# Patient Record
Sex: FEMALE | Race: WHITE | NOT HISPANIC OR LATINO | Employment: OTHER | URBAN - METROPOLITAN AREA
[De-identification: names, ages, dates, MRNs, and addresses within clinical notes are randomized per-mention and may not be internally consistent; named-entity substitution may affect disease eponyms.]

---

## 2017-01-04 RX ORDER — CLONIDINE HYDROCHLORIDE 0.3 MG/1
TABLET ORAL
Qty: 30 TABLET | Refills: 0 | Status: SHIPPED | OUTPATIENT
Start: 2017-01-04 | End: 2017-05-19

## 2017-01-09 ENCOUNTER — PATIENT OUTREACH (OUTPATIENT)
Dept: ADMINISTRATIVE | Facility: HOSPITAL | Age: 67
End: 2017-01-09

## 2017-02-16 ENCOUNTER — PATIENT MESSAGE (OUTPATIENT)
Dept: INTERNAL MEDICINE | Facility: CLINIC | Age: 67
End: 2017-02-16

## 2017-02-16 DIAGNOSIS — E11.9 DIABETES MELLITUS TYPE 2 IN NONOBESE: Primary | ICD-10-CM

## 2017-02-16 DIAGNOSIS — I15.2 HYPERTENSION ASSOCIATED WITH DIABETES: ICD-10-CM

## 2017-02-16 DIAGNOSIS — E78.5 HYPERLIPIDEMIA ASSOCIATED WITH TYPE 2 DIABETES MELLITUS: ICD-10-CM

## 2017-02-16 DIAGNOSIS — E55.9 VITAMIN D DEFICIENCY: ICD-10-CM

## 2017-02-16 DIAGNOSIS — E11.69 HYPERLIPIDEMIA ASSOCIATED WITH TYPE 2 DIABETES MELLITUS: ICD-10-CM

## 2017-02-16 DIAGNOSIS — E11.59 HYPERTENSION ASSOCIATED WITH DIABETES: ICD-10-CM

## 2017-02-21 ENCOUNTER — LAB VISIT (OUTPATIENT)
Dept: LAB | Facility: HOSPITAL | Age: 67
End: 2017-02-21
Attending: FAMILY MEDICINE
Payer: MEDICARE

## 2017-02-21 DIAGNOSIS — E55.9 VITAMIN D DEFICIENCY: ICD-10-CM

## 2017-02-21 DIAGNOSIS — E78.5 HYPERLIPIDEMIA ASSOCIATED WITH TYPE 2 DIABETES MELLITUS: ICD-10-CM

## 2017-02-21 DIAGNOSIS — E11.9 DIABETES MELLITUS TYPE 2 IN NONOBESE: ICD-10-CM

## 2017-02-21 DIAGNOSIS — E11.59 HYPERTENSION ASSOCIATED WITH DIABETES: ICD-10-CM

## 2017-02-21 DIAGNOSIS — E11.69 HYPERLIPIDEMIA ASSOCIATED WITH TYPE 2 DIABETES MELLITUS: ICD-10-CM

## 2017-02-21 DIAGNOSIS — I15.2 HYPERTENSION ASSOCIATED WITH DIABETES: ICD-10-CM

## 2017-02-21 LAB
25(OH)D3+25(OH)D2 SERPL-MCNC: 40 NG/ML
ALBUMIN SERPL BCP-MCNC: 4 G/DL
ALP SERPL-CCNC: 84 U/L
ALT SERPL W/O P-5'-P-CCNC: 28 U/L
ANION GAP SERPL CALC-SCNC: 9 MMOL/L
AST SERPL-CCNC: 24 U/L
BASOPHILS # BLD AUTO: 0.02 K/UL
BASOPHILS NFR BLD: 0.4 %
BILIRUB SERPL-MCNC: 0.5 MG/DL
BUN SERPL-MCNC: 10 MG/DL
CALCIUM SERPL-MCNC: 9.5 MG/DL
CHLORIDE SERPL-SCNC: 106 MMOL/L
CHOLEST/HDLC SERPL: 3.7 {RATIO}
CO2 SERPL-SCNC: 28 MMOL/L
CREAT SERPL-MCNC: 0.8 MG/DL
DIFFERENTIAL METHOD: NORMAL
EOSINOPHIL # BLD AUTO: 0.2 K/UL
EOSINOPHIL NFR BLD: 3.2 %
ERYTHROCYTE [DISTWIDTH] IN BLOOD BY AUTOMATED COUNT: 12.6 %
EST. GFR  (AFRICAN AMERICAN): >60 ML/MIN/1.73 M^2
EST. GFR  (NON AFRICAN AMERICAN): >60 ML/MIN/1.73 M^2
GLUCOSE SERPL-MCNC: 123 MG/DL
HCT VFR BLD AUTO: 38.9 %
HDL/CHOLESTEROL RATIO: 26.8 %
HDLC SERPL-MCNC: 149 MG/DL
HDLC SERPL-MCNC: 40 MG/DL
HGB BLD-MCNC: 12.9 G/DL
LDLC SERPL CALC-MCNC: 76.8 MG/DL
LYMPHOCYTES # BLD AUTO: 1.7 K/UL
LYMPHOCYTES NFR BLD: 33.4 %
MCH RBC QN AUTO: 30.1 PG
MCHC RBC AUTO-ENTMCNC: 33.2 %
MCV RBC AUTO: 91 FL
MONOCYTES # BLD AUTO: 0.4 K/UL
MONOCYTES NFR BLD: 8.4 %
NEUTROPHILS # BLD AUTO: 2.7 K/UL
NEUTROPHILS NFR BLD: 54.4 %
NONHDLC SERPL-MCNC: 109 MG/DL
PLATELET # BLD AUTO: 172 K/UL
PMV BLD AUTO: 12 FL
POTASSIUM SERPL-SCNC: 3.5 MMOL/L
PROT SERPL-MCNC: 6.9 G/DL
RBC # BLD AUTO: 4.29 M/UL
SODIUM SERPL-SCNC: 143 MMOL/L
TRIGL SERPL-MCNC: 161 MG/DL
TSH SERPL DL<=0.005 MIU/L-ACNC: 1.24 UIU/ML
WBC # BLD AUTO: 5 K/UL

## 2017-02-21 PROCEDURE — 84443 ASSAY THYROID STIM HORMONE: CPT

## 2017-02-21 PROCEDURE — 82306 VITAMIN D 25 HYDROXY: CPT

## 2017-02-21 PROCEDURE — 80053 COMPREHEN METABOLIC PANEL: CPT

## 2017-02-21 PROCEDURE — 85025 COMPLETE CBC W/AUTO DIFF WBC: CPT

## 2017-02-21 PROCEDURE — 83036 HEMOGLOBIN GLYCOSYLATED A1C: CPT

## 2017-02-21 PROCEDURE — 36415 COLL VENOUS BLD VENIPUNCTURE: CPT

## 2017-02-21 PROCEDURE — 80061 LIPID PANEL: CPT

## 2017-02-22 LAB
ESTIMATED AVG GLUCOSE: 131 MG/DL
HBA1C MFR BLD HPLC: 6.2 %

## 2017-02-23 ENCOUNTER — OFFICE VISIT (OUTPATIENT)
Dept: INTERNAL MEDICINE | Facility: CLINIC | Age: 67
End: 2017-02-23
Payer: MEDICARE

## 2017-02-23 VITALS
HEIGHT: 60 IN | WEIGHT: 138.88 LBS | OXYGEN SATURATION: 98 % | DIASTOLIC BLOOD PRESSURE: 76 MMHG | TEMPERATURE: 98 F | HEART RATE: 69 BPM | BODY MASS INDEX: 27.26 KG/M2 | SYSTOLIC BLOOD PRESSURE: 132 MMHG

## 2017-02-23 DIAGNOSIS — E11.9 CONTROLLED TYPE 2 DIABETES MELLITUS WITHOUT COMPLICATION, WITHOUT LONG-TERM CURRENT USE OF INSULIN: ICD-10-CM

## 2017-02-23 DIAGNOSIS — Z11.59 NEED FOR HEPATITIS C SCREENING TEST: ICD-10-CM

## 2017-02-23 DIAGNOSIS — Z00.00 ROUTINE GENERAL MEDICAL EXAMINATION AT A HEALTH CARE FACILITY: Primary | ICD-10-CM

## 2017-02-23 DIAGNOSIS — F51.04 CHRONIC INSOMNIA: ICD-10-CM

## 2017-02-23 DIAGNOSIS — K21.9 GASTROESOPHAGEAL REFLUX DISEASE WITHOUT ESOPHAGITIS: ICD-10-CM

## 2017-02-23 DIAGNOSIS — I15.2 HYPERTENSION ASSOCIATED WITH DIABETES: ICD-10-CM

## 2017-02-23 DIAGNOSIS — E78.5 HYPERLIPIDEMIA ASSOCIATED WITH TYPE 2 DIABETES MELLITUS: ICD-10-CM

## 2017-02-23 DIAGNOSIS — E11.69 HYPERLIPIDEMIA ASSOCIATED WITH TYPE 2 DIABETES MELLITUS: ICD-10-CM

## 2017-02-23 DIAGNOSIS — Z85.3 HISTORY OF MALIGNANT NEOPLASM OF BREAST: ICD-10-CM

## 2017-02-23 DIAGNOSIS — E11.59 HYPERTENSION ASSOCIATED WITH DIABETES: ICD-10-CM

## 2017-02-23 DIAGNOSIS — Z23 NEED FOR VACCINATION WITH 13-POLYVALENT PNEUMOCOCCAL CONJUGATE VACCINE: ICD-10-CM

## 2017-02-23 PROCEDURE — G0009 ADMIN PNEUMOCOCCAL VACCINE: HCPCS | Mod: S$GLB,,, | Performed by: FAMILY MEDICINE

## 2017-02-23 PROCEDURE — 99397 PER PM REEVAL EST PAT 65+ YR: CPT | Mod: S$GLB,,, | Performed by: FAMILY MEDICINE

## 2017-02-23 PROCEDURE — 99999 PR PBB SHADOW E&M-EST. PATIENT-LVL III: CPT | Mod: PBBFAC,,, | Performed by: FAMILY MEDICINE

## 2017-02-23 PROCEDURE — 3075F SYST BP GE 130 - 139MM HG: CPT | Mod: S$GLB,,, | Performed by: FAMILY MEDICINE

## 2017-02-23 PROCEDURE — 99499 UNLISTED E&M SERVICE: CPT | Mod: S$GLB,,, | Performed by: FAMILY MEDICINE

## 2017-02-23 PROCEDURE — 3078F DIAST BP <80 MM HG: CPT | Mod: S$GLB,,, | Performed by: FAMILY MEDICINE

## 2017-02-23 PROCEDURE — 90670 PCV13 VACCINE IM: CPT | Mod: S$GLB,,, | Performed by: FAMILY MEDICINE

## 2017-02-23 NOTE — MR AVS SNAPSHOT
[facility]  [address]  [place] [address]  Phone: [phone]  Fax: [phone]                  Renea Bourgeois   2017 1:00 PM   Office Visit    Description:  Female : 1950   Provider:  Kamini Newton MD   Department:  [facility]           Reason for Visit     Annual Exam           Diagnoses this Visit        Comments    Need for vaccination with 13-polyvalent pneumococcal conjugate vaccine    -  Primary     Need for hepatitis C screening test                To Do List           Future Appointments        Provider Department Dept Phone    2017 10:00 AM Andree Coyne, NP-C, CDE [facility] - Diabetes Management [phone]      Goals (5 Years of Data)     None      Follow-Up and Disposition     Return in about 6 months (around 2017), or if symptoms worsen or fail to improve, for follow up chronic conditions.      [facility]     [facility] On Call Nurse Care Line -  Assistance  Registered nurses in the [facility] provide clinical advisement, health education, appointment booking, and other advisory services.  Call for this free service at [phone].             Medications           Message regarding Medications     Verify the changes and/or additions to your medication regime listed below are the same as discussed with your clinician today.  If any of these changes or additions are incorrect, please notify your healthcare provider.        STOP taking these medications     calcium citrate tablet 200 mg     cyclobenzaprine (FLEXERIL) 10 MG tablet Take 1 tablet by mouth daily as needed. [facility] [place] [phone].    ergocalciferol (ERGOCALCIFEROL) 50,000 unit Cap take 1 capsule by mouth every week    glucosamine-D3-boswellia serr (OSTEO BI-FLEX, 5-LOXIN,) 1,500-400-100 mg-unit-mg Tab Take 1 tablet by mouth once daily.           Verify that the below list of medications is an accurate representation  of the medications you are currently taking.  If none reported, the list may be blank. If incorrect, please contact your healthcare provider. Carry this list with you in case of emergency.           Current Medications     ALCOHOL PREP PADS PadM     anastrozole (ARIMIDEX) 1 mg Tab Take 1 tablet (1 mg total) by mouth once daily.    blood sugar diagnostic (ACCU-CHEK JD PLUS TEST STRP) Strp 1 strip by Misc.(Non-Drug; Combo Route) route 3 (three) times daily. Accu-chek Jd Plus Strips    blood-glucose meter (ACCU-CHEK JD PLUS METER) Misc 1 each by Misc.(Non-Drug; Combo Route) route as directed. Accu-chek Jd Plus Meter    cetirizine-pseudoephedrine (ZYRTEC-D) 5-120 mg Tb12 Take by mouth. 1 Tablet Sustained Release 12HR Oral Twice a day    cloNIDine (CATAPRES) 0.3 MG tablet take 1 tablet by mouth every evening    CRESTOR 10 mg tablet take 1 tablet by mouth once daily    esomeprazole (NEXIUM) 40 MG capsule take 1 capsule by mouth once daily    fluticasone (FLONASE) 50 mcg/actuation nasal spray 2 Spray, Suspension Nasal Every day    glimepiride (AMARYL) 1 MG tablet Take 1 tablet (1 mg total) by mouth before breakfast.    KRILL OIL ORAL Take by mouth.    lancets (ACCU-CHEK SOFTCLIX LANCETS) Misc 1 each by Misc.(Non-Drug; Combo Route) route 3 (three) times daily.    lancing device Misc     nebivolol (BYSTOLIC) 5 MG tablet Take 5 mg by mouth Daily.     trazodone (DESYREL) 50 MG tablet take 2 tablets by mouth at bedtime if needed for insomnia           Clinical Reference Information           Your Vitals Were     BP Pulse Temp Height    132/76 (BP Location: Right arm, Patient Position: Sitting, BP Method: Manual) 69 98.1 °F (36.7 °C) (Tympanic) 5' (1.524 m)    Weight Last Period SpO2 BMI    63 kg (138 lb 14.2 oz) 01/01/2004 (Within Months) 98% 27.13 kg/m2      Blood Pressure          Most Recent Value    BP  132/76      Allergies as of 2/23/2017     Codeine      Immunizations Administered on Date of Encounter -  2/23/2017     Name Date Dose VIS Date Route    Pneumococcal Conjugate - 13 Valent 2/23/2017 0.5 mL 11/5/2015 Intramuscular      Orders Placed During Today's Visit      Normal Orders This Visit    Pneumococcal Conjugate Vaccine (13 Valent) (IM)     Future Labs/Procedures Expected by Expires    Hepatitis C antibody  2/23/2017 4/24/2018      Language Assistance Services     ATTENTION: Language assistance services are available, free of charge. Please call [phone].      ATENCIÓN: Si habla jose albertoañol, tiene a flynn disposición servicios gratuitos de asistencia lingüística. Llame al [phone].     CHÚ Ý: N?u b?n nói Ti?ng Vi?t, có các d?ch v? h? tr? ngôn ng? mi?n phí dành cho b?n. G?i s? [phone].         [facility] complies with applicable Federal civil rights laws and does not discriminate on the basis of race, color, national origin, age, disability, or sex.

## 2017-02-24 NOTE — PROGRESS NOTES
"Subjective:       Patient ID: Renea Bourgeois is a 67 y.o. female.    Chief Complaint: Annual Exam    HPI Comments: Patient presents to clinic today for annual physical exam.    Review of Systems   Constitutional: Positive for fatigue. Negative for chills, fever and unexpected weight change.   HENT: Positive for trouble swallowing (chronic, stable, x years). Negative for congestion, dental problem, ear pain, hearing loss and rhinorrhea.    Eyes: Negative for pain and visual disturbance.   Respiratory: Positive for cough (occasionally). Negative for shortness of breath.    Cardiovascular: Positive for palpitations (chronic, stable). Negative for chest pain and leg swelling.   Gastrointestinal: Negative for abdominal distention, abdominal pain, blood in stool, constipation, diarrhea, nausea and vomiting.   Genitourinary: Negative for difficulty urinating and vaginal discharge.   Musculoskeletal: Positive for arthralgias (knees x 2 weeks, "achy", aleve helps). Negative for myalgias.   Skin: Negative for rash.   Neurological: Negative for dizziness, weakness, numbness and headaches.   Hematological: Negative for adenopathy. Does not bruise/bleed easily.   Psychiatric/Behavioral: Positive for sleep disturbance (chronic). Negative for dysphoric mood. The patient is not nervous/anxious.        Objective:      Physical Exam   Constitutional: She is oriented to person, place, and time. Vital signs are normal. She appears well-developed and well-nourished. No distress.   HENT:   Head: Normocephalic and atraumatic.   Right Ear: Tympanic membrane, external ear and ear canal normal.   Left Ear: Tympanic membrane, external ear and ear canal normal.   Nose: Nose normal. No mucosal edema or rhinorrhea.   Mouth/Throat: Uvula is midline, oropharynx is clear and moist and mucous membranes are normal.   Eyes: Conjunctivae, EOM and lids are normal. Pupils are equal, round, and reactive to light.   Neck: Normal range of motion. Neck " supple. No thyromegaly present.   Cardiovascular: Normal rate and regular rhythm.  Exam reveals no gallop and no friction rub.    No murmur heard.  Pulmonary/Chest: Effort normal and breath sounds normal. She has no wheezes. She has no rhonchi. She has no rales.   Abdominal: Soft. Normal appearance and bowel sounds are normal. She exhibits no distension and no mass. There is no hepatosplenomegaly. There is no tenderness.   Musculoskeletal: Normal range of motion.   Lymphadenopathy:     She has no cervical adenopathy.   Neurological: She is alert and oriented to person, place, and time. She has normal strength. No cranial nerve deficit or sensory deficit. Gait normal.   Reflex Scores:       Patellar reflexes are 2+ on the right side and 2+ on the left side.  Skin: Skin is warm and dry. No lesion and no rash noted. No cyanosis. Nails show no clubbing.   Psychiatric: She has a normal mood and affect.   Vitals reviewed.      Assessment:       1. Routine general medical examination at a health care facility    2. Controlled type 2 diabetes mellitus without complication, without long-term current use of insulin    3. Hypertension associated with diabetes    4. Hyperlipidemia associated with type 2 diabetes mellitus    5. Gastroesophageal reflux disease without esophagitis    6. Chronic insomnia    7. Malignant neoplasm of right female breast, unspecified site of breast    8. Need for vaccination with 13-polyvalent pneumococcal conjugate vaccine    9. Need for hepatitis C screening test        Plan:   Renea was seen today for annual exam.    Diagnoses and all orders for this visit:    Routine general medical examination at a health care facility    Controlled type 2 diabetes mellitus without complication, without long-term current use of insulin    Hypertension associated with diabetes  Comments:  controlled, continue current meds    Hyperlipidemia associated with type 2 diabetes mellitus  Comments:  improved, continue  current meds    Gastroesophageal reflux disease without esophagitis  Comments:  controlled on nexium    Chronic insomnia  Comments:  stable on trazodone    Malignant neoplasm of right female breast, unspecified site of breast  Comments:  followed by Dr. Jones, Hem/Onc    Need for vaccination with 13-polyvalent pneumococcal conjugate vaccine  -     Pneumococcal Conjugate Vaccine (13 Valent) (IM)    Need for hepatitis C screening test  -     Hepatitis C antibody; Future    - knee pain may be due to recently helping her kids clean their houses. She can take aleve with food as needed, advised against regular use. Followup if persistent.  - Health Maintenance reviewed/updated.

## 2017-03-16 DIAGNOSIS — E11.9 TYPE 2 DIABETES MELLITUS WITHOUT COMPLICATION, WITHOUT LONG-TERM CURRENT USE OF INSULIN: Chronic | ICD-10-CM

## 2017-03-16 RX ORDER — ROSUVASTATIN CALCIUM 10 MG/1
TABLET, COATED ORAL
Qty: 90 TABLET | Refills: 3 | Status: SHIPPED | OUTPATIENT
Start: 2017-03-16 | End: 2018-03-24 | Stop reason: SDUPTHER

## 2017-03-16 RX ORDER — GLIMEPIRIDE 1 MG/1
TABLET ORAL
Qty: 30 TABLET | Refills: 3 | Status: SHIPPED | OUTPATIENT
Start: 2017-03-16 | End: 2017-05-19 | Stop reason: SDUPTHER

## 2017-05-19 ENCOUNTER — OFFICE VISIT (OUTPATIENT)
Dept: INTERNAL MEDICINE | Facility: CLINIC | Age: 67
End: 2017-05-19
Payer: MEDICARE

## 2017-05-19 ENCOUNTER — OFFICE VISIT (OUTPATIENT)
Dept: DIABETES | Facility: CLINIC | Age: 67
End: 2017-05-19
Payer: MEDICARE

## 2017-05-19 VITALS
DIASTOLIC BLOOD PRESSURE: 80 MMHG | WEIGHT: 140.88 LBS | SYSTOLIC BLOOD PRESSURE: 120 MMHG | HEIGHT: 60 IN | BODY MASS INDEX: 27.66 KG/M2

## 2017-05-19 VITALS
WEIGHT: 140.88 LBS | BODY MASS INDEX: 27.66 KG/M2 | SYSTOLIC BLOOD PRESSURE: 120 MMHG | TEMPERATURE: 98 F | HEART RATE: 70 BPM | DIASTOLIC BLOOD PRESSURE: 80 MMHG | OXYGEN SATURATION: 99 % | HEIGHT: 60 IN

## 2017-05-19 DIAGNOSIS — I10 ESSENTIAL HYPERTENSION, BENIGN: Chronic | ICD-10-CM

## 2017-05-19 DIAGNOSIS — H25.13 NUCLEAR SCLEROSIS OF BOTH EYES: ICD-10-CM

## 2017-05-19 DIAGNOSIS — B97.7 HUMAN PAPILLOMA VIRUS (HPV) INFECTION: ICD-10-CM

## 2017-05-19 DIAGNOSIS — I20.9 ANGINA PECTORIS: ICD-10-CM

## 2017-05-19 DIAGNOSIS — Z85.3 HISTORY OF MALIGNANT NEOPLASM OF BREAST: ICD-10-CM

## 2017-05-19 DIAGNOSIS — K21.9 GASTROESOPHAGEAL REFLUX DISEASE, ESOPHAGITIS PRESENCE NOT SPECIFIED: ICD-10-CM

## 2017-05-19 DIAGNOSIS — E11.9 DIABETES MELLITUS TYPE 2 WITHOUT RETINOPATHY: ICD-10-CM

## 2017-05-19 DIAGNOSIS — E78.2 COMBINED HYPERLIPIDEMIA ASSOCIATED WITH TYPE 2 DIABETES MELLITUS: Chronic | ICD-10-CM

## 2017-05-19 DIAGNOSIS — G56.03 CARPAL TUNNEL SYNDROME ON BOTH SIDES: Chronic | ICD-10-CM

## 2017-05-19 DIAGNOSIS — M85.80 OSTEOPENIA, UNSPECIFIED LOCATION: ICD-10-CM

## 2017-05-19 DIAGNOSIS — E11.9 TYPE 2 DIABETES MELLITUS WITHOUT COMPLICATION, WITHOUT LONG-TERM CURRENT USE OF INSULIN: Primary | Chronic | ICD-10-CM

## 2017-05-19 DIAGNOSIS — H52.7 REFRACTIVE ERROR: ICD-10-CM

## 2017-05-19 DIAGNOSIS — Z00.00 ENCOUNTER FOR PREVENTIVE HEALTH EXAMINATION: Primary | ICD-10-CM

## 2017-05-19 DIAGNOSIS — E11.69 COMBINED HYPERLIPIDEMIA ASSOCIATED WITH TYPE 2 DIABETES MELLITUS: Chronic | ICD-10-CM

## 2017-05-19 DIAGNOSIS — I27.20 PULMONARY HTN: ICD-10-CM

## 2017-05-19 DIAGNOSIS — H26.9 CATARACT, UNSPECIFIED CATARACT TYPE, UNSPECIFIED LATERALITY: ICD-10-CM

## 2017-05-19 DIAGNOSIS — Z96.1 PSEUDOPHAKIA: ICD-10-CM

## 2017-05-19 DIAGNOSIS — Z87.442 HISTORY OF RENAL CALCULI: ICD-10-CM

## 2017-05-19 DIAGNOSIS — H25.12 NUCLEAR SCLEROSIS OF LEFT EYE: ICD-10-CM

## 2017-05-19 LAB — GLUCOSE SERPL-MCNC: 142 MG/DL (ref 70–110)

## 2017-05-19 PROCEDURE — 3074F SYST BP LT 130 MM HG: CPT | Mod: S$GLB,,, | Performed by: PHYSICIAN ASSISTANT

## 2017-05-19 PROCEDURE — 3079F DIAST BP 80-89 MM HG: CPT | Mod: S$GLB,,, | Performed by: NURSE PRACTITIONER

## 2017-05-19 PROCEDURE — 99213 OFFICE O/P EST LOW 20 MIN: CPT | Mod: S$GLB,,, | Performed by: NURSE PRACTITIONER

## 2017-05-19 PROCEDURE — G0439 PPPS, SUBSEQ VISIT: HCPCS | Mod: S$GLB,,, | Performed by: PHYSICIAN ASSISTANT

## 2017-05-19 PROCEDURE — 1159F MED LIST DOCD IN RCRD: CPT | Mod: S$GLB,,, | Performed by: NURSE PRACTITIONER

## 2017-05-19 PROCEDURE — 99999 PR PBB SHADOW E&M-EST. PATIENT-LVL III: CPT | Mod: PBBFAC,,, | Performed by: NURSE PRACTITIONER

## 2017-05-19 PROCEDURE — 99999 PR PBB SHADOW E&M-EST. PATIENT-LVL IV: CPT | Mod: PBBFAC,,, | Performed by: PHYSICIAN ASSISTANT

## 2017-05-19 PROCEDURE — 99499 UNLISTED E&M SERVICE: CPT | Mod: S$GLB,,, | Performed by: PHYSICIAN ASSISTANT

## 2017-05-19 PROCEDURE — 3044F HG A1C LEVEL LT 7.0%: CPT | Mod: S$GLB,,, | Performed by: NURSE PRACTITIONER

## 2017-05-19 PROCEDURE — 3079F DIAST BP 80-89 MM HG: CPT | Mod: S$GLB,,, | Performed by: PHYSICIAN ASSISTANT

## 2017-05-19 PROCEDURE — 3074F SYST BP LT 130 MM HG: CPT | Mod: S$GLB,,, | Performed by: NURSE PRACTITIONER

## 2017-05-19 PROCEDURE — 1160F RVW MEDS BY RX/DR IN RCRD: CPT | Mod: S$GLB,,, | Performed by: NURSE PRACTITIONER

## 2017-05-19 PROCEDURE — 82948 REAGENT STRIP/BLOOD GLUCOSE: CPT | Mod: S$GLB,,, | Performed by: NURSE PRACTITIONER

## 2017-05-19 PROCEDURE — 3060F POS MICROALBUMINURIA REV: CPT | Mod: 8P,S$GLB,, | Performed by: NURSE PRACTITIONER

## 2017-05-19 RX ORDER — GLIMEPIRIDE 1 MG/1
1 TABLET ORAL DAILY
Qty: 30 TABLET | Refills: 6 | Status: SHIPPED | OUTPATIENT
Start: 2017-05-19 | End: 2017-12-11 | Stop reason: SDUPTHER

## 2017-05-19 RX ORDER — METOPROLOL SUCCINATE 25 MG/1
25 TABLET, EXTENDED RELEASE ORAL NIGHTLY
Refills: 1 | COMMUNITY
Start: 2017-05-12 | End: 2021-12-02 | Stop reason: SDUPTHER

## 2017-05-19 NOTE — PATIENT INSTRUCTIONS
Counseling and Referral of Other Preventative  (Italic type indicates deductible and co-insurance are waived)    Patient Name: Renea Bourgeois  Today's Date: 5/19/2017      SERVICE LIMITATIONS RECOMMENDATION    Vaccines    · Pneumococcal (once after 65)    · Influenza (annually)    · Hepatitis B (if medium/high risk)    · Prevnar 13      Hepatitis B medium/high risk factors:       - End-stage renal disease       - Hemophiliacs who received Factor VII or         IX concentrates       - Clients of institutions for the mentally             retarded       - Persons who live in the same house as          a HepB carrier       - Homosexual men       - Illicit injectable drug abusers     Pneumococcal: Done, no repeat necessary     Influenza: Done, repeat in one year     Hepatitis B: N/A     Prevnar 13: Done, no repeat necessary    Mammogram (biennial age 50-74)  Annually (age 40 or over)  done 6/21/17   Recommend yearly    Pap (up to age 70 and after 70 if unknown history or abnormal study last 10 years)    N/A     Patient's GYN states there no need.     Colorectal cancer screening (to age 75)    · Fecal occult blood test (annual)  · Flexible sigmoidoscopy (5y)  · Screening colonoscopy (10y)  · Barium enema   Last done 12/18/2014, recommend to repeat every 3  years    Diabetes self-management training (no USPSTF recommendations)  Requires referral by treating physician for patient with diabetes or renal disease. 10 hours of initial DSMT sessions of no less than 30 minutes each in a continuous 12-month period. 2 hours of follow-up DSMT in subsequent years.  Done this year, repeat every year    Bone mass measurements (age 65 & older, biennial)  Requires diagnosis related to osteoporosis or estrogen deficiency. Biennial benefit unless patient has history of long-term glucocorticoid  Scheduled, see appointments    Glaucoma screening (no USPSTF recommendation)  Diabetes mellitus, family history   , age 50 or  over    American, age 65 or over  Last done 6/17/16, recommend to repeat every 1  years    Medical nutrition therapy for diabetes or renal disease (no recommended schedule)  Requires referral by treating physician for patient with diabetes or renal disease or kidney transplant within the past 3 years.  Can be provided in same year as diabetes self-management training (DSMT), and CMS recommends medical nutrition therapy take place after DSMT. Up to 3 hours for initial year and 2 hours in subsequent years.  Done this year, repeat every year    Cardiovascular screening blood tests (every 5 years)  · Fasting lipid panel  Order as a panel if possible  Done this year, repeat every year    Diabetes screening tests (at least every 3 years, Medicare covers annually or at 6-month intervals for prediabetic patients)  · Fasting blood sugar (FBS) or glucose tolerance test (GTT)  Patient must be diagnosed with one of the following:       - Hypertension       - Dyslipidemia       - Obesity (BMI 30kg/m2)       - Previous elevated impaired FBS or GTT       ... or any two of the following:       - Overweight (BMI 25 but <30)       - Family history of diabetes       - Age 65 or older       - History of gestational diabetes or birth of baby weighing more than 9 pounds  Last done 2/21/2017, recommend to repeat every 6  months    Abdominal aortic aneurysm screening (once)  · Sonogram   Limited to patients who meet one of the following criteria:       - Men who are 65-75 years old and have smoked more than 100 cigarette in their lifetime       - Anyone with a family history of abdominal aortic aneurysm       - Anyone recommended for screening by the USPSTF  N/A    HIV screening (annually for increased risk patients)  · HIV-1 and HIV-2 by EIA, or PALMER, rapid antibody test or oral mucosa transudate  Patients must be at increased risk for HIV infection per USPSTF guidelines or pregnant. Tests covered annually for patient at  increased risk or as requested by the patient. Pregnant patients may receive up to 3 tests during pregnancy.  Risks discussed, screening is not recommended    Smoking cessation counseling (up to 8 sessions per year)  Patients must be asymptomatic of tobacco-related conditions to receive as a preventative service.  Non-smoker    Subsequent annual wellness visit  At least 12 months since last AWV  Return in one year     The following information is provided to all patients.  This information is to help you find resources for any of the problems found today that may be affecting your health:                Living healthy guide: www.[facility].[place].[facility]      Understanding Diabetes: www.diabetes.org      Eating healthy: www.cdc.gov/healthyweight      [facility] home safety checklist: www.cdc.gov/steadi/patient.html      Agency on Aging: www.goea.[place].[facility]      Alcoholics anonymous (AA): www.aa.org      Physical Activity: www.arie.nih.gov/ki2ipza      Tobacco use: www.quitwithusla.org

## 2017-05-19 NOTE — MR AVS SNAPSHOT
[facility]  [id number] [place] [id number]  Phone: [phone]  Fax: [phone]                  Renea Bourgeois   2017 9:00 AM   Office Visit    Description:  Female : 1950   Provider:  Marc Srinivasan III, PA-C   Department:  [facility]           Reason for Visit     HRA           Diagnoses this Visit        Comments    Encounter for preventive health examination    -  Primary     Angina pectoris         Carpal tunnel syndrome on both sides         Pulmonary HTN         Essential hypertension, benign         Human papilloma virus (HPV) infection         Combined hyperlipidemia associated with type 2 diabetes mellitus         Diabetes mellitus type 2 without retinopathy         Gastroesophageal reflux disease, esophagitis presence not specified         Osteopenia, unspecified location         Cataract, unspecified cataract type, unspecified laterality         History of renal calculi         Nuclear sclerosis of both eyes         Refractive error         Pseudophakia         Nuclear sclerosis of left eye         History of malignant neoplasm of breast                To Do List           Future Appointments        Provider Department Dept Phone    2017 10:00 AM Andree Coyne, NP-C, CDE [facility] [phone]      Goals (5 Years of Data)     None      [facility]     [facility] Care [facility] - 24/7 Assistance  Unless otherwise directed by your provider, please contact [facility], our nurse care line that is available for 24/7 assistance.     Registered nurses in the [facility] provide: appointment scheduling, clinical advisement, health education, and other advisory services.  Call: [phone] (toll free)               Medications           Message regarding Medications     Verify the changes and/or additions to your medication regime listed below are the same as discussed with  your clinician today.  If any of these changes or additions are incorrect, please notify your healthcare provider.        STOP taking these medications     cloNIDine (CATAPRES) 0.3 MG tablet take 1 tablet by mouth every evening           Verify that the below list of medications is an accurate representation of the medications you are currently taking.  If none reported, the list may be blank. If incorrect, please contact your healthcare provider. Carry this list with you in case of emergency.           Current Medications     ALCOHOL PREP PADS PadM     anastrozole (ARIMIDEX) 1 mg Tab Take 1 tablet (1 mg total) by mouth once daily.    blood sugar diagnostic (ACCU-CHEK JD PLUS TEST STRP) Strp 1 strip by Misc.(Non-Drug; Combo Route) route 3 (three) times daily. Accu-chek Jd Plus Strips    blood-glucose meter (ACCU-CHEK JD PLUS METER) Misc 1 each by Misc.(Non-Drug; Combo Route) route as directed. Accu-chek Jd Plus Meter    cetirizine-pseudoephedrine (ZYRTEC-D) 5-120 mg Tb12 Take by mouth. 1 Tablet Sustained Release 12HR Oral Twice a day    esomeprazole (NEXIUM) 40 MG capsule take 1 capsule by mouth once daily    fluticasone (FLONASE) 50 mcg/actuation nasal spray 2 Spray, Suspension Nasal Every day    glimepiride (AMARYL) 1 MG tablet take 1 tablet by mouth once daily BEFORE BREAKFAST    KRILL OIL ORAL Take by mouth.    lancets (ACCU-CHEK SOFTCLIX LANCETS) Misc 1 each by Misc.(Non-Drug; Combo Route) route 3 (three) times daily.    lancing device Misc     metoprolol succinate (TOPROL-XL) 25 MG 24 hr tablet Take 25 mg by mouth once daily.    nebivolol (BYSTOLIC) 5 MG tablet Take 5 mg by mouth Daily.     rosuvastatin (CRESTOR) 10 MG tablet take 1 tablet by mouth once daily    trazodone (DESYREL) 50 MG tablet take 2 tablets by mouth at bedtime if needed for insomnia           Clinical Reference Information           Your Vitals Were     BP Pulse Temp Height    120/80 (BP Location: Left arm, Patient Position:  Sitting, BP Method: Manual) 70 97.6 °F (36.4 °C) (Tympanic) 5' (1.524 m)    Weight Last Period SpO2 BMI    63.9 kg (140 lb 14 oz) 01/01/2004 (Within Months) 99% 27.51 kg/m2      Blood Pressure          Most Recent Value    BP  120/80      Allergies as of 5/19/2017     Codeine      Immunizations Administered on Date of Encounter - 5/19/2017     None      Instructions      Counseling and Referral of Other Preventative  (Italic type indicates deductible and co-insurance are waived)    Patient Name: Renea Bourgeois  Today's Date: 5/19/2017      SERVICE LIMITATIONS RECOMMENDATION    Vaccines    · Pneumococcal (once after 65)    · Influenza (annually)    · Hepatitis B (if medium/high risk)    · Prevnar 13      Hepatitis B medium/high risk factors:       - End-stage renal disease       - Hemophiliacs who received Factor VII or         IX concentrates       - Clients of institutions for the mentally             retarded       - Persons who live in the same house as          a HepB carrier       - Homosexual men       - Illicit injectable drug abusers     Pneumococcal: Done, no repeat necessary     Influenza: Done, repeat in one year     Hepatitis B: N/A     Prevnar 13: Done, no repeat necessary    Mammogram (biennial age 50-74)  Annually (age 40 or over)  done 6/21/17   Recommend yearly    Pap (up to age 70 and after 70 if unknown history or abnormal study last 10 years)    N/A     Patient's GYN states there no need.     Colorectal cancer screening (to age 75)    · Fecal occult blood test (annual)  · Flexible sigmoidoscopy (5y)  · Screening colonoscopy (10y)  · Barium enema   Last done 12/18/2014, recommend to repeat every 3  years    Diabetes self-management training (no USPSTF recommendations)  Requires referral by treating physician for patient with diabetes or renal disease. 10 hours of initial DSMT sessions of no less than 30 minutes each in a continuous 12-month period. 2 hours of follow-up DSMT in subsequent years.   Done this year, repeat every year    Bone mass measurements (age 65 & older, biennial)  Requires diagnosis related to osteoporosis or estrogen deficiency. Biennial benefit unless patient has history of long-term glucocorticoid  Scheduled, see appointments    Glaucoma screening (no USPSTF recommendation)  Diabetes mellitus, family history   , age 50 or over    American, age 65 or over  Last done 6/17/16, recommend to repeat every 1  years    Medical nutrition therapy for diabetes or renal disease (no recommended schedule)  Requires referral by treating physician for patient with diabetes or renal disease or kidney transplant within the past 3 years.  Can be provided in same year as diabetes self-management training (DSMT), and CMS recommends medical nutrition therapy take place after DSMT. Up to 3 hours for initial year and 2 hours in subsequent years.  Done this year, repeat every year    Cardiovascular screening blood tests (every 5 years)  · Fasting lipid panel  Order as a panel if possible  Done this year, repeat every year    Diabetes screening tests (at least every 3 years, Medicare covers annually or at 6-month intervals for prediabetic patients)  · Fasting blood sugar (FBS) or glucose tolerance test (GTT)  Patient must be diagnosed with one of the following:       - Hypertension       - Dyslipidemia       - Obesity (BMI 30kg/m2)       - Previous elevated impaired FBS or GTT       ... or any two of the following:       - Overweight (BMI 25 but <30)       - Family history of diabetes       - Age 65 or older       - History of gestational diabetes or birth of baby weighing more than 9 pounds  Last done 2/21/2017, recommend to repeat every 6  months    Abdominal aortic aneurysm screening (once)  · Sonogram   Limited to patients who meet one of the following criteria:       - Men who are 65-75 years old and have smoked more than 100 cigarette in their lifetime       - Anyone with a family  history of abdominal aortic aneurysm       - Anyone recommended for screening by the USPSTF  N/A    HIV screening (annually for increased risk patients)  · HIV-1 and HIV-2 by EIA, or PALMER, rapid antibody test or oral mucosa transudate  Patients must be at increased risk for HIV infection per USPSTF guidelines or pregnant. Tests covered annually for patient at increased risk or as requested by the patient. Pregnant patients may receive up to 3 tests during pregnancy.  Risks discussed, screening is not recommended    Smoking cessation counseling (up to 8 sessions per year)  Patients must be asymptomatic of tobacco-related conditions to receive as a preventative service.  Non-smoker    Subsequent annual wellness visit  At least 12 months since last AWV  Return in one year     The following information is provided to all patients.  This information is to help you find resources for any of the problems found today that may be affecting your health:                Living healthy guide: www.[facility].[place].[facility]      Understanding Diabetes: www.diabetes.org      Eating healthy: www.cdc.gov/healthyweight      [facility] home safety checklist: www.cdc.gov/steadi/patient.html      Agency on Aging: www.goea.[place].[facility]      Alcoholics anonymous (AA): www.aa.org      Physical Activity: www.arie.nih.gov/uv4epcm      Tobacco use: www.quitwithusla.org          Language Assistance Services     ATTENTION: Language assistance services are available, free of charge. Please call [phone].      ATENCIÓN: Si habla español, tiene a flynn disposición servicios gratuitos de asistencia lingüística. Llame al [phone].     CHÚ Ý: N?u b?n nói Ti?ng Vi?t, có các d?ch v? h? tr? ngôn ng? mi?n phí dành cho b?n. G?i s? [phone].         [facility] complies with applicable Federal civil rights laws and does not discriminate on the basis of race, color, national origin, age, disability, or sex.

## 2017-05-19 NOTE — PROGRESS NOTES
PCP: Kamini Newton MD    Subjective:     HISTORY OF PRESENT ILLNESS: 67 year old  female patient is in clinic today for diabetes. Patient has had Type II diabetes since 2010. She is to be enrolled in diabetes education classes. Most recent A1C is 6.2, ADA recommends less than 7.0.  She has a history of breast cancer, treated in 2010 with remission since then.  No longer taking metformin due to SE.  Per meter, fasting BGs are 86 - 136.  She has lost 1 pound since last visit.     Patient denies polyuria, polydipsia, occasional polyphagia, or blurred vision. Also denies nausea, vomiting, diarrhea or hypoglycemic symptoms.     Height: 5', Weight: 140 pounds, BMI 27.51  Blood Glucose reading this AM: 107 mg/dl fasting  Blood Glucose reading in clinic: 142 mg/dl at 10:00 am    DM MEDICATIONS:   Glimepiride 1 mg before breakfast    Labs Reviewed.    REVIEW OF SYSTEMS:  General: Denies fever, chills, change in appetite.  HEENT: Denies impaired hearing, dysphagia.  Respiratory: Denies shortness of breath, cough or wheezing.  Cardiovascular: Denies chest pain, palpitations.  GI: Denies hematochezia.  : Denies hematuria, dysuria or frequency.  MS:  Normal gait. Denies difficulty with mobility, muscle or joint pain.  SKIN: Denies rashes and lesions.  Neuro: Denies numbness or tingling in the hands or feet.   PSYCH: Denies depression or anxiety. No suicidal ideations.  ENDO: See HPI.    STANDARDS OF CARE:  Eye doctor: Dr. Lu, Last exam 6 / 2016.    Dental exam: Recommend regular exams; denies gums bleeding.  Podiatry doctor: No podiatrist.     ACTIVITY LEVEL: No routine exercise.   MEAL PLANNING: Number of meals per day - 3. Number of snacks per day - 2.  Per dietary recall, patient is not limiting carbohydrates, saturated fats and sodium.     BLOOD GLUCOSE TESTING: Self-monitoring with TRUE RESULT meter  SOCIAL HISTORY: .  Lives with spouse. She works as a  for a disabled student,  which happens to be her grandchild.  She does not smoke, drink.     Objective:     PHYSICAL EXAMINATION:  GENERAL: WDWN  female in no acute distress, ambulatory, responds appropriately. AAO X 3.   NECK: Supple, no thyromegaly, no cervical or supraclavicular lymphadenopathy, no carotid bruit.  HEART: Regular rate and rhythm. No rubs, murmurs or gallops.  LUNGS: CTA bilaterally. Unlabored breathing, no use of accessory muscles.  MUSCULOSKELETAL: Normal gait and muscle strength.  ABDOMEN: Active bowel sounds X 4, no masses or tenderness.   SKIN: Warm, dry skin. No lesions or abrasions. Clean, dry well-healed injection sites.   NEUROLOGIC: Cranial nerves II-XII grossly intact.   FOOT EXAMINATION: Protective Sensation (w/ 10 gram monofilament):  Right: Intact  Left: Intact  //  Visual Inspection: Normal -  Bilateral  //  Pedal Pulses:  Right: Present  Left: Present    Assessment:     EDUCATIONAL ASSESSMENT:  1. Impediments in learning class environment - NONE.  2. Needs improvement in self-care management skills.    (1.) Diabetes Type II  - controlled on glimepiride, A1C 6.2  (2.) Hypertension - controlled Toprol XL, Bystolic  (3.) Hyperlipidemia - controlled on Crestor    Plan:     1.) Patient was instructed to monitor blood glucose 1 x daily, rotating times. Discussed ADA goal for fasting blood sugar, 80 - 130 mg/dL; pp blood sugars below 180 mg/dl. Also, discussed prevention of hypoglycemia and the need to adjust goals to higher levels if persistent hypoglycemia. Reminded to bring blood glucose records or meter to each visit for review.  2.) Reviewed pathophysiology of Type 2 diabetes, complications related to the disease, importance of annual dilated eye exam and daily foot examination.  3.) Continue glimepiride 1 mg before breakfast.  This regimen appears to be controlling blood sugars without frequent hypoglycemia.   4.) Meal planning teaching: Carbohydrate definition - one serving is 15 gms. Carbohydrate  spacing - carbohydrates should be spaced into approximately 3 meals with 2 snacks (of one carbohydrate) between meals or at bedtime. Increase vegetable intake to 2 or more cups of vegetables per day as well as 2 fruit servings. Recommended low saturated fat, low sodium diet to aid in control of hypertension and cholesterol.  5.) Discussed activity, benefits, methods, and precautions. Recommended patient start some form of exercise and increase as tolerated to 30 minutes per day to facilitate weight loss and aid in control of BGs.  6.) Future Labs scheduled:  A1C, Comprehensive metabolic panel.  7.) Return to clinic in 6 months for follow up.  Advised patient to call clinic with any questions or concerns.     Andree Coyne, JULIUS-C, CDE

## 2017-05-19 NOTE — PROGRESS NOTES
Renea Bourgeois presented for a  Medicare AWV and comprehensive Health Risk Assessment today. The following components were reviewed and updated:    · Medical history  · Family History  · Social history  · Allergies and Current Medications  · Health Risk Assessment  · Health Maintenance  · Care Team     ** See Completed Assessments for Annual Wellness Visit within the encounter summary.**       The following assessments were completed:  · Living Situation  · CAGE  · Depression Screening  · Timed Get Up and Go  · Whisper Test  · Cognitive Function Screening  · Nutrition Screening  · ADL Screening  · PAQ Screening    Vitals:    05/19/17 0904   BP: 120/80   BP Location: Left arm   Patient Position: Sitting   BP Method: Manual   Pulse: 70   Temp: 97.6 °F (36.4 °C)   TempSrc: Tympanic   SpO2: 99%   Weight: 63.9 kg (140 lb 14 oz)   Height: 5' (1.524 m)     Body mass index is 27.51 kg/(m^2).  Physical Exam   Constitutional: She appears well-developed and well-nourished. No distress.   Cardiovascular: Normal rate and regular rhythm.  Exam reveals no gallop and no friction rub.    No murmur heard.  Pulmonary/Chest: Effort normal and breath sounds normal. No respiratory distress. She has no wheezes. She has no rales. She exhibits no tenderness.   Abdominal: Soft. There is no tenderness.   Skin: She is not diaphoretic.         Diagnoses and health risks identified today and associated recommendations/orders:    1. Encounter for preventive health examination      2. Angina pectoris  Patient states that she is scheduled to see cardiology for this. She has no active chest pain at the moment. She was instructed to go to the ER if things get worse.     3. Carpal tunnel syndrome on both sides  Stable and controlled. Continue current treatment plan as previously prescribed with your PCP.       4. Pulmonary HTN  Stable and controlled. Continue current treatment plan as previously prescribed with your cardiologist.       5. Essential  hypertension, benign  Stable and controlled. Continue current treatment plan as previously prescribed with your PCP.       6. Human papilloma virus (HPV) infection  Stable and controlled. Continue current treatment plan as previously prescribed with your PCP.       7. Combined hyperlipidemia associated with type 2 diabetes mellitus  Stable and controlled. Continue current treatment plan as previously prescribed with your PCP.       9. Gastroesophageal reflux disease, esophagitis presence not specified  Stable and controlled. Continue current treatment plan as previously prescribed with your PCP.       10. Osteopenia, unspecified location  Stable and controlled. Continue current treatment plan as previously prescribed with your PCP.       11. Cataract, unspecified cataract type, unspecified laterality  Stable and controlled. Continue current treatment plan as previously prescribed with your Ophthalmologist. .       12. History of renal calculi  Stable and controlled. Continue current treatment plan as previously prescribed with your PCP.       13. Nuclear sclerosis of both eyes  Stable and controlled. Continue current treatment plan as previously prescribed with your Ophthalmologist. .         14. Refractive error  Stable and controlled. Continue current treatment plan as previously prescribed with your Ophthalmologist. .         15. Pseudophakia  Stable and controlled. Continue current treatment plan as previously prescribed with your Ophthalmologist. .         16. Nuclear sclerosis of left eye  Stable and controlled. Continue current treatment plan as previously prescribed with your Ophthalmologist. .         17. History of malignant neoplasm of breast  Stable and controlled. Continue current treatment plan as previously prescribed with your Oncologist.         Provided Renea with a 5-10 year written screening schedule and personal prevention plan. Recommendations were developed using the USPSTF age appropriate  recommendations. Education, counseling, and referrals were provided as needed. After Visit Summary printed and given to patient which includes a list of additional screenings\tests needed.    No Follow-up on file.    Marc Srinivasan Iii, PA-C

## 2017-07-07 ENCOUNTER — HOSPITAL ENCOUNTER (OUTPATIENT)
Dept: RADIOLOGY | Facility: HOSPITAL | Age: 67
Discharge: HOME | End: 2017-07-07
Attending: INTERNAL MEDICINE
Payer: MEDICARE

## 2017-07-07 VITALS — HEIGHT: 60 IN | BODY MASS INDEX: 27.48 KG/M2 | WEIGHT: 140 LBS

## 2017-07-07 DIAGNOSIS — T38.6X5A OSTEOPOROSIS DUE TO AROMATASE INHIBITOR: ICD-10-CM

## 2017-07-07 DIAGNOSIS — M85.80 OSTEOPENIA: ICD-10-CM

## 2017-07-07 DIAGNOSIS — M81.8 OSTEOPOROSIS DUE TO AROMATASE INHIBITOR: ICD-10-CM

## 2017-07-07 DIAGNOSIS — Z12.31 ENCOUNTER FOR SCREENING MAMMOGRAM FOR BREAST CANCER: ICD-10-CM

## 2017-07-07 DIAGNOSIS — C50.411 MALIGNANT NEOPLASM OF UPPER-OUTER QUADRANT OF RIGHT FEMALE BREAST: ICD-10-CM

## 2017-07-07 DIAGNOSIS — C50.911 BREAST CANCER, RIGHT: Chronic | ICD-10-CM

## 2017-07-07 PROCEDURE — 77063 BREAST TOMOSYNTHESIS BI: CPT | Mod: 26,,, | Performed by: RADIOLOGY

## 2017-07-07 PROCEDURE — 77067 SCR MAMMO BI INCL CAD: CPT | Mod: 26,,, | Performed by: RADIOLOGY

## 2017-07-07 PROCEDURE — 77067 SCR MAMMO BI INCL CAD: CPT | Mod: TC

## 2017-07-12 ENCOUNTER — OFFICE VISIT (OUTPATIENT)
Dept: HEMATOLOGY/ONCOLOGY | Facility: CLINIC | Age: 67
End: 2017-07-12
Payer: MEDICARE

## 2017-07-12 VITALS
RESPIRATION RATE: 18 BRPM | TEMPERATURE: 98 F | WEIGHT: 141.75 LBS | HEIGHT: 62 IN | BODY MASS INDEX: 26.09 KG/M2 | SYSTOLIC BLOOD PRESSURE: 139 MMHG | DIASTOLIC BLOOD PRESSURE: 82 MMHG | HEART RATE: 73 BPM | OXYGEN SATURATION: 98 %

## 2017-07-12 DIAGNOSIS — Z17.0 MALIGNANT NEOPLASM OF UPPER-OUTER QUADRANT OF LEFT BREAST IN FEMALE, ESTROGEN RECEPTOR POSITIVE: ICD-10-CM

## 2017-07-12 DIAGNOSIS — Z85.3 HISTORY OF MALIGNANT NEOPLASM OF BREAST: Primary | ICD-10-CM

## 2017-07-12 DIAGNOSIS — C50.412 MALIGNANT NEOPLASM OF UPPER-OUTER QUADRANT OF LEFT BREAST IN FEMALE, ESTROGEN RECEPTOR POSITIVE: ICD-10-CM

## 2017-07-12 PROCEDURE — 99999 PR PBB SHADOW E&M-EST. PATIENT-LVL III: CPT | Mod: PBBFAC,,, | Performed by: INTERNAL MEDICINE

## 2017-07-12 PROCEDURE — 99214 OFFICE O/P EST MOD 30 MIN: CPT | Mod: S$GLB,,, | Performed by: INTERNAL MEDICINE

## 2017-07-12 PROCEDURE — 1126F AMNT PAIN NOTED NONE PRSNT: CPT | Mod: S$GLB,,, | Performed by: INTERNAL MEDICINE

## 2017-07-12 PROCEDURE — 1159F MED LIST DOCD IN RCRD: CPT | Mod: S$GLB,,, | Performed by: INTERNAL MEDICINE

## 2017-07-12 NOTE — PROGRESS NOTES
Subjective:       Patient ID: Renea Bourgeois is a 67 y.o. female.    Chief Complaint: Results and Breast Cancer    HPI 67-year-old female history of T1 ER positive high Oncotype on the type recurrence score breast cancer completed 6 cycles of TAC therapy 2011 is been on aromatase inhibitor since will complete in 2021    Past Medical History:   Diagnosis Date    Allergy     Angina pectoris     followed by cardiology    Arthritis     Breast cancer     Right T1 Ductal Ca in situ,high oncotype Dx 13, Estrogen positive. Lumpectomy, TAC chemo x 6 cyscles then RT,on aromatase inhibitor    Cataract     Diabetes mellitus type II 2011    GERD (gastroesophageal reflux disease)     Hyperlipidemia     Hypertension     Kidney stone     right side, passed    Osteopenia     PVC (premature ventricular contraction)     on and off    Trouble in sleeping      Family History   Problem Relation Age of Onset    Diabetes Father     Hypertension Father     Stroke Father     Cancer Father 65     metastatic when diagnosed    Stroke Brother     Cancer Other      kidney    Atrial fibrillation Son      35    Heart disease Son     Cancer Paternal Grandfather      prostate    Glaucoma Maternal Grandmother     Psoriasis Cousin     Alcohol abuse Neg Hx     Drug abuse Neg Hx     COPD Neg Hx     Birth defects Neg Hx     Mental retardation Neg Hx     Mental illness Neg Hx     Kidney disease Neg Hx     Hyperlipidemia Neg Hx     Melanoma Neg Hx     Lupus Neg Hx      Social History     Social History    Marital status:      Spouse name: Agustín    Number of children: 4    Years of education: N/A     Occupational History    Retired Teacher      authorGEN     Social History Main Topics    Smoking status: Never Smoker    Smokeless tobacco: Never Used    Alcohol use No    Drug use: No    Sexual activity: Not Currently     Partners: Male     Birth control/ protection: Post-menopausal     Other Topics  Concern    Not on file     Social History Narrative    aretired from homeschooling/,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 Caffeine intake;1-2 per week - manily coke. Decaffinated tea and most beveridges. Does have a living will - at home in her filing cabinet.      Past Surgical History:   Procedure Laterality Date    BREAST LUMPECTOMY  2/11/2011    right    CATARACT EXTRACTION Right 10/14/15    CHOLECYSTECTOMY  1989    open    DILATION AND CURETTAGE OF [place]TERUS  10/2010    [place]    Nasal septal deviation repair  2009    toenail edges removed      TONSILLECTOMY  1959    TOTAL REDUCTION MAMMOPLASTY Left     2015       Labs:  Lab Results   Component Value Date    WBC 5.00 02/21/2017    HGB 12.9 02/21/2017    HCT 38.9 02/21/2017    MCV 91 02/21/2017     02/21/2017     BMP  Lab Results   Component Value Date     02/21/2017    K 3.5 02/21/2017     02/21/2017    CO2 28 02/21/2017    BUN 10 02/21/2017    CREATININE 0.8 02/21/2017    CALCIUM 9.5 02/21/2017    ANIONGAP 9 02/21/2017    ESTGFRAFRICA >60.0 02/21/2017    EGFRNONAA >60.0 02/21/2017     Lab Results   Component Value Date    ALT 28 02/21/2017    AST 24 02/21/2017    GGT 69 (H) 02/09/2010    ALKPHOS 84 02/21/2017    BILITOT 0.5 02/21/2017       No results found for: IRON, TIBC, FERRITIN, SATURATEDIRO  Lab Results   Component Value Date    GDNRFCCP49 255 02/09/2012     Lab Results   Component Value Date    FOLATE 11.5 02/09/2012     Lab Results   Component Value Date    TSH 1.239 02/21/2017         Review of Systems   Constitutional: Negative for activity change, appetite change, chills, diaphoresis, fatigue, fever and unexpected weight change.   HENT: Negative for congestion, dental problem, drooling, ear discharge, ear pain, facial swelling, hearing loss, mouth sores, nosebleeds, postnasal drip, rhinorrhea, sinus pressure, sneezing, sore throat, tinnitus, trouble swallowing and voice change.    Eyes: Negative for photophobia, pain,  discharge, redness, itching and visual disturbance.   Respiratory: Negative for cough, choking, chest tightness, shortness of breath, wheezing and stridor.    Cardiovascular: Negative for chest pain, palpitations and leg swelling.   Gastrointestinal: Negative for abdominal distention, abdominal pain, anal bleeding, blood in stool, constipation, diarrhea, nausea, rectal pain and vomiting.   Endocrine: Negative for cold intolerance, heat intolerance, polydipsia, polyphagia and polyuria.   Genitourinary: Negative for decreased urine volume, difficulty urinating, dyspareunia, dysuria, enuresis, flank pain, frequency, genital sores, hematuria, menstrual problem, pelvic pain, urgency, vaginal bleeding, vaginal discharge and vaginal pain.   Musculoskeletal: Negative for arthralgias, back pain, gait problem, joint swelling, myalgias, neck pain and neck stiffness.   Skin: Negative for color change, pallor and rash.   Allergic/Immunologic: Negative for environmental allergies, food allergies and immunocompromised state.   Neurological: Negative for dizziness, tremors, seizures, syncope, facial asymmetry, speech difficulty, weakness, light-headedness, numbness and headaches.   Hematological: Negative for adenopathy. Does not bruise/bleed easily.   Psychiatric/Behavioral: Negative for agitation, behavioral problems, confusion, decreased concentration, dysphoric mood, hallucinations, self-injury, sleep disturbance and suicidal ideas. The patient is not nervous/anxious and is not hyperactive.        Objective:      Physical Exam   Constitutional: She is oriented to person, place, and time. She appears well-developed and well-nourished. No distress.   HENT:   Head: Normocephalic and atraumatic.   Right Ear: External ear normal.   Left Ear: External ear normal.   Nose: Nose normal. Right sinus exhibits no maxillary sinus tenderness and no frontal sinus tenderness. Left sinus exhibits no maxillary sinus tenderness and no frontal  sinus tenderness.   Mouth/Throat: Oropharynx is clear and moist. No oropharyngeal exudate.   Eyes: Conjunctivae, EOM and lids are normal. Pupils are equal, round, and reactive to light. Right eye exhibits no discharge. Left eye exhibits no discharge. Right conjunctiva is not injected. Right conjunctiva has no hemorrhage. Left conjunctiva is not injected. Left conjunctiva has no hemorrhage. No scleral icterus.   Neck: Normal range of motion. Neck supple. No JVD present. No tracheal deviation present. No thyromegaly present.   Cardiovascular: Normal rate, regular rhythm, normal heart sounds and intact distal pulses.    Pulmonary/Chest: Effort normal and breath sounds normal. No stridor. No respiratory distress. She exhibits no tenderness.       Abdominal: Soft. Bowel sounds are normal. She exhibits no distension and no mass. There is no splenomegaly or hepatomegaly. There is no tenderness. There is no rebound.   Musculoskeletal: Normal range of motion. She exhibits no edema or tenderness.   Lymphadenopathy:     She has no cervical adenopathy.     She has no axillary adenopathy.        Right: No supraclavicular adenopathy present.        Left: No supraclavicular adenopathy present.   Neurological: She is alert and oriented to person, place, and time. No cranial nerve deficit. Coordination normal.   Skin: Skin is dry. No rash noted. She is not diaphoretic. No erythema.   Psychiatric: She has a normal mood and affect. Her behavior is normal. Judgment and thought content normal.   Vitals reviewed.          Assessment:      1. History of malignant neoplasm of breast    2. Malignant neoplasm of upper-outer quadrant of left breast in female, estrogen receptor positive           Plan:   Patient remains an active treatment with Arimidex continue 2/20/20 1 repeat mammogram negative DEXA scan reviewed will continue to remain off of Prolia present will repeat DEXA scan in approximately 2 years for any deterioration.  Follow-up  with me in one year with repeat mammogram

## 2017-07-18 ENCOUNTER — LAB VISIT (OUTPATIENT)
Dept: LAB | Facility: HOSPITAL | Age: 67
End: 2017-07-18
Payer: MEDICARE

## 2017-07-18 DIAGNOSIS — E11.9 TYPE 2 DIABETES MELLITUS WITHOUT COMPLICATION, WITHOUT LONG-TERM CURRENT USE OF INSULIN: Chronic | ICD-10-CM

## 2017-07-18 LAB
ALBUMIN SERPL BCP-MCNC: 4.1 G/DL
ALP SERPL-CCNC: 99 U/L
ALT SERPL W/O P-5'-P-CCNC: 33 U/L
ANION GAP SERPL CALC-SCNC: 12 MMOL/L
AST SERPL-CCNC: 21 U/L
BILIRUB SERPL-MCNC: 0.5 MG/DL
BUN SERPL-MCNC: 9 MG/DL
CALCIUM SERPL-MCNC: 9.6 MG/DL
CHLORIDE SERPL-SCNC: 105 MMOL/L
CO2 SERPL-SCNC: 24 MMOL/L
CREAT SERPL-MCNC: 0.8 MG/DL
EST. GFR  (AFRICAN AMERICAN): >60 ML/MIN/1.73 M^2
EST. GFR  (NON AFRICAN AMERICAN): >60 ML/MIN/1.73 M^2
GLUCOSE SERPL-MCNC: 94 MG/DL
POTASSIUM SERPL-SCNC: 3.7 MMOL/L
PROT SERPL-MCNC: 7.2 G/DL
SODIUM SERPL-SCNC: 141 MMOL/L

## 2017-07-18 PROCEDURE — 36415 COLL VENOUS BLD VENIPUNCTURE: CPT

## 2017-07-18 PROCEDURE — 80053 COMPREHEN METABOLIC PANEL: CPT

## 2017-07-18 PROCEDURE — 83036 HEMOGLOBIN GLYCOSYLATED A1C: CPT

## 2017-07-19 LAB
ESTIMATED AVG GLUCOSE: 123 MG/DL
HBA1C MFR BLD HPLC: 5.9 %

## 2017-07-24 DIAGNOSIS — C50.411 MALIGNANT NEOPLASM OF UPPER-OUTER QUADRANT OF RIGHT FEMALE BREAST: ICD-10-CM

## 2017-07-24 RX ORDER — ANASTROZOLE 1 MG/1
TABLET ORAL
Qty: 30 TABLET | Refills: 10 | Status: SHIPPED | OUTPATIENT
Start: 2017-07-24 | End: 2018-07-23 | Stop reason: SDUPTHER

## 2017-08-04 ENCOUNTER — OFFICE VISIT (OUTPATIENT)
Dept: OPHTHALMOLOGY | Facility: CLINIC | Age: 67
End: 2017-08-04
Payer: MEDICARE

## 2017-08-04 DIAGNOSIS — Z96.1 PSEUDOPHAKIA OF BOTH EYES: ICD-10-CM

## 2017-08-04 DIAGNOSIS — H52.7 REFRACTIVE ERROR: ICD-10-CM

## 2017-08-04 DIAGNOSIS — H04.123 BILATERAL DRY EYES: ICD-10-CM

## 2017-08-04 DIAGNOSIS — E11.9 DIABETES MELLITUS TYPE 2 WITHOUT RETINOPATHY: Primary | ICD-10-CM

## 2017-08-04 PROCEDURE — 99499 UNLISTED E&M SERVICE: CPT | Mod: S$GLB,,, | Performed by: OPTOMETRIST

## 2017-08-04 PROCEDURE — 99999 PR PBB SHADOW E&M-EST. PATIENT-LVL I: CPT | Mod: PBBFAC,,, | Performed by: OPTOMETRIST

## 2017-08-04 PROCEDURE — 92014 COMPRE OPH EXAM EST PT 1/>: CPT | Mod: S$GLB,,, | Performed by: OPTOMETRIST

## 2017-08-04 PROCEDURE — 92015 DETERMINE REFRACTIVE STATE: CPT | Mod: S$GLB,,, | Performed by: OPTOMETRIST

## 2017-08-04 NOTE — PROGRESS NOTES
HPI     NIDDM exam. Dry eyes a lot. Last eye exam 06/17/2016 [facility]. Patient wears   OTC readers. Bilateral pseudophakia.    Last edited by SILVIO Lu, OD on 8/4/2017  2:41 PM. (History)            Assessment /Plan     For exam results, see Encounter Report.    Pseudophakia of both eyes    Diabetes mellitus type 2 without retinopathy    Bilateral dry eyes    Refractive error      No diabetic retinopathy OU.  Stable PIOL OU.  Dry eyes OU = Systane Balance prn.  OH OK OU otherwise.  Spec Rx given but OTC readers are also OK.  RTC one year.

## 2017-09-21 ENCOUNTER — HOSPITAL ENCOUNTER (OUTPATIENT)
Dept: RADIOLOGY | Facility: HOSPITAL | Age: 67
Discharge: HOME | End: 2017-09-21
Attending: PODIATRIST
Payer: MEDICARE

## 2017-09-21 ENCOUNTER — OFFICE VISIT (OUTPATIENT)
Dept: PODIATRY | Facility: CLINIC | Age: 67
End: 2017-09-21
Payer: MEDICARE

## 2017-09-21 VITALS
WEIGHT: 144.19 LBS | DIASTOLIC BLOOD PRESSURE: 82 MMHG | TEMPERATURE: 98 F | BODY MASS INDEX: 26.53 KG/M2 | HEIGHT: 62 IN | SYSTOLIC BLOOD PRESSURE: 154 MMHG | HEART RATE: 63 BPM

## 2017-09-21 DIAGNOSIS — M79.672 LEFT FOOT PAIN: ICD-10-CM

## 2017-09-21 DIAGNOSIS — M77.9 BONE SPUR: ICD-10-CM

## 2017-09-21 DIAGNOSIS — M67.40 GANGLION OF JOINT: Primary | ICD-10-CM

## 2017-09-21 DIAGNOSIS — M67.40 GANGLION OF JOINT: ICD-10-CM

## 2017-09-21 PROCEDURE — 3077F SYST BP >= 140 MM HG: CPT | Mod: S$GLB,,, | Performed by: PODIATRIST

## 2017-09-21 PROCEDURE — 1159F MED LIST DOCD IN RCRD: CPT | Mod: S$GLB,,, | Performed by: PODIATRIST

## 2017-09-21 PROCEDURE — 3079F DIAST BP 80-89 MM HG: CPT | Mod: S$GLB,,, | Performed by: PODIATRIST

## 2017-09-21 PROCEDURE — 3008F BODY MASS INDEX DOCD: CPT | Mod: S$GLB,,, | Performed by: PODIATRIST

## 2017-09-21 PROCEDURE — 73630 X-RAY EXAM OF FOOT: CPT | Mod: TC,LT

## 2017-09-21 PROCEDURE — 1126F AMNT PAIN NOTED NONE PRSNT: CPT | Mod: S$GLB,,, | Performed by: PODIATRIST

## 2017-09-21 PROCEDURE — 73630 X-RAY EXAM OF FOOT: CPT | Mod: 26,LT,, | Performed by: RADIOLOGY

## 2017-09-21 PROCEDURE — 99999 PR PBB SHADOW E&M-EST. PATIENT-LVL III: CPT | Mod: PBBFAC,,, | Performed by: PODIATRIST

## 2017-09-21 PROCEDURE — 99213 OFFICE O/P EST LOW 20 MIN: CPT | Mod: 25,S$GLB,, | Performed by: PODIATRIST

## 2017-09-21 NOTE — PROGRESS NOTES
Office Visit 9/21/2017  Last encounter in this department: Visit date not found    Subjective:      Patient ID: Reena Bourgeois is a 67 y.o. female.    Chief Complaint: Follow-up (pcp Kamini Newton, last seen 2/23/2017)    Renea Bourgeois is a 67 y.o. year-old female presenting to podiatry clinic with complaint of Painful cyst left foot. The patient describes the pain as a sharp pressure that is as bad as a  5/10 pain when ruptured.  The patient relates that over 40 years ago she had a cyst removed from that same location but since then she has had off and on recurrence in that area.  Over the past month she states that the cyst ruptured and caused significant pain that is actually resolving now but is concerned of the recurrence of the cyst with rupture and subsequent pain.       Review of Systems   Constitution: Negative for chills and fever.   Cardiovascular: Negative for chest pain.   Respiratory: Negative for shortness of breath.    Gastrointestinal: Negative for nausea and vomiting.           Objective:      Physical Exam   Constitutional: She is oriented to person, place, and time. She appears well-developed and well-nourished. No distress.   Cardiovascular:   Pulses:       Dorsalis pedis pulses are 2+ on the right side, and 2+ on the left side.        Posterior tibial pulses are 2+ on the right side, and 2+ on the left side.   Capillary fill time 3 seconds to digits bilateral feet.   Musculoskeletal:   Lower extremities:    Deformities: Left dorsal midtarsal 1 cm mobile nodule just adjacent to dorsalis pedis neurovascular bundle.    Normal arch height and rectus feet bilaterally.     5/5 muscle strength and tone in all four quadrants bilaterally.     Pain-free range of motion in all four quadrants bilaterally.     Some pulling discomfort on maximal ankle joint dorsiflexion with some limitation bilateral feet.    No pain on side to side compression of the calcaneal body bilateral feet.    Primary area of  pain to palpation is the left dorsal midtarsal nodule.   Neurological: She is alert and oriented to person, place, and time. She displays no Babinski's sign on the right side. She displays no Babinski's sign on the left side.   Plantar protective sensation by touch via 5.07 [place] monofilament present bilaterally. Paresthesia right 5th digit.      Skin:   Lower extremities:    Normal turgor texture temperature, bilaterally. Warm equally bilaterally.     Mild fullness at the dorsal midtarsal left foot. No varicosities bilaterally. No open wounds or interdigital maceration, bilaterally.     Keratotic skin lesions: None.     Nails are thick dystrophic and discolored bilaterally.       Psychiatric: She has a normal mood and affect.   Nursing note and vitals reviewed.            X-rays: no fracture or dislocation noted, soft tissue swelling, no obvious spurring    Assessment:       Encounter Diagnoses   Name Primary?    Ganglion of joint Yes    Left foot pain     Bone spur          Plan:       Renea was seen today for follow-up.    Diagnoses and all orders for this visit:    Ganglion of joint  -     X-Ray Foot Complete Left; Future  -     US Soft Tissue Misc; Future    Left foot pain  -     X-Ray Foot Complete Left; Future  -     US Soft Tissue Misc; Future    Bone spur  -     X-Ray Foot Complete Left; Future  -     US Soft Tissue Misc; Future      I counseled the patient on her conditions, their implications and medical management.    Patient was educated and counseled regarding the likely ganglion cyst. I explained to the patient that the current presentation is a result of a benign cystic growth of either a tendon sheath or joint capsule. The patient was guided on off-loading the cyst for symptomatic relief by utilizing a cutout adhesive pad which was dispensed to the patient with information on ordering additional pads. We also discussed the possibilities of aspiration vs. surgical excision of the cyst  for continued irritation and discomfort. I explained that there is a respectable recurrence rate, more so for the aspiration.  Patient acknowledges this stating that she did have it removed in the past.    Patient wished to go ahead and begin planning for excision of the cyst.  We discussed its proximity to the neurovascular bundle and possible underlying spur.  X-rays were reviewed and no obvious spurring was noted.  We discussed how ultrasound imaging might give a better image of the soft tissue, surrounding neurovascular bundle and possible underlying spur.  Ultrasound imaging was ordered to be reviewed on follow-up when we may further discuss surgical excision as well.  In the meantime she will try to offload with padding and looser fitting shoes.  She will call if any problems arise.  .

## 2017-09-22 ENCOUNTER — HOSPITAL ENCOUNTER (OUTPATIENT)
Dept: RADIOLOGY | Facility: HOSPITAL | Age: 67
Discharge: HOME | End: 2017-09-22
Attending: PODIATRIST
Payer: MEDICARE

## 2017-09-22 DIAGNOSIS — M67.40 GANGLION OF JOINT: ICD-10-CM

## 2017-09-22 DIAGNOSIS — M79.672 LEFT FOOT PAIN: ICD-10-CM

## 2017-09-22 DIAGNOSIS — M77.9 BONE SPUR: ICD-10-CM

## 2017-09-22 PROCEDURE — 76999 ECHO EXAMINATION PROCEDURE: CPT | Mod: TC

## 2017-09-22 PROCEDURE — 76882 US LMTD JT/FCL EVL NVASC XTR: CPT | Mod: 26,,, | Performed by: RADIOLOGY

## 2017-09-25 RX ORDER — TRAZODONE HYDROCHLORIDE 50 MG/1
TABLET ORAL
Qty: 60 TABLET | Refills: 0 | Status: SHIPPED | OUTPATIENT
Start: 2017-09-25 | End: 2017-11-14 | Stop reason: SDUPTHER

## 2017-09-25 NOTE — TELEPHONE ENCOUNTER
Patient overdue for 6 month follow up, HRA also needs to be reviewed. Please schedule with Roopa Cox. Thanks

## 2017-09-26 ENCOUNTER — OFFICE VISIT (OUTPATIENT)
Dept: INTERNAL MEDICINE | Facility: CLINIC | Age: 67
End: 2017-09-26
Payer: MEDICARE

## 2017-09-26 VITALS
SYSTOLIC BLOOD PRESSURE: 102 MMHG | HEIGHT: 62 IN | RESPIRATION RATE: 16 BRPM | BODY MASS INDEX: 26.29 KG/M2 | HEART RATE: 71 BPM | OXYGEN SATURATION: 96 % | TEMPERATURE: 98 F | WEIGHT: 142.88 LBS | DIASTOLIC BLOOD PRESSURE: 70 MMHG

## 2017-09-26 DIAGNOSIS — H66.91 RIGHT OTITIS MEDIA, UNSPECIFIED CHRONICITY, UNSPECIFIED OTITIS MEDIA TYPE: Primary | ICD-10-CM

## 2017-09-26 DIAGNOSIS — I10 ESSENTIAL HYPERTENSION, BENIGN: Chronic | ICD-10-CM

## 2017-09-26 PROCEDURE — 99213 OFFICE O/P EST LOW 20 MIN: CPT | Mod: S$GLB,,, | Performed by: PHYSICIAN ASSISTANT

## 2017-09-26 PROCEDURE — 1159F MED LIST DOCD IN RCRD: CPT | Mod: S$GLB,,, | Performed by: PHYSICIAN ASSISTANT

## 2017-09-26 PROCEDURE — 3078F DIAST BP <80 MM HG: CPT | Mod: S$GLB,,, | Performed by: PHYSICIAN ASSISTANT

## 2017-09-26 PROCEDURE — 3074F SYST BP LT 130 MM HG: CPT | Mod: S$GLB,,, | Performed by: PHYSICIAN ASSISTANT

## 2017-09-26 PROCEDURE — 99999 PR PBB SHADOW E&M-EST. PATIENT-LVL IV: CPT | Mod: PBBFAC,,, | Performed by: PHYSICIAN ASSISTANT

## 2017-09-26 PROCEDURE — 99499 UNLISTED E&M SERVICE: CPT | Mod: S$GLB,,, | Performed by: PHYSICIAN ASSISTANT

## 2017-09-26 PROCEDURE — 1125F AMNT PAIN NOTED PAIN PRSNT: CPT | Mod: S$GLB,,, | Performed by: PHYSICIAN ASSISTANT

## 2017-09-26 PROCEDURE — 3008F BODY MASS INDEX DOCD: CPT | Mod: S$GLB,,, | Performed by: PHYSICIAN ASSISTANT

## 2017-09-26 RX ORDER — ACETAMINOPHEN 500 MG
500 TABLET ORAL EVERY 6 HOURS PRN
COMMUNITY

## 2017-09-26 RX ORDER — NAPROXEN SODIUM 220 MG
220 TABLET ORAL
Status: ON HOLD | COMMUNITY
End: 2020-06-29 | Stop reason: HOSPADM

## 2017-09-26 RX ORDER — AMOXICILLIN 875 MG/1
875 TABLET, FILM COATED ORAL EVERY 12 HOURS
Qty: 20 TABLET | Refills: 0 | Status: SHIPPED | OUTPATIENT
Start: 2017-09-26 | End: 2017-10-06

## 2017-09-26 NOTE — PROGRESS NOTES
"Subjective:       Patient ID: Renea Bourgeois is a 67 y.o. female.    Chief Complaint: Nasal Congestion and Otalgia (Right)    67 year old female c/o clear rhinorrhea, post-nasal drainage, rare headaches, and dry cough X "quite awhile." PCP is Dr. Newton. She reports onset of R ear pain/pressure about one week ago. She reports no fever, chills, N/V, CP, SOB, rash, sinus pressure, sore throat, or other medical complaints. She has used Flonase and taken OTC antihistamine without improvement of sxs.    Patient Active Problem List:     Type 2 diabetes mellitus without complication     GERD (gastroesophageal reflux disease)     Combined hyperlipidemia associated with type 2 diabetes mellitus     Essential hypertension, benign     Osteopenia     Cataract     Carpal tunnel syndrome on both sides     Pulmonary HTN     History of renal calculi     Nuclear sclerosis of both eyes     Refractive error     Family history of glaucoma     Pseudophakia     Nuclear sclerosis of left eye     Human papilloma virus (HPV) infection     Angina pectoris     Malignant neoplasm of upper-outer quadrant of left breast in female, estrogen receptor positive      Review of Systems   Constitutional: Negative for chills and fever.   HENT: Positive for congestion, ear pain (R), postnasal drip and rhinorrhea. Negative for sore throat.    Respiratory: Positive for cough. Negative for shortness of breath.    Cardiovascular: Negative for chest pain, palpitations and leg swelling.   Gastrointestinal: Negative for nausea and vomiting.   Skin: Negative for rash.   Neurological: Positive for headaches. Negative for dizziness, weakness and numbness.   Psychiatric/Behavioral: Negative for confusion.       Objective:       Vitals:    09/26/17 0923   BP: 102/70   BP Location: Right arm   Patient Position: Sitting   BP Method: Medium (Manual)   Pulse: 71   Resp: 16   Temp: 98.4 °F (36.9 °C)   TempSrc: Oral   SpO2: 96%   Weight: 64.8 kg (142 lb 13.7 oz) " "  Height: 5' 1.5" (1.562 m)     Physical Exam   Constitutional: She is oriented to person, place, and time. She appears well-developed and well-nourished. No distress.   HENT:   Head: Normocephalic and atraumatic.   Right Ear: Ear canal normal. A middle ear effusion (purulent) is present.   Left Ear: Tympanic membrane and ear canal normal.   Mouth/Throat: Oropharynx is clear and moist. No oropharyngeal exudate.   Eyes: EOM are normal. No scleral icterus.   Neck: Neck supple.   Cardiovascular: Normal rate and regular rhythm.    Pulmonary/Chest: Effort normal and breath sounds normal. No respiratory distress. She has no decreased breath sounds. She has no wheezes. She has no rhonchi. She has no rales.   Musculoskeletal: Normal range of motion. She exhibits no edema.   Lymphadenopathy:     She has no cervical adenopathy.   Neurological: She is alert and oriented to person, place, and time. No cranial nerve deficit.   Skin: Skin is warm and dry. No rash noted.   Psychiatric: She has a normal mood and affect. Her speech is normal and behavior is normal. Thought content normal.       Assessment:       1. Right otitis media, unspecified chronicity, unspecified otitis media type    2. Essential hypertension, benign        Plan:         Renea was seen today for nasal congestion and otalgia.    Diagnoses and all orders for this visit:    Right otitis media, unspecified chronicity, unspecified otitis media type  -     amoxicillin (AMOXIL) 875 MG tablet; Take 1 tablet (875 mg total) by mouth every 12 (twelve) hours.  F/u with PCP or ENT if sxs persist or worsen. ER if severe sxs occur.    Essential hypertension, benign  Controlled. F/u with PCP as recommended for health management.      "

## 2017-10-12 ENCOUNTER — OFFICE VISIT (OUTPATIENT)
Dept: PODIATRY | Facility: CLINIC | Age: 67
End: 2017-10-12
Payer: MEDICARE

## 2017-10-12 VITALS
SYSTOLIC BLOOD PRESSURE: 140 MMHG | WEIGHT: 144.5 LBS | BODY MASS INDEX: 26.59 KG/M2 | HEIGHT: 62 IN | DIASTOLIC BLOOD PRESSURE: 60 MMHG | HEART RATE: 85 BPM

## 2017-10-12 DIAGNOSIS — M79.672 LEFT FOOT PAIN: ICD-10-CM

## 2017-10-12 DIAGNOSIS — M67.40 GANGLION OF JOINT: Primary | ICD-10-CM

## 2017-10-12 PROCEDURE — 99999 PR PBB SHADOW E&M-EST. PATIENT-LVL III: CPT | Mod: PBBFAC,,, | Performed by: PODIATRIST

## 2017-10-12 PROCEDURE — 99214 OFFICE O/P EST MOD 30 MIN: CPT | Mod: 57,S$GLB,, | Performed by: PODIATRIST

## 2017-10-12 NOTE — PROGRESS NOTES
Office Visit 10/12/2017  Last encounter in this department: 9/21/2017    Subjective:      Patient ID: Renea Bourgeois is a 67 y.o. female.    Chief Complaint: Pre-op Exam (pt presents to preop of cyst to left foot,,,sx in November)    Renea Bourgeois is a 67 y.o. year-old female here for review of u/s results and preop evaluation of left dorsal midtarsal ganglion cyst.  The patient has tried conservative therapy alternatives including different pads and wound care with fracture of the cyst.  She states she has had previous cyst removal that had periodic recurrence but most recently had breakdown of the skin with rupture of this cyst with wound that caused her significant discomfort.  She wants to go ahead and proceed with surgical excision for that reason.      Review of Systems   Constitution: Negative for chills and fever.   Cardiovascular: Negative for chest pain.   Respiratory: Negative for shortness of breath.    Gastrointestinal: Negative for nausea and vomiting.           Objective:      Physical Exam   Constitutional: She is oriented to person, place, and time. She appears well-developed and well-nourished. No distress.   Cardiovascular:   Pulses:       Dorsalis pedis pulses are 2+ on the right side, and 2+ on the left side.        Posterior tibial pulses are 2+ on the right side, and 2+ on the left side.   Capillary fill time 3 seconds to digits bilateral feet.   Musculoskeletal:   Lower extremities:    Deformities: Left dorsal midtarsal 1 cm mobile nodule just adjacent to dorsalis pedis neurovascular bundle.    Normal arch height and rectus feet bilaterally.     5/5 muscle strength and tone in all four quadrants bilaterally.     Pain-free range of motion in all four quadrants bilaterally.     Some pulling discomfort on maximal ankle joint dorsiflexion with some limitation bilateral feet.    No pain on side to side compression of the calcaneal body bilateral feet.    Primary area of pain to palpation is the  left dorsal midtarsal nodule.   Neurological: She is alert and oriented to person, place, and time. She displays no Babinski's sign on the right side. She displays no Babinski's sign on the left side.   Plantar protective sensation by touch via 5.07 [place]oto monofilament present bilaterally. Paresthesia right 5th digit.      Skin:   Lower extremities:    Normal turgor texture temperature, bilaterally. Warm equally bilaterally.     Mild fullness at the dorsal midtarsal left foot. No varicosities bilaterally. No open wounds or interdigital maceration, bilaterally.     Keratotic skin lesions: None.     Nails are thick dystrophic and discolored bilaterally.       Psychiatric: She has a normal mood and affect.   Nursing note and vitals reviewed.    Ultrasound of the soft tissues of the left foot.    Findings: Directed sonographic evaluation performed for a palpable lump at the dorsal aspect of the left foot.    There is a small oval cyst or collection with thin internal septation in the area palpable concern measuring 7 x 2 x 6 mm.  No solid mass or other abnormality noted.   Impression      Small complex cyst or collection dorsal aspect of left foot corresponding to clinically palpable lump.             X-rays: No significant spurring or exostosis.    Assessment:       Encounter Diagnoses   Name Primary?    Ganglion of joint Yes    Left foot pain          Plan:       Renea was seen today for pre-op exam.    Diagnoses and all orders for this visit:    Ganglion of joint  -     Case Request Operating Room: GANGLIONECTOMY    Left foot pain  -     Case Request Operating Room: GANGLIONECTOMY      I counseled the patient on her conditions, their implications and medical management.    At this time ultrasound results were reviewed with the patient and recommendation was made for surgical excision of ganglion cyst left dorsal midfoot. Preop Intra-Op and postoperative course was reviewed with the patient and patient  appeared to be in good understanding. Risk benefits and alternatives were reviewed with the patient and consent was signed making no guarantees regarding the outcome of the surgery. All of the patient's questions were answered and concerns were addressed. Copies were made of the consent and given to the patient and patient was instructed to call for any additional questions and if any problems arise. The patient is scheduled for surgery at [facility].      .

## 2017-10-17 ENCOUNTER — TELEPHONE (OUTPATIENT)
Dept: PODIATRY | Facility: CLINIC | Age: 67
End: 2017-10-17

## 2017-10-17 DIAGNOSIS — Z01.818 PRE-OP TESTING: Primary | ICD-10-CM

## 2017-10-17 NOTE — TELEPHONE ENCOUNTER
Contacted pt and made her aware of upcoming surgery with Dr. Cara DPM on 11/24/17 at . Made aware of all pre-op appts scheduled and testing required. Verbalized understanding and confirmed all appts. Encouraged to contact clinic with any questions or concerns prior to surgery. Verbalized understanding.

## 2017-10-20 RX ORDER — BLOOD SUGAR DIAGNOSTIC
STRIP MISCELLANEOUS
Qty: 100 STRIP | Refills: 11 | Status: SHIPPED | OUTPATIENT
Start: 2017-10-20

## 2017-10-23 ENCOUNTER — OFFICE VISIT (OUTPATIENT)
Dept: OTOLARYNGOLOGY | Facility: CLINIC | Age: 67
End: 2017-10-23
Payer: MEDICARE

## 2017-10-23 VITALS
SYSTOLIC BLOOD PRESSURE: 139 MMHG | BODY MASS INDEX: 27.33 KG/M2 | HEART RATE: 74 BPM | DIASTOLIC BLOOD PRESSURE: 75 MMHG | WEIGHT: 147.06 LBS

## 2017-10-23 DIAGNOSIS — H92.01 RIGHT EAR PAIN: Primary | ICD-10-CM

## 2017-10-23 DIAGNOSIS — H93.13 TINNITUS OF BOTH EARS: ICD-10-CM

## 2017-10-23 DIAGNOSIS — M26.623 BILATERAL TEMPOROMANDIBULAR JOINT PAIN: ICD-10-CM

## 2017-10-23 PROCEDURE — 99999 PR PBB SHADOW E&M-EST. PATIENT-LVL III: CPT | Mod: PBBFAC,,, | Performed by: PHYSICIAN ASSISTANT

## 2017-10-23 PROCEDURE — 99214 OFFICE O/P EST MOD 30 MIN: CPT | Mod: S$GLB,,, | Performed by: PHYSICIAN ASSISTANT

## 2017-10-23 NOTE — PROGRESS NOTES
Subjective:       Patient ID: Renea Bourgeois is a 67 y.o. female.    Chief Complaint: Otalgia    Patient is a very pleasant 67 year old female here to see me today for the first time for evaluation of right otalgia, intermittent over past 6 weeks.  She was seen by PCP last month with right STACEY; treated with Amoxil.  She had a crackling sensation in her right ear when bending over at that time that has now resolved.  She has occasional tinnitus AU for years.  She describes intermittent right ear pressure; she says it pops at time.  Denies any recent change in her hearing.  She noticed that her right ear is usually sensitive to loud noise so she often uses her left ear when on the phone.  Denies ear drainage or dizziness.  She has clear nasal drainage and has been using Zyrtec and Flonase as needed (not daily).  Denies facial pain or pressure.  Denies fever.  Denies previous otologic surgery but had TM rupture as a child.      Review of Systems   Constitutional: Negative for activity change, appetite change and fever.   HENT: Positive for dental problem (TMJ arthralgia), ear pain (right), rhinorrhea and tinnitus (occasional AU). Negative for congestion, ear discharge, hearing loss, nosebleeds, postnasal drip, sinus pain, sinus pressure, sneezing and sore throat.    Eyes: Negative for discharge.   Respiratory: Positive for cough (dry). Negative for shortness of breath and wheezing.    Cardiovascular: Negative for chest pain and palpitations.   Gastrointestinal: Negative for diarrhea, nausea and vomiting.   Musculoskeletal: Positive for arthralgias.   Allergic/Immunologic: Negative for food allergies.   Neurological: Positive for light-headedness (occasional). Negative for dizziness and headaches.   Hematological: Negative for adenopathy.   Psychiatric/Behavioral: Negative for confusion.       Objective:      Physical Exam   Constitutional: She is oriented to person, place, and time. She appears well-developed and  well-nourished. She is cooperative. No distress.   HENT:   Head: Normocephalic and atraumatic.   Right Ear: External ear and ear canal normal. Tympanic membrane is scarred and retracted. Tympanic membrane is not erythematous. No middle ear effusion.   Left Ear: Tympanic membrane, external ear and ear canal normal.  No middle ear effusion.   Nose: Mucosal edema present. No rhinorrhea, nasal deformity or septal deviation. No epistaxis. Right sinus exhibits no maxillary sinus tenderness and no frontal sinus tenderness. Left sinus exhibits no maxillary sinus tenderness and no frontal sinus tenderness.   Mouth/Throat: Uvula is midline, oropharynx is clear and moist and mucous membranes are normal. Mucous membranes are not pale and not dry. She has dentures (upper). No trismus in the jaw. Normal dentition. No uvula swelling. No oropharyngeal exudate or posterior oropharyngeal erythema.   Crepitus over bilateral TMJ with opening her mouth   Eyes: Conjunctivae, EOM and lids are normal. Pupils are equal, round, and reactive to light. Right eye exhibits no chemosis. Left eye exhibits no chemosis. Right conjunctiva is not injected. Left conjunctiva is not injected. No scleral icterus. Right eye exhibits normal extraocular motion and no nystagmus. Left eye exhibits normal extraocular motion and no nystagmus.   Neck: Trachea normal and phonation normal. No tracheal tenderness present. No tracheal deviation present. No thyroid mass and no thyromegaly present.   Cardiovascular: Intact distal pulses.    Pulmonary/Chest: Effort normal. No stridor. No respiratory distress.   Abdominal: She exhibits no distension.   Lymphadenopathy:        Head (right side): No submental, no submandibular, no preauricular and no posterior auricular adenopathy present.        Head (left side): No submental, no submandibular, no preauricular and no posterior auricular adenopathy present.     She has no cervical adenopathy.   Neurological: She is alert  and oriented to person, place, and time. No cranial nerve deficit.   Skin: Skin is warm and dry. No rash noted. No erythema.   Psychiatric: She has a normal mood and affect. Her behavior is normal.         Tympanograms:  Right  Type A 1.2 mL @ ; ECV 1.3 mL  Left 1.6 mL @ -18 daPa; ECV 1.1 mL      Tuning fork exam:  Calzada does not lateralize  Rinne  AC > BC  AU      Assessment:       1. Right ear pain    2. Tinnitus of both ears    3. Bilateral temporomandibular joint pain        Plan:         Reassured patient that her ear exam today is normal.  I see no STACEY or signs of otitis externa.  Her tympanograms and tuning fork exam are also reassuring today.  Recommend scheduling an audiogram at her convenience and I'll review those results once available.  We discussed that tinnitus is most often caused by a hearing loss, and that as the hair cells are damaged, either genetic or as a result of loud noise exposure, they then cause tinnitus.  Some patients find that restricting the salt or caffeine in their diet helps, and there is also an OTC supplement, lipoflavinoids, that some people find to be effective though their benefit is not fully proven.  Tinnitus tends to be louder in times of stress and fatigue, and may decrease with time.  Sound machines may also be an effective masking technique if needed at night.  Recommend she continue Zyrtec and Flonase.   We discussed in detail the proper mechanism of use directing the spray away from the nasal septum.  In addition, we also discussed that it will take two to three weeks of daily use to achieve maximal effectiveness.      We had a long discussion regarding the underlying pathology of temporomandibular joint dysfunction (TMD) as the cause of ear pain.  We further discussed conservative measures to treat TMD including avoiding gum and other foods that require lots of chewing, warm compresses, and scheduled antinflammatories.  She has previously worn a  and we  discussed that if the pain persists, the patient will then schedule an appointment with a dentist for further evaluation.

## 2017-10-23 NOTE — LETTER
October 23, 2017      Kamini Newton MD  [address] [place] [address] [address] [place] [id number]           [facility]  [address] [place] [address] [address] [place] [address]  Phone: [phone]  Fax: [phone]          Patient: Renea Bourgeois   MR Number: [id number]   YOB: 1950   Date of Visit: 10/23/2017       Dear Dr. Kamini Newton:    Thank you for referring Renea Bourgeois to me for evaluation. Attached you will find relevant portions of my assessment and plan of care.    If you have questions, please do not hesitate to call me. I look forward to following Renea Bourgeois along with you.    Sincerely,    Kaylee Butcher PA-C    Enclosure  CC:  No Recipients    If you would like to receive this communication electronically, please contact [email or web address] or [phone] to request more information on [employer] access.    For providers and/or their staff who would like to refer a patient to [facility], please contact us through our one-stop-shop provider referral line, [facility], at [phone].    If you feel you have received this communication in error or would no longer like to receive these types of communications, please e-mail [email or web address]

## 2017-11-06 ENCOUNTER — CLINICAL SUPPORT (OUTPATIENT)
Dept: CARDIOLOGY | Facility: CLINIC | Age: 67
End: 2017-11-06
Payer: MEDICARE

## 2017-11-06 ENCOUNTER — LAB VISIT (OUTPATIENT)
Dept: LAB | Facility: HOSPITAL | Age: 67
End: 2017-11-06
Attending: PODIATRIST
Payer: MEDICARE

## 2017-11-06 DIAGNOSIS — Z01.818 PRE-OP TESTING: ICD-10-CM

## 2017-11-06 LAB
ANION GAP SERPL CALC-SCNC: 7 MMOL/L
BASOPHILS # BLD AUTO: 0.03 K/UL
BASOPHILS NFR BLD: 0.7 %
BUN SERPL-MCNC: 10 MG/DL
CALCIUM SERPL-MCNC: 9.3 MG/DL
CHLORIDE SERPL-SCNC: 106 MMOL/L
CO2 SERPL-SCNC: 28 MMOL/L
CREAT SERPL-MCNC: 0.8 MG/DL
DIFFERENTIAL METHOD: ABNORMAL
EOSINOPHIL # BLD AUTO: 0.1 K/UL
EOSINOPHIL NFR BLD: 1.8 %
ERYTHROCYTE [DISTWIDTH] IN BLOOD BY AUTOMATED COUNT: 12.7 %
EST. GFR  (AFRICAN AMERICAN): >60 ML/MIN/1.73 M^2
EST. GFR  (NON AFRICAN AMERICAN): >60 ML/MIN/1.73 M^2
GLUCOSE SERPL-MCNC: 145 MG/DL
HCT VFR BLD AUTO: 35.8 %
HCT VFR BLD AUTO: 35.8 %
HGB BLD-MCNC: 11.7 G/DL
IMM GRANULOCYTES # BLD AUTO: 0.01 K/UL
IMM GRANULOCYTES NFR BLD AUTO: 0.2 %
LYMPHOCYTES # BLD AUTO: 1.2 K/UL
LYMPHOCYTES NFR BLD: 26.8 %
MCH RBC QN AUTO: 28.8 PG
MCHC RBC AUTO-ENTMCNC: 32.7 G/DL
MCV RBC AUTO: 88 FL
MONOCYTES # BLD AUTO: 0.3 K/UL
MONOCYTES NFR BLD: 6 %
NEUTROPHILS # BLD AUTO: 2.9 K/UL
NEUTROPHILS NFR BLD: 64.5 %
NRBC BLD-RTO: 0 /100 WBC
PLATELET # BLD AUTO: 146 K/UL
PMV BLD AUTO: 12.5 FL
POTASSIUM SERPL-SCNC: 3.5 MMOL/L
RBC # BLD AUTO: 4.06 M/UL
SODIUM SERPL-SCNC: 141 MMOL/L
WBC # BLD AUTO: 4.48 K/UL

## 2017-11-06 PROCEDURE — 85025 COMPLETE CBC W/AUTO DIFF WBC: CPT

## 2017-11-06 PROCEDURE — 80048 BASIC METABOLIC PNL TOTAL CA: CPT

## 2017-11-06 PROCEDURE — 93000 ELECTROCARDIOGRAM COMPLETE: CPT | Mod: S$GLB,,, | Performed by: INTERNAL MEDICINE

## 2017-11-06 PROCEDURE — 36415 COLL VENOUS BLD VENIPUNCTURE: CPT

## 2017-11-14 ENCOUNTER — OFFICE VISIT (OUTPATIENT)
Dept: INTERNAL MEDICINE | Facility: CLINIC | Age: 67
End: 2017-11-14
Payer: MEDICARE

## 2017-11-14 ENCOUNTER — CLINICAL SUPPORT (OUTPATIENT)
Dept: AUDIOLOGY | Facility: CLINIC | Age: 67
End: 2017-11-14
Payer: MEDICARE

## 2017-11-14 ENCOUNTER — HOSPITAL ENCOUNTER (OUTPATIENT)
Dept: RADIOLOGY | Facility: HOSPITAL | Age: 67
Discharge: HOME | End: 2017-11-14
Attending: FAMILY MEDICINE
Payer: MEDICARE

## 2017-11-14 VITALS
HEART RATE: 72 BPM | DIASTOLIC BLOOD PRESSURE: 80 MMHG | OXYGEN SATURATION: 98 % | TEMPERATURE: 98 F | WEIGHT: 144.38 LBS | BODY MASS INDEX: 26.57 KG/M2 | HEIGHT: 62 IN | SYSTOLIC BLOOD PRESSURE: 134 MMHG

## 2017-11-14 DIAGNOSIS — E11.59 HYPERTENSION ASSOCIATED WITH DIABETES: ICD-10-CM

## 2017-11-14 DIAGNOSIS — Z01.818 PREOP GENERAL PHYSICAL EXAM: Primary | ICD-10-CM

## 2017-11-14 DIAGNOSIS — Z12.11 COLON CANCER SCREENING: ICD-10-CM

## 2017-11-14 DIAGNOSIS — E11.9 DIABETES MELLITUS TYPE 2 IN NONOBESE: ICD-10-CM

## 2017-11-14 DIAGNOSIS — F51.04 CHRONIC INSOMNIA: ICD-10-CM

## 2017-11-14 DIAGNOSIS — D64.9 ANEMIA, UNSPECIFIED TYPE: ICD-10-CM

## 2017-11-14 DIAGNOSIS — Z01.818 PREOP GENERAL PHYSICAL EXAM: ICD-10-CM

## 2017-11-14 DIAGNOSIS — I15.2 HYPERTENSION ASSOCIATED WITH DIABETES: ICD-10-CM

## 2017-11-14 DIAGNOSIS — J30.9 ALLERGIC RHINITIS, UNSPECIFIED CHRONICITY, UNSPECIFIED SEASONALITY, UNSPECIFIED TRIGGER: ICD-10-CM

## 2017-11-14 DIAGNOSIS — H90.3 SENSORINEURAL HEARING LOSS, BILATERAL: Primary | ICD-10-CM

## 2017-11-14 DIAGNOSIS — H93.13 TINNITUS, BILATERAL: ICD-10-CM

## 2017-11-14 PROCEDURE — 99499 UNLISTED E&M SERVICE: CPT | Mod: S$GLB,,, | Performed by: FAMILY MEDICINE

## 2017-11-14 PROCEDURE — 71020 XR CHEST PA AND LATERAL: CPT | Mod: TC

## 2017-11-14 PROCEDURE — 92557 COMPREHENSIVE HEARING TEST: CPT | Mod: S$GLB,,, | Performed by: AUDIOLOGIST-HEARING AID FITTER

## 2017-11-14 PROCEDURE — 71020 XR CHEST PA AND LATERAL: CPT | Mod: 26,,, | Performed by: RADIOLOGY

## 2017-11-14 PROCEDURE — 92567 TYMPANOMETRY: CPT | Mod: S$GLB,,, | Performed by: AUDIOLOGIST-HEARING AID FITTER

## 2017-11-14 PROCEDURE — 99999 PR PBB SHADOW E&M-EST. PATIENT-LVL III: CPT | Mod: PBBFAC,,, | Performed by: FAMILY MEDICINE

## 2017-11-14 PROCEDURE — 99214 OFFICE O/P EST MOD 30 MIN: CPT | Mod: S$GLB,,, | Performed by: FAMILY MEDICINE

## 2017-11-14 RX ORDER — FLUTICASONE PROPIONATE 50 MCG
2 SPRAY, SUSPENSION (ML) NASAL DAILY PRN
Qty: 16 G | Refills: 5 | Status: SHIPPED | OUTPATIENT
Start: 2017-11-14 | End: 2021-05-05 | Stop reason: SDUPTHER

## 2017-11-14 RX ORDER — TRAZODONE HYDROCHLORIDE 100 MG/1
100 TABLET ORAL NIGHTLY
Qty: 90 TABLET | Refills: 1 | Status: SHIPPED | OUTPATIENT
Start: 2017-11-14 | End: 2018-05-14 | Stop reason: SDUPTHER

## 2017-11-14 NOTE — PROGRESS NOTES
Referring provider: Kaylee Butcher PA-C    Renea Bourgeois was seen 11/14/2017 for an audiological evaluation.  Patient's last audiogram was 2009 - normal hearing sensitivity 250-8000 Hz bilaterally.  She suspects a slight decrease in hearing since that time but does not have problems understanding conversations.   She has occasional tinnitus AU for years.  She notes that her right ear is usually sensitive to loud noise so she often uses her left ear when on the phone.  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     Results reveal a borderline normal-to-mild sensorineural hearing loss 250-8000 Hz for the right ear, and  normal-to-mild sensorineural hearing loss 250-8000 Hz for the left ear.   Speech Reception Thresholds were  15 dBHL for the right ear and 10 dBHL for the left ear.   Word recognition scores were excellent for the right ear and excellent for the left ear.   Tympanograms were Type A for the right ear and Type A for the left ear.    Patient was counseled on the above findings.    Recommendations:  1. Audiogram every other year to monitor hearing  2. Hearing protection around loud noises

## 2017-11-14 NOTE — PROGRESS NOTES
Subjective:       Patient ID: Renea Bourgeois is a 67 y.o. female.    Chief Complaint: Pre-op Exam (cyst removal from left foot next friday)    Patient presents to clinic today for preop exam. Patient is having cyst removed from foot by Dr. Marroquin on November 24th. Patient denies personal or family history of problems with anesthesia. Patient requests refills on flonase and trazodone.      Review of Systems   Constitutional: Negative for activity change and unexpected weight change.   HENT: Positive for rhinorrhea. Negative for hearing loss and trouble swallowing.    Eyes: Negative for discharge and visual disturbance.   Respiratory: Negative for chest tightness and wheezing.    Cardiovascular: Negative for chest pain and palpitations.   Gastrointestinal: Negative for blood in stool, constipation, diarrhea and vomiting.   Endocrine: Negative for polydipsia and polyuria.   Genitourinary: Negative for difficulty urinating, dysuria, hematuria and menstrual problem.   Musculoskeletal: Positive for arthralgias and joint swelling. Negative for neck pain.   Neurological: Negative for weakness and headaches.   Psychiatric/Behavioral: Negative for dysphoric mood.       Objective:      Physical Exam   Constitutional: She is oriented to person, place, and time. Vital signs are normal. She appears well-developed and well-nourished. No distress.   HENT:   Head: Normocephalic and atraumatic.   Right Ear: Tympanic membrane, external ear and ear canal normal.   Left Ear: Tympanic membrane, external ear and ear canal normal.   Nose: Nose normal. No mucosal edema or rhinorrhea.   Mouth/Throat: Uvula is midline, oropharynx is clear and moist and mucous membranes are normal.   Eyes: Conjunctivae, EOM and lids are normal. Pupils are equal, round, and reactive to light.   Neck: Normal range of motion. Neck supple. No thyromegaly present.   Cardiovascular: Normal rate and regular rhythm.  Exam reveals no gallop and no friction rub.    No  murmur heard.  Pulmonary/Chest: Effort normal and breath sounds normal. She has no wheezes. She has no rhonchi. She has no rales.   Abdominal: Soft. Normal appearance and bowel sounds are normal. She exhibits no distension and no mass. There is no hepatosplenomegaly. There is no tenderness.   Musculoskeletal: Normal range of motion.   Lymphadenopathy:     She has no cervical adenopathy.   Neurological: She is alert and oriented to person, place, and time. She has normal strength. No cranial nerve deficit or sensory deficit. Gait normal.   Reflex Scores:       Patellar reflexes are 2+ on the right side and 2+ on the left side.  Skin: Skin is warm and dry. No lesion and no rash noted. No cyanosis. Nails show no clubbing.   Psychiatric: She has a normal mood and affect.   Vitals reviewed.      Assessment:       1. Preop general physical exam    2. Diabetes mellitus type 2 in nonobese    3. Hypertension associated with diabetes    4. Anemia, unspecified type    5. Allergic rhinitis, unspecified chronicity, unspecified seasonality, unspecified trigger    6. Chronic insomnia    7. Colon cancer screening        Plan:     Problem List Items Addressed This Visit     Diabetes mellitus type 2 in nonobese    Current Assessment & Plan     Last a1c showed good control, recheck today         Relevant Orders    Hemoglobin A1c    Anemia    Current Assessment & Plan     Mild, check iron studies, due for routine colonoscopy next month         Relevant Orders    Ferritin    Iron and TIBC    Allergic rhinitis    Relevant Medications    fluticasone (FLONASE) 50 mcg/actuation nasal spray    Chronic insomnia    Relevant Medications    traZODone (DESYREL) 100 MG tablet    Hypertension associated with diabetes    Current Assessment & Plan     Borderline, reassess at visit in 1 month, continue current medications           Other Visit Diagnoses     Preop general physical exam    -  Primary    labs within reasonable limits, check iron as  above; EKG normal; CXR pending    Relevant Orders    X-Ray Chest PA And Lateral    Colon cancer screening        Relevant Orders    Case request GI: COLONOSCOPY (Completed)          Health Maintenance reviewed/updated.    ADDENDUM:  - CXR negative for acute process.  - Iron deficiency anemia, monitor H/H in perioperative period.  - patient moderate risk for proposed procedure.

## 2017-11-15 ENCOUNTER — PATIENT MESSAGE (OUTPATIENT)
Dept: INTERNAL MEDICINE | Facility: CLINIC | Age: 67
End: 2017-11-15

## 2017-11-15 DIAGNOSIS — D50.9 IRON DEFICIENCY ANEMIA, UNSPECIFIED IRON DEFICIENCY ANEMIA TYPE: Primary | ICD-10-CM

## 2017-11-15 RX ORDER — QUINIDINE GLUCONATE 324 MG
240 TABLET, EXTENDED RELEASE ORAL DAILY
COMMUNITY
Start: 2017-11-15

## 2017-11-16 NOTE — PRE-PROCEDURE INSTRUCTIONS
Pre op instructions reviewed with patient per phone:    To confirm, Your surgeon has instructed you:  Surgery is scheduled 11/24/17 at 0700.      Please report to [facility] 1st floor main lobby by 0530.   Pre admit office to call afternoon prior to surgery with final arrival time      INSTRUCTIONS IMPORTANT!!!  ¨ Do not eat, drink, or smoke after 12 midnight-including water. OK to brush teeth, no gum, candy or mints!    ¨ Take only these medicines with a small swallow of water-morning of surgery.  None  ____  Do not wear makeup, including mascara.  ____  No powder, lotions or creams to surgical area.  ____  Please remove all jewelry, including piercings and leave at home.  ____  No money or valuables needed. Please leave at home.  ____  Please bring identification and insurance information to hospital.  ____  If going home the same day, arrange for a ride home. You will not be able to   drive if Anesthesia was used.  ____  Children, under 12 years old, must remain in the waiting room with an adult.  They are not allowed in patient areas.  ____  Wear loose fitting clothing. Allow for dressings, bandages.  ____  Stop Aspirin, Ibuprofen, Motrin and Aleve at least 5-7 days before surgery, unless otherwise instructed by your doctor, or the nurse.   You MAY use Tylenol/acetaminophen until day of surgery.  ____  If you take diabetic medication, do not take am of surgery unless instructed by   Doctor.  ____ Stop taking any Fish Oil supplement or any Vitamins that contain Vitamin E at least 5 days prior to surgery.          Bathing Instructions-- The night before surgery and the morning prior to coming to the hospital:   -Do not shave the surgical area.   -Shower and wash your hair and body as usual with anti-bacterial  soap and shampoo.   -Rinse your hair and body completely.   -Use one packet of hibiclens to wash the surgical site (using your hand) gently for 5 minutes.  Do not scrub you skin too  hard.   -Do not use hibiclens on your head, face, or genitals.   -Do not wash with anti-bacterial soap after you use the hibiclens.   -Rinse your body thoroughly.   -Dry with clean, soft towel.  Do not use lotion, cream, deodorant, or powders on   the surgical site.    Use antibacterial soap in place of hibiclens if your surgery is on the head, face or genitals.         Surgical Site Infection    Prevention of surgical site infections:     -Keep incisions clean and dry.   -Do not soak/submerge incisions in water until completely healed.   -Do not apply lotions, powders, creams, or deodorants to site.   -Always make sure hands are cleaned with antibacterial soap/ alcohol-based   prior to touching the surgical site.  (This includes doctors, nurses, staff, and yourself.)    Signs and symptoms:   -Redness and pain around the area where you had surgery   -Drainage of cloudy fluid from your surgical wound   -Fever over 100.4  I have read or had read and explained to me, and understand the above information.

## 2017-11-22 ENCOUNTER — ANESTHESIA EVENT (OUTPATIENT)
Dept: SURGERY | Facility: HOSPITAL | Age: 67
End: 2017-11-22
Payer: MEDICARE

## 2017-11-24 ENCOUNTER — ANESTHESIA (OUTPATIENT)
Dept: SURGERY | Facility: HOSPITAL | Age: 67
End: 2017-11-24
Payer: MEDICARE

## 2017-11-24 ENCOUNTER — HOSPITAL ENCOUNTER (OUTPATIENT)
Facility: HOSPITAL | Age: 67
Discharge: HOME | End: 2017-11-24
Attending: PODIATRIST | Admitting: PODIATRIST
Payer: MEDICARE

## 2017-11-24 ENCOUNTER — SURGERY (OUTPATIENT)
Age: 67
End: 2017-11-24

## 2017-11-24 VITALS
HEART RATE: 75 BPM | HEIGHT: 60 IN | BODY MASS INDEX: 27.88 KG/M2 | WEIGHT: 142 LBS | TEMPERATURE: 98 F | RESPIRATION RATE: 18 BRPM | OXYGEN SATURATION: 100 % | SYSTOLIC BLOOD PRESSURE: 172 MMHG | DIASTOLIC BLOOD PRESSURE: 83 MMHG

## 2017-11-24 DIAGNOSIS — E11.9 DIABETES MELLITUS TYPE 2 IN NONOBESE: ICD-10-CM

## 2017-11-24 DIAGNOSIS — M67.40 GANGLION OF JOINT: Primary | ICD-10-CM

## 2017-11-24 DIAGNOSIS — M67.40 GANGLION: ICD-10-CM

## 2017-11-24 LAB — POCT GLUCOSE: 142 MG/DL (ref 70–110)

## 2017-11-24 PROCEDURE — 28090 REMOVAL OF FOOT LESION: CPT | Mod: 51,LT,, | Performed by: PODIATRIST

## 2017-11-24 PROCEDURE — 37000008 HC ANESTHESIA 1ST 15 MINUTES: Performed by: PODIATRIST

## 2017-11-24 PROCEDURE — 36000707: Performed by: PODIATRIST

## 2017-11-24 PROCEDURE — 88304 TISSUE EXAM BY PATHOLOGIST: CPT | Mod: 26,,, | Performed by: PATHOLOGY

## 2017-11-24 PROCEDURE — 25000003 PHARM REV CODE 250: Performed by: PODIATRIST

## 2017-11-24 PROCEDURE — 25000003 PHARM REV CODE 250: Performed by: CLINIC/CENTER

## 2017-11-24 PROCEDURE — 71000015 HC POSTOP RECOV 1ST HR: Performed by: PODIATRIST

## 2017-11-24 PROCEDURE — S0020 INJECTION, BUPIVICAINE HYDRO: HCPCS | Performed by: PODIATRIST

## 2017-11-24 PROCEDURE — 71000033 HC RECOVERY, INTIAL HOUR: Performed by: PODIATRIST

## 2017-11-24 PROCEDURE — 63600175 PHARM REV CODE 636 W HCPCS: Performed by: CLINIC/CENTER

## 2017-11-24 PROCEDURE — 88304 TISSUE EXAM BY PATHOLOGIST: CPT | Performed by: PATHOLOGY

## 2017-11-24 PROCEDURE — 36000706: Performed by: PODIATRIST

## 2017-11-24 PROCEDURE — 63600175 PHARM REV CODE 636 W HCPCS: Performed by: ANESTHESIOLOGY

## 2017-11-24 PROCEDURE — 28120 PART REMOVAL OF ANKLE/HEEL: CPT | Mod: LT,,, | Performed by: PODIATRIST

## 2017-11-24 PROCEDURE — 37000009 HC ANESTHESIA EA ADD 15 MINS: Performed by: PODIATRIST

## 2017-11-24 RX ORDER — FENTANYL CITRATE 50 UG/ML
INJECTION, SOLUTION INTRAMUSCULAR; INTRAVENOUS
Status: DISCONTINUED | OUTPATIENT
Start: 2017-11-24 | End: 2017-11-24

## 2017-11-24 RX ORDER — HYDROMORPHONE HYDROCHLORIDE 1 MG/ML
0.2 INJECTION, SOLUTION INTRAMUSCULAR; INTRAVENOUS; SUBCUTANEOUS EVERY 5 MIN PRN
Status: DISCONTINUED | OUTPATIENT
Start: 2017-11-24 | End: 2017-11-24 | Stop reason: HOSPADM

## 2017-11-24 RX ORDER — ACETAMINOPHEN 10 MG/ML
1000 INJECTION, SOLUTION INTRAVENOUS ONCE
Status: COMPLETED | OUTPATIENT
Start: 2017-11-24 | End: 2017-11-24

## 2017-11-24 RX ORDER — LIDOCAINE HYDROCHLORIDE 10 MG/ML
1 INJECTION, SOLUTION EPIDURAL; INFILTRATION; INTRACAUDAL; PERINEURAL ONCE
Status: DISCONTINUED | OUTPATIENT
Start: 2017-11-24 | End: 2017-11-24 | Stop reason: HOSPADM

## 2017-11-24 RX ORDER — ONDANSETRON 2 MG/ML
INJECTION INTRAMUSCULAR; INTRAVENOUS
Status: DISCONTINUED | OUTPATIENT
Start: 2017-11-24 | End: 2017-11-24

## 2017-11-24 RX ORDER — OXYCODONE AND ACETAMINOPHEN 7.5; 325 MG/1; MG/1
1 TABLET ORAL EVERY 4 HOURS PRN
Qty: 30 TABLET | Refills: 0 | Status: SHIPPED | OUTPATIENT
Start: 2017-11-24 | End: 2018-05-03 | Stop reason: ALTCHOICE

## 2017-11-24 RX ORDER — MEPERIDINE HYDROCHLORIDE 50 MG/ML
12.5 INJECTION INTRAMUSCULAR; INTRAVENOUS; SUBCUTANEOUS ONCE AS NEEDED
Status: DISCONTINUED | OUTPATIENT
Start: 2017-11-24 | End: 2017-11-24 | Stop reason: HOSPADM

## 2017-11-24 RX ORDER — SODIUM CHLORIDE 0.9 % (FLUSH) 0.9 %
3 SYRINGE (ML) INJECTION EVERY 8 HOURS
Status: DISCONTINUED | OUTPATIENT
Start: 2017-11-24 | End: 2017-11-24 | Stop reason: HOSPADM

## 2017-11-24 RX ORDER — MIDAZOLAM HYDROCHLORIDE 1 MG/ML
INJECTION, SOLUTION INTRAMUSCULAR; INTRAVENOUS
Status: DISCONTINUED | OUTPATIENT
Start: 2017-11-24 | End: 2017-11-24

## 2017-11-24 RX ORDER — SODIUM CHLORIDE, SODIUM LACTATE, POTASSIUM CHLORIDE, CALCIUM CHLORIDE 600; 310; 30; 20 MG/100ML; MG/100ML; MG/100ML; MG/100ML
INJECTION, SOLUTION INTRAVENOUS CONTINUOUS PRN
Status: DISCONTINUED | OUTPATIENT
Start: 2017-11-24 | End: 2017-11-24

## 2017-11-24 RX ORDER — LIDOCAINE HYDROCHLORIDE 10 MG/ML
INJECTION, SOLUTION EPIDURAL; INFILTRATION; INTRACAUDAL; PERINEURAL
Status: DISCONTINUED | OUTPATIENT
Start: 2017-11-24 | End: 2017-11-24 | Stop reason: HOSPADM

## 2017-11-24 RX ORDER — PROPOFOL 10 MG/ML
VIAL (ML) INTRAVENOUS
Status: DISCONTINUED | OUTPATIENT
Start: 2017-11-24 | End: 2017-11-24

## 2017-11-24 RX ORDER — HYDROCODONE BITARTRATE AND ACETAMINOPHEN 5; 325 MG/1; MG/1
1 TABLET ORAL EVERY 4 HOURS PRN
Status: DISCONTINUED | OUTPATIENT
Start: 2017-11-24 | End: 2017-11-24 | Stop reason: HOSPADM

## 2017-11-24 RX ORDER — CEPHALEXIN 500 MG/1
500 CAPSULE ORAL EVERY 12 HOURS
Qty: 20 CAPSULE | Refills: 0 | Status: SHIPPED | OUTPATIENT
Start: 2017-11-24 | End: 2017-12-04

## 2017-11-24 RX ORDER — BUPIVACAINE HYDROCHLORIDE 5 MG/ML
INJECTION, SOLUTION EPIDURAL; INTRACAUDAL
Status: DISCONTINUED | OUTPATIENT
Start: 2017-11-24 | End: 2017-11-24 | Stop reason: HOSPADM

## 2017-11-24 RX ORDER — CEFAZOLIN SODIUM 2 G/50ML
2 SOLUTION INTRAVENOUS
Status: DISCONTINUED | OUTPATIENT
Start: 2017-11-24 | End: 2017-11-24 | Stop reason: HOSPADM

## 2017-11-24 RX ORDER — PROPOFOL 10 MG/ML
VIAL (ML) INTRAVENOUS CONTINUOUS PRN
Status: DISCONTINUED | OUTPATIENT
Start: 2017-11-24 | End: 2017-11-24

## 2017-11-24 RX ORDER — LIDOCAINE HYDROCHLORIDE 10 MG/ML
INJECTION INFILTRATION; PERINEURAL
Status: DISCONTINUED | OUTPATIENT
Start: 2017-11-24 | End: 2017-11-24

## 2017-11-24 RX ORDER — SODIUM CHLORIDE 0.9 % (FLUSH) 0.9 %
3 SYRINGE (ML) INJECTION
Status: DISCONTINUED | OUTPATIENT
Start: 2017-11-24 | End: 2017-11-24 | Stop reason: HOSPADM

## 2017-11-24 RX ADMIN — LIDOCAINE HYDROCHLORIDE 40 MG: 10 INJECTION, SOLUTION INFILTRATION; PERINEURAL at 07:11

## 2017-11-24 RX ADMIN — MIDAZOLAM HYDROCHLORIDE 2 MG: 1 INJECTION, SOLUTION INTRAMUSCULAR; INTRAVENOUS at 06:11

## 2017-11-24 RX ADMIN — ACETAMINOPHEN 1000 MG: 10 INJECTION, SOLUTION INTRAVENOUS at 08:11

## 2017-11-24 RX ADMIN — FENTANYL CITRATE 25 MCG: 50 INJECTION, SOLUTION INTRAMUSCULAR; INTRAVENOUS at 07:11

## 2017-11-24 RX ADMIN — PROPOFOL 40 MG: 10 INJECTION, EMULSION INTRAVENOUS at 07:11

## 2017-11-24 RX ADMIN — SODIUM CHLORIDE, SODIUM LACTATE, POTASSIUM CHLORIDE, AND CALCIUM CHLORIDE: 600; 310; 30; 20 INJECTION, SOLUTION INTRAVENOUS at 06:11

## 2017-11-24 RX ADMIN — LIDOCAINE HYDROCHLORIDE 10 ML: 10 INJECTION, SOLUTION EPIDURAL; INFILTRATION; INTRACAUDAL; PERINEURAL at 07:11

## 2017-11-24 RX ADMIN — PROPOFOL 75 MCG/KG/MIN: 10 INJECTION, EMULSION INTRAVENOUS at 07:11

## 2017-11-24 RX ADMIN — BUPIVACAINE HYDROCHLORIDE 30 ML: 5 INJECTION, SOLUTION EPIDURAL; INTRACAUDAL; PERINEURAL at 07:11

## 2017-11-24 RX ADMIN — ONDANSETRON 4 MG: 2 INJECTION, SOLUTION INTRAMUSCULAR; INTRAVENOUS at 07:11

## 2017-11-24 RX ADMIN — FENTANYL CITRATE 50 MCG: 50 INJECTION, SOLUTION INTRAMUSCULAR; INTRAVENOUS at 07:11

## 2017-11-24 NOTE — BRIEF OP NOTE
[facility] -   Brief Operative Note     SUMMARY     Surgery Date: 11/24/2017     Surgeon(s) and Role:     * Cindy Marroquin DPM - Primary    Assisting Surgeon: None    Pre-op Diagnosis:  Ganglion of joint [M67.40]  Left foot pain [M79.672]    Post-op Diagnosis:  Post-Op Diagnosis Codes:     * Ganglion of joint [M67.40]     * Left foot pain [M79.672]    Procedure(s) (LRB):  GANGLIONECTOMY (Left) and exostectomy left    Anesthesia: Local MAC    Description of the findings of the procedure: ganglion with underlying spur resection    Findings/Key Components: ganglion submitted to pathology    Estimated Blood Loss: * No values recorded between 11/24/2017  7:15 AM and 11/24/2017  8:00 AM *         Specimens:   Specimen (12h ago through future)    Start     Ordered    11/24/17 0723  Specimen to Pathology - Surgery  Once     Comments:  Ganglion cyst left footDx:  Ganglion cyst      11/24/17 0723          Discharge Note    SUMMARY     Admit Date: 11/24/2017    Discharge Date and Time:  11/24/2017 8:11 AM    Hospital Course (synopsis of major diagnoses, care, treatment, and services provided during the course of the hospital stay): VSS. VSI. Patient tolerated procedure and anesthesia well with no complications.        Final Diagnosis: Post-Op Diagnosis Codes:     * Ganglion of joint [M67.40]     * Left foot pain [M79.672]    Disposition: Home or Self Care    Follow Up/Patient Instructions:     Medications:  Reconciled Home Medications:   Current Discharge Medication List      START taking these medications    Details   cephALEXin (KEFLEX) 500 MG capsule Take 1 capsule (500 mg total) by mouth every 12 (twelve) hours.  Qty: 20 capsule, Refills: 0      oxyCODONE-acetaminophen (PERCOCET) 7.5-325 mg per tablet Take 1 tablet by mouth every 4 (four) hours as needed for Pain.  Qty: 30 tablet, Refills: 0         CONTINUE these medications which have NOT CHANGED    Details   acetaminophen (TYLENOL) 500 MG tablet Take 500  mg by mouth every 6 (six) hours as needed for Pain.      anastrozole (ARIMIDEX) 1 mg Tab take 1 tablet by mouth once daily  Qty: 30 tablet, Refills: 10    Associated Diagnoses: Malignant neoplasm of upper-outer quadrant of right female breast      fluticasone (FLONASE) 50 mcg/actuation nasal spray 2 sprays by Each Nare route daily as needed. 2 Spray, Suspension Nasal Every day  Qty: 16 g, Refills: 5    Associated Diagnoses: Allergic rhinitis, unspecified chronicity, unspecified seasonality, unspecified trigger      glimepiride (AMARYL) 1 MG tablet Take 1 tablet (1 mg total) by mouth once daily.  Qty: 30 tablet, Refills: 6    Associated Diagnoses: Type 2 diabetes mellitus without complication, without long-term current use of insulin      metoprolol succinate (TOPROL-XL) 25 MG 24 hr tablet Take 25 mg by mouth every evening.   Refills: 1      naproxen sodium (ALEVE) 220 MG tablet Take 220 mg by mouth as needed.      rosuvastatin (CRESTOR) 10 MG tablet take 1 tablet by mouth once daily  Qty: 90 tablet, Refills: 3      traZODone (DESYREL) 100 MG tablet Take 1 tablet (100 mg total) by mouth nightly.  Qty: 90 tablet, Refills: 1    Associated Diagnoses: Chronic insomnia      ACCU-CHEK JD PLUS TEST STRP Strp TEST three times a day  Qty: 100 strip, Refills: 11      ALCOHOL PREP PADS PadM       ferrous gluconate (FERGON) 240 (27 FE) MG tablet Take 1 tablet (240 mg total) by mouth once daily.    Associated Diagnoses: Iron deficiency anemia, unspecified iron deficiency anemia type      lancets (ACCU-CHEK SOFTCLIX LANCETS) Misc 1 each by Misc.(Non-Drug; Combo Route) route 3 (three) times daily.  Qty: 100 each, Refills: 11      lancing device Misc              Discharge Procedure Orders  CRUTCHES FOR HOME USE   Order Specific Question Answer Comments   Type: Axillary    Height: 5' (1.524 m)    Weight: 64.4 kg (141 lb 15.6 oz)    Length of need (1-99 months): 3      Diet general     Sponge bath only until clinic visit     Keep  surgical extremity elevated     Ice to affected area     Lifting restrictions     Weight bearing restrictions (specify)     Call MD for:     Leave dressing on - Keep it clean, dry, and intact until clinic visit

## 2017-11-24 NOTE — TRANSFER OF CARE
Anesthesia Transfer of Care Note    Patient: Renea Bourgeois    Procedure(s) Performed: Procedure(s) (LRB):  GANGLIONECTOMY (Left)    Patient location: PACU    Anesthesia Type: MAC    Transport from OR: Transported from OR on room air with adequate spontaneous ventilation    Post pain: adequate analgesia    Post assessment: no apparent anesthetic complications and tolerated procedure well    Post vital signs: stable    Level of consciousness: awake    Nausea/Vomiting: no nausea/vomiting    Complications: none    Transfer of care protocol was followed      Last vitals:   Visit Vitals  BP (!) 157/79 (BP Location: Right arm, Patient Position: Sitting)   Pulse 70   Temp 36.7 °C (98.1 °F) (Temporal)   Resp 18   Ht 5' (1.524 m)   Wt 64.4 kg (141 lb 15.6 oz)   LMP 01/01/2004 (Within Months)   SpO2 98%   Breastfeeding? No   BMI 27.73 kg/m²

## 2017-11-24 NOTE — INTERVAL H&P NOTE
The patient has been examined and the H&P has been reviewed:    I concur with the findings and no changes have occurred since H&P was written.    Anesthesia/Surgery risks, benefits and alternative options discussed and understood by patient/family.          Active Hospital Problems    Diagnosis  POA    Ganglion [M67.40]  Yes      Resolved Hospital Problems    Diagnosis Date Resolved POA   No resolved problems to display.

## 2017-11-24 NOTE — OP NOTE
DATE OF PROCEDURE:  11/24/2017    PREOPERATIVE DIAGNOSIS:  Ganglion cyst, left dorsal mid foot.    POSTOPERATIVE DIAGNOSES:  Ganglion cyst, left dorsal mid foot with underlying   exostosis.    PROCEDURE:  Excision of ganglion cyst, left foot and exostosectomy dorsal mid   tarsal left foot.    SURGEON:  Cindy Marroquin DPM.    ANESTHESIA:  MAC with local.    HEMOSTASIS:  Pneumatic ankle tourniquet.    ESTIMATED BLOOD LOSS:  Less than 5 mL    PROCEDURE IN DETAIL:  The patient was brought into the Operating Room and placed   on the operating table in the supine position.  Under mild IV sedation, local   anesthesia of 1:1 mixture of 1% lidocaine plain and 0.5% Marcaine plain was   administered to the left foot.  Pneumatic ankle tourniquet was placed around the   left ankle and the foot was then scrubbed, prepped and draped in the usual   aseptic manner and following Esmarch exsanguination, tourniquet was inflated to   250 mmHg.  Attention was first directed over the left dorsal mid tarsal just   prior to infiltration of local anesthesia.  The area of the ganglion cyst was   demarcated and noted to be adjacent in proximity of the DP artery, incision was   placed just medial to the DP artery.  Incision was deepened taking care to   identify and retract all neurovascular elements.  The dissection was continued   within the subcutaneous tissue where the ganglion cyst was noted to the   multilobular measuring in total about 2.5 cm x 1 cm.  Dissection was continued   around the perimeter of the ganglion cyst and care was taken to dissect the DP   artery away, which was retracted as the ganglion cyst was completely dissected   free and passed from the operative field.  Bleeders which were supplying the   ganglion cyst was cauterized and ligated and the DP artery was noted to be   remained intact, though upon further inspection, there was noted to be a dorsal   exostosis immediately underlying the ganglion cyst.  The underlying  capsular   tissue and periosteum was incised and reflected medial and lateral and the small   exostosis measuring approximately 0.5 cm was resected utilizing the rongeur and   the wound was copiously irrigated with sterile normal saline.  The area was   further inspected and any prominences was smoothed down utilizing the   [place]-tail rasp.  The wound was once again copiously irrigated and 3-0 Vicryl   was utilized to reapproximate the capsular tissue.  Subcutaneous tissue was   further reapproximated utilizing 4-0 Vicryl and the skin was reapproximated   utilizing 4-0 nylon.  The wound was then further dressed with Betadine-soaked   Adaptics, 4 x 4, and local anesthetic, 0.5% Marcaine plain was infiltrated into   the area.  The tourniquet was released with immediate hyperemia to all digits of   the left foot and the foot was further dressed with Evans, cast padding and   Ace.  The patient tolerated procedure and anesthesia well without complications   and was transported to the recovery area with all vital signs stable and   vascular status intact to the left foot.  Following a period of postoperative   monitoring, oral and written instructions were given to the patient to keep   dressings clean, dry and intact, to avoid excessive ambulation and to ambulate   only with the postoperative shoe and prescriptions were written for cephalexin and Percocet 7.5/325.  She will follow up in 1 week for first postoperative   dressing change and call if any problems arise.      ANA/IN  dd: 11/24/2017 15:49:02 (CST)  td: 11/24/2017 16:22:37 (CST)  Doc ID   #[id number]  Job ID #[id number]    CC:

## 2017-11-24 NOTE — ANESTHESIA POSTPROCEDURE EVALUATION
Anesthesia Post Evaluation    Patient: Renea Bourgeois    Procedure(s) Performed: Procedure(s) (LRB):  GANGLIONECTOMY (Left)    Final Anesthesia Type: MAC  Patient location during evaluation: PACU  Patient participation: Yes- Able to Participate  Level of consciousness: awake and alert  Post-procedure vital signs: reviewed and stable  Pain management: adequate  Airway patency: patent  PONV status at discharge: No PONV  Anesthetic complications: no      Cardiovascular status: hemodynamically stable  Respiratory status: unassisted, room air and spontaneous ventilation          Visit Vitals  BP (!) 172/83   Pulse 75   Temp 36.5 °C (97.7 °F) (Temporal)   Resp 18   Ht 5' (1.524 m)   Wt 64.4 kg (141 lb 15.6 oz)   LMP 01/01/2004 (Within Months)   SpO2 100%   Breastfeeding? No   BMI 27.73 kg/m²       Pain/Johnathan Score: Pain Assessment Performed: Yes (11/24/2017  8:40 AM)  Presence of Pain: denies (11/24/2017  8:40 AM)  Pain Rating Prior to Med Admin: 0 (11/24/2017  8:30 AM)  Johnathan Score: 10 (11/24/2017  8:40 AM)

## 2017-11-24 NOTE — ANESTHESIA PREPROCEDURE EVALUATION
11/23/2017  Renea Bourgeois is a 67 y.o., female.    Pre-op Assessment    I have reviewed the Patient Summary Reports.     I have reviewed the Nursing Notes.      Review of Systems  Social:  Non-Smoker, No Alcohol Use    Hematology/Oncology:        Oncology Comments: Breast cancer   EENT/Dental:   Cataract   Cardiovascular:   Hypertension Angina hyperlipidemia ECG has been reviewed. Premature ventricular contraction   Pulmonary:   Cataract   Renal/:   Chronic Renal Disease    Hepatic/GI:   GERD    Musculoskeletal:   Arthritis  Ganglion of left foot.  Osteopenia.   Neurological:   Neuromuscular Disease,    Endocrine:   Diabetes           Anesthesia Plan  Type of Anesthesia, risks & benefits discussed:  Anesthesia Type:  MAC  Patient's Preference:   Intra-op Monitoring Plan: standard ASA monitors  Intra-op Monitoring Plan Comments:   Post Op Pain Control Plan: multimodal analgesia  Post Op Pain Control Plan Comments:   Induction:   IV  Beta Blocker:         Informed Consent: Patient understands risks and agrees with Anesthesia plan.  Questions answered. Anesthesia consent signed with patient.  ASA Score: 3     Day of Surgery Review of History & Physical:

## 2017-11-29 ENCOUNTER — OFFICE VISIT (OUTPATIENT)
Dept: PODIATRY | Facility: CLINIC | Age: 67
End: 2017-11-29
Payer: MEDICARE

## 2017-11-29 VITALS
SYSTOLIC BLOOD PRESSURE: 128 MMHG | HEIGHT: 60 IN | RESPIRATION RATE: 18 BRPM | BODY MASS INDEX: 27.68 KG/M2 | HEART RATE: 69 BPM | WEIGHT: 141 LBS | DIASTOLIC BLOOD PRESSURE: 76 MMHG

## 2017-11-29 DIAGNOSIS — Z98.890 POST-OPERATIVE STATE: Primary | ICD-10-CM

## 2017-11-29 PROCEDURE — 99999 PR PBB SHADOW E&M-EST. PATIENT-LVL III: CPT | Mod: PBBFAC,,, | Performed by: PODIATRIST

## 2017-11-29 PROCEDURE — 99024 POSTOP FOLLOW-UP VISIT: CPT | Mod: S$GLB,,, | Performed by: PODIATRIST

## 2017-11-29 NOTE — PROGRESS NOTES
Renea Bourgeois is following up 1 week post op excision ganglionic cyst left foot. Patient reports good compliance by keeping the dressings clean dry and intact and avoiding excessive ambulation and remaining nonweightbearing on the operative foot. Patient reports good pain control with prescribed pain meds.  Patient denies any fevers, chills, nausea, vomiting or pain.     LOWER EXTREMITY PHYSICAL EXAM:  VASCULAR: DP and PT pulses palpable with capillary fill time 3 seconds to left foot.   DERMATOLOGIC: Sutures intact and incision site well coapted. Minimal surrounding postoperative swelling and ecchymosis left foot. No surrounding redness or streaking.  NEURO: Plantar protective sensation by touch is present surrounding and distal to the operative incision site.  MUSCULOSKELETAL: Good deformity reduction with excision ganglion cyst left foot. Positive passive and active range of motion of the digits.    ASSESSMENT AND PLAN: 1 week status post excision ganglionic cyst left foot. Dressings were changed and applied to the operative site left foot. Patient was instructed to keep the dressing clean dry and intact and ambulate only in the post operative shoe. Patient will followup in 1 week for possible suture removal and will call if any problems arise.

## 2017-12-06 ENCOUNTER — OFFICE VISIT (OUTPATIENT)
Dept: PODIATRY | Facility: CLINIC | Age: 67
End: 2017-12-06
Payer: MEDICARE

## 2017-12-06 VITALS
HEART RATE: 80 BPM | HEIGHT: 60 IN | SYSTOLIC BLOOD PRESSURE: 140 MMHG | DIASTOLIC BLOOD PRESSURE: 74 MMHG | BODY MASS INDEX: 29.04 KG/M2 | RESPIRATION RATE: 18 BRPM | WEIGHT: 147.94 LBS

## 2017-12-06 DIAGNOSIS — Z98.890 POST-OPERATIVE STATE: Primary | ICD-10-CM

## 2017-12-06 PROCEDURE — 99999 PR PBB SHADOW E&M-EST. PATIENT-LVL III: CPT | Mod: PBBFAC,,, | Performed by: PODIATRIST

## 2017-12-06 PROCEDURE — 99024 POSTOP FOLLOW-UP VISIT: CPT | Mod: S$GLB,,, | Performed by: PODIATRIST

## 2017-12-11 DIAGNOSIS — E11.9 TYPE 2 DIABETES MELLITUS WITHOUT COMPLICATION, WITHOUT LONG-TERM CURRENT USE OF INSULIN: Chronic | ICD-10-CM

## 2017-12-11 RX ORDER — GLIMEPIRIDE 1 MG/1
TABLET ORAL
Qty: 30 TABLET | Refills: 3 | Status: SHIPPED | OUTPATIENT
Start: 2017-12-11 | End: 2018-04-18 | Stop reason: SDUPTHER

## 2017-12-16 NOTE — PROGRESS NOTES
Renea Bourgeois is following up 2 weeks post op excision of ganglion cyst with exostectomy left foot. Patient reports good compliance by keeping the dressings clean dry and intact and avoiding excessive ambulation. Patient denies any fevers, chills, nausea, vomiting or pain.     LOWER EXTREMITY PHYSICAL EXAM:  VASCULAR: DP and PT pulses palpable with capillary fill time 3 seconds to the left foot.   DERMATOLOGIC: Sutures intact and incision site well coapted. Minimal surrounding postoperative swelling and ecchymosis. No surrounding redness or streaking.  NEURO: Plantar protective sensation by touch is present surrounding and distal to the operative incision site.  MUSCULOSKELETAL: Good deformity reduction with excision of ganglion cyst and exostectomy left foot. Positive passive and active range of motion of the digits.     ASSESSMENT AND PLAN: 2 weeks status post excision of ganglion cyst and exostectomy left foot.  Sutures were removed and a light dressing applied to the operative foot. Patient was instructed to keep the foot dry for additional 24 hours, but after that may begin to bathe and get the foot wet. Patient was also instructed to continue Ace bandage wrapping to control swelling as needed and ambulate only in postoperative shoe until further instructed.  Patient will followup in 3 weeks for postoperative x-rays or call if any problems arise.

## 2018-01-02 ENCOUNTER — OFFICE VISIT (OUTPATIENT)
Dept: URGENT CARE | Facility: CLINIC | Age: 68
End: 2018-01-02
Payer: MEDICARE

## 2018-01-02 VITALS
BODY MASS INDEX: 28.61 KG/M2 | RESPIRATION RATE: 21 BRPM | SYSTOLIC BLOOD PRESSURE: 128 MMHG | HEIGHT: 60 IN | TEMPERATURE: 98 F | DIASTOLIC BLOOD PRESSURE: 66 MMHG | WEIGHT: 145.75 LBS | HEART RATE: 81 BPM

## 2018-01-02 DIAGNOSIS — L03.011 PARONYCHIA OF RIGHT THUMB: Primary | ICD-10-CM

## 2018-01-02 PROCEDURE — 99214 OFFICE O/P EST MOD 30 MIN: CPT | Mod: S$GLB,,, | Performed by: FAMILY MEDICINE

## 2018-01-02 PROCEDURE — 99999 PR PBB SHADOW E&M-EST. PATIENT-LVL IV: CPT | Mod: PBBFAC,,, | Performed by: FAMILY MEDICINE

## 2018-01-02 RX ORDER — CEPHALEXIN 500 MG/1
500 CAPSULE ORAL 4 TIMES DAILY
Qty: 28 CAPSULE | Refills: 0 | Status: SHIPPED | OUTPATIENT
Start: 2018-01-02 | End: 2018-01-09

## 2018-01-02 NOTE — PROGRESS NOTES
Subjective:       Patient ID: Renea Bourgeois is a 67 y.o. female.    Chief Complaint: Hand Injury    /66 (BP Location: Right arm, Patient Position: Sitting, BP Method: Large (Manual))   Pulse 81   Temp 98.2 °F (36.8 °C) (Tympanic)   Resp (!) 21   Ht 5' (1.524 m)   Wt 66.1 kg (145 lb 11.6 oz)   LMP 01/01/2004 (Within Months)   BMI 28.46 kg/m²     HPI  Right thumb pain and red and swelling after cutting off a hangnail a week ago.   Hx arthritis involving finger joints    Review of Systems   Constitutional: Positive for appetite change.   Skin: Positive for color change.       Objective:      Physical Exam   Constitutional: She is oriented to person, place, and time. She appears well-developed and well-nourished.   HENT:   Head: Normocephalic and atraumatic.   Eyes: EOM are normal. Pupils are equal, round, and reactive to light.   Neck: Normal range of motion. Neck supple.   Cardiovascular: Normal rate.    Pulmonary/Chest: Effort normal.   Neurological: She is alert and oriented to person, place, and time.   Skin:   Right thumb: skin adjacent to nail groove tender and erythematous, mild swelling   Psychiatric: She has a normal mood and affect.   Nursing note and vitals reviewed.      Assessment:       1. Paronychia of right thumb        Plan:     Renea was seen today for hand injury.    Diagnoses and all orders for this visit:    Paronychia of right thumb  -     cephALEXin (KEFLEX) 500 MG capsule; Take 1 capsule (500 mg total) by mouth 4 (four) times daily.        Discussed with pt/family all information and results pertaining to this visit. Discussed diagnosis and plan of treatment.  All questions and concerns were addressed at this time. Pt/family expresses understanding of information and instructions.  Care and follow up instruction provided.

## 2018-01-19 ENCOUNTER — OFFICE VISIT (OUTPATIENT)
Dept: PODIATRY | Facility: CLINIC | Age: 68
End: 2018-01-19
Payer: MEDICARE

## 2018-01-19 VITALS
WEIGHT: 150.56 LBS | HEART RATE: 81 BPM | RESPIRATION RATE: 18 BRPM | SYSTOLIC BLOOD PRESSURE: 129 MMHG | BODY MASS INDEX: 29.56 KG/M2 | DIASTOLIC BLOOD PRESSURE: 67 MMHG | HEIGHT: 60 IN

## 2018-01-19 DIAGNOSIS — Z98.890 POST-OPERATIVE STATE: Primary | ICD-10-CM

## 2018-01-19 PROCEDURE — 99999 PR PBB SHADOW E&M-EST. PATIENT-LVL III: CPT | Mod: PBBFAC,,, | Performed by: PODIATRIST

## 2018-01-19 PROCEDURE — 99024 POSTOP FOLLOW-UP VISIT: CPT | Mod: S$GLB,,, | Performed by: PODIATRIST

## 2018-01-19 NOTE — Clinical Note
I've ordered PO WB left foot x-ray. Please schedule at patient's convenience. I will call results in for her.

## 2018-01-19 NOTE — PROGRESS NOTES
Renea Bourgeois is following up 2 months post op excision of ganglion cyst with exostectomy left foot. Patient reports good compliance by limiting weightbearing on the operative foot in recommended supportive shoe and insole. Patient denies any fevers, chills, nausea, vomiting.  She has noticed only mild pain which she describes as a 1 out of 10 sharp pain on her first steps in the morning along the surgical site.    LOWER EXTREMITY PHYSICAL EXAM:  VASCULAR: DP and PT pulses palpable with capillary fill time 3 seconds to the left foot.   DERMATOLOGIC: Incision site well coapted. Minimal surrounding postoperative swelling and no ecchymosis. No surrounding redness or streaking. No hypertrophy of surgical scar.  NEURO: Plantar protective sensation by touch is present surrounding and distal to the operative incision site.  MUSCULOSKELETAL: Good deformity reduction with excision of ganglion cyst with exostectomy  left foot. Positive passive and active range of motion of the digits. No pain to palpation or range of motion.    X-rays: ordered    ASSESSMENT AND PLAN: 4 weeks status post excision of ganglion cyst with exostectomy  left foot. Patient was instructed to continue Ace bandage wrapping to control swelling as needed and gradually transition and increase ambulation in the recommended supportive shoes and insoles until further instructed.  Patient will followup in  1 year for annual diabetic foot check.  We will also order postoperative x-rays with results to be called in.  She will call if any problems arise.

## 2018-01-24 ENCOUNTER — PATIENT MESSAGE (OUTPATIENT)
Dept: INTERNAL MEDICINE | Facility: CLINIC | Age: 68
End: 2018-01-24

## 2018-01-24 DIAGNOSIS — Z12.11 COLON CANCER SCREENING: Primary | ICD-10-CM

## 2018-01-30 NOTE — DISCHARGE INSTRUCTIONS
General Information:  1.  Do not drink alcoholic beverages including beer for 24 hours or as long as you are on pain medication..  2.  Do not drive a motor vehicle, operate machinery or power tools, or signs legal papers for 24 hours or as long as you are on pain medication.   3.  You may experience light-headedness, dizziness, and sleepiness following surgery. Please do not stay alone. A responsible adult should be with you for this 24 hour period.  4.  Go home and rest.  5. Progress slowly to a normal diet unless instructed.  Otherwise, begin with liquids such as soft drinks, then soup and crackers working up to solid foods. Drink plenty of nonalcoholic fluids.  6.  Certain anesthetics and pain medications produce nausea and vomiting in certain       individuals. If nausea becomes a problem at home, call you doctor.  7. A nurse will be calling you sometime after surgery. Do not be alarmed. This is our way of finding out how you are doing.  8. Several times every hour while you are awake, take 2-3 deep breaths and cough. If you had stomach surgery hold a pillow or rolled towel firmly against your stomach before you cough. This will help with any pain the cough might cause.  9. Several times every hour while you are awake, pump and flex your feet 5-6 times and do foot circles. This will help prevent blood clots.  10.Call your doctor for severe pain, bleeding, fever, or signs or symptoms of infection (pain, swelling, redness, foul odor, drainage).  11.You can contact your doctor anytime by [phone] for the [facility] (at [facility]) or [phone] for the [facility] on [facility].   [email or web address] is another way to contact your doctor if you are an active participant online with [facility].          
7

## 2018-02-06 RX ORDER — SODIUM, POTASSIUM,MAG SULFATES 17.5-3.13G
SOLUTION, RECONSTITUTED, ORAL ORAL
Qty: 354 ML | Refills: 0 | Status: SHIPPED | OUTPATIENT
Start: 2018-02-06 | End: 2018-05-09

## 2018-02-07 ENCOUNTER — TELEPHONE (OUTPATIENT)
Dept: INTERNAL MEDICINE | Facility: CLINIC | Age: 68
End: 2018-02-07

## 2018-02-07 NOTE — TELEPHONE ENCOUNTER
Notice received that patient has not scheduled colonoscopy. Order has . Patient will need to contact office for new order when they are ready to schedule colonoscopy.\

## 2018-02-07 NOTE — TELEPHONE ENCOUNTER
"----- Message from Rosanna Pinto LPN sent at 1/30/2018  9:15 AM CST -----  Regarding: Cancellation of Order # [id number]  Order number [id number] for the procedure [facility] [GI5]   has been canceled by Rosanna Pinto LPN [[id number]]. This procedure  was ordered by Kamini Newton MD [[id number]] on Nov 14, 2017   for the patient Renea Bourgeois [[id number]]. The reason for   cancellation was "None".  "

## 2018-02-15 ENCOUNTER — LAB VISIT (OUTPATIENT)
Dept: LAB | Facility: HOSPITAL | Age: 68
End: 2018-02-15
Attending: FAMILY MEDICINE
Payer: MEDICARE

## 2018-02-15 DIAGNOSIS — D50.9 IRON DEFICIENCY ANEMIA, UNSPECIFIED IRON DEFICIENCY ANEMIA TYPE: ICD-10-CM

## 2018-02-15 LAB
BASOPHILS # BLD AUTO: 0.02 K/UL
BASOPHILS NFR BLD: 0.4 %
DIFFERENTIAL METHOD: NORMAL
EOSINOPHIL # BLD AUTO: 0.1 K/UL
EOSINOPHIL NFR BLD: 2.9 %
ERYTHROCYTE [DISTWIDTH] IN BLOOD BY AUTOMATED COUNT: 13 %
FERRITIN SERPL-MCNC: 35 NG/ML
HCT VFR BLD AUTO: 37.7 %
HGB BLD-MCNC: 12.6 G/DL
IMM GRANULOCYTES # BLD AUTO: 0.01 K/UL
IMM GRANULOCYTES NFR BLD AUTO: 0.2 %
IRON SERPL-MCNC: 93 UG/DL
LYMPHOCYTES # BLD AUTO: 1.4 K/UL
LYMPHOCYTES NFR BLD: 28.9 %
MCH RBC QN AUTO: 29.9 PG
MCHC RBC AUTO-ENTMCNC: 33.4 G/DL
MCV RBC AUTO: 90 FL
MONOCYTES # BLD AUTO: 0.4 K/UL
MONOCYTES NFR BLD: 7.4 %
NEUTROPHILS # BLD AUTO: 2.9 K/UL
NEUTROPHILS NFR BLD: 60.2 %
NRBC BLD-RTO: 0 /100 WBC
PLATELET # BLD AUTO: 151 K/UL
PMV BLD AUTO: 11.8 FL
RBC # BLD AUTO: 4.21 M/UL
SATURATED IRON: 23 %
TOTAL IRON BINDING CAPACITY: 413 UG/DL
TRANSFERRIN SERPL-MCNC: 279 MG/DL
WBC # BLD AUTO: 4.84 K/UL

## 2018-02-15 PROCEDURE — 82728 ASSAY OF FERRITIN: CPT

## 2018-02-15 PROCEDURE — 36415 COLL VENOUS BLD VENIPUNCTURE: CPT

## 2018-02-15 PROCEDURE — 85025 COMPLETE CBC W/AUTO DIFF WBC: CPT

## 2018-02-15 PROCEDURE — 83540 ASSAY OF IRON: CPT

## 2018-02-21 PROBLEM — Z86.0100 HISTORY OF COLON POLYPS: Status: ACTIVE | Noted: 2018-02-21

## 2018-02-21 PROBLEM — Z86.010 HISTORY OF COLON POLYPS: Status: ACTIVE | Noted: 2018-02-21

## 2018-02-22 ENCOUNTER — ANESTHESIA (OUTPATIENT)
Dept: ENDOSCOPY | Facility: HOSPITAL | Age: 68
End: 2018-02-22
Payer: MEDICARE

## 2018-02-22 ENCOUNTER — HOSPITAL ENCOUNTER (OUTPATIENT)
Facility: HOSPITAL | Age: 68
Discharge: HOME | End: 2018-02-22
Attending: FAMILY MEDICINE | Admitting: FAMILY MEDICINE
Payer: MEDICARE

## 2018-02-22 ENCOUNTER — ANESTHESIA EVENT (OUTPATIENT)
Dept: ENDOSCOPY | Facility: HOSPITAL | Age: 68
End: 2018-02-22
Payer: MEDICARE

## 2018-02-22 ENCOUNTER — SURGERY (OUTPATIENT)
Age: 68
End: 2018-02-22

## 2018-02-22 VITALS
SYSTOLIC BLOOD PRESSURE: 128 MMHG | OXYGEN SATURATION: 97 % | BODY MASS INDEX: 27 KG/M2 | DIASTOLIC BLOOD PRESSURE: 75 MMHG | HEIGHT: 61 IN | WEIGHT: 143 LBS | TEMPERATURE: 99 F | HEART RATE: 69 BPM | RESPIRATION RATE: 18 BRPM

## 2018-02-22 DIAGNOSIS — Z86.010 HISTORY OF COLON POLYPS: Primary | ICD-10-CM

## 2018-02-22 DIAGNOSIS — K63.5 POLYP OF COLON, UNSPECIFIED PART OF COLON, UNSPECIFIED TYPE: ICD-10-CM

## 2018-02-22 DIAGNOSIS — K64.0 GRADE I HEMORRHOIDS: ICD-10-CM

## 2018-02-22 DIAGNOSIS — K57.30 DIVERTICULOSIS OF COLON: ICD-10-CM

## 2018-02-22 LAB — POCT GLUCOSE: 110 MG/DL (ref 70–110)

## 2018-02-22 PROCEDURE — 45380 COLONOSCOPY AND BIOPSY: CPT | Performed by: FAMILY MEDICINE

## 2018-02-22 PROCEDURE — 25000003 PHARM REV CODE 250: Performed by: FAMILY MEDICINE

## 2018-02-22 PROCEDURE — 63600175 PHARM REV CODE 636 W HCPCS: Performed by: NURSE ANESTHETIST, CERTIFIED REGISTERED

## 2018-02-22 PROCEDURE — 82962 GLUCOSE BLOOD TEST: CPT | Performed by: FAMILY MEDICINE

## 2018-02-22 PROCEDURE — 27201012 HC FORCEPS, HOT/COLD, DISP: Performed by: FAMILY MEDICINE

## 2018-02-22 PROCEDURE — 37000008 HC ANESTHESIA 1ST 15 MINUTES: Performed by: FAMILY MEDICINE

## 2018-02-22 PROCEDURE — 27201089 HC SNARE, DISP (ANY): Performed by: FAMILY MEDICINE

## 2018-02-22 PROCEDURE — 45380 COLONOSCOPY AND BIOPSY: CPT | Mod: 59,,, | Performed by: FAMILY MEDICINE

## 2018-02-22 PROCEDURE — 88305 TISSUE EXAM BY PATHOLOGIST: CPT | Performed by: PATHOLOGY

## 2018-02-22 PROCEDURE — 45385 COLONOSCOPY W/LESION REMOVAL: CPT | Mod: PT,,, | Performed by: FAMILY MEDICINE

## 2018-02-22 PROCEDURE — 45385 COLONOSCOPY W/LESION REMOVAL: CPT | Performed by: FAMILY MEDICINE

## 2018-02-22 PROCEDURE — 37000009 HC ANESTHESIA EA ADD 15 MINS: Performed by: FAMILY MEDICINE

## 2018-02-22 PROCEDURE — 88305 TISSUE EXAM BY PATHOLOGIST: CPT | Mod: 26,,, | Performed by: PATHOLOGY

## 2018-02-22 RX ORDER — PROPOFOL 10 MG/ML
VIAL (ML) INTRAVENOUS
Status: DISCONTINUED | OUTPATIENT
Start: 2018-02-22 | End: 2018-02-22

## 2018-02-22 RX ORDER — LIDOCAINE HCL/PF 100 MG/5ML
SYRINGE (ML) INTRAVENOUS
Status: DISCONTINUED | OUTPATIENT
Start: 2018-02-22 | End: 2018-02-22

## 2018-02-22 RX ORDER — SODIUM CHLORIDE, SODIUM LACTATE, POTASSIUM CHLORIDE, CALCIUM CHLORIDE 600; 310; 30; 20 MG/100ML; MG/100ML; MG/100ML; MG/100ML
INJECTION, SOLUTION INTRAVENOUS CONTINUOUS
Status: DISCONTINUED | OUTPATIENT
Start: 2018-02-22 | End: 2018-02-22 | Stop reason: HOSPADM

## 2018-02-22 RX ADMIN — PROPOFOL 20 MG: 10 INJECTION, EMULSION INTRAVENOUS at 07:02

## 2018-02-22 RX ADMIN — SODIUM CHLORIDE, SODIUM LACTATE, POTASSIUM CHLORIDE, AND CALCIUM CHLORIDE: .6; .31; .03; .02 INJECTION, SOLUTION INTRAVENOUS at 07:02

## 2018-02-22 RX ADMIN — LIDOCAINE HYDROCHLORIDE 40 MG: 20 INJECTION, SOLUTION INTRAVENOUS at 07:02

## 2018-02-22 RX ADMIN — PROPOFOL 30 MG: 10 INJECTION, EMULSION INTRAVENOUS at 07:02

## 2018-02-22 RX ADMIN — PROPOFOL 80 MG: 10 INJECTION, EMULSION INTRAVENOUS at 07:02

## 2018-02-22 RX ADMIN — PROPOFOL 40 MG: 10 INJECTION, EMULSION INTRAVENOUS at 07:02

## 2018-02-22 NOTE — ANESTHESIA POSTPROCEDURE EVALUATION
"Anesthesia Post Evaluation    Patient: Renea Bourgeois    Procedure(s) Performed: Procedure(s) (LRB):  COLONOSCOPY (N/A)    Final Anesthesia Type: MAC  Patient location during evaluation: PACU  Patient participation: Yes- Able to Participate  Level of consciousness: awake and alert  Post-procedure vital signs: reviewed and stable  Pain management: adequate  PONV status at discharge: No PONV  Anesthetic complications: no      Cardiovascular status: blood pressure returned to baseline  Respiratory status: unassisted  Hydration status: euvolemic  Follow-up not needed.        Visit Vitals  BP (!) 154/82 (BP Location: Left arm, Patient Position: Lying)   Pulse 78   Temp 37.1 °C (98.7 °F) (Oral)   Resp 18   Ht 5' 1" (1.549 m)   Wt 64.9 kg (143 lb)   LMP 01/01/2004 (Within Months)   SpO2 97%   Breastfeeding? No   BMI 27.02 kg/m²       Pain/Johnathan Score: Pain Assessment Performed: Yes (2/22/2018  7:22 AM)  Presence of Pain: denies (2/22/2018  7:22 AM)      "

## 2018-02-22 NOTE — DISCHARGE SUMMARY
Endoscopy Discharge Summary      Admit Date: 2/22/2018    Discharge Date and Time:  2/22/2018 7:55 AM    Attending Physician: Carlito Odonnell MD     Discharge Physician: Carlito Odonnell MD     Principal Admitting Diagnoses: History of colon polyps         Discharge Diagnosis: The primary encounter diagnosis was History of colon polyps. Diagnoses of Grade I hemorrhoids, Polyp of colon, unspecified part of colon, unspecified type, and Diverticulosis of colon were also pertinent to this visit.     Discharged Condition: Good    Indication for Admission: History of colon polyps     Hospital Course: Patient was admitted for an inpatient procedure and tolerated the procedure well with no complications.    Significant Diagnostic Studies: Colonoscopy with cold biopsy polypectomy and Colonoscopy with hot snare polypectomy    Pathology (if any):  Specimen (12h ago through future)    Start     Ordered    02/22/18 0746  Specimen to Pathology - Surgery  Once     Comments:  1. Proximal ascending polyps2. Transverse polyp      02/22/18 0751          Estimated Blood Loss: 1 ml.    Discussed with: patient and family.    Disposition: Home.    Follow Up/Patient Instructions:   Current Discharge Medication List      CONTINUE these medications which have NOT CHANGED    Details   ACCU-CHEK JD PLUS TEST STRP Strp TEST three times a day  Qty: 100 strip, Refills: 11      acetaminophen (TYLENOL) 500 MG tablet Take 500 mg by mouth every 6 (six) hours as needed for Pain.      ALCOHOL PREP PADS PadM       anastrozole (ARIMIDEX) 1 mg Tab take 1 tablet by mouth once daily  Qty: 30 tablet, Refills: 10    Associated Diagnoses: Malignant neoplasm of upper-outer quadrant of right female breast      ferrous gluconate (FERGON) 240 (27 FE) MG tablet Take 1 tablet (240 mg total) by mouth once daily.    Associated Diagnoses: Iron deficiency anemia, unspecified iron deficiency anemia type      fluticasone (FLONASE) 50 mcg/actuation nasal spray 2  sprays by Each Nare route daily as needed. 2 Spray, Suspension Nasal Every day  Qty: 16 g, Refills: 5    Associated Diagnoses: Allergic rhinitis, unspecified chronicity, unspecified seasonality, unspecified trigger      glimepiride (AMARYL) 1 MG tablet take 1 tablet by mouth once daily  Qty: 30 tablet, Refills: 3    Associated Diagnoses: Type 2 diabetes mellitus without complication, without long-term current use of insulin      lancets (ACCU-CHEK SOFTCLIX LANCETS) Misc 1 each by Misc.(Non-Drug; Combo Route) route 3 (three) times daily.  Qty: 100 each, Refills: 11      lancing device Misc       metoprolol succinate (TOPROL-XL) 25 MG 24 hr tablet Take 25 mg by mouth every evening.   Refills: 1      naproxen sodium (ALEVE) 220 MG tablet Take 220 mg by mouth as needed.      rosuvastatin (CRESTOR) 10 MG tablet take 1 tablet by mouth once daily  Qty: 90 tablet, Refills: 3      sodium,potassium,mag sulfates (SUPREP BOWEL PREP KIT) 17.5-3.13-1.6 gram SolR As directed  Qty: 354 mL, Refills: 0      traZODone (DESYREL) 100 MG tablet Take 1 tablet (100 mg total) by mouth nightly.  Qty: 90 tablet, Refills: 1    Associated Diagnoses: Chronic insomnia      oxyCODONE-acetaminophen (PERCOCET) 7.5-325 mg per tablet Take 1 tablet by mouth every 4 (four) hours as needed for Pain.  Qty: 30 tablet, Refills: 0               Discharge Procedure Orders  Diet general     Activity as tolerated     Call MD for:  temperature >100.4     Call MD for:  persistent nausea and vomiting     Call MD for:  severe uncontrolled pain     Call MD for:  difficulty breathing, headache or visual disturbances     Call MD for:  redness, tenderness, or signs of infection (pain, swelling, redness, odor or green/yellow discharge around incision site)     Call MD for:  hives     Call MD for:  persistent dizziness or light-headedness     No dressing needed         Follow-up Information     Carlito Odonnell MD. Call in 1 week.    Specialty:  Family Medicine  Why:  To  receive pathology results.  Contact information:  [id number] [facility] [address] [place] [address]  [phone]                   [email or web address]    3

## 2018-02-22 NOTE — ANESTHESIA PREPROCEDURE EVALUATION
02/22/2018  Renea Bourgeois is a 68 y.o., female.    Anesthesia Evaluation    I have reviewed the Patient Summary Reports.    I have reviewed the Nursing Notes.   I have reviewed the Medications.     Review of Systems  Anesthesia Hx:  No problems with previous Anesthesia    Social:  Non-Smoker    Hematology/Oncology:         -- Anemia: Breast  Current/Recent Cancer.   EENT/Dental:   chronic allergic rhinitis   Cardiovascular:   Hypertension, well controlled Angina, with exertion hyperlipidemia  Disorder of Cardiac Rhythm, Premature Ventricular Contraction (PVC)    Pulmonary:  Pulmonary Normal Pul HTN   Renal/:   Chronic Renal Disease renal calculi    Hepatic/GI:   Bowel Prep. GERD, well controlled  Bowel Conditions:  Bowel Neoplasm:, Adenomatous Polyp    Musculoskeletal:   Arthritis   Bone Disorders: Osteopenia    Neurological:   Neuromuscular Disease,    Endocrine:   Diabetes, well controlled, type 2    Dermatological:  Skin Normal    Psych:  Psychiatric Normal           Physical Exam  General:  Well nourished    Airway/Jaw/Neck:  Airway Findings: Mouth Opening: Normal Tongue: Normal       Chest/Lungs:  Chest/Lungs Findings: Clear to auscultation     Heart/Vascular:  Heart Findings: Rate: Normal             Anesthesia Plan  Type of Anesthesia, risks & benefits discussed:  Anesthesia Type:  MAC  Patient's Preference:   Intra-op Monitoring Plan:   Intra-op Monitoring Plan Comments:   Post Op Pain Control Plan:   Post Op Pain Control Plan Comments:   Induction:   IV  Beta Blocker:  Patient is on a Beta-Blocker and has received one dose within the past 24 hours (No further documentation required).       Informed Consent: Patient understands risks and agrees with Anesthesia plan.  Questions answered. Anesthesia consent signed with patient.  ASA Score: 3     Day of Surgery Review of History & Physical: I have  interviewed and examined the patient. I have reviewed the patient's H&P dated:  There are no significant changes.          Ready For Surgery From Anesthesia Perspective.

## 2018-02-22 NOTE — ANESTHESIA RELEASE NOTE
"Anesthesia Release from PACU Note    Patient: Renea Bourgeois    Procedure(s) Performed: Procedure(s) (LRB):  COLONOSCOPY (N/A)    Anesthesia type: MAC    Post pain: Adequate analgesia    Post assessment: no apparent anesthetic complications    Last Vitals:   Visit Vitals  BP (!) 154/82 (BP Location: Left arm, Patient Position: Lying)   Pulse 78   Temp 37.1 °C (98.7 °F) (Oral)   Resp 18   Ht 5' 1" (1.549 m)   Wt 64.9 kg (143 lb)   LMP 01/01/2004 (Within Months)   SpO2 97%   Breastfeeding? No   BMI 27.02 kg/m²       Post vital signs: stable    Level of consciousness: awake    Nausea/Vomiting: no nausea/no vomiting    Complications: none    Airway Patency: patent    Respiratory: unassisted    Cardiovascular: stable and blood pressure at baseline    Hydration: euvolemic  "

## 2018-02-22 NOTE — TRANSFER OF CARE
"Anesthesia Transfer of Care Note    Patient: Renea Bourgeois    Procedure(s) Performed: Procedure(s) (LRB):  COLONOSCOPY (N/A)    Patient location: PACU    Anesthesia Type: MAC    Transport from OR: Transported from OR on room air with adequate spontaneous ventilation    Post pain: adequate analgesia    Post assessment: no apparent anesthetic complications    Post vital signs: stable    Level of consciousness: awake    Nausea/Vomiting: no nausea/vomiting    Complications: none    Transfer of care protocol was followed      Last vitals:   Visit Vitals  BP (!) 154/82 (BP Location: Left arm, Patient Position: Lying)   Pulse 78   Temp 37.1 °C (98.7 °F) (Oral)   Resp 18   Ht 5' 1" (1.549 m)   Wt 64.9 kg (143 lb)   LMP 01/01/2004 (Within Months)   SpO2 97%   Breastfeeding? No   BMI 27.02 kg/m²     "

## 2018-02-22 NOTE — H&P
Short Stay Endoscopy History and Physical    PCP - Kamini Newton MD    Procedure - Colonoscopy  ASA - 2  Mallampati - per anesthesia  History of Anesthesia problems - no  Family history Anesthesia problems -  no     HPI:  This is a 68 y.o. female here for evaluation of :   Active Hospital Problems    Diagnosis  POA    *History of colon polyps [Z86.010]  Not Applicable     The patient had adenomatous colon polyps in 2014.          Resolved Hospital Problems    Diagnosis Date Resolved POA   No resolved problems to display.         Health Maintenance       Date Due Completion Date    Colonoscopy 12/18/2017 12/18/2014    Override on 1/27/2006: Done (Repeat in 7 years)    Lipid Panel 02/21/2018 2/21/2017    Pneumococcal (65+) (2 of 2 - PPSV23) 02/23/2018 2/23/2017    Hemoglobin A1c 05/14/2018 11/14/2017    Foot Exam 05/19/2018 5/19/2017 (Done)    Override on 5/19/2017: Done    Override on 11/18/2016: Done    Override on 10/1/2015: Done    Override on 8/11/2015: Done    Urine Microalbumin 05/19/2018 5/19/2017    Mammogram 07/07/2018 7/7/2017    Override on 4/16/2013: Done    Eye Exam 08/04/2018 8/4/2017    Override on 2/26/2013: Done    High Dose Statin 01/19/2019 1/19/2018    DEXA SCAN 07/07/2020 7/7/2017    Override on 8/25/2011: Done (osteopenia)    TETANUS VACCINE 10/24/2024 10/24/2014          Screening - yes  History of polyps - yes  Diarrhea - no  Anemia - no  Blood in stools - no  Abdominal pain - no  Other - no    ROS:  CONSTITUTIONAL: Denies weight change,  fatigue, fevers, chills, night sweats.  CARDIOVASCULAR: Denies chest pain, shortness of breath, orthopnea and edema.  RESPIRATORY: Denies cough, hemoptysis, dyspnea, and wheezing.  GI: See HPI.    Medical History:   Past Medical History:   Diagnosis Date    Allergy     Anemia 11/14/2017    Angina pectoris     followed by cardiology    Arthritis     Breast cancer     Right T1 Ductal Ca in situ,high oncotype Dx 13, Estrogen positive. Lumpectomy, TAC  chemo x 6 cyscles then RT,on aromatase inhibitor    Cataract     Diabetes mellitus type II 2011    DM (diabetes mellitus) 2011     am 08/04/2017    GERD (gastroesophageal reflux disease)     History of colon polyps 2/21/2018    The patient had adenomatous colon polyps in 2014.      Hyperlipidemia     Hypertension     Kidney stone     right side, passed    Osteopenia     PVC (premature ventricular contraction)     on and off    Trouble in sleeping        Surgical History:   Past Surgical History:   Procedure Laterality Date    BREAST LUMPECTOMY  2/11/2011    right    CATARACT EXTRACTION Bilateral 10/14/15    CHOLECYSTECTOMY  1989    open    CYST REMOVAL Left 11/2017    foot    DIL[place]  10[place]    Nasal septal deviation repair  2009    toenail edges removed      TONSILLECTOMY  1959    TOTAL REDUCTION MAMMOPLASTY Left     2015       Family History:   Family History   Problem Relation Age of Onset    Diabetes Father     Hypertension Father     Stroke Father     Cancer Father 65     metastatic when diagnosed    Stroke Brother     Cancer Other      kidney    Atrial fibrillation Son      35    Heart disease Son     Cancer Paternal Grandfather      prostate    Glaucoma Maternal Grandmother     Psoriasis Cousin     Alcohol abuse Neg Hx     Drug abuse Neg Hx     COPD Neg Hx     Birth defects Neg Hx     Mental retardation Neg Hx     Mental illness Neg Hx     Kidney disease Neg Hx     Hyperlipidemia Neg Hx     Melanoma Neg Hx     Lupus Neg Hx        Social History:   Social History   Substance Use Topics    Smoking status: Never Smoker    Smokeless tobacco: Never Used    Alcohol use No       Allergies:   Review of patient's allergies indicates:   Allergen Reactions    Codeine Itching       Medications:   No current facility-administered medications on file prior to encounter.      Current Outpatient Prescriptions on File Prior to  Encounter   Medication Sig Dispense Refill    ACCU-CHEK JD PLUS TEST STRP Strp TEST three times a day 100 strip 11    acetaminophen (TYLENOL) 500 MG tablet Take 500 mg by mouth every 6 (six) hours as needed for Pain.      ALCOHOL PREP PADS PadM       anastrozole (ARIMIDEX) 1 mg Tab take 1 tablet by mouth once daily (Patient taking differently: take 1 tablet by mouth once nightly) 30 tablet 10    ferrous gluconate (FERGON) 240 (27 FE) MG tablet Take 1 tablet (240 mg total) by mouth once daily.      fluticasone (FLONASE) 50 mcg/actuation nasal spray 2 sprays by Each Nare route daily as needed. 2 Spray, Suspension Nasal Every day 16 g 5    glimepiride (AMARYL) 1 MG tablet take 1 tablet by mouth once daily 30 tablet 3    lancets (ACCU-CHEK SOFTCLIX LANCETS) Misc 1 each by Misc.(Non-Drug; Combo Route) route 3 (three) times daily. 100 each 11    lancing device Misc       metoprolol succinate (TOPROL-XL) 25 MG 24 hr tablet Take 25 mg by mouth every evening.   1    naproxen sodium (ALEVE) 220 MG tablet Take 220 mg by mouth as needed.      rosuvastatin (CRESTOR) 10 MG tablet take 1 tablet by mouth once daily (Patient taking differently: take 1 tablet by mouth once nightly) 90 tablet 3    traZODone (DESYREL) 100 MG tablet Take 1 tablet (100 mg total) by mouth nightly. 90 tablet 1    oxyCODONE-acetaminophen (PERCOCET) 7.5-325 mg per tablet Take 1 tablet by mouth every 4 (four) hours as needed for Pain. 30 tablet 0       Physical Exam:  Vital Signs:   Vitals:    02/22/18 0718   BP: (!) 154/82   Pulse: 78   Resp: 18   Temp: 98.7 °F (37.1 °C)     General Appearance: Well appearing in no acute distress  ENT: OP clear  Chest: CTA B  CV: RRR, no m/r/g  Abd: s/nt/nd/nabs  Ext: no edema    Labs:Reviewed    IMP:  Active Hospital Problems    Diagnosis  POA    *History of colon polyps [Z86.010]  Not Applicable     The patient had adenomatous colon polyps in 2014.          Resolved Hospital Problems    Diagnosis Date  Resolved POA   No resolved problems to display.         Plan:   I have explained the risks and benefits of colonoscopy to the patient including but not limited to bleeding, perforation, infection, and death. The patient wishes to proceed.

## 2018-02-22 NOTE — DISCHARGE INSTRUCTIONS
Diverticulosis    Diverticulosis means that small pouches have formed in the wall of your large intestine (colon). Most often, this problem causes no symptoms and is common as people age. But the pouches in the colon are at risk of becoming infected. When this happens, the condition is called diverticulitis. Although most people with diverticulosis never develop diverticulitis, it is still not uncommon. Rectal bleeding can also occur and in less common situations, a type of colon inflammation called colitis.  While most people do not have symptoms, some people with diverticulosis may have:  · Abdominal cramps and pain  · Bloating  · Constipation  · Change in bowel habits  Causes  The exact cause of diverticulosis (and diverticulitis) has not been proved, but a few things are associated with the condition:  · Low-fiber diet  · Constipation  · Lack of exercise  Your healthcare provider will talk with you about how to manage your condition. Diet changes may be all that are needed to help control diverticulosis and prevent progression to diverticulitis. If you develop diverticulitis, you will likely need other treatments.  Home care  You may be told to take fiber supplements daily. Fiber adds bulk to the stool so that it passes through the colon more easily. Stool softeners may be recommended. You may also be given medications for pain relief. Be sure to take all medications as directed.  In the past, people were told to avoid corn, nuts, and seeds. This is no longer necessary.  Follow these guidelines when caring for yourself at home:  · Eat unprocessed foods that are high in fiber. Whole grains, fruits, and vegetables are good choices.  · Drink 6 to 8 glasses of water every day unless your healthcare provider has you limit how much fluid you should have.  · Watch for changes in your bowel movements. Tell your provider if you notice any changes.  · Begin an exercise program. Ask your provider how to get started.  Generally, walking is the best.  · Get plenty of rest and sleep.  Follow-up care  Follow up with your healthcare provider, or as advised. Regular visits may be needed to check on your health. Sometimes special procedures such as colonoscopy, are needed after an episode of diverticulitis or blooding. Be sure to keep all your appointments.  If a stool sample was taken, or cultures were done, you should be told if they are positive, or if your treatment needs to be changed. You can call as directed for the results.  If X-rays were done, a radiologist will look at them. You will be told if there is a change in your treatment.  If antibiotics were prescribed, be sure to finish them all.  When to seek medical advice  Call your healthcare provider right away if any of these occur:  · Fever of 100.4°F (38°C) or higher, or as directed by your healthcare provider  · Severe cramps in the lower left side of the abdomen or pain that is getting worse  · Tenderness in the lower left side of the abdomen or worsening pain throughout the abdomen  · Diarrhea or constipation that doesn't get better within 24 hours  · Nausea and vomiting  · Bleeding from the rectum  Call 911  Call emergency services if any of the following occur:  · Trouble breathing  · Confusion  · Very drowsy or trouble awakening  · Fainting or loss of consciousness  · Rapid heart rate  · Chest pain  Date Last Reviewed: 12/30/2015 © 2000-2017 "[employer] [address] [place] [address]. All rights reserved. This information is not intended as a substitute for professional medical care. Always follow your healthcare professional's instructions.        Hemorrhoids    Hemorrhoids are swollen and inflamed veins inside the rectum and near the anus. The rectum is the last several inches of the colon. The anus is the passage between the rectum and the outside of the body.  Causes  The veins can become swollen due to increased pressure in them. This  is most often caused by:  · Chronic constipation or diarrhea  · Straining when having a bowel movement  · Sitting too long on the toilet  · A low-fiber diet  · Pregnancy  Symptoms  · Bleeding from the rectum (this may be noticeable after bowel movements)  · Lump near the anus  · Itching around the anus  · Pain around the anus  There are different types of hemorrhoids. Depending on the type you have and the severity, you may be able to treat yourself at home. In some cases, a procedure may be the best treatment option. Your healthcare provider can tell you more about this, if needed.  Home care  General care  · To get relief from pain or itching, try:  ¨ Topical products. Your healthcare provider may prescribe or recommend creams, ointments, or pads that can be applied to the hemorrhoid. Use these exactly as directed.  ¨ Medicines. Your healthcare provider may recommend stool softeners, suppositories, or laxatives to help manage constipation. Use these exactly as directed.  ¨ Sitz baths. A sitz bath involves sitting in a few inches of warm bath water. Be careful not to make the water so hot that you burn yourself--test it before sitting in it. Soak for about 10 to 15 minutes a few times a day. This may help relieve pain.  Tips to help prevent hemorrhoids  · Eat more fiber. Fiber adds bulk to stool and absorbs water as it moves through your colon. This makes stool softer and easier to pass.  ¨ Increase the fiber in your diet with more fiber-rich foods. These include fresh fruit, vegetables, and whole grains.  ¨ Take a fiber supplement or bulking agent, if advised to by your provider. These include products such as psyllium or methylcellulose.  · Drink plenty of water, if directed to by your provider. This can help keep stool soft.  · Be more active. Frequent exercise aids digestion and helps prevent constipation. It may also help make bowel movements more regular.  · Dont strain during bowel movements. This can make  hemorrhoids more likely. Also, dont sit on the toilet for long periods of time.  Follow-up care  Follow up with your healthcare provider, or as advised. If a culture or imaging tests were done, you will be notified of the results when they are ready. This may take a few days or longer.  When to seek medical advice  Call your healthcare provider right away if any of these occur:  · Increased bleeding from the rectum  · Increased pain around the rectum or anus  · Weakness or dizziness  Call 911  Call 911 or return to the emergency department right away if any of these occur:  · Trouble breathing or swallowing  · Fainting or loss of consciousness  · Unusually fast heart rate  · Vomiting blood  · Large amounts of blood in stool  Date Last Reviewed: 6/22/2015 © 2000-2017 [employer]. [address] [place] [address], [place] [address]. All rights reserved. This information is not intended as a substitute for professional medical care. Always follow your healthcare professional's instructions.        Understanding Colon and Rectal Polyps    The colon (also called the large intestine) is a muscular tube that forms the last part of the digestive tract. It absorbs water and stores food waste. The colon is about 4 to 6 feet long. The rectum is the last 6 inches of the colon. The colon and rectum have a smooth lining composed of millions of cells. Changes in these cells can lead to growths in the colon that can become cancerous and should be removed. Multiple tests are available to screen for colon cancer, but the colonoscopy is the most recommended test. During colonoscopy, these polyps can be removed. How often you need this test depends on many things including your condition, your family history, symptoms, and what the findings were at the previous colonoscopy.   When the colon lining changes  Changes that happen in the cells that line the colon or rectum can lead to growths called polyps. Over a period of years,  polyps can turn cancerous. Removing polyps early may prevent cancer from ever forming.  Polyps  Polyps are fleshy clumps of tissue that form on the lining of the colon or rectum. Small polyps are usually benign (not cancerous). However, over time, cells in a polyp can change and become cancerous. Certain types of polyps known as adenomatous polyps are premalignant. The risk for invasive cancer increases with the size of the polyp and certain cell and gene features. This means that they can become cancerous if they're not removed. Hyperplastic polyps are benign. They can grow quite large and not turn cancerous.   Cancer  Almost all colorectal cancers start when polyp cells begin growing abnormally. As a cancerous tumor grows, it may involve more and more of the colon or rectum. In time, cancer can also grow beyond the colon or rectum and spread to nearby organs or to glands called lymph nodes. The cells can also travel to other parts of the body. This is known as metastasis. The earlier a cancerous tumor is removed, the better the chance of preventing its spread.    Date Last Reviewed: 8/1/2016  © 0782-9020 [employer]. [address] [place] [address], [place] [address]. All rights reserved. This information is not intended as a substitute for professional medical care. Always follow your healthcare professional's instructions.

## 2018-02-27 NOTE — PROGRESS NOTES
Dear Kamini Newton MD,    I recently cared for Renea Bourgeois and performed an endoscopy.  Tissue was sent for pathology evaluation and I will have a letter written to ask the patient to repeat the colonoscopy in 5 years.  The pathology showed that there was adenomatous tissue present.  Thank you for allowing me to participate in the care of your patient.  Please call me for any questions that you might have.      Dr. Carlito Odonnell  [phone] cell  [phone] office      NURSING STAFF:Please  inform the patient that I reviewed the recent pathology obtained at the time of colonoscopy.    The results showed that there was adenomatous tissue present which is benign and based on that, I recommend that the patient have a repeat colonoscopy performed in 5 years.     If the patient has MyChart, this message has been sent to them.  Confirm that they read the note.  If not, copy the information and print a letter to send to the patient at this time.  Confirm that a notation to the PCP was done.      Dear Renea Bourgeois,    This is to inform you that I have reviewed your recent colonoscopy pathology.  The results showed that you had adenomatous tissue present which is benign and based on that, I recommend that you have a repeat colonoscopy performed in 5 years.      Dr. Carlito Odonnell  [phone]

## 2018-02-28 ENCOUNTER — OFFICE VISIT (OUTPATIENT)
Dept: PODIATRY | Facility: CLINIC | Age: 68
End: 2018-02-28
Payer: MEDICARE

## 2018-02-28 ENCOUNTER — HOSPITAL ENCOUNTER (OUTPATIENT)
Dept: RADIOLOGY | Facility: HOSPITAL | Age: 68
Discharge: HOME | End: 2018-02-28
Attending: PODIATRIST
Payer: MEDICARE

## 2018-02-28 VITALS
SYSTOLIC BLOOD PRESSURE: 156 MMHG | DIASTOLIC BLOOD PRESSURE: 75 MMHG | WEIGHT: 148.5 LBS | HEIGHT: 61 IN | BODY MASS INDEX: 28.04 KG/M2 | HEART RATE: 69 BPM

## 2018-02-28 DIAGNOSIS — Z98.890 POST-OPERATIVE STATE: ICD-10-CM

## 2018-02-28 DIAGNOSIS — M79.672 LEFT FOOT PAIN: Primary | ICD-10-CM

## 2018-02-28 DIAGNOSIS — M19.079 ARTHRITIS OF MIDFOOT: ICD-10-CM

## 2018-02-28 PROCEDURE — 3077F SYST BP >= 140 MM HG: CPT | Mod: S$GLB,,, | Performed by: PODIATRIST

## 2018-02-28 PROCEDURE — 99999 PR PBB SHADOW E&M-EST. PATIENT-LVL III: CPT | Mod: PBBFAC,,, | Performed by: PODIATRIST

## 2018-02-28 PROCEDURE — 73630 X-RAY EXAM OF FOOT: CPT | Mod: TC,LT

## 2018-02-28 PROCEDURE — 99213 OFFICE O/P EST LOW 20 MIN: CPT | Mod: S$GLB,,, | Performed by: PODIATRIST

## 2018-02-28 PROCEDURE — 3078F DIAST BP <80 MM HG: CPT | Mod: S$GLB,,, | Performed by: PODIATRIST

## 2018-02-28 PROCEDURE — 73630 X-RAY EXAM OF FOOT: CPT | Mod: 26,LT,, | Performed by: RADIOLOGY

## 2018-03-03 NOTE — PROGRESS NOTES
Last encounter in this department: 12/6/2017    Subjective:      Patient ID: Renea Bourgoeis is a 68 y.o. female.    Chief Complaint: Follow-up (PCP Dr. Newton 11/14/17, Review of Xray and pt is also stating pain on the top of left foot, rates pain 4/10 describes as a sharp pain. Pt is daibetic , A1c 110, pt is wearing tennis shoes)    Renea Bourgeois is a 68 y.o. year-old female following up for now over 3 months post excision of ganglion cyst with exostectomy left foot. Patient now rates pain as 4/10 sharp with weightbearing that has improved in some ways and worsened in others.  She explains that she does not have pain from direct rubbing over the top of the foot anymore but when she weightbears she notices the pain over the area of surgery.  She does feel that the pain is deep and notices some difference depending on shoe she wears.  She has brought her new balance tennis shoes and insoles today.  She admits that she is due for new ones and would like guidance and recommendations on shoes and insoles.        Review of Systems   Constitution: Negative for chills and fever.   Cardiovascular: Negative for chest pain.   Respiratory: Negative for shortness of breath.    Gastrointestinal: Negative for nausea and vomiting.           Objective:      Physical Exam   Constitutional: She is oriented to person, place, and time. She appears well-developed and well-nourished. No distress.   Cardiovascular:   Pulses:       Dorsalis pedis pulses are 2+ on the right side, and 2+ on the left side.        Posterior tibial pulses are 2+ on the right side, and 2+ on the left side.   Capillary fill time 3 seconds to digits bilateral feet.   Musculoskeletal:   Lower extremities:    Deformities:  Normal arch height and rectus feet bilaterally.     5/5 muscle strength and tone in all four quadrants bilaterally.     Pain-free range of motion in all four quadrants bilaterally.     Some pulling discomfort on maximal ankle joint  dorsiflexion with some limitation bilateral feet.    No pain on side to side compression of the calcaneal body bilateral feet.    No pain to palpation left midtarsal surgical site.   Neurological: She is alert and oriented to person, place, and time. She displays no Babinski's sign on the right side. She displays no Babinski's sign on the left side.   Plantar protective sensation by touch via 5.07 [place] [employer] monofilament present bilaterally. Paresthesia right 5th digit.      Skin:   Lower extremities:    Normal turgor texture temperature, bilaterally. Warm equally bilaterally.     Minimal residual fullness at the dorsal midtarsal left foot. No varicosities bilaterally. No open wounds or interdigital maceration, bilaterally.     Keratotic skin lesions: Surgical scar dorsal midtarsal left foot.     Nails are thick dystrophic and discolored bilaterally.       Psychiatric: She has a normal mood and affect.   Nursing note and vitals reviewed.            X-rays: No fractures or dislocations reduction of dorsal soft tissue density along prior ganglion cyst.    Assessment:       Encounter Diagnoses   Name Primary?    Left foot pain Yes    Arthritis of midfoot          Plan:       Renea was seen today for follow-up.    Diagnoses and all orders for this visit:    Left foot pain    Arthritis of midfoot      I counseled the patient on her conditions, their implications and medical management.  Patient's shoes and insoles were inspected and she does have relatively high arch compared to amount of accommodation are provided by prefab insoles.  At this time we discussed going ahead and purchasing recommended shoes and insoles.  She will return with recommended insoles to be modified with additional arch padding to give her more added support.  Explained to her that she may have more motion in the area where she had degenerative exostosis which was resected along with the ganglionic cyst.  Patient appeared to be in good  understanding and will go ahead and purchase recommended shoes and insoles and will return with him for modification.    .

## 2018-03-03 NOTE — PATIENT INSTRUCTIONS
Wear recommended shoes and insoles and perform stretches and exercises as directed. Return with shoes and insoles on follow up for adjustments as needed.

## 2018-03-26 ENCOUNTER — HOSPITAL ENCOUNTER (OUTPATIENT)
Dept: RADIOLOGY | Facility: HOSPITAL | Age: 68
Discharge: HOME | End: 2018-03-26
Attending: ORTHOPAEDIC SURGERY
Payer: MEDICARE

## 2018-03-26 ENCOUNTER — OFFICE VISIT (OUTPATIENT)
Dept: ORTHOPEDICS | Facility: CLINIC | Age: 68
End: 2018-03-26
Payer: MEDICARE

## 2018-03-26 VITALS
DIASTOLIC BLOOD PRESSURE: 77 MMHG | SYSTOLIC BLOOD PRESSURE: 145 MMHG | BODY MASS INDEX: 27.19 KG/M2 | HEIGHT: 61 IN | WEIGHT: 144 LBS | HEART RATE: 66 BPM

## 2018-03-26 DIAGNOSIS — M22.41 CHONDROMALACIA OF RIGHT PATELLA: ICD-10-CM

## 2018-03-26 DIAGNOSIS — M25.561 ACUTE PAIN OF RIGHT KNEE: ICD-10-CM

## 2018-03-26 DIAGNOSIS — M25.561 ACUTE PAIN OF RIGHT KNEE: Primary | ICD-10-CM

## 2018-03-26 DIAGNOSIS — M17.0 PRIMARY OSTEOARTHRITIS OF BOTH KNEES: ICD-10-CM

## 2018-03-26 PROCEDURE — 3078F DIAST BP <80 MM HG: CPT | Mod: CPTII,S$GLB,, | Performed by: ORTHOPAEDIC SURGERY

## 2018-03-26 PROCEDURE — 99214 OFFICE O/P EST MOD 30 MIN: CPT | Mod: 25,S$GLB,, | Performed by: ORTHOPAEDIC SURGERY

## 2018-03-26 PROCEDURE — 3077F SYST BP >= 140 MM HG: CPT | Mod: CPTII,S$GLB,, | Performed by: ORTHOPAEDIC SURGERY

## 2018-03-26 PROCEDURE — 99999 PR PBB SHADOW E&M-EST. PATIENT-LVL III: CPT | Mod: PBBFAC,,, | Performed by: ORTHOPAEDIC SURGERY

## 2018-03-26 PROCEDURE — 73562 X-RAY EXAM OF KNEE 3: CPT | Mod: 26,59,LT, | Performed by: RADIOLOGY

## 2018-03-26 PROCEDURE — 73564 X-RAY EXAM KNEE 4 OR MORE: CPT | Mod: 26,RT,, | Performed by: RADIOLOGY

## 2018-03-26 PROCEDURE — 20610 DRAIN/INJ JOINT/BURSA W/O US: CPT | Mod: RT,S$GLB,, | Performed by: ORTHOPAEDIC SURGERY

## 2018-03-26 PROCEDURE — 73562 X-RAY EXAM OF KNEE 3: CPT | Mod: TC,FY,PO,LT

## 2018-03-26 RX ORDER — ROSUVASTATIN CALCIUM 10 MG/1
TABLET, COATED ORAL
Qty: 90 TABLET | Refills: 1 | Status: SHIPPED | OUTPATIENT
Start: 2018-03-26 | End: 2018-09-25 | Stop reason: SDUPTHER

## 2018-03-26 RX ORDER — TRIAMCINOLONE ACETONIDE 40 MG/ML
40 INJECTION, SUSPENSION INTRA-ARTICULAR; INTRAMUSCULAR
Status: COMPLETED | OUTPATIENT
Start: 2018-03-26 | End: 2018-03-26

## 2018-03-26 RX ADMIN — TRIAMCINOLONE ACETONIDE 40 MG: 40 INJECTION, SUSPENSION INTRA-ARTICULAR; INTRAMUSCULAR at 12:03

## 2018-03-26 NOTE — PROGRESS NOTES
Subjective:     Patient ID: Renea Bourgeois is a 68 y.o. female.    Chief Complaint: Pain of the Right Knee    Patient is here for right knee pain. Reports slipping and falling on a puppy pad on Friday, March 16 and landing down on the knee. Anterior knee pain since.      Knee Pain    The pain is present in the right knee. This is a new problem. The current episode started 1 to 4 weeks ago. The injury was the result of a falling action while at home. The problem occurs intermittently. The problem has been gradually worsening. The quality of the pain is described as sharp, dull and aching. The pain is at a severity of 5/10. Associated symptoms include joint swelling, a limited range of motion and stiffness. Pertinent negatives include no fever or numbness. The symptoms are aggravated by bending (Going down stairs). She has tried NSAIDs and rest (Aleve) for the symptoms. The treatment provided mild relief.       Past Medical History:   Diagnosis Date    Allergy     Anemia 11/14/2017    Angina pectoris     followed by cardiology    Arthritis     Breast cancer     Right T1 Ductal Ca in situ,high oncotype Dx 13, Estrogen positive. Lumpectomy, TAC chemo x 6 cyscles then RT,on aromatase inhibitor    Cataract     Diabetes mellitus type II 2011    DM (diabetes mellitus) 2011     am 08/04/2017    GERD (gastroesophageal reflux disease)     History of colon polyps 2/21/2018    The patient had adenomatous colon polyps in 2014.      Hyperlipidemia     Hypertension     Kidney stone     right side, passed    Osteopenia     PVC (premature ventricular contraction)     on and off    Trouble in sleeping      Past Surgical History:   Procedure Laterality Date    BREAST LUMPECTOMY  2/11/2011    right    CATARACT EXTRACTION Bilateral 10/14/15    CHOLECYSTECTOMY  1989    open    COLONOSCOPY N/A 2/22/2018    Procedure: COLONOSCOPY;  Surgeon: Carlito Odonnell MD;  Location: [facility];  Service: Endoscopy;   Laterality: N/A;    CYST REMOVAL Left 11/2017    foot    DILATION AND CURETTAGE OF [place]TERUS  10/2010    [place]    Nasal septal deviation repair  2009    toenail edges removed      TONSILLECTOMY  1959    TOTAL REDUCTION MAMMOPLASTY Left     2015     Family History   Problem Relation Age of Onset    Diabetes Father     Hypertension Father     Stroke Father     Cancer Father 65     metastatic when diagnosed    Stroke Brother     Cancer Other      kidney    Atrial fibrillation Son      35    Heart disease Son     Cancer Paternal Grandfather      prostate    Glaucoma Maternal Grandmother     Psoriasis Cousin     Alcohol abuse Neg Hx     Drug abuse Neg Hx     COPD Neg Hx     Birth defects Neg Hx     Mental retardation Neg Hx     Mental illness Neg Hx     Kidney disease Neg Hx     Hyperlipidemia Neg Hx     Melanoma Neg Hx     Lupus Neg Hx      Social History     Social History    Marital status:      Spouse name: Don    Number of children: 4    Years of education: N/A     Occupational History    Retired Teacher      [employer]     Social History Main Topics    Smoking status: Never Smoker    Smokeless tobacco: Never Used    Alcohol use No    Drug use: No    Sexual activity: Not Currently     Partners: Male     Birth control/ protection: Post-menopausal     Other Topics Concern    Not on file     Social History Narrative    aretired from homeschooling/,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 Caffeine intake;1-2 per week - dennis calvo. Decaffinated tea and most beveridges. Does have a living will - at home in her filing cabinet.      Medication List with Changes/Refills   Current Medications    ACCU-CHEK JD PLUS TEST STRP STRP    TEST three times a day    ACETAMINOPHEN (TYLENOL) 500 MG TABLET    Take 500 mg by mouth every 6 (six) hours as needed for Pain.    ALCOHOL PREP PADS PADM        ANASTROZOLE (ARIMIDEX) 1 MG TAB    take 1 tablet by mouth once daily    FERROUS  GLUCONATE (FERGON) 240 (27 FE) MG TABLET    Take 1 tablet (240 mg total) by mouth once daily.    FLUTICASONE (FLONASE) 50 MCG/ACTUATION NASAL SPRAY    2 sprays by Each Nare route daily as needed. 2 Spray, Suspension Nasal Every day    GLIMEPIRIDE (AMARYL) 1 MG TABLET    take 1 tablet by mouth once daily    LANCETS (ACCU-CHEK SOFTCLIX LANCETS) MISC    1 each by Misc.(Non-Drug; Combo Route) route 3 (three) times daily.    LANCING DEVICE MISC        METOPROLOL SUCCINATE (TOPROL-XL) 25 MG 24 HR TABLET    Take 25 mg by mouth every evening.     NAPROXEN SODIUM (ALEVE) 220 MG TABLET    Take 220 mg by mouth as needed.    OXYCODONE-ACETAMINOPHEN (PERCOCET) 7.5-325 MG PER TABLET    Take 1 tablet by mouth every 4 (four) hours as needed for Pain.    SODIUM,POTASSIUM,MAG SULFATES (SUPREP BOWEL PREP KIT) 17.5-3.13-1.6 GRAM SOLR    As directed    TRAZODONE (DESYREL) 100 MG TABLET    Take 1 tablet (100 mg total) by mouth nightly.   Changed and/or Refilled Medications    Modified Medication Previous Medication    ROSUVASTATIN (CRESTOR) 10 MG TABLET rosuvastatin (CRESTOR) 10 MG tablet       take 1 tablet by mouth once daily    take 1 tablet by mouth once daily     Review of patient's allergies indicates:   Allergen Reactions    Codeine Itching     Review of Systems   Constitution: Negative for fever and night sweats.   HENT: Negative for hearing loss.    Eyes: Positive for blurred vision. Negative for visual disturbance.   Cardiovascular: Positive for leg swelling. Negative for chest pain.   Respiratory: Negative for shortness of breath.    Endocrine: Negative for polyuria.   Hematologic/Lymphatic: Negative for bleeding problem.   Skin: Negative for rash.   Musculoskeletal: Positive for joint pain, joint swelling and stiffness. Negative for back pain, muscle cramps and muscle weakness.   Gastrointestinal: Negative for melena.   Genitourinary: Negative for hematuria.   Neurological: Negative for loss of balance, numbness and  paresthesias.   Psychiatric/Behavioral: Negative for altered mental status.       Objective:   Body mass index is 27.21 kg/m².  Vitals:    03/26/18 1101   BP: (!) 145/77   Pulse: 66       General: Renea is well-developed, well-nourished, appears stated age, in no acute distress, alert and oriented to time, place and person.       General    Vitals reviewed.  Constitutional: She is oriented to person, place, and time. She appears well-developed and well-nourished. No distress.   HENT:   Mouth/Throat: No oropharyngeal exudate.   Eyes: Right eye exhibits no discharge. Left eye exhibits no discharge.   Neck: Normal range of motion.   Pulmonary/Chest: Effort normal and breath sounds normal. No respiratory distress.   Neurological: She is alert and oriented to person, place, and time. She has normal reflexes. No cranial nerve deficit. Coordination normal.   Psychiatric: She has a normal mood and affect. Her behavior is normal. Judgment and thought content normal.     General Musculoskeletal Exam   Gait: normal       Right Knee Exam     Inspection   Erythema: absent  Scars: absent  Swelling: absent  Effusion: effusion  Deformity: deformity  Bruising: absent    Tenderness   The patient is tender to palpation of the condyle.    Crepitus   The patient has crepitus of the patella.    Range of Motion   Extension: 0   Flexion: 140     Tests   Meniscus   Regina:  Medial - negative Lateral - negative  Ligament Examination Lachman: normal (-1 to 2mm) PCL-Posterior Drawer: normal (0 to 2mm)     MCL - Valgus: normal (0 to 2mm)  LCL - Varus: normal  Posterior Sag Test: negative  Posterolateral Corner: unstable (>15 degrees difference)  Patella   Patellar Apprehension: negative  Passive Patellar Tilt: neutral  Patellar Tracking: normal  Patellar Glide (quadrants): Lateral - 1   Medial - 2  Q-Angle at 90 degrees: normal  Patellar Grind: positive  J-Sign: none    Other   Meniscal Cyst: absent  Popliteal (Baker's) Cyst:  absent  Sensation: normal    Left Knee Exam   Left knee exam is normal.    Inspection   Erythema: absent  Scars: absent  Swelling: absent  Effusion: absent  Deformity: deformity  Bruising: absent    Tenderness   The patient is experiencing no tenderness.         Crepitus   The patient has crepitus of the patella.    Range of Motion   Extension: 0   Flexion: 140     Tests   Meniscus   Regina:  Medial - negative Lateral - negative  Stability Lachman: normal (-1 to 2mm) PCL-Posterior Drawer: normal (0 to 2mm)  MCL - Valgus: normal (0 to 2mm)  LCL - Varus: normal (0 to 2mm)  Posterior Sag Test: negative  Posterolateral Corner: unstable (>15 degrees difference)  Patella   Patellar Apprehension: negative  Passive Patellar Tilt: neutral  Patellar Tracking: normal  Patellar Glide (Quadrants): Lateral - 1 Medial - 2  Q-Angle at 90 degrees: normal  Patellar Grind: positive  J-Sign: J sign absent    Other   Meniscal Cyst: absent  Popliteal (Baker's) Cyst: absent  Sensation: normal    Right Hip Exam     Tests   Kaleb: negative  Left Hip Exam     Tests   Kaleb: negative          Muscle Strength   Right Lower Extremity   Quadriceps:  4/5   Hamstrin/5   Left Lower Extremity   Quadriceps:  4/5   Hamstrin/5     Reflexes     Left Side  Quadriceps:  2+  Achilles:  2+    Right Side   Quadriceps:  2+  Achilles:  2+    Vascular Exam     Right Pulses  Dorsalis Pedis:      2+  Posterior Tibial:      2+        Left Pulses  Dorsalis Pedis:      2+  Posterior Tibial:      2+          X-rays including standing, weight bearing AP and flexion bilateral knees, lateral and merchant views ordered and images reviewed by me show:    No fracture, dislocation or other pathology   Medial compartment: mild degenerative changes   Lateral compartment: no degenerative changes   Patellofemoral compartment: mild degenerative changes    Assessment:     Encounter Diagnosis   Name Primary?    Acute pain of right knee Yes        Plan:      We reviewed  with Renea today, the pathology and natural history of her diagnosis. We had an extensive discussion as to the conservative treatment and management of their condition. We also discussed the variety of treatment options to include medication, physical therapy, diagnostic testing as well as other treatments.The decision was made to go forward with :    1. PT    2. PROCEDURE NOTE: right KNEE INJECTION  After time out was performed, including verification of patient ID, procedure, site and side, availability of information and equipment, review of safety issues, and agreement with consent, the procedure site was marked and the patient was prepped aseptically. A diagnostic and therapeutic injection of 2cc 40 mg kenalog and 1% lidocaine/0.5% sensorcaine was given under sterile technique using a 22g x 1.5 needle into the knee inferolaterally in seated position.   The patient had no adverse reactions to the medication. Pain decreased. The patient was instructed to apply ice to the joint for 20 minutes and avoid strenuous activities for 24-36 hours following the injection. Patient was warned of possible blood sugar and/or blood pressure changes during that time. Following that time, patient can resume regular activities.

## 2018-04-11 ENCOUNTER — OFFICE VISIT (OUTPATIENT)
Dept: PODIATRY | Facility: CLINIC | Age: 68
End: 2018-04-11
Payer: MEDICARE

## 2018-04-11 ENCOUNTER — CLINICAL SUPPORT (OUTPATIENT)
Dept: REHABILITATION | Facility: HOSPITAL | Age: 68
End: 2018-04-11
Attending: ORTHOPAEDIC SURGERY
Payer: MEDICARE

## 2018-04-11 VITALS
SYSTOLIC BLOOD PRESSURE: 150 MMHG | HEIGHT: 61 IN | RESPIRATION RATE: 16 BRPM | BODY MASS INDEX: 27.55 KG/M2 | HEART RATE: 69 BPM | WEIGHT: 145.94 LBS | DIASTOLIC BLOOD PRESSURE: 74 MMHG

## 2018-04-11 DIAGNOSIS — M25.561 ACUTE PAIN OF RIGHT KNEE: Primary | ICD-10-CM

## 2018-04-11 DIAGNOSIS — M19.079 ARTHRITIS OF MIDFOOT: Primary | ICD-10-CM

## 2018-04-11 PROCEDURE — 99999 PR PBB SHADOW E&M-EST. PATIENT-LVL III: CPT | Mod: PBBFAC,,, | Performed by: PODIATRIST

## 2018-04-11 PROCEDURE — 97014 ELECTRIC STIMULATION THERAPY: CPT

## 2018-04-11 PROCEDURE — G8978 MOBILITY CURRENT STATUS: HCPCS | Mod: CJ

## 2018-04-11 PROCEDURE — 3078F DIAST BP <80 MM HG: CPT | Mod: CPTII,S$GLB,, | Performed by: PODIATRIST

## 2018-04-11 PROCEDURE — 3077F SYST BP >= 140 MM HG: CPT | Mod: CPTII,S$GLB,, | Performed by: PODIATRIST

## 2018-04-11 PROCEDURE — 97161 PT EVAL LOW COMPLEX 20 MIN: CPT

## 2018-04-11 PROCEDURE — 99213 OFFICE O/P EST LOW 20 MIN: CPT | Mod: S$GLB,,, | Performed by: PODIATRIST

## 2018-04-11 PROCEDURE — 97110 THERAPEUTIC EXERCISES: CPT

## 2018-04-11 PROCEDURE — G8979 MOBILITY GOAL STATUS: HCPCS | Mod: CI

## 2018-04-11 NOTE — PROGRESS NOTES
PHYSICAL THERAPY INITIAL OUTPATIENT EVALUATION    Referring Provider:  Dr. Vishnu Cox    Diagnosis:       ICD-10-CM ICD-9-CM    1. Acute pain of right knee M25.561 719.46        Orders:  Evaluate and Treat    Date of Initial Evaluation: 4/10/18      Visit # 1     BACKGROUND: 69 y/o WF with acute right knee pain sustained during a fall 3-4 weeks ago. Reports pain began approximately 1 week after the fall with multiple point of pain surrounding the knee. X-rays reveal no structural defecits. Following the fall patient struck her right tibia resulting in a hematoma. Reports pain is worst in the morning and with prolonged standing. Reports pain as sharp, dull and aching.  PAIN: 6/10    PMH:   Anemia 11/14/2017     Angina pectoris       followed by cardiology    Arthritis      Breast cancer       Right T1 Ductal Ca in situ,high oncotype Dx 13, Estrogen positive. Lumpectomy, TAC chemo x 6 cyscles then RT,on aromatase inhibitor    Cataract      Diabetes mellitus type II 2011    DM (diabetes mellitus) 2011      am 08/04/2017    GERD (gastroesophageal reflux disease)      History of colon polyps 2/21/2018     The patient had adenomatous colon polyps in 2014.      Hyperlipidemia      Hypertension      Kidney stone       right side, passed    Osteopenia      PVC (premature ventricular contraction)       on and off    Trouble in sleeping              Past Surgical History:   Procedure Laterality Date    BREAST LUMPECTOMY   2/11/2011     right    CATARACT EXTRACTION Bilateral 10/14/15    CHOLECYSTECTOMY   1989     open    COLONOSCOPY N/A 2/22/2018     Procedure: COLONOSCOPY;  Surgeon: Carlito Odonnell MD;  Location: [facility];  Service: Endoscopy;  Laterality: N/A;         OBJECTIVE:  Gait: Presents ambulating with antalgic gait of right LE.                                                                                             RIGHT                                 LEFT  Knee  AROM:  Flexion      131                               128     Extension    0                                           0  Knee PROM  Flexion      131                                    128      Extension    0                                          0        Strength:  Quadriceps    4/5                               5/5      Hamstrings    4/5                                    4+/5     Gluteus Medius   3+/5                                  3+/5      Gluteus Kole   3+/5                                  3+/5            Function: (53/72) 27% impairment noted on LEFS  LOWER EXTREMITY FUNCTIONAL SCALE         1. Any of your usual work, housework or school activities   2/4  2. Your usual hobbies, sporting     2/4  3. Getting in and out of tub      3/4  4. Walking between rooms      3/4  5. Putting on shoes or socks      4/4  6. Squatting        1/4  7. Lifting an object from the ground      4/4  8. Performing light activities around the home   3/4  9. Performing heavy activities around the home   3/4  10. Getting in and out of car      3/4  11. Walking 2 blocks       3/4  12.Walking a mile       2/4  13. Getting up and down 1 flight of stairs    2/4  14. Standing for 1 hour      2/4  15. Sitting for an hour       2/4  16. Running on even ground      2/4  17. Running on uneven ground     x/4  18. Making sharp turns when running fast    x/4  19. Hopping        4/4  20. Rolling over in bed                  4/4      Tenderness to palpation:  Medial tibial plateau and medial joint line    Special Test: (-) yony, lachman, posterior drawer and varus/valgus testing    ASSESSMENT:  The patient is a 68 y.o. year old female who presents to physical therapy with complaints of right knee pain due to a recent fall from slipping on a dog blanket.  Patient's impairments include right knee pain with resultant weakness and intermittent intraarticular edema resulting in antalgic gait pattern .  Exhibits lateral patellar tracking.  These impairments are limiting patient's ability to stand and ambulate prolonged distances, perform household duties, recreational activities and squatting. Pain reduced from 6/10 to 1/10 following treatment. Patient's prognosis is excellent.  Patient will benefit from skilled physical therapy intervention to improve right knee strength, pain reduction and improved gait mechanics to allow for return to painfree activities. Clinical presentation is stable requiring a low complexity physical therapy evaluation.    Short Term Goals:    1.I with HEP  2. Increase hip/knee strength 1/2 grade  3. Pt to score less than 20% on the LEFS  Long Term Goals:  1.Ambulate with normalized gait pattern painfree  2. Hip/knee strength 4+/5  3. Patient to perform daily activities including squatting, walking and stairs without limitation    TREATMENT PROVIDED:  -Therapeutic Exercise:  SLR, SLR with Er, SLR adduction, bridges, hamstring stretch, achilles stretch,   -Modalities: IFC/MH  -Evaluation  -Education on condition, joint protection techniques and HEP    PLAN:  Patient will benefit from physical therapy (2) x/week for (4) weeks including manual therapy, therapeutic exercise, functional activities, modalities, and patient education.    Thank you for this referral.    These services are reasonable and necessary for the conditions set forth above while under my care.

## 2018-04-13 ENCOUNTER — CLINICAL SUPPORT (OUTPATIENT)
Dept: REHABILITATION | Facility: HOSPITAL | Age: 68
End: 2018-04-13
Attending: ORTHOPAEDIC SURGERY
Payer: MEDICARE

## 2018-04-13 DIAGNOSIS — M25.561 ACUTE PAIN OF RIGHT KNEE: Primary | ICD-10-CM

## 2018-04-13 PROCEDURE — G8978 MOBILITY CURRENT STATUS: HCPCS | Mod: CJ

## 2018-04-13 PROCEDURE — 97110 THERAPEUTIC EXERCISES: CPT

## 2018-04-13 PROCEDURE — G8979 MOBILITY GOAL STATUS: HCPCS | Mod: CI

## 2018-04-13 NOTE — PROGRESS NOTES
PHYSICAL THERAPY ROUTINE VISIT    Referring Provider:  Dr. Vishnu Cox    Diagnosis:       ICD-10-CM ICD-9-CM    1. Acute pain of right knee M25.561 719.46        Orders:  Evaluate and Treat    Date of Initial Evaluation: 4/10/18      Visit # 2     BACKGROUND: Patient reports significant decrease in pain since evaluation. Reports HEP helps to reduce pain.  PAIN: 1/10    PMH:   Anemia 11/14/2017     Angina pectoris       followed by cardiology    Arthritis      Breast cancer       Right T1 Ductal Ca in situ,high oncotype Dx 13, Estrogen positive. Lumpectomy, TAC chemo x 6 cyscles then RT,on aromatase inhibitor    Cataract      Diabetes mellitus type II 2011    DM (diabetes mellitus) 2011      am 08/04/2017    GERD (gastroesophageal reflux disease)      History of colon polyps 2/21/2018     The patient had adenomatous colon polyps in 2014.      Hyperlipidemia      Hypertension      Kidney stone       right side, passed    Osteopenia      PVC (premature ventricular contraction)       on and off    Trouble in sleeping              Past Surgical History:   Procedure Laterality Date    BREAST LUMPECTOMY   2/11/2011     right    CATARACT EXTRACTION Bilateral 10/14/15    CHOLECYSTECTOMY   1989     open    COLONOSCOPY N/A 2/22/2018     Procedure: COLONOSCOPY;  Surgeon: Carlito Odonnell MD;  Location[facility];  Service: Endoscopy;  Laterality: N/A;         OBJECTIVE:  Gait: Presents ambulating with improved mechanics and less report of pain.                                                                                               RIGHT                                 LEFT  Knee AROM:  Flexion      131                               128     Extension    0                                           0  Knee PROM  Flexion      131                                    128      Extension    0                                          0        Strength:  Quadriceps    4/5                                5/5      Hamstrings    4/5                                    4+/5     Gluteus Medius   3+/5                                  3+/5      Gluteus Kole   3+/5                                  3+/5            Function: (53/72) 27% impairment noted on LEFS  LOWER EXTREMITY FUNCTIONAL SCALE         1. Any of your usual work, housework or school activities   2/4  2. Your usual hobbies, sporting     2/4  3. Getting in and out of tub      3/4  4. Walking between rooms      3/4  5. Putting on shoes or socks      4/4  6. Squatting        1/4  7. Lifting an object from the ground      4/4  8. Performing light activities around the home   3/4  9. Performing heavy activities around the home   3/4  10. Getting in and out of car      3/4  11. Walking 2 blocks       3/4  12.Walking a mile       2/4  13. Getting up and down 1 flight of stairs    2/4  14. Standing for 1 hour      2/4  15. Sitting for an hour       2/4  16. Running on even ground      2/4  17. Running on uneven ground     x/4  18. Making sharp turns when running fast    x/4  19. Hopping        4/4  20. Rolling over in bed                  4/4      Tenderness to palpation:  Medial tibial plateau and medial joint line      ASSESSMENT: Patient presents in NAD. Reports pain reduction following previous session. Pain upon arrival was 1/10 and reduced to 0/10 after treatment. Participated in right knee strengthening and flexibility activities with min assist to perform correctly with cues for adequate alignment. Gait mechanics improved allowing for pain free ambulation and reduced trendelenburg gait. Provided enhanced HEP.    Short Term Goals:    1.I with HEP  2. Increase hip/knee strength 1/2 grade  3. Pt to score less than 20% on the LEFS  Long Term Goals:  1.Ambulate with normalized gait pattern painfree  2. Hip/knee strength 4+/5  3. Patient to perform daily activities including squatting, walking and stairs without limitation    TREATMENT PROVIDED:  -Therapeutic  Exercise:         SLR                           10X2 B      SLR with Er               10X2 B      SLR adduction          10X2 B      Bridges                     10X2  B      hamstring stretch     20 sec X 5      achilles stretch         3 min      Mini squats               5 minutes      SLR extension         10 X 2  -Modalities: IFC/MH  -Evaluation  -Education on condition, joint protection techniques and HEP    PLAN:  Patient will benefit from physical therapy (2) x/week for (4) weeks including manual therapy, therapeutic exercise, functional activities, modalities, and patient education.    Thank you for this referral.    These services are reasonable and necessary for the conditions set forth above while under my care.

## 2018-04-15 NOTE — PROGRESS NOTES
Last encounter in this department: 2/28/2018    Subjective:      Patient ID: Renea Bourgeois is a 68 y.o. female.    Chief Complaint: Follow-up (6 wk F/U left foot, rates pain 0/10,  Pt. wears [employer] shoes with insoles, Last visit with PCP Dr. Newton on 11/14/17)    Renea Bourgeois is a 68 y.o. year-old female following up for left midfoot arthritis. Patient now reports complete resolution of her left midfoot pain and discomfort.  She returns today with recommended shoes and insoles reporting remarkable improvement of her discomfort with her knee shoes.          Review of Systems   Constitution: Negative for chills and fever.   Cardiovascular: Negative for chest pain.   Respiratory: Negative for shortness of breath.    Gastrointestinal: Negative for nausea and vomiting.           Objective:      Physical Exam   Constitutional: She is oriented to person, place, and time. She appears well-developed and well-nourished. No distress.   Cardiovascular:   Pulses:       Dorsalis pedis pulses are 2+ on the right side, and 2+ on the left side.        Posterior tibial pulses are 2+ on the right side, and 2+ on the left side.   Capillary fill time 3 seconds to digits bilateral feet.   Musculoskeletal:   Lower extremities:    Deformities:  Normal arch height and rectus feet bilaterally.     5/5 muscle strength and tone in all four quadrants bilaterally.     Pain-free range of motion in all four quadrants bilaterally.     Some pulling discomfort on maximal ankle joint dorsiflexion with some limitation bilateral feet.    No pain on side to side compression of the calcaneal body bilateral feet.    No pain to palpation left midtarsal surgical site.   Neurological: She is alert and oriented to person, place, and time. She displays no Babinski's sign on the right side. She displays no Babinski's sign on the left side.   Plantar protective sensation by touch via 5.07 [place] monofilament present bilaterally. Paresthesia  right 5th digit.      Skin:   Lower extremities:    Normal turgor texture temperature, bilaterally. Warm equally bilaterally.     Minimal residual fullness at the dorsal midtarsal left foot. No varicosities bilaterally. No open wounds or interdigital maceration, bilaterally.     Keratotic skin lesions: Surgical scar dorsal midtarsal left foot.     Nails are thick dystrophic and discolored bilaterally.       Psychiatric: She has a normal mood and affect.   Nursing note and vitals reviewed.              Assessment:       Encounter Diagnosis   Name Primary?    Arthritis of midfoot Yes         Plan:       Renea was seen today for follow-up.    Diagnoses and all orders for this visit:    Arthritis of midfoot      I counseled the patient on her conditions, their implications and medical management. Once again emphasized the importance of continuing the use of supportive shoe gear at all times, orthotic inserts, stretching, and icing to prevent recurrence. The patient was guided on the use of over the counter anti-inflammatories for flare ups and possible injection in the future if pain persists. The patient is to call if any problems arise.      .

## 2018-04-17 ENCOUNTER — CLINICAL SUPPORT (OUTPATIENT)
Dept: REHABILITATION | Facility: HOSPITAL | Age: 68
End: 2018-04-17
Attending: ORTHOPAEDIC SURGERY
Payer: MEDICARE

## 2018-04-17 DIAGNOSIS — M25.561 ACUTE PAIN OF RIGHT KNEE: Primary | ICD-10-CM

## 2018-04-17 PROCEDURE — G8979 MOBILITY GOAL STATUS: HCPCS | Mod: CI

## 2018-04-17 PROCEDURE — G8978 MOBILITY CURRENT STATUS: HCPCS | Mod: CJ

## 2018-04-17 PROCEDURE — 97110 THERAPEUTIC EXERCISES: CPT

## 2018-04-17 NOTE — PROGRESS NOTES
PHYSICAL THERAPY ROUTINE VISIT    Referring Provider:  Dr. Vishnu Cox    Diagnosis:       ICD-10-CM ICD-9-CM    1. Acute pain of right knee M25.561 719.46        Orders:  Evaluate and Treat    Date of Initial Evaluation: 4/10/18    Visit # 3    BACKGROUND: Patient reports significant decrease in pain since evaluation. Reports HEP helps to reduce pain.  PAIN: 0/10  SUBJECTIVE: Reports no current pain however continues to report low intensity right knee pain in the morning. Reports increased ability amadeo perform household duties.  PMH:   Anemia 11/14/2017     Angina pectoris       followed by cardiology    Arthritis      Breast cancer       Right T1 Ductal Ca in situ,high oncotype Dx 13, Estrogen positive. Lumpectomy, TAC chemo x 6 cyscles then RT,on aromatase inhibitor    Cataract      Diabetes mellitus type II 2011    DM (diabetes mellitus) 2011      am 08/04/2017    GERD (gastroesophageal reflux disease)      History of colon polyps 2/21/2018     The patient had adenomatous colon polyps in 2014.      Hyperlipidemia      Hypertension      Kidney stone       right side, passed    Osteopenia      PVC (premature ventricular contraction)       on and off    Trouble in sleeping              Past Surgical History:   Procedure Laterality Date    BREAST LUMPECTOMY   2/11/2011     right    CATARACT EXTRACTION Bilateral 10/14/15    CHOLECYSTECTOMY   1989     open    COLONOSCOPY N/A 2/22/2018     Procedure: COLONOSCOPY;  Surgeon: Carlito Odonnell MD;  Location: [facility];  Service: Endoscopy;  Laterality: N/A;         OBJECTIVE:  Gait: Presents ambulating with improved mechanics.                                                                                               RIGHT                                 LEFT  Knee AROM:  Flexion      131                               128     Extension    0                                           0  Knee PROM  Flexion      131                                     128      Extension    0                                          0        Strength:  Quadriceps    4/5                               5/5      Hamstrings    4/5                                    4+/5     Gluteus Medius   3+/5                                  3+/5      Gluteus Kole   3+/5                                  3+/5            Function: (53/72) 27% impairment noted on LEFS  LOWER EXTREMITY FUNCTIONAL SCALE         1. Any of your usual work, housework or school activities   2/4  2. Your usual hobbies, sporting     2/4  3. Getting in and out of tub      3/4  4. Walking between rooms      3/4  5. Putting on shoes or socks      4/4  6. Squatting        1/4  7. Lifting an object from the ground      4/4  8. Performing light activities around the home   3/4  9. Performing heavy activities around the home   3/4  10. Getting in and out of car      3/4  11. Walking 2 blocks       3/4  12.Walking a mile       2/4  13. Getting up and down 1 flight of stairs    2/4  14. Standing for 1 hour      2/4  15. Sitting for an hour       2/4  16. Running on even ground      2/4  17. Running on uneven ground     x/4  18. Making sharp turns when running fast    x/4  19. Hopping        4/4  20. Rolling over in bed                  4/4      Tenderness to palpation:  Minimal tenderness to pes anserine Right    ASSESSMENT: Patient presents in NAD. Reports pain reduction following previous session however minimal pain with prolonged standing and household duties. Pain upon arrival was 0/10 and  0/10 after treatment. Participated in enhanced right knee strengthening and flexibility activities with min assist to perform correctly with cues for adequate alignment. Gait mechanics improved allowing for pain free ambulation and reduced trendelenburg gait. Provided enhanced HEP which she completed independently. Continues to require bilateral LE strength and muscular endurance training.   Short Term Goals:    1.I with HEP  2.  Increase hip/knee strength 1/2 grade  3. Pt to score less than 20% on the LEFS  Long Term Goals:  1.Ambulate with normalized gait pattern painfree  2. Hip/knee strength 4+/5  3. Patient to perform daily activities including squatting, walking and stairs without limitation    TREATMENT PROVIDED:  -Therapeutic Exercise:   40 minutes individual time      Bike                            5 minutes      SLR                           15X2 B      SLR with Er               15X2 B      SLR adduction          10X2 B      SLR abd                    10X 1 B      Bridges                     15X2  B on bal      hamstring stretch    2 min      achilles stretch        2 min      IT band stretch        2 min      Mini squats               5 minutes      SLR extension         10 X 2      LAQ                          10#  10X1  -Education on condition, joint protection techniques and HEP    PLAN:  Patient will benefit from physical therapy (2) x/week for (4) weeks including manual therapy, therapeutic exercise, functional activities, modalities, and patient education.    Thank you for this referral.    These services are reasonable and necessary for the conditions set forth above while under my care.

## 2018-04-18 DIAGNOSIS — E11.9 TYPE 2 DIABETES MELLITUS WITHOUT COMPLICATION, WITHOUT LONG-TERM CURRENT USE OF INSULIN: Chronic | ICD-10-CM

## 2018-04-18 RX ORDER — GLIMEPIRIDE 1 MG/1
TABLET ORAL
Qty: 30 TABLET | Refills: 1 | Status: SHIPPED | OUTPATIENT
Start: 2018-04-18 | End: 2018-06-25 | Stop reason: SDUPTHER

## 2018-04-19 ENCOUNTER — CLINICAL SUPPORT (OUTPATIENT)
Dept: REHABILITATION | Facility: HOSPITAL | Age: 68
End: 2018-04-19
Attending: ORTHOPAEDIC SURGERY
Payer: MEDICARE

## 2018-04-19 DIAGNOSIS — M25.561 ACUTE PAIN OF RIGHT KNEE: Primary | ICD-10-CM

## 2018-04-19 PROCEDURE — G8978 MOBILITY CURRENT STATUS: HCPCS | Mod: CJ

## 2018-04-19 PROCEDURE — 97110 THERAPEUTIC EXERCISES: CPT

## 2018-04-19 PROCEDURE — G8979 MOBILITY GOAL STATUS: HCPCS | Mod: CI

## 2018-04-19 NOTE — PROGRESS NOTES
PHYSICAL THERAPY ROUTINE VISIT    Referring Provider:  Dr. Vishnu Cox    Diagnosis:       ICD-10-CM ICD-9-CM    1. Acute pain of right knee M25.561 719.46        Orders:  Evaluate and Treat    Date of Initial Evaluation: 4/10/18    Visit # 4    BACKGROUND: Patient reports significant decrease in pain since evaluation. Reports HEP helps to reduce pain.  PAIN: 0/10  SUBJECTIVE: Reports no current pain however continues to report low intensity right knee pain in the morning. Reports increased ability amadeo perform household duties painfree after performing HEP.  PMH:   Anemia 11/14/2017     Angina pectoris       followed by cardiology    Arthritis      Breast cancer       Right T1 Ductal Ca in situ,high oncotype Dx 13, Estrogen positive. Lumpectomy, TAC chemo x 6 cyscles then RT,on aromatase inhibitor    Cataract      Diabetes mellitus type II 2011    DM (diabetes mellitus) 2011      am 08/04/2017    GERD (gastroesophageal reflux disease)      History of colon polyps 2/21/2018     The patient had adenomatous colon polyps in 2014.      Hyperlipidemia      Hypertension      Kidney stone       right side, passed    Osteopenia      PVC (premature ventricular contraction)       on and off    Trouble in sleeping              Past Surgical History:   Procedure Laterality Date    BREAST LUMPECTOMY   2/11/2011     right    CATARACT EXTRACTION Bilateral 10/14/15    CHOLECYSTECTOMY   1989     open    COLONOSCOPY N/A 2/22/2018     Procedure: COLONOSCOPY;  Surgeon: Carlito Odonnell MD;  Location: [facility];  Service: Endoscopy;  Laterality: N/A;         OBJECTIVE:  Gait: Presents ambulating with improved mechanics painfree                                                                                               RIGHT                                 LEFT  Knee AROM:  Flexion      131                               128     Extension    0                                           0  Knee  PROM  Flexion      131                                    128      Extension    0                                          0        Strength:  Quadriceps    4/5                               5/5      Hamstrings    4/5                                    4+/5     Gluteus Medius   3+/5                                  3+/5      Gluteus Kole   3+/5                                  3+/5            Function: (53/72) 27% impairment noted on LEFS  LOWER EXTREMITY FUNCTIONAL SCALE         1. Any of your usual work, housework or school activities   2/4  2. Your usual hobbies, sporting     2/4  3. Getting in and out of tub      3/4  4. Walking between rooms      3/4  5. Putting on shoes or socks      4/4  6. Squatting        1/4  7. Lifting an object from the ground      4/4  8. Performing light activities around the home   3/4  9. Performing heavy activities around the home   3/4  10. Getting in and out of car      3/4  11. Walking 2 blocks       3/4  12.Walking a mile       2/4  13. Getting up and down 1 flight of stairs    2/4  14. Standing for 1 hour      2/4  15. Sitting for an hour       2/4  16. Running on even ground      2/4  17. Running on uneven ground     x/4  18. Making sharp turns when running fast    x/4  19. Hopping        4/4  20. Rolling over in bed                  4/4      ASSESSMENT: Patient presents in NAD. Reports 0/10 pain following previous session allowing her to participate in household duties. Pain upon arrival was 0/10 and  0/10 after treatment. Participated in enhanced right knee strengthening and flexibility activities with min assist to perform correctly with cues for adequate alignment . Attempted step ups to increase proprioception and stabilization however unable to perform due to pain. Gait mechanics improved allowing for pain free ambulation. Provided enhanced HEP which she completed independently. Continues to require bilateral LE strength and muscular endurance training as well  proprioception training to allow for increased standing and activity tolerance.    Short Term Goals:    1.I with HEP  2. Increase hip/knee strength 1/2 grade  3. Pt to score less than 20% on the LEFS  Long Term Goals:  1.Ambulate with normalized gait pattern painfree  2. Hip/knee strength 4+/5  3. Patient to perform daily activities including squatting, walking and stairs without limitation    TREATMENT PROVIDED:  -Therapeutic Exercise:   30 minutes individual time      Bike                            5 minutes      SLR                           10X3 B      SLR with Er               10x2 B      SLR adduction          10X2 B      SLR abd                    10X 2 B      Bridges                     10x3  B on bal      hamstring stretch    2 min      achilles stretch        2 min      IT band stretch        2 min      Mini squats              10x3      SLR extension         10 X 2      LAQ                          10#  10X1  -Education on condition, joint protection techniques and HEP    PLAN:  Patient will benefit from physical therapy (2) x/week for (4) weeks including manual therapy, therapeutic exercise, functional activities, modalities, and patient education.    Thank you for this referral.    These services are reasonable and necessary for the conditions set forth above while under my care.

## 2018-04-24 ENCOUNTER — CLINICAL SUPPORT (OUTPATIENT)
Dept: REHABILITATION | Facility: HOSPITAL | Age: 68
End: 2018-04-24
Attending: ORTHOPAEDIC SURGERY
Payer: MEDICARE

## 2018-04-24 DIAGNOSIS — M25.561 ACUTE PAIN OF RIGHT KNEE: Primary | ICD-10-CM

## 2018-04-24 PROCEDURE — G8978 MOBILITY CURRENT STATUS: HCPCS | Mod: CJ

## 2018-04-24 PROCEDURE — G8979 MOBILITY GOAL STATUS: HCPCS | Mod: CI

## 2018-04-24 PROCEDURE — 97110 THERAPEUTIC EXERCISES: CPT

## 2018-04-24 NOTE — PROGRESS NOTES
PHYSICAL THERAPY ROUTINE VISIT    Referring Provider:  Dr. Vishnu Cox    Diagnosis:       ICD-10-CM ICD-9-CM    1. Acute pain of right knee M25.561 719.46        Orders:  Evaluate and Treat    Date of Initial Evaluation: 4/10/18    Visit # 5    BACKGROUND: Patient reports significant decrease in pain since evaluation. Reports HEP helps to reduce pain.  PAIN: 0/10  SUBJECTIVE: Reports no current pain. Reports excessive stretching over the weekend which resulted in soreness.  PMH:   Anemia 11/14/2017     Angina pectoris       followed by cardiology    Arthritis      Breast cancer       Right T1 Ductal Ca in situ,high oncotype Dx 13, Estrogen positive. Lumpectomy, TAC chemo x 6 cyscles then RT,on aromatase inhibitor    Cataract      Diabetes mellitus type II 2011    DM (diabetes mellitus) 2011      am 08/04/2017    GERD (gastroesophageal reflux disease)      History of colon polyps 2/21/2018     The patient had adenomatous colon polyps in 2014.      Hyperlipidemia      Hypertension      Kidney stone       right side, passed    Osteopenia      PVC (premature ventricular contraction)       on and off    Trouble in sleeping              Past Surgical History:   Procedure Laterality Date    BREAST LUMPECTOMY   2/11/2011     right    CATARACT EXTRACTION Bilateral 10/14/15    CHOLECYSTECTOMY   1989     open    COLONOSCOPY N/A 2/22/2018     Procedure: COLONOSCOPY;  Surgeon: Carlito Odonnell MD;  Location: [facility];  Service: Endoscopy;  Laterality: N/A;         OBJECTIVE:  Gait: Presents ambulating with improved mechanics painfree                                                                                               RIGHT                                 LEFT  Knee AROM:  Flexion      131                               128     Extension    0                                           0  Knee PROM  Flexion      131                                    128      Extension    0                                           0        Strength:  Quadriceps    4/5                               5/5      Hamstrings    4/5                                    4+/5     Gluteus Medius   3+/5                                  3+/5      Gluteus Kole   3+/5                                  3+/5            Function: (53/72) 27% impairment noted on LEFS  LOWER EXTREMITY FUNCTIONAL SCALE         1. Any of your usual work, housework or school activities   2/4  2. Your usual hobbies, sporting     2/4  3. Getting in and out of tub      3/4  4. Walking between rooms      3/4  5. Putting on shoes or socks      4/4  6. Squatting        1/4  7. Lifting an object from the ground      4/4  8. Performing light activities around the home   3/4  9. Performing heavy activities around the home   3/4  10. Getting in and out of car      3/4  11. Walking 2 blocks       3/4  12.Walking a mile       2/4  13. Getting up and down 1 flight of stairs    2/4  14. Standing for 1 hour      2/4  15. Sitting for an hour       2/4  16. Running on even ground      2/4  17. Running on uneven ground     x/4  18. Making sharp turns when running fast    x/4  19. Hopping        4/4  20. Rolling over in bed                  4/4      ASSESSMENT: Patient presents in NAD with reports of 0/10 pain. Patient continues to exhibit improved right knee strength and stability allowing patient to tolerate prolonged standing and walking with less discomfort. Proprioception remains limited with single limb stance but also demonstrates improvement in maintaining NPA.  Patient continues to have excessive right femoral IR after 5 reps while descending step resulting in pain. Exhibits right gluteus medius weakness in  Standing which limits in left sided pelvic drop. Will continue to require PT to enhance pelvic stability and right hip and knee weakness to allow for ability to perform stairs painfree.      Short Term Goals:    1.I with HEP  2. Increase hip/knee strength 1/2  grade  3. Pt to score less than 20% on the LEFS  Long Term Goals:  1.Ambulate with normalized gait pattern painfree  2. Hip/knee strength 4+/5  3. Patient to perform daily activities including squatting, walking and stairs without limitation    TREATMENT PROVIDED:  -Therapeutic Exercise:   45 minutes individual time      Bike                            5 minutes      SLR                           10X3 B      SLR with Er               10x3 B      SLR adduction          10X3 B      SLR abd                    10X 3 B      Bridges                     10x3  B on bal      hamstring stretch    2 min      achilles stretch        2 min      IT band stretch        2 min      Mini squats              10x3      SLR extension         10 X 3      LAQ                          10#  10X3     Step ups                    5    Standing hip flexion    10  -Education on condition, joint protection techniques and HEP    PLAN:  Patient will benefit from physical therapy (2) x/week for (4) weeks including manual therapy, therapeutic exercise, functional activities, modalities, and patient education.    Thank you for this referral.    These services are reasonable and necessary for the conditions set forth above while under my care.

## 2018-04-25 ENCOUNTER — PATIENT OUTREACH (OUTPATIENT)
Dept: ADMINISTRATIVE | Facility: HOSPITAL | Age: 68
End: 2018-04-25

## 2018-04-25 DIAGNOSIS — E11.9 TYPE 2 DIABETES MELLITUS WITHOUT COMPLICATION, WITHOUT LONG-TERM CURRENT USE OF INSULIN: Primary | Chronic | ICD-10-CM

## 2018-04-25 DIAGNOSIS — E11.69 COMBINED HYPERLIPIDEMIA ASSOCIATED WITH TYPE 2 DIABETES MELLITUS: Chronic | ICD-10-CM

## 2018-04-25 DIAGNOSIS — E78.2 COMBINED HYPERLIPIDEMIA ASSOCIATED WITH TYPE 2 DIABETES MELLITUS: Chronic | ICD-10-CM

## 2018-04-25 DIAGNOSIS — I15.2 HYPERTENSION ASSOCIATED WITH DIABETES: ICD-10-CM

## 2018-04-25 DIAGNOSIS — E11.59 HYPERTENSION ASSOCIATED WITH DIABETES: ICD-10-CM

## 2018-04-27 ENCOUNTER — LAB VISIT (OUTPATIENT)
Dept: LAB | Facility: HOSPITAL | Age: 68
End: 2018-04-27
Attending: FAMILY MEDICINE
Payer: MEDICARE

## 2018-04-27 DIAGNOSIS — E11.59 HYPERTENSION ASSOCIATED WITH DIABETES: ICD-10-CM

## 2018-04-27 DIAGNOSIS — E78.5 HYPERLIPIDEMIA ASSOCIATED WITH TYPE 2 DIABETES MELLITUS: ICD-10-CM

## 2018-04-27 DIAGNOSIS — E11.9 DIABETES MELLITUS TYPE 2 IN NONOBESE: ICD-10-CM

## 2018-04-27 DIAGNOSIS — E11.69 HYPERLIPIDEMIA ASSOCIATED WITH TYPE 2 DIABETES MELLITUS: ICD-10-CM

## 2018-04-27 DIAGNOSIS — E55.9 VITAMIN D DEFICIENCY: ICD-10-CM

## 2018-04-27 DIAGNOSIS — E78.2 COMBINED HYPERLIPIDEMIA ASSOCIATED WITH TYPE 2 DIABETES MELLITUS: Chronic | ICD-10-CM

## 2018-04-27 DIAGNOSIS — E11.9 TYPE 2 DIABETES MELLITUS WITHOUT COMPLICATION, WITHOUT LONG-TERM CURRENT USE OF INSULIN: Chronic | ICD-10-CM

## 2018-04-27 DIAGNOSIS — E11.69 COMBINED HYPERLIPIDEMIA ASSOCIATED WITH TYPE 2 DIABETES MELLITUS: Chronic | ICD-10-CM

## 2018-04-27 DIAGNOSIS — I15.2 HYPERTENSION ASSOCIATED WITH DIABETES: ICD-10-CM

## 2018-04-27 LAB
25(OH)D3+25(OH)D2 SERPL-MCNC: 25 NG/ML
ALBUMIN SERPL BCP-MCNC: 4.1 G/DL
ALP SERPL-CCNC: 85 U/L
ALT SERPL W/O P-5'-P-CCNC: 16 U/L
ANION GAP SERPL CALC-SCNC: 8 MMOL/L
AST SERPL-CCNC: 15 U/L
BILIRUB SERPL-MCNC: 0.4 MG/DL
BUN SERPL-MCNC: 8 MG/DL
CALCIUM SERPL-MCNC: 9.6 MG/DL
CHLORIDE SERPL-SCNC: 105 MMOL/L
CHOLEST SERPL-MCNC: 170 MG/DL
CHOLEST/HDLC SERPL: 2.9 {RATIO}
CO2 SERPL-SCNC: 28 MMOL/L
CREAT SERPL-MCNC: 0.8 MG/DL
CREAT UR-MCNC: 162 MG/DL
EST. GFR  (AFRICAN AMERICAN): >60 ML/MIN/1.73 M^2
EST. GFR  (NON AFRICAN AMERICAN): >60 ML/MIN/1.73 M^2
ESTIMATED AVG GLUCOSE: 117 MG/DL
GLUCOSE SERPL-MCNC: 111 MG/DL
HBA1C MFR BLD HPLC: 5.7 %
HDLC SERPL-MCNC: 58 MG/DL
HDLC SERPL: 34.1 %
LDLC SERPL CALC-MCNC: 87.2 MG/DL
MICROALBUMIN UR DL<=1MG/L-MCNC: 16 UG/ML
MICROALBUMIN/CREATININE RATIO: 9.9 UG/MG
NONHDLC SERPL-MCNC: 112 MG/DL
POTASSIUM SERPL-SCNC: 4.1 MMOL/L
PROT SERPL-MCNC: 7 G/DL
SODIUM SERPL-SCNC: 141 MMOL/L
TRIGL SERPL-MCNC: 124 MG/DL
TSH SERPL DL<=0.005 MIU/L-ACNC: 1.44 UIU/ML

## 2018-04-27 PROCEDURE — 36415 COLL VENOUS BLD VENIPUNCTURE: CPT

## 2018-04-27 PROCEDURE — 82306 VITAMIN D 25 HYDROXY: CPT

## 2018-04-27 PROCEDURE — 82043 UR ALBUMIN QUANTITATIVE: CPT

## 2018-04-27 PROCEDURE — 80061 LIPID PANEL: CPT

## 2018-04-27 PROCEDURE — 83036 HEMOGLOBIN GLYCOSYLATED A1C: CPT

## 2018-04-27 PROCEDURE — 84443 ASSAY THYROID STIM HORMONE: CPT

## 2018-04-27 PROCEDURE — 80053 COMPREHEN METABOLIC PANEL: CPT

## 2018-05-01 ENCOUNTER — CLINICAL SUPPORT (OUTPATIENT)
Dept: REHABILITATION | Facility: HOSPITAL | Age: 68
End: 2018-05-01
Attending: ORTHOPAEDIC SURGERY
Payer: MEDICARE

## 2018-05-01 DIAGNOSIS — G89.29 CHRONIC PATELLOFEMORAL PAIN OF RIGHT KNEE: Primary | ICD-10-CM

## 2018-05-01 DIAGNOSIS — M25.561 CHRONIC PATELLOFEMORAL PAIN OF RIGHT KNEE: Primary | ICD-10-CM

## 2018-05-01 PROCEDURE — G8978 MOBILITY CURRENT STATUS: HCPCS | Mod: CJ

## 2018-05-01 PROCEDURE — 97110 THERAPEUTIC EXERCISES: CPT

## 2018-05-01 PROCEDURE — G8979 MOBILITY GOAL STATUS: HCPCS | Mod: CI

## 2018-05-01 NOTE — PROGRESS NOTES
PHYSICAL THERAPY ROUTINE VISIT    Referring Provider:  Dr. Vishnu Cox    Diagnosis:       ICD-10-CM ICD-9-CM    1. Chronic patellofemoral pain of right knee M25.561 719.46     G89.29 338.29        Orders:  Evaluate and Treat    Date of Initial Evaluation: 4/10/18    Visit # 6    BACKGROUND: Patient reports significant decrease in pain since evaluation. Reports HEP helps to reduce pain.  PAIN: 0/10  SUBJECTIVE: Reports no current pain.   PMH:   Anemia 11/14/2017     Angina pectoris       followed by cardiology    Arthritis      Breast cancer       Right T1 Ductal Ca in situ,high oncotype Dx 13, Estrogen positive. Lumpectomy, TAC chemo x 6 cyscles then RT,on aromatase inhibitor    Cataract      Diabetes mellitus type II 2011    DM (diabetes mellitus) 2011      am 08/04/2017    GERD (gastroesophageal reflux disease)      History of colon polyps 2/21/2018     The patient had adenomatous colon polyps in 2014.      Hyperlipidemia      Hypertension      Kidney stone       right side, passed    Osteopenia      PVC (premature ventricular contraction)       on and off    Trouble in sleeping              Past Surgical History:   Procedure Laterality Date    BREAST LUMPECTOMY   2/11/2011     right    CATARACT EXTRACTION Bilateral 10/14/15    CHOLECYSTECTOMY   1989     open    COLONOSCOPY N/A 2/22/2018     Procedure: COLONOSCOPY;  Surgeon: Carlito Odonnell MD;  Location: [facility];  Service: Endoscopy;  Laterality: N/A;         OBJECTIVE:  Gait: Presents ambulating with improved mechanics painfree                                                                                               RIGHT                                 LEFT  Knee AROM:  Flexion      131                               128     Extension    0                                           0  Knee PROM  Flexion      131                                    128      Extension    0                                           0        Strength:  Quadriceps    4/5                               5/5      Hamstrings    4/5                                    4+/5     Gluteus Medius   3+/5                                  3+/5      Gluteus Kole   3+/5                                  3+/5            Function: (53/72) 27% impairment noted on LEFS  LOWER EXTREMITY FUNCTIONAL SCALE         1. Any of your usual work, housework or school activities   2/4  2. Your usual hobbies, sporting     2/4  3. Getting in and out of tub      3/4  4. Walking between rooms      3/4  5. Putting on shoes or socks      4/4  6. Squatting        1/4  7. Lifting an object from the ground      4/4  8. Performing light activities around the home   3/4  9. Performing heavy activities around the home   3/4  10. Getting in and out of car      3/4  11. Walking 2 blocks       3/4  12.Walking a mile       2/4  13. Getting up and down 1 flight of stairs    2/4  14. Standing for 1 hour      2/4  15. Sitting for an hour       2/4  16. Running on even ground      2/4  17. Running on uneven ground     x/4  18. Making sharp turns when running fast    x/4  19. Hopping        4/4  20. Rolling over in bed                  4/4      ASSESSMENT: Patient presents in NAD with reports of 0/10 pain. Reports intermittent soreness of right knee over the weekend due to prolonged standing and walking. Patient continues to exhibit improved right knee strength and stability allowing patient to tolerate prolonged standing and walking with less discomfort. Proprioception remains limited with single limb stance . However unable to sustain due to muscular endurance defecits. Patient continues to have excessive right femoral IR with single limb stance and prolonged walking due to right gluteus medius weakness resulting in contralateral pelvic drop. Will continue to require PT to enhance pelvic stability, proprioception and right hip/ knee strengthening to allow for ability to perform stairs painfree.       Short Term Goals:    1.I with HEP  2. Increase hip/knee strength 1/2 grade  3. Pt to score less than 20% on the LEFS  Long Term Goals:  1.Ambulate with normalized gait pattern painfree  2. Hip/knee strength 4+/5  3. Patient to perform daily activities including squatting, walking and stairs without limitation    TREATMENT PROVIDED:  -Therapeutic Exercise:   30 minutes individual time      Bike                            5 minutes      SLR                           10X3 B      SLR with Er               10x3 B      SLR adduction          10X3 B      SLR abd                    10X 3 B      Bridges                     10x3  B on bal      hamstring stretch    2 min      achilles stretch        2 min      Mini squats              10x3      SLR extension         10 X 3      LAQ                          10#  10X3     Step ups                    5x4    Standing hip flexion    10  -Education on condition, joint protection techniques and HEP    PLAN:  Patient will benefit from physical therapy (2) x/week for (4) weeks including manual therapy, therapeutic exercise, functional activities, modalities, and patient education.    Thank you for this referral.    These services are reasonable and necessary for the conditions set forth above while under my care.

## 2018-05-03 ENCOUNTER — OFFICE VISIT (OUTPATIENT)
Dept: INTERNAL MEDICINE | Facility: CLINIC | Age: 68
End: 2018-05-03
Payer: MEDICARE

## 2018-05-03 ENCOUNTER — CLINICAL SUPPORT (OUTPATIENT)
Dept: REHABILITATION | Facility: HOSPITAL | Age: 68
End: 2018-05-03
Attending: ORTHOPAEDIC SURGERY
Payer: MEDICARE

## 2018-05-03 VITALS
RESPIRATION RATE: 16 BRPM | OXYGEN SATURATION: 98 % | SYSTOLIC BLOOD PRESSURE: 130 MMHG | BODY MASS INDEX: 27.26 KG/M2 | DIASTOLIC BLOOD PRESSURE: 70 MMHG | TEMPERATURE: 98 F | HEIGHT: 61 IN | HEART RATE: 64 BPM | WEIGHT: 144.38 LBS

## 2018-05-03 DIAGNOSIS — G89.29 CHRONIC PATELLOFEMORAL PAIN OF RIGHT KNEE: Primary | ICD-10-CM

## 2018-05-03 DIAGNOSIS — R05.9 COUGH: ICD-10-CM

## 2018-05-03 DIAGNOSIS — J30.2 SEASONAL ALLERGIC RHINITIS, UNSPECIFIED TRIGGER: Primary | ICD-10-CM

## 2018-05-03 DIAGNOSIS — M25.561 CHRONIC PATELLOFEMORAL PAIN OF RIGHT KNEE: Primary | ICD-10-CM

## 2018-05-03 PROCEDURE — 99214 OFFICE O/P EST MOD 30 MIN: CPT | Mod: S$GLB,,, | Performed by: FAMILY MEDICINE

## 2018-05-03 PROCEDURE — G8979 MOBILITY GOAL STATUS: HCPCS | Mod: CI

## 2018-05-03 PROCEDURE — 97110 THERAPEUTIC EXERCISES: CPT

## 2018-05-03 PROCEDURE — 3078F DIAST BP <80 MM HG: CPT | Mod: CPTII,S$GLB,, | Performed by: FAMILY MEDICINE

## 2018-05-03 PROCEDURE — 3075F SYST BP GE 130 - 139MM HG: CPT | Mod: CPTII,S$GLB,, | Performed by: FAMILY MEDICINE

## 2018-05-03 PROCEDURE — G8978 MOBILITY CURRENT STATUS: HCPCS | Mod: CJ

## 2018-05-03 PROCEDURE — 99999 PR PBB SHADOW E&M-EST. PATIENT-LVL III: CPT | Mod: PBBFAC,,, | Performed by: FAMILY MEDICINE

## 2018-05-03 RX ORDER — BENZONATATE 100 MG/1
100-200 CAPSULE ORAL 3 TIMES DAILY PRN
Qty: 30 CAPSULE | Refills: 1 | Status: SHIPPED | OUTPATIENT
Start: 2018-05-03 | End: 2018-05-13

## 2018-05-03 RX ORDER — HYDROCODONE POLISTIREX AND CHLORPHENIRAMINE POLISTIREX 10; 8 MG/5ML; MG/5ML
5 SUSPENSION, EXTENDED RELEASE ORAL EVERY 12 HOURS PRN
Qty: 115 ML | Refills: 0 | Status: SHIPPED | OUTPATIENT
Start: 2018-05-03 | End: 2019-05-17

## 2018-05-03 NOTE — PROGRESS NOTES
PHYSICAL THERAPY ROUTINE VISIT    Referring Provider:  Dr. Vishnu Cox    Diagnosis:       ICD-10-CM ICD-9-CM    1. Chronic patellofemoral pain of right knee M25.561 719.46     G89.29 338.29        Orders:  Evaluate and Treat    Date of Initial Evaluation: 4/10/18    Visit # 7    BACKGROUND: Patient reports significant decrease in pain since evaluation. Reports HEP helps to reduce pain.  PAIN: 0/10  SUBJECTIVE: Reports no pain but reports feeling ill from sinus issues   PMH:   Anemia 11/14/2017     Angina pectoris       followed by cardiology    Arthritis      Breast cancer       Right T1 Ductal Ca in situ,high oncotype Dx 13, Estrogen positive. Lumpectomy, TAC chemo x 6 cyscles then RT,on aromatase inhibitor    Cataract      Diabetes mellitus type II 2011    DM (diabetes mellitus) 2011      am 08/04/2017    GERD (gastroesophageal reflux disease)      History of colon polyps 2/21/2018     The patient had adenomatous colon polyps in 2014.      Hyperlipidemia      Hypertension      Kidney stone       right side, passed    Osteopenia      PVC (premature ventricular contraction)       on and off    Trouble in sleeping              Past Surgical History:   Procedure Laterality Date    BREAST LUMPECTOMY   2/11/2011     right    CATARACT EXTRACTION Bilateral 10/14/15    CHOLECYSTECTOMY   1989     open    COLONOSCOPY N/A 2/22/2018     Procedure: COLONOSCOPY;  Surgeon: Carlito Odonnell MD;  Location: [facility];  Service: Endoscopy;  Laterality: N/A;         OBJECTIVE: Presents to clinic in NAD  Gait: Presents ambulating with improved mechanics painfree                                                                                               RIGHT                                 LEFT  Knee AROM:  Flexion      131                               128     Extension    0                                           0  Knee PROM  Flexion      131                                     128      Extension    0                                          0        Strength:  Quadriceps    4/5                               5/5      Hamstrings    4/5                                    4+/5     Gluteus Medius   3+/5                                  3+/5      Gluteus Kole   3+/5                                  3+/5            Function: (53/72) 27% impairment noted on LEFS  LOWER EXTREMITY FUNCTIONAL SCALE         1. Any of your usual work, housework or school activities   2/4  2. Your usual hobbies, sporting     2/4  3. Getting in and out of tub      3/4  4. Walking between rooms      3/4  5. Putting on shoes or socks      4/4  6. Squatting        1/4  7. Lifting an object from the ground      4/4  8. Performing light activities around the home   3/4  9. Performing heavy activities around the home   3/4  10. Getting in and out of car      3/4  11. Walking 2 blocks       3/4  12.Walking a mile       2/4  13. Getting up and down 1 flight of stairs    2/4  14. Standing for 1 hour      2/4  15. Sitting for an hour       2/4  16. Running on even ground      2/4  17. Running on uneven ground     x/4  18. Making sharp turns when running fast    x/4  19. Hopping        4/4  20. Rolling over in bed                  4/4      ASSESSMENT: Patient presents in NAD with reports of 0/10 pain. After approximately 30 minutes of treatment patient began to feel fatigue from recent sinus symptoms. Treatment was discontinued at that time.  Patient continues to exhibit improved right knee strength and stability allowing patient to tolerate prolonged standing and walking with less discomfort. Patient continues to have excessive right femoral IR with single limb stance and prolonged walking due to right gluteus medius weakness resulting in contralateral pelvic drop. Will continue to require PT to enhance pelvic stability, proprioception and right hip/ knee strengthening to allow for ability to perform stairs painfree.       Short Term Goals:    1.I with HEP  2. Increase hip/knee strength 1/2 grade  3. Pt to score less than 20% on the LEFS  Long Term Goals:  1.Ambulate with normalized gait pattern painfree  2. Hip/knee strength 4+/5  3. Patient to perform daily activities including squatting, walking and stairs without limitation    TREATMENT PROVIDED:  -Therapeutic Exercise:   30 minutes individual time      Bike                            5 minutes      SLR                           10X3 B      SLR with Er               10x3 B      SLR adduction          10X3 B      SLR abd                    10X 3 B      Bridges                     10x3  B on bal      hamstring stretch    2 min      achilles stretch        2 min      Mini squats              10x3      SLR extension         10 X 3      LAQ                          10#  10X3     Step ups                    5x4    Standing hip flexion    10  -Education on condition, joint protection techniques and HEP    PLAN:  Patient will benefit from physical therapy (2) x/week for (4) weeks including manual therapy, therapeutic exercise, functional activities, modalities, and patient education.    Thank you for this referral.    These services are reasonable and necessary for the conditions set forth above while under my care.

## 2018-05-03 NOTE — PROGRESS NOTES
Subjective:       Patient ID: Renea Bourgeois is a 68 y.o. female.    Chief Complaint: Cough (white to yellow mucus, since last Thursday getting worse) and Sore Throat (painful when coughing ans swallowing)    Taking loratadine - little improvement.  Cough, sore throat, clear runny nose since last Thursday, worsening. No sinus pressure, headache, vomiting, fever, chills.   + nausea with coughing last night, interfering with sleep.      Review of Systems   Constitutional: Negative for chills and fever.   HENT: Positive for rhinorrhea and sore throat. Negative for sinus pressure.    Respiratory: Positive for cough.    Gastrointestinal: Positive for nausea. Negative for vomiting.   Neurological: Negative for headaches.       Objective:      Physical Exam   Constitutional: She is oriented to person, place, and time. She appears well-developed and well-nourished. No distress.   HENT:   Head: Normocephalic and atraumatic.   Right Ear: Tympanic membrane and ear canal normal.   Left Ear: Tympanic membrane and ear canal normal.   Nose: Mucosal edema and rhinorrhea present.   Mouth/Throat: Oropharynx is clear and moist and mucous membranes are normal.   Pale, boggy nasal turbinates with clear rhinorrhea   Eyes: Conjunctivae and EOM are normal. Pupils are equal, round, and reactive to light. Right eye exhibits no discharge. Left eye exhibits no discharge.   Neck: Normal range of motion. Neck supple.   Cardiovascular: Normal rate and regular rhythm.  Exam reveals no gallop and no friction rub.    No murmur heard.  Pulmonary/Chest: Effort normal and breath sounds normal. No respiratory distress. She has no wheezes. She has no rales.   Lymphadenopathy:     She has no cervical adenopathy.   Neurological: She is alert and oriented to person, place, and time.   Skin: Skin is warm and dry. No rash noted.   Vitals reviewed.      Assessment:       1. Seasonal allergic rhinitis, unspecified trigger    2. Cough        Plan:     Problem  List Items Addressed This Visit     Allergic rhinitis - Primary      Other Visit Diagnoses     Cough        Relevant Medications    benzonatate (TESSALON) 100 MG capsule    hydrocodone-chlorpheniramine (TUSSIONEX) 10-8 mg/5 mL suspension

## 2018-05-03 NOTE — PATIENT INSTRUCTIONS
Continue claritin (loratadine) daily, resume flonase daily.  Follow up if worse/not resolving.    Nasal Allergy  Nasal Allergy, also called Allergic Rhinitis occurs after exposure to pollen, molds, mildew, animal dander (scales from animal skin, hair and feathers), dust, smoke and fumes. (These are called allergens). When pollen causes a nasal allergy it is commonly called Hay Fever.  When these particles contact the lining of the nose, eyes, eyelids, sinuses or throat, they cause the cells to release a chemical called histamine. Histamine may cause a watery discharge from the eyes or nose. It may also cause violent sneezing, nasal congestion, itching of the eyes, nose, throat and mouth.  Prevention:  Nasal allergy cannot be cured but symptoms can be reduced. Avoid or reduce exposure to the allergen when possible, by the following measures:  POLLEN  · Stay indoors on hot windy days during pollen season  · Keep windows and doors closed  · Use an air conditioner with an electrostatic filter  DUST, MOLD & MILDEW  Follow these measures, especially in the bedroom:  · When cleaning use vacuum , oiled mops and damp cloths; dont stir up the dust.  · Once a week clean the walls, woodwork and floors with a damp mop and vacuum carpets.  · Once a year clean the bed frame and springs (do this outside).  · Cover the box springs with plastic. Do not use mattress pads.  · Remove stuffed chairs and rugs from the bedroom.  · Discard old moldy books, furniture and bedding.  · Use synthetic fabrics for furniture, curtains and bedding. Avoid quilts, comforters, and stuffed toys.  ANIMAL DANDER  · Remove all indoor pets (except fish and reptiles).  · Avoid all contact with furry animals.  · Avoid down-stuffed pillows and coats.  · Some persons are also sensitive to wool and should avoid it.  OTHER IRRITANTS  · Do not smoke and avoid the smoke of others.  · Some persons are sensitive to cosmetic powder, baby powder and  powdered laundry detergents. Therefore, these powders should be avoided.  Home Care:  · DECONGESTANT pills and sprays (Sudafed, NeoSynephrine, Afrin), reduce tissue swelling and watery discharge. Overuse of nasal decongestant sprays may make symptoms worse. Do not use these more often than recommended.  · ANTIHISTAMINES block the release of histamine during the allergic response. Antihistamines are more effective when taken BEFORE symptoms develop. Unless a prescription antihistamine was prescribed, you may take CLARITIN (loratadine). (Claritin is an over-the-counter antihistamine that does not cause drowsiness.)  · STEROID nasal sprays (Beconase, Vancenase, Nasalide) or oral steroids (Prednisone) may also be prescribed for more severe symptoms. These help to reduce the local inflammation which adds to the allergic response.  · If you have ASTHMA, pollen season may make your asthma symptoms worse. It is important that you use your asthma medicines as directed during this time to prevent or treat attacks. Some persons with asthma have a worsening of their asthma symptoms when taking antihistamines. If you notice this, stop the antihistamines and notify your doctor.  Follow Up  with your doctor or as directed by our staff if your symptoms are not improving with the treatment advised.  Get Prompt Medical Attention  if any of the following occur:  · Facial or sinus pain or colored drainage from the nose  · Severe headache or ear pain  · Fever of 100.4°F (38ºC) or higher, or as directed by your healthcare provider  · Wheezing or trouble breathing (If you already know you have asthma, return if your asthma symptoms do not respond to the usual doses of your medicine)  · Cough with lots of colored sputum (mucus)  © 8165-4782 [facility], [address] [place] [address], [place] [address]. All rights reserved. This information is not intended as a substitute for professional medical care. Always follow your healthcare  professional's instructions.

## 2018-05-07 ENCOUNTER — CLINICAL SUPPORT (OUTPATIENT)
Dept: REHABILITATION | Facility: HOSPITAL | Age: 68
End: 2018-05-07
Attending: ORTHOPAEDIC SURGERY
Payer: MEDICARE

## 2018-05-07 DIAGNOSIS — M25.561 ACUTE PAIN OF RIGHT KNEE: Primary | ICD-10-CM

## 2018-05-07 PROCEDURE — G8978 MOBILITY CURRENT STATUS: HCPCS | Mod: CI

## 2018-05-07 PROCEDURE — G8979 MOBILITY GOAL STATUS: HCPCS | Mod: CI

## 2018-05-07 PROCEDURE — 97110 THERAPEUTIC EXERCISES: CPT

## 2018-05-07 NOTE — PROGRESS NOTES
PHYSICAL THERAPY DISCHARGE SUMMARY    Referring Provider:  Dr. Vishnu Cox    Diagnosis:       ICD-10-CM ICD-9-CM    1. Acute pain of right knee M25.561 719.46        Orders:  Evaluate and Treat    Date of Initial Evaluation: 4/10/18    Visit # 8    BACKGROUND: Patient reports significant decrease in pain since evaluation. Reports HEP helps to reduce pain.  PAIN: 0/10  SUBJECTIVE: Reports no pain   PMH:   Anemia 11/14/2017     Angina pectoris       followed by cardiology    Arthritis      Breast cancer       Right T1 Ductal Ca in situ,high oncotype Dx 13, Estrogen positive. Lumpectomy, TAC chemo x 6 cyscles then RT,on aromatase inhibitor    Cataract      Diabetes mellitus type II 2011    DM (diabetes mellitus) 2011      am 08/04/2017    GERD (gastroesophageal reflux disease)      History of colon polyps 2/21/2018     The patient had adenomatous colon polyps in 2014.      Hyperlipidemia      Hypertension      Kidney stone       right side, passed    Osteopenia      PVC (premature ventricular contraction)       on and off    Trouble in sleeping              Past Surgical History:   Procedure Laterality Date    BREAST LUMPECTOMY   2/11/2011     right    CATARACT EXTRACTION Bilateral 10/14/15    CHOLECYSTECTOMY   1989     open    COLONOSCOPY N/A 2/22/2018     Procedure: COLONOSCOPY;  Surgeon: Carlito Odonnell MD;  Location: [facility];  Service: Endoscopy;  Laterality: N/A;         OBJECTIVE: Presents to clinic in NAD  Gait: Presents ambulating with improved mechanics painfree                                                                                               RIGHT                                 LEFT  Knee AROM:  Flexion      131                               128     Extension    0                                           0  Knee PROM  Flexion      131                                    128      Extension    0                                           0        Strength:  Quadriceps    5/5                               5/5      Hamstrings    5/5                                   5/5     Gluteus Medius   4+/5                                  4+/5      Gluteus Kole   4+/5                                  4+/5            Function: (73/80) 8% impairment noted on LEFS  LOWER EXTREMITY FUNCTIONAL SCALE         1. Any of your usual work, housework or school activities   4/4  2. Your usual hobbies, sporting     4/4  3. Getting in and out of tub      4/4  4. Walking between rooms      4/4  5. Putting on shoes or socks      4/4  6. Squatting        3/4  7. Lifting an object from the ground      4/4  8. Performing light activities around the home   4/4  9. Performing heavy activities around the home   4/4  10. Getting in and out of car      4/4  11. Walking 2 blocks       3/4  12.Walking a mile       3/4  13. Getting up and down 1 flight of stairs    3/4  14. Standing for 1 hour      4/4  15. Sitting for an hour       4/4  16. Running on even ground      3/4  17. Running on uneven ground     3/4  18. Making sharp turns when running fast    3/4  19. Hopping        4/4  20. Rolling over in bed                  4/4      ASSESSMENT: Patient presents in NAD with reports of 0/10 pain. Reports ability to perform all household and recreational activities. Patient is independent with HEP. LEFS reduced from 27% to 8% with 8 treatments. Right knee strength 5/5 with gluteus medius and minimus 4+/5. Patient has met all goals and will continue to benefit from HEP daily.    Short Term Goals:    1.I with HEP                                               MET  2. Increase hip/knee strength 1/2 grade   MET  3. Pt to score less than 20% on the LEFS    MET  Long Term Goals:  1.Ambulate with normalized gait pattern painfree     MET  2. Hip/knee strength 4+/5                                          MET  3. Patient to perform daily activities including squatting, walking and stairs without  limitation      MET    TREATMENT PROVIDED:  -Therapeutic Exercise:   30 minutes individual time      Bike                            5 minutes      SLR                           10X3 B      SLR with Er               10x3 B      SLR adduction          10X3 B      SLR abd                    10X 3 B      Bridges                     10x3  B on bal      hamstring stretch    2 min      achilles stretch        2 min      Mini squats              10x3      SLR extension         10 X 3      LAQ                          10#  10X3     Step ups                    5x4    Standing hip flexion    10  -Education on condition, joint protection techniques and HEP    PLAN:  Patient will benefit from physical therapy (2) x/week for (4) weeks including manual therapy, therapeutic exercise, functional activities, modalities, and patient education.    Thank you for this referral.    These services are reasonable and necessary for the conditions set forth above while under my care.

## 2018-05-09 ENCOUNTER — OFFICE VISIT (OUTPATIENT)
Dept: INTERNAL MEDICINE | Facility: CLINIC | Age: 68
End: 2018-05-09
Payer: MEDICARE

## 2018-05-09 VITALS
DIASTOLIC BLOOD PRESSURE: 74 MMHG | HEART RATE: 63 BPM | SYSTOLIC BLOOD PRESSURE: 122 MMHG | TEMPERATURE: 98 F | BODY MASS INDEX: 27.3 KG/M2 | RESPIRATION RATE: 17 BRPM | HEIGHT: 61 IN | OXYGEN SATURATION: 98 % | WEIGHT: 144.63 LBS

## 2018-05-09 DIAGNOSIS — E11.69 COMBINED HYPERLIPIDEMIA ASSOCIATED WITH TYPE 2 DIABETES MELLITUS: Chronic | ICD-10-CM

## 2018-05-09 DIAGNOSIS — E78.2 COMBINED HYPERLIPIDEMIA ASSOCIATED WITH TYPE 2 DIABETES MELLITUS: Chronic | ICD-10-CM

## 2018-05-09 DIAGNOSIS — E55.9 VITAMIN D DEFICIENCY: ICD-10-CM

## 2018-05-09 DIAGNOSIS — Z00.00 ROUTINE GENERAL MEDICAL EXAMINATION AT A HEALTH CARE FACILITY: Primary | ICD-10-CM

## 2018-05-09 DIAGNOSIS — Z11.59 NEED FOR HEPATITIS C SCREENING TEST: ICD-10-CM

## 2018-05-09 DIAGNOSIS — E11.9 CONTROLLED TYPE 2 DIABETES MELLITUS WITHOUT COMPLICATION, WITHOUT LONG-TERM CURRENT USE OF INSULIN: ICD-10-CM

## 2018-05-09 DIAGNOSIS — I15.2 HYPERTENSION ASSOCIATED WITH DIABETES: ICD-10-CM

## 2018-05-09 DIAGNOSIS — E11.59 HYPERTENSION ASSOCIATED WITH DIABETES: ICD-10-CM

## 2018-05-09 PROCEDURE — 3044F HG A1C LEVEL LT 7.0%: CPT | Mod: CPTII,S$GLB,, | Performed by: FAMILY MEDICINE

## 2018-05-09 PROCEDURE — 99397 PER PM REEVAL EST PAT 65+ YR: CPT | Mod: S$GLB,,, | Performed by: FAMILY MEDICINE

## 2018-05-09 PROCEDURE — 99499 UNLISTED E&M SERVICE: CPT | Mod: S$GLB,,, | Performed by: FAMILY MEDICINE

## 2018-05-09 PROCEDURE — 99999 PR PBB SHADOW E&M-EST. PATIENT-LVL III: CPT | Mod: PBBFAC,,, | Performed by: FAMILY MEDICINE

## 2018-05-09 PROCEDURE — 3074F SYST BP LT 130 MM HG: CPT | Mod: CPTII,S$GLB,, | Performed by: FAMILY MEDICINE

## 2018-05-09 PROCEDURE — 3078F DIAST BP <80 MM HG: CPT | Mod: CPTII,S$GLB,, | Performed by: FAMILY MEDICINE

## 2018-05-09 NOTE — PROGRESS NOTES
Subjective:       Patient ID: Renea Bourgeois is a 68 y.o. female.    Chief Complaint: Annual Exam    Patient presents to clinic today for annual physical exam. She reports cough/allergy symptoms have improved significantly.      Review of Systems   Constitutional: Negative for activity change and unexpected weight change.   HENT: Negative for hearing loss and trouble swallowing.    Eyes: Negative for discharge and visual disturbance.   Respiratory: Negative for chest tightness and wheezing.    Cardiovascular: Negative for chest pain and palpitations.   Gastrointestinal: Negative for blood in stool, constipation, diarrhea and vomiting.   Endocrine: Negative for polyuria.   Genitourinary: Negative for difficulty urinating, dysuria, hematuria and menstrual problem.   Musculoskeletal: Positive for arthralgias, joint swelling and neck pain (intermittent x several months; on right side).   Neurological: Positive for headaches. Negative for weakness.   Psychiatric/Behavioral: Negative for confusion and dysphoric mood.       Objective:      Physical Exam   Constitutional: She is oriented to person, place, and time. Vital signs are normal. She appears well-developed and well-nourished. No distress.   HENT:   Head: Normocephalic and atraumatic.   Right Ear: Tympanic membrane, external ear and ear canal normal.   Left Ear: Tympanic membrane, external ear and ear canal normal.   Nose: Mucosal edema and rhinorrhea present.   Mouth/Throat: Uvula is midline, oropharynx is clear and moist and mucous membranes are normal.   Pale, boggy nasal turbinates with clear rhinorrhea   Eyes: Conjunctivae, EOM and lids are normal. Pupils are equal, round, and reactive to light. Right eye exhibits no discharge. Left eye exhibits no discharge.   Neck: Normal range of motion. Neck supple. No thyromegaly present.   Cardiovascular: Normal rate and regular rhythm.  Exam reveals no gallop and no friction rub.    No murmur heard.  Pulmonary/Chest:  Effort normal and breath sounds normal. No respiratory distress. She has no wheezes. She has no rhonchi. She has no rales.   Abdominal: Soft. Normal appearance and bowel sounds are normal. She exhibits no distension and no mass. There is no hepatosplenomegaly. There is no tenderness.   Musculoskeletal: Normal range of motion.   Lymphadenopathy:     She has no cervical adenopathy.   Neurological: She is alert and oriented to person, place, and time. She has normal strength. No cranial nerve deficit or sensory deficit. Gait normal.   Reflex Scores:       Patellar reflexes are 2+ on the right side and 2+ on the left side.  Skin: Skin is warm and dry. No lesion and no rash noted. No cyanosis. Nails show no clubbing.   Psychiatric: She has a normal mood and affect.   Vitals reviewed.        Protective Sensation (w/ 10 gram monofilament):  Right: Intact  Left: Intact    Visual Inspection:  Normal -  Bilateral    Pedal Pulses:   Right: Present  Left: Present    Posterior tibialis:   Right:Present  Left: Present        Assessment:       1. Routine general medical examination at a health care facility    2. Controlled type 2 diabetes mellitus without complication, without long-term current use of insulin    3. Hypertension associated with diabetes    4. Combined hyperlipidemia associated with type 2 diabetes mellitus    5. Vitamin D deficiency    6. Need for hepatitis C screening test        Plan:     Problem List Items Addressed This Visit     Combined hyperlipidemia associated with type 2 diabetes mellitus (Chronic)    Overview     LDL 87, continue crestor         Controlled type 2 diabetes mellitus without complication, without long-term current use of insulin    Current Assessment & Plan     a1c 5.7, continue glimepiride         Hypertension associated with diabetes    Current Assessment & Plan     Controlled, continue metoprolol         Vitamin D deficiency    Current Assessment & Plan     Resume OTC supplement            Other Visit Diagnoses     Routine general medical examination at a health care facility    -  Primary    Need for hepatitis C screening test        Relevant Orders    Hepatitis C antibody          Health Maintenance reviewed/updated. Dr. Jones follows patient for history of breast cancer. PADMINI signed for immunizations at [facility], she reports having pneumonia vaccine done there.

## 2018-05-12 PROBLEM — E55.9 VITAMIN D DEFICIENCY: Status: ACTIVE | Noted: 2018-05-12

## 2018-05-14 DIAGNOSIS — F51.04 CHRONIC INSOMNIA: ICD-10-CM

## 2018-05-14 RX ORDER — TRAZODONE HYDROCHLORIDE 100 MG/1
TABLET ORAL
Qty: 90 TABLET | Refills: 1 | Status: SHIPPED | OUTPATIENT
Start: 2018-05-14 | End: 2018-11-01 | Stop reason: SDUPTHER

## 2018-05-18 ENCOUNTER — PES CALL (OUTPATIENT)
Dept: ADMINISTRATIVE | Facility: CLINIC | Age: 68
End: 2018-05-18

## 2018-05-19 DIAGNOSIS — R05.9 COUGH: ICD-10-CM

## 2018-05-22 DIAGNOSIS — Z17.0 MALIGNANT NEOPLASM OF UPPER-OUTER QUADRANT OF LEFT BREAST IN FEMALE, ESTROGEN RECEPTOR POSITIVE: Primary | ICD-10-CM

## 2018-05-22 DIAGNOSIS — C50.412 MALIGNANT NEOPLASM OF UPPER-OUTER QUADRANT OF LEFT BREAST IN FEMALE, ESTROGEN RECEPTOR POSITIVE: Primary | ICD-10-CM

## 2018-05-24 ENCOUNTER — PATIENT OUTREACH (OUTPATIENT)
Dept: ADMINISTRATIVE | Facility: HOSPITAL | Age: 68
End: 2018-05-24

## 2018-05-24 RX ORDER — BENZONATATE 100 MG/1
CAPSULE ORAL
Qty: 30 CAPSULE | Refills: 1 | OUTPATIENT
Start: 2018-05-24

## 2018-06-07 ENCOUNTER — OFFICE VISIT (OUTPATIENT)
Dept: INTERNAL MEDICINE | Facility: CLINIC | Age: 68
End: 2018-06-07
Payer: MEDICARE

## 2018-06-07 VITALS
OXYGEN SATURATION: 97 % | BODY MASS INDEX: 26.76 KG/M2 | HEART RATE: 65 BPM | TEMPERATURE: 97 F | WEIGHT: 141.75 LBS | HEIGHT: 61 IN | SYSTOLIC BLOOD PRESSURE: 118 MMHG | DIASTOLIC BLOOD PRESSURE: 78 MMHG

## 2018-06-07 DIAGNOSIS — E55.9 VITAMIN D DEFICIENCY: ICD-10-CM

## 2018-06-07 DIAGNOSIS — E78.2 COMBINED HYPERLIPIDEMIA ASSOCIATED WITH TYPE 2 DIABETES MELLITUS: Chronic | ICD-10-CM

## 2018-06-07 DIAGNOSIS — E11.59 HYPERTENSION ASSOCIATED WITH DIABETES: ICD-10-CM

## 2018-06-07 DIAGNOSIS — F51.04 CHRONIC INSOMNIA: ICD-10-CM

## 2018-06-07 DIAGNOSIS — I49.3 PVC (PREMATURE VENTRICULAR CONTRACTION): ICD-10-CM

## 2018-06-07 DIAGNOSIS — M22.41 CHONDROMALACIA OF RIGHT PATELLA: ICD-10-CM

## 2018-06-07 DIAGNOSIS — K57.30 DIVERTICULOSIS OF COLON: ICD-10-CM

## 2018-06-07 DIAGNOSIS — I15.2 HYPERTENSION ASSOCIATED WITH DIABETES: ICD-10-CM

## 2018-06-07 DIAGNOSIS — D64.9 ANEMIA, UNSPECIFIED TYPE: ICD-10-CM

## 2018-06-07 DIAGNOSIS — H52.7 REFRACTIVE ERROR: ICD-10-CM

## 2018-06-07 DIAGNOSIS — I27.20 PULMONARY HTN: ICD-10-CM

## 2018-06-07 DIAGNOSIS — H25.13 NUCLEAR SCLEROSIS OF BOTH EYES: ICD-10-CM

## 2018-06-07 DIAGNOSIS — G56.03 CARPAL TUNNEL SYNDROME ON BOTH SIDES: Chronic | ICD-10-CM

## 2018-06-07 DIAGNOSIS — E11.69 COMBINED HYPERLIPIDEMIA ASSOCIATED WITH TYPE 2 DIABETES MELLITUS: Chronic | ICD-10-CM

## 2018-06-07 DIAGNOSIS — Z87.442 HISTORY OF RENAL CALCULI: ICD-10-CM

## 2018-06-07 DIAGNOSIS — C50.412 MALIGNANT NEOPLASM OF UPPER-OUTER QUADRANT OF LEFT BREAST IN FEMALE, ESTROGEN RECEPTOR POSITIVE: ICD-10-CM

## 2018-06-07 DIAGNOSIS — Z17.0 MALIGNANT NEOPLASM OF UPPER-OUTER QUADRANT OF LEFT BREAST IN FEMALE, ESTROGEN RECEPTOR POSITIVE: ICD-10-CM

## 2018-06-07 DIAGNOSIS — Z00.00 ENCOUNTER FOR PREVENTIVE HEALTH EXAMINATION: Primary | ICD-10-CM

## 2018-06-07 DIAGNOSIS — M17.0 PRIMARY OSTEOARTHRITIS OF BOTH KNEES: ICD-10-CM

## 2018-06-07 DIAGNOSIS — Z86.010 HISTORY OF COLON POLYPS: ICD-10-CM

## 2018-06-07 DIAGNOSIS — B97.7 HUMAN PAPILLOMA VIRUS (HPV) INFECTION: ICD-10-CM

## 2018-06-07 DIAGNOSIS — J30.9 ALLERGIC RHINITIS, UNSPECIFIED SEASONALITY, UNSPECIFIED TRIGGER: ICD-10-CM

## 2018-06-07 DIAGNOSIS — M85.80 OSTEOPENIA, UNSPECIFIED LOCATION: ICD-10-CM

## 2018-06-07 DIAGNOSIS — Z96.1 PSEUDOPHAKIA: ICD-10-CM

## 2018-06-07 DIAGNOSIS — E11.69 TYPE 2 DIABETES MELLITUS WITH OTHER SPECIFIED COMPLICATION, WITHOUT LONG-TERM CURRENT USE OF INSULIN: ICD-10-CM

## 2018-06-07 DIAGNOSIS — H81.09 MENIERE'S DISEASE, UNSPECIFIED LATERALITY: ICD-10-CM

## 2018-06-07 DIAGNOSIS — K21.9 GASTROESOPHAGEAL REFLUX DISEASE, ESOPHAGITIS PRESENCE NOT SPECIFIED: ICD-10-CM

## 2018-06-07 PROBLEM — K63.5 COLON POLYPS: Status: RESOLVED | Noted: 2018-02-22 | Resolved: 2018-06-07

## 2018-06-07 PROBLEM — M25.561 ACUTE PAIN OF RIGHT KNEE: Status: RESOLVED | Noted: 2018-03-26 | Resolved: 2018-06-07

## 2018-06-07 PROBLEM — I20.9 ANGINA PECTORIS: Status: RESOLVED | Noted: 2017-05-19 | Resolved: 2018-06-07

## 2018-06-07 PROBLEM — E11.9 CONTROLLED TYPE 2 DIABETES MELLITUS WITHOUT COMPLICATION, WITHOUT LONG-TERM CURRENT USE OF INSULIN: Status: RESOLVED | Noted: 2017-11-14 | Resolved: 2018-06-07

## 2018-06-07 PROBLEM — E11.9 TYPE 2 DIABETES MELLITUS, WITHOUT LONG-TERM CURRENT USE OF INSULIN: Status: ACTIVE | Noted: 2018-06-07

## 2018-06-07 PROBLEM — M67.40 GANGLION: Status: RESOLVED | Noted: 2017-11-24 | Resolved: 2018-06-07

## 2018-06-07 PROCEDURE — 99499 UNLISTED E&M SERVICE: CPT | Mod: S$GLB,,, | Performed by: PHYSICIAN ASSISTANT

## 2018-06-07 PROCEDURE — 3044F HG A1C LEVEL LT 7.0%: CPT | Mod: CPTII,S$GLB,, | Performed by: PHYSICIAN ASSISTANT

## 2018-06-07 PROCEDURE — G0439 PPPS, SUBSEQ VISIT: HCPCS | Mod: S$GLB,,, | Performed by: PHYSICIAN ASSISTANT

## 2018-06-07 PROCEDURE — 90732 PPSV23 VACC 2 YRS+ SUBQ/IM: CPT | Mod: S$GLB,,, | Performed by: FAMILY MEDICINE

## 2018-06-07 PROCEDURE — G0009 ADMIN PNEUMOCOCCAL VACCINE: HCPCS | Mod: S$GLB,,, | Performed by: FAMILY MEDICINE

## 2018-06-07 PROCEDURE — 99999 PR PBB SHADOW E&M-EST. PATIENT-LVL IV: CPT | Mod: PBBFAC,,, | Performed by: PHYSICIAN ASSISTANT

## 2018-06-07 PROCEDURE — 3074F SYST BP LT 130 MM HG: CPT | Mod: CPTII,S$GLB,, | Performed by: PHYSICIAN ASSISTANT

## 2018-06-07 PROCEDURE — 3078F DIAST BP <80 MM HG: CPT | Mod: CPTII,S$GLB,, | Performed by: PHYSICIAN ASSISTANT

## 2018-06-07 NOTE — PATIENT INSTRUCTIONS
Counseling and Referral of Other Preventative  (Italic type indicates deductible and co-insurance are waived)    Patient Name: Renea Bourgeois  Today's Date: 6/7/2018    Health Maintenance       Date Due Completion Date    Pneumococcal (65+) (2 of 2 - PPSV23) 02/23/2018 2/23/2017    Mammogram 07/07/2018 7/7/2017    Override on 4/16/2013: Done    Influenza Vaccine 08/01/2018 9/25/2017    Eye Exam 08/04/2018 8/4/2017    Override on 2/26/2013: Done    Hemoglobin A1c 10/27/2018 4/27/2018    Lipid Panel 04/27/2019 4/27/2018    Urine Microalbumin 04/27/2019 4/27/2018    Foot Exam 05/09/2019 5/9/2018    Override on 5/19/2017: Done    Override on 11/18/2016: Done    Override on 10/1/2015: Done    Override on 8/11/2015: Done    High Dose Statin 06/07/2019 6/7/2018    DEXA SCAN 07/07/2020 7/7/2017    Override on 8/25/2011: Done (osteopenia)    Colonoscopy 02/22/2021 2/22/2018    Override on 1/27/2006: Done (Repeat in 7 years)    TETANUS VACCINE 10/24/2024 10/24/2014        Orders Placed This Encounter   Procedures    Pneumococcal Polysaccharide Vaccine (23 Valent) (SQ/IM)     The following information is provided to all patients.  This information is to help you find resources for any of the problems found today that may be affecting your health:                Living healthy guide: www.[facility].[place].gov      Understanding Diabetes: www.diabetes.org      Eating healthy: www.cdc.gov/healthyweight      CDC home safety checklist: www.cdc.gov/steadi/patient.html      Agency on Aging: www.goea.[place].gov      Alcoholics anonymous (AA): www.aa.org      Physical Activity: www.arie.nih.gov/mw5aqbp      Tobacco use: www.quitwithusla.org

## 2018-06-07 NOTE — ASSESSMENT & PLAN NOTE
Stable and controlled. Continue current treatment plan as previously prescribed with your eye doctor.

## 2018-06-07 NOTE — PROGRESS NOTES
"MD Sonal Platanna Christelle presented for a  Medicare AWV and comprehensive Health Risk Assessment today. The following components were reviewed and updated:    · Medical history  · Family History  · Social history  · Allergies and Current Medications  · Health Risk Assessment  · Health Maintenance  · Care Team     ** See Completed Assessments for Annual Wellness Visit within the encounter summary.**       The following assessments were completed:  · Living Situation  · CAGE  · Depression Screening  · Timed Get Up and Go  · Whisper Test  · Cognitive Function Screening  · Nutrition Screening  · ADL Screening  · PAQ Screening    Vitals:    06/07/18 0908   BP: 118/78   BP Location: Left arm   Patient Position: Sitting   BP Method: Medium (Manual)   Pulse: 65   Temp: 97.4 °F (36.3 °C)   TempSrc: Tympanic   SpO2: 97%   Weight: 64.3 kg (141 lb 12.1 oz)   Height: 5' 1" (1.549 m)     Body mass index is 26.78 kg/m².  Physical Exam   Constitutional: She appears well-developed and well-nourished. No distress.   Cardiovascular: Normal rate, regular rhythm and normal heart sounds.  Exam reveals no gallop and no friction rub.    No murmur heard.  Pulmonary/Chest: Effort normal and breath sounds normal. No respiratory distress. She has no wheezes. She has no rales. She exhibits no tenderness.   Abdominal: Soft. Bowel sounds are normal. She exhibits no distension and no mass. There is no tenderness. There is no rebound and no guarding. No hernia.   Skin: She is not diaphoretic.   Nursing note and vitals reviewed.        Diagnoses and health risks identified today and associated recommendations/orders:    Problem List Items Addressed This Visit     Vitamin D deficiency    Overview     Renea Bourgeois #[id number] (CSN: [id number])  (68 y.o. F)     Results    Vitamin D (Acc# [id number]) (Order [id number])   Reviewed By     Kamini Newton MD on 5/1/2018 07:54    MyChart Results Release     MyChart " Status: Active Results Release      Vitamin D   Order: [id number]   Status:  Final result   Visible to patient:  Yes (Patient Portal) Next appt:  06/27/2018 at 10:20 AM in Orthopedics (Vishnu Cox MD) Dx:  Vitamin D deficiency     Ref Range & Units 1mo ago 1yr ago 2yr ago 3yr ago 5yr ago 6yr ago    Vit D, 25-Hydroxy 30 - 96 ng/mL 25   40CM  79CM  67CM  56R, CM  29R, CM     Comment: Vitamin D deficiency.........<10 ng/mL                               Vitamin D insufficiency......10-29 ng/mL       Vitamin D sufficiency........> or equal to 30 ng/mL   Vitamin D toxicity............>100 ng/mL    Resulting Agency  [facility]      Specimen Collected: 04/27/18 09:12 Last Resulted: 04/27/18 15:42 Lab Flowsheet Order Details View Encounter Lab and Collection Details Routing Result History      CM=Additional comments  R=Reference range differs from displayed range              Patient Result Comments     Viewed by Renea Bourgeois on 5/1/2018  1:17 PM   Written by Kamini Newton MD on 5/1/2018  7:54 AM   Here are your recent lab results, we will discuss at your upcoming visit.            Current Assessment & Plan     The current medical regimen is effective;  continue present plan and medications.         Type 2 diabetes mellitus, without long-term current use of insulin    Overview     Hemoglobin A1c   Order: [id number]   Status:  Final result   Visible to patient:  Yes (Patient Portal) Next appt:  06/27/2018 at 10:20 AM in Orthopedics (Vishnu Cox MD) Dx:  Diabetes mellitus type 2 in nonobese     Ref Range & Units 1mo ago  (4/27/18) 6mo ago  (11/14/17) 10mo ago  (7/18/17) 1yr ago  (2/21/17) 1yr ago  (11/18/16) 2yr ago  (5/18/16) 2yr ago  (1/6/16)    Hemoglobin A1C 4.0 - 5.6 % 5.7   6.1CM   5.9CM   6.2R, CM  7.2R, CM   7.1R   7.3R     Comment: According to ADA guidelines, hemoglobin A1c <7.0% represents   optimal control in non-pregnant diabetic patients. Different   metrics may  apply to specific patient populations.   Standards of Medical Care in Diabetes-2016.   For the purpose of screening for the presence of diabetes:   <5.7%     Consistent with the absence of diabetes   5.7-6.4%  Consistent with increasing risk for diabetes   (prediabetes)   >or=6.5%  Consistent with diabetes   Currently, no consensus exists for use of hemoglobin A1c   for diagnosis of diabetes for children.   This Hemoglobin A1c assay has significant interference with fetal   hemoglobin   (HbF). The results are invalid for patients with abnormal amounts of   HbF,   including those with known Hereditary Persistence   of Fetal Hemoglobin. Heterozygous hemoglobin variants (HbAS, HbAC,   HbAD, HbAE, HbA2) do not significantly interfere with this assay;   however, presence of multiple variants in a sample may impact the %   interference.     Estimated Avg Glucose 68 - 131 mg/dL 117  128  123  131  160   157CM   163CM     Resulting Agency  [facility]      Specimen Collected: 04/27/18 09:12 Last Resulted: 04/27/18 16:02 Lab Flowsheet Order Details View Encounter Lab and Collection Details Routing Result History      CM=Additional comments  R=Reference range differs from displayed range                         Current Assessment & Plan     Stable and controlled. Continue current treatment plan as previously prescribed with your PCP.            Refractive error    Current Assessment & Plan     Stable and controlled. Continue current treatment plan as previously prescribed with your eye doctor.              PVC (premature ventricular contraction)    Pulmonary HTN    Overview     3/8/2011 2 D echo: Mild Pulm HTN.PAP 36 , Mild MR, mild TR; nl sys and diastolic fx. Sees Dr Lewis cardio - [facility] Asso since 2008.         Current Assessment & Plan     Stable and controlled. Continue current treatment plan as previously prescribed with your PCP.            Pseudophakia    Current Assessment &  Plan     Stable and controlled. Continue current treatment plan as previously prescribed with your eye doctor.              Primary osteoarthritis of both knees    Current Assessment & Plan     Stable and controlled. Continue current treatment plan as previously prescribed with your PCP.            Osteopenia    Current Assessment & Plan     Stable and controlled. Continue current treatment plan as previously prescribed with your PCP.            Nuclear sclerosis of both eyes    Current Assessment & Plan     Stable and controlled. Continue current treatment plan as previously prescribed with your eye doctor.            Meniere's disease    Malignant neoplasm of upper-outer quadrant of left breast in female, estrogen receptor positive    Overview     Treated with chemo  And continue  Arimidex 1 mg till June 2021         Hypertension associated with diabetes    Current Assessment & Plan     The current medical regimen is effective;  continue present plan and medications.         Human papilloma virus (HPV) infection    History of renal calculi    Overview     Right obstructing 8/20/13 - passed.         History of colon polyps    Overview     The patient had adenomatous colon polyps in 2014.           Current Assessment & Plan     Stable and controlled. Continue current treatment plan as previously prescribed with your PCP.            GERD (gastroesophageal reflux disease)    Overview     Controlled with a PPI         Current Assessment & Plan     The current medical regimen is effective;  continue present plan and medications.         Diverticulosis of colon    Current Assessment & Plan     Stable and controlled. Continue current treatment plan as previously prescribed with your PCP.            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         Chondromalacia of right patella    Overview     Stable and controlled. Continue current treatment plan as previously prescribed with your PCP.            Carpal tunnel syndrome on both sides (Chronic)    Overview     very mild demyelinating bilaterally         Current Assessment & Plan     Stable and controlled. Continue current treatment plan as previously prescribed with your PCP.            Anemia    Overview     CBC auto differential   Order: [id number]   Status:  Final result   Visible to patient:  Yes (Patient Portal) Next appt:  06/27/2018 at 10:20 AM in Orthopedics (Vishnu Cox MD) Dx:  Iron deficiency anemia, unspecified i...     Ref Range & Units 3mo ago  (2/15/18) 7mo ago  (11/6/17) 7mo ago  (11/6/17) 1yr ago  (2/21/17) 2yr ago  (1/6/16) 2yr ago  (11/19/15) 2yr ago  (9/28/15)    WBC 3.90 - 12.70 K/uL 4.84   4.48  5.00  5.34  5.96  5.82     RBC 4.00 - 5.40 M/uL 4.21   4.06  4.29  4.18  4.11  3.98      Hemoglobin 12.0 - 16.0 g/dL 12.6   11.7   12.9  12.9  12.8  12.5     Hematocrit 37.0 - 48.5 % 37.7  35.8   35.8   38.9  38.1  37.8  36.5      MCV 82 - 98 fL 90   88  91  91  92  92     MCH 27.0 - 31.0 pg 29.9   28.8  30.1  30.9  31.1   31.4      MCHC 32.0 - 36.0 g/dL 33.4   32.7  33.2R  33.9R  33.9R  34.2R     RDW 11.5 - 14.5 % 13.0   12.7  12.6  12.5  12.4  12.7     Platelets 150 - 350 K/uL 151   146   172  172  176  160     MPV 9.2 - 12.9 fL 11.8   12.5  12.0  12.5  11.9  12.3     Immature Granulocytes 0.0 - 0.5 % 0.2   0.2         Gran # (ANC) 1.8 - 7.7 K/uL 2.9   2.9  2.7  2.9  3.9  3.8     Immature Grans (Abs) 0.00 - 0.04 K/uL 0.01   0.01        Comment: Mild elevation in immature granulocytes is non specific and   can be seen in a variety of conditions including stress response,   acute inflammation, trauma and pregnancy. Correlation with other   laboratory and clinical findings is essential.     Lymph # 1.0 -  4.8 K/uL 1.4   1.2  1.7  1.8  1.5  1.5     Mono # 0.3 - 1.0 K/uL 0.4   0.3  0.4  0.4  0.4  0.4     Eos # 0.0 - 0.5 K/uL 0.1   0.1  0.2  0.1  0.1  0.1     Baso # 0.00 - 0.20 K/uL 0.02   0.03  0.02  0.01  0.01  0.01     nRBC 0 /100 WBC 0   0         Gran% 38.0 - 73.0 % 60.2   64.5  54.4  54.7  65.9  64.7     Lymph% 18.0 - 48.0 % 28.9   26.8  33.4  34.3  24.5  25.3     Mono% 4.0 - 15.0 % 7.4   6.0  8.4  8.2  7.4  7.4     Eosinophil% 0.0 - 8.0 % 2.9   1.8  3.2  2.2  1.8  2.4     Basophil% 0.0 - 1.9 % 0.4   0.7  0.4  0.2  0.2  0.2     Differential Method  Automated   Automated  Automated  Automated  Automated  Automated    Resulting Agency  [facility]      Specimen Collected: 02/15/18 09:20 Last Resulted: 02/15/18 17:15 Lab Flowsheet Order Details View Encounter Lab and Collection Details Routing Result History      R=Reference range differs from displayed range                         Current Assessment & Plan     Stable and controlled. Continue current treatment plan as previously prescribed with your PCP.            Allergic rhinitis    Current Assessment & Plan     The current medical regimen is effective;  continue present plan and medications.             Other Visit Diagnoses     Encounter for preventive health examination    -  Primary        Pneumococcal 23 given today.  Patient with get her Mammogram thru Dr aguilera  Provided Renea with a 5-10 year written screening schedule and personal prevention plan. Recommendations were developed using the USPSTF age appropriate recommendations. Education, counseling, and referrals were provided as needed. After Visit Summary printed and given to patient which includes a list of additional screenings\tests needed.    No Follow-up on file.    Marc Srinivasan Iii, PA-C

## 2018-06-25 DIAGNOSIS — E11.9 TYPE 2 DIABETES MELLITUS WITHOUT COMPLICATION, WITHOUT LONG-TERM CURRENT USE OF INSULIN: Chronic | ICD-10-CM

## 2018-06-25 RX ORDER — GLIMEPIRIDE 1 MG/1
TABLET ORAL
Qty: 90 TABLET | Refills: 1 | OUTPATIENT
Start: 2018-06-25

## 2018-06-25 RX ORDER — GLIMEPIRIDE 1 MG/1
1 TABLET ORAL DAILY
Qty: 30 TABLET | Refills: 1 | Status: SHIPPED | OUTPATIENT
Start: 2018-06-25 | End: 2018-08-23 | Stop reason: SDUPTHER

## 2018-07-03 ENCOUNTER — PATIENT MESSAGE (OUTPATIENT)
Dept: DIABETES | Facility: CLINIC | Age: 68
End: 2018-07-03

## 2018-07-11 ENCOUNTER — OFFICE VISIT (OUTPATIENT)
Dept: DIABETES | Facility: CLINIC | Age: 68
End: 2018-07-11
Payer: MEDICARE

## 2018-07-11 VITALS
WEIGHT: 138.88 LBS | BODY MASS INDEX: 26.22 KG/M2 | DIASTOLIC BLOOD PRESSURE: 70 MMHG | HEIGHT: 61 IN | SYSTOLIC BLOOD PRESSURE: 120 MMHG

## 2018-07-11 DIAGNOSIS — E11.59 HYPERTENSION ASSOCIATED WITH DIABETES: ICD-10-CM

## 2018-07-11 DIAGNOSIS — E11.9 TYPE 2 DIABETES MELLITUS WITHOUT COMPLICATION, WITHOUT LONG-TERM CURRENT USE OF INSULIN: Chronic | ICD-10-CM

## 2018-07-11 DIAGNOSIS — I15.2 HYPERTENSION ASSOCIATED WITH DIABETES: ICD-10-CM

## 2018-07-11 DIAGNOSIS — E11.69 TYPE 2 DIABETES MELLITUS WITH OTHER SPECIFIED COMPLICATION, WITHOUT LONG-TERM CURRENT USE OF INSULIN: Primary | ICD-10-CM

## 2018-07-11 DIAGNOSIS — E78.2 COMBINED HYPERLIPIDEMIA ASSOCIATED WITH TYPE 2 DIABETES MELLITUS: Chronic | ICD-10-CM

## 2018-07-11 DIAGNOSIS — E11.69 COMBINED HYPERLIPIDEMIA ASSOCIATED WITH TYPE 2 DIABETES MELLITUS: Chronic | ICD-10-CM

## 2018-07-11 LAB — GLUCOSE SERPL-MCNC: 97 MG/DL (ref 70–110)

## 2018-07-11 PROCEDURE — 99214 OFFICE O/P EST MOD 30 MIN: CPT | Mod: S$GLB,,, | Performed by: NURSE PRACTITIONER

## 2018-07-11 PROCEDURE — 99999 PR PBB SHADOW E&M-EST. PATIENT-LVL III: CPT | Mod: PBBFAC,,, | Performed by: NURSE PRACTITIONER

## 2018-07-11 PROCEDURE — 82948 REAGENT STRIP/BLOOD GLUCOSE: CPT | Mod: S$GLB,,, | Performed by: NURSE PRACTITIONER

## 2018-07-11 NOTE — PROGRESS NOTES
PCP: Kamini Newton MD    Subjective:     HISTORY OF PRESENT ILLNESS: 68 year old  female patient is in clinic today for diabetes. Patient has had Type II diabetes since 2010. She is to be enrolled in diabetes education classes. Most recent A1C is 5.7, ADA recommends less than 7.0.  She has a history of breast cancer, treated in 2010 with remission since then.  No longer taking metformin due to SE.  Per meter, fasting BGs are 97 - 127.  She has lost 2 pounds since last visit.     Patient denies polyuria, polydipsia, occasional polyphagia, or blurred vision. Also denies nausea, vomiting, diarrhea or hypoglycemia.     Height: 5', Weight: 138 pounds, BMI 26.68  Blood Glucose reading this AM: 107 mg/dl fasting  Blood Glucose reading in clinic: 97 mg/dl at 9:43 am    DM MEDICATIONS:   Glimepiride 1 mg before breakfast    Labs Reviewed.    REVIEW OF SYSTEMS:  General: Denies fever, chills, change in appetite.  HEENT: Denies impaired hearing, dysphagia.  Respiratory: Denies shortness of breath, cough or wheezing.  Cardiovascular: Denies chest pain, palpitations.  GI: Denies hematochezia.  : Denies hematuria, dysuria or frequency.  MS:  Normal gait. Denies difficulty with mobility, muscle or joint pain.  SKIN: Denies rashes and lesions.  Neuro: Denies numbness or tingling in the hands or feet.   PSYCH: Denies depression or anxiety. No suicidal ideations.  ENDO: See HPI.    STANDARDS OF CARE:  Eye doctor: Dr. Lu, Last exam 8 / 2017   Dental exam: Recommend regular exams; denies gums bleeding.  Podiatry doctor: No podiatrist.   ACE / ARB: No   Statin: Yes    ACTIVITY LEVEL: No routine exercise.   MEAL PLANNING: Number of meals per day - 3. Number of snacks per day - 2.  Per dietary recall, patient is not limiting carbohydrates, saturated fats and sodium.     BLOOD GLUCOSE TESTING: Self-monitoring with TRUE RESULT meter  SOCIAL HISTORY: .  Lives with spouse. She works as a  for a  disabled student, which happens to be her grandchild.  She does not smoke, drink.     Objective:     PHYSICAL EXAMINATION:  GENERAL: WDWN  female in no acute distress, ambulatory, responds appropriately. AAO X 3.   NECK: Supple, no thyromegaly, no cervical or supraclavicular lymphadenopathy, no carotid bruit.  HEART: Regular rate and rhythm. No rubs, murmurs or gallops.  LUNGS: CTA bilaterally. Unlabored breathing, no use of accessory muscles.  MUSCULOSKELETAL: Normal gait and muscle strength.  ABDOMEN: Active bowel sounds X 4, no masses or tenderness.   SKIN: Warm, dry skin. No lesions or abrasions. Clean, dry well-healed injection sites.   NEUROLOGIC: Cranial nerves II-XII grossly intact.   FOOT EXAMINATION: Protective Sensation (w/ 10 gram monofilament):  Right: Intact  Left: Intact  //  Visual Inspection: Normal -  Bilateral  //  Pedal Pulses:  Right: Present  Left: Present    Assessment:     (1.) Diabetes Type II  - controlled on glimepiride, A1C 5.7  (2.) Hypertension - controlled Toprol XL, Bystolic  (3.) Hyperlipidemia - controlled on Crestor    Plan:     1.) Patient was instructed to monitor blood glucose 1 x daily, rotating times. Discussed ADA goal for fasting blood sugar, 80 - 130 mg/dL; pp blood sugars below 180 mg/dl. Also, discussed prevention of hypoglycemia and the need to adjust goals to higher levels if persistent hypoglycemia. Reminded to bring blood glucose records or meter to each visit for review.  2.) Reviewed pathophysiology of Type 2 diabetes, complications related to the disease, importance of annual dilated eye exam and daily foot examination.  3.) Continue glimepiride 1 mg before breakfast.  This regimen appears to be controlling blood sugars without frequent hypoglycemia.   4.) Meal planning teaching: Carbohydrate definition - one serving is 15 gms. Carbohydrate spacing - carbohydrates should be spaced into approximately 3 meals with 2 snacks (of one carbohydrate) between meals  or at bedtime. Increase vegetable intake to 2 or more cups of vegetables per day as well as 2 fruit servings. Recommended low saturated fat, low sodium diet to aid in control of hypertension and cholesterol.  5.) Discussed activity, benefits, methods, and precautions. Recommended patient start some form of exercise and increase as tolerated to 30 minutes per day to facilitate weight loss and aid in control of blood glucoses.  6.) Return to clinic in yearly for follow up.  Advised patient to call clinic with any questions or concerns.     Andree Coyne, NP-C, CDE

## 2018-07-23 ENCOUNTER — OFFICE VISIT (OUTPATIENT)
Dept: HEMATOLOGY/ONCOLOGY | Facility: CLINIC | Age: 68
End: 2018-07-23
Payer: MEDICARE

## 2018-07-23 ENCOUNTER — HOSPITAL ENCOUNTER (OUTPATIENT)
Dept: RADIOLOGY | Facility: HOSPITAL | Age: 68
Discharge: HOME | End: 2018-07-23
Attending: INTERNAL MEDICINE
Payer: MEDICARE

## 2018-07-23 VITALS
TEMPERATURE: 98 F | RESPIRATION RATE: 20 BRPM | DIASTOLIC BLOOD PRESSURE: 73 MMHG | BODY MASS INDEX: 26.65 KG/M2 | OXYGEN SATURATION: 96 % | WEIGHT: 141.13 LBS | SYSTOLIC BLOOD PRESSURE: 130 MMHG | HEIGHT: 61 IN | HEART RATE: 76 BPM

## 2018-07-23 VITALS — HEIGHT: 61 IN | BODY MASS INDEX: 26.06 KG/M2 | WEIGHT: 138 LBS

## 2018-07-23 DIAGNOSIS — Z17.0 MALIGNANT NEOPLASM OF UPPER-OUTER QUADRANT OF RIGHT BREAST IN FEMALE, ESTROGEN RECEPTOR POSITIVE: ICD-10-CM

## 2018-07-23 DIAGNOSIS — C50.412 MALIGNANT NEOPLASM OF UPPER-OUTER QUADRANT OF LEFT BREAST IN FEMALE, ESTROGEN RECEPTOR POSITIVE: ICD-10-CM

## 2018-07-23 DIAGNOSIS — C50.411 MALIGNANT NEOPLASM OF UPPER-OUTER QUADRANT OF RIGHT BREAST IN FEMALE, ESTROGEN RECEPTOR POSITIVE: ICD-10-CM

## 2018-07-23 DIAGNOSIS — Z17.0 MALIGNANT NEOPLASM OF UPPER-OUTER QUADRANT OF LEFT BREAST IN FEMALE, ESTROGEN RECEPTOR POSITIVE: ICD-10-CM

## 2018-07-23 DIAGNOSIS — Z12.31 ENCOUNTER FOR SCREENING MAMMOGRAM FOR MALIGNANT NEOPLASM OF BREAST: ICD-10-CM

## 2018-07-23 PROCEDURE — 77063 BREAST TOMOSYNTHESIS BI: CPT | Mod: TC,PO

## 2018-07-23 PROCEDURE — 99999 PR PBB SHADOW E&M-EST. PATIENT-LVL III: CPT | Mod: PBBFAC,,, | Performed by: INTERNAL MEDICINE

## 2018-07-23 PROCEDURE — 77067 SCR MAMMO BI INCL CAD: CPT | Mod: 26,,, | Performed by: RADIOLOGY

## 2018-07-23 PROCEDURE — 3078F DIAST BP <80 MM HG: CPT | Mod: CPTII,S$GLB,, | Performed by: INTERNAL MEDICINE

## 2018-07-23 PROCEDURE — 99215 OFFICE O/P EST HI 40 MIN: CPT | Mod: S$GLB,,, | Performed by: INTERNAL MEDICINE

## 2018-07-23 PROCEDURE — 3075F SYST BP GE 130 - 139MM HG: CPT | Mod: CPTII,S$GLB,, | Performed by: INTERNAL MEDICINE

## 2018-07-23 PROCEDURE — 77063 BREAST TOMOSYNTHESIS BI: CPT | Mod: 26,,, | Performed by: RADIOLOGY

## 2018-07-23 RX ORDER — ANASTROZOLE 1 MG/1
1 TABLET ORAL DAILY
Qty: 90 TABLET | Refills: 3 | Status: SHIPPED | OUTPATIENT
Start: 2018-07-23 | End: 2018-10-17 | Stop reason: SDUPTHER

## 2018-07-23 NOTE — PROGRESS NOTES
Subjective:       Patient ID: Renea Bourgeois is a 68 y.o. female.    Chief Complaint: Results and Breast Cancer    HPI 67-year-old female history of T1 ER positive high Oncotype on the type recurrence score breast cancer completed 6 cycles of TAC therapy 2011 is been on aromatase inhibitor since will complete in 2021       Past Medical History:   Diagnosis Date    Allergy     Anemia 11/14/2017    Angina pectoris     followed by cardiology    Arthritis     Breast cancer     Right T1 Ductal Ca in situ,high oncotype Dx 33, Estrogen positive. Lumpectomy, TAC chemo x 6 cyscles then RT,on aromatase inhibitor    Cataract     Diabetes mellitus type II 2011    DM (diabetes mellitus) 2011     am 08/04/2017    GERD (gastroesophageal reflux disease)     History of colon polyps 2/21/2018    The patient had adenomatous colon polyps in 2014.      Hyperlipidemia     Hypertension     Kidney stone     right side, passed    Osteopenia     PVC (premature ventricular contraction)     on and off    Trouble in sleeping     Type 2 diabetes mellitus      Family History   Problem Relation Age of Onset    Diabetes Father     Hypertension Father     Stroke Father     Cancer Father 65        metastatic when diagnosed    Stroke Brother     Cancer Other         kidney    Atrial fibrillation Son         35    Heart disease Son     Cancer Paternal Grandfather         prostate    Glaucoma Maternal Grandmother     Psoriasis Cousin     Alcohol abuse Neg Hx     Drug abuse Neg Hx     COPD Neg Hx     Birth defects Neg Hx     Mental retardation Neg Hx     Mental illness Neg Hx     Kidney disease Neg Hx     Hyperlipidemia Neg Hx     Melanoma Neg Hx     Lupus Neg Hx      Social History     Social History    Marital status:      Spouse name: Don    Number of children: 4    Years of education: N/A     Occupational History    Retired Teacher      [employer]     Social History Main Topics     Smoking status: Never Smoker    Smokeless tobacco: Never Used    Alcohol use No    Drug use: No    Sexual activity: Not Currently     Partners: Male     Birth control/ protection: Post-menopausal     Other Topics Concern    Not on file     Social History Narrative    aretired from homeschooling/,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 Caffeine intake;1-2 per week - manily coke. Decaffinated tea and most beveridges. Does have a living will - at home in her filing cabinet.      Past Surgical History:   Procedure Laterality Date    BREAST BIOPSY      BREAST LUMPECTOMY  2/11/2011    right    CATARACT EXTRACTION Bilateral 10/14/15    CHOLECYSTECTOMY  1989    open    COLONOSCOPY N/A 2/22/2018    Procedure: COLONOSCOPY;  Surgeon: Carlito Odonnell MD;  Location: [facility];  Service: Endoscopy;  Laterality: N/A;    CYST REMOVAL Left 11/2017    foot    DILATION AND CURETTAGE OF [place]TERUS  10/2010    [place]    Nasal septal deviation repair  2009    toenail edges removed      TONSILLECTOMY  1959    TOTAL REDUCTION MAMMOPLASTY Left     2015       Labs:  Lab Results   Component Value Date    WBC 4.84 02/15/2018    HGB 12.6 02/15/2018    HCT 37.7 02/15/2018    MCV 90 02/15/2018     02/15/2018     BMP  Lab Results   Component Value Date     04/27/2018    K 4.1 04/27/2018     04/27/2018    CO2 28 04/27/2018    BUN 8 04/27/2018    CREATININE 0.8 04/27/2018    CALCIUM 9.6 04/27/2018    ANIONGAP 8 04/27/2018    ESTGFRAFRICA >60.0 04/27/2018    EGFRNONAA >60.0 04/27/2018     Lab Results   Component Value Date    ALT 16 04/27/2018    AST 15 04/27/2018    GGT 69 (H) 02/09/2010    ALKPHOS 85 04/27/2018    BILITOT 0.4 04/27/2018       Lab Results   Component Value Date    IRON 93 02/15/2018    TIBC 413 02/15/2018    FERRITIN 35 02/15/2018     Lab Results   Component Value Date    QAWTYPGT90 255 02/09/2012     Lab Results   Component Value Date    FOLATE 11.5 02/09/2012     Lab Results   Component Value Date     TSH 1.441 04/27/2018         Review of Systems   Constitutional: Negative for activity change, appetite change, chills, diaphoresis, fatigue, fever and unexpected weight change.   HENT: Negative for congestion, dental problem, drooling, ear discharge, ear pain, facial swelling, hearing loss, mouth sores, nosebleeds, postnasal drip, rhinorrhea, sinus pressure, sneezing, sore throat, tinnitus, trouble swallowing and voice change.    Eyes: Negative for photophobia, pain, discharge, redness, itching and visual disturbance.   Respiratory: Negative for cough, choking, chest tightness, shortness of breath, wheezing and stridor.    Cardiovascular: Negative for chest pain, palpitations and leg swelling.   Gastrointestinal: Negative for abdominal distention, abdominal pain, anal bleeding, blood in stool, constipation, diarrhea, nausea, rectal pain and vomiting.   Endocrine: Negative for cold intolerance, heat intolerance, polydipsia, polyphagia and polyuria.   Genitourinary: Negative for decreased urine volume, difficulty urinating, dyspareunia, dysuria, enuresis, flank pain, frequency, genital sores, hematuria, menstrual problem, pelvic pain, urgency, vaginal bleeding, vaginal discharge and vaginal pain.   Musculoskeletal: Negative for arthralgias, back pain, gait problem, joint swelling, myalgias, neck pain and neck stiffness.   Skin: Negative for color change, pallor and rash.   Allergic/Immunologic: Negative for environmental allergies, food allergies and immunocompromised state.   Neurological: Negative for dizziness, tremors, seizures, syncope, facial asymmetry, speech difficulty, weakness, light-headedness, numbness and headaches.   Hematological: Negative for adenopathy. Does not bruise/bleed easily.   Psychiatric/Behavioral: Negative for agitation, behavioral problems, confusion, decreased concentration, dysphoric mood, hallucinations, self-injury, sleep disturbance and suicidal ideas. The patient is nervous/anxious.  The patient is not hyperactive.        Objective:      Physical Exam   Constitutional: She is oriented to person, place, and time. She appears well-developed and well-nourished. She appears distressed.   HENT:   Head: Normocephalic and atraumatic.   Right Ear: External ear normal.   Left Ear: External ear normal.   Nose: Nose normal. Right sinus exhibits no maxillary sinus tenderness and no frontal sinus tenderness. Left sinus exhibits no maxillary sinus tenderness and no frontal sinus tenderness.   Mouth/Throat: Oropharynx is clear and moist. No oropharyngeal exudate.   Eyes: Conjunctivae, EOM and lids are normal. Pupils are equal, round, and reactive to light. Right eye exhibits no discharge. Left eye exhibits no discharge. Right conjunctiva is not injected. Right conjunctiva has no hemorrhage. Left conjunctiva is not injected. Left conjunctiva has no hemorrhage. No scleral icterus.   Neck: Normal range of motion. Neck supple. No JVD present. No tracheal deviation present. No thyromegaly present.   Cardiovascular: Normal rate, regular rhythm, normal heart sounds and intact distal pulses.    Pulmonary/Chest: Effort normal and breath sounds normal. No stridor. No respiratory distress. She exhibits no tenderness.       Abdominal: Soft. Bowel sounds are normal. She exhibits no distension and no mass. There is no splenomegaly or hepatomegaly. There is no tenderness. There is no rebound.   Musculoskeletal: Normal range of motion. She exhibits no edema or tenderness.   Lymphadenopathy:     She has no cervical adenopathy.     She has no axillary adenopathy.        Right: No supraclavicular adenopathy present.        Left: No supraclavicular adenopathy present.   Neurological: She is alert and oriented to person, place, and time. No cranial nerve deficit. Coordination normal.   Skin: Skin is dry. No rash noted. She is not diaphoretic. No erythema.   Psychiatric: She has a normal mood and affect. Her behavior is normal.  Judgment and thought content normal.   Vitals reviewed.          Assessment:      1. Malignant neoplasm of upper-outer quadrant of right breast in female, estrogen receptor positive           Plan:     Continue with aromatase inhibitor 1 mg daily and till old 01/10/2020 years treatment.  Sister diagnosed with breast cancer is being followed in [place] having elevated tumor markers.  Talked to her about the lack of utility of this in sent an article from up-to-date for her for and her sisters review        Reza Jones Jr, MD FACP

## 2018-08-23 DIAGNOSIS — E11.9 TYPE 2 DIABETES MELLITUS WITHOUT COMPLICATION, WITHOUT LONG-TERM CURRENT USE OF INSULIN: Chronic | ICD-10-CM

## 2018-08-24 RX ORDER — GLIMEPIRIDE 1 MG/1
TABLET ORAL
Qty: 30 TABLET | Refills: 6 | Status: SHIPPED | OUTPATIENT
Start: 2018-08-24 | End: 2019-02-18 | Stop reason: SDUPTHER

## 2018-09-07 ENCOUNTER — OFFICE VISIT (OUTPATIENT)
Dept: OPHTHALMOLOGY | Facility: CLINIC | Age: 68
End: 2018-09-07
Payer: MEDICARE

## 2018-09-07 DIAGNOSIS — H52.13 MYOPIA OF BOTH EYES: ICD-10-CM

## 2018-09-07 DIAGNOSIS — Z96.1 PSEUDOPHAKIA OF BOTH EYES: ICD-10-CM

## 2018-09-07 DIAGNOSIS — H52.4 PRESBYOPIA: ICD-10-CM

## 2018-09-07 DIAGNOSIS — H04.123 BILATERAL DRY EYES: ICD-10-CM

## 2018-09-07 DIAGNOSIS — E11.9 DIABETES MELLITUS TYPE 2 WITHOUT RETINOPATHY: Primary | ICD-10-CM

## 2018-09-07 PROCEDURE — 99212 OFFICE O/P EST SF 10 MIN: CPT | Mod: PBBFAC | Performed by: OPTOMETRIST

## 2018-09-07 PROCEDURE — 99499 UNLISTED E&M SERVICE: CPT | Mod: S$GLB,,, | Performed by: OPTOMETRIST

## 2018-09-07 PROCEDURE — 92015 DETERMINE REFRACTIVE STATE: CPT | Mod: ,,, | Performed by: OPTOMETRIST

## 2018-09-07 PROCEDURE — 92014 COMPRE OPH EXAM EST PT 1/>: CPT | Mod: S$PBB,,, | Performed by: OPTOMETRIST

## 2018-09-07 PROCEDURE — 99999 PR PBB SHADOW E&M-EST. PATIENT-LVL II: CPT | Mod: PBBFAC,,, | Performed by: OPTOMETRIST

## 2018-09-07 NOTE — PROGRESS NOTES
HPI     Last [facility] exam 08/04/2017  Diabetic/NIDDM  Pseudophakia, OU  Dry eyes, bilateral  Screening for glaucoma  RE  Using OTC Readers +2.00    Last edited by Vahid Pittman MA on 9/7/2018  3:27 PM. (History)            Assessment /Plan     For exam results, see Encounter Report.    Diabetes mellitus type 2 without retinopathy    Pseudophakia of both eyes    Bilateral dry eyes    Myopia of both eyes    Presbyopia      No diabetic retinopathy OU.  Stable PIOL OU.  Dry eyes OU = AT prn.  OH OK OU otherwise.  Spec Rx given.  RTC one year or prn.

## 2018-09-26 RX ORDER — ROSUVASTATIN CALCIUM 10 MG/1
TABLET, COATED ORAL
Qty: 90 TABLET | Refills: 1 | Status: SHIPPED | OUTPATIENT
Start: 2018-09-26 | End: 2019-03-27 | Stop reason: SDUPTHER

## 2018-10-17 DIAGNOSIS — C50.411 MALIGNANT NEOPLASM OF UPPER-OUTER QUADRANT OF RIGHT BREAST IN FEMALE, ESTROGEN RECEPTOR POSITIVE: ICD-10-CM

## 2018-10-17 DIAGNOSIS — Z17.0 MALIGNANT NEOPLASM OF UPPER-OUTER QUADRANT OF RIGHT BREAST IN FEMALE, ESTROGEN RECEPTOR POSITIVE: ICD-10-CM

## 2018-10-17 RX ORDER — ANASTROZOLE 1 MG/1
TABLET ORAL
Qty: 90 TABLET | Refills: 0 | Status: SHIPPED | OUTPATIENT
Start: 2018-10-17 | End: 2020-05-27

## 2018-11-01 DIAGNOSIS — F51.04 CHRONIC INSOMNIA: ICD-10-CM

## 2018-11-02 RX ORDER — TRAZODONE HYDROCHLORIDE 100 MG/1
TABLET ORAL
Qty: 90 TABLET | Refills: 1 | Status: SHIPPED | OUTPATIENT
Start: 2018-11-02 | End: 2019-04-26 | Stop reason: SDUPTHER

## 2018-11-09 ENCOUNTER — OFFICE VISIT (OUTPATIENT)
Dept: INTERNAL MEDICINE | Facility: CLINIC | Age: 68
End: 2018-11-09
Payer: MEDICARE

## 2018-11-09 VITALS
WEIGHT: 143.06 LBS | SYSTOLIC BLOOD PRESSURE: 170 MMHG | HEART RATE: 66 BPM | TEMPERATURE: 97 F | OXYGEN SATURATION: 99 % | BODY MASS INDEX: 27.48 KG/M2 | DIASTOLIC BLOOD PRESSURE: 82 MMHG

## 2018-11-09 DIAGNOSIS — K21.9 GASTROESOPHAGEAL REFLUX DISEASE, ESOPHAGITIS PRESENCE NOT SPECIFIED: ICD-10-CM

## 2018-11-09 DIAGNOSIS — Z17.0 MALIGNANT NEOPLASM OF UPPER-OUTER QUADRANT OF RIGHT BREAST IN FEMALE, ESTROGEN RECEPTOR POSITIVE: ICD-10-CM

## 2018-11-09 DIAGNOSIS — F51.04 CHRONIC INSOMNIA: ICD-10-CM

## 2018-11-09 DIAGNOSIS — I15.2 HYPERTENSION ASSOCIATED WITH DIABETES: ICD-10-CM

## 2018-11-09 DIAGNOSIS — E11.69 COMBINED HYPERLIPIDEMIA ASSOCIATED WITH TYPE 2 DIABETES MELLITUS: Chronic | ICD-10-CM

## 2018-11-09 DIAGNOSIS — Z87.442 HISTORY OF RENAL CALCULI: ICD-10-CM

## 2018-11-09 DIAGNOSIS — K57.30 DIVERTICULOSIS OF COLON: ICD-10-CM

## 2018-11-09 DIAGNOSIS — E11.59 HYPERTENSION ASSOCIATED WITH DIABETES: ICD-10-CM

## 2018-11-09 DIAGNOSIS — E55.9 VITAMIN D DEFICIENCY: Primary | ICD-10-CM

## 2018-11-09 DIAGNOSIS — Z86.010 HISTORY OF COLON POLYPS: ICD-10-CM

## 2018-11-09 DIAGNOSIS — M85.80 OSTEOPENIA, UNSPECIFIED LOCATION: ICD-10-CM

## 2018-11-09 DIAGNOSIS — M17.0 PRIMARY OSTEOARTHRITIS OF BOTH KNEES: ICD-10-CM

## 2018-11-09 DIAGNOSIS — E78.2 COMBINED HYPERLIPIDEMIA ASSOCIATED WITH TYPE 2 DIABETES MELLITUS: Chronic | ICD-10-CM

## 2018-11-09 DIAGNOSIS — D64.9 ANEMIA, UNSPECIFIED TYPE: ICD-10-CM

## 2018-11-09 DIAGNOSIS — H52.7 REFRACTIVE ERROR: ICD-10-CM

## 2018-11-09 DIAGNOSIS — Z96.1 PSEUDOPHAKIA: ICD-10-CM

## 2018-11-09 DIAGNOSIS — C50.411 MALIGNANT NEOPLASM OF UPPER-OUTER QUADRANT OF RIGHT BREAST IN FEMALE, ESTROGEN RECEPTOR POSITIVE: ICD-10-CM

## 2018-11-09 DIAGNOSIS — I49.3 PVC (PREMATURE VENTRICULAR CONTRACTION): ICD-10-CM

## 2018-11-09 PROBLEM — H81.09 MENIERE'S DISEASE: Status: RESOLVED | Noted: 2018-06-07 | Resolved: 2018-11-09

## 2018-11-09 PROCEDURE — 99999 PR PBB SHADOW E&M-EST. PATIENT-LVL IV: CPT | Mod: PBBFAC,HCNC,, | Performed by: NURSE PRACTITIONER

## 2018-11-09 PROCEDURE — G0008 ADMIN INFLUENZA VIRUS VAC: HCPCS | Mod: HCNC,S$GLB,, | Performed by: NURSE PRACTITIONER

## 2018-11-09 PROCEDURE — 3079F DIAST BP 80-89 MM HG: CPT | Mod: CPTII,HCNC,S$GLB, | Performed by: NURSE PRACTITIONER

## 2018-11-09 PROCEDURE — 3288F FALL RISK ASSESSMENT DOCD: CPT | Mod: CPTII,HCNC,S$GLB, | Performed by: NURSE PRACTITIONER

## 2018-11-09 PROCEDURE — 1100F PTFALLS ASSESS-DOCD GE2>/YR: CPT | Mod: CPTII,HCNC,S$GLB, | Performed by: NURSE PRACTITIONER

## 2018-11-09 PROCEDURE — 3044F HG A1C LEVEL LT 7.0%: CPT | Mod: CPTII,HCNC,S$GLB, | Performed by: NURSE PRACTITIONER

## 2018-11-09 PROCEDURE — 99214 OFFICE O/P EST MOD 30 MIN: CPT | Mod: 25,HCNC,S$GLB, | Performed by: NURSE PRACTITIONER

## 2018-11-09 PROCEDURE — 90662 IIV NO PRSV INCREASED AG IM: CPT | Mod: HCNC,S$GLB,, | Performed by: NURSE PRACTITIONER

## 2018-11-09 PROCEDURE — 3077F SYST BP >= 140 MM HG: CPT | Mod: CPTII,HCNC,S$GLB, | Performed by: NURSE PRACTITIONER

## 2018-11-09 NOTE — PATIENT INSTRUCTIONS
Neck Clock (Flexibility)    1. Lie on your back on the floor, with your knees bent and your feet flat on the floor. Place a rolled-up towel or neck roll under your neck.  2. Close your eyes and imagine a clock face. With your nose, slowly trace the outer edge of the clock in a clockwise direction. Move your neck smoothly. Dont force your head or neck.  3. Repeat 5 times, or as instructed.  4. Then switch to a counterclockwise direction, and repeat the exercise 5 times, or as instructed.     Challenge yourself  You can also do this exercise while sitting at your work desk. Sit up straight with your back supported firmly against your chair. You can do the exercise several times throughout your day.   Date Last Reviewed: 3/10/2016  © 8315-7482 [employer] [address] [place] [address]. All rights reserved. This information is not intended as a substitute for professional medical care. Always follow your healthcare professional's instructions.

## 2018-11-09 NOTE — PROGRESS NOTES
Renea Bourgeois  11/09/2018  [id number]    Kamini Newton MD  Patient Care Team:  Kamini Newton MD as PCP - General (Family Medicine)  SILVIO Lu OD as Consulting Physician (Optometry)  Reza Jones MD as Consulting Physician (Hematology and Oncology)  Perlita Zafar LPN as Care Coordinator (Internal Medicine)  Has the patient seen any provider outside of the [facility] network since the last visit? (no). If yes, HIPPA forms completed and records requested.        Visit Type:a scheduled routine follow-up visit    Chief Complaint:  Chief Complaint   Patient presents with    Follow-up       History of Present Illness:    Patient presents to clinic today for follow up of chronic conditions including HTN, HLD, and vitamin D deficiency. She has had URI symptoms and taking OTC medication. She is otherwise without concern.     History:  Past Medical History:   Diagnosis Date    Allergy     Anemia 11/14/2017    Angina pectoris     followed by cardiology    Arthritis     Breast cancer     Right T1 Ductal Ca in situ,high oncotype Dx 33, Estrogen positive. Lumpectomy, TAC chemo x 6 cyscles then RT,on aromatase inhibitor    Cataract     Diabetes mellitus type II 2011    DM (diabetes mellitus) 2011     am 08/04/2017    GERD (gastroesophageal reflux disease)     History of colon polyps 2/21/2018    The patient had adenomatous colon polyps in 2014.      Hyperlipidemia     Hypertension     Kidney stone     right side, passed    Osteopenia     PVC (premature ventricular contraction)     on and off    Trouble in sleeping     Type 2 diabetes mellitus      Past Surgical History:   Procedure Laterality Date    BREAST BIOPSY      BREAST LUMPECTOMY  2/11/2011    right    CATARACT EXTRACTION Bilateral 10/14/15    CHOLECYSTECTOMY  1989    open    COLONOSCOPY N/A 2/22/2018    Procedure: COLONOSCOPY;  Surgeon: Carlito Odonnell MD;  Location[facility];  Service: Endoscopy;  Laterality: N/A;     COLONOSCOPY N/A 2/22/2018    [facility]O    COLONOSCOPY N/A 12/18/2014    [facility]den [facility] at [facility] ENDO    CYST REMOVAL Left 11/2017    foot    DILATION AND CURETTAGE OF [place]TERUS  10/2010    [place]    EXOSTECTOMY Left 11/24/2017    [facility] D[facility] at [facility] OR    GANGLIONECTOMY Left 11/24/2017    [facility] OR    Nasal septal deviation repair  2009    toenail edges removed      TONSILLECTOMY  1959    TOTAL REDUCTION MAMMOPLASTY Left     2015     Family History   Problem Relation Age of Onset    Diabetes Father     Hypertension Father     Stroke Father     Cancer Father 65        metastatic when diagnosed    Stroke Brother     Cancer Other         kidney    Atrial fibrillation Son         35    Heart disease Son     Cancer Paternal Grandfather         prostate    Glaucoma Maternal Grandmother     Psoriasis Cousin     Alcohol abuse Neg Hx     Drug abuse Neg Hx     COPD Neg Hx     Birth defects Neg Hx     Mental retardation Neg Hx     Mental illness Neg Hx     Kidney disease Neg Hx     Hyperlipidemia Neg Hx     Melanoma Neg Hx     Lupus Neg Hx      Social History     Socioeconomic History    Marital status:      Spouse name: Don    Number of children: 4    Years of education: Not on file    Highest education level: Not on file   Social Needs    Financial resource strain: Not on file    Food insecurity - worry: Not on file    Food insecurity - inability: Not on file    Transportation needs - medical: Not on file    Transportation needs - non-medical: Not on file   Occupational History    Occupation: Retired Teacher     Comment: [employer]   Tobacco Use    Smoking status: Never Smoker    Smokeless tobacco: Never Used   Substance and Sexual Activity    Alcohol use: No     Alcohol/week: 0.0 oz    Drug use: No    Sexual activity: Not Currently     Partners: Male      Birth control/protection: Post-menopausal   Other Topics Concern    Are you pregnant or think you may be? 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 Caffeine intake;1-2 per week - dennis calvo. Decaffinated tea and most beveridges. Does have a living will - at home in her filing cabinet.      Patient Active Problem List   Diagnosis    GERD (gastroesophageal reflux disease)    Combined hyperlipidemia associated with type 2 diabetes mellitus    Osteopenia    Pulmonary HTN    History of renal calculi    Nuclear sclerosis of both eyes    Refractive error    Pseudophakia    Anemia    Allergic rhinitis    Chronic insomnia    Hypertension associated with diabetes    History of colon polyps    Diverticulosis of colon    Chondromalacia of right patella    Primary osteoarthritis of both knees    Vitamin D deficiency    Type 2 diabetes mellitus, without long-term current use of insulin    PVC (premature ventricular contraction)    Malignant neoplasm of upper-outer quadrant of right breast in female, estrogen receptor positive     Review of patient's allergies indicates:   Allergen Reactions    Codeine Itching       The following were reviewed at this visit: active problem list, medication list, allergies, family history, social history, and health maintenance.    Medications:  Current Outpatient Medications on File Prior to Visit   Medication Sig Dispense Refill    ACCU-CHEK JD PLUS TEST STRP Strp TEST three times a day 100 strip 11    acetaminophen (TYLENOL) 500 MG tablet Take 500 mg by mouth every 6 (six) hours as needed for Pain.      ALCOHOL PREP PADS PadM       anastrozole (ARIMIDEX) 1 mg Tab TAKE 1 TABLET(1 MG) BY MOUTH EVERY DAY 90 tablet 0    ferrous gluconate (FERGON) 240 (27 FE) MG tablet Take 1 tablet (240 mg total) by mouth once daily.      fluticasone (FLONASE) 50 mcg/actuation nasal spray 2 sprays  by Each Nare route daily as needed. 2 Spray, Suspension Nasal Every day 16 g 5    glimepiride (AMARYL) 1 MG tablet TAKE 1 TABLET(1 MG) BY MOUTH EVERY DAY 30 tablet 6    hydrocodone-chlorpheniramine (TUSSIONEX) 10-8 mg/5 mL suspension Take 5 mLs by mouth every 12 (twelve) hours as needed for Cough. 115 mL 0    lancets (ACCU-CHEK SOFTCLIX LANCETS) Misc 1 each by Misc.(Non-Drug; Combo Route) route 3 (three) times daily. 100 each 11    lancing device Misc       metoprolol succinate (TOPROL-XL) 25 MG 24 hr tablet Take 25 mg by mouth every evening.   1    naproxen sodium (ALEVE) 220 MG tablet Take 220 mg by mouth as needed.      rosuvastatin (CRESTOR) 10 MG tablet TAKE 1 TABLET BY MOUTH ONCE DAILY 90 tablet 1    traZODone (DESYREL) 100 MG tablet TAKE 1 TABLET BY MOUTH AT BEDTIME 90 tablet 1     No current facility-administered medications on file prior to visit.        Medications have been reviewed and reconciled with patient at this visit.  Barriers to medications present (no)    Adverse reactions to current medications (no)    Over the counter medications reviewed (Yes ), and if needed added to active Medication list at this visit.     Exam:  Wt Readings from Last 3 Encounters:   11/09/18 64.9 kg (143 lb 1.3 oz)   07/23/18 62.6 kg (138 lb)   07/23/18 64 kg (141 lb 1.5 oz)     Temp Readings from Last 3 Encounters:   11/09/18 97.2 °F (36.2 °C) (Tympanic)   07/23/18 97.9 °F (36.6 °C) (Oral)   06/07/18 97.4 °F (36.3 °C) (Tympanic)     BP Readings from Last 3 Encounters:   11/09/18 (!) 170/82   07/23/18 130/73   07/11/18 120/70     Pulse Readings from Last 3 Encounters:   11/09/18 66   07/23/18 76   06/07/18 65     Body mass index is 27.48 kg/m².      Review of Systems   Constitutional: Negative for chills, diaphoresis, fever, malaise/fatigue and weight loss.   HENT: Positive for congestion. Negative for ear pain and sore throat.    Eyes: Negative for pain.   Respiratory: Positive for cough. Negative for shortness  of breath.    Cardiovascular: Negative for chest pain and leg swelling.   Skin: Negative for rash.   Neurological: Negative for headaches.     Physical Exam   Constitutional: She is oriented to person, place, and time. She appears well-developed and well-nourished. No distress.   HENT:   Head: Normocephalic and atraumatic.   Eyes: Conjunctivae and EOM are normal. Pupils are equal, round, and reactive to light.   Cardiovascular: Normal rate and regular rhythm. Exam reveals no gallop and no friction rub.   No murmur heard.  Pulmonary/Chest: Effort normal and breath sounds normal.   Neurological: She is alert and oriented to person, place, and time.   Skin: Skin is warm and dry.   Psychiatric: She has a normal mood and affect.   Vitals reviewed.      Laboratory Reviewed ({Yes)  Lab Results   Component Value Date    WBC 4.84 02/15/2018    HGB 12.6 02/15/2018    HCT 37.7 02/15/2018     02/15/2018    CHOL 170 04/27/2018    TRIG 124 04/27/2018    HDL 58 04/27/2018    ALT 16 04/27/2018    AST 15 04/27/2018     04/27/2018    K 4.1 04/27/2018     04/27/2018    CREATININE 0.8 04/27/2018    BUN 8 04/27/2018    CO2 28 04/27/2018    TSH 1.441 04/27/2018    INR 1.0 03/19/2011    GLUF 136 (H) 09/05/2012    HGBA1C 5.7 (H) 04/27/2018       Renea was seen today for follow-up.    Diagnoses and all orders for this visit:    Vitamin D deficiency  Comments:  status pending labs, continue current medications    Hypertension associated with diabetes  Comments:  HTN not controlled, taking OTC cold meds that may be contributing, recheck in 2 weeks, diabetes status pending labs, continue current medications    Combined hyperlipidemia associated with type 2 diabetes mellitus  Comments:  status pending labs, continue current medications    Anemia, unspecified type  Comments:  status pending labs    Refractive error  Comments:  followed by Dr. Lu    Pseudophakia  Comments:  followed by Dr. Lu    PVC (premature  ventricular contraction)  Comments:  none recently    Osteopenia, unspecified location  Comments:  stable, DEXA up to date    Primary osteoarthritis of both knees  Comments:  chronic, stable    Malignant neoplasm of upper-outer quadrant of right breast in female, estrogen receptor positive  Comments:  followed by Dr. Jones    History of renal calculi    History of colon polyps  Comments:  colonoscopy up to date    Gastroesophageal reflux disease, esophagitis presence not specified  Comments:  stable on nexium    Diverticulosis of colon  Comments:  colonoscopy up to date    Chronic insomnia  Comments:  stable on trazodone    Other orders  -     Influenza - High Dose (65+) (PF) (IM)      Labs Tuesday.  HRA reviewed, questions answered.    Care Plan/Goals: Reviewed  (N/A)  Goals     None          Follow up: Follow-up in about 6 months (around 5/9/2019), or if symptoms worsen or fail to improve, for annual wellness/EPP with Dr. Newton.    After visit summary was printed and given to patient upon discharge today.  Patient goals and care plan are included in After Visit Summary.

## 2018-11-13 ENCOUNTER — LAB VISIT (OUTPATIENT)
Dept: LAB | Facility: HOSPITAL | Age: 68
End: 2018-11-13
Attending: FAMILY MEDICINE
Payer: MEDICARE

## 2018-11-13 DIAGNOSIS — E78.5 HYPERLIPIDEMIA ASSOCIATED WITH TYPE 2 DIABETES MELLITUS: ICD-10-CM

## 2018-11-13 DIAGNOSIS — E11.59 HYPERTENSION ASSOCIATED WITH DIABETES: ICD-10-CM

## 2018-11-13 DIAGNOSIS — I15.2 HYPERTENSION ASSOCIATED WITH DIABETES: ICD-10-CM

## 2018-11-13 DIAGNOSIS — E11.9 DIABETES MELLITUS TYPE 2 IN NONOBESE: ICD-10-CM

## 2018-11-13 DIAGNOSIS — E55.9 VITAMIN D DEFICIENCY: ICD-10-CM

## 2018-11-13 DIAGNOSIS — E11.69 HYPERLIPIDEMIA ASSOCIATED WITH TYPE 2 DIABETES MELLITUS: ICD-10-CM

## 2018-11-13 LAB
25(OH)D3+25(OH)D2 SERPL-MCNC: 28 NG/ML
ALBUMIN SERPL BCP-MCNC: 3.7 G/DL
ALP SERPL-CCNC: 81 U/L
ALT SERPL W/O P-5'-P-CCNC: 17 U/L
ANION GAP SERPL CALC-SCNC: 8 MMOL/L
AST SERPL-CCNC: 17 U/L
BASOPHILS # BLD AUTO: 0.03 K/UL
BASOPHILS NFR BLD: 0.5 %
BILIRUB SERPL-MCNC: 0.5 MG/DL
BUN SERPL-MCNC: 8 MG/DL
CALCIUM SERPL-MCNC: 9.8 MG/DL
CHLORIDE SERPL-SCNC: 105 MMOL/L
CHOLEST SERPL-MCNC: 146 MG/DL
CHOLEST/HDLC SERPL: 3 {RATIO}
CO2 SERPL-SCNC: 28 MMOL/L
CREAT SERPL-MCNC: 0.8 MG/DL
DIFFERENTIAL METHOD: NORMAL
EOSINOPHIL # BLD AUTO: 0.1 K/UL
EOSINOPHIL NFR BLD: 2.4 %
ERYTHROCYTE [DISTWIDTH] IN BLOOD BY AUTOMATED COUNT: 12.7 %
EST. GFR  (AFRICAN AMERICAN): >60 ML/MIN/1.73 M^2
EST. GFR  (NON AFRICAN AMERICAN): >60 ML/MIN/1.73 M^2
ESTIMATED AVG GLUCOSE: 114 MG/DL
GLUCOSE SERPL-MCNC: 118 MG/DL
HBA1C MFR BLD HPLC: 5.6 %
HCT VFR BLD AUTO: 39.9 %
HDLC SERPL-MCNC: 48 MG/DL
HDLC SERPL: 32.9 %
HGB BLD-MCNC: 12.9 G/DL
IMM GRANULOCYTES # BLD AUTO: 0.01 K/UL
IMM GRANULOCYTES NFR BLD AUTO: 0.2 %
LDLC SERPL CALC-MCNC: 70 MG/DL
LYMPHOCYTES # BLD AUTO: 1.4 K/UL
LYMPHOCYTES NFR BLD: 25.3 %
MCH RBC QN AUTO: 30.1 PG
MCHC RBC AUTO-ENTMCNC: 32.3 G/DL
MCV RBC AUTO: 93 FL
MONOCYTES # BLD AUTO: 0.4 K/UL
MONOCYTES NFR BLD: 7.2 %
NEUTROPHILS # BLD AUTO: 3.6 K/UL
NEUTROPHILS NFR BLD: 64.4 %
NONHDLC SERPL-MCNC: 98 MG/DL
NRBC BLD-RTO: 0 /100 WBC
PLATELET # BLD AUTO: 177 K/UL
PMV BLD AUTO: 12 FL
POTASSIUM SERPL-SCNC: 3.8 MMOL/L
PROT SERPL-MCNC: 6.8 G/DL
RBC # BLD AUTO: 4.29 M/UL
SODIUM SERPL-SCNC: 141 MMOL/L
TRIGL SERPL-MCNC: 140 MG/DL
WBC # BLD AUTO: 5.53 K/UL

## 2018-11-13 PROCEDURE — 85025 COMPLETE CBC W/AUTO DIFF WBC: CPT | Mod: HCNC

## 2018-11-13 PROCEDURE — 36415 COLL VENOUS BLD VENIPUNCTURE: CPT | Mod: HCNC

## 2018-11-13 PROCEDURE — 80061 LIPID PANEL: CPT | Mod: HCNC

## 2018-11-13 PROCEDURE — 83036 HEMOGLOBIN GLYCOSYLATED A1C: CPT | Mod: HCNC

## 2018-11-13 PROCEDURE — 82306 VITAMIN D 25 HYDROXY: CPT | Mod: HCNC

## 2018-11-13 PROCEDURE — 80053 COMPREHEN METABOLIC PANEL: CPT | Mod: HCNC

## 2018-11-25 ENCOUNTER — HOSPITAL ENCOUNTER (OUTPATIENT)
Dept: RADIOLOGY | Facility: HOSPITAL | Age: 68
Discharge: HOME | End: 2018-11-25
Attending: FAMILY MEDICINE
Payer: MEDICARE

## 2018-11-25 ENCOUNTER — OFFICE VISIT (OUTPATIENT)
Dept: URGENT CARE | Facility: CLINIC | Age: 68
End: 2018-11-25
Payer: MEDICARE

## 2018-11-25 VITALS
OXYGEN SATURATION: 97 % | WEIGHT: 141.13 LBS | SYSTOLIC BLOOD PRESSURE: 170 MMHG | HEART RATE: 60 BPM | BODY MASS INDEX: 26.65 KG/M2 | HEIGHT: 61 IN | TEMPERATURE: 98 F | RESPIRATION RATE: 17 BRPM | DIASTOLIC BLOOD PRESSURE: 74 MMHG

## 2018-11-25 DIAGNOSIS — M25.522 LEFT ELBOW PAIN: ICD-10-CM

## 2018-11-25 DIAGNOSIS — M25.571 ACUTE RIGHT ANKLE PAIN: ICD-10-CM
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
## 2018-11-25 DIAGNOSIS — R03.0 ELEVATED BP WITHOUT DIAGNOSIS OF HYPERTENSION: ICD-10-CM

## 2018-11-25 PROCEDURE — 73080 X-RAY EXAM OF ELBOW: CPT | Mod: 26,HCNC,LT, | Performed by: RADIOLOGY

## 2018-11-25 PROCEDURE — 73610 X-RAY EXAM OF ANKLE: CPT | Mod: 26,HCNC,RT, | Performed by: RADIOLOGY

## 2018-11-25 PROCEDURE — 99999 PR PBB SHADOW E&M-EST. PATIENT-LVL IV: CPT | Mod: PBBFAC,HCNC,, | Performed by: FAMILY MEDICINE

## 2018-11-25 PROCEDURE — 99214 OFFICE O/P EST MOD 30 MIN: CPT | Mod: HCNC,25,S$GLB, | Performed by: FAMILY MEDICINE

## 2018-11-25 PROCEDURE — 1100F PTFALLS ASSESS-DOCD GE2>/YR: CPT | Mod: CPTII,HCNC,S$GLB, | Performed by: FAMILY MEDICINE

## 2018-11-25 PROCEDURE — 3288F FALL RISK ASSESSMENT DOCD: CPT | Mod: CPTII,HCNC,S$GLB, | Performed by: FAMILY MEDICINE

## 2018-11-25 PROCEDURE — 3077F SYST BP >= 140 MM HG: CPT | Mod: CPTII,HCNC,S$GLB, | Performed by: FAMILY MEDICINE

## 2018-11-25 PROCEDURE — 73080 X-RAY EXAM OF ELBOW: CPT | Mod: TC,HCNC,PO,LT

## 2018-11-25 PROCEDURE — 3078F DIAST BP <80 MM HG: CPT | Mod: CPTII,HCNC,S$GLB, | Performed by: FAMILY MEDICINE

## 2018-11-25 PROCEDURE — 73610 X-RAY EXAM OF ANKLE: CPT | Mod: TC,HCNC,PO,RT

## 2018-11-25 RX ORDER — MELOXICAM 15 MG/1
15 TABLET ORAL DAILY
Qty: 10 TABLET | Refills: 0 | Status: SHIPPED | OUTPATIENT
Start: 2018-11-25 | End: 2019-05-17

## 2018-11-25 NOTE — PATIENT INSTRUCTIONS
ACE Wrap  Minor muscle or joint injuries are often treated with an elastic bandage. The bandage provides support and compression to the injured area. An elastic bandage is a stretchy, rolled bandage. Elastic bandages range in width from 2 to 6 inches. They can be used for a variety of injuries. The bandages are often called ACE bandages, after the most common brand name.  If used correctly, elastic bandages help control swelling and ease pain. An elastic bandage is also a good reminder not to overuse the injured area. However, elastic bandages do not provide a lot of support and will not prevent reinjury.  Home care    To apply an elastic bandage:  · Check the skin before wrapping the injury. It should be clean, dry, and free of drainage.  · Start wrapping below the injury and work your way toward the body. For an ankle sprain, start wrapping around the foot and work up toward the calf. This will help control swelling.  · Overlap the edges of the bandage so it stays snuggly in place.  · Wrap the bandage firmly, but not too tightly. A tight bandage can increase swelling on either end of the bandage. Make sure the bandage is wrinkle free.  · Leave fingers and toes exposed.  · Secure ends of the bandage (even self-sticking ones) with clips or tape.  · Check frequently to ensure adequate circulation, especially in the fingers and toes. Loosen the bandage if there is local swelling, numbness, tingling, discomfort, coldness, or discoloration (skin pale or bluish in color).  · Rewrap the bandage as needed during the day. Reroll the bandage as you unwind it.  Continue using the elastic bandage until the pain and swelling are gone or as your healthcare provider advises.  If you have been told to ice the area, the ice can be secured in place with the elastic bandage. Wrap the ice pack with a thin towel to protect the skin. Do not put ice or an ice pack directly on the skin.  Ice the area for no more than 20 minutes at a  time.    Follow-up care  Follow up with your healthcare provider, as advised.  When to seek medical advice  Call your healthcare provider for any of the following:  · Pain and swelling that doesn't get better or gets worse  · Trouble moving injured area  · Skin discoloration, numbness, or tingling that doesnt go away after bandage is removed  Date Last Reviewed: 9/13/2015  © 1410-5438 [employer]. [address] [place] [address], [place] [address]. All rights reserved. This information is not intended as a substitute for professional medical care. Always follow your healthcare professional's instructions.        Understanding Ankle Sprain    The ankle is the joint where the leg and foot meet. Bones are held in place by connective tissue called ligaments. When ankle ligaments are stretched to the point of pain and injury, it is called an ankle sprain. A sprain can tear the ligaments. These tears can be very small but still cause pain. Ankle sprains can be mild or severe.  What causes an ankle sprain?  A sprain may occur when you twist your ankle or bend it too far. This can happen when you stumble or fall. Things that can make an ankle sprain more likely include:  · Having had an ankle sprain before  · Playing sports that involve running and jumping. Or playing contact sports such as football or hockey.  · Wearing shoes that dont support your feet and ankles well  · Having ankles with poor strength and flexibility  Symptoms of an ankle sprain  Symptoms may include:  · Pain or soreness in the ankle  · Swelling  · Redness or bruising  · Not being able to walk or put weight on the affected foot  · Reduced range of motion in the ankle  · A popping or tearing feeling at the time the sprain occurs  · An abnormal or dislocated look to the ankle  · Instability or too much range of motion in the ankle  Treatment for an ankle sprain  Treatment focuses on reducing pain and swelling, and avoiding further injury.  Treatments may include:  · Resting the ankle. Avoid putting weight on it. This may mean using crutches until the sprain heals.  · Prescription or over-the-counter pain medicines. These help reduce swelling and pain.  · Cold packs. These help reduce pain and swelling.  · Raising your ankle above your heart. This helps reduce swelling.  · Wrapping the ankle with an elastic bandage or ankle brace. This helps reduce swelling and gives some support to the ankle. In rare cases, you may need a cast or boot.  · Stretching and other exercises. These improve flexibility and strength.  · Heat packs. These may be recommended before doing ankle exercises.  Possible complications of an ankle sprain  An ankle that has been weakened by a sprain can be more likely to have repeated sprains afterward. Doing exercises to strengthen your ankle and improve balance can reduce your risk for repeated sprains. Other possible complications are long-term (chronic) pain or an ankle that remains unstable.  When to call your healthcare provider  Call your healthcare provider right away if you have any of these:  · Fever of 100.4°F (38°C) or higher, or as directed  · Pain, numbness, discoloration, or coldness in the foot or toes  · Pain that gets worse  · Symptoms that dont get better, or get worse  · New symptoms   Date Last Reviewed: 3/10/2016  © 6524-0794 [employer]. [address] [place], [place] [address]. All rights reserved. This information is not intended as a substitute for professional medical care. Always follow your healthcare professional's instructions.        Treating Ankle Sprains  Treatment will depend on how bad your sprain is. For a severe sprain, healing may take 3 months or more.  Right after your injury: Use R.I.C.E.  · Rest: At first, keep weight off the ankle as much as you can. You may be given crutches to help you walk without putting weight on the ankle.  · Ice: Put an ice pack on the ankle for 15  minutes. Remove the pack and wait at least 30 minutes. Repeat for up to 3 days. This helps reduce swelling.  · Compression: To reduce swelling and keep the joint stable, you may need to wrap the ankle with an elastic bandage. For more severe sprains, you may need an ankle brace or a cast.  · Elevation: To reduce swelling, keep your ankle raised above your heart when you sit or lie down.  Medicine  Your healthcare provider may suggest oral non-steroidal anti-inflammatory medicine (NSAIDs), such as ibuprofen. This relieves the pain and helps reduce any swelling. Be sure to take your medicine as directed.  Contrast baths  After 3 days, soak your ankle in warm water for 30 seconds, then in cool water for 30 seconds. Go back and forth for 5 minutes. Doing this every 2 hours will help keep the swelling down.  Exercises    After about 2 to 3 weeks, you may be given exercises to strengthen the ligaments and muscles in the ankle. Doing these exercises will help prevent another ankle sprain. Exercises may include standing on your toes and then on your heels and doing ankle curls.  Ankle curls  · Sit on the edge of a sturdy table or lie on your back.  · Pull your toes toward you. Then point them away from you. Repeat for 2 to 3 minutes.   Date Last Reviewed: 9/28/2015 © 2000-2017 [employer]. [address] [place] [address], [place] [address]. All rights reserved. This information is not intended as a substitute for professional medical care. Always follow your healthcare professional's instructions.        First Aid: Cuts and Scrapes  A break in the skin is an open door, inviting dirt and germs to enter your body and cause infection.      Step 1. Control bleeding  · Apply direct pressure for at least 5 minutes.  Step 2. Clean and cover  · Wash the scrape or cut with soap and water to kill germs and remove dirt and foreign objects.  · Apply a topical antibiotic to minor cuts and scrapes that do not need medical attention  (see below).  · Cover the wound with a clean gauze dressing to reduce the risk of infection and further injury. Keep the dressing in place with a gauze or cloth bandage.  · Don't tie or tape the bandage too tight.  When to call your healthcare provider  Seek medical help if any of the following is true:  · The wound covers a large area or is deep.  · The injury is on the face or any other area where scarring is a concern.  · The person needs protection against tetanus. This is a disease caused by bacteria that may enter any break in the skin and bring on a life-threatening illness called lockjaw. A tetanus booster (injection) may be needed if it's been more than 5 years since the last tetanus vaccination.   Call 911  Call 911 immediately if the victim has any of the following  · Uncontrollable bleeding  · Shock symptoms:  ¨ The skin is pale or clammy  ¨ The pulse is so light or races so fast that you can't count the beats.  ¨ The victim is confused or unable to concentrate, or stares blankly. Over time, the victim may even become unconscious.  · A detached body part: Keep all fragments of the detached part dry. Put them in a plastic bag or other container first, and then put bag or container in ice or cold water to improve chances for reattachment. Send the parts to the hospital along with the victim.  While you wait for help  · Reassure the person.  · Continue to control bleeding with direct pressure.   Date Last Reviewed: 12/1/2016  © 9895-5926 [employer]. [address] [place] [address], [place] [address]. All rights reserved. This information is not intended as a substitute for professional medical care. Always follow your healthcare professional's instructions.        RICE     Rest an injury, elevate it, and use ice and compression as directed.   RICE stands for rest, ice, compression, and elevation. These can limit pain and swelling after an injury. RICE may be recommended to help treat fractures,  sprains, strains, and bruises or bumps.   Home care  The following explain the details of RICE:  · Rest. Limit the use of the injured body part. This helps prevent further damage to the body part and gives it time to heal. In some cases, you may need a sling, brace, splint, or cast to help keep the body part still until it has healed.  · Ice. Applying ice right after an injury helps relieve pain and swelling. Wrap a bag of ice in a thin towel. Then, place it over the injured area. Do this for 10 to 15 minutes every 3 to 4 hours. Continue for the next 1 to 3 days or until your symptoms improve. Never put ice directly on your skin or ice an area longer than 15 minutes at a time.  · Compression. Putting pressure on an injury helps reduce swelling and provides support. Wrap the injured area firmly with an elastic bandage/wrap. Make sure not to wrap the bandage too tightly or you will cut off blood flow to the injured area. If your bandage loosens, rewrap it.  · Elevation. Keeping an injury raised above the level of your heart reduces swelling, pain, and throbbing. For instance, if you have a broken leg, it may help to rest your leg on several pillows when sitting or lying down. Try to keep the injured area elevated for at least 2 to 3 hours per day.  Follow-up care  Follow up with your healthcare provider, or as advised.  When to seek medical advice  Call your healthcare provider right away if any of these occur:  · Fever of 100.4°F (38°C) or higher, or as directed by your healthcare provider  · Increased pain or swelling in the injured body part  · Injured body part becomes cold, blue, numb, or tingly  · Signs of infection. These include warmth in the skin, redness, drainage, or bad smell coming from the injured body part.  Date Last Reviewed: 1/18/2016  © 9017-9340 [employer]. [address] [place] [address], [place] [address]. All rights reserved. This information is not intended as a substitute for  professional medical care. Always follow your healthcare professional's instructions.

## 2018-11-25 NOTE — PROGRESS NOTES
Subjective:       Patient ID: Renea Bourgeois is a 68 y.o. female.    Chief Complaint: Fall; Ankle Pain (right ); and Elbow Pain    Blood pressure is elevated, no visual changes, HA, or CP.      Fall   The accident occurred 1 to 3 hours ago. Fall occurred: tripped over sidewalk while walking dog. She landed on concrete. The volume of blood lost was minimal. Point of impact: left elbow, right ankle. Pain location: left elbow, right ankle. The patient is experiencing no pain. Pertinent negatives include no abdominal pain, fever, headaches, nausea, numbness, tingling or vomiting. She has tried acetaminophen for the symptoms. The treatment provided no relief.     Review of Systems   Constitutional: Negative for fever.   Respiratory: Negative for shortness of breath.    Cardiovascular: Negative for chest pain.   Gastrointestinal: Negative for abdominal pain, nausea and vomiting.   Musculoskeletal: Positive for arthralgias and joint swelling.   Neurological: Negative for tingling, numbness and headaches.       Objective:      Physical Exam   Constitutional: She appears well-developed and well-nourished. She appears distressed.   HENT:   Head: Normocephalic and atraumatic.   Pulmonary/Chest: Effort normal and breath sounds normal. No respiratory distress. She has no wheezes.   Musculoskeletal: Normal range of motion. She exhibits no edema or deformity.   As per [place] Ankle Rules. Ankle XR as there is some pain and ttp in malleolar zone. Able to bear weight, but walking very gingerly.       Skin: Skin is warm and dry. No rash noted. She is not diaphoretic. No erythema.   Abrasion to right elbow.     Nursing note and vitals reviewed.      Assessment:       1. Fall, initial encounter    2. Acute right ankle pain    3. Left elbow pain    4. Elevated BP without diagnosis of hypertension        Plan:     Problem List Items Addressed This Visit        Cardiac/Vascular    Elevated BP without diagnosis of hypertension    Current  Assessment & Plan     Pt not on BP meds, likely cause is pain 2/2 fall. F/u with pcp            Orthopedic    Acute right ankle pain    Current Assessment & Plan     RICE.  Do not take mobic with naproxen.           Relevant Orders    X-Ray Ankle Complete Right (Completed)    Left elbow pain    Current Assessment & Plan     Neosporin to abrasion of left elbow.  RICE         Relevant Orders    X-Ray Elbow Complete Left (Completed)      Other Visit Diagnoses     Fall, initial encounter    -  Primary    Relevant Medications    meloxicam (MOBIC) 15 MG tablet

## 2018-11-26 NOTE — PROGRESS NOTES
Results have been reviewed . All labs are within normal range.   If you have any questions please feel free to contact me.  No acute fracture or dislocation identified.

## 2018-11-26 NOTE — PROGRESS NOTES
Results have been reviewed . All labs are within normal range.   If you have any questions please feel free to contact me.  No acute fracture or dislocation.  There is some soft tissue swelling seen laterally.

## 2018-11-27 PROBLEM — R03.0 ELEVATED BP WITHOUT DIAGNOSIS OF HYPERTENSION: Status: ACTIVE | Noted: 2018-11-27

## 2019-02-18 DIAGNOSIS — E11.9 TYPE 2 DIABETES MELLITUS WITHOUT COMPLICATION, WITHOUT LONG-TERM CURRENT USE OF INSULIN: Chronic | ICD-10-CM

## 2019-02-18 RX ORDER — GLIMEPIRIDE 1 MG/1
TABLET ORAL
Qty: 90 TABLET | Refills: 0 | Status: SHIPPED | OUTPATIENT
Start: 2019-02-18 | End: 2019-05-20 | Stop reason: SDUPTHER

## 2019-03-27 RX ORDER — ROSUVASTATIN CALCIUM 10 MG/1
TABLET, COATED ORAL
Qty: 90 TABLET | Refills: 1 | Status: SHIPPED | OUTPATIENT
Start: 2019-03-27 | End: 2019-09-18 | Stop reason: SDUPTHER

## 2019-04-12 ENCOUNTER — OFFICE VISIT (OUTPATIENT)
Dept: INTERNAL MEDICINE | Facility: CLINIC | Age: 69
End: 2019-04-12
Payer: MEDICARE

## 2019-04-12 VITALS
SYSTOLIC BLOOD PRESSURE: 122 MMHG | OXYGEN SATURATION: 98 % | WEIGHT: 141.13 LBS | HEART RATE: 69 BPM | DIASTOLIC BLOOD PRESSURE: 80 MMHG | HEIGHT: 61 IN | BODY MASS INDEX: 26.65 KG/M2

## 2019-04-12 DIAGNOSIS — I10 HYPERTENSION, UNSPECIFIED TYPE: ICD-10-CM

## 2019-04-12 DIAGNOSIS — E11.69 HYPERLIPIDEMIA ASSOCIATED WITH TYPE 2 DIABETES MELLITUS: ICD-10-CM

## 2019-04-12 DIAGNOSIS — M81.0 OSTEOPOROSIS, UNSPECIFIED OSTEOPOROSIS TYPE, UNSPECIFIED PATHOLOGICAL FRACTURE PRESENCE: ICD-10-CM

## 2019-04-12 DIAGNOSIS — Z00.00 ENCOUNTER FOR PREVENTIVE HEALTH EXAMINATION: Primary | ICD-10-CM

## 2019-04-12 DIAGNOSIS — G47.00 INSOMNIA, UNSPECIFIED TYPE: ICD-10-CM

## 2019-04-12 DIAGNOSIS — M19.90 OSTEOARTHRITIS, UNSPECIFIED OSTEOARTHRITIS TYPE, UNSPECIFIED SITE: ICD-10-CM

## 2019-04-12 DIAGNOSIS — E78.5 HYPERLIPIDEMIA ASSOCIATED WITH TYPE 2 DIABETES MELLITUS: ICD-10-CM

## 2019-04-12 DIAGNOSIS — E11.9 TYPE 2 DIABETES MELLITUS WITHOUT COMPLICATION, WITHOUT LONG-TERM CURRENT USE OF INSULIN: ICD-10-CM

## 2019-04-12 DIAGNOSIS — E55.9 VITAMIN D INSUFFICIENCY: ICD-10-CM

## 2019-04-12 DIAGNOSIS — C50.919 MALIGNANT NEOPLASM OF FEMALE BREAST, UNSPECIFIED ESTROGEN RECEPTOR STATUS, UNSPECIFIED LATERALITY, UNSPECIFIED SITE OF BREAST: ICD-10-CM

## 2019-04-12 PROCEDURE — 99499 UNLISTED E&M SERVICE: CPT | Mod: HCNC,S$GLB,, | Performed by: NURSE PRACTITIONER

## 2019-04-12 PROCEDURE — 3074F SYST BP LT 130 MM HG: CPT | Mod: HCNC,CPTII,S$GLB, | Performed by: NURSE PRACTITIONER

## 2019-04-12 PROCEDURE — G0439 PR MEDICARE ANNUAL WELLNESS SUBSEQUENT VISIT: ICD-10-PCS | Mod: HCNC,S$GLB,, | Performed by: NURSE PRACTITIONER

## 2019-04-12 PROCEDURE — 3074F PR MOST RECENT SYSTOLIC BLOOD PRESSURE < 130 MM HG: ICD-10-PCS | Mod: HCNC,CPTII,S$GLB, | Performed by: NURSE PRACTITIONER

## 2019-04-12 PROCEDURE — 3079F PR MOST RECENT DIASTOLIC BLOOD PRESSURE 80-89 MM HG: ICD-10-PCS | Mod: HCNC,CPTII,S$GLB, | Performed by: NURSE PRACTITIONER

## 2019-04-12 PROCEDURE — 99999 PR PBB SHADOW E&M-EST. PATIENT-LVL IV: CPT | Mod: PBBFAC,HCNC,, | Performed by: NURSE PRACTITIONER

## 2019-04-12 PROCEDURE — 99999 PR PBB SHADOW E&M-EST. PATIENT-LVL IV: ICD-10-PCS | Mod: PBBFAC,HCNC,, | Performed by: NURSE PRACTITIONER

## 2019-04-12 PROCEDURE — 3079F DIAST BP 80-89 MM HG: CPT | Mod: HCNC,CPTII,S$GLB, | Performed by: NURSE PRACTITIONER

## 2019-04-12 PROCEDURE — 3044F PR MOST RECENT HEMOGLOBIN A1C LEVEL <7.0%: ICD-10-PCS | Mod: HCNC,CPTII,S$GLB, | Performed by: NURSE PRACTITIONER

## 2019-04-12 PROCEDURE — 99499 RISK ADDL DX/OHS AUDIT: ICD-10-PCS | Mod: HCNC,S$GLB,, | Performed by: NURSE PRACTITIONER

## 2019-04-12 PROCEDURE — 3044F HG A1C LEVEL LT 7.0%: CPT | Mod: HCNC,CPTII,S$GLB, | Performed by: NURSE PRACTITIONER

## 2019-04-12 PROCEDURE — G0439 PPPS, SUBSEQ VISIT: HCPCS | Mod: HCNC,S$GLB,, | Performed by: NURSE PRACTITIONER

## 2019-04-12 NOTE — PROGRESS NOTES
"Renea Bourgeois presented for a  Medicare AWV and comprehensive Health Risk Assessment today. The following components were reviewed and updated:    · Medical history  · Family History  · Social history  · Allergies and Current Medications  · Health Risk Assessment  · Health Maintenance  · Care Team     ** See Completed Assessments for Annual Wellness Visit within the encounter summary.**       The following assessments were completed:  · Living Situation  · CAGE  · Depression Screening  · Timed Get Up and Go  · Whisper Test  · Cognitive Function Screening  · Nutrition Screening  · ADL Screening  · PAQ Screening    Vitals:    04/12/19 0807   BP: 122/80   BP Location: Left arm   Patient Position: Sitting   BP Method: Medium (Manual)   Pulse: 69   SpO2: 98%   Weight: 64 kg (141 lb 1.5 oz)   Height: 5' 1.42" (1.56 m)     Body mass index is 26.3 kg/m².  Physical Exam   Constitutional: She is oriented to person, place, and time. She appears well-developed and well-nourished.   HENT:   Head: Normocephalic.   Cardiovascular: Normal rate, regular rhythm and normal heart sounds.   No murmur heard.  Pulmonary/Chest: Effort normal and breath sounds normal. No respiratory distress.   Abdominal: Soft. She exhibits no mass. There is no tenderness.   Musculoskeletal: Normal range of motion. She exhibits no edema.   Neurological: She is alert and oriented to person, place, and time. She exhibits normal muscle tone.   Skin: Skin is warm, dry and intact.   Psychiatric: She has a normal mood and affect. Her speech is normal and behavior is normal.   Nursing note and vitals reviewed.        Diagnoses and health risks identified today and associated recommendations/orders:    1. Encounter for preventive health examination  Normal timed get up and go test. Reports 1 fall in the last 12 months.    Fall precautions reviewed with patient. Advised to follow up with PCP for further recommendations. Patient expressed understanding.       She " will discuss ASA therapy with outside cardiologist and PCP.     2. Hyperlipidemia associated with type 2 diabetes mellitus  A1C 5.6  Stable and controlled (DM and hyperlipidemia). Continue current treatment plan as previously prescribed with your PCP and diabetes management.  - Diabetes Digital Medicine (DDMP) Enrollment Order  - Hypertension Digital Medicine (HDMP) Enrollment Order  - Hypertension Digital Medicine (HDMP): Assign Onboarding Questionnaires    3. Type 2 diabetes mellitus without complication, without long-term current use of insulin  See #2  - Diabetes Digital Medicine (DDMP) Enrollment Order  - Hypertension Digital Medicine (HDMP) Enrollment Order    4. Hypertension, unspecified type  Stable and controlled. Continue current treatment plan as previously prescribed with your PCP.   - Hypertension Digital Medicine (HDMP) Enrollment Order  - Hypertension Digital Medicine (HDMP): Assign Onboarding Questionnaires    5. Malignant neoplasm of female breast, unspecified estrogen receptor status, unspecified laterality, unspecified site of breast  S/P lumpectomy, chemo, and radiation.   On Arimidex.   Stable and controlled. Continue current treatment plan as previously prescribed with your oncologist.     6. Osteoporosis, unspecified osteoporosis type, unspecified pathological fracture presence  DEXA 7/7/2017  Advised to follow up with PCP and oncologist for further evaluation and treatment. She expressed understanding.      7. Vitamin D insufficiency  28L, increased from prior check.   Continue current treatment plan as previously prescribed with your PCP.     8. Osteoarthritis, unspecified osteoarthritis type, unspecified site  Continue current treatment plan as previously prescribed with your orthopedist.     9. Insomnia, unspecified type  Continue current treatment plan as previously prescribed with your PCP.    10. Reports she has Echos completed with her outside cardiologist. Recommended to get outside  records sent to PCP to be scanned into chart. She expressed understanding.       Provided Renea with a 5-10 year written screening schedule and personal prevention plan. Education, counseling, and referrals were provided as needed. After Visit Summary printed and given to patient which includes a list of additional screenings\tests needed.    Follow up in about 1 year (around 4/12/2020) for AWV.    Merced Trujillo NP  I offered to discuss end of life issues, including information on how to make advance directives that the patient could use to name someone who would make medical decisions on their behalf if they became too ill to make themselves.    ___Patient declined  _X_Patient is interested, I provided paper work and offered to discuss.

## 2019-04-12 NOTE — PATIENT INSTRUCTIONS
Counseling and Referral of Other Preventative  (Italic type indicates deductible and co-insurance are waived)    Patient Name: Renea Bourgeois  Today's Date: 4/12/2019    Health Maintenance       Date Due Completion Date    Aspirin/Antiplatelet Therapy 01/09/1968 ---    Urine Microalbumin 04/27/2019 4/27/2018    Foot Exam 05/09/2019 5/9/2018    Override on 5/19/2017: Done    Override on 11/18/2016: Done    Override on 10/1/2015: Done    Override on 8/11/2015: Done    Hemoglobin A1c 05/13/2019 11/13/2018    Mammogram 07/23/2019 7/23/2018    Override on 4/16/2013: Done    Eye Exam 09/07/2019 9/7/2018    Override on 2/26/2013: Done    Lipid Panel 11/13/2019 11/13/2018    DEXA SCAN 07/07/2020 7/7/2017    Override on 8/25/2011: Done (osteopenia)    Colonoscopy 02/22/2023 2/22/2018    Override on 1/27/2006: Done (Repeat in 7 years)    TETANUS VACCINE 10/24/2024 10/24/2014        Orders Placed This Encounter   Procedures    Diabetes Digital Medicine (DDMP) Enrollment Order    Hypertension Digital Medicine (HDMP) Enrollment Order     The following information is provided to all patients.  This information is to help you find resources for any of the problems found today that may be affecting your health:                Living healthy guide: www.[facility].[place].gov      Understanding Diabetes: www.diabetes.org      Eating healthy: www.cdc.gov/healthyweight      CDC home safety checklist: www.cdc.gov/steadi/patient.html      Agency on Aging: www.goea.[place].[facility]      Alcoholics anonymous (AA): www.aa.org      Physical Activity: www.arie.nih.gov/lp8lxtv      Tobacco use: www.quitwithusla.org

## 2019-04-25 ENCOUNTER — PATIENT MESSAGE (OUTPATIENT)
Dept: INTERNAL MEDICINE | Facility: CLINIC | Age: 69
End: 2019-04-25

## 2019-04-26 DIAGNOSIS — F51.04 CHRONIC INSOMNIA: ICD-10-CM

## 2019-04-26 RX ORDER — TRAZODONE HYDROCHLORIDE 100 MG/1
TABLET ORAL
Qty: 90 TABLET | Refills: 1 | Status: SHIPPED | OUTPATIENT
Start: 2019-04-26 | End: 2019-10-24 | Stop reason: SDUPTHER

## 2019-04-30 ENCOUNTER — LAB VISIT (OUTPATIENT)
Dept: LAB | Facility: HOSPITAL | Age: 69
End: 2019-04-30
Payer: MEDICARE

## 2019-04-30 DIAGNOSIS — E11.9 DIABETES MELLITUS TYPE 2 IN NONOBESE: ICD-10-CM

## 2019-04-30 DIAGNOSIS — Z11.59 NEED FOR HEPATITIS C SCREENING TEST: ICD-10-CM

## 2019-04-30 DIAGNOSIS — E11.69 HYPERLIPIDEMIA ASSOCIATED WITH TYPE 2 DIABETES MELLITUS: ICD-10-CM

## 2019-04-30 DIAGNOSIS — E55.9 VITAMIN D DEFICIENCY: ICD-10-CM

## 2019-04-30 DIAGNOSIS — E78.5 HYPERLIPIDEMIA ASSOCIATED WITH TYPE 2 DIABETES MELLITUS: ICD-10-CM

## 2019-04-30 LAB
25(OH)D3+25(OH)D2 SERPL-MCNC: 22 NG/ML (ref 30–96)
ALBUMIN SERPL BCP-MCNC: 4.1 G/DL (ref 3.5–5.2)
ALP SERPL-CCNC: 88 U/L (ref 55–135)
ALT SERPL W/O P-5'-P-CCNC: 22 U/L (ref 10–44)
ANION GAP SERPL CALC-SCNC: 9 MMOL/L (ref 8–16)
AST SERPL-CCNC: 21 U/L (ref 10–40)
BILIRUB SERPL-MCNC: 0.7 MG/DL (ref 0.1–1)
BUN SERPL-MCNC: 12 MG/DL (ref 8–23)
CALCIUM SERPL-MCNC: 9.8 MG/DL (ref 8.7–10.5)
CHLORIDE SERPL-SCNC: 105 MMOL/L (ref 95–110)
CHOLEST SERPL-MCNC: 157 MG/DL (ref 120–199)
CHOLEST/HDLC SERPL: 3.2 {RATIO} (ref 2–5)
CO2 SERPL-SCNC: 27 MMOL/L (ref 23–29)
CREAT SERPL-MCNC: 0.8 MG/DL (ref 0.5–1.4)
EST. GFR  (AFRICAN AMERICAN): >60 ML/MIN/1.73 M^2
EST. GFR  (NON AFRICAN AMERICAN): >60 ML/MIN/1.73 M^2
ESTIMATED AVG GLUCOSE: 126 MG/DL (ref 68–131)
GLUCOSE SERPL-MCNC: 101 MG/DL (ref 70–110)
HBA1C MFR BLD HPLC: 6 % (ref 4–5.6)
HDLC SERPL-MCNC: 49 MG/DL (ref 40–75)
HDLC SERPL: 31.2 % (ref 20–50)
LDLC SERPL CALC-MCNC: 81 MG/DL (ref 63–159)
NONHDLC SERPL-MCNC: 108 MG/DL
POTASSIUM SERPL-SCNC: 3.4 MMOL/L (ref 3.5–5.1)
PROT SERPL-MCNC: 7 G/DL (ref 6–8.4)
SODIUM SERPL-SCNC: 141 MMOL/L (ref 136–145)
TRIGL SERPL-MCNC: 135 MG/DL (ref 30–150)

## 2019-04-30 PROCEDURE — 82306 VITAMIN D 25 HYDROXY: CPT | Mod: HCNC

## 2019-04-30 PROCEDURE — 80061 LIPID PANEL: CPT | Mod: HCNC

## 2019-04-30 PROCEDURE — 86803 HEPATITIS C AB TEST: CPT | Mod: HCNC

## 2019-04-30 PROCEDURE — 80053 COMPREHEN METABOLIC PANEL: CPT | Mod: HCNC

## 2019-04-30 PROCEDURE — 36415 COLL VENOUS BLD VENIPUNCTURE: CPT | Mod: HCNC

## 2019-04-30 PROCEDURE — 83036 HEMOGLOBIN GLYCOSYLATED A1C: CPT | Mod: HCNC

## 2019-05-01 LAB — HCV AB SERPL QL IA: NEGATIVE

## 2019-05-17 ENCOUNTER — OFFICE VISIT (OUTPATIENT)
Dept: INTERNAL MEDICINE | Facility: CLINIC | Age: 69
End: 2019-05-17
Payer: MEDICARE

## 2019-05-17 VITALS
DIASTOLIC BLOOD PRESSURE: 86 MMHG | SYSTOLIC BLOOD PRESSURE: 138 MMHG | HEIGHT: 61 IN | HEART RATE: 70 BPM | OXYGEN SATURATION: 97 % | WEIGHT: 143.31 LBS | TEMPERATURE: 98 F | BODY MASS INDEX: 27.06 KG/M2

## 2019-05-17 DIAGNOSIS — E11.59 HYPERTENSION ASSOCIATED WITH DIABETES: ICD-10-CM

## 2019-05-17 DIAGNOSIS — E55.9 VITAMIN D INSUFFICIENCY: ICD-10-CM

## 2019-05-17 DIAGNOSIS — M81.0 OSTEOPOROSIS, UNSPECIFIED OSTEOPOROSIS TYPE, UNSPECIFIED PATHOLOGICAL FRACTURE PRESENCE: ICD-10-CM

## 2019-05-17 DIAGNOSIS — E11.69 HYPERLIPIDEMIA ASSOCIATED WITH TYPE 2 DIABETES MELLITUS: ICD-10-CM

## 2019-05-17 DIAGNOSIS — E78.5 HYPERLIPIDEMIA ASSOCIATED WITH TYPE 2 DIABETES MELLITUS: ICD-10-CM

## 2019-05-17 DIAGNOSIS — E11.9 TYPE 2 DIABETES MELLITUS WITHOUT COMPLICATION, WITHOUT LONG-TERM CURRENT USE OF INSULIN: Primary | ICD-10-CM

## 2019-05-17 DIAGNOSIS — M19.90 OSTEOARTHRITIS, UNSPECIFIED OSTEOARTHRITIS TYPE, UNSPECIFIED SITE: ICD-10-CM

## 2019-05-17 DIAGNOSIS — Z17.0 MALIGNANT NEOPLASM OF UPPER-OUTER QUADRANT OF RIGHT BREAST IN FEMALE, ESTROGEN RECEPTOR POSITIVE: ICD-10-CM

## 2019-05-17 DIAGNOSIS — I15.2 HYPERTENSION ASSOCIATED WITH DIABETES: ICD-10-CM

## 2019-05-17 DIAGNOSIS — C50.411 MALIGNANT NEOPLASM OF UPPER-OUTER QUADRANT OF RIGHT BREAST IN FEMALE, ESTROGEN RECEPTOR POSITIVE: ICD-10-CM

## 2019-05-17 DIAGNOSIS — F51.04 CHRONIC INSOMNIA: ICD-10-CM

## 2019-05-17 PROCEDURE — 99499 UNLISTED E&M SERVICE: CPT | Mod: HCNC,S$GLB,, | Performed by: FAMILY MEDICINE

## 2019-05-17 PROCEDURE — 99397 PR PREVENTIVE VISIT,EST,65 & OVER: ICD-10-PCS | Mod: HCNC,S$GLB,, | Performed by: FAMILY MEDICINE

## 2019-05-17 PROCEDURE — 3075F PR MOST RECENT SYSTOLIC BLOOD PRESS GE 130-139MM HG: ICD-10-PCS | Mod: HCNC,CPTII,S$GLB, | Performed by: FAMILY MEDICINE

## 2019-05-17 PROCEDURE — 3079F PR MOST RECENT DIASTOLIC BLOOD PRESSURE 80-89 MM HG: ICD-10-PCS | Mod: HCNC,CPTII,S$GLB, | Performed by: FAMILY MEDICINE

## 2019-05-17 PROCEDURE — 99499 RISK ADDL DX/OHS AUDIT: ICD-10-PCS | Mod: HCNC,S$GLB,, | Performed by: FAMILY MEDICINE

## 2019-05-17 PROCEDURE — 3044F PR MOST RECENT HEMOGLOBIN A1C LEVEL <7.0%: ICD-10-PCS | Mod: HCNC,CPTII,S$GLB, | Performed by: FAMILY MEDICINE

## 2019-05-17 PROCEDURE — 3044F HG A1C LEVEL LT 7.0%: CPT | Mod: HCNC,CPTII,S$GLB, | Performed by: FAMILY MEDICINE

## 2019-05-17 PROCEDURE — 99999 PR PBB SHADOW E&M-EST. PATIENT-LVL IV: ICD-10-PCS | Mod: PBBFAC,HCNC,, | Performed by: FAMILY MEDICINE

## 2019-05-17 PROCEDURE — 3079F DIAST BP 80-89 MM HG: CPT | Mod: HCNC,CPTII,S$GLB, | Performed by: FAMILY MEDICINE

## 2019-05-17 PROCEDURE — 99999 PR PBB SHADOW E&M-EST. PATIENT-LVL IV: CPT | Mod: PBBFAC,HCNC,, | Performed by: FAMILY MEDICINE

## 2019-05-17 PROCEDURE — 3075F SYST BP GE 130 - 139MM HG: CPT | Mod: HCNC,CPTII,S$GLB, | Performed by: FAMILY MEDICINE

## 2019-05-17 PROCEDURE — 99397 PER PM REEVAL EST PAT 65+ YR: CPT | Mod: HCNC,S$GLB,, | Performed by: FAMILY MEDICINE

## 2019-05-17 RX ORDER — VIT C/E/ZN/COPPR/LUTEIN/ZEAXAN 250MG-90MG
1000 CAPSULE ORAL DAILY
Refills: 0 | COMMUNITY
Start: 2019-05-17

## 2019-05-17 NOTE — PATIENT INSTRUCTIONS
Try 3-5 minutes daily of guided meditation  Headspace - has free 10 day introduction  Simply Being  Calm  Simple Habit

## 2019-05-17 NOTE — ASSESSMENT & PLAN NOTE
Advised to try turmeric supplement; okay to take tylenol; will schedule with Rheumatology for further management

## 2019-05-17 NOTE — PROGRESS NOTES
"Subjective:       Patient ID: Renea Bourgeois is a 69 y.o. female.    Chief Complaint: Annual Exam    Patient presents to clinic today for annual physical exam.    Review of Systems   Constitutional: Positive for activity change ("I've slowed down a little bit, doing 99% of housework, stopped mowing"). Negative for unexpected weight change.   HENT: Positive for rhinorrhea and trouble swallowing (chronic, stable). Negative for hearing loss.    Eyes: Negative for discharge and visual disturbance.   Respiratory: Positive for cough (last few days with runny nose). Negative for chest tightness and wheezing.    Cardiovascular: Negative for chest pain and palpitations.   Gastrointestinal: Positive for constipation (increases fiber intake). Negative for blood in stool, diarrhea and vomiting.   Endocrine: Negative for polydipsia and polyuria.   Genitourinary: Negative for difficulty urinating, dysuria, hematuria and menstrual problem.   Musculoskeletal: Positive for arthralgias. Negative for joint swelling and neck pain.   Neurological: Negative for weakness and headaches.   Psychiatric/Behavioral: Negative for confusion and dysphoric mood.       Objective:      Physical Exam   Constitutional: She is oriented to person, place, and time. Vital signs are normal. She appears well-developed and well-nourished. No distress.   HENT:   Head: Normocephalic and atraumatic.   Right Ear: Tympanic membrane, external ear and ear canal normal.   Left Ear: Tympanic membrane, external ear and ear canal normal.   Nose: Nose normal. No mucosal edema or rhinorrhea.   Mouth/Throat: Uvula is midline, oropharynx is clear and moist and mucous membranes are normal.   Eyes: Pupils are equal, round, and reactive to light. Conjunctivae, EOM and lids are normal.   Neck: Normal range of motion. Neck supple. No thyromegaly present.   Cardiovascular: Normal rate and regular rhythm. Exam reveals no gallop and no friction rub.   No murmur " heard.  Pulmonary/Chest: Effort normal and breath sounds normal. She has no wheezes. She has no rhonchi. She has no rales. Right breast exhibits tenderness. Right breast exhibits no inverted nipple, no mass and no nipple discharge. Left breast exhibits no inverted nipple, no mass, no nipple discharge and no tenderness.   Scars noted from prior surgeries       Abdominal: Soft. Normal appearance and bowel sounds are normal. She exhibits no distension and no mass. There is no hepatosplenomegaly. There is no tenderness.   Musculoskeletal: Normal range of motion.   Lymphadenopathy:     She has no cervical adenopathy.   Neurological: She is alert and oriented to person, place, and time. She has normal strength. No cranial nerve deficit or sensory deficit. Gait normal.   Reflex Scores:       Patellar reflexes are 2+ on the right side and 2+ on the left side.  Skin: Skin is warm and dry. No lesion and no rash noted. No cyanosis. Nails show no clubbing.   Psychiatric: She has a normal mood and affect.   Vitals reviewed.      Protective Sensation (w/ 10 gram monofilament):  Right: Intact  Left: Intact    Visual Inspection:  Normal -  Bilateral    Pedal Pulses:   Right: Present  Left: Present    Posterior tibialis:   Right:Present  Left: Present      Assessment:       1. Type 2 diabetes mellitus without complication, without long-term current use of insulin    2. Osteoporosis, unspecified osteoporosis type, unspecified pathological fracture presence    3. Osteoarthritis, unspecified osteoarthritis type, unspecified site    4. Vitamin D insufficiency    5. Hyperlipidemia associated with type 2 diabetes mellitus    6. Hypertension associated with diabetes    7. Malignant neoplasm of upper-outer quadrant of right breast in female, estrogen receptor positive    8. Chronic insomnia        Plan:     Problem List Items Addressed This Visit     Chronic insomnia    Current Assessment & Plan     Controlled with trazadone          Hyperlipidemia associated with type 2 diabetes mellitus    Current Assessment & Plan     Reasonable control, LDL 81, continue crestor         Hypertension associated with diabetes    Current Assessment & Plan     Controlled, continue metoprolol         Malignant neoplasm of upper-outer quadrant of right breast in female, estrogen receptor positive    Overview     Followed by Dr. Jones         Current Assessment & Plan     Scheduled for follow up Monday with Dr. Jones         Osteoarthritis    Current Assessment & Plan     Advised to try turmeric supplement; okay to take tylenol; will schedule with Rheumatology for further management         Relevant Orders    Ambulatory referral to Rheumatology    Osteoporosis    Current Assessment & Plan     DEXA ordered, scheduled with Rheumatology to address         Relevant Orders    DXA Bone Density Spine And Hip    Ambulatory referral to Rheumatology    Type 2 diabetes mellitus, without long-term current use of insulin - Primary    Current Assessment & Plan     a1c 6.0, controlled, continue current meds         Relevant Orders    Microalbumin/creatinine urine ratio    Vitamin D insufficiency    Current Assessment & Plan     Vitamin D is low, resume supplement         Relevant Medications    cholecalciferol, vitamin D3, (VITAMIN D3) 1,000 unit capsule          Health Maintenance reviewed/updated.

## 2019-05-20 ENCOUNTER — OFFICE VISIT (OUTPATIENT)
Dept: HEMATOLOGY/ONCOLOGY | Facility: CLINIC | Age: 69
End: 2019-05-20
Payer: MEDICARE

## 2019-05-20 VITALS
DIASTOLIC BLOOD PRESSURE: 82 MMHG | HEIGHT: 61 IN | TEMPERATURE: 98 F | RESPIRATION RATE: 20 BRPM | HEART RATE: 69 BPM | WEIGHT: 143.06 LBS | SYSTOLIC BLOOD PRESSURE: 150 MMHG | OXYGEN SATURATION: 98 % | BODY MASS INDEX: 27.01 KG/M2

## 2019-05-20 DIAGNOSIS — C50.411 MALIGNANT NEOPLASM OF UPPER-OUTER QUADRANT OF RIGHT BREAST IN FEMALE, ESTROGEN RECEPTOR POSITIVE: Primary | ICD-10-CM

## 2019-05-20 DIAGNOSIS — E11.9 TYPE 2 DIABETES MELLITUS WITHOUT COMPLICATION, WITHOUT LONG-TERM CURRENT USE OF INSULIN: Chronic | ICD-10-CM

## 2019-05-20 DIAGNOSIS — Z17.0 MALIGNANT NEOPLASM OF UPPER-OUTER QUADRANT OF RIGHT BREAST IN FEMALE, ESTROGEN RECEPTOR POSITIVE: Primary | ICD-10-CM

## 2019-05-20 PROCEDURE — 99214 OFFICE O/P EST MOD 30 MIN: CPT | Mod: HCNC,S$GLB,, | Performed by: INTERNAL MEDICINE

## 2019-05-20 PROCEDURE — 3079F DIAST BP 80-89 MM HG: CPT | Mod: HCNC,CPTII,S$GLB, | Performed by: INTERNAL MEDICINE

## 2019-05-20 PROCEDURE — 99214 PR OFFICE/OUTPT VISIT, EST, LEVL IV, 30-39 MIN: ICD-10-PCS | Mod: HCNC,S$GLB,, | Performed by: INTERNAL MEDICINE

## 2019-05-20 PROCEDURE — 3077F PR MOST RECENT SYSTOLIC BLOOD PRESSURE >= 140 MM HG: ICD-10-PCS | Mod: HCNC,CPTII,S$GLB, | Performed by: INTERNAL MEDICINE

## 2019-05-20 PROCEDURE — 3077F SYST BP >= 140 MM HG: CPT | Mod: HCNC,CPTII,S$GLB, | Performed by: INTERNAL MEDICINE

## 2019-05-20 PROCEDURE — 99999 PR PBB SHADOW E&M-EST. PATIENT-LVL III: CPT | Mod: PBBFAC,HCNC,, | Performed by: INTERNAL MEDICINE

## 2019-05-20 PROCEDURE — 1101F PR PT FALLS ASSESS DOC 0-1 FALLS W/OUT INJ PAST YR: ICD-10-PCS | Mod: HCNC,CPTII,S$GLB, | Performed by: INTERNAL MEDICINE

## 2019-05-20 PROCEDURE — 99999 PR PBB SHADOW E&M-EST. PATIENT-LVL III: ICD-10-PCS | Mod: PBBFAC,HCNC,, | Performed by: INTERNAL MEDICINE

## 2019-05-20 PROCEDURE — 1101F PT FALLS ASSESS-DOCD LE1/YR: CPT | Mod: HCNC,CPTII,S$GLB, | Performed by: INTERNAL MEDICINE

## 2019-05-20 PROCEDURE — 3079F PR MOST RECENT DIASTOLIC BLOOD PRESSURE 80-89 MM HG: ICD-10-PCS | Mod: HCNC,CPTII,S$GLB, | Performed by: INTERNAL MEDICINE

## 2019-05-20 RX ORDER — GLIMEPIRIDE 1 MG/1
TABLET ORAL
Qty: 90 TABLET | Refills: 0 | Status: SHIPPED | OUTPATIENT
Start: 2019-05-20 | End: 2019-07-01 | Stop reason: SDUPTHER

## 2019-05-20 NOTE — PROGRESS NOTES
Subjective:       Patient ID: Renea Bourgeois is a 69 y.o. female.    Chief Complaint: Results; Breast Cancer; and Chemotherapy    HPI 69-year-old female returns increasing anterior chest wall pain over 6 right breast area patient is concerned sister recently  of metastatic breast cancer.  Patient is concerned about recurrence she herself had a T1 N0 Oncotype recurrence score 33 status post 6 cycles of Tolu chemotherapy  ECOG status 1    Past Medical History:   Diagnosis Date    Allergy     Anemia 2017    Angina pectoris     followed by cardiology    Arthritis     Breast cancer     Right T1 Ductal Ca in situ,high oncotype Dx 33, Estrogen positive. Lumpectomy, TAC chemo x 6 cyscles then RT,on aromatase inhibitor    Cataract     Diabetes mellitus type II     DM (diabetes mellitus)      am 2017    Elevated BP without diagnosis of hypertension 2018    GERD (gastroesophageal reflux disease)     History of colon polyps 2018    The patient had adenomatous colon polyps in .      Hyperlipidemia     Hypertension     Kidney stone     right side, passed    Osteopenia     Osteoporosis 2019    PVC (premature ventricular contraction)     on and off    Trouble in sleeping     Type 2 diabetes mellitus      Family History   Problem Relation Age of Onset    Diabetes Father     Hypertension Father     Stroke Father     Cancer Father 65        metastatic when diagnosed    Stroke Brother     Cancer Other         kidney    Atrial fibrillation Son         35    Heart disease Son     Alzheimer's disease Mother     Parkinsonism Brother     Cancer Paternal Grandfather         prostate    Glaucoma Maternal Grandmother     Psoriasis Cousin     Alcohol abuse Neg Hx     Drug abuse Neg Hx     COPD Neg Hx     Birth defects Neg Hx     Mental retardation Neg Hx     Mental illness Neg Hx     Kidney disease Neg Hx     Hyperlipidemia Neg Hx     Melanoma Neg Hx      Lupus Neg Hx      Social History     Socioeconomic History    Marital status:      Spouse name: Don    Number of children: 4    Years of education: Not on file    Highest education level: Not on file   Occupational History    Occupation: Retired Teacher     Comment: [employer]   Social Needs    Financial resource strain: Not on file    Food insecurity:     Worry: Not on file     Inability: Not on file    Transportation needs:     Medical: Not on file     Non-medical: Not on file   Tobacco Use    Smoking status: Never Smoker    Smokeless tobacco: Never Used   Substance and Sexual Activity    Alcohol use: No     Alcohol/week: 0.0 oz    Drug use: No    Sexual activity: Not Currently     Partners: Male     Birth control/protection: Post-menopausal   Lifestyle    Physical activity:     Days per week: Not on file     Minutes per session: Not on file    Stress: Not on file   Relationships    Social connections:     Talks on phone: Not on file     Gets together: Not on file     Attends Zoroastrianism service: Not on file     Active member of club or organization: Not on file     Attends meetings of clubs or organizations: Not on file     Relationship status: Not on file   Other Topics Concern    Are you pregnant or think you may be? 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 Caffeine intake;1-2 per week - dennis calvo. Decaffinated tea and most beveridges. Does have a living will - at home in her filing cabinet.      Past Surgical History:   Procedure Laterality Date    BREAST BIOPSY      BREAST LUMPECTOMY  2/11/2011    right    CATARACT EXTRACTION Bilateral 10/14/15    CHOLECYSTECTOMY  1989    open    COLONOSCOPY N/A 2/22/2018    [facility]    COLONOSCOPY N/A 12/18/2014    Performed by Jeovanny Walden MD at [facility]    CYST REMOVAL Left 11/2017    foot     DILATION AND CURETTAGE OF UTERUS  10/2010    [place]    EXOSTECTOMY Left 11/24/2017    [facility]    GALLBLADDER SURGERY      removed in 1989    GANGLIONECTOMY Left 11/24/2017    [facility]    Nasal septal deviation repair  2009    toenail edges removed      TONSILLECTOMY  1959    TOTAL REDUCTION MAMMOPLASTY Left     2015       Labs:  Lab Results   Component Value Date    WBC 5.53 11/13/2018    HGB 12.9 11/13/2018    HCT 39.9 11/13/2018    MCV 93 11/13/2018     11/13/2018     BMP  Lab Results   Component Value Date     04/30/2019    K 3.4 (L) 04/30/2019     04/30/2019    CO2 27 04/30/2019    BUN 12 04/30/2019    CREATININE 0.8 04/30/2019    CALCIUM 9.8 04/30/2019    ANIONGAP 9 04/30/2019    ESTGFRAFRICA >60.0 04/30/2019    EGFRNONAA >60.0 04/30/2019     Lab Results   Component Value Date    ALT 22 04/30/2019    AST 21 04/30/2019    GGT 69 (H) 02/09/2010    ALKPHOS 88 04/30/2019    BILITOT 0.7 04/30/2019       Lab Results   Component Value Date    IRON 93 02/15/2018    TIBC 413 02/15/2018    FERRITIN 35 02/15/2018     Lab Results   Component Value Date    LCNPTEKV56 255 02/09/2012     Lab Results   Component Value Date    FOLATE 11.5 02/09/2012     Lab Results   Component Value Date    TSH 1.441 04/27/2018         Review of Systems   Constitutional: Negative for activity change, appetite change, chills, diaphoresis, fatigue, fever and unexpected weight change.   HENT: Negative for congestion, dental problem, drooling, ear discharge, ear pain, facial swelling, hearing loss, mouth sores, nosebleeds, postnasal drip, rhinorrhea, sinus pressure, sneezing, sore throat, tinnitus, trouble swallowing and voice change.    Eyes: Negative for photophobia, pain, discharge, redness, itching and visual disturbance.   Respiratory: Negative for cough, choking, chest tightness, shortness of breath, wheezing and stridor.    Cardiovascular:  Positive for chest pain. Negative for palpitations and leg swelling.   Gastrointestinal: Negative for abdominal distention, abdominal pain, anal bleeding, blood in stool, constipation, diarrhea, nausea, rectal pain and vomiting.   Endocrine: Negative for cold intolerance, heat intolerance, polydipsia, polyphagia and polyuria.   Genitourinary: Negative for decreased urine volume, difficulty urinating, dyspareunia, dysuria, enuresis, flank pain, frequency, genital sores, hematuria, menstrual problem, pelvic pain, urgency, vaginal bleeding, vaginal discharge and vaginal pain.   Musculoskeletal: Negative for arthralgias, back pain, gait problem, joint swelling, myalgias, neck pain and neck stiffness.   Skin: Negative for color change, pallor and rash.   Allergic/Immunologic: Negative for environmental allergies, food allergies and immunocompromised state.   Neurological: Negative for dizziness, tremors, seizures, syncope, facial asymmetry, speech difficulty, weakness, light-headedness, numbness and headaches.   Hematological: Negative for adenopathy. Does not bruise/bleed easily.   Psychiatric/Behavioral: Positive for dysphoric mood. Negative for agitation, behavioral problems, confusion, decreased concentration, hallucinations, self-injury, sleep disturbance and suicidal ideas. The patient is nervous/anxious. The patient is not hyperactive.        Objective:      Physical Exam   Constitutional: She is oriented to person, place, and time. She appears well-developed and well-nourished. She appears distressed.   HENT:   Head: Normocephalic and atraumatic.   Right Ear: External ear normal.   Left Ear: External ear normal.   Nose: Nose normal. Right sinus exhibits no maxillary sinus tenderness and no frontal sinus tenderness. Left sinus exhibits no maxillary sinus tenderness and no frontal sinus tenderness.   Mouth/Throat: Oropharynx is clear and moist. No oropharyngeal exudate.   Eyes: Pupils are equal, round, and reactive  to light. Conjunctivae, EOM and lids are normal. Right eye exhibits no discharge. Left eye exhibits no discharge. Right conjunctiva is not injected. Right conjunctiva has no hemorrhage. Left conjunctiva is not injected. Left conjunctiva has no hemorrhage. No scleral icterus.   Neck: Normal range of motion. Neck supple. No JVD present. No tracheal deviation present. No thyromegaly present.   Cardiovascular: Normal rate and regular rhythm.   Pulmonary/Chest: Effort normal. No stridor. No respiratory distress. She exhibits no tenderness.   Abdominal: Soft. She exhibits no distension and no mass. There is no splenomegaly or hepatomegaly. There is no tenderness. There is no rebound.   Musculoskeletal: Normal range of motion. She exhibits no edema or tenderness.   Lymphadenopathy:     She has no cervical adenopathy.     She has no axillary adenopathy.        Right: No supraclavicular adenopathy present.        Left: No supraclavicular adenopathy present.   Neurological: She is alert and oriented to person, place, and time. No cranial nerve deficit. Coordination normal.   Skin: Skin is dry. No rash noted. She is not diaphoretic. No erythema.   Psychiatric: She has a normal mood and affect. Her behavior is normal. Judgment and thought content normal.   Vitals reviewed.          Assessment:      1. Malignant neoplasm of upper-outer quadrant of right breast in female, estrogen receptor positive           Plan:     Recurrent breast carcinoma possible discussed with her likelihood low proceed with PET scan for review high risk recurrence with high Oncotype recurrence score        Reza Jones Jr, MD FACP

## 2019-05-23 ENCOUNTER — TELEPHONE (OUTPATIENT)
Dept: RADIOLOGY | Facility: HOSPITAL | Age: 69
End: 2019-05-23

## 2019-05-23 ENCOUNTER — INITIAL CONSULT (OUTPATIENT)
Dept: RHEUMATOLOGY | Facility: CLINIC | Age: 69
End: 2019-05-23
Payer: MEDICARE

## 2019-05-23 VITALS
DIASTOLIC BLOOD PRESSURE: 58 MMHG | HEIGHT: 61 IN | BODY MASS INDEX: 26.95 KG/M2 | WEIGHT: 142.75 LBS | SYSTOLIC BLOOD PRESSURE: 150 MMHG

## 2019-05-23 DIAGNOSIS — Z71.89 COUNSELING ON HEALTH PROMOTION AND DISEASE PREVENTION: ICD-10-CM

## 2019-05-23 DIAGNOSIS — M15.9 PRIMARY OSTEOARTHRITIS INVOLVING MULTIPLE JOINTS: Primary | ICD-10-CM

## 2019-05-23 DIAGNOSIS — M81.0 OSTEOPOROSIS, UNSPECIFIED OSTEOPOROSIS TYPE, UNSPECIFIED PATHOLOGICAL FRACTURE PRESENCE: ICD-10-CM

## 2019-05-23 PROCEDURE — 1101F PR PT FALLS ASSESS DOC 0-1 FALLS W/OUT INJ PAST YR: ICD-10-PCS | Mod: HCNC,CPTII,S$GLB, | Performed by: INTERNAL MEDICINE

## 2019-05-23 PROCEDURE — 3077F PR MOST RECENT SYSTOLIC BLOOD PRESSURE >= 140 MM HG: ICD-10-PCS | Mod: HCNC,CPTII,S$GLB, | Performed by: INTERNAL MEDICINE

## 2019-05-23 PROCEDURE — 99999 PR PBB SHADOW E&M-EST. PATIENT-LVL III: ICD-10-PCS | Mod: PBBFAC,HCNC,, | Performed by: INTERNAL MEDICINE

## 2019-05-23 PROCEDURE — 99205 OFFICE O/P NEW HI 60 MIN: CPT | Mod: HCNC,S$GLB,, | Performed by: INTERNAL MEDICINE

## 2019-05-23 PROCEDURE — 3078F PR MOST RECENT DIASTOLIC BLOOD PRESSURE < 80 MM HG: ICD-10-PCS | Mod: HCNC,CPTII,S$GLB, | Performed by: INTERNAL MEDICINE

## 2019-05-23 PROCEDURE — 3077F SYST BP >= 140 MM HG: CPT | Mod: HCNC,CPTII,S$GLB, | Performed by: INTERNAL MEDICINE

## 2019-05-23 PROCEDURE — 99999 PR PBB SHADOW E&M-EST. PATIENT-LVL III: CPT | Mod: PBBFAC,HCNC,, | Performed by: INTERNAL MEDICINE

## 2019-05-23 PROCEDURE — 99205 PR OFFICE/OUTPT VISIT, NEW, LEVL V, 60-74 MIN: ICD-10-PCS | Mod: HCNC,S$GLB,, | Performed by: INTERNAL MEDICINE

## 2019-05-23 PROCEDURE — 3078F DIAST BP <80 MM HG: CPT | Mod: HCNC,CPTII,S$GLB, | Performed by: INTERNAL MEDICINE

## 2019-05-23 PROCEDURE — 1101F PT FALLS ASSESS-DOCD LE1/YR: CPT | Mod: HCNC,CPTII,S$GLB, | Performed by: INTERNAL MEDICINE

## 2019-05-23 RX ORDER — GABAPENTIN 100 MG/1
100 CAPSULE ORAL 3 TIMES DAILY
Qty: 90 CAPSULE | Refills: 2 | Status: SHIPPED | OUTPATIENT
Start: 2019-05-23 | End: 2019-08-29 | Stop reason: SDUPTHER

## 2019-05-23 RX ORDER — DICLOFENAC SODIUM 10 MG/G
2 GEL TOPICAL DAILY
Qty: 1 TUBE | Refills: 2 | Status: SHIPPED | OUTPATIENT
Start: 2019-05-23 | End: 2023-07-26

## 2019-05-23 NOTE — LETTER
May 23, 2019      Kamini Newton MD  [address] [place] [address]           [facility]  [address] [place] [address]  Phone: [phone]  Fax: [phone]          Patient: Renea Bourgeois   MR Number: [id number]   YOB: 1950   Date of Visit: 5/23/2019       Dear Dr. Kamini Newton:    Thank you for referring Renea Bourgeois to me for evaluation. Attached you will find relevant portions of my assessment and plan of care.    If you have questions, please do not hesitate to call me. I look forward to following Renea Bourgeois along with you.    Sincerely,    Nathaniel Moon MD    Enclosure  CC:  No Recipients    If you would like to receive this communication electronically, please contact [email or web address] or [phone] to request more information on [employer] access.    For providers and/or their staff who would like to refer a patient to [facility], please contact us through our one-stop-shop provider referral line, [facility] , at [phone].    If you feel you have received this communication in error or would no longer like to receive these types of communications, please e-mail [email or web address]

## 2019-05-23 NOTE — PATIENT INSTRUCTIONS
Osteoarthritis: Coping with Pain    There are many ways to control your pain. Youre making a good start by learning about osteoarthritis and its treatments. Knowing more about this condition helps you work with your healthcare provider to find answers to problems. Keeping a positive outlook can help you manage pain from day to day. And making time each day to relax and enjoy yourself may help you control osteoarthritis pain, instead of letting it control you. Try these methods to help you cope with, and even reduce, your pain.  Take control  Relaxing may help relieve muscle aches that result from joint pain. To relax, try these techniques:  · Breathe slowly and calmly and think of a peaceful scene.  · Meditate by focusing your mind on one word, object, or idea.  Getting plenty of sleep can help reduce pain and let you function better. If pain is making it hard for you to sleep, ask your doctor about ways to control pain and ensure a good nights sleep. Cutting back on caffeine and alcohol can help you sleep better. So can going to bed and getting up at about the same time every day.  Use distraction  Getting your mind off the pain may seem hard to do. But it can actually help reduce pain. When you are in pain, try one of these ways of distracting yourself:  · Watch a funny movie with a friend.  · Listen to music you enjoy.  · Read a novel.  · Talk with friends or family.  · Go to a museum, park, or other favorite attraction.  · Arrange to do a regular activity, such as volunteer work.  Heat and cold  Using heat and cold treatments are simple ways to lessen arthritis symptoms:  · Heat soothes stiff joints and tired muscles. Heat works well before exercise, for example. Heat treatments include:  ¨ A warm shower or baths, or soak (for example, fill the sink with warm water and move your fingers, hands, and wrists around in the water)  ¨ A moist heating pad  ¨ A warm, moist wash cloth  ¨ An electric blanket or  throw  · Cold treatments help to numb painful areas and decrease swelling. Cold treatments include the following wrapped in a thin towel:  ¨ An ice pack or bag of ice  ¨ A gel-filled cold pack  ¨ A bag of frozen vegetables, like peas or corn  Be careful when using heat or cold. You can injure your skin. Each treatment should only last for 10 to 20 minutes. Your healthcare provider or therapist can give you specific instructions.  Acupuncture  Acupuncture is a 2000-year-old practice. Practitioners insert thin needles in specific parts of the body. Research shows that it can help to relieve the pain of arthritis.  For more information or to find a practitioner in your area, contact the American Academy of Medical Acupuncture. Its website is: http://www.medicalacupuncture.org/.  Massage  Therapeutic massage has many benefits. It may:  · Help you and your muscles relax  · Improve blood flow to muscles and joints  · Help joints stay more flexible  Look for a certified massage therapist. Many are trained to treat sore muscles and joint pain and stiffness.  Vitamins, supplements, and herbs  People with arthritis, or other long-term conditions that cause pain, often look for alternative ways to lessen pain. Vitamins, supplements, and herbs may or may not help you to feel better. Before you try any vitamin, supplement, or herb, make sure you ask your healthcare provider or pharmacist.  Physical therapy/occupational therapy  Evaluation by a physical therapist and or occupational therapist for assessment for limitations in activities of daily living  Assistance with developing an appropriate exercise routine for both muscle strengthening and cardiovascular health  Weight management  Studies have demonstrated that weight loss in overweight individuals can improve osteoarthritis symptoms.  Talk with your healthcare provider regarding your optimal weight and techniques for weight management if necessary.  Psychological  "treatments  Research shows that many psychological therapies or those that deal with thinking and emotions, help people cope with arthritis pain. Therapies include cognitive-behavioral therapy (CBT), pain coping skills training, biofeedback, stress management, and hypnosis. Ask your healthcare provider for more information about these therapies.  For more information about many of these methods, contact the National Center for Complementary and Alternative Medicine (NCCAM) at http://www.[facility].gov.   Date Last Reviewed: 2/14/2016 © 2000-2017 [employer]. [address] [place] [address]. All rights reserved. This information is not intended as a substitute for professional medical care. Always follow your healthcare professional's instructions.        Osteoarthritis: Tips for Daily Living     Lift items with both hands.   Making a few changes in your daily life can reduce stress on your joints. This helps protect the joints from further damage.  Your surroundings  Make your home work for you:  · Arrange cupboards, closets, desks, and drawers to reduce reaching and bending.  · Arrange furniture to make it safer and easier to get around.  · Secure or remove rugs, power cords, and other items that might make you slip or trip.  Think ahead  Plan in advance:  · Combine errands so that you make fewer trips up and down stairs.  · Break up packages so that you carry less weight with each trip. For example, ask cashiers to use more bags for your groceries.  · If you need help with chores or errands, try to arrange for it in advance.  · If you need to lift something heavy, ask for help.  · Try to use other parts of your body if you have pain in certain joints.  Use whats available  To rest your hands, back, and neck:  · Make sure that knives are sharp.  · Use a "reacher" or grasper to reach and grab.  · Use soap-on-a-rope and a long-handled scrubber in the shower or bath.  To rest your knees, " hips, and lower back:  · Wear shoes that feel good, fit well, and provide good support.  · Choose chairs with firm seats and armrests.  Assistive devices  Devices are available to help you:  · In the kitchen, use light-weight dishes, cook and bakeware.  · Attach larger handles to keys.  · Use helpful gripping devices for opening jars.   · For gardening, use a rolling bench to sit on or to hold your tools. Use tools with padded handles.  · In the bathroom, try using grab bars, a raised toilet seat, or a shower seat.  · A cane, brace, or walker may help you walk more easily. Make sure that its properly fitted and that youre trained to use it.  Date Last Reviewed: 2/14/2016 © 2000-2017 [employer]. [address] [place] [address]. All rights reserved. This information is not intended as a substitute for professional medical care. Always follow your healthcare professional's instructions.        Osteoarthritis Medicine    Pain from osteoarthritis can interfere with your life in many ways. It can make it hard to be active and take good care of yourself. Untreated pain may make sleep difficult. It may also contribute to depression and anxiety.  Controlling pain involves lifestyle changes like weight management and exercise. Natural and alternative treatments for pain relief include the use of hot and cold, massage, acupuncture, relaxation, and counseling. Other medicines are available to help relieve pain.  Over-the-counter medicines  Some arthritis medicines can be bought without a prescription:  · Acetaminophen is effective for moderate pain and does not cause stomach upset. It doesnt relieve swelling, though. You must talk with your healthcare provider before taking it if you have liver or kidney problems.   · Nonsteroidal anti-inflammatory medicines (NSAIDs), such as aspirin, ibuprofen, or naproxen, help relieve pain and swelling. Use of NSAIDs can cause stomach and kidney problems and  raise blood pressure. Note: Do not take NSAIDs if you take medicines that thin your blood, such as warfarin. Talk to your healthcare provider first if you have kidney or liver disease.   Prescription medicines  Some arthritis medicines need a prescription:  · Prescription NSAIDs are stronger than over-the-counter NSAIDs. They reduce pain and swelling. Use of NSAIDs may cause serious stomach problems and easy bruising. In rare cases they may lead to kidney or liver problems. These include nonselective NSAIDs, such as naproxen, diclofenac, and indomethacin. Also, selective NSAIDs, such as meloxicam and celecoxib. Selective NSAIDs are thought to have less of a risk of gastrointestinal side effects when compared to nonselective NSAIDs.   · Other medicines, such as duloxetine, tramadol, and narcotic pain relievers, may be prescribed for certain patients based on specific clinical factors.   Topical medicines  Topical medicines are those applied directly to the skin over the affected joint. They may be lotions, cream, sprays, ointments, or gels. They can be used along with some oral medicines:  · NSAID creams may reduce swelling and relieve pain.  · Capsaicin (cream) is made from an ingredient found in chili peppers. It works by stopping production of a substance that helps send pain signals to the brain. It may cause a burning or stinging feeling when you first use it.  · Other topical medicines provide pain relief by numbing the area to which they are applied.  Intra-articular medicines  Some patients benefit from joint injections with:  · Corticosteroids used for most joints  · Hyaluronic acid preparation used for knees   If one medicine doesn't work for you, another may help. If you have any questions or concerns about your current medicines or other medicines choices, talk with your healthcare provider.  Date Last Reviewed: 2/14/2016  © 1983-7614 [employer]. [address] [place] [address], [place] [address].  All rights reserved. This information is not intended as a substitute for professional medical care. Always follow your healthcare professional's instructions.        Preventing Osteoporosis: Avoiding Bone Loss  Certain factors can speed up bone loss or decrease bone growth. For example, alcohol, cigarettes, and certain medicines reduce bone mass. Some foods make it hard for your body to absorb calcium.    Things to avoid  Here are things to avoid to help prevent osteoporosis:  · Alcohol is toxic to bones. It is a major cause of bone loss. Heavy drinking can cause osteoporosis even if you have no other risk factors.  · Smoking reduces bone mass. Smoking may also interfere with estrogen levels and cause early menopause.  · Inactivity makes your bones lose strength and become thinner. Over time, thin bones may break. Women who aren't active are at a high risk for osteoporosis.  · Certain medicines, such as cortisone, increase bone loss. They also decrease bone growth. Ask your healthcare provider about any side effects of your medicines, and how to prevent them.  · Protein-rich or salty foods eaten in large amounts may deplete calcium.  · Caffeine increases calcium loss. People who drink a lot of coffee, tea, or shaji lose more calcium than those who don't.  Date Last Reviewed: 10/17/2015  © 0401-5146 [employer]. [address] [place] [address], [place] [address]. All rights reserved. This information is not intended as a substitute for professional medical care. Always follow your healthcare professional's instructions.        Preventing Osteoporosis: Staying Active  Certain factors can speed up bone loss or decrease bone growth. For example, a lack of activity makes bones lose their strength. Exercise plays a big part in maintaining bone mass, no matter what your age. The amount and type of activity you do also play a part in keeping your bones strong. Weight-bearing and resistance exercises, such as walking,  aerobic dancing, and bicycling, are just a few of the activities that are good for your bones.     Resistance exercises, such as weight training, help maintain bones by strengthening the muscles around them. Swimming is also a good choice.     · Check with your healthcare provider before starting any new exercise program.  · Stop any exercise that causes pain.   Date Last Reviewed: 10/17/2015  © 4612-3177 [employer]. [address] [place] [address], [place] [address]. All rights reserved. This information is not intended as a substitute for professional medical care. Always follow your healthcare professional's instructions.        Preventing Osteoporosis: Meeting Your Calcium Needs    Your body needs calcium to build and repair bones. But it can't make calcium on its own. That's why it's important to eat calcium-rich foods. Some foods are naturally rich in calcium. Others have calcium added (fortified). It's best to get calcium from the foods you eat. But if you can't get enough, you may want to take calcium supplements. To meet your daily calcium needs, try the foods listed below.  Dairy Fish & beans Other sources      Source   Calcium (mg) per serving   Source   Calcium (mg) per serving   Source   Calcium (mg) per serving      Low-fat yogurt, plain   415 mg/8 oz.   Sardines, Atlantic, canned, with bones   351 mg/3 oz.   Oatmeal, instant, fortified   215 mg/1 cup   Nonfat milk   302 mg/1 cup   [place], sockeye, canned, with bones   239 mg/3 oz.   Tofu made with calcium sulfate   204 mg/3 oz.   Low-fat milk   297 mg/1 cup   Soybeans, fresh, boiled   131 mg/1/2 cup   Collards   179 mg/1/2 cup   Swiss cheese   272 mg/1 oz.   White beans, cooked   81 mg/1/2 cup   English muffin, whole wheat   175 mg/1 muffin   Cheddar cheese   205 mg/1 oz.   Navy beans, cooked   79 mg/1/2 cup   Kale   90 mg/1/2 cup   Ice cream strawberry   79 mg/1/2 cup           Orange, navel   56 mg/1 medium   Note: Calcium levels may vary  depending on brand and size.  Daily calcium needs  14-18 years old: 1,300 mg  19-30 years old: 1,000 mg  31-50 years old: 1,000 mg  51-70 years old, women: 1,200 mg  51-70 years old, men: 1,000 mg  Pregnant or nursin-28 years old: 1,300 mg, 19-50 years old: 1,000 mg  Older than 70 (women and men): 1,200 mg   Date Last Reviewed: 10/17/2015  © 9385-6891 [employer]. [address] [place] [address]. All rights reserved. This information is not intended as a substitute for professional medical care. Always follow your healthcare professional's instructions.        Iliotibial Band Stretch (Flexibility)    1. Stand next to a chair. Hold onto the chair with your right hand for support. Cross your right leg behind your left leg.  2. Lean your right hip toward the right. Feel the stretch at the outside of your hip.  3. Hold for 30 to 60 seconds. Then relax.  4. Repeat 2 times, or as instructed.  5. Switch sides and repeat.  6. Do this 3 times a day, or as instructed.     Tip: Dont bend forward or twist at the waist.   Date Last Reviewed: 3/29/2016  © 4854-1692 [employer]. [address] [place] [address]. All rights reserved. This information is not intended as a substitute for professional medical care. Always follow your healthcare professional's instructions.        Understanding Trochanteric Bursitis    A bursa is a thin, slippery, sac-like film. It contains a small amount of fluid. This structure is found between bones and soft tissues in and around joints. A bursa cushions and protects a joint. It keeps parts of a joint from rubbing against each other. If a bursa becomes inflamed and irritated, it is known as bursitis.  The trochanteric bursa is found on the hip joint. It lies on top of the bump at the top of the thighbone called the greater trochanter. Inflammation of this bursa is called trochanteric bursitis.     How to say it  [id number]   Causes of  trochanteric bursitis  Causes may include:  · Overuse of the hip during running or other sports, dance, or work  · Falling on or irritation to the side of the hip  This condition may occur along with other problems, such as osteoarthritis of the hip or knee, or low back problems. In rare cases, it may occur after hip surgery.  Symptoms of trochanteric bursitis  · Pain or aching on the side of the hip. The pain may travel down the leg.  · Swelling, tenderness, or warmth on the side of the hip at the bony bump at the top of the thigh  Treatment for trochanteric bursitis  These may include:  · Resting the hip. This allows the bursa to heal.  · Prescription or over-the-counter pain medicines. These help reduce inflammation, swelling, and pain.  · Cold packs and heat packs. These help reduce pain and swelling.  · Stretching and strengthening exercises. These improve flexibility and strength around the hip.  · Physical therapy. This includes exercises or other treatments.  · Injections of medicine into the bursa. This may help reduce inflammation and relieve symptoms.  Possible complications  If you dont give your hip time to heal, the problem may not go away, may return, or may get worse. Rest and treat your hip as directed.     When to call your healthcare provider  Call your healthcare provider right away if you have any of these:  · Fever of 100.4°F (38°C) or higher, or as directed  · Redness, swelling, or warmth that gets worse  · Symptoms that dont get better with prescribed medicines, or get worse  · New symptoms   Date Last Reviewed: 3/29/2016  © 4797-6353 [employer]. [address] [place] [address]. All rights reserved. This information is not intended as a substitute for professional medical care. Always follow your healthcare professional's instructions.        Side Lying Hip Abduction (Strength)    7. Lie down on the floor on your side. Rest your head on your arm. Bend your legs at  the knees.  8. Keep your feet together and lift your top leg up so that your knees are . Keep your hips steady.     9. Slowly lower your leg back down.  10. Repeat 10 times, or as instructed.  11. Switch sides if instructed.     Challenge yourself  Put an elastic band or tubing around your thighs. Raise and lower your top leg slowly and steadily.      Date Last Reviewed: 3/29/2016  © 9905-9655 [employer]. [address] [place] [address], [place] [address]. All rights reserved. This information is not intended as a substitute for professional medical care. Always follow your healthcare professional's instructions.        Gabapentin capsules or tablets  What is this medicine?  GABAPENTIN (GA ba pen tin) is used to control partial seizures in adults with epilepsy. It is also used to treat certain types of nerve pain.  How should I use this medicine?  Take this medicine by mouth with a glass of water. Follow the directions on the prescription label. You can take it with or without food. If it upsets your stomach, take it with food.Take your medicine at regular intervals. Do not take it more often than directed. Do not stop taking except on your doctor's advice.  If you are directed to break the 600 or 800 mg tablets in half as part of your dose, the extra half tablet should be used for the next dose. If you have not used the extra half tablet within 28 days, it should be thrown away.  A special MedGuide will be given to you by the pharmacist with each prescription and refill. Be sure to read this information carefully each time.  Talk to your pediatrician regarding the use of this medicine in children. Special care may be needed.  What side effects may I notice from receiving this medicine?  Side effects that you should report to your doctor or health care professional as soon as possible:  · allergic reactions like skin rash, itching or hives, swelling of the face, lips, or tongue  · worsening of mood,  thoughts or actions of suicide or dying  Side effects that usually do not require medical attention (report to your doctor or health care professional if they continue or are bothersome):  · constipation  · difficulty walking or controlling muscle movements  · dizziness  · nausea  · slurred speech  · tiredness  · tremors  · weight gain  What may interact with this medicine?  Do not take this medicine with any of the following medications:  · other gabapentin products  This medicine may also interact with the following medications:  · alcohol  · antacids  · antihistamines for allergy, cough and cold  · certain medicines for anxiety or sleep  · certain medicines for depression or psychotic disturbances  · homatropine; hydrocodone  · naproxen  · narcotic medicines (opiates) for pain  · phenothiazines like chlorpromazine, mesoridazine, prochlorperazine, thioridazine  What if I miss a dose?  If you miss a dose, take it as soon as you can. If it is almost time for your next dose, take only that dose. Do not take double or extra doses.  Where should I keep my medicine?  Keep out of reach of children.  This medicine may cause accidental overdose and death if it taken by other adults, children, or pets. Mix any unused medicine with a substance like cat litter or coffee grounds. Then throw the medicine away in a sealed container like a sealed bag or a coffee can with a lid. Do not use the medicine after the expiration date.  Store at room temperature between 15 and 30 degrees C (59 and 86 degrees F).  What should I tell my health care provider before I take this medicine?  They need to know if you have any of these conditions:  · kidney disease  · suicidal thoughts, plans, or attempt; a previous suicide attempt by you or a family member  · an unusual or allergic reaction to gabapentin, other medicines, foods, dyes, or preservatives  · pregnant or trying to get pregnant  · breast-feeding  What should I watch for while using  this medicine?  Visit your doctor or health care professional for regular checks on your progress. You may want to keep a record at home of how you feel your condition is responding to treatment. You may want to share this information with your doctor or health care professional at each visit. You should contact your doctor or health care professional if your seizures get worse or if you have any new types of seizures. Do not stop taking this medicine or any of your seizure medicines unless instructed by your doctor or health care professional. Stopping your medicine suddenly can increase your seizures or their severity.  Wear a medical identification bracelet or chain if you are taking this medicine for seizures, and carry a card that lists all your medications.  You may get drowsy, dizzy, or have blurred vision. Do not drive, use machinery, or do anything that needs mental alertness until you know how this medicine affects you. To reduce dizzy or fainting spells, do not sit or stand up quickly, especially if you are an older patient. Alcohol can increase drowsiness and dizziness. Avoid alcoholic drinks.  Your mouth may get dry. Chewing sugarless gum or sucking hard candy, and drinking plenty of water will help.  The use of this medicine may increase the chance of suicidal thoughts or actions. Pay special attention to how you are responding while on this medicine. Any worsening of mood, or thoughts of suicide or dying should be reported to your health care professional right away.  Women who become pregnant while using this medicine may enroll in the [place] American Antiepileptic Drug Pregnancy Registry by calling 1-867.271.6470. This registry collects information about the safety of antiepileptic drug use during pregnancy.  NOTE:This sheet is a summary. It may not cover all possible information. If you have questions about this medicine, talk to your doctor, pharmacist, or health care provider. Copyright© 2017 [employer]

## 2019-05-23 NOTE — PROGRESS NOTES
RHEUMATOLOGY OUTPATIENT CLINIC NOTE    5/23/2019    Attending Rheumatologist: Nathaniel Moon  Primary Care Provider: Kamini Newton MD   Physician Requesting Consultation: Kamini Newton MD  [address] [place] [address] [place] [id number]  Chief Complaint/Reason For Consultation:  Osteoarthritis and osteoporosis.    Subjective:       HPI  Renea Bourgeois is a 69 y.o. White female with OA and osteoporosis referred for therapeutic recommendations.  Patient has history of breast cancer status post chemo, currently on Arimidex.  Longstanding history of osteoporosis, previously managed with Prolia (per documentation, last received 2014).  Patient has several years of any bone antiresorptive therapy.  Was not tried on oral biphosphonates due to history of GERD.  Patient's main complaint is chronic arthralgias, worse in hands and trochanteric bursa area bilaterally.  Previously received corticosteroid injection for trochanteric bursitis with good results.  Reports pain is dull, constant.  Progressively worsening, associated with finger deformities.  Minimal relieved with acetaminophen which she uses regularly.  No association with prolonged morning stiffness, swelling, rash,  or GI complaints.  No episodes of mechanical fall or bone fractures.  Currently not planned for any dental procedures.    Review of Systems   Constitutional: Negative for chills and fever.   Eyes: Negative for pain and redness.   Respiratory: Negative for cough and shortness of breath.    Cardiovascular: Negative for chest pain and leg swelling.   Gastrointestinal: Negative for blood in stool and melena.   Genitourinary: Negative for dysuria and urgency.   Musculoskeletal: Positive for joint pain. Negative for falls.   Skin: Negative for rash.   Neurological: Negative for tingling and weakness.   Psychiatric/Behavioral: Negative for memory loss. The patient does not have insomnia.        Chronic comorbid conditions affecting medical  decision making today:  Past Medical History:   Diagnosis Date    Allergy     Anemia 11/14/2017    Angina pectoris     followed by cardiology    Arthritis     Breast cancer     Right T1 Ductal Ca in situ,high oncotype Dx 33, Estrogen positive. Lumpectomy, TAC chemo x 6 cyscles then RT,on aromatase inhibitor    Cataract     Diabetes mellitus type II 2011    DM (diabetes mellitus) 2011     am 08/04/2017    Elevated BP without diagnosis of hypertension 11/27/2018    GERD (gastroesophageal reflux disease)     History of colon polyps 2/21/2018    The patient had adenomatous colon polyps in 2014.      Hyperlipidemia     Hypertension     Kidney stone     right side, passed    Osteopenia     Osteoporosis 4/12/2019    PVC (premature ventricular contraction)     on and off    Trouble in sleeping     Type 2 diabetes mellitus      Past Surgical History:   Procedure Laterality Date    BREAST BIOPSY      BREAST LUMPECTOMY  2/11/2011    right    CATARACT EXTRACTION Bilateral 10/14/15    CHOLECYSTECTOMY  1989    open    COLONOSCOPY N/A 2/22/2018    [facility]    COLONOSCOPY N/A 12/18/2014    [facility]den [facility] at [facility]    CYST REMOVAL Left 11/2017    foot    [place]    EXOSTECTOMY Left 11/24/2017    [facility] Cindy [facility]quin D[facility]    GALLBLADDER SURGERY      removed in 1989    GANGLIONECTOMY Left 11/24/2017    [facility]    Nasal septal deviation repair  2009    toenail edges removed      TONSILLECTOMY  1959    TOTAL REDUCTION MAMMOPLASTY Left     2015     Family History   Problem Relation Age of Onset    Diabetes Father     Hypertension Father     Stroke Father     Cancer Father 65        metastatic when diagnosed    Stroke Brother     Cancer Other         kidney    Atrial fibrillation Son         35    Heart disease Son     Alzheimer's  disease Mother     Parkinsonism Brother     Cancer Paternal Grandfather         prostate    Glaucoma Maternal Grandmother     Psoriasis Cousin     Alcohol abuse Neg Hx     Drug abuse Neg Hx     COPD Neg Hx     Birth defects Neg Hx     Mental retardation Neg Hx     Mental illness Neg Hx     Kidney disease Neg Hx     Hyperlipidemia Neg Hx     Melanoma Neg Hx     Lupus Neg Hx      Social History     Substance and Sexual Activity   Alcohol Use No    Alcohol/week: 0.0 oz     Social History     Tobacco Use   Smoking Status Never Smoker   Smokeless Tobacco Never Used     Social History     Substance and Sexual Activity   Drug Use No       Current Outpatient Medications:     ACCU-CHEK JD PLUS TEST STRP Strp, TEST three times a day, Disp: 100 strip, Rfl: 11    acetaminophen (TYLENOL) 500 MG tablet, Take 500 mg by mouth every 6 (six) hours as needed for Pain., Disp: , Rfl:     ALCOHOL PREP PADS PadM, , Disp: , Rfl:     anastrozole (ARIMIDEX) 1 mg Tab, TAKE 1 TABLET(1 MG) BY MOUTH EVERY DAY, Disp: 90 tablet, Rfl: 0    cholecalciferol, vitamin D3, (VITAMIN D3) 1,000 unit capsule, Take 1 capsule (1,000 Units total) by mouth once daily., Disp: , Rfl: 0    ferrous gluconate (FERGON) 240 (27 FE) MG tablet, Take 1 tablet (240 mg total) by mouth once daily., Disp: , Rfl:     fluticasone (FLONASE) 50 mcg/actuation nasal spray, 2 sprays by Each Nare route daily as needed. 2 Spray, Suspension Nasal Every day, Disp: 16 g, Rfl: 5    glimepiride (AMARYL) 1 MG tablet, TAKE 1 TABLET(1 MG) BY MOUTH EVERY DAY, Disp: 90 tablet, Rfl: 0    lancets (ACCU-CHEK SOFTCLIX LANCETS) Misc, 1 each by Misc.(Non-Drug; Combo Route) route 3 (three) times daily., Disp: 100 each, Rfl: 11    lancing device Misc, , Disp: , Rfl:     metoprolol succinate (TOPROL-XL) 25 MG 24 hr tablet, Take 25 mg by mouth every evening. , Disp: , Rfl: 1    naproxen sodium (ALEVE) 220 MG tablet, Take 220 mg by mouth as needed., Disp: , Rfl:      rosuvastatin (CRESTOR) 10 MG tablet, TAKE 1 TABLET BY MOUTH ONCE DAILY, Disp: 90 tablet, Rfl: 1    traZODone (DESYREL) 100 MG tablet, TAKE 1 TABLET BY MOUTH AT BEDTIME, Disp: 90 tablet, Rfl: 1    diclofenac sodium (VOLTAREN) 1 % Gel, Apply 2 g topically once daily., Disp: 1 Tube, Rfl: 2    gabapentin (NEURONTIN) 100 MG capsule, Take 1 capsule (100 mg total) by mouth 3 (three) times daily., Disp: 90 capsule, Rfl: 2     Objective:         Vitals:    05/23/19 1026   BP: (!) 150/58     Physical Exam   Nursing note and vitals reviewed.  Constitutional: She is oriented to person, place, and time and well-developed, well-nourished, and in no distress. No distress.   HENT:   Head: Normocephalic.   no oral ulcers, normal pooling of saliva.  no parotid enlargement.   Eyes: Conjunctivae are normal. Pupils are equal, round, and reactive to light.   Absent Episcleritis/scleritis.   Neck: Normal range of motion.   Cardiovascular: Normal rate and intact distal pulses.    Pulmonary/Chest: Effort normal. No respiratory distress.   Abdominal: Soft. She exhibits no distension.   Neurological: She is alert and oriented to person, place, and time. Gait normal.   absent sensory deficits  muscle strength 5/5 through.     Step up and go test less than 12 sec   Skin: No rash noted. No erythema.     Musculoskeletal: Normal range of motion. She exhibits tenderness (Slight tenderness DIPs, PIPs.  Tenderness to palpation trochanteric bursa left more than the right.) and deformity (Heberden's, Ute's.). She exhibits no edema.   : strong.  No synovitis.    AROM: Decreased flexion on some fingers.  Otherwise, intact  PROM:  As above    Devices used by patient: none    + crepitus.       Reviewed old and all outside pertinent medical records available.    All lab results personally reviewed and interpreted by me.  Lab Results   Component Value Date    WBC 5.53 11/13/2018    HGB 12.9 11/13/2018    HCT 39.9 11/13/2018    MCV 93 11/13/2018     MCH 30.1 11/13/2018    MCHC 32.3 11/13/2018    RDW 12.7 11/13/2018     11/13/2018    MPV 12.0 11/13/2018    PLTEST Sl decreased (A) 07/21/2011       Lab Results   Component Value Date     04/30/2019    K 3.4 (L) 04/30/2019     04/30/2019    CO2 27 04/30/2019     04/30/2019    BUN 12 04/30/2019    CALCIUM 9.8 04/30/2019    PROT 7.0 04/30/2019    ALBUMIN 4.1 04/30/2019    BILITOT 0.7 04/30/2019    AST 21 04/30/2019    ALKPHOS 88 04/30/2019    ALT 22 04/30/2019       Lab Results   Component Value Date    COLORU Dk straw 10/16/2013    APPEARANCEUA CLEAR 08/20/2013    SPECGRAV 1.010 10/16/2013    PHUR 6 10/16/2013    PROTEINUA Negative 08/20/2013    KETONESU neg 10/16/2013    LEUKOCYTESUR Negative 08/20/2013    NITRITE neg 10/16/2013    UROBILINOGEN normal 10/16/2013       No results found for: CRP    Lab Results   Component Value Date    SEDRATE 10 03/08/2010       Lab Results   Component Value Date    SANTINO Negative 09/29/2006    RF Negative 09/29/2006    SEDRATE 10 03/08/2010       No components found for: 25OHVITDTOT, 60EUEGYR8, 46CRGLBK8, METHODNOTE    No results found for: URICACID    No components found for: TSPOTTB    · Vitamin-D 22 (4/2019)    Rheum Labs:  SANTINO negative  Rheumatoid factor negative     Infectious Labs:  HCV nonreactive     Imaging:  All imaging reviewed and independently  interpreted by me.    X-ray hands July 2015  prominent joint space narrowing and osteophyte formation involving the interphalangeal joints bilaterally.     X-ray foot January 2018  Alternatively degenerative changes throughout the foot.  Early dorsal and plantar calcaneal spurs.  No acute fracture or dislocation.  No discrete focal erosion or periosteal reaction.    X-ray knee March 2018  Minimal bilateral degenerative changes noted throughout the all 3 compartments.  Equivocal small right effusion.    DEXA scan  December 2014 July 2017  FN: -1.3   -1.5   LS: -2    -2.5     ASSESSMENT / PLAN:      Renea Bourgeois is a 69 y.o. White female with:    1. Primary osteoarthritis involving multiple joints  - history of present illness current findings consistent with DJD.  - Discussed and recommended exercise (jose alberto non wt-bearing/swimming)  - resting affected joint for brief periods (<12 h)  - PT/OT, joint braces/splints PRN  - stretching, massage, heat, paraffin wax  - range of motion, flexibility, strengthening exercises for trochanteric bursitis.  - Acetaminophen prn -> standing up to 3 g per day  - gabapentin trial.  - Topicals therapy: Capsaicin / NSAIDs   - consider for corticosteroid injection trochanteric bursa next visit if refractory.  - gabapentin (NEURONTIN) 100 MG capsule; Take 1 capsule (100 mg total) by mouth 3 (three) times daily.  Dispense: 90 capsule; Refill: 2  - diclofenac sodium (VOLTAREN) 1 % Gel; Apply 2 g topically once daily.  Dispense: 1 Tube; Refill: 2    2. History of osteoporosis  - prior management with Prolia, last received several years ago.  - history of GERD.  Creatinine clearance 67.  - scheduled for repeat DEXA in July, will try to expedite study.  Depending on results, may consider Reclast versus Prolia.  - continue with calcium vitamin-D supplementation.  Recheck levels prior to next visit.  - Avoid immobility, fall prevention    - CBC, vitamin-D    3. Other specified counseling  - over 10 minutes spent regarding below topics:  - Nutrition and exercise counseling.  - Regular exercise:  Aerobic and resistance.  - Medication counseling provided.    Follow up in about 2 months (around 8/1/2019).    Method of contact with patient concerns: [facility]    Disclaimer:  This note is prepared using voice recognition software and as such is likely to have errors and has not been proof read. Please contact me for questions.     Time spent: 60 minutes in face to face discussion concerning diagnosis, prognosis, review of lab and test results, benefits of treatment as well  as management of disease, counseling of patient and coordination of care between various health care providers.  Greater than half the time spent was used for coordination of care and counseling of patient.    Nathaniel Moon M.D.  Rheumatology Department   [facility]

## 2019-05-27 ENCOUNTER — APPOINTMENT (OUTPATIENT)
Dept: RADIOLOGY | Facility: HOSPITAL | Age: 69
End: 2019-05-27
Attending: FAMILY MEDICINE
Payer: MEDICARE

## 2019-05-27 DIAGNOSIS — M81.0 OSTEOPOROSIS, UNSPECIFIED OSTEOPOROSIS TYPE, UNSPECIFIED PATHOLOGICAL FRACTURE PRESENCE: ICD-10-CM

## 2019-05-27 PROCEDURE — 77080 DXA BONE DENSITY AXIAL: CPT | Mod: TC,HCNC

## 2019-05-27 PROCEDURE — 77080 DXA BONE DENSITY AXIAL: CPT | Mod: 26,HCNC,, | Performed by: RADIOLOGY

## 2019-05-27 PROCEDURE — 77080 DEXA BONE DENSITY SPINE HIP: ICD-10-PCS | Mod: 26,HCNC,, | Performed by: RADIOLOGY

## 2019-05-30 ENCOUNTER — HOSPITAL ENCOUNTER (OUTPATIENT)
Dept: RADIOLOGY | Facility: HOSPITAL | Age: 69
Discharge: HOME | End: 2019-05-30
Attending: INTERNAL MEDICINE
Payer: MEDICARE

## 2019-05-30 DIAGNOSIS — Z17.0 MALIGNANT NEOPLASM OF UPPER-OUTER QUADRANT OF RIGHT BREAST IN FEMALE, ESTROGEN RECEPTOR POSITIVE: ICD-10-CM

## 2019-05-30 DIAGNOSIS — C50.411 MALIGNANT NEOPLASM OF UPPER-OUTER QUADRANT OF RIGHT BREAST IN FEMALE, ESTROGEN RECEPTOR POSITIVE: ICD-10-CM

## 2019-05-30 PROCEDURE — A9552 F18 FDG: HCPCS | Mod: HCNC

## 2019-05-30 PROCEDURE — 78815 PET IMAGE W/CT SKULL-THIGH: CPT | Mod: 26,HCNC,PI, | Performed by: RADIOLOGY

## 2019-05-30 PROCEDURE — 78815 PET IMAGE W/CT SKULL-THIGH: CPT | Mod: TC,HCNC,PS

## 2019-05-30 PROCEDURE — 78815 NM PET CT ROUTINE: ICD-10-PCS | Mod: 26,HCNC,PI, | Performed by: RADIOLOGY

## 2019-05-31 ENCOUNTER — OFFICE VISIT (OUTPATIENT)
Dept: HEMATOLOGY/ONCOLOGY | Facility: CLINIC | Age: 69
End: 2019-05-31
Payer: MEDICARE

## 2019-05-31 VITALS
SYSTOLIC BLOOD PRESSURE: 129 MMHG | RESPIRATION RATE: 18 BRPM | DIASTOLIC BLOOD PRESSURE: 65 MMHG | OXYGEN SATURATION: 98 % | BODY MASS INDEX: 26.55 KG/M2 | HEIGHT: 61 IN | WEIGHT: 140.63 LBS | TEMPERATURE: 98 F | HEART RATE: 68 BPM

## 2019-05-31 DIAGNOSIS — C50.411 MALIGNANT NEOPLASM OF UPPER-OUTER QUADRANT OF RIGHT BREAST IN FEMALE, ESTROGEN RECEPTOR POSITIVE: Primary | ICD-10-CM

## 2019-05-31 DIAGNOSIS — Z17.0 MALIGNANT NEOPLASM OF UPPER-OUTER QUADRANT OF RIGHT BREAST IN FEMALE, ESTROGEN RECEPTOR POSITIVE: Primary | ICD-10-CM

## 2019-05-31 PROCEDURE — 99999 PR PBB SHADOW E&M-EST. PATIENT-LVL III: ICD-10-PCS | Mod: PBBFAC,HCNC,, | Performed by: INTERNAL MEDICINE

## 2019-05-31 PROCEDURE — 99214 PR OFFICE/OUTPT VISIT, EST, LEVL IV, 30-39 MIN: ICD-10-PCS | Mod: HCNC,S$GLB,, | Performed by: INTERNAL MEDICINE

## 2019-05-31 PROCEDURE — 3074F PR MOST RECENT SYSTOLIC BLOOD PRESSURE < 130 MM HG: ICD-10-PCS | Mod: HCNC,CPTII,S$GLB, | Performed by: INTERNAL MEDICINE

## 2019-05-31 PROCEDURE — 3078F DIAST BP <80 MM HG: CPT | Mod: HCNC,CPTII,S$GLB, | Performed by: INTERNAL MEDICINE

## 2019-05-31 PROCEDURE — 3078F PR MOST RECENT DIASTOLIC BLOOD PRESSURE < 80 MM HG: ICD-10-PCS | Mod: HCNC,CPTII,S$GLB, | Performed by: INTERNAL MEDICINE

## 2019-05-31 PROCEDURE — 99999 PR PBB SHADOW E&M-EST. PATIENT-LVL III: CPT | Mod: PBBFAC,HCNC,, | Performed by: INTERNAL MEDICINE

## 2019-05-31 PROCEDURE — 99214 OFFICE O/P EST MOD 30 MIN: CPT | Mod: HCNC,S$GLB,, | Performed by: INTERNAL MEDICINE

## 2019-05-31 PROCEDURE — 1101F PR PT FALLS ASSESS DOC 0-1 FALLS W/OUT INJ PAST YR: ICD-10-PCS | Mod: HCNC,CPTII,S$GLB, | Performed by: INTERNAL MEDICINE

## 2019-05-31 PROCEDURE — 1101F PT FALLS ASSESS-DOCD LE1/YR: CPT | Mod: HCNC,CPTII,S$GLB, | Performed by: INTERNAL MEDICINE

## 2019-05-31 PROCEDURE — 3074F SYST BP LT 130 MM HG: CPT | Mod: HCNC,CPTII,S$GLB, | Performed by: INTERNAL MEDICINE

## 2019-05-31 NOTE — PROGRESS NOTES
Subjective:       Patient ID: Renea Bourgeois is a 69 y.o. female.    Chief Complaint: Follow-up; Results; and Breast Cancer    HPI 69-year-old female high Oncotype recurrence score T1 breast cancer patient here for follow-up status post systemic chemotherapy and aromatase inhibitor.  Patient had PET scan for back and arm pain    Past Medical History:   Diagnosis Date    Allergy     Anemia 11/14/2017    Angina pectoris     followed by cardiology    Arthritis     Breast cancer     Right T1 Ductal Ca in situ,high oncotype Dx 33, Estrogen positive. Lumpectomy, TAC chemo x 6 cyscles then RT,on aromatase inhibitor    Cataract     Diabetes mellitus type II 2011    DM (diabetes mellitus) 2011     am 08/04/2017    Elevated BP without diagnosis of hypertension 11/27/2018    GERD (gastroesophageal reflux disease)     History of colon polyps 2/21/2018    The patient had adenomatous colon polyps in 2014.      Hyperlipidemia     Hypertension     Kidney stone     right side, passed    Osteopenia     Osteoporosis 4/12/2019    PVC (premature ventricular contraction)     on and off    Trouble in sleeping     Type 2 diabetes mellitus      Family History   Problem Relation Age of Onset    Diabetes Father     Hypertension Father     Stroke Father     Cancer Father 65        metastatic when diagnosed    Stroke Brother     Cancer Other         kidney    Atrial fibrillation Son         35    Heart disease Son     Alzheimer's disease Mother     Parkinsonism Brother     Cancer Paternal Grandfather         prostate    Glaucoma Maternal Grandmother     Psoriasis Cousin     Alcohol abuse Neg Hx     Drug abuse Neg Hx     COPD Neg Hx     Birth defects Neg Hx     Mental retardation Neg Hx     Mental illness Neg Hx     Kidney disease Neg Hx     Hyperlipidemia Neg Hx     Melanoma Neg Hx     Lupus Neg Hx      Social History     Socioeconomic History    Marital status:      Spouse name: Agustín     Number of children: 4    Years of education: Not on file    Highest education level: Not on file   Occupational History    Occupation: Retired Teacher     Comment: [employer]   Social Needs    Financial resource strain: Not on file    Food insecurity:     Worry: Not on file     Inability: Not on file    Transportation needs:     Medical: Not on file     Non-medical: Not on file   Tobacco Use    Smoking status: Never Smoker    Smokeless tobacco: Never Used   Substance and Sexual Activity    Alcohol use: No     Alcohol/week: 0.0 oz    Drug use: No    Sexual activity: Not Currently     Partners: Male     Birth control/protection: Post-menopausal   Lifestyle    Physical activity:     Days per week: Not on file     Minutes per session: Not on file    Stress: Not on file   Relationships    Social connections:     Talks on phone: Not on file     Gets together: Not on file     Attends Orthodox service: Not on file     Active member of club or organization: Not on file     Attends meetings of clubs or organizations: Not on file     Relationship status: Not on file   Other Topics Concern    Are you pregnant or think you may be? 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 Caffeine intake;1-2 per week - dennis cohunter. Decaffinated tea and most beveridges. Does have a living will - at home in her filing cabinet.      Past Surgical History:   Procedure Laterality Date    BREAST BIOPSY      BREAST LUMPECTOMY  2/11/2011    right    CATARACT EXTRACTION Bilateral 10/14/15    CHOLECYSTECTOMY  1989    open    COLONOSCOPY N/A 2/22/2018    [facility]    COLONOSCOPY N/A 12/18/2014    [facility] by [facility] Walden [facility] at [facility]    CYST REMOVAL Left 11/2017    foot    DILATION AND CURETTAGE [place]TERUS  10/2010    [place]    EXOSTECTOMY Left 11/24/2017    [facility] Cindy [facility]quin  [facility] at [facility]    GALLBLADDER SURGERY      removed in 1989    GANGLIONECTOMY Left 11/24/2017    Performed by [facility]indy [facility]quin, D[facility] at [facility] OR    Nasal septal deviation repair  2009    toenail edges removed      TONSILLECTOMY  1959    TOTAL REDUCTION MAMMOPLASTY Left     2015       Labs:  Lab Results   Component Value Date    WBC 5.53 11/13/2018    HGB 12.9 11/13/2018    HCT 39.9 11/13/2018    MCV 93 11/13/2018     11/13/2018     BMP  Lab Results   Component Value Date     04/30/2019    K 3.4 (L) 04/30/2019     04/30/2019    CO2 27 04/30/2019    BUN 12 04/30/2019    CREATININE 0.8 04/30/2019    CALCIUM 9.8 04/30/2019    ANIONGAP 9 04/30/2019    ESTGFRAFRICA >60.0 04/30/2019    EGFRNONAA >60.0 04/30/2019     Lab Results   Component Value Date    ALT 22 04/30/2019    AST 21 04/30/2019    GGT 69 (H) 02/09/2010    ALKPHOS 88 04/30/2019    BILITOT 0.7 04/30/2019       Lab Results   Component Value Date    IRON 93 02/15/2018    TIBC 413 02/15/2018    FERRITIN 35 02/15/2018     Lab Results   Component Value Date    VMVTPIUI39 255 02/09/2012     Lab Results   Component Value Date    FOLATE 11.5 02/09/2012     Lab Results   Component Value Date    TSH 1.441 04/27/2018         Review of Systems   Constitutional: Negative for activity change, appetite change, chills, diaphoresis, fatigue, fever and unexpected weight change.   HENT: Negative for congestion, dental problem, drooling, ear discharge, ear pain, facial swelling, hearing loss, mouth sores, nosebleeds, postnasal drip, rhinorrhea, sinus pressure, sneezing, sore throat, tinnitus, trouble swallowing and voice change.    Eyes: Negative for photophobia, pain, discharge, redness, itching and visual disturbance.   Respiratory: Negative for cough, choking, chest tightness, shortness of breath, wheezing and stridor.    Cardiovascular: Negative for chest pain, palpitations and leg swelling.   Gastrointestinal: Negative for abdominal distention, abdominal  pain, anal bleeding, blood in stool, constipation, diarrhea, nausea, rectal pain and vomiting.   Endocrine: Negative for cold intolerance, heat intolerance, polydipsia, polyphagia and polyuria.   Genitourinary: Negative for decreased urine volume, difficulty urinating, dyspareunia, dysuria, enuresis, flank pain, frequency, genital sores, hematuria, menstrual problem, pelvic pain, urgency, vaginal bleeding, vaginal discharge and vaginal pain.   Musculoskeletal: Negative for arthralgias, back pain, gait problem, joint swelling, myalgias, neck pain and neck stiffness.   Skin: Negative for color change, pallor and rash.   Allergic/Immunologic: Negative for environmental allergies, food allergies and immunocompromised state.   Neurological: Negative for dizziness, tremors, seizures, syncope, facial asymmetry, speech difficulty, weakness, light-headedness, numbness and headaches.   Hematological: Negative for adenopathy. Does not bruise/bleed easily.   Psychiatric/Behavioral: Positive for dysphoric mood. Negative for agitation, behavioral problems, confusion, decreased concentration, hallucinations, self-injury, sleep disturbance and suicidal ideas. The patient is nervous/anxious. The patient is not hyperactive.        Objective:      Physical Exam   Constitutional: She is oriented to person, place, and time. She appears well-developed and well-nourished. She appears distressed.   HENT:   Head: Normocephalic and atraumatic.   Right Ear: External ear normal.   Left Ear: External ear normal.   Nose: Nose normal. Right sinus exhibits no maxillary sinus tenderness and no frontal sinus tenderness. Left sinus exhibits no maxillary sinus tenderness and no frontal sinus tenderness.   Mouth/Throat: Oropharynx is clear and moist. No oropharyngeal exudate.   Eyes: Pupils are equal, round, and reactive to light. Conjunctivae, EOM and lids are normal. Right eye exhibits no discharge. Left eye exhibits no discharge. Right conjunctiva  is not injected. Right conjunctiva has no hemorrhage. Left conjunctiva is not injected. Left conjunctiva has no hemorrhage. No scleral icterus.   Neck: Normal range of motion. Neck supple. No JVD present. No tracheal deviation present. No thyromegaly present.   Cardiovascular: Normal rate and regular rhythm.   Pulmonary/Chest: Effort normal. No stridor. No respiratory distress. She exhibits no tenderness.   Abdominal: Soft. She exhibits no distension and no mass. There is no splenomegaly or hepatomegaly. There is no tenderness. There is no rebound.   Musculoskeletal: Normal range of motion. She exhibits no edema or tenderness.   Lymphadenopathy:     She has no cervical adenopathy.     She has no axillary adenopathy.        Right: No supraclavicular adenopathy present.        Left: No supraclavicular adenopathy present.   Neurological: She is alert and oriented to person, place, and time. No cranial nerve deficit. Coordination normal.   Skin: Skin is dry. No rash noted. She is not diaphoretic. No erythema.   Psychiatric: She has a normal mood and affect. Her behavior is normal. Judgment and thought content normal.   Vitals reviewed.          Assessment:      1. Malignant neoplasm of upper-outer quadrant of right breast in female, estrogen receptor positive           Plan:     Results of PET scan is negative at this time no evidence of recurrence reassurance given continue with current treatment recommendations follow-up in July of 2019 with nurse practitioner with repeat mammogram Psychosocial Distress screening score of Distress Score: 2 noted and reviewed. No intervention indicated.        Reza Jones Jr, MD FACP

## 2019-05-31 NOTE — PROGRESS NOTES
Distress Screening Results: Psychosocial Distress screening score of Distress Score: 2 {AMB ONC DISTRESS SCORE:66299}

## 2019-06-19 ENCOUNTER — OFFICE VISIT (OUTPATIENT)
Dept: DIABETES | Facility: CLINIC | Age: 69
End: 2019-06-19
Payer: MEDICARE

## 2019-06-19 VITALS
HEIGHT: 61 IN | BODY MASS INDEX: 26.98 KG/M2 | WEIGHT: 142.88 LBS | SYSTOLIC BLOOD PRESSURE: 140 MMHG | DIASTOLIC BLOOD PRESSURE: 80 MMHG

## 2019-06-19 DIAGNOSIS — I15.2 HYPERTENSION ASSOCIATED WITH DIABETES: ICD-10-CM

## 2019-06-19 DIAGNOSIS — E11.59 HYPERTENSION ASSOCIATED WITH DIABETES: ICD-10-CM

## 2019-06-19 DIAGNOSIS — E78.5 HYPERLIPIDEMIA ASSOCIATED WITH TYPE 2 DIABETES MELLITUS: ICD-10-CM

## 2019-06-19 DIAGNOSIS — E11.9 TYPE 2 DIABETES MELLITUS WITHOUT COMPLICATION, WITHOUT LONG-TERM CURRENT USE OF INSULIN: Primary | ICD-10-CM

## 2019-06-19 DIAGNOSIS — E11.69 HYPERLIPIDEMIA ASSOCIATED WITH TYPE 2 DIABETES MELLITUS: ICD-10-CM

## 2019-06-19 LAB — GLUCOSE SERPL-MCNC: 93 MG/DL (ref 70–110)

## 2019-06-19 PROCEDURE — 99999 PR PBB SHADOW E&M-EST. PATIENT-LVL III: ICD-10-PCS | Mod: PBBFAC,HCNC,, | Performed by: NURSE PRACTITIONER

## 2019-06-19 PROCEDURE — 99214 OFFICE O/P EST MOD 30 MIN: CPT | Mod: HCNC,S$GLB,, | Performed by: NURSE PRACTITIONER

## 2019-06-19 PROCEDURE — 99214 PR OFFICE/OUTPT VISIT, EST, LEVL IV, 30-39 MIN: ICD-10-PCS | Mod: HCNC,S$GLB,, | Performed by: NURSE PRACTITIONER

## 2019-06-19 PROCEDURE — 82962 POCT GLUCOSE, HAND-HELD DEVICE: ICD-10-PCS | Mod: HCNC,S$GLB,, | Performed by: NURSE PRACTITIONER

## 2019-06-19 PROCEDURE — 82962 GLUCOSE BLOOD TEST: CPT | Mod: HCNC,S$GLB,, | Performed by: NURSE PRACTITIONER

## 2019-06-19 PROCEDURE — 99999 PR PBB SHADOW E&M-EST. PATIENT-LVL III: CPT | Mod: PBBFAC,HCNC,, | Performed by: NURSE PRACTITIONER

## 2019-06-19 NOTE — PROGRESS NOTES
"PCP: Kamini Newton MD    Subjective:     Chief Complaint: Diabetes Follow Up    HISTORY OF PRESENT ILLNESS: 69 y.o.  female presenting, with , for diabetes follow up. Patient has had Type II diabetes since 2010 and has the following complications: none. She is to be enrolled in diabetes education classes. She has a history of breast cancer, treated in 2010 with remission since then.  No longer taking metformin due to SE.      Blood glucose testing is performed regularly, usually every am. Per meter, fasting BGs are 78 - 111, < 150 >.  She denies having hypoglycemia.  She denies any recent hospital admissions or emergency room visits.     Height: 5' 1" (154.9 cm)  //  Weight: 64.8 kg (142 lb 13.7 oz), Body mass index is 26.99 kg/m².  Home Blood Glucose reading this AM: 101 mg/dl fasting  Her blood sugar in clinic today is: 93 mg/dl at 1:18 pm      Labs Reviewed.       DM MEDICATIONS:   Glimepiride 1 mg before breakfast    Review of Systems   Constitutional: Negative for appetite change, fatigue and unexpected weight change.   Eyes: Negative for visual disturbance.   Gastrointestinal: Negative for abdominal pain, constipation, diarrhea and nausea.   Endocrine: Negative for polydipsia, polyphagia and polyuria.   Neurological: Negative for dizziness, numbness and headaches.   Psychiatric/Behavioral: Negative for confusion and decreased concentration. The patient is not nervous/anxious.        Diabetes Management Status  Statin: Taking  ACE/ARB: Not taking    Screening or Prevention Patient's value Goal Complete/Controlled?   HgA1C Testing and Control   Lab Results   Component Value Date    HGBA1C 6.0 (H) 04/30/2019      Annually/Less than 8% Yes   Lipid profile : 04/30/2019 Annually Yes   LDL control Lab Results   Component Value Date    LDLCALC 81.0 04/30/2019    Annually/Less than 100 mg/dl  Yes   Nephropathy screening Lab Results   Component Value Date    MICALBCREAT 7.5 05/23/2019     Lab " Results   Component Value Date    PROTEINUA Negative 08/20/2013    Annually Yes   Blood pressure BP Readings from Last 1 Encounters:   06/19/19 (!) 140/80    Less than 140/90 Yes   Dilated retinal exam : 09/07/2018 Annually Yes, Dr. Xiong   Foot exam   : 05/17/2019 Annually Yes     ACTIVITY LEVEL: Moderately Active. Discussed activities, benefits, methods, and precautions.  MEAL PLANNING: Patient reports number of meals per day to be 3 and number of snacks per day to be 2.   Patient is encouraged to carb count and consume no more than 45 - 60 grams of carbohydrates in each meal, and 1800 k / razia per day.      BLOOD GLUCOSE TESTING:  Acc-Check meter, one time daily  SOCIAL HISTORY: . Never smoker.    Objective:      Physical Exam   Constitutional: She appears well-developed and well-nourished.   HENT:   Head: Normocephalic and atraumatic.   Eyes: Pupils are equal, round, and reactive to light. EOM are normal.   Neck: Normal range of motion.   Cardiovascular: Normal rate and regular rhythm.   Pulses:       Dorsalis pedis pulses are 2+ on the right side, and 2+ on the left side.   Pulmonary/Chest: Effort normal and breath sounds normal.   Musculoskeletal: Normal range of motion.   Feet:   Right Foot:   Protective Sensation: 6 sites tested. 6 sites sensed.   Skin Integrity: Negative for ulcer, callus or dry skin.   Left Foot:   Protective Sensation: 6 sites tested. 6 sites sensed.   Skin Integrity: Negative for ulcer, blister, callus or dry skin.   Skin: Skin is warm and dry. No rash noted.   Psychiatric: She has a normal mood and affect. Her behavior is normal. Judgment and thought content normal.       Assessment / Plan:     1.) Type 2 diabetes mellitus without complication, without long-term current use of insulin  Comments:  -  Controlled. Continue glimepiride 1 mg before breakfast. She denies symptoms of hypoglycemia.    Orders:  -     POCT Glucose, Hand-Held Device    2.) Hypertension associated with  diabetes - continue meds as prescribed.    3.) Hyperlipidemia associated with type 2 diabetes mellitus- continue meds as prescribed.    Additional Plan Details:    1.) Continue monitoring blood sugar 1 x daily, rotating times. Discussed ADA goal for fasting blood sugar, 80 - 130 mg/dL; pp blood sugars below 180 mg/dl. Also, discussed prevention of hypoglycemia and the need to adjust goals to higher levels if persistent hypoglycemia.  Reminded to bring BG records or meter to each visit for review.  2.) Return to clinic in 1 year for follow up. The patient was explained the above plan and given opportunity to ask questions.  She understands, chooses and consents to this plan and accepts all the risks, which include but are not limited to the risks mentioned above. She understands the alternative of having no testing, interventions or treatments at this time. She left content and without further questions.     Andree Coyne, NP-C, CDE    A total of 30 minutes was spent in face to face time, of which over 50 % was spent in counseling patient on disease process, complications, treatment, and side effects of medications.

## 2019-06-28 ENCOUNTER — OFFICE VISIT (OUTPATIENT)
Dept: URGENT CARE | Facility: CLINIC | Age: 69
End: 2019-06-28
Payer: MEDICARE

## 2019-06-28 VITALS
WEIGHT: 142 LBS | HEIGHT: 61 IN | OXYGEN SATURATION: 98 % | DIASTOLIC BLOOD PRESSURE: 68 MMHG | SYSTOLIC BLOOD PRESSURE: 146 MMHG | HEART RATE: 77 BPM | BODY MASS INDEX: 26.81 KG/M2 | RESPIRATION RATE: 14 BRPM | TEMPERATURE: 98 F

## 2019-06-28 DIAGNOSIS — T15.92XA EYE FOREIGN BODY, LEFT, INITIAL ENCOUNTER: Primary | ICD-10-CM

## 2019-06-28 PROCEDURE — 99024 POSTOP FOLLOW-UP VISIT: CPT | Mod: HCNC,S$GLB,, | Performed by: PHYSICIAN ASSISTANT

## 2019-06-28 PROCEDURE — 99999 PR PBB SHADOW E&M-EST. PATIENT-LVL V: CPT | Mod: PBBFAC,HCNC,, | Performed by: PHYSICIAN ASSISTANT

## 2019-06-28 PROCEDURE — 99024 PR POST-OP FOLLOW-UP VISIT: ICD-10-PCS | Mod: HCNC,S$GLB,, | Performed by: PHYSICIAN ASSISTANT

## 2019-06-28 PROCEDURE — 99999 PR PBB SHADOW E&M-EST. PATIENT-LVL V: ICD-10-PCS | Mod: PBBFAC,HCNC,, | Performed by: PHYSICIAN ASSISTANT

## 2019-06-29 NOTE — PATIENT INSTRUCTIONS
Irrigate eye   lacrilube   Follow-up with Ophthalmologist or go to ED if eye irritation returns once drops wear off

## 2019-07-01 DIAGNOSIS — C50.411 MALIGNANT NEOPLASM OF UPPER-OUTER QUADRANT OF RIGHT BREAST IN FEMALE, ESTROGEN RECEPTOR POSITIVE: ICD-10-CM

## 2019-07-01 DIAGNOSIS — E11.9 TYPE 2 DIABETES MELLITUS WITHOUT COMPLICATION, WITHOUT LONG-TERM CURRENT USE OF INSULIN: Chronic | ICD-10-CM

## 2019-07-01 DIAGNOSIS — Z17.0 MALIGNANT NEOPLASM OF UPPER-OUTER QUADRANT OF RIGHT BREAST IN FEMALE, ESTROGEN RECEPTOR POSITIVE: ICD-10-CM

## 2019-07-01 RX ORDER — GLIMEPIRIDE 1 MG/1
TABLET ORAL
Qty: 90 TABLET | Refills: 0 | Status: SHIPPED | OUTPATIENT
Start: 2019-07-01 | End: 2019-12-17 | Stop reason: SDUPTHER

## 2019-07-02 RX ORDER — ANASTROZOLE 1 MG/1
TABLET ORAL
Qty: 90 TABLET | Refills: 0 | Status: SHIPPED | OUTPATIENT
Start: 2019-07-02 | End: 2020-05-27

## 2019-07-29 ENCOUNTER — HOSPITAL ENCOUNTER (OUTPATIENT)
Dept: RADIOLOGY | Facility: HOSPITAL | Age: 69
Discharge: HOME | End: 2019-07-29
Attending: INTERNAL MEDICINE
Payer: MEDICARE

## 2019-07-29 DIAGNOSIS — Z12.39 BREAST CANCER SCREENING: ICD-10-CM

## 2019-07-29 DIAGNOSIS — Z17.0 MALIGNANT NEOPLASM OF UPPER-OUTER QUADRANT OF RIGHT BREAST IN FEMALE, ESTROGEN RECEPTOR POSITIVE: ICD-10-CM

## 2019-07-29 DIAGNOSIS — C50.411 MALIGNANT NEOPLASM OF UPPER-OUTER QUADRANT OF RIGHT BREAST IN FEMALE, ESTROGEN RECEPTOR POSITIVE: ICD-10-CM

## 2019-07-29 PROCEDURE — 77067 SCR MAMMO BI INCL CAD: CPT | Mod: 26,HCNC,, | Performed by: RADIOLOGY

## 2019-07-29 PROCEDURE — 77067 MAMMO DIGITAL SCREENING BILAT WITH TOMOSYNTHESIS_CAD: ICD-10-PCS | Mod: 26,HCNC,, | Performed by: RADIOLOGY

## 2019-07-29 PROCEDURE — 77063 MAMMO DIGITAL SCREENING BILAT WITH TOMOSYNTHESIS_CAD: ICD-10-PCS | Mod: 26,HCNC,, | Performed by: RADIOLOGY

## 2019-07-29 PROCEDURE — 77067 SCR MAMMO BI INCL CAD: CPT | Mod: TC,HCNC

## 2019-07-29 PROCEDURE — 77063 BREAST TOMOSYNTHESIS BI: CPT | Mod: 26,HCNC,, | Performed by: RADIOLOGY

## 2019-07-30 NOTE — PROGRESS NOTES
Breast cancer follow-up  Renea Bourgeois  [id number]  [birth year]    Oncologist: VIKAS Jones MD    Surgeon: Shubham Shepherd MD    Radiation Oncologist: Korina Moore MD    Internist: Kamini Newton MD    Diagnosis:   stage I, T1 C. N0 M0 ER positive/[place] negative high risk breast cancer.  The patient self discovered a lesion in the medial upper portion of her right breast, underwent imaging and confirmatory biopsy in [place]. She ultimately had a lumpectomy and sentinel node biopsy at [facility] 2/11/11,  for a 1.3 cm Maureen 8 adenocarcinoma. The sentinel lymph nodes were negative. ER positive/WA negative. Oncotype DX testing revealed that the patient was at high risk of recurrence so she received 6 cycles of TAC chemotherapy which was well-tolerated.    12/29/10 - Performed in [place]- Right breast biopsy - sonocore- 1 oclock: Poorly differentiated invasive ductal carcinoma - high histologic grade of 3/3. 0.6 cm with no definitive vascular or lymphatic stread.     Staging: Stage 1, T1N0M0, ER positive and WA negative, Her2 non amplified.     2/11/11: Oncotype DX 56- high recurrence score.    2/7/11 initial visit with Dr. Jones.     2/11/11 - Lumpectomy and sentinel node biopsy with Dr. Shepherd. 4 nodes sampled.     3/7/11 - Was considering [id number] randomization but it was decided to go with Cytoxan 500 mg/m2, Adriamycin 50 mg/m2 and taxotere 75 mg/m2 and neulasta support.     3/29/11 - first chemotherapy    4/8/11, 4/19/11, 4/28/11 - grade 2 mucositis on cycle 2 day 10 of TAC.    5/10/11 - C3D1 with 10% dose reduction in Adriamycin.     5/31/11 - C4D1 - 10 % dose reduction in Adriamycin    6/21/11- C5D1- 10% dose reduction in adriamycin dose    7/12/11- C6D1 Completed TAC and had bilateral lower extremity edema - Ultrasounds negative for clots.     8/16/11 - Initial visit with Dr. Moore for Radiation therapy.   August 17, 2011-September 21, 2011, the patient received 4500 cGy in 25 fractions over 35 elapsed  days to a posed tangential right  breast field using 6 X photons followed by an electron boost from September 22, 2011-October 3, 2011 delivering 1600 cGy in 8 fractions over 11 elapsed days using 9  MeV electrons to a depth of Dmax. Total cumulative dose was 6100 cGy as of October 3, 2011. Radiotherapy was well-tolerated with minimal tanning and breast  lymphedema. Subsequently the patient has resumed her Arimidex and is tolerating this without side effects.    10/19/11- [place] follow-up after completing radiation. Arimidex initiated. Zometa initiated - 4 mg IVPB for osteopenia.     4/16/12 - Pt did not want Zometa- switched to Prolia     5/2/12 - First dose of Prolia - diagnosed with bilateral carpel tunnel syndrome    11/16/12 - Prolia    6/24/13 - Prolia    12/27/13 - Prolia held due to dental work    6/2014  - Prolia held due to dental work.    12/5/14 - cough with negative chest x-ray. Prolia held due to an improved bone density test.    Age: 61 y/o    Family History/Genetics: Father metastatic cancer of unknown origin    Surgery:   2/11/11-   Lumpectomy of the right breast with 4 sentinel nodes removed.     Chemotherapy Regimen: TAC:   Cytoxan 500 mg/m2   Adriamycin 50 mg/m2 with 10% dose reduction after the first treatment due to severe mucositis  taxotere 75 mg/m2 and neulasta support.    Dates: 3/29/11 - 7/12/11  Cycles: 6    Radiation therapy:  August 17, 2011-September 21, 2011, the patient received 4500 cGy in 25 fractions over 35 elapsed days to a posed tangential right  breast field using 6 X photons followed by an electron boost from September 22, 2011-October 3, 2011 delivering 1600 cGy in 8 fractions over 11 elapsed days using 9  MeV electrons to a depth of Dmax. Total cumulative dose was 6100 cGy as of October 3, 2011. Radiotherapy was well-tolerated with minimal tanning and breast  lymphedema. Subsequently the patient has resumed her Arimidex and is tolerating this without side  effects.    Hormone therapy: Arimidex 1 mg po daily X 10 years - due for completion 2/2020    Health Maintenance and Screening Recommendations:  Colonoscopy: 12/18/14 - 5 tubular adenomas- f/u recommended in 3 years (2017)  Dexa: 5/2019- osteopenia - improved bmd.  Pt taking vit D and calcium. Recommend 2-3 year follow-up     Mammogram:  7/29/19- wnl bilaterally  Pap: Dr. Sellers 7/24/14    Vaccines:   Pneumovax- 2013   Zostivax- 2014   FLU- 2018    Lifestyle:   Work - retired  at [employer] couple of years   Exercise- walks 20 minutes most days a week  Diet: Water- lemon water - one quart a day   Fruits- not daily - 3 times a week one serving   Veg- not daily- 3 times a week one serving   Processed Foods- rare   Protein- one serving daily   Sodas- none    Lifestyle Recommendations:     Exercise: 30 minutes moderate intensity most days of the week.   Alcohol: does not drink      What is meaningful or important to you now?  Maintaining health and trying to get more exercise    What helps you feel calm and focused?  Being alone and reading  What are your personal goals?  one day at a time.      ROS: Signs and Symptoms to report: Denies any of the following    Potential late effects and risks of all cancer treatment:    Cough  SOB  Weaknes or fatigue  Bone pain that is unusual and not improving  Swelling of extremities  Chest pain  Headaches or dizziness  Unintentional wt loss  Decreased appetite  Change in bowel or bladder function or character  Breast lumps or changes  Skin nodules or rash  Fevers or night sweats  Lymphedema    Hormone therapy: Denies any of the following -due off her Arimidex 2/2020   Decrease in bone density with fractures  Endometrial changes/abnormalities and vaginal discharge  Blood clots  Hot flashes  Vaginal dryness and sexual dysfunction.     PSYCHOSOCIAL ADJUSTMENT: Pt states she is doing great with no adjustment difficulties.    Depression   Anxiety   Work  stability   Stressors    Adjustment difficulties   Relationship difficulties    Referrals: none    Follow-up Appointment:   1 year.     Assessment/Plan:  Personal history of high risk for recurrence breast cancer-stage I, T1 C. N0 M0 ER positive/[place] negative high risk breast cancer. Chemotherapy Regimen: TAC:   Cytoxan 500 mg/m2,  Adriamycin 50 mg/m2 with 10% dose reduction after the first treatment due to severe mucositis, taxotere 75 mg/m2 and neulasta support.  Dates: 3/29/11 - 7/12/11  Cycles: 6  Right breast radiation therapy  Adjuvant therapy f/u - continue Arimidex until 2/2020    RTC one year with melany mammo and for exam.    One hour was spent with the patient and family reviewing past records, identifying risk factors for complications, planning strategies for health promotion and disease prevention, dietary counseling and signs/symptoms to report. Discussed follow-up plan and rationale.

## 2019-08-01 ENCOUNTER — OFFICE VISIT (OUTPATIENT)
Dept: RHEUMATOLOGY | Facility: CLINIC | Age: 69
End: 2019-08-01
Payer: MEDICARE

## 2019-08-01 ENCOUNTER — OFFICE VISIT (OUTPATIENT)
Dept: HEMATOLOGY/ONCOLOGY | Facility: CLINIC | Age: 69
End: 2019-08-01
Payer: MEDICARE

## 2019-08-01 VITALS
HEART RATE: 67 BPM | DIASTOLIC BLOOD PRESSURE: 70 MMHG | WEIGHT: 141.75 LBS | WEIGHT: 141.75 LBS | BODY MASS INDEX: 27.01 KG/M2 | SYSTOLIC BLOOD PRESSURE: 140 MMHG | HEART RATE: 67 BPM | SYSTOLIC BLOOD PRESSURE: 126 MMHG | BODY MASS INDEX: 27.83 KG/M2 | DIASTOLIC BLOOD PRESSURE: 74 MMHG | TEMPERATURE: 97 F | HEIGHT: 60 IN

## 2019-08-01 DIAGNOSIS — Z08 ENCOUNTER FOR FOLLOW-UP SURVEILLANCE OF BREAST CANCER: ICD-10-CM

## 2019-08-01 DIAGNOSIS — Z12.31 ENCOUNTER FOR SCREENING MAMMOGRAM FOR MALIGNANT NEOPLASM OF BREAST: ICD-10-CM

## 2019-08-01 DIAGNOSIS — M81.0 OSTEOPOROSIS, UNSPECIFIED OSTEOPOROSIS TYPE, UNSPECIFIED PATHOLOGICAL FRACTURE PRESENCE: ICD-10-CM

## 2019-08-01 DIAGNOSIS — C50.411 MALIGNANT NEOPLASM OF UPPER-OUTER QUADRANT OF RIGHT BREAST IN FEMALE, ESTROGEN RECEPTOR POSITIVE: Primary | ICD-10-CM

## 2019-08-01 DIAGNOSIS — Z17.0 MALIGNANT NEOPLASM OF UPPER-OUTER QUADRANT OF RIGHT BREAST IN FEMALE, ESTROGEN RECEPTOR POSITIVE: Primary | ICD-10-CM

## 2019-08-01 DIAGNOSIS — M18.11 PRIMARY OSTEOARTHRITIS OF FIRST CARPOMETACARPAL JOINT OF RIGHT HAND: Primary | ICD-10-CM

## 2019-08-01 DIAGNOSIS — Z85.3 ENCOUNTER FOR FOLLOW-UP SURVEILLANCE OF BREAST CANCER: ICD-10-CM

## 2019-08-01 DIAGNOSIS — Z51.81 ENCOUNTER FOR MONITORING ADJUVANT HORMONAL THERAPY: ICD-10-CM

## 2019-08-01 DIAGNOSIS — Z79.899 ENCOUNTER FOR MONITORING ADJUVANT HORMONAL THERAPY: ICD-10-CM

## 2019-08-01 DIAGNOSIS — Z71.89 COUNSELING ON HEALTH PROMOTION AND DISEASE PREVENTION: ICD-10-CM

## 2019-08-01 PROCEDURE — 20550 NJX 1 TENDON SHEATH/LIGAMENT: CPT | Mod: HCNC,RT,S$GLB, | Performed by: INTERNAL MEDICINE

## 2019-08-01 PROCEDURE — 99214 OFFICE O/P EST MOD 30 MIN: CPT | Mod: HCNC,25,S$GLB, | Performed by: INTERNAL MEDICINE

## 2019-08-01 PROCEDURE — 3074F SYST BP LT 130 MM HG: CPT | Mod: HCNC,CPTII,S$GLB, | Performed by: INTERNAL MEDICINE

## 2019-08-01 PROCEDURE — 3288F FALL RISK ASSESSMENT DOCD: CPT | Mod: HCNC,CPTII,S$GLB, | Performed by: NURSE PRACTITIONER

## 2019-08-01 PROCEDURE — 99999 PR PBB SHADOW E&M-EST. PATIENT-LVL III: CPT | Mod: PBBFAC,HCNC,, | Performed by: NURSE PRACTITIONER

## 2019-08-01 PROCEDURE — 3078F PR MOST RECENT DIASTOLIC BLOOD PRESSURE < 80 MM HG: ICD-10-PCS | Mod: HCNC,CPTII,S$GLB, | Performed by: INTERNAL MEDICINE

## 2019-08-01 PROCEDURE — 3074F SYST BP LT 130 MM HG: CPT | Mod: HCNC,CPTII,S$GLB, | Performed by: NURSE PRACTITIONER

## 2019-08-01 PROCEDURE — 99214 OFFICE O/P EST MOD 30 MIN: CPT | Mod: HCNC,S$GLB,, | Performed by: NURSE PRACTITIONER

## 2019-08-01 PROCEDURE — 1100F PR PT FALLS ASSESS DOC 2+ FALLS/FALL W/INJURY/YR: ICD-10-PCS | Mod: HCNC,CPTII,S$GLB, | Performed by: NURSE PRACTITIONER

## 2019-08-01 PROCEDURE — 99214 PR OFFICE/OUTPT VISIT, EST, LEVL IV, 30-39 MIN: ICD-10-PCS | Mod: HCNC,S$GLB,, | Performed by: NURSE PRACTITIONER

## 2019-08-01 PROCEDURE — 1100F PTFALLS ASSESS-DOCD GE2>/YR: CPT | Mod: HCNC,CPTII,S$GLB, | Performed by: INTERNAL MEDICINE

## 2019-08-01 PROCEDURE — 3288F PR FALLS RISK ASSESSMENT DOCUMENTED: ICD-10-PCS | Mod: HCNC,CPTII,S$GLB, | Performed by: INTERNAL MEDICINE

## 2019-08-01 PROCEDURE — 3078F DIAST BP <80 MM HG: CPT | Mod: HCNC,CPTII,S$GLB, | Performed by: NURSE PRACTITIONER

## 2019-08-01 PROCEDURE — 99214 PR OFFICE/OUTPT VISIT, EST, LEVL IV, 30-39 MIN: ICD-10-PCS | Mod: HCNC,25,S$GLB, | Performed by: INTERNAL MEDICINE

## 2019-08-01 PROCEDURE — 3074F PR MOST RECENT SYSTOLIC BLOOD PRESSURE < 130 MM HG: ICD-10-PCS | Mod: HCNC,CPTII,S$GLB, | Performed by: INTERNAL MEDICINE

## 2019-08-01 PROCEDURE — 99999 PR PBB SHADOW E&M-EST. PATIENT-LVL III: CPT | Mod: PBBFAC,HCNC,, | Performed by: INTERNAL MEDICINE

## 2019-08-01 PROCEDURE — 20550 TENDON SHEATH: R THUMB MCP: ICD-10-PCS | Mod: HCNC,RT,S$GLB, | Performed by: INTERNAL MEDICINE

## 2019-08-01 PROCEDURE — 3074F PR MOST RECENT SYSTOLIC BLOOD PRESSURE < 130 MM HG: ICD-10-PCS | Mod: HCNC,CPTII,S$GLB, | Performed by: NURSE PRACTITIONER

## 2019-08-01 PROCEDURE — 99999 PR PBB SHADOW E&M-EST. PATIENT-LVL III: ICD-10-PCS | Mod: PBBFAC,HCNC,, | Performed by: NURSE PRACTITIONER

## 2019-08-01 PROCEDURE — 99999 PR PBB SHADOW E&M-EST. PATIENT-LVL III: ICD-10-PCS | Mod: PBBFAC,HCNC,, | Performed by: INTERNAL MEDICINE

## 2019-08-01 PROCEDURE — 3078F DIAST BP <80 MM HG: CPT | Mod: HCNC,CPTII,S$GLB, | Performed by: INTERNAL MEDICINE

## 2019-08-01 PROCEDURE — 3288F FALL RISK ASSESSMENT DOCD: CPT | Mod: HCNC,CPTII,S$GLB, | Performed by: INTERNAL MEDICINE

## 2019-08-01 PROCEDURE — 3078F PR MOST RECENT DIASTOLIC BLOOD PRESSURE < 80 MM HG: ICD-10-PCS | Mod: HCNC,CPTII,S$GLB, | Performed by: NURSE PRACTITIONER

## 2019-08-01 PROCEDURE — 3288F PR FALLS RISK ASSESSMENT DOCUMENTED: ICD-10-PCS | Mod: HCNC,CPTII,S$GLB, | Performed by: NURSE PRACTITIONER

## 2019-08-01 PROCEDURE — 1100F PR PT FALLS ASSESS DOC 2+ FALLS/FALL W/INJURY/YR: ICD-10-PCS | Mod: HCNC,CPTII,S$GLB, | Performed by: INTERNAL MEDICINE

## 2019-08-01 PROCEDURE — 1100F PTFALLS ASSESS-DOCD GE2>/YR: CPT | Mod: HCNC,CPTII,S$GLB, | Performed by: NURSE PRACTITIONER

## 2019-08-01 RX ORDER — TRIAMCINOLONE ACETONIDE 40 MG/ML
40 INJECTION, SUSPENSION INTRA-ARTICULAR; INTRAMUSCULAR
Status: DISCONTINUED | OUTPATIENT
Start: 2019-08-01 | End: 2019-08-01 | Stop reason: HOSPADM

## 2019-08-01 RX ORDER — SODIUM CHLORIDE 0.9 % (FLUSH) 0.9 %
10 SYRINGE (ML) INJECTION
Status: CANCELLED | OUTPATIENT
Start: 2019-08-01

## 2019-08-01 RX ORDER — ZOLEDRONIC ACID 5 MG/100ML
5 INJECTION, SOLUTION INTRAVENOUS
Status: CANCELLED | OUTPATIENT
Start: 2019-08-01

## 2019-08-01 RX ORDER — HEPARIN 100 UNIT/ML
500 SYRINGE INTRAVENOUS
Status: CANCELLED | OUTPATIENT
Start: 2019-08-01

## 2019-08-01 RX ADMIN — TRIAMCINOLONE ACETONIDE 40 MG: 40 INJECTION, SUSPENSION INTRA-ARTICULAR; INTRAMUSCULAR at 12:08

## 2019-08-01 NOTE — PATIENT INSTRUCTIONS
Zoledronic Acid injection (Paget's Disease, Osteoporosis)  What is this medicine?  ZOLEDRONIC ACID (JESSICA le dron ik AS id) lowers the amount of calcium loss from bone. It is used to treat Paget's disease and osteoporosis in women.  How should I use this medicine?  This medicine is for infusion into a vein. It is given by a health care professional in a hospital or clinic setting.  Talk to your pediatrician regarding the use of this medicine in children. This medicine is not approved for use in children.  What side effects may I notice from receiving this medicine?  Side effects that you should report to your doctor or health care professional as soon as possible:  · allergic reactions like skin rash, itching or hives, swelling of the face, lips, or tongue  · anxiety, confusion, or depression  · breathing problems  · changes in vision  · eye pain  · feeling faint or lightheaded, falls  · jaw pain, especially after dental work  · mouth sores  · muscle cramps, stiffness, or weakness  · redness, blistering, peeling or loosening of the skin, including inside the mouth  · trouble passing urine or change in the amount of urine  Side effects that usually do not require medical attention (report to your doctor or health care professional if they continue or are bothersome):  · bone, joint, or muscle pain  · constipation  · diarrhea  · fever  · hair loss  · irritation at site where injected  · loss of appetite  · nausea, vomiting  · stomach upset  · trouble sleeping  · trouble swallowing  · weak or tired  What may interact with this medicine?  · certain antibiotics given by injection  · NSAIDs, medicines for pain and inflammation, like ibuprofen or naproxen  · some diuretics like bumetanide, furosemide  · teriparatide  What if I miss a dose?  It is important not to miss your dose. Call your doctor or health care professional if you are unable to keep an appointment.  Where should I keep my medicine?  This drug is given in a  hospital or clinic and will not be stored at home.  What should I tell my health care provider before I take this medicine?  They need to know if you have any of these conditions:  · aspirin-sensitive asthma  · cancer, especially if you are receiving medicines used to treat cancer  · dental disease or wear dentures  · infection  · kidney disease  · low levels of calcium in the blood  · past surgery on the parathyroid gland or intestines  · receiving corticosteroids like dexamethasone or prednisone  · an unusual or allergic reaction to zoledronic acid, other medicines, foods, dyes, or preservatives  · pregnant or trying to get pregnant  · breast-feeding  What should I watch for while using this medicine?  Visit your doctor or health care professional for regular checkups. It may be some time before you see the benefit from this medicine. Do not stop taking your medicine unless your doctor tells you to. Your doctor may order blood tests or other tests to see how you are doing.  Women should inform their doctor if they wish to become pregnant or think they might be pregnant. There is a potential for serious side effects to an unborn child. Talk to your health care professional or pharmacist for more information.  You should make sure that you get enough calcium and vitamin D while you are taking this medicine. Discuss the foods you eat and the vitamins you take with your health care professional.  Some people who take this medicine have severe bone, joint, and/or muscle pain. This medicine may also increase your risk for jaw problems or a broken thigh bone. Tell your doctor right away if you have severe pain in your jaw, bones, joints, or muscles. Tell your doctor if you have any pain that does not go away or that gets worse.  Tell your dentist and dental surgeon that you are taking this medicine. You should not have major dental surgery while on this medicine. See your dentist to have a dental exam and fix any dental  problems before starting this medicine. Take good care of your teeth while on this medicine. Make sure you see your dentist for regular follow-up appointments.  NOTE:This sheet is a summary. It may not cover all possible information. If you have questions about this medicine, talk to your doctor, pharmacist, or health care provider. Copyright© 2017 [employer]

## 2019-08-01 NOTE — PROGRESS NOTES
RHEUMATOLOGY OUTPATIENT CLINIC NOTE    8/1/2019    Attending Rheumatologist: Nathaniel Moon  Primary Care Provider: Kamini Newton MD   Physician Requesting Consultation: No referring provider defined for this encounter.  Chief Complaint/Reason For Consultation:  Osteoarthritis and osteoporosis.    Subjective:       HPI  Renea Bourgeois is a 69 y.o. White female with OA and osteoporosis comes for follow-up.    Today  Last seen on May.  Recommended for repeat densitometry test and recommendations provided for trochanteric bursa syndrome.  Good results reported with trochanteric bursa, main area of concern today is right thumb CMC pain.  Worse with pincer grasp, intermittently interfering with her activities of daily living.  Denies any other significant joint pain or swelling.  Denies any recent trauma or episodes of fall.  Denies any rash, prolonged morning stiffness,  or GI complaints.  Currently not planned for any dental procedures.    Review of Systems   Constitutional: Negative for chills and fever.   Eyes: Negative for pain and redness.   Respiratory: Negative for cough and shortness of breath.    Cardiovascular: Negative for chest pain and leg swelling.   Gastrointestinal: Negative for blood in stool and melena.   Genitourinary: Negative for dysuria and urgency.   Musculoskeletal: Positive for joint pain (Right thumb CMC.  Mechanical pattern.). Negative for falls.   Skin: Negative for rash.   Neurological: Negative for tingling and weakness.   Psychiatric/Behavioral: Negative for memory loss. The patient does not have insomnia.        Chronic comorbid conditions affecting medical decision making today:  Past Medical History:   Diagnosis Date    Allergy     Anemia 11/14/2017    Angina pectoris     followed by cardiology    Arthritis     Breast cancer     Right T1 Ductal Ca in situ,high oncotype Dx 33, Estrogen positive. Lumpectomy, TAC chemo x 6 cyscles then RT,on aromatase inhibitor     Cataract     Diabetes mellitus type II 2011    DM (diabetes mellitus) 2011     am 08/04/2017    Elevated BP without diagnosis of hypertension 11/27/2018    GERD (gastroesophageal reflux disease)     History of colon polyps 2/21/2018    The patient had adenomatous colon polyps in 2014.      Hyperlipidemia     Hypertension     Kidney stone     right side, passed    Osteopenia     Osteoporosis 4/12/2019    PVC (premature ventricular contraction)     on and off    Trouble in sleeping     Type 2 diabetes mellitus      Past Surgical History:   Procedure Laterality Date    BREAST BIOPSY      BREAST LUMPECTOMY  2/11/2011    right    CATARACT EXTRACTION Bilateral 10/14/15    CHOLECYSTECTOMY  1989    open    COLONOSCOPY N/A 2/22/2018    [facility]    COLONOSCOPY N/A 12/18/2014    [facility] Jeovanny Walden [facility] at [facility]    CYST REMOVAL Left 11/2017    foot    DILATION AND CURETTAGE OF UTERUS  10/2010    [place]    EXOSTECTOMY Left 11/24/2017    [facility] D[facility] OR    GALLBLADDER SURGERY      removed in 1989    GANGLIONECTOMY Left 11/24/2017    [facility]    Nasal septal deviation repair  2009    toenail edges removed      TONSILLECTOMY  1959    TOTAL REDUCTION MAMMOPLASTY Left     2015     Family History   Problem Relation Age of Onset    Diabetes Father     Hypertension Father     Stroke Father     Cancer Father 65        metastatic when diagnosed    Stroke Brother     Cancer Other         kidney    Atrial fibrillation Son         35    Heart disease Son     Alzheimer's disease Mother     Parkinsonism Brother     Cancer Paternal Grandfather         prostate    Glaucoma Maternal Grandmother     Psoriasis Cousin     Alcohol abuse Neg Hx     Drug abuse Neg Hx     COPD Neg Hx     Birth defects Neg Hx     Mental retardation Neg Hx     Mental illness Neg Hx     Kidney disease  Neg Hx     Hyperlipidemia Neg Hx     Melanoma Neg Hx     Lupus Neg Hx      Social History     Substance and Sexual Activity   Alcohol Use No    Alcohol/week: 0.0 oz     Social History     Tobacco Use   Smoking Status Never Smoker   Smokeless Tobacco Never Used     Social History     Substance and Sexual Activity   Drug Use No       Current Outpatient Medications:     ACCU-CHEK JD PLUS TEST STRP Strp, TEST three times a day, Disp: 100 strip, Rfl: 11    acetaminophen (TYLENOL) 500 MG tablet, Take 500 mg by mouth every 6 (six) hours as needed for Pain., Disp: , Rfl:     ALCOHOL PREP PADS PadM, , Disp: , Rfl:     anastrozole (ARIMIDEX) 1 mg Tab, TAKE 1 TABLET(1 MG) BY MOUTH EVERY DAY, Disp: 90 tablet, Rfl: 0    anastrozole (ARIMIDEX) 1 mg Tab, TAKE 1 TABLET(1 MG) BY MOUTH EVERY DAY, Disp: 90 tablet, Rfl: 0    cholecalciferol, vitamin D3, (VITAMIN D3) 1,000 unit capsule, Take 1 capsule (1,000 Units total) by mouth once daily., Disp: , Rfl: 0    diclofenac sodium (VOLTAREN) 1 % Gel, Apply 2 g topically once daily., Disp: 1 Tube, Rfl: 2    ferrous gluconate (FERGON) 240 (27 FE) MG tablet, Take 1 tablet (240 mg total) by mouth once daily., Disp: , Rfl:     fluticasone (FLONASE) 50 mcg/actuation nasal spray, 2 sprays by Each Nare route daily as needed. 2 Spray, Suspension Nasal Every day, Disp: 16 g, Rfl: 5    gabapentin (NEURONTIN) 100 MG capsule, Take 1 capsule (100 mg total) by mouth 3 (three) times daily., Disp: 90 capsule, Rfl: 2    glimepiride (AMARYL) 1 MG tablet, TAKE 1 TABLET(1 MG) BY MOUTH EVERY DAY, Disp: 90 tablet, Rfl: 0    lancets (ACCU-CHEK SOFTCLIX LANCETS) Misc, 1 each by Misc.(Non-Drug; Combo Route) route 3 (three) times daily., Disp: 100 each, Rfl: 11    lancing device Misc, , Disp: , Rfl:     metoprolol succinate (TOPROL-XL) 25 MG 24 hr tablet, Take 25 mg by mouth every evening. , Disp: , Rfl: 1    naproxen sodium (ALEVE) 220 MG tablet, Take 220 mg by mouth as needed., Disp: , Rfl:      rosuvastatin (CRESTOR) 10 MG tablet, TAKE 1 TABLET BY MOUTH ONCE DAILY, Disp: 90 tablet, Rfl: 1    traZODone (DESYREL) 100 MG tablet, TAKE 1 TABLET BY MOUTH AT BEDTIME, Disp: 90 tablet, Rfl: 1     Objective:         Vitals:    08/01/19 0851   BP: (!) 140/70   Pulse: 67     Physical Exam   Nursing note and vitals reviewed.  Constitutional: She is oriented to person, place, and time and well-developed, well-nourished, and in no distress. No distress.   HENT:   Head: Normocephalic.   Eyes: Conjunctivae are normal. Pupils are equal, round, and reactive to light.   Absent Episcleritis/scleritis.   Neck: Normal range of motion.   Cardiovascular: Normal rate and intact distal pulses.    Pulmonary/Chest: Effort normal. No respiratory distress.   Abdominal: Soft. She exhibits no distension.   Neurological: She is alert and oriented to person, place, and time. Gait normal.   absent sensory deficits  muscle strength 5/5 through.     Step up and go test less than 12 sec   Skin: No rash noted. No erythema.     Musculoskeletal: Normal range of motion. She exhibits tenderness (Right thumb CMC.) and deformity (Heberden's, Ute's.  Thumb CMC squaring.). She exhibits no edema.   : strong.  No synovitis.    AROM: Decreased flexion on some fingers.  Otherwise, intact.  Pain reported upon rt thumb active and passive movement.  PROM:  As above    Devices used by patient: none    + crepitus.       Reviewed old and all outside pertinent medical records available.    All lab results personally reviewed and interpreted by me.  Lab Results   Component Value Date    WBC 5.53 11/13/2018    HGB 12.9 11/13/2018    HCT 39.9 11/13/2018    MCV 93 11/13/2018    MCH 30.1 11/13/2018    MCHC 32.3 11/13/2018    RDW 12.7 11/13/2018     11/13/2018    MPV 12.0 11/13/2018    PLTEST Sl decreased (A) 07/21/2011       Lab Results   Component Value Date     07/29/2019    K 3.5 07/29/2019     07/29/2019    CO2 24 07/29/2019    GLU  92 07/29/2019    BUN 9 07/29/2019    CALCIUM 10.0 07/29/2019    PROT 7.1 07/29/2019    ALBUMIN 4.2 07/29/2019    BILITOT 0.5 07/29/2019    AST 17 07/29/2019    ALKPHOS 85 07/29/2019    ALT 18 07/29/2019       Lab Results   Component Value Date    COLORU Dk straw 10/16/2013    APPEARANCEUA CLEAR 08/20/2013    SPECGRAV 1.010 10/16/2013    PHUR 6 10/16/2013    PROTEINUA Negative 08/20/2013    KETONESU neg 10/16/2013    LEUKOCYTESUR Negative 08/20/2013    NITRITE neg 10/16/2013    UROBILINOGEN normal 10/16/2013       No results found for: CRP    Lab Results   Component Value Date    SEDRATE 10 03/08/2010       Lab Results   Component Value Date    SANTINO Negative 09/29/2006    RF Negative 09/29/2006    SEDRATE 10 03/08/2010       No components found for: 25OHVITDTOT, 64DIBCHO0, 87KYHQIO9, METHODNOTE    No results found for: URICACID    No components found for: TSPOTTB    · Vitamin-D 22 (4/2019)    Rheum Labs:  SANTINO negative  Rheumatoid factor negative     Infectious Labs:  HCV nonreactive     Imaging:  All imaging reviewed and independently  interpreted by me.    X-ray hands July 2015  prominent joint space narrowing and osteophyte formation involving the interphalangeal joints bilaterally.     X-ray foot January 2018  Alternatively degenerative changes throughout the foot.  Early dorsal and plantar calcaneal spurs.  No acute fracture or dislocation.  No discrete focal erosion or periosteal reaction.    X-ray knee March 2018  Minimal bilateral degenerative changes noted throughout the all 3 compartments.  Equivocal small right effusion.    DEXA scan  December 2014  July 2017 May 2019  FN: -1.3   -1.5   -1.7  LS: -2    -2.5  -2.7     ASSESSMENT / PLAN:     Renea Bourgeois is a 69 y.o. White female with:    1. Primary osteoarthritis involving multiple joints  - Discussed and recommended exercise (jose alberto non wt-bearing/swimming)  - resting affected joint for brief periods (<12 h), activity modification  - PT/OT, joint  braces/splints PRN  - stretching, massage, heat, paraffin wax  - continue with range of motion, flexibility, strengthening exercises  - Acetaminophen prn -> standing up to 3 g per day  - good results with gabapentin.  Continue as tolerated.  - Topicals therapy: Capsaicin / NSAIDs   - corticosteroid injection on right thumb CMC provided today.    2. Osteoporosis  - previously on Prolia, last given 2014  - remains at high risk of fragility fracture.  - history of GERD.  Creatinine clearance 66, GFR over 60.  - recommend to continue bone antiresorptive therapy with Reclast.  - Discussed and verbalized understanding concerning the adverse effects of therapy particularly risk of osteonecrosis and atypical fractures  - continue with adequate dietary calcium and vitamin-D intake  - avoid mobility, fall prevention.    3. Other specified counseling  - over 10 minutes spent regarding below topics:  - fall prevention.  - Nutrition and exercise counseling.  - Regular exercise:  Aerobic and resistance.  - Medication counseling provided.    Follow up in about 3 months (around 11/1/2019).    Method of contact with patient concerns: [facility]    Disclaimer:  This note is prepared using voice recognition software and as such is likely to have errors and has not been proof read. Please contact me for questions.     Time spent: 25 minutes in face to face discussion concerning diagnosis, prognosis, review of lab and test results, benefits of treatment as well as management of disease, counseling of patient and coordination of care between various health care providers.  Greater than half the time spent was used for coordination of care and counseling of patient.    R thumb CMC  Date/Time: 8/1/2019 12:41 PM  Performed by: Nathaniel Moon MD  Authorized by: Nathaniel Moon MD     Consent Done?: Yes (Verbal)  Timeout: prior to procedure the correct patient, procedure, and site was verified   Indications:  Pain  Site marked:  the procedure site was marked    Timeout: prior to procedure the correct patient, procedure, and site was verified    Location:  Thumb  Site:  R thumb MCP  Ultrasonic guidance for needle placement?: No    Needle size:  27 G  Approach:  Lateral  Medications:  40 mg triamcinolone acetonide 40 mg/mL: 20mg  Patient tolerance:  Patient tolerated the procedure well with no immediate complications      Nathaniel Moon M.D.  Rheumatology Department   [facility]

## 2019-08-01 NOTE — PROCEDURES
Tendon Sheath: R thumb MCP  Date/Time: 8/1/2019 12:41 PM  Performed by: Nathaniel Moon MD  Authorized by: Nathaniel Moon MD     Consent Done?: Yes (Verbal)  Timeout: prior to procedure the correct patient, procedure, and site was verified   Indications:  Pain  Site marked: the procedure site was marked    Timeout: prior to procedure the correct patient, procedure, and site was verified    Location:  Thumb  Site:  R thumb MCP  Ultrasonic guidance for needle placement?: No    Needle size:  27 G  Approach:  Lateral  Medications:  40 mg triamcinolone acetonide 40 mg/mL  Patient tolerance:  Patient tolerated the procedure well with no immediate complications

## 2019-08-02 ENCOUNTER — TELEPHONE (OUTPATIENT)
Dept: RHEUMATOLOGY | Facility: HOSPITAL | Age: 69
End: 2019-08-02

## 2019-08-08 ENCOUNTER — PATIENT MESSAGE (OUTPATIENT)
Dept: RHEUMATOLOGY | Facility: HOSPITAL | Age: 69
End: 2019-08-08

## 2019-08-20 ENCOUNTER — INFUSION (OUTPATIENT)
Dept: RHEUMATOLOGY | Facility: HOSPITAL | Age: 69
End: 2019-08-20
Attending: INTERNAL MEDICINE
Payer: MEDICARE

## 2019-08-20 VITALS
DIASTOLIC BLOOD PRESSURE: 82 MMHG | SYSTOLIC BLOOD PRESSURE: 149 MMHG | OXYGEN SATURATION: 98 % | RESPIRATION RATE: 18 BRPM | TEMPERATURE: 98 F | HEART RATE: 70 BPM | WEIGHT: 141.75 LBS | BODY MASS INDEX: 27.68 KG/M2

## 2019-08-20 DIAGNOSIS — M81.0 AGE-RELATED OSTEOPOROSIS WITHOUT CURRENT PATHOLOGICAL FRACTURE: Primary | ICD-10-CM

## 2019-08-20 PROCEDURE — 25000003 PHARM REV CODE 250: Mod: HCNC | Performed by: INTERNAL MEDICINE

## 2019-08-20 PROCEDURE — A4216 STERILE WATER/SALINE, 10 ML: HCPCS | Mod: HCNC | Performed by: INTERNAL MEDICINE

## 2019-08-20 PROCEDURE — 96365 THER/PROPH/DIAG IV INF INIT: CPT | Mod: HCNC

## 2019-08-20 PROCEDURE — 63600175 PHARM REV CODE 636 W HCPCS: Mod: HCNC | Performed by: INTERNAL MEDICINE

## 2019-08-20 RX ORDER — SODIUM CHLORIDE 0.9 % (FLUSH) 0.9 %
10 SYRINGE (ML) INJECTION
Status: DISCONTINUED | OUTPATIENT
Start: 2019-08-20 | End: 2019-08-20 | Stop reason: HOSPADM

## 2019-08-20 RX ORDER — ZOLEDRONIC ACID 5 MG/100ML
5 INJECTION, SOLUTION INTRAVENOUS
Status: COMPLETED | OUTPATIENT
Start: 2019-08-20 | End: 2019-08-20

## 2019-08-20 RX ORDER — ZOLEDRONIC ACID 5 MG/100ML
5 INJECTION, SOLUTION INTRAVENOUS
Status: CANCELLED | OUTPATIENT
Start: 2019-08-20

## 2019-08-20 RX ORDER — HEPARIN 100 UNIT/ML
500 SYRINGE INTRAVENOUS
Status: CANCELLED | OUTPATIENT
Start: 2019-08-20

## 2019-08-20 RX ORDER — SODIUM CHLORIDE 0.9 % (FLUSH) 0.9 %
10 SYRINGE (ML) INJECTION
Status: CANCELLED | OUTPATIENT
Start: 2019-08-20

## 2019-08-20 RX ADMIN — ZOLEDRONIC ACID 5 MG: 5 INJECTION, SOLUTION INTRAVENOUS at 12:08

## 2019-08-20 RX ADMIN — Medication 10 ML: at 12:08

## 2019-08-20 NOTE — NURSING
Infusion# 1    Recent labs? yes    S/S infection noted or voiced? denies  Recent or planned surgeries/invasive procedures? denies  Recent vaccines? denies    Premeds? none    Reclast 5 mg administered IV at a 20 minute rate per orders; see MAR & Flow Sheet for more  details. Tolerated well without adverse events

## 2019-08-20 NOTE — PLAN OF CARE
Problem: Adult Inpatient Plan of Care  Goal: Absence of Hospital-Acquired Illness or Injury    Intervention: Prevent Infection  Aseptic technique maintained; no s/s of infection

## 2019-08-20 NOTE — PLAN OF CARE
Problem: Adult Inpatient Plan of Care  Goal: Optimal Comfort and Wellbeing    Intervention: Monitor Pain and Promote Comfort  Pain controlled per pt

## 2019-08-29 DIAGNOSIS — M15.9 PRIMARY OSTEOARTHRITIS INVOLVING MULTIPLE JOINTS: ICD-10-CM

## 2019-09-02 NOTE — PROGRESS NOTES
"Subjective:      Patient ID: Renea Bourgeois is a 69 y.o. female.    Chief Complaint: Eye Problem (debris in eye)    Renea is a 69 year old female who presents to urgent care after debris (dirt) flew in her eye earlier today when the wind picked up outside of her daughter[place].  After it occurred, Renea irrigated her eye out using lubricant drops.  The irritation improved after the drops, but she presents to urgent care to make sure there is nothing in her eye.  She is able to see from the eye.  She admits eye irritation.  She denies discharge.      Review of Systems   Constitutional: Negative for chills and fever.   Eyes: Positive for pain (eye irritation ) and itching. Negative for photophobia, discharge, redness and visual disturbance.   Neurological: Negative for weakness.       Objective:   BP (!) 146/68 (BP Location: Left arm, Patient Position: Sitting)   Pulse 77   Temp 98.4 °F (36.9 °C) (Oral)   Resp 14   Ht 5' 0.75" (1.543 m)   Wt 64.4 kg (141 lb 15.6 oz)   LMP 01/01/2004 (Within Months)   SpO2 98%   BMI 27.05 kg/m²   Physical Exam   Constitutional: She is oriented to person, place, and time. She appears well-developed and well-nourished. No distress.   HENT:   Head: Normocephalic and atraumatic.   Right Ear: External ear normal.   Left Ear: External ear normal.   Nose: Nose normal.   Eyes: Pupils are equal, round, and reactive to light. Conjunctivae and EOM are normal. Lids are everted and swept, no foreign bodies found. Right eye exhibits no discharge, no exudate and no hordeolum. No foreign body present in the right eye. Left eye exhibits no discharge, no exudate and no hordeolum. No foreign body present in the left eye.   Cardiovascular: Normal rate.   Pulmonary/Chest: Effort normal. No respiratory distress.   Neurological: She is alert and oriented to person, place, and time. She exhibits normal muscle tone.   Skin: Skin is warm and dry. Capillary refill takes less " Started: Friday     61 year old female present with lower left side back.  Pt c/o discomfort with urinating   Pt c/o urinary frequency  Pt admits she has increased her fluid intake as well   Pt states her lower back pain increase with movement   Pt states she believes she has a kidney infection   Pt denies any recent injuries    than 2 seconds. She is not diaphoretic.   Psychiatric: She has a normal mood and affect. Her behavior is normal.   Vitals reviewed.    Assessment:      1. Eye foreign body, left, initial encounter       Plan:   Eye foreign body, left, initial encounter    Eye Foreign Body    -  Irrigated eye using eye wash station    -  Eye lids swept with saline water on cotton swab with no foreign bodies visualized    -  Irritation improved with tetracaine drops     -  Vision 20/20 in affected eye    -  Do not have a lamp/florescein stain to evaluate for abrasion - recommended for patient to go to one of the other urgent care locations for this evaluation    -  Follow-up with Ophthalmologist or go to ED if eye irritation returns once drops wear off     Shaunna Alaniz PA-C   Physician Assistant   [facility]

## 2019-09-03 RX ORDER — GABAPENTIN 100 MG/1
100 CAPSULE ORAL 3 TIMES DAILY
Qty: 90 CAPSULE | Refills: 2 | Status: SHIPPED | OUTPATIENT
Start: 2019-09-03 | End: 2019-12-03 | Stop reason: SDUPTHER

## 2019-09-18 RX ORDER — ROSUVASTATIN CALCIUM 10 MG/1
TABLET, COATED ORAL
Qty: 90 TABLET | Refills: 0 | Status: SHIPPED | OUTPATIENT
Start: 2019-09-18 | End: 2019-12-15 | Stop reason: SDUPTHER

## 2019-10-04 ENCOUNTER — OFFICE VISIT (OUTPATIENT)
Dept: URGENT CARE | Facility: CLINIC | Age: 69
End: 2019-10-04
Payer: MEDICARE

## 2019-10-04 VITALS
RESPIRATION RATE: 14 BRPM | OXYGEN SATURATION: 96 % | WEIGHT: 141.63 LBS | DIASTOLIC BLOOD PRESSURE: 78 MMHG | SYSTOLIC BLOOD PRESSURE: 144 MMHG | BODY MASS INDEX: 27.8 KG/M2 | HEIGHT: 60 IN | HEART RATE: 67 BPM | TEMPERATURE: 98 F

## 2019-10-04 DIAGNOSIS — H92.01 OTALGIA, RIGHT: Primary | ICD-10-CM

## 2019-10-04 PROCEDURE — 3288F FALL RISK ASSESSMENT DOCD: CPT | Mod: HCNC,CPTII,S$GLB, | Performed by: FAMILY MEDICINE

## 2019-10-04 PROCEDURE — 1100F PTFALLS ASSESS-DOCD GE2>/YR: CPT | Mod: HCNC,CPTII,S$GLB, | Performed by: FAMILY MEDICINE

## 2019-10-04 PROCEDURE — 99999 PR PBB SHADOW E&M-EST. PATIENT-LVL IV: ICD-10-PCS | Mod: PBBFAC,HCNC,,

## 2019-10-04 PROCEDURE — 99213 OFFICE O/P EST LOW 20 MIN: CPT | Mod: HCNC,S$GLB,, | Performed by: FAMILY MEDICINE

## 2019-10-04 PROCEDURE — 3077F PR MOST RECENT SYSTOLIC BLOOD PRESSURE >= 140 MM HG: ICD-10-PCS | Mod: HCNC,CPTII,S$GLB, | Performed by: FAMILY MEDICINE

## 2019-10-04 PROCEDURE — 99213 PR OFFICE/OUTPT VISIT, EST, LEVL III, 20-29 MIN: ICD-10-PCS | Mod: HCNC,S$GLB,, | Performed by: FAMILY MEDICINE

## 2019-10-04 PROCEDURE — 3078F DIAST BP <80 MM HG: CPT | Mod: HCNC,CPTII,S$GLB, | Performed by: FAMILY MEDICINE

## 2019-10-04 PROCEDURE — 1100F PR PT FALLS ASSESS DOC 2+ FALLS/FALL W/INJURY/YR: ICD-10-PCS | Mod: HCNC,CPTII,S$GLB, | Performed by: FAMILY MEDICINE

## 2019-10-04 PROCEDURE — 3288F PR FALLS RISK ASSESSMENT DOCUMENTED: ICD-10-PCS | Mod: HCNC,CPTII,S$GLB, | Performed by: FAMILY MEDICINE

## 2019-10-04 PROCEDURE — 3077F SYST BP >= 140 MM HG: CPT | Mod: HCNC,CPTII,S$GLB, | Performed by: FAMILY MEDICINE

## 2019-10-04 PROCEDURE — 99999 PR PBB SHADOW E&M-EST. PATIENT-LVL IV: CPT | Mod: PBBFAC,HCNC,,

## 2019-10-04 PROCEDURE — 3078F PR MOST RECENT DIASTOLIC BLOOD PRESSURE < 80 MM HG: ICD-10-PCS | Mod: HCNC,CPTII,S$GLB, | Performed by: FAMILY MEDICINE

## 2019-10-05 NOTE — PATIENT INSTRUCTIONS
You should take Flonase to help relive the sinus pressure and post nasal drip.  You can take a daily anti-histamine such as Claritin, Zyrtec, Singulair, Xyzal, etc.  If your symptoms persist or worsen, you will need to follow-up with primary care or schedule an appointment with an ENT as we discussed.  Go to the ER for any emergency.

## 2019-10-05 NOTE — PROGRESS NOTES
Subjective:       Patient ID: Renea Bourgeois is a 69 y.o. female.    Vitals:  height is 5' (1.524 m) and weight is 64.3 kg (141 lb 10.3 oz). Her tympanic temperature is 98.4 °F (36.9 °C). Her blood pressure is 144/78 (abnormal) and her pulse is 67. Her respiration is 14 and oxygen saturation is 96%.     Chief Complaint: Otalgia (right ear x 6 weeks)    CC: Right otalgia    HPI: 69 y.o. Female presents for consideration of right ear fullness. She reports right ear fullness and right otalgia intermittently for the past several days. Denies otorrhea, tinnitus, hearing loss or further symptoms. Denies attempted tx.       Constitution: Negative for chills, fatigue and fever.   HENT: Positive for ear pain. Negative for ear discharge, foreign body in ear, tinnitus, hearing loss, congestion, postnasal drip, sinus pain, sinus pressure and sore throat.    Neck: Negative for painful lymph nodes.   Cardiovascular: Negative for chest pain and leg swelling.   Eyes: Negative for eye pain, eye redness, double vision and blurred vision.   Respiratory: Negative for cough and shortness of breath.    Gastrointestinal: Negative for abdominal pain, nausea, vomiting, constipation and diarrhea.   Genitourinary: Negative for dysuria, frequency, urgency and history of kidney stones.   Musculoskeletal: Negative for joint pain, joint swelling, muscle cramps and muscle ache.   Skin: Negative for color change, pale, rash and bruising.   Allergic/Immunologic: Negative for seasonal allergies.   Neurological: Negative for dizziness, history of vertigo, light-headedness, passing out and headaches.   Hematologic/Lymphatic: Negative for swollen lymph nodes.   Psychiatric/Behavioral: Negative for nervous/anxious, sleep disturbance and depression. The patient is not nervous/anxious.        Objective:      Physical Exam   Constitutional: She is oriented to person, place, and time. Vital signs are normal. She appears well-developed and well-nourished.  She is cooperative.  Non-toxic appearance. She does not have a sickly appearance. She does not appear ill. No distress.   HENT:   Head: Normocephalic and atraumatic.   Right Ear: Tympanic membrane, external ear and ear canal normal. No drainage, swelling or tenderness. No mastoid tenderness. Tympanic membrane is not injected, not scarred, not perforated, not erythematous, not retracted and not bulging. No middle ear effusion.   Left Ear: Tympanic membrane, external ear and ear canal normal. No drainage, swelling or tenderness. No mastoid tenderness. Tympanic membrane is not injected, not scarred, not perforated, not erythematous, not retracted and not bulging.  No middle ear effusion.   Nose: Nose normal.   Mouth/Throat: Uvula is midline, oropharynx is clear and moist and mucous membranes are normal.   + right serous effusion   Eyes: Pupils are equal, round, and reactive to light. Conjunctivae, EOM and lids are normal.   Neck: Trachea normal, normal range of motion, full passive range of motion without pain and phonation normal. Neck supple.   Cardiovascular: Normal rate, regular rhythm and normal heart sounds.   No murmur heard.  Pulmonary/Chest: Effort normal and breath sounds normal. No stridor. She has no decreased breath sounds. She has no wheezes. She has no rhonchi. She has no rales.   Abdominal: Soft. Bowel sounds are normal. There is no tenderness.   Musculoskeletal: Normal range of motion.   Lymphadenopathy:     She has no cervical adenopathy.   Neurological: She is alert and oriented to person, place, and time.   Skin: Skin is warm and dry. Capillary refill takes less than 2 seconds. No rash noted.   Psychiatric: She has a normal mood and affect. Her behavior is normal. Judgment and thought content normal.   Nursing note and vitals reviewed.      Assessment:       1. Otalgia, right        Plan:         Otalgia, right  -     Ambulatory referral to ENT    Vitals are reassuring. I suspect symptom  presentation is secondary to allergies. No evidence of bacterial etiology at this time. I will treat with Flonase and anti-histamine.     I have discussed the diagnosis, treatment plan and recommendations for follow-up with ENT and patient verbalized understanding and is agreeable to the plan. ED precautions given. AVS printed and given to patient upon discharge with information regarding this visit. All questions were addressed prior to discharge.    Kaylee Thornton PA-C

## 2019-10-15 ENCOUNTER — OFFICE VISIT (OUTPATIENT)
Dept: OTOLARYNGOLOGY | Facility: CLINIC | Age: 69
End: 2019-10-15
Payer: MEDICARE

## 2019-10-15 VITALS
DIASTOLIC BLOOD PRESSURE: 74 MMHG | HEART RATE: 68 BPM | HEIGHT: 60 IN | WEIGHT: 141.75 LBS | SYSTOLIC BLOOD PRESSURE: 124 MMHG | OXYGEN SATURATION: 97 % | BODY MASS INDEX: 27.83 KG/M2

## 2019-10-15 DIAGNOSIS — H91.90 PERCEIVED HEARING LOSS: Primary | ICD-10-CM

## 2019-10-15 DIAGNOSIS — H93.11 RIGHT-SIDED TINNITUS: ICD-10-CM

## 2019-10-15 PROCEDURE — 3078F DIAST BP <80 MM HG: CPT | Mod: HCNC,CPTII,S$GLB, | Performed by: ORTHOPAEDIC SURGERY

## 2019-10-15 PROCEDURE — 99213 OFFICE O/P EST LOW 20 MIN: CPT | Mod: HCNC,S$GLB,, | Performed by: ORTHOPAEDIC SURGERY

## 2019-10-15 PROCEDURE — 99999 PR PBB SHADOW E&M-EST. PATIENT-LVL IV: CPT | Mod: PBBFAC,HCNC,, | Performed by: ORTHOPAEDIC SURGERY

## 2019-10-15 PROCEDURE — 3074F PR MOST RECENT SYSTOLIC BLOOD PRESSURE < 130 MM HG: ICD-10-PCS | Mod: HCNC,CPTII,S$GLB, | Performed by: ORTHOPAEDIC SURGERY

## 2019-10-15 PROCEDURE — 1101F PR PT FALLS ASSESS DOC 0-1 FALLS W/OUT INJ PAST YR: ICD-10-PCS | Mod: HCNC,CPTII,S$GLB, | Performed by: ORTHOPAEDIC SURGERY

## 2019-10-15 PROCEDURE — 3074F SYST BP LT 130 MM HG: CPT | Mod: HCNC,CPTII,S$GLB, | Performed by: ORTHOPAEDIC SURGERY

## 2019-10-15 PROCEDURE — 99999 PR PBB SHADOW E&M-EST. PATIENT-LVL IV: ICD-10-PCS | Mod: PBBFAC,HCNC,, | Performed by: ORTHOPAEDIC SURGERY

## 2019-10-15 PROCEDURE — 1101F PT FALLS ASSESS-DOCD LE1/YR: CPT | Mod: HCNC,CPTII,S$GLB, | Performed by: ORTHOPAEDIC SURGERY

## 2019-10-15 PROCEDURE — 3078F PR MOST RECENT DIASTOLIC BLOOD PRESSURE < 80 MM HG: ICD-10-PCS | Mod: HCNC,CPTII,S$GLB, | Performed by: ORTHOPAEDIC SURGERY

## 2019-10-15 PROCEDURE — 99213 PR OFFICE/OUTPT VISIT, EST, LEVL III, 20-29 MIN: ICD-10-PCS | Mod: HCNC,S$GLB,, | Performed by: ORTHOPAEDIC SURGERY

## 2019-10-15 NOTE — PROGRESS NOTES
Subjective:       Patient ID: Renea Bourgeois is a 69 y.o. female.    Chief Complaint: Hearing Loss (right ear muffled)    Patient is a very pleasant 69 y.o. female here to see me today for the first time for evaluation of hearing loss.  She reports hearing loss that has been gradually progressing over the last 3 months.  She has noted a difference in hearing between the ears, with the left ear being the better hearing ear.  She has noted any tinnitus in the right ear.  She has not had any recent issues with ear pain or ear drainage.  She denies a family history of hearing loss, and has not had any previous otologic surgery.  She denies any history of significant loud noise exposure.  She denies issues with dizziness.  She has been on zyrtec with no improvement in her symptoms.  She last had an audiogram here in 2017 that was nearly normal.    Review of Systems   Constitutional: Negative for chills, fatigue, fever and unexpected weight change.   HENT: Positive for hearing loss and tinnitus. Negative for congestion, dental problem, ear discharge, ear pain, facial swelling, nosebleeds, postnasal drip, rhinorrhea, sinus pressure, sneezing, sore throat, trouble swallowing and voice change.    Eyes: Negative for redness, itching and visual disturbance.   Respiratory: Negative for cough, choking, shortness of breath and wheezing.    Cardiovascular: Negative for chest pain and palpitations.   Gastrointestinal: Negative for abdominal pain.        No reflux.   Musculoskeletal: Negative for gait problem.   Skin: Negative for rash.   Allergic/Immunologic: Positive for environmental allergies.   Neurological: Negative for dizziness, light-headedness and headaches.       Objective:      Physical Exam   Constitutional: She is oriented to person, place, and time. She appears well-developed and well-nourished. No distress.   HENT:   Head: Normocephalic and atraumatic.   Right Ear: Tympanic membrane, external ear and ear canal  normal.   Left Ear: Tympanic membrane, external ear and ear canal normal.   Nose: Nose normal. No mucosal edema, rhinorrhea, nasal deformity or septal deviation. No epistaxis. Right sinus exhibits no maxillary sinus tenderness and no frontal sinus tenderness. Left sinus exhibits no maxillary sinus tenderness and no frontal sinus tenderness.   Mouth/Throat: Uvula is midline, oropharynx is clear and moist and mucous membranes are normal. Mucous membranes are not pale and not dry. No dental caries. No oropharyngeal exudate or posterior oropharyngeal erythema.   History of tonsillectomy at [age] years of age, abnormal contour of soft palate (? Adhesion to posterior pharyngeal wall?), no VPI   Eyes: Pupils are equal, round, and reactive to light. Conjunctivae, EOM and lids are normal. Right eye exhibits no chemosis. Left eye exhibits no chemosis. Right conjunctiva is not injected. Left conjunctiva is not injected. No scleral icterus. Right eye exhibits normal extraocular motion and no nystagmus. Left eye exhibits normal extraocular motion and no nystagmus.   Neck: Trachea normal and phonation normal. No tracheal tenderness present. No tracheal deviation present. No thyroid mass and no thyromegaly present.   Cardiovascular: Intact distal pulses.   Pulmonary/Chest: Effort normal. No stridor. No respiratory distress.   Abdominal: She exhibits no distension.   Lymphadenopathy:        Head (right side): No submental, no submandibular, no preauricular, no posterior auricular and no occipital adenopathy present.        Head (left side): No submental, no submandibular, no preauricular, no posterior auricular and no occipital adenopathy present.     She has no cervical adenopathy.   Neurological: She is alert and oriented to person, place, and time. No cranial nerve deficit.   Skin: Skin is warm and dry. No rash noted. No erythema.   Psychiatric: She has a normal mood and affect. Her behavior is normal.       Assessment:       1.  Perceived hearing loss    2. Right-sided tinnitus        Plan:       1.  Right hearing loss, tinnitus:  Her previous audiogram showed nearly normal hearing bilaterally, she is now noting a decline in her hearing and tinnitus in the right ear for the last three months.  Ear exam today is normal.  I would recommend an audiogram, will review and make recommendations when complete.

## 2019-10-15 NOTE — LETTER
October 15, 2019      Kaylee Sotelo PA-C  [facility] [place] [id number]           [facility]  [address] [facility]  [place] [address]  Phone: [phone]  Fax: [phone]          Patient: Renea Bourgeois   MR Number: [id number]   YOB: 1950   Date of Visit: 10/15/2019       Dear Kaylee Sotelo:    Thank you for referring Renea Bourgeois to me for evaluation. Attached you will find relevant portions of my assessment and plan of care.    If you have questions, please do not hesitate to call me. I look forward to following Renea Bourgeois along with you.    Sincerely,    Flaca Howell MD    Enclosure  CC:  No Recipients    If you would like to receive this communication electronically, please contact [email or web address] or [phone] to request more information on [employer] access.    For providers and/or their staff who would like to refer a patient to [facility], please contact us through our one-stop-shop provider referral line, [facility], at [phone].    If you feel you have received this communication in error or would no longer like to receive these types of communications, please e-mail [email or web address]

## 2019-10-16 ENCOUNTER — CLINICAL SUPPORT (OUTPATIENT)
Dept: AUDIOLOGY | Facility: CLINIC | Age: 69
End: 2019-10-16
Payer: MEDICARE

## 2019-10-16 DIAGNOSIS — H93.11 TINNITUS, RIGHT: ICD-10-CM

## 2019-10-16 PROCEDURE — 92557 COMPREHENSIVE HEARING TEST: CPT | Mod: HCNC,S$GLB,, | Performed by: AUDIOLOGIST-HEARING AID FITTER

## 2019-10-16 PROCEDURE — 92567 TYMPANOMETRY: CPT | Mod: HCNC,S$GLB,, | Performed by: AUDIOLOGIST-HEARING AID FITTER

## 2019-10-16 PROCEDURE — 92567 PR TYMPA2METRY: ICD-10-PCS | Mod: HCNC,S$GLB,, | Performed by: AUDIOLOGIST-HEARING AID FITTER

## 2019-10-16 PROCEDURE — 92557 PR COMPREHENSIVE HEARING TEST: ICD-10-PCS | Mod: HCNC,S$GLB,, | Performed by: AUDIOLOGIST-HEARING AID FITTER

## 2019-10-16 NOTE — PROGRESS NOTES
Referring Provider: Dr. Lynda Bourgeois was seen 10/16/2019 for an audiological evaluation.  She reports hearing loss that has been gradually progressing over the last 3 months.  She has noted a difference in hearing between the ears, with the left ear being the better hearing ear.  She has noted tinnitus in the right ear.  She has not had any recent issues with ear pain or ear drainage. Sh eis experiencing sensitivity to certain sounds.  She denies a family history of hearing loss, and has not had any previous otologic surgery.  She denies any history of significant loud noise exposure.  She denies issues with dizziness.  She has been on zyrtec with no improvement in her symptoms.  She last had an audiogram here in 2017 that was nearly normal.    Results reveal a normal to moderately severe sensorineural hearing loss 250-8000 Hz for the right ear, and a mild sensorineural hearing loss 250-8000 Hz for the left ear.   Speech Reception Thresholds were  15 dBHL for the right ear and 10 dBHL for the left ear.   Word recognition scores were excellent for the right ear and excellent for the left ear.   Tympanograms were Type A, normal for the right ear and Type A, normal for the left ear.    Patient was counseled on the above findings.    Recommendations:  1. ENT: Asymmetric SNHL and tinnitus AD

## 2019-10-17 ENCOUNTER — TELEPHONE (OUTPATIENT)
Dept: OTOLARYNGOLOGY | Facility: CLINIC | Age: 69
End: 2019-10-17

## 2019-10-17 NOTE — TELEPHONE ENCOUNTER
Please change to Open MRI Brain with and without (external)  I spoke with Carmel to scheduled and told her it is with IAC protocol.

## 2019-10-17 NOTE — TELEPHONE ENCOUNTER
Please let the patient know that her hearing test did show a hearing loss that is worse in the right ear as compared to the left ear.  As humans, we are not entirely symmetric and it is a relatively common finding.  However, I would recommend an MRI of the brain to ensure there is nothing going on in the brain or hearing and balance nerve to cause the difference.  Please schedule MRI and creatinine, will call with results.  If normal, then will have her follow yearly with audiograms.

## 2019-10-17 NOTE — TELEPHONE ENCOUNTER
I updated the referral to reflect [facility] and put the date and time it.  This along with the demographics were faxed.    I received an e-mail confirmation that fax was successfully sent.

## 2019-10-17 NOTE — TELEPHONE ENCOUNTER
----- Message from KATIE Hawkins sent at 10/16/2019  4:13 PM CDT -----  Audio for your review. Please f/u w/ patient. Thank you

## 2019-10-17 NOTE — TELEPHONE ENCOUNTER
I spoke with the patient.  She would like Open MRI.  I was able to get her scheduled at [facility]  Phone: [phone]  Fax: [phone]  Monday, Oct 28th.  Arrive at 8:15, scan at 8:30. NPO 4 hours prior to scan.  Facility will do her creatine day of scan.    Dr. Howell is not in today so Kaylee will change the orders to Open MRI external.

## 2019-10-24 DIAGNOSIS — F51.04 CHRONIC INSOMNIA: ICD-10-CM

## 2019-10-24 RX ORDER — TRAZODONE HYDROCHLORIDE 100 MG/1
TABLET ORAL
Qty: 90 TABLET | Refills: 1 | Status: SHIPPED | OUTPATIENT
Start: 2019-10-24 | End: 2020-04-22

## 2019-10-29 ENCOUNTER — PATIENT MESSAGE (OUTPATIENT)
Dept: DIABETES | Facility: CLINIC | Age: 69
End: 2019-10-29

## 2019-11-06 ENCOUNTER — OFFICE VISIT (OUTPATIENT)
Dept: RHEUMATOLOGY | Facility: CLINIC | Age: 69
End: 2019-11-06
Payer: MEDICARE

## 2019-11-06 VITALS
SYSTOLIC BLOOD PRESSURE: 132 MMHG | BODY MASS INDEX: 27.96 KG/M2 | WEIGHT: 142.44 LBS | HEIGHT: 60 IN | DIASTOLIC BLOOD PRESSURE: 82 MMHG | HEART RATE: 76 BPM

## 2019-11-06 DIAGNOSIS — M81.0 AGE-RELATED OSTEOPOROSIS WITHOUT CURRENT PATHOLOGICAL FRACTURE: Primary | ICD-10-CM

## 2019-11-06 DIAGNOSIS — Z71.89 COUNSELING ON HEALTH PROMOTION AND DISEASE PREVENTION: ICD-10-CM

## 2019-11-06 DIAGNOSIS — M18.11 PRIMARY OSTEOARTHRITIS OF FIRST CARPOMETACARPAL JOINT OF RIGHT HAND: ICD-10-CM

## 2019-11-06 PROCEDURE — 3079F PR MOST RECENT DIASTOLIC BLOOD PRESSURE 80-89 MM HG: ICD-10-PCS | Mod: CPTII,S$GLB,, | Performed by: INTERNAL MEDICINE

## 2019-11-06 PROCEDURE — 99214 OFFICE O/P EST MOD 30 MIN: CPT | Mod: S$GLB,,, | Performed by: INTERNAL MEDICINE

## 2019-11-06 PROCEDURE — 3075F PR MOST RECENT SYSTOLIC BLOOD PRESS GE 130-139MM HG: ICD-10-PCS | Mod: CPTII,S$GLB,, | Performed by: INTERNAL MEDICINE

## 2019-11-06 PROCEDURE — 99999 PR PBB SHADOW E&M-EST. PATIENT-LVL IV: CPT | Mod: PBBFAC,,, | Performed by: INTERNAL MEDICINE

## 2019-11-06 PROCEDURE — 1100F PR PT FALLS ASSESS DOC 2+ FALLS/FALL W/INJURY/YR: ICD-10-PCS | Mod: CPTII,S$GLB,, | Performed by: INTERNAL MEDICINE

## 2019-11-06 PROCEDURE — 99499 RISK ADDL DX/OHS AUDIT: ICD-10-PCS | Mod: HCNC,S$GLB,, | Performed by: INTERNAL MEDICINE

## 2019-11-06 PROCEDURE — 3288F PR FALLS RISK ASSESSMENT DOCUMENTED: ICD-10-PCS | Mod: CPTII,S$GLB,, | Performed by: INTERNAL MEDICINE

## 2019-11-06 PROCEDURE — 99499 UNLISTED E&M SERVICE: CPT | Mod: HCNC,S$GLB,, | Performed by: INTERNAL MEDICINE

## 2019-11-06 PROCEDURE — 3075F SYST BP GE 130 - 139MM HG: CPT | Mod: CPTII,S$GLB,, | Performed by: INTERNAL MEDICINE

## 2019-11-06 PROCEDURE — 99214 PR OFFICE/OUTPT VISIT, EST, LEVL IV, 30-39 MIN: ICD-10-PCS | Mod: S$GLB,,, | Performed by: INTERNAL MEDICINE

## 2019-11-06 PROCEDURE — 3079F DIAST BP 80-89 MM HG: CPT | Mod: CPTII,S$GLB,, | Performed by: INTERNAL MEDICINE

## 2019-11-06 PROCEDURE — 3288F FALL RISK ASSESSMENT DOCD: CPT | Mod: CPTII,S$GLB,, | Performed by: INTERNAL MEDICINE

## 2019-11-06 PROCEDURE — 99999 PR PBB SHADOW E&M-EST. PATIENT-LVL IV: ICD-10-PCS | Mod: PBBFAC,,, | Performed by: INTERNAL MEDICINE

## 2019-11-06 PROCEDURE — 1100F PTFALLS ASSESS-DOCD GE2>/YR: CPT | Mod: CPTII,S$GLB,, | Performed by: INTERNAL MEDICINE

## 2019-11-06 NOTE — PROGRESS NOTES
RHEUMATOLOGY OUTPATIENT CLINIC NOTE    11/6/2019    Attending Rheumatologist: Nathaniel Moon  Primary Care Provider: Kamini Newton MD   Physician Requesting Consultation: Aaareferral Self  No address on file  Chief Complaint/Reason For Consultation:  Osteoarthritis and osteoporosis.    Subjective:       HPI  Renea Bourgeois is a 69 y.o. White female with OA and osteoporosis comes for follow-up.    Today  Last seen on August.  Provided corticosteroid injection right thumb CMC with very short lasting results reported by patient.  Received Reclast on August without significant side effects.  No acute complaints.  Refers 1 episode of mechanical fall without sequela since last visit.  Has not had any fragility fractures.      Review of Systems   Constitutional: Negative for chills and fever.   Eyes: Negative for pain and redness.   Respiratory: Negative for cough and shortness of breath.    Cardiovascular: Negative for chest pain and leg swelling.   Gastrointestinal: Negative for blood in stool and melena.   Genitourinary: Negative for dysuria and urgency.   Musculoskeletal: Negative for falls and joint pain.   Skin: Negative for rash.   Neurological: Negative for tingling and weakness.   Psychiatric/Behavioral: Negative for memory loss. The patient does not have insomnia.      Chronic comorbid conditions affecting medical decision making today:  Past Medical History:   Diagnosis Date    Allergy     Anemia 11/14/2017    Angina pectoris     followed by cardiology    Arthritis     Breast cancer     Right T1 Ductal Ca in situ,high oncotype Dx 33, Estrogen positive. Lumpectomy, TAC chemo x 6 cyscles then RT,on aromatase inhibitor    Cataract     Diabetes mellitus type II 2011    DM (diabetes mellitus) 2011     am 08/04/2017    Elevated BP without diagnosis of hypertension 11/27/2018    GERD (gastroesophageal reflux disease)     History of colon polyps 2/21/2018    The patient had adenomatous colon  polyps in 2014.      Hyperlipidemia     Hypertension     Kidney stone     right side, passed    Osteopenia     Osteoporosis 4/12/2019    PVC (premature ventricular contraction)     on and off    Trouble in sleeping     Type 2 diabetes mellitus      Past Surgical History:   Procedure Laterality Date    BREAST BIOPSY      BREAST LUMPECTOMY  2/11/2011    right    CATARACT EXTRACTION Bilateral 10/14/15    CHOLECYSTECTOMY  1989    open    COLONOSCOPY N/A 2/22/2018    Procedure: COLONOSCOPY;  Surgeon: Carlito Odonnell MD;  [facility];  Service: Endoscopy;  Laterality: N/A;    CYST REMOVAL Left 11/2017    foot    DILATION AND CURETTAGE OF [place]TERUS  10[place]2010    [place]    GALLBLADDER SURGERY      removed in 1989    Nasal septal deviation repair  2009    toenail edges removed      TONSILLECTOMY  1959    TOTAL REDUCTION MAMMOPLASTY Left     2015     Family History   Problem Relation Age of Onset    Diabetes Father     Hypertension Father     Stroke Father     Cancer Father 65        metastatic when diagnosed    Stroke Brother     Cancer Other         kidney    Atrial fibrillation Son         35    Heart disease Son     Alzheimer's disease Mother     Parkinsonism Brother     Cancer Paternal Grandfather         prostate    Glaucoma Maternal Grandmother     Psoriasis Cousin     Alcohol abuse Neg Hx     Drug abuse Neg Hx     COPD Neg Hx     Birth defects Neg Hx     Mental retardation Neg Hx     Mental illness Neg Hx     Kidney disease Neg Hx     Hyperlipidemia Neg Hx     Melanoma Neg Hx     Lupus Neg Hx      Social History     Substance and Sexual Activity   Alcohol Use No    Alcohol/week: 0.0 standard drinks     Social History     Tobacco Use   Smoking Status Never Smoker   Smokeless Tobacco Never Used     Social History     Substance and Sexual Activity   Drug Use No       Current Outpatient Medications:     ACCU-CHEK JD PLUS TEST STRP Strp, TEST three times a day, Disp:  100 strip, Rfl: 11    acetaminophen (TYLENOL) 500 MG tablet, Take 500 mg by mouth every 6 (six) hours as needed for Pain., Disp: , Rfl:     ALCOHOL PREP PADS PadM, , Disp: , Rfl:     anastrozole (ARIMIDEX) 1 mg Tab, TAKE 1 TABLET(1 MG) BY MOUTH EVERY DAY, Disp: 90 tablet, Rfl: 0    cholecalciferol, vitamin D3, (VITAMIN D3) 1,000 unit capsule, Take 1 capsule (1,000 Units total) by mouth once daily., Disp: , Rfl: 0    diclofenac sodium (VOLTAREN) 1 % Gel, Apply 2 g topically once daily., Disp: 1 Tube, Rfl: 2    ferrous gluconate (FERGON) 240 (27 FE) MG tablet, Take 1 tablet (240 mg total) by mouth once daily., Disp: , Rfl:     fluticasone (FLONASE) 50 mcg/actuation nasal spray, 2 sprays by Each Nare route daily as needed. 2 Spray, Suspension Nasal Every day, Disp: 16 g, Rfl: 5    gabapentin (NEURONTIN) 100 MG capsule, Take 1 capsule (100 mg total) by mouth 3 (three) times daily., Disp: 90 capsule, Rfl: 2    glimepiride (AMARYL) 1 MG tablet, TAKE 1 TABLET(1 MG) BY MOUTH EVERY DAY, Disp: 90 tablet, Rfl: 0    lancets (ACCU-CHEK SOFTCLIX LANCETS) Misc, 1 each by Misc.(Non-Drug; Combo Route) route 3 (three) times daily., Disp: 100 each, Rfl: 11    lancing device Misc, , Disp: , Rfl:     metoprolol succinate (TOPROL-XL) 25 MG 24 hr tablet, Take 25 mg by mouth every evening. , Disp: , Rfl: 1    naproxen sodium (ALEVE) 220 MG tablet, Take 220 mg by mouth as needed., Disp: , Rfl:     rosuvastatin (CRESTOR) 10 MG tablet, TAKE 1 TABLET BY MOUTH ONCE DAILY, Disp: 90 tablet, Rfl: 0    traZODone (DESYREL) 100 MG tablet, TAKE 1 TABLET BY MOUTH AT BEDTIME, Disp: 90 tablet, Rfl: 1    anastrozole (ARIMIDEX) 1 mg Tab, TAKE 1 TABLET(1 MG) BY MOUTH EVERY DAY, Disp: 90 tablet, Rfl: 0  No current facility-administered medications for this visit.     Facility-Administered Medications Ordered in Other Visits:     sodium chloride 0.9% flush 10 mL, 10 mL, Intravenous, PRN, Nathaniel Moon MD    zoledronic acid-mannitol & water  5 mg/100 mL infusion 5 mg, 5 mg, Intravenous, 1 time in Clinic/HOD, Nathaniel Moon MD     Objective:         Vitals:    11/06/19 0836   BP: 132/82   Pulse: 76     Physical Exam   Nursing note and vitals reviewed.  Constitutional: She is oriented to person, place, and time and well-developed, well-nourished, and in no distress. No distress.   HENT:   Head: Normocephalic.   Eyes: Conjunctivae are normal. Pupils are equal, round, and reactive to light.   Absent Episcleritis/scleritis.   Neck: Normal range of motion.   Cardiovascular: Normal rate and intact distal pulses.    Pulmonary/Chest: Effort normal. No respiratory distress.   Abdominal: Soft. She exhibits no distension.   Neurological: She is alert and oriented to person, place, and time. Gait normal.   absent sensory deficits  muscle strength 5/5 through.     Step up and go test less than 12 sec   Skin: No rash noted. No erythema.     Musculoskeletal: Normal range of motion. She exhibits deformity (Heberden's, Ute's.  Thumb CMC squaring.). She exhibits no edema.   : strong.  No synovitis.    Devices used by patient: none       Reviewed old and all outside pertinent medical records available.    All lab results personally reviewed and interpreted by me.  Lab Results   Component Value Date    WBC 5.53 11/13/2018    HGB 12.9 11/13/2018    HCT 39.9 11/13/2018    MCV 93 11/13/2018    MCH 30.1 11/13/2018    MCHC 32.3 11/13/2018    RDW 12.7 11/13/2018     11/13/2018    MPV 12.0 11/13/2018    PLTEST Sl decreased (A) 07/21/2011       Lab Results   Component Value Date     07/29/2019    K 3.5 07/29/2019     07/29/2019    CO2 24 07/29/2019    GLU 92 07/29/2019    BUN 9 07/29/2019    CALCIUM 10.0 07/29/2019    PROT 7.1 07/29/2019    ALBUMIN 4.2 07/29/2019    BILITOT 0.5 07/29/2019    AST 17 07/29/2019    ALKPHOS 85 07/29/2019    ALT 18 07/29/2019       Lab Results   Component Value Date    COLORU Dk straw 10/16/2013    APPEARANCEUA CLEAR  08/20/2013    SPECGRAV 1.010 10/16/2013    PHUR 6 10/16/2013    PROTEINUA Negative 08/20/2013    KETONESU neg 10/16/2013    LEUKOCYTESUR Negative 08/20/2013    NITRITE neg 10/16/2013    UROBILINOGEN normal 10/16/2013       No results found for: CRP    Lab Results   Component Value Date    SEDRATE 10 03/08/2010       Lab Results   Component Value Date    SANTINO Negative 09/29/2006    RF Negative 09/29/2006    SEDRATE 10 03/08/2010       No components found for: 25OHVITDTOT, 22XYHLGN0, 46HWHKBX9, METHODNOTE    No results found for: URICACID    No components found for: TSPOTTB    · Vitamin-D 22 (4/2019)    Rheum Labs:  SANTINO negative  Rheumatoid factor negative     Infectious Labs:  HCV nonreactive     Imaging:  All imaging reviewed and independently  interpreted by me.    X-ray hands July 2015  prominent joint space narrowing and osteophyte formation involving the interphalangeal joints bilaterally.     X-ray foot January 2018  Alternatively degenerative changes throughout the foot.  Early dorsal and plantar calcaneal spurs.  No acute fracture or dislocation.  No discrete focal erosion or periosteal reaction.    X-ray knee March 2018  Minimal bilateral degenerative changes noted throughout the all 3 compartments.  Equivocal small right effusion.    DEXA scan  December 2014  July 2017 May 2019  FN: -1.3   -1.5   -1.7  LS: -2    -2.5  -2.7     ASSESSMENT / PLAN:     Renea Bourgeois is a 69 y.o. White female with:    1. Primary osteoarthritis involving multiple joints - stable  - resting affected joint for brief periods (<12 h), activity modification  - PT/OT, joint braces/splints PRN  - stretching, massage, heat, paraffin wax PRN  - Acetaminophen prn -> standing up to 3 g per day  - good results with gabapentin.  Continue as tolerated.  - Topicals therapy: Capsaicin / NSAIDs     2. Osteoporosis  - previously on Prolia, last given 2014  - remains at high risk of fragility fracture. Hx of breast Ca on AI.  - received Reclast  on August 2019.  Repeat next year.  - continue with adequate dietary calcium and vitamin-D intake    3. Other specified counseling  - over 10 minutes spent regarding below topics:  - avoid immobility, fall prevention.  - Nutrition and exercise counseling.  - Medication counseling provided.    Follow up in about 9 months (around 8/6/2020).    Method of contact with patient concerns: [facility]    Disclaimer:  This note is prepared using voice recognition software and as such is likely to have errors and has not been proof read. Please contact me for questions.     Time spent: 25 minutes in face to face discussion concerning diagnosis, prognosis, review of lab and test results, benefits of treatment as well as management of disease, counseling of patient and coordination of care between various health care providers.  Greater than half the time spent was used for coordination of care and counseling of patient.    Nathaniel Moon M.D.  Rheumatology Department   [facility]

## 2019-11-12 ENCOUNTER — TELEPHONE (OUTPATIENT)
Dept: OTOLARYNGOLOGY | Facility: CLINIC | Age: 69
End: 2019-11-12

## 2019-11-12 NOTE — TELEPHONE ENCOUNTER
Attempted 3 times to call [facility] kept getting a vm/answering service. Will continue to try calling. Dialing number [phone]

## 2019-11-13 ENCOUNTER — TELEPHONE (OUTPATIENT)
Dept: OTOLARYNGOLOGY | Facility: CLINIC | Age: 69
End: 2019-11-13

## 2019-11-13 NOTE — TELEPHONE ENCOUNTER
----- Message from Frances Ty MA sent at 11/12/2019  4:30 PM CST -----  Marcia Phelan MA 1 hour ago (2:59 PM)          Attempt #1 to reach the patient.       I left a message asking the patient to call us back.      Documentation     ROLAND Valera Glenna J 1 hour ago (2:57 PM)    Flaca Howell MD routed conversation to Lynda Khan 1 hour ago (2:53 PM)    Flaca Howell MD 1 hour ago (2:53 PM)          Please let Ms. Bourgeois know that her MRI of the IAC was normal, and does not show any abnormality of the hearing and balance nerve as the cause for her hearing to be worse in one ear.  I would recommend she consider hearing aids when she is ready.      Documentation

## 2019-11-14 ENCOUNTER — LAB VISIT (OUTPATIENT)
Dept: LAB | Facility: HOSPITAL | Age: 69
End: 2019-11-14
Attending: FAMILY MEDICINE
Payer: MEDICARE

## 2019-11-14 DIAGNOSIS — E11.9 DIABETES MELLITUS TYPE 2 IN NONOBESE: ICD-10-CM

## 2019-11-14 DIAGNOSIS — E55.9 VITAMIN D DEFICIENCY: ICD-10-CM

## 2019-11-14 DIAGNOSIS — E78.5 HYPERLIPIDEMIA ASSOCIATED WITH TYPE 2 DIABETES MELLITUS: ICD-10-CM

## 2019-11-14 DIAGNOSIS — E11.69 HYPERLIPIDEMIA ASSOCIATED WITH TYPE 2 DIABETES MELLITUS: ICD-10-CM

## 2019-11-14 LAB
25(OH)D3+25(OH)D2 SERPL-MCNC: 38 NG/ML (ref 30–96)
ALBUMIN SERPL BCP-MCNC: 4.2 G/DL (ref 3.5–5.2)
ALP SERPL-CCNC: 72 U/L (ref 55–135)
ALT SERPL W/O P-5'-P-CCNC: 20 U/L (ref 10–44)
ANION GAP SERPL CALC-SCNC: 8 MMOL/L (ref 8–16)
AST SERPL-CCNC: 18 U/L (ref 10–40)
BILIRUB SERPL-MCNC: 0.5 MG/DL (ref 0.1–1)
BUN SERPL-MCNC: 11 MG/DL (ref 8–23)
CALCIUM SERPL-MCNC: 9.6 MG/DL (ref 8.7–10.5)
CHLORIDE SERPL-SCNC: 107 MMOL/L (ref 95–110)
CHOLEST SERPL-MCNC: 176 MG/DL (ref 120–199)
CHOLEST/HDLC SERPL: 3.4 {RATIO} (ref 2–5)
CO2 SERPL-SCNC: 28 MMOL/L (ref 23–29)
CREAT SERPL-MCNC: 0.9 MG/DL (ref 0.5–1.4)
EST. GFR  (AFRICAN AMERICAN): >60 ML/MIN/1.73 M^2
EST. GFR  (NON AFRICAN AMERICAN): >60 ML/MIN/1.73 M^2
ESTIMATED AVG GLUCOSE: 120 MG/DL (ref 68–131)
GLUCOSE SERPL-MCNC: 114 MG/DL (ref 70–110)
HBA1C MFR BLD HPLC: 5.8 % (ref 4–5.6)
HDLC SERPL-MCNC: 52 MG/DL (ref 40–75)
HDLC SERPL: 29.5 % (ref 20–50)
LDLC SERPL CALC-MCNC: 95.4 MG/DL (ref 63–159)
NONHDLC SERPL-MCNC: 124 MG/DL
POTASSIUM SERPL-SCNC: 4 MMOL/L (ref 3.5–5.1)
PROT SERPL-MCNC: 7.1 G/DL (ref 6–8.4)
SODIUM SERPL-SCNC: 143 MMOL/L (ref 136–145)
TRIGL SERPL-MCNC: 143 MG/DL (ref 30–150)

## 2019-11-14 PROCEDURE — 36415 COLL VENOUS BLD VENIPUNCTURE: CPT

## 2019-11-14 PROCEDURE — 80053 COMPREHEN METABOLIC PANEL: CPT

## 2019-11-14 PROCEDURE — 83036 HEMOGLOBIN GLYCOSYLATED A1C: CPT

## 2019-11-14 PROCEDURE — 82306 VITAMIN D 25 HYDROXY: CPT

## 2019-11-14 PROCEDURE — 80061 LIPID PANEL: CPT

## 2019-11-21 ENCOUNTER — OFFICE VISIT (OUTPATIENT)
Dept: INTERNAL MEDICINE | Facility: CLINIC | Age: 69
End: 2019-11-21
Payer: MEDICARE

## 2019-11-21 VITALS
OXYGEN SATURATION: 96 % | BODY MASS INDEX: 27.74 KG/M2 | WEIGHT: 141.31 LBS | SYSTOLIC BLOOD PRESSURE: 128 MMHG | TEMPERATURE: 98 F | HEART RATE: 74 BPM | HEIGHT: 60 IN | DIASTOLIC BLOOD PRESSURE: 70 MMHG

## 2019-11-21 DIAGNOSIS — E11.59 HYPERTENSION ASSOCIATED WITH DIABETES: ICD-10-CM

## 2019-11-21 DIAGNOSIS — E78.5 HYPERLIPIDEMIA ASSOCIATED WITH TYPE 2 DIABETES MELLITUS: ICD-10-CM

## 2019-11-21 DIAGNOSIS — E55.9 VITAMIN D INSUFFICIENCY: ICD-10-CM

## 2019-11-21 DIAGNOSIS — F51.04 CHRONIC INSOMNIA: ICD-10-CM

## 2019-11-21 DIAGNOSIS — I15.2 HYPERTENSION ASSOCIATED WITH DIABETES: ICD-10-CM

## 2019-11-21 DIAGNOSIS — E11.69 HYPERLIPIDEMIA ASSOCIATED WITH TYPE 2 DIABETES MELLITUS: ICD-10-CM

## 2019-11-21 DIAGNOSIS — E11.9 TYPE 2 DIABETES MELLITUS WITHOUT COMPLICATION, WITHOUT LONG-TERM CURRENT USE OF INSULIN: ICD-10-CM

## 2019-11-21 PROCEDURE — 3288F FALL RISK ASSESSMENT DOCD: CPT | Mod: CPTII,S$GLB,, | Performed by: FAMILY MEDICINE

## 2019-11-21 PROCEDURE — 1159F MED LIST DOCD IN RCRD: CPT | Mod: S$GLB,,, | Performed by: FAMILY MEDICINE

## 2019-11-21 PROCEDURE — 3078F PR MOST RECENT DIASTOLIC BLOOD PRESSURE < 80 MM HG: ICD-10-PCS | Mod: CPTII,S$GLB,, | Performed by: FAMILY MEDICINE

## 2019-11-21 PROCEDURE — 1100F PR PT FALLS ASSESS DOC 2+ FALLS/FALL W/INJURY/YR: ICD-10-PCS | Mod: CPTII,S$GLB,, | Performed by: FAMILY MEDICINE

## 2019-11-21 PROCEDURE — 99999 PR PBB SHADOW E&M-EST. PATIENT-LVL III: CPT | Mod: PBBFAC,,, | Performed by: FAMILY MEDICINE

## 2019-11-21 PROCEDURE — 1126F PR PAIN SEVERITY QUANTIFIED, NO PAIN PRESENT: ICD-10-PCS | Mod: S$GLB,,, | Performed by: FAMILY MEDICINE

## 2019-11-21 PROCEDURE — 1159F PR MEDICATION LIST DOCUMENTED IN MEDICAL RECORD: ICD-10-PCS | Mod: S$GLB,,, | Performed by: FAMILY MEDICINE

## 2019-11-21 PROCEDURE — 3044F PR MOST RECENT HEMOGLOBIN A1C LEVEL <7.0%: ICD-10-PCS | Mod: CPTII,S$GLB,, | Performed by: FAMILY MEDICINE

## 2019-11-21 PROCEDURE — 3078F DIAST BP <80 MM HG: CPT | Mod: CPTII,S$GLB,, | Performed by: FAMILY MEDICINE

## 2019-11-21 PROCEDURE — 1126F AMNT PAIN NOTED NONE PRSNT: CPT | Mod: S$GLB,,, | Performed by: FAMILY MEDICINE

## 2019-11-21 PROCEDURE — 99999 PR PBB SHADOW E&M-EST. PATIENT-LVL III: ICD-10-PCS | Mod: PBBFAC,,, | Performed by: FAMILY MEDICINE

## 2019-11-21 PROCEDURE — 99214 PR OFFICE/OUTPT VISIT, EST, LEVL IV, 30-39 MIN: ICD-10-PCS | Mod: S$GLB,,, | Performed by: FAMILY MEDICINE

## 2019-11-21 PROCEDURE — 99214 OFFICE O/P EST MOD 30 MIN: CPT | Mod: S$GLB,,, | Performed by: FAMILY MEDICINE

## 2019-11-21 PROCEDURE — 3074F PR MOST RECENT SYSTOLIC BLOOD PRESSURE < 130 MM HG: ICD-10-PCS | Mod: CPTII,S$GLB,, | Performed by: FAMILY MEDICINE

## 2019-11-21 PROCEDURE — 1100F PTFALLS ASSESS-DOCD GE2>/YR: CPT | Mod: CPTII,S$GLB,, | Performed by: FAMILY MEDICINE

## 2019-11-21 PROCEDURE — 3288F PR FALLS RISK ASSESSMENT DOCUMENTED: ICD-10-PCS | Mod: CPTII,S$GLB,, | Performed by: FAMILY MEDICINE

## 2019-11-21 PROCEDURE — 3074F SYST BP LT 130 MM HG: CPT | Mod: CPTII,S$GLB,, | Performed by: FAMILY MEDICINE

## 2019-11-21 PROCEDURE — 3044F HG A1C LEVEL LT 7.0%: CPT | Mod: CPTII,S$GLB,, | Performed by: FAMILY MEDICINE

## 2019-11-21 NOTE — PATIENT INSTRUCTIONS
Schedule eye exam with Dr. Lu  Check with your pharmacist regarding shingrix vaccine.  For Caregivers: Coping Tips  Caregivers often feel they must tend to their loved ones needs full time. But burning yourself out doesnt help anyone and can negatively affect your own health. You cant take good care of someone else without taking good care of yourself as well. Its not selfish. Its essential. Take a break. Eat right. Get out and exercise. Most of all, accept that you cant do everything yourself.    Give yourself a break  All of the things you do are not equally important. Set priorities. That way you wont be busy all the time. Look after your health. Go for a walk each chance you get. Take a long bath. Lift your spirits by having lunch with a friend. Or do nothing for an hour. Just nap or relax.  Accept help  Knowing you can count on others can be a relief. Accept help when its offered. And be willing to ask for help when you need it. Those who care about you really do want to help.  If you feel depressed  Over time, after a serious health event, stress should gradually lessen. But your life may have changed. Realizing this may cause grief, both for you and your loved one. Contact your healthcare provider if either of you shows signs of depression. Treatment can help you find hope--even when you think nothing can help.  Common signs of depression  · Feeling down most of the time  · Feeling guilty or helpless  · Losing pleasure in things you used to enjoy, like reading, exercise, or social events  · Sleeping less or more than normal  · Having a big rise or fall in appetite or weight  · Feeling restless or irritable  · Feeling tired, weak, or low in energy  · Having trouble focusing, remembering, or making decisions  · Feeling angry or agitated can be a sign as well. This may be the only sign more common in men.   Date Last Reviewed: 3/1/2017  © 5433-2110 [employer]. [address] [place] [address],  [place] [address]. All rights reserved. This information is not intended as a substitute for professional medical care. Always follow your healthcare professional's instructions.

## 2019-11-21 NOTE — PROGRESS NOTES
Subjective:       Patient ID: Renea Bourgeois is a 69 y.o. female.    Chief Complaint: Follow-up    Patient presents to clinic today for followup of diabetes, hypertension and hyperlipidemia. She reports increased personal stressors recently, so has not followed diet as closely as she should.    Review of Systems   Constitutional: Negative for chills, fatigue, fever and unexpected weight change.   Eyes: Negative for visual disturbance.   Respiratory: Negative for shortness of breath.    Cardiovascular: Negative for chest pain.   Musculoskeletal: Negative for myalgias.   Neurological: Negative for headaches.       Objective:      Physical Exam   Constitutional: She is oriented to person, place, and time. She appears well-developed and well-nourished. No distress.   HENT:   Head: Normocephalic and atraumatic.   Eyes: Pupils are equal, round, and reactive to light. Conjunctivae and EOM are normal. No scleral icterus.   Cardiovascular: Normal rate and regular rhythm. Exam reveals no gallop and no friction rub.   No murmur heard.  Pulmonary/Chest: Effort normal and breath sounds normal.   Neurological: She is alert and oriented to person, place, and time. No cranial nerve deficit. Gait normal.   Psychiatric: She has a normal mood and affect.   Vitals reviewed.      Assessment:       1. Hypertension associated with diabetes    2. Hyperlipidemia associated with type 2 diabetes mellitus    3. Vitamin D insufficiency    4. Chronic insomnia    5. Type 2 diabetes mellitus without complication, without long-term current use of insulin        Plan:     Problem List Items Addressed This Visit     Chronic insomnia    Current Assessment & Plan     Stable on trazodone         Hyperlipidemia associated with type 2 diabetes mellitus    Current Assessment & Plan     LDL 94; continue crestor, watch diet         Hypertension associated with diabetes    Current Assessment & Plan     Controlled, continue metoprolol         Type 2 diabetes  mellitus, without long-term current use of insulin    Current Assessment & Plan     a1c 5.8; continue glimepiride         Vitamin D insufficiency    Current Assessment & Plan     Controlled, continue supplement               Health Maintenance reviewed/updated.

## 2019-11-27 ENCOUNTER — IMMUNIZATION (OUTPATIENT)
Dept: PHARMACY | Facility: CLINIC | Age: 69
End: 2019-11-27
Payer: MEDICARE

## 2019-12-03 DIAGNOSIS — M15.9 PRIMARY OSTEOARTHRITIS INVOLVING MULTIPLE JOINTS: ICD-10-CM

## 2019-12-03 RX ORDER — GABAPENTIN 100 MG/1
100 CAPSULE ORAL 3 TIMES DAILY
Qty: 90 CAPSULE | Refills: 2 | Status: SHIPPED | OUTPATIENT
Start: 2019-12-03 | End: 2020-07-09

## 2019-12-16 RX ORDER — ROSUVASTATIN CALCIUM 10 MG/1
TABLET, COATED ORAL
Qty: 90 TABLET | Refills: 0 | Status: SHIPPED | OUTPATIENT
Start: 2019-12-16 | End: 2020-03-15

## 2019-12-17 DIAGNOSIS — E11.9 TYPE 2 DIABETES MELLITUS WITHOUT COMPLICATION, WITHOUT LONG-TERM CURRENT USE OF INSULIN: Chronic | ICD-10-CM

## 2019-12-17 RX ORDER — GLIMEPIRIDE 1 MG/1
TABLET ORAL
Qty: 90 TABLET | Refills: 0 | Status: SHIPPED | OUTPATIENT
Start: 2019-12-17 | End: 2020-03-16

## 2020-02-10 DIAGNOSIS — M79.641 RIGHT HAND PAIN: Primary | ICD-10-CM

## 2020-02-19 ENCOUNTER — OFFICE VISIT (OUTPATIENT)
Dept: ORTHOPEDICS | Facility: CLINIC | Age: 70
End: 2020-02-19
Payer: MEDICARE

## 2020-02-19 ENCOUNTER — HOSPITAL ENCOUNTER (OUTPATIENT)
Dept: RADIOLOGY | Facility: HOSPITAL | Age: 70
Discharge: HOME | End: 2020-02-19
Attending: ORTHOPAEDIC SURGERY
Payer: MEDICARE

## 2020-02-19 VITALS
HEIGHT: 60 IN | DIASTOLIC BLOOD PRESSURE: 82 MMHG | WEIGHT: 141 LBS | SYSTOLIC BLOOD PRESSURE: 157 MMHG | BODY MASS INDEX: 27.68 KG/M2 | HEART RATE: 63 BPM

## 2020-02-19 DIAGNOSIS — M18.11 PRIMARY OSTEOARTHRITIS OF FIRST CARPOMETACARPAL JOINT OF RIGHT HAND: ICD-10-CM

## 2020-02-19 DIAGNOSIS — M65.331 TRIGGER MIDDLE FINGER OF RIGHT HAND: Primary | ICD-10-CM

## 2020-02-19 DIAGNOSIS — M19.049 ARTHRITIS OF FINGER: ICD-10-CM

## 2020-02-19 DIAGNOSIS — M79.641 RIGHT HAND PAIN: ICD-10-CM

## 2020-02-19 PROCEDURE — 3079F PR MOST RECENT DIASTOLIC BLOOD PRESSURE 80-89 MM HG: ICD-10-PCS | Mod: HCNC,CPTII,S$GLB, | Performed by: ORTHOPAEDIC SURGERY

## 2020-02-19 PROCEDURE — 73130 XR HAND COMPLETE 3 VIEW RIGHT: ICD-10-PCS | Mod: 26,HCNC,RT, | Performed by: RADIOLOGY

## 2020-02-19 PROCEDURE — 1101F PR PT FALLS ASSESS DOC 0-1 FALLS W/OUT INJ PAST YR: ICD-10-PCS | Mod: HCNC,CPTII,S$GLB, | Performed by: ORTHOPAEDIC SURGERY

## 2020-02-19 PROCEDURE — 3079F DIAST BP 80-89 MM HG: CPT | Mod: HCNC,CPTII,S$GLB, | Performed by: ORTHOPAEDIC SURGERY

## 2020-02-19 PROCEDURE — 99999 PR PBB SHADOW E&M-EST. PATIENT-LVL III: ICD-10-PCS | Mod: PBBFAC,HCNC,, | Performed by: ORTHOPAEDIC SURGERY

## 2020-02-19 PROCEDURE — 3077F PR MOST RECENT SYSTOLIC BLOOD PRESSURE >= 140 MM HG: ICD-10-PCS | Mod: HCNC,CPTII,S$GLB, | Performed by: ORTHOPAEDIC SURGERY

## 2020-02-19 PROCEDURE — 3077F SYST BP >= 140 MM HG: CPT | Mod: HCNC,CPTII,S$GLB, | Performed by: ORTHOPAEDIC SURGERY

## 2020-02-19 PROCEDURE — 99214 OFFICE O/P EST MOD 30 MIN: CPT | Mod: HCNC,S$GLB,, | Performed by: ORTHOPAEDIC SURGERY

## 2020-02-19 PROCEDURE — 99999 PR PBB SHADOW E&M-EST. PATIENT-LVL III: CPT | Mod: PBBFAC,HCNC,, | Performed by: ORTHOPAEDIC SURGERY

## 2020-02-19 PROCEDURE — 99214 PR OFFICE/OUTPT VISIT, EST, LEVL IV, 30-39 MIN: ICD-10-PCS | Mod: HCNC,S$GLB,, | Performed by: ORTHOPAEDIC SURGERY

## 2020-02-19 PROCEDURE — 1159F PR MEDICATION LIST DOCUMENTED IN MEDICAL RECORD: ICD-10-PCS | Mod: HCNC,S$GLB,, | Performed by: ORTHOPAEDIC SURGERY

## 2020-02-19 PROCEDURE — 1159F MED LIST DOCD IN RCRD: CPT | Mod: HCNC,S$GLB,, | Performed by: ORTHOPAEDIC SURGERY

## 2020-02-19 PROCEDURE — 1125F AMNT PAIN NOTED PAIN PRSNT: CPT | Mod: HCNC,S$GLB,, | Performed by: ORTHOPAEDIC SURGERY

## 2020-02-19 PROCEDURE — 73130 X-RAY EXAM OF HAND: CPT | Mod: 26,HCNC,RT, | Performed by: RADIOLOGY

## 2020-02-19 PROCEDURE — 73130 X-RAY EXAM OF HAND: CPT | Mod: TC,HCNC,RT

## 2020-02-19 PROCEDURE — 1125F PR PAIN SEVERITY QUANTIFIED, PAIN PRESENT: ICD-10-PCS | Mod: HCNC,S$GLB,, | Performed by: ORTHOPAEDIC SURGERY

## 2020-02-19 PROCEDURE — 1101F PT FALLS ASSESS-DOCD LE1/YR: CPT | Mod: HCNC,CPTII,S$GLB, | Performed by: ORTHOPAEDIC SURGERY

## 2020-02-19 NOTE — PATIENT INSTRUCTIONS
If you are experiencing pain/discomfort or have questions after 5pm and would like to be connected to the [facility] on call team, please call this number [phone] and specify which Orthopedics/Sports Medicine provider is treating you.

## 2020-03-03 ENCOUNTER — CLINICAL SUPPORT (OUTPATIENT)
Dept: REHABILITATION | Facility: HOSPITAL | Age: 70
End: 2020-03-03
Attending: ORTHOPAEDIC SURGERY
Payer: MEDICARE

## 2020-03-03 DIAGNOSIS — M19.049 ARTHRITIS OF FINGER: ICD-10-CM

## 2020-03-03 DIAGNOSIS — M18.11 PRIMARY OSTEOARTHRITIS OF FIRST CARPOMETACARPAL JOINT OF RIGHT HAND: ICD-10-CM

## 2020-03-03 DIAGNOSIS — M65.331 TRIGGER MIDDLE FINGER OF RIGHT HAND: ICD-10-CM

## 2020-03-03 PROCEDURE — 97166 OT EVAL MOD COMPLEX 45 MIN: CPT | Mod: HCNC | Performed by: OCCUPATIONAL THERAPIST

## 2020-03-03 PROCEDURE — 97535 SELF CARE MNGMENT TRAINING: CPT | Mod: HCNC | Performed by: OCCUPATIONAL THERAPIST

## 2020-03-03 PROCEDURE — 97110 THERAPEUTIC EXERCISES: CPT | Mod: HCNC | Performed by: OCCUPATIONAL THERAPIST

## 2020-03-04 NOTE — PLAN OF CARE
[facility] Occupational Therapy  Initial Evaluation     Date: 3/3/2020  Name: Renea Bourgeois  Clinic Number: [id number]    Therapy Diagnosis: Middle finger A-1 pulley and thumb CMC joint pain  Physician: Vishnu Cox, *    Physician Orders: Evaluate and treatment occupational therapy  Medical Diagnosis: right Middle trigger finger/Osteoarthritis of the right thumb  M65.331 (ICD-10-CM) - Trigger middle finger of right hand   M18.11 (ICD-10-CM) - Primary osteoarthritis of first carpometacarpal joint of right hand   M19.049 (ICD-10-CM) - Arthritis of finger       Surgical Procedure and Date: NA,  Evaluation Date: 3/3/2020  Insurance Authorization Period Expiration: 03/30/2020  Plan of Care Certification Period: 3/36815 to 4/3/2020  Date of Return to MD: 4/1/2020  FOTO: 3/3/2020  [employer] managed care form turned in.    Visit # / Visits authorized: 1 / 1  Time In:2:00 pm  Time Out: 3:15 pm  Total Billable Time: 30 minutes    Precautions:  Standard/diabetis/osteoporosis    Subjective     Involved Side: right  Dominant Side: Right  Date of Onset: 6 months ago  History of Current Condition/Mechanism of Injury: Patient is a right handed 70 year old female with right hand thumb CMC pain for osteoarthritis that she has been dealing with for a long time and right middle trigger finger. She reports that since her  had his stroke she has been pulling him forward while gripping his arm with her hand and this has been hurting her thumb and finger.  performed a joint arthroplasty of her left thumb several years ago.  Imaging: X-ray No acute fractures or dislocations visualized.  Periarticular soft tissue edema noted at the IP joints of the 2nd, 3rd, and 5th digits.  There is moderate to severe osteoarthritis seen throughout the IP joints with a possible element of erosive OA at the 2nd, 3rd, and 5th digits.  There is mild osteoarthritis present at the 1st CMC joint.  No appreciable change  from prior.  Previous Therapy: None     Past Medical History/Physical Systems Review:   Renea Bourgeois  has a past medical history of Allergy, Anemia, Angina pectoris, Arthritis, Breast cancer, Cataract, Diabetes mellitus type II, DM (diabetes mellitus), Elevated BP without diagnosis of hypertension, GERD (gastroesophageal reflux disease), History of colon polyps, Hyperlipidemia, Hypertension, Kidney stone, Osteopenia, Osteoporosis, PVC (premature ventricular contraction), Trouble in sleeping, and Type 2 diabetes mellitus.    Renea Bourgeois  has a past surgical history that includes Tonsillectomy (1959); toenail edges removed; Dilation and curettage of uterus (10/2010); Cholecystectomy (1989); Nasal septal deviation repair (2009); Breast lumpectomy (2/11/2011); Total Reduction Mammoplasty (Left); Cataract extraction (Bilateral, 10/14/15); Cyst Removal (Left, 11/2017); Colonoscopy (N/A, 2/22/2018); Breast biopsy; and Gallbladder surgery.    Renea has a current medication list which includes the following prescription(s): accu-chek nancy plus test strp, acetaminophen, alcohol prep pads, anastrozole, anastrozole, cholecalciferol (vitamin d3), diclofenac sodium, ferrous gluconate, fluticasone propionate, gabapentin, glimepiride, lancets, lancing device, metoprolol succinate, naproxen sodium, rosuvastatin, and trazodone, and the following Facility-Administered Medications: sodium chloride 0.9% and zoledronic acid-mannitol & water.    Review of patient's allergies indicates:   Allergen Reactions    Codeine Itching     Other reaction(s): Itching        Patient's Goals for Therapy: Manage trigger finger and thumb pain.    Pain:  Functional Pain Scale Rating 0-10:   0/10 on average  0/10 at best  10/10 at worst  Location: base of thumb  Description: Burning and Sharp  Aggravating Factors: pulling or gripping objects  Easing Factors: rest    Occupation:  Retired  Working presently: No  Duties: Caregiver for      Functional Limitations/Social History:    Previous functional status includes: Independent with all ADLs.     Current FunctionalStatus   Home/Living environment : lives with their spouse      Limitation of Functional Status as follows:   ADLs/IADLs:     - Feeding: difficulty cutting meat    - Bathing: None    - Dressing/Grooming: Mild difficulty secondary to pain    - Driving: Moderate to extreme difficulty     Leisure: Fishing    Objective       CMS Impairment/Limitation/Restriction for FOTO hand Survey    Therapist reviewed FOTO scores for Renea Bourgeois on 3/3/2020.   FOTO documents entered into [employer] - see Media section.    Limitation Score: 34%           Observation: Deformities noted: BILATERAL HANDS HEBERDEN'S AND TEX'S AND DECREASED RANGE OF MOTION MAINLY OF THE PIP JOINTS.  Palpation: tenderness  Over the A-1 pulley of the 3rd right hand digit and tenderness of the CMC joint line on the right hand    Sensation: Intact per patient report  Special Tests:   Finkelstein's Test  negative and Grind Test  negative    Edema: Circumferential measurements: None  Range of Motion: right Active  IP joint:63 degrees  MP Joint 60 degrees     Thumb Opposition: WNL WITH CMC joint pain.  Palmar Abduction: WNL  Radial Abduction: WNL    Wrist Ext/Flex: 65/75  Wrist RD/UD: 20/35 with pain over thumb  Supination/Pronation: WNL/WNL  Elbow extension/flexion: WNL/WNL    Manual Muscle Test: Deferred  Muscle   Strength     Strength: (RAMÓN Dynamometer in lbs.) Average 3 trials, Position II  Right: 20#'s  Left: 25#'s    Pinch Strength: (Pinch Gauge in psi's), Average 3 trials  Golden Pinch R) 4psi's   L) 4.5psi's  3pt Pinch   R) 4+psi's  L) 6psi's  2pt Pinch   R) 4.5psi's   L) 5psi's    Fine Rashmi Coordination Tests:   9 hole Peg Test R) NT   L) NT  PURDUE PEGBOARD  Placing R) NT L) NT  Bilateral Placing NT  4-Part Assembly NT    Treatment     Treatment Time In: 2:45    pm  Treatment Time Out: 3:15 pm  Total Treatment  time separate from Evaluation time:30 minutes    Renea received the following supervised modalities after being cleared for contradictions for 0 minutes:     Renea received the following direct contact modalities after being cleared for contraindications for 0 minutes:    Renea received the following manual therapy techniques for 0 minutes:     Renea received therapeutic exercises for 15 minutes including:  -1st dorsal interrousei strengthening exercises  -thumb CMC stretches  -thumb strengthening exercises in C position  -1st web space trigger point release    Renea participated in self activities to improve functional performance for 15  minutes, including:  -joint protection with review of home modifications to include built up handles  -lifting recommendations  -steering wheel recommendations to decrease thumb pain while drive  -recommended thumb brace and finger brace to be worn during home management to decrease hand pain.    Home Exercise Program/Education:  Issued HEP (see patient instructions in EMR) and educated on modality use for pain management . Exercises were reviewed and Renea was able to demonstrate them prior to the end of the session.   Pt received a written copy of exercises to perform at home. Renea demonstrated good  understanding of the education provided.  Pt was advised to perform these exercises free of pain, and to stop performing them if pain occurs.    Patient/Family Education: role of OT, goals for OT, scheduling/cancellations - pt verbalized understanding. Discussed insurance limitations with patient.    Additional Education provided: educated patient on CMC braces with proper fit and where to purchase,recommended medium for cool comfort brace and size 2 for CMC brace. Provided joint protection handout with home modification recommendations.  Recommended steering wheel cover to decrease thumb pain while driving.  Assessment     Renea Bourgeois is a 70 y.o. female referred to  outpatient occupational therapy and presents with a medical diagnosis of right thumb CMC osteoarthritis and middle trigger finger, resulting in Decreased ROM, Decreased  strength, Decreased pinch strength, Decreased functional hand use, Increased pain and Joint Stiffness and demonstrates limitations as described in the chart below. Following medical record review it is determined that pt will benefit from occupational therapy services in order to maximize pain free and/or functional use of right hand. The following goals were discussed with the patient and patient is in agreement with them as to be addressed in the treatment plan. The patient's rehab potential is Good.     Anticipated barriers to occupational therapy: lifting and pulling  with tight  of the right hand  Pt has no cultural, educational or language barriers to learning provided.    Profile and History Assessment of Occupational Performance Level of Clinical Decision Making Complexity Score   Occupational Profile:   Renea Bourgeois is a 70 y.o. female who lives with their spouse and is retired Renea Bourgeois has difficulty with  ADLs and IADLs as listed previously, which  affecting his/her daily functional abilities.      Comorbidities:    has a past medical history of Allergy, Anemia, Angina pectoris, Arthritis, Breast cancer, Cataract, Diabetes mellitus type II, DM (diabetes mellitus), Elevated BP without diagnosis of hypertension, GERD (gastroesophageal reflux disease), History of colon polyps, Hyperlipidemia, Hypertension, Kidney stone, Osteopenia, Osteoporosis, PVC (premature ventricular contraction), Trouble in sleeping, and Type 2 diabetes mellitus.    Medical and Therapy History Review:   Brief               Performance Deficits    Physical:  Joint Mobility  Joint Stability  Muscle Power/Strength   Strength  Pinch Strength  Fine Motor Coordination  Pain    Cognitive:  No Deficits    Psychosocial:    No Deficits     Clinical  Decision Making:  low    Assessment Process:  Problem-Focused Assessments    Modification/Need for Assistance:  Not Necessary    Intervention Selection:  Limited Treatment Options       low  Based on PMHX, co morbidities , data from assessments and functional level of assistance required with task and clinical presentation directly impacting function.       The following goals were discussed with the patient and patient is in agreement with them as to be addressed in the treatment plan.     Goals:     Short Term Goals: Patient to be IND with HEP and modalities for pain/edema managment., Decrease complaints of worst pain to  4 out of 10 to increase functional hand use for ADL/work/leisure activities. and Patient to be IND wiht Orthotic use, wear and care precautions.     Long Term Goals: Patient will demonstrate understanding of joint protection techniques in the home and will identify at least 2 changes that she can make to decrease strain on her hands.  Patient will state the specifics of her home program verbally with 100% accuracy for her middle trigger finger.  Patient will gadiel her trigger finger and thumb brace appropriately without verbal cues.  Patient will be independent with skin inspection for her trigger finger brace.      Plan   Certification Period/Plan of care expiration: 3/3/2020 to 4/3/2020.    Outpatient Occupational Therapy 1 times weekly for 4 weeks to include the following interventions: Fluidotherapy, Manual therapy/joint mobilizations, Modalities for pain management, Therapeutic exercises/activities., Strengthening, Orthotic Fabrication/Fit/Training and Joint Protection.      Deanna Valencia OTR

## 2020-03-15 DIAGNOSIS — E11.9 TYPE 2 DIABETES MELLITUS WITHOUT COMPLICATION, WITHOUT LONG-TERM CURRENT USE OF INSULIN: Chronic | ICD-10-CM

## 2020-03-15 RX ORDER — ROSUVASTATIN CALCIUM 10 MG/1
TABLET, COATED ORAL
Qty: 90 TABLET | Refills: 0 | Status: SHIPPED | OUTPATIENT
Start: 2020-03-15 | End: 2020-06-14 | Stop reason: SDUPTHER

## 2020-03-16 RX ORDER — GLIMEPIRIDE 1 MG/1
TABLET ORAL
Qty: 90 TABLET | Refills: 0 | Status: SHIPPED | OUTPATIENT
Start: 2020-03-16 | End: 2020-06-15 | Stop reason: SDUPTHER

## 2020-03-27 ENCOUNTER — PATIENT MESSAGE (OUTPATIENT)
Dept: ORTHOPEDICS | Facility: CLINIC | Age: 70
End: 2020-03-27

## 2020-03-31 ENCOUNTER — PATIENT OUTREACH (OUTPATIENT)
Dept: ADMINISTRATIVE | Facility: OTHER | Age: 70
End: 2020-03-31

## 2020-04-01 ENCOUNTER — PATIENT MESSAGE (OUTPATIENT)
Dept: ORTHOPEDICS | Facility: CLINIC | Age: 70
End: 2020-04-01

## 2020-04-01 ENCOUNTER — OFFICE VISIT (OUTPATIENT)
Dept: ORTHOPEDICS | Facility: CLINIC | Age: 70
End: 2020-04-01
Payer: MEDICARE

## 2020-04-01 DIAGNOSIS — M18.11 PRIMARY OSTEOARTHRITIS OF FIRST CARPOMETACARPAL JOINT OF RIGHT HAND: Primary | ICD-10-CM

## 2020-04-01 PROCEDURE — 99213 PR OFFICE/OUTPT VISIT, EST, LEVL III, 20-29 MIN: ICD-10-PCS | Mod: HCNC,95,, | Performed by: ORTHOPAEDIC SURGERY

## 2020-04-01 PROCEDURE — 1101F PT FALLS ASSESS-DOCD LE1/YR: CPT | Mod: HCNC,CPTII,95, | Performed by: ORTHOPAEDIC SURGERY

## 2020-04-01 PROCEDURE — 99213 OFFICE O/P EST LOW 20 MIN: CPT | Mod: HCNC,95,, | Performed by: ORTHOPAEDIC SURGERY

## 2020-04-01 PROCEDURE — 1101F PR PT FALLS ASSESS DOC 0-1 FALLS W/OUT INJ PAST YR: ICD-10-PCS | Mod: HCNC,CPTII,95, | Performed by: ORTHOPAEDIC SURGERY

## 2020-04-01 PROCEDURE — 1159F MED LIST DOCD IN RCRD: CPT | Mod: HCNC,95,, | Performed by: ORTHOPAEDIC SURGERY

## 2020-04-01 PROCEDURE — 1159F PR MEDICATION LIST DOCUMENTED IN MEDICAL RECORD: ICD-10-PCS | Mod: HCNC,95,, | Performed by: ORTHOPAEDIC SURGERY

## 2020-04-01 NOTE — LETTER
April 1, 2020      Vishnu Cox MD  [address]  [place] [address]           [facility]  [address]  [place] [address]  Phone: [phone]  Fax: [phone]          Patient: Renea Bourgeois   MR Number: [id number]   YOB: 1950   Date of Visit: 4/1/2020       Dear Dr. Vishnu Cox:    Thank you for referring Renea Bourgeois to me for evaluation. Attached you will find relevant portions of my assessment and plan of care.    If you have questions, please do not hesitate to call me. I look forward to following Renea Bourgeois along with you.    Sincerely,    Iban Kuhn MD    Enclosure  CC:  No Recipients    If you would like to receive this communication electronically, please contact [email or web address] or [phone] to request more information on [facility] Link access.    For providers and/or their staff who would like to refer a patient to [facility], please contact us through our one-stop-shop provider referral line, [facility], at [phone].    If you feel you have received this communication in error or would no longer like to receive these types of communications, please e-mail [email or web address]

## 2020-04-01 NOTE — PROGRESS NOTES
Subjective:     Patient ID: Renea Bourgeois is a 70 y.o. female.    Chief Complaint: No chief complaint on file.    This is a telemedicine visit regarding the right thumb basal joint arthritis.  The patient is a 70-year-old female who is status post left thumb basal joint arthroplasty by Dr. Estes with good results.  The patient says she does not need to have any surgery at this point.  Her  had a stroke in August.  She has not been injected in the right thumb.  She has tried a splint.  She wishes to get a cortisone injection right thumb basal joint whenever elective office visits are allowed.  She said she wishes to have a right thumb basal joint arthroplasty in the fall.      Past Medical History:   Diagnosis Date    Allergy     Anemia 11/14/2017    Angina pectoris     followed by cardiology    Arthritis     Breast cancer     Right T1 Ductal Ca in situ,high oncotype Dx 33, Estrogen positive. Lumpectomy, TAC chemo x 6 cyscles then RT,on aromatase inhibitor    Cataract     Diabetes mellitus type II 2011    DM (diabetes mellitus) 2011     am 08/04/2017    Elevated BP without diagnosis of hypertension 11/27/2018    GERD (gastroesophageal reflux disease)     History of colon polyps 2/21/2018    The patient had adenomatous colon polyps in 2014.      Hyperlipidemia     Hypertension     Kidney stone     right side, passed    Osteopenia     Osteoporosis 4/12/2019    PVC (premature ventricular contraction)     on and off    Trouble in sleeping     Type 2 diabetes mellitus      Past Surgical History:   Procedure Laterality Date    BREAST BIOPSY      BREAST LUMPECTOMY  2/11/2011    right    CATARACT EXTRACTION Bilateral 10/14/15    CHOLECYSTECTOMY  1989    open    COLONOSCOPY N/A 2/22/2018    Procedure: COLONOSCOPY;  Surgeon: Carlito Odonnell MD;  Location[facility];  Service: Endoscopy;  Laterality: N/A;    CYST REMOVAL Left 11/2017    foot    DILATION AND CURETTAGE OF [place]TERUS  10/2010     [place]    GALLBLADDER SURGERY      removed in 1989    Nasal septal deviation repair  2009    toenail edges removed      TONSILLECTOMY  1959    TOTAL REDUCTION MAMMOPLASTY Left     2015     Family History   Problem Relation Age of Onset    Diabetes Father     Hypertension Father     Stroke Father     Cancer Father 65        metastatic when diagnosed    Stroke Brother     Cancer Other         kidney    Atrial fibrillation Son         35    Heart disease Son     Alzheimer's disease Mother     Parkinsonism Brother     Cancer Paternal Grandfather         prostate    Glaucoma Maternal Grandmother     Psoriasis Cousin     Alcohol abuse Neg Hx     Drug abuse Neg Hx     COPD Neg Hx     Birth defects Neg Hx     Mental retardation Neg Hx     Mental illness Neg Hx     Kidney disease Neg Hx     Hyperlipidemia Neg Hx     Melanoma Neg Hx     Lupus Neg Hx      Social History     Socioeconomic History    Marital status:      Spouse name: Agustín    Number of children: 4    Years of education: Not on file    Highest education level: Not on file   Occupational History    Occupation: Retired Teacher     Comment: [employer]   Social Needs    Financial resource strain: Not on file    Food insecurity:     Worry: Not on file     Inability: Not on file    Transportation needs:     Medical: Not on file     Non-medical: Not on file   Tobacco Use    Smoking status: Never Smoker    Smokeless tobacco: Never Used   Substance and Sexual Activity    Alcohol use: No     Alcohol/week: 0.0 standard drinks    Drug use: No    Sexual activity: Not Currently     Partners: Male     Birth control/protection: Post-menopausal   Lifestyle    Physical activity:     Days per week: Not on file     Minutes per session: Not on file    Stress: Not on file   Relationships    Social connections:     Talks on phone: Not on file     Gets together: Not on file     Attends Religion service: Not on file      Active member of club or organization: Not on file     Attends meetings of clubs or organizations: Not on file     Relationship status: Not on file   Other Topics Concern    Are you pregnant or think you may be? 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 Caffeine intake;1-2 per week - dennis calvo. Decaffinated tea and most beveridges. Does have a living will - at home in her filing cabinet.      Medication List with Changes/Refills   Current Medications    ACCU-CHEK JD PLUS TEST STRP STRP    TEST three times a day    ACETAMINOPHEN (TYLENOL) 500 MG TABLET    Take 500 mg by mouth every 6 (six) hours as needed for Pain.    ALCOHOL PREP PADS PADM        ANASTROZOLE (ARIMIDEX) 1 MG TAB    TAKE 1 TABLET(1 MG) BY MOUTH EVERY DAY    ANASTROZOLE (ARIMIDEX) 1 MG TAB    TAKE 1 TABLET(1 MG) BY MOUTH EVERY DAY    CHOLECALCIFEROL, VITAMIN D3, (VITAMIN D3) 1,000 UNIT CAPSULE    Take 1 capsule (1,000 Units total) by mouth once daily.    DICLOFENAC SODIUM (VOLTAREN) 1 % GEL    Apply 2 g topically once daily.    FERROUS GLUCONATE (FERGON) 240 (27 FE) MG TABLET    Take 1 tablet (240 mg total) by mouth once daily.    FLUTICASONE (FLONASE) 50 MCG/ACTUATION NASAL SPRAY    2 sprays by Each Nare route daily as needed. 2 Spray, Suspension Nasal Every day    GABAPENTIN (NEURONTIN) 100 MG CAPSULE    Take 1 capsule (100 mg total) by mouth 3 (three) times daily.    GLIMEPIRIDE (AMARYL) 1 MG TABLET    TAKE ONE(1) TABLET BY MOUTH EVERY DAY    LANCETS (ACCU-CHEK SOFTCLIX LANCETS) MISC    1 each by Misc.(Non-Drug; Combo Route) route 3 (three) times daily.    LANCING DEVICE MISC        METOPROLOL SUCCINATE (TOPROL-XL) 25 MG 24 HR TABLET    Take 25 mg by mouth every evening.     NAPROXEN SODIUM (ALEVE) 220 MG TABLET    Take 220 mg by mouth as needed.    ROSUVASTATIN (CRESTOR) 10 MG TABLET    TAKE 1 TABLET BY MOUTH EVERY DAY    TRAZODONE (DESYREL) 100 MG TABLET     TAKE 1 TABLET BY MOUTH AT BEDTIME     Review of patient's allergies indicates:   Allergen Reactions    Codeine Itching     Other reaction(s): Itching     Review of Systems   Constitution: Negative for malaise/fatigue.   HENT: Negative for hearing loss.    Eyes: Positive for visual disturbance. Negative for double vision.   Cardiovascular: Negative for chest pain.   Respiratory: Negative for shortness of breath.    Endocrine: Negative for cold intolerance.   Hematologic/Lymphatic: Does not bruise/bleed easily.   Skin: Negative for poor wound healing and suspicious lesions.   Musculoskeletal: Positive for arthritis, joint pain and joint swelling. Negative for gout.   Gastrointestinal: Positive for abdominal pain and heartburn. Negative for nausea and vomiting.   Genitourinary: Positive for flank pain. Negative for dysuria.   Neurological: Negative for numbness, paresthesias and sensory change.   Psychiatric/Behavioral: Negative for depression, memory loss and substance abuse. The patient has insomnia. The patient is not nervous/anxious.    Allergic/Immunologic: Negative for persistent infections.       Objective:   There is no height or weight on file to calculate BMI.  There were no vitals filed for this visit.             General    Constitutional: She is oriented to person, place, and time. She appears well-developed and well-nourished. No distress.   HENT:   Head: Normocephalic.   Eyes: EOM are normal.   Neck: Normal range of motion.   Pulmonary/Chest: Effort normal.   Neurological: She is oriented to person, place, and time.   Psychiatric: She has a normal mood and affect.             Right Hand/Wrist Exam     Inspection   Scars: Hand -  absent    Pain   Wrist - The patient exhibits pain of the CMC.    Other     Neuorologic Exam    Median Distribution: normal  Ulnar Distribution: normal  Radial Distribution: normal    Comments:  Observed area of tenderness located by the patient is at the basal joint of the  right thumb.  She has pain on range of motion.      Left Hand/Wrist Exam     Inspection   Scars: Hand -  present          Vascular Exam       Capillary Refill  Right Hand: normal capillary refill      Relevant imaging results reviewed and interpreted by me, discussed with the patient and / or family today radiographs right hand showed osteoarthritic change in multiple small joints as well as basal joint of the right thumb  Assessment:     Encounter Diagnosis   Name Primary?    Primary osteoarthritis of first carpometacarpal joint of right hand Yes        Plan:       I told her we would call her when elective office visits were allowed for consideration of injection.  Meanwhile she does have a thumb spica splint.              Disclaimer: This note was prepared using a voice recognition system and is likely to have sound alike errors within the text.

## 2020-04-22 DIAGNOSIS — F51.04 CHRONIC INSOMNIA: ICD-10-CM

## 2020-04-22 RX ORDER — TRAZODONE HYDROCHLORIDE 100 MG/1
TABLET ORAL
Qty: 90 TABLET | Refills: 1 | Status: SHIPPED | OUTPATIENT
Start: 2020-04-22 | End: 2020-11-02

## 2020-05-12 ENCOUNTER — PATIENT OUTREACH (OUTPATIENT)
Dept: ADMINISTRATIVE | Facility: OTHER | Age: 70
End: 2020-05-12

## 2020-05-13 ENCOUNTER — OFFICE VISIT (OUTPATIENT)
Dept: ORTHOPEDICS | Facility: CLINIC | Age: 70
End: 2020-05-13
Payer: MEDICARE

## 2020-05-13 VITALS
WEIGHT: 141 LBS | HEART RATE: 73 BPM | BODY MASS INDEX: 27.68 KG/M2 | SYSTOLIC BLOOD PRESSURE: 148 MMHG | HEIGHT: 60 IN | DIASTOLIC BLOOD PRESSURE: 77 MMHG

## 2020-05-13 DIAGNOSIS — M18.11 PRIMARY OSTEOARTHRITIS OF FIRST CARPOMETACARPAL JOINT OF RIGHT HAND: Primary | ICD-10-CM

## 2020-05-13 PROCEDURE — 1126F AMNT PAIN NOTED NONE PRSNT: CPT | Mod: HCNC,S$GLB,, | Performed by: ORTHOPAEDIC SURGERY

## 2020-05-13 PROCEDURE — 99214 PR OFFICE/OUTPT VISIT, EST, LEVL IV, 30-39 MIN: ICD-10-PCS | Mod: HCNC,25,S$GLB, | Performed by: ORTHOPAEDIC SURGERY

## 2020-05-13 PROCEDURE — 1101F PR PT FALLS ASSESS DOC 0-1 FALLS W/OUT INJ PAST YR: ICD-10-PCS | Mod: HCNC,CPTII,S$GLB, | Performed by: ORTHOPAEDIC SURGERY

## 2020-05-13 PROCEDURE — 99214 OFFICE O/P EST MOD 30 MIN: CPT | Mod: HCNC,25,S$GLB, | Performed by: ORTHOPAEDIC SURGERY

## 2020-05-13 PROCEDURE — 3078F PR MOST RECENT DIASTOLIC BLOOD PRESSURE < 80 MM HG: ICD-10-PCS | Mod: HCNC,CPTII,S$GLB, | Performed by: ORTHOPAEDIC SURGERY

## 2020-05-13 PROCEDURE — 20600 SMALL JOINT ASPIRATION/INJECTION: R THUMB MCP: ICD-10-PCS | Mod: HCNC,RT,S$GLB, | Performed by: ORTHOPAEDIC SURGERY

## 2020-05-13 PROCEDURE — 3077F SYST BP >= 140 MM HG: CPT | Mod: HCNC,CPTII,S$GLB, | Performed by: ORTHOPAEDIC SURGERY

## 2020-05-13 PROCEDURE — 1159F PR MEDICATION LIST DOCUMENTED IN MEDICAL RECORD: ICD-10-PCS | Mod: HCNC,S$GLB,, | Performed by: ORTHOPAEDIC SURGERY

## 2020-05-13 PROCEDURE — 3078F DIAST BP <80 MM HG: CPT | Mod: HCNC,CPTII,S$GLB, | Performed by: ORTHOPAEDIC SURGERY

## 2020-05-13 PROCEDURE — 1126F PR PAIN SEVERITY QUANTIFIED, NO PAIN PRESENT: ICD-10-PCS | Mod: HCNC,S$GLB,, | Performed by: ORTHOPAEDIC SURGERY

## 2020-05-13 PROCEDURE — 1101F PT FALLS ASSESS-DOCD LE1/YR: CPT | Mod: HCNC,CPTII,S$GLB, | Performed by: ORTHOPAEDIC SURGERY

## 2020-05-13 PROCEDURE — 99999 PR PBB SHADOW E&M-EST. PATIENT-LVL III: ICD-10-PCS | Mod: PBBFAC,HCNC,, | Performed by: ORTHOPAEDIC SURGERY

## 2020-05-13 PROCEDURE — 3077F PR MOST RECENT SYSTOLIC BLOOD PRESSURE >= 140 MM HG: ICD-10-PCS | Mod: HCNC,CPTII,S$GLB, | Performed by: ORTHOPAEDIC SURGERY

## 2020-05-13 PROCEDURE — 20600 DRAIN/INJ JOINT/BURSA W/O US: CPT | Mod: HCNC,RT,S$GLB, | Performed by: ORTHOPAEDIC SURGERY

## 2020-05-13 PROCEDURE — 1159F MED LIST DOCD IN RCRD: CPT | Mod: HCNC,S$GLB,, | Performed by: ORTHOPAEDIC SURGERY

## 2020-05-13 PROCEDURE — 99999 PR PBB SHADOW E&M-EST. PATIENT-LVL III: CPT | Mod: PBBFAC,HCNC,, | Performed by: ORTHOPAEDIC SURGERY

## 2020-05-13 RX ORDER — TRIAMCINOLONE ACETONIDE 40 MG/ML
40 INJECTION, SUSPENSION INTRA-ARTICULAR; INTRAMUSCULAR
Status: DISCONTINUED | OUTPATIENT
Start: 2020-05-13 | End: 2020-05-13 | Stop reason: HOSPADM

## 2020-05-13 RX ADMIN — TRIAMCINOLONE ACETONIDE 40 MG: 40 INJECTION, SUSPENSION INTRA-ARTICULAR; INTRAMUSCULAR at 02:05

## 2020-05-13 NOTE — PROGRESS NOTES
Subjective:     Patient ID: Renea Bourgeois is a 70 y.o. female.    Chief Complaint: Pain of the Right Hand    The patient is a 70-year-old female with right thumb basal joint degenerative joint disease.  She request cortisone injection.      Past Medical History:   Diagnosis Date    Allergy     Anemia 11/14/2017    Angina pectoris     followed by cardiology    Arthritis     Breast cancer     Right T1 Ductal Ca in situ,high oncotype Dx 33, Estrogen positive. Lumpectomy, TAC chemo x 6 cyscles then RT,on aromatase inhibitor    Cataract     Diabetes mellitus type II 2011    DM (diabetes mellitus) 2011     am 08/04/2017    Elevated BP without diagnosis of hypertension 11/27/2018    GERD (gastroesophageal reflux disease)     History of colon polyps 2/21/2018    The patient had adenomatous colon polyps in 2014.      Hyperlipidemia     Hypertension     Kidney stone     right side, passed    Osteopenia     Osteoporosis 4/12/2019    PVC (premature ventricular contraction)     on and off    Trouble in sleeping     Type 2 diabetes mellitus      Past Surgical History:   Procedure Laterality Date    BREAST BIOPSY      BREAST LUMPECTOMY  2/11/2011    right    CATARACT EXTRACTION Bilateral 10/14/15    CHOLECYSTECTOMY  1989    open    COLONOSCOPY N/A 2/22/2018    Procedure: COLONOSCOPY;  Surgeon: Carlito Odonnell MD;  Location: [facility] END[facility];  Service: Endoscopy;  Laterality: N/A;    CYST REMOVAL Left 11/2017    foot    [place]IL[place]URE[place]    GALLBLADDER SURGERY      removed in 1989    Nasal septal deviation repair  2009    toenail edges removed      TONSILLECTOMY  1959    TOTAL REDUCTION MAMMOPLASTY Left     2015     Family History   Problem Relation Age of Onset    Diabetes Father     Hypertension Father     Stroke Father     Cancer Father 65        metastatic when diagnosed    Stroke Brother     Cancer Other         kidney    Atrial fibrillation Son          35    Heart disease Son     Alzheimer's disease Mother     Parkinsonism Brother     Cancer Paternal Grandfather         prostate    Glaucoma Maternal Grandmother     Psoriasis Cousin     Alcohol abuse Neg Hx     Drug abuse Neg Hx     COPD Neg Hx     Birth defects Neg Hx     Mental retardation Neg Hx     Mental illness Neg Hx     Kidney disease Neg Hx     Hyperlipidemia Neg Hx     Melanoma Neg Hx     Lupus Neg Hx      Social History     Socioeconomic History    Marital status:      Spouse name: Don    Number of children: 4    Years of education: Not on file    Highest education level: Not on file   Occupational History    Occupation: Retired Teacher     Comment: [employer]   Social Needs    Financial resource strain: Not on file    Food insecurity:     Worry: Not on file     Inability: Not on file    Transportation needs:     Medical: Not on file     Non-medical: Not on file   Tobacco Use    Smoking status: Never Smoker    Smokeless tobacco: Never Used   Substance and Sexual Activity    Alcohol use: No     Alcohol/week: 0.0 standard drinks    Drug use: No    Sexual activity: Not Currently     Partners: Male     Birth control/protection: Post-menopausal   Lifestyle    Physical activity:     Days per week: Not on file     Minutes per session: Not on file    Stress: Not on file   Relationships    Social connections:     Talks on phone: Not on file     Gets together: Not on file     Attends Yazidi service: Not on file     Active member of club or organization: Not on file     Attends meetings of clubs or organizations: Not on file     Relationship status: Not on file   Other Topics Concern    Are you pregnant or think you may be? 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 Caffeine intake;1-2 per week - dennis calvo. Decaffinated tea and most beveridges. Does have a living will -  at home in her filing cabinet.      Medication List with Changes/Refills   Current Medications    ACCU-CHEK JD PLUS TEST STRP STRP    TEST three times a day    ACETAMINOPHEN (TYLENOL) 500 MG TABLET    Take 500 mg by mouth every 6 (six) hours as needed for Pain.    ALCOHOL PREP PADS PADM        ANASTROZOLE (ARIMIDEX) 1 MG TAB    TAKE 1 TABLET(1 MG) BY MOUTH EVERY DAY    ANASTROZOLE (ARIMIDEX) 1 MG TAB    TAKE 1 TABLET(1 MG) BY MOUTH EVERY DAY    CHOLECALCIFEROL, VITAMIN D3, (VITAMIN D3) 1,000 UNIT CAPSULE    Take 1 capsule (1,000 Units total) by mouth once daily.    DICLOFENAC SODIUM (VOLTAREN) 1 % GEL    Apply 2 g topically once daily.    FERROUS GLUCONATE (FERGON) 240 (27 FE) MG TABLET    Take 1 tablet (240 mg total) by mouth once daily.    FLUTICASONE (FLONASE) 50 MCG/ACTUATION NASAL SPRAY    2 sprays by Each Nare route daily as needed. 2 Spray, Suspension Nasal Every day    GABAPENTIN (NEURONTIN) 100 MG CAPSULE    Take 1 capsule (100 mg total) by mouth 3 (three) times daily.    GLIMEPIRIDE (AMARYL) 1 MG TABLET    TAKE ONE(1) TABLET BY MOUTH EVERY DAY    LANCETS (ACCU-CHEK SOFTCLIX LANCETS) MISC    1 each by Misc.(Non-Drug; Combo Route) route 3 (three) times daily.    LANCING DEVICE MISC        METOPROLOL SUCCINATE (TOPROL-XL) 25 MG 24 HR TABLET    Take 25 mg by mouth every evening.     NAPROXEN SODIUM (ALEVE) 220 MG TABLET    Take 220 mg by mouth as needed.    ROSUVASTATIN (CRESTOR) 10 MG TABLET    TAKE 1 TABLET BY MOUTH EVERY DAY    TRAZODONE (DESYREL) 100 MG TABLET    TAKE 1 TABLET BY MOUTH AT BEDTIME     Review of patient's allergies indicates:   Allergen Reactions    Codeine Itching     Other reaction(s): Itching     Review of Systems   Constitution: Negative for malaise/fatigue.   HENT: Negative for hearing loss.    Eyes: Positive for visual disturbance. Negative for double vision.   Cardiovascular: Negative for chest pain.   Respiratory: Negative for shortness of breath.    Endocrine: Negative for cold  intolerance.   Hematologic/Lymphatic: Does not bruise/bleed easily.   Skin: Negative for poor wound healing and suspicious lesions.   Musculoskeletal: Positive for arthritis, joint pain and joint swelling. Negative for gout.   Gastrointestinal: Positive for abdominal pain and heartburn. Negative for nausea and vomiting.   Genitourinary: Positive for flank pain. Negative for dysuria.   Neurological: Negative for numbness, paresthesias and sensory change.   Psychiatric/Behavioral: Negative for depression, memory loss and substance abuse. The patient has insomnia. The patient is not nervous/anxious.    Allergic/Immunologic: Negative for persistent infections.       Objective:   Body mass index is 27.54 kg/m².  Vitals:    05/13/20 1421   BP: (!) 148/77   Pulse: 73                General    Constitutional: She is oriented to person, place, and time. She appears well-developed and well-nourished. No distress.   HENT:   Head: Normocephalic.   Eyes: EOM are normal.   Neck: Normal range of motion.   Pulmonary/Chest: Effort normal.   Neurological: She is oriented to person, place, and time.   Psychiatric: She has a normal mood and affect.             Right Hand/Wrist Exam     Inspection   Scars: Wrist - absent Hand -  absent  Effusion: Wrist - absent Hand -  absent    Pain   Wrist - The patient exhibits pain of the CMC.    Other     Neuorologic Exam    Median Distribution: normal  Ulnar Distribution: normal  Radial Distribution: normal    Comments:  The patient has tender right thumb basal joint with a positive axial circumduction grind test.  There are no motor or sensory deficits.          Vascular Exam       Capillary Refill  Right Hand: normal capillary refill      Relevant imaging results reviewed and interpreted by me, discussed with the patient and / or family today radiographs right hand showed basal joint degenerative change and otherwise unrevealing  Assessment:     Encounter Diagnosis   Name Primary?    Primary  osteoarthritis of first carpometacarpal joint of right hand Yes        Plan:     The patient injected right thumb basal joint with 0.5 cc of Kenalog and 0.5 cc of 2% plain lidocaine under sterile technique.  She was given a thumb spica splint.  She will call when she is ready for another injection.  She wait at least 3 months between injections.                Disclaimer: This note was prepared using a voice recognition system and is likely to have sound alike errors within the text.

## 2020-05-13 NOTE — PROCEDURES
Small Joint Aspiration/Injection: R thumb MCP  Date/Time: 5/13/2020 2:00 PM  Performed by: Iban Kuhn MD  Authorized by: Iban Kuhn MD     Consent Done?:  Yes (Verbal)  Indications:  Pain and diagnostic evaluation  Site marked: the procedure site was marked    Timeout: prior to procedure the correct patient, procedure, and site was verified    Prep: patient was prepped and draped in usual sterile fashion      Local anesthesia used?: Yes    Local anesthetic:  Lidocaine 2% without epinephrine  Anesthetic total (ml):  0.5    Location:  Thumb  Site:  R thumb MCP  Needle size:  25 G  Medications:  40 mg triamcinolone acetonide 40 mg/mL; 40 mg triamcinolone acetonide 40 mg/mL

## 2020-05-19 ENCOUNTER — LAB VISIT (OUTPATIENT)
Dept: LAB | Facility: HOSPITAL | Age: 70
End: 2020-05-19
Attending: FAMILY MEDICINE
Payer: MEDICARE

## 2020-05-19 DIAGNOSIS — E55.9 VITAMIN D DEFICIENCY: ICD-10-CM

## 2020-05-19 DIAGNOSIS — E78.5 HYPERLIPIDEMIA ASSOCIATED WITH TYPE 2 DIABETES MELLITUS: ICD-10-CM

## 2020-05-19 DIAGNOSIS — E11.9 DIABETES MELLITUS TYPE 2 IN NONOBESE: ICD-10-CM

## 2020-05-19 DIAGNOSIS — I15.2 HYPERTENSION ASSOCIATED WITH DIABETES: ICD-10-CM

## 2020-05-19 DIAGNOSIS — E11.59 HYPERTENSION ASSOCIATED WITH DIABETES: ICD-10-CM

## 2020-05-19 DIAGNOSIS — E11.69 HYPERLIPIDEMIA ASSOCIATED WITH TYPE 2 DIABETES MELLITUS: ICD-10-CM

## 2020-05-19 LAB
25(OH)D3+25(OH)D2 SERPL-MCNC: 26 NG/ML (ref 30–96)
ALBUMIN SERPL BCP-MCNC: 4.3 G/DL (ref 3.5–5.2)
ALP SERPL-CCNC: 76 U/L (ref 55–135)
ALT SERPL W/O P-5'-P-CCNC: 15 U/L (ref 10–44)
ANION GAP SERPL CALC-SCNC: 7 MMOL/L (ref 8–16)
AST SERPL-CCNC: 17 U/L (ref 10–40)
BASOPHILS # BLD AUTO: 0.03 K/UL (ref 0–0.2)
BASOPHILS NFR BLD: 0.5 % (ref 0–1.9)
BILIRUB SERPL-MCNC: 0.6 MG/DL (ref 0.1–1)
BUN SERPL-MCNC: 14 MG/DL (ref 8–23)
CALCIUM SERPL-MCNC: 8.9 MG/DL (ref 8.7–10.5)
CHLORIDE SERPL-SCNC: 108 MMOL/L (ref 95–110)
CHOLEST SERPL-MCNC: 159 MG/DL (ref 120–199)
CHOLEST/HDLC SERPL: 3.1 {RATIO} (ref 2–5)
CO2 SERPL-SCNC: 26 MMOL/L (ref 23–29)
CREAT SERPL-MCNC: 0.9 MG/DL (ref 0.5–1.4)
DIFFERENTIAL METHOD: ABNORMAL
EOSINOPHIL # BLD AUTO: 0.1 K/UL (ref 0–0.5)
EOSINOPHIL NFR BLD: 1.4 % (ref 0–8)
ERYTHROCYTE [DISTWIDTH] IN BLOOD BY AUTOMATED COUNT: 12.6 % (ref 11.5–14.5)
EST. GFR  (AFRICAN AMERICAN): >60 ML/MIN/1.73 M^2
EST. GFR  (NON AFRICAN AMERICAN): >60 ML/MIN/1.73 M^2
ESTIMATED AVG GLUCOSE: 120 MG/DL (ref 68–131)
GLUCOSE SERPL-MCNC: 98 MG/DL (ref 70–110)
HBA1C MFR BLD HPLC: 5.8 % (ref 4–5.6)
HCT VFR BLD AUTO: 43.8 % (ref 37–48.5)
HDLC SERPL-MCNC: 52 MG/DL (ref 40–75)
HDLC SERPL: 32.7 % (ref 20–50)
HGB BLD-MCNC: 13.8 G/DL (ref 12–16)
IMM GRANULOCYTES # BLD AUTO: 0.02 K/UL (ref 0–0.04)
IMM GRANULOCYTES NFR BLD AUTO: 0.3 % (ref 0–0.5)
LDLC SERPL CALC-MCNC: 82 MG/DL (ref 63–159)
LYMPHOCYTES # BLD AUTO: 1.5 K/UL (ref 1–4.8)
LYMPHOCYTES NFR BLD: 26.5 % (ref 18–48)
MCH RBC QN AUTO: 30.1 PG (ref 27–31)
MCHC RBC AUTO-ENTMCNC: 31.5 G/DL (ref 32–36)
MCV RBC AUTO: 96 FL (ref 82–98)
MONOCYTES # BLD AUTO: 0.4 K/UL (ref 0.3–1)
MONOCYTES NFR BLD: 7 % (ref 4–15)
NEUTROPHILS # BLD AUTO: 3.7 K/UL (ref 1.8–7.7)
NEUTROPHILS NFR BLD: 64.3 % (ref 38–73)
NONHDLC SERPL-MCNC: 107 MG/DL
NRBC BLD-RTO: 0 /100 WBC
PLATELET # BLD AUTO: 169 K/UL (ref 150–350)
PMV BLD AUTO: 11.5 FL (ref 9.2–12.9)
POTASSIUM SERPL-SCNC: 4 MMOL/L (ref 3.5–5.1)
PROT SERPL-MCNC: 7.1 G/DL (ref 6–8.4)
RBC # BLD AUTO: 4.58 M/UL (ref 4–5.4)
SODIUM SERPL-SCNC: 141 MMOL/L (ref 136–145)
TRIGL SERPL-MCNC: 125 MG/DL (ref 30–150)
TSH SERPL DL<=0.005 MIU/L-ACNC: 2.42 UIU/ML (ref 0.4–4)
WBC # BLD AUTO: 5.82 K/UL (ref 3.9–12.7)

## 2020-05-19 PROCEDURE — 84443 ASSAY THYROID STIM HORMONE: CPT | Mod: HCNC

## 2020-05-19 PROCEDURE — 36415 COLL VENOUS BLD VENIPUNCTURE: CPT | Mod: HCNC

## 2020-05-19 PROCEDURE — 82306 VITAMIN D 25 HYDROXY: CPT | Mod: HCNC

## 2020-05-19 PROCEDURE — 80053 COMPREHEN METABOLIC PANEL: CPT | Mod: HCNC

## 2020-05-19 PROCEDURE — 80061 LIPID PANEL: CPT | Mod: HCNC

## 2020-05-19 PROCEDURE — 85025 COMPLETE CBC W/AUTO DIFF WBC: CPT | Mod: HCNC

## 2020-05-19 PROCEDURE — 83036 HEMOGLOBIN GLYCOSYLATED A1C: CPT | Mod: HCNC

## 2020-05-20 ENCOUNTER — OFFICE VISIT (OUTPATIENT)
Dept: INTERNAL MEDICINE | Facility: CLINIC | Age: 70
End: 2020-05-20
Payer: MEDICARE

## 2020-05-20 VITALS
HEART RATE: 82 BPM | SYSTOLIC BLOOD PRESSURE: 110 MMHG | HEIGHT: 60 IN | BODY MASS INDEX: 27.79 KG/M2 | DIASTOLIC BLOOD PRESSURE: 70 MMHG | WEIGHT: 141.56 LBS | TEMPERATURE: 98 F | OXYGEN SATURATION: 95 %

## 2020-05-20 DIAGNOSIS — Z17.0 MALIGNANT NEOPLASM OF UPPER-OUTER QUADRANT OF RIGHT BREAST IN FEMALE, ESTROGEN RECEPTOR POSITIVE: ICD-10-CM

## 2020-05-20 DIAGNOSIS — E11.69 HYPERLIPIDEMIA ASSOCIATED WITH TYPE 2 DIABETES MELLITUS: ICD-10-CM

## 2020-05-20 DIAGNOSIS — F51.04 CHRONIC INSOMNIA: ICD-10-CM

## 2020-05-20 DIAGNOSIS — R13.10 DYSPHAGIA, UNSPECIFIED TYPE: ICD-10-CM

## 2020-05-20 DIAGNOSIS — Z00.00 ROUTINE GENERAL MEDICAL EXAMINATION AT A HEALTH CARE FACILITY: Primary | ICD-10-CM

## 2020-05-20 DIAGNOSIS — I15.2 HYPERTENSION ASSOCIATED WITH DIABETES: ICD-10-CM

## 2020-05-20 DIAGNOSIS — E11.9 TYPE 2 DIABETES MELLITUS WITHOUT COMPLICATION, WITHOUT LONG-TERM CURRENT USE OF INSULIN: ICD-10-CM

## 2020-05-20 DIAGNOSIS — E78.5 HYPERLIPIDEMIA ASSOCIATED WITH TYPE 2 DIABETES MELLITUS: ICD-10-CM

## 2020-05-20 DIAGNOSIS — C50.411 MALIGNANT NEOPLASM OF UPPER-OUTER QUADRANT OF RIGHT BREAST IN FEMALE, ESTROGEN RECEPTOR POSITIVE: ICD-10-CM

## 2020-05-20 DIAGNOSIS — E11.59 HYPERTENSION ASSOCIATED WITH DIABETES: ICD-10-CM

## 2020-05-20 DIAGNOSIS — E55.9 VITAMIN D INSUFFICIENCY: ICD-10-CM

## 2020-05-20 PROCEDURE — 99999 PR PBB SHADOW E&M-EST. PATIENT-LVL III: ICD-10-PCS | Mod: PBBFAC,HCNC,, | Performed by: FAMILY MEDICINE

## 2020-05-20 PROCEDURE — 99397 PER PM REEVAL EST PAT 65+ YR: CPT | Mod: HCNC,S$GLB,, | Performed by: FAMILY MEDICINE

## 2020-05-20 PROCEDURE — 3074F PR MOST RECENT SYSTOLIC BLOOD PRESSURE < 130 MM HG: ICD-10-PCS | Mod: HCNC,CPTII,S$GLB, | Performed by: FAMILY MEDICINE

## 2020-05-20 PROCEDURE — 3078F DIAST BP <80 MM HG: CPT | Mod: HCNC,CPTII,S$GLB, | Performed by: FAMILY MEDICINE

## 2020-05-20 PROCEDURE — 3044F PR MOST RECENT HEMOGLOBIN A1C LEVEL <7.0%: ICD-10-PCS | Mod: HCNC,CPTII,S$GLB, | Performed by: FAMILY MEDICINE

## 2020-05-20 PROCEDURE — 99999 PR PBB SHADOW E&M-EST. PATIENT-LVL III: CPT | Mod: PBBFAC,HCNC,, | Performed by: FAMILY MEDICINE

## 2020-05-20 PROCEDURE — 99397 PR PREVENTIVE VISIT,EST,65 & OVER: ICD-10-PCS | Mod: HCNC,S$GLB,, | Performed by: FAMILY MEDICINE

## 2020-05-20 PROCEDURE — 3074F SYST BP LT 130 MM HG: CPT | Mod: HCNC,CPTII,S$GLB, | Performed by: FAMILY MEDICINE

## 2020-05-20 PROCEDURE — 3078F PR MOST RECENT DIASTOLIC BLOOD PRESSURE < 80 MM HG: ICD-10-PCS | Mod: HCNC,CPTII,S$GLB, | Performed by: FAMILY MEDICINE

## 2020-05-20 PROCEDURE — 3044F HG A1C LEVEL LT 7.0%: CPT | Mod: HCNC,CPTII,S$GLB, | Performed by: FAMILY MEDICINE

## 2020-05-20 NOTE — PROGRESS NOTES
Subjective:       Patient ID: Renea Bourgeois is a 70 y.o. female.    Chief Complaint: Annual Exam    Patient presents to clinic today for annual physical exam. Reports swallowing trouble returning, had EGD with stretching in the past.    Review of Systems   Constitutional: Negative for activity change and unexpected weight change.   HENT: Positive for trouble swallowing. Negative for hearing loss and rhinorrhea.    Eyes: Negative for discharge and visual disturbance.   Respiratory: Negative for chest tightness and wheezing.    Cardiovascular: Negative for chest pain and palpitations.   Gastrointestinal: Negative for blood in stool, constipation, diarrhea and vomiting.   Endocrine: Negative for polydipsia and polyuria.   Genitourinary: Negative for difficulty urinating, dysuria, hematuria and menstrual problem.   Musculoskeletal: Positive for arthralgias. Negative for joint swelling and neck pain.   Neurological: Positive for headaches. Negative for weakness.   Psychiatric/Behavioral: Negative for confusion and dysphoric mood.       Objective:      Physical Exam   Constitutional: She is oriented to person, place, and time. Vital signs are normal. She appears well-developed and well-nourished. No distress.   HENT:   Head: Normocephalic and atraumatic.   Right Ear: Tympanic membrane, external ear and ear canal normal.   Left Ear: Tympanic membrane, external ear and ear canal normal.   Nose: Nose normal. No mucosal edema or rhinorrhea.   Mouth/Throat: Uvula is midline, oropharynx is clear and moist and mucous membranes are normal.   Eyes: Pupils are equal, round, and reactive to light. Conjunctivae, EOM and lids are normal.   Neck: Normal range of motion. Neck supple. No thyromegaly present.   Cardiovascular: Normal rate and regular rhythm. Exam reveals no gallop and no friction rub.   No murmur heard.  Pulmonary/Chest: Effort normal and breath sounds normal. She has no wheezes. She has no rhonchi. She has no rales.    Abdominal: Soft. Normal appearance and bowel sounds are normal. She exhibits no distension and no mass. There is no hepatosplenomegaly. There is no tenderness.   Musculoskeletal: Normal range of motion.   Lymphadenopathy:     She has no cervical adenopathy.   Neurological: She is alert and oriented to person, place, and time. She has normal strength. No cranial nerve deficit or sensory deficit. Gait normal.   Reflex Scores:       Patellar reflexes are 2+ on the right side and 2+ on the left side.  Skin: Skin is warm and dry. No lesion and no rash noted. No cyanosis. Nails show no clubbing.   Psychiatric: She has a normal mood and affect.   Vitals reviewed.      Assessment:       1. Routine general medical examination at a health care facility    2. Hypertension associated with diabetes    3. Hyperlipidemia associated with type 2 diabetes mellitus    4. Vitamin D insufficiency    5. Type 2 diabetes mellitus without complication, without long-term current use of insulin    6. Malignant neoplasm of upper-outer quadrant of right breast in female, estrogen receptor positive    7. Dysphagia, unspecified type    8. Chronic insomnia        Plan:     Problem List Items Addressed This Visit     Chronic insomnia    Current Assessment & Plan     Stable on trazodone         Dysphagia    Relevant Orders    Ambulatory referral/consult to Gastroenterology    Hyperlipidemia associated with type 2 diabetes mellitus    Current Assessment & Plan     LDL 82; continue Crestor 10 mg daily         Hypertension associated with diabetes    Current Assessment & Plan     Controlled, continue metoprolol         Malignant neoplasm of upper-outer quadrant of right breast in female, estrogen receptor positive    Overview     Followed by Dr. Jones         Type 2 diabetes mellitus, without long-term current use of insulin    Current Assessment & Plan     A1c 5.8, continue Amaryl         Relevant Orders    Ambulatory referral/consult to Optometry     Vitamin D insufficiency    Current Assessment & Plan     Level low; advised to resume supplement           Other Visit Diagnoses     Routine general medical examination at a health care facility    -  Primary          Health Maintenance reviewed/updated.

## 2020-05-25 ENCOUNTER — PES CALL (OUTPATIENT)
Dept: ADMINISTRATIVE | Facility: CLINIC | Age: 70
End: 2020-05-25

## 2020-05-26 ENCOUNTER — PATIENT OUTREACH (OUTPATIENT)
Dept: ADMINISTRATIVE | Facility: OTHER | Age: 70
End: 2020-05-26

## 2020-05-26 DIAGNOSIS — E11.9 TYPE 2 DIABETES MELLITUS WITHOUT COMPLICATION, WITHOUT LONG-TERM CURRENT USE OF INSULIN: Primary | ICD-10-CM

## 2020-05-27 ENCOUNTER — OFFICE VISIT (OUTPATIENT)
Dept: GASTROENTEROLOGY | Facility: CLINIC | Age: 70
End: 2020-05-27
Payer: MEDICARE

## 2020-05-27 VITALS
DIASTOLIC BLOOD PRESSURE: 86 MMHG | WEIGHT: 143.75 LBS | HEART RATE: 68 BPM | SYSTOLIC BLOOD PRESSURE: 154 MMHG | BODY MASS INDEX: 28.22 KG/M2 | OXYGEN SATURATION: 98 % | HEIGHT: 60 IN

## 2020-05-27 DIAGNOSIS — R13.10 DYSPHAGIA, UNSPECIFIED TYPE: ICD-10-CM

## 2020-05-27 PROCEDURE — 3079F DIAST BP 80-89 MM HG: CPT | Mod: HCNC,CPTII,S$GLB, | Performed by: PHYSICIAN ASSISTANT

## 2020-05-27 PROCEDURE — 3079F PR MOST RECENT DIASTOLIC BLOOD PRESSURE 80-89 MM HG: ICD-10-PCS | Mod: HCNC,CPTII,S$GLB, | Performed by: PHYSICIAN ASSISTANT

## 2020-05-27 PROCEDURE — 3077F SYST BP >= 140 MM HG: CPT | Mod: HCNC,CPTII,S$GLB, | Performed by: PHYSICIAN ASSISTANT

## 2020-05-27 PROCEDURE — 99999 PR PBB SHADOW E&M-EST. PATIENT-LVL IV: CPT | Mod: PBBFAC,HCNC,, | Performed by: PHYSICIAN ASSISTANT

## 2020-05-27 PROCEDURE — 1126F PR PAIN SEVERITY QUANTIFIED, NO PAIN PRESENT: ICD-10-PCS | Mod: HCNC,S$GLB,, | Performed by: PHYSICIAN ASSISTANT

## 2020-05-27 PROCEDURE — 3077F PR MOST RECENT SYSTOLIC BLOOD PRESSURE >= 140 MM HG: ICD-10-PCS | Mod: HCNC,CPTII,S$GLB, | Performed by: PHYSICIAN ASSISTANT

## 2020-05-27 PROCEDURE — 99214 OFFICE O/P EST MOD 30 MIN: CPT | Mod: HCNC,S$GLB,, | Performed by: PHYSICIAN ASSISTANT

## 2020-05-27 PROCEDURE — 1101F PT FALLS ASSESS-DOCD LE1/YR: CPT | Mod: HCNC,CPTII,S$GLB, | Performed by: PHYSICIAN ASSISTANT

## 2020-05-27 PROCEDURE — 99214 PR OFFICE/OUTPT VISIT, EST, LEVL IV, 30-39 MIN: ICD-10-PCS | Mod: HCNC,S$GLB,, | Performed by: PHYSICIAN ASSISTANT

## 2020-05-27 PROCEDURE — 1126F AMNT PAIN NOTED NONE PRSNT: CPT | Mod: HCNC,S$GLB,, | Performed by: PHYSICIAN ASSISTANT

## 2020-05-27 PROCEDURE — 99999 PR PBB SHADOW E&M-EST. PATIENT-LVL IV: ICD-10-PCS | Mod: PBBFAC,HCNC,, | Performed by: PHYSICIAN ASSISTANT

## 2020-05-27 PROCEDURE — 1159F PR MEDICATION LIST DOCUMENTED IN MEDICAL RECORD: ICD-10-PCS | Mod: HCNC,S$GLB,, | Performed by: PHYSICIAN ASSISTANT

## 2020-05-27 PROCEDURE — 1159F MED LIST DOCD IN RCRD: CPT | Mod: HCNC,S$GLB,, | Performed by: PHYSICIAN ASSISTANT

## 2020-05-27 PROCEDURE — 1101F PR PT FALLS ASSESS DOC 0-1 FALLS W/OUT INJ PAST YR: ICD-10-PCS | Mod: HCNC,CPTII,S$GLB, | Performed by: PHYSICIAN ASSISTANT

## 2020-05-27 NOTE — LETTER
May 27, 2020      Kamini Newton MD  [address] [place] [address] [address] [place] [id number]           [facility]  [address] [place] [address] [place] [address]  Phone: [phone]  Fax: [phone]          Patient: Renea Bourgeois   MR Number: [id number]   YOB: 1950   Date of Visit: 5/27/2020       Dear Dr. Kamini Newton:    Thank you for referring Renea Bourgeois to me for evaluation. Attached you will find relevant portions of my assessment and plan of care.    If you have questions, please do not hesitate to call me. I look forward to following Renea Bourgeois along with you.    Sincerely,    Marian Lala PA-C    Enclosure  CC:  No Recipients    If you would like to receive this communication electronically, please contact [email or web address] or [phone] to request more information on gauzz Link access.    For providers and/or their staff who would like to refer a patient to [facility], please contact us through our one-stop-shop provider referral line, [facility], at [phone].    If you feel you have received this communication in error or would no longer like to receive these types of communications, please e-mail [email or web address]

## 2020-05-27 NOTE — PROGRESS NOTES
Chart reviewed.   Immunizations: Triggered Imm Registry     Orders placed: n/a  Upcoming appts to satisfy NASREEN topics: n/a

## 2020-05-27 NOTE — PROGRESS NOTES
Clinic Consult:  [facility] Consultation Note    Reason for Consult:  The encounter diagnosis was Dysphagia, unspecified type.    PCP: Kamini Newton   [id number] [facility]  / [place] [address]    CC: Dysphagia      HPI:  This is a 70 y.o. female here for evaluation of the above. Patient states that she has had symptoms of dysphagia in the past and was dilated via EGD about 15 years ago. She notes that her current symptoms are very similar to what they were then. She is noticing food getting stuck near the base of her neck and not digesting. Sometimes liquids can be helpful in passing the bolus, but occasionally she will have to regurgitate. She denies any nausea. It does not occur with every meal, but it is worsening. Worse with meats and bread. No family history of GI related cancers, although patient is a breast cancer survivor. She also complains of some reflux/indigestion for which she has started on Nexium OTC once per day. It does seem to be helpful. Denies any anticoagulation.  UTD on colonoscopy (2018)    Review of Systems   Constitutional: Negative for appetite change, chills, diaphoresis, fatigue, fever and unexpected weight change.   HENT: Positive for trouble swallowing. Negative for sore throat.    Respiratory: Negative for cough and shortness of breath.    Cardiovascular: Negative for chest pain.   Gastrointestinal: Negative for abdominal pain, blood in stool, constipation, diarrhea, nausea and vomiting.   Skin: Negative for rash.   Neurological: Negative for dizziness, weakness and light-headedness.   Psychiatric/Behavioral: Negative for dysphoric mood. The patient is not nervous/anxious.         Medical History:   Past Medical History:   Diagnosis Date    Allergy     Anemia 11/14/2017    Angina pectoris     followed by cardiology    Arthritis     Breast cancer     Right T1 Ductal Ca in situ,high oncotype Dx 33, Estrogen positive. Lumpectomy, TAC chemo x 6 cyscles then  RT,on aromatase inhibitor    Cataract     Diabetes mellitus type II 2011    DM (diabetes mellitus) 2011     am 08/04/2017    Elevated BP without diagnosis of hypertension 11/27/2018    GERD (gastroesophageal reflux disease)     History of colon polyps 2/21/2018    The patient had adenomatous colon polyps in 2014.      Hyperlipidemia     Hypertension     Kidney stone     right side, passed    Osteopenia     Osteoporosis 4/12/2019    PVC (premature ventricular contraction)     on and off    Trouble in sleeping     Type 2 diabetes mellitus        Surgical History:   Past Surgical History:   Procedure Laterality Date    BREAST BIOPSY      BREAST LUMPECTOMY  2/11/2011    right    CATARACT EXTRACTION Bilateral 10/14/15    CHOLECYSTECTOMY  1989    open    COLONOSCOPY N/A 2/22/2018    Procedure: COLONOSCOPY;  Surgeon: Carlito Odonnell MD;  [facility];  Service: Endoscopy;  Laterality: N/A;    CYST REMOVAL Left 11/2017    foot    DILATION AND CURETTAGE OF [place]TERUS  10/2010    [place]    GALLBLADDER SURGERY      removed in 1989    Nasal septal deviation repair  2009    toenail edges removed      TONSILLECTOMY  1959    TOTAL REDUCTION MAMMOPLASTY Left     2015       Family History:    Family History   Problem Relation Age of Onset    Diabetes Father     Hypertension Father     Stroke Father     Cancer Father 65        metastatic when diagnosed    Stroke Brother     Cancer Other         kidney    Atrial fibrillation Son         35    Heart disease Son     Alzheimer's disease Mother     Parkinsonism Brother     Cancer Paternal Grandfather         prostate    Glaucoma Maternal Grandmother     Psoriasis Cousin     Alcohol abuse Neg Hx     Drug abuse Neg Hx     COPD Neg Hx     Birth defects Neg Hx     Mental retardation Neg Hx     Mental illness Neg Hx     Kidney disease Neg Hx     Hyperlipidemia Neg Hx     Melanoma Neg Hx     Lupus Neg Hx        Social History:    Social History     Socioeconomic History    Marital status:      Spouse name: Agustín    Number of children: 4    Years of education: Not on file    Highest education level: Not on file   Occupational History    Occupation: Retired Teacher     Comment: [employer]   Social Needs    Financial resource strain: Not on file    Food insecurity:     Worry: Not on file     Inability: Not on file    Transportation needs:     Medical: Not on file     Non-medical: Not on file   Tobacco Use    Smoking status: Never Smoker    Smokeless tobacco: Never Used   Substance and Sexual Activity    Alcohol use: No     Alcohol/week: 0.0 standard drinks    Drug use: No    Sexual activity: Not Currently     Partners: Male     Birth control/protection: Post-menopausal   Lifestyle    Physical activity:     Days per week: Not on file     Minutes per session: Not on file    Stress: Not on file   Relationships    Social connections:     Talks on phone: Not on file     Gets together: Not on file     Attends Taoist service: Not on file     Active member of club or organization: Not on file     Attends meetings of clubs or organizations: Not on file     Relationship status: Not on file   Other Topics Concern    Are you pregnant or think you may be? 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 Caffeine intake;1-2 per week - dennis calvo. Decaffinated tea and most beveridges. Does have a living will - at home in her filing cabinet.        Allergies:   Review of patient's allergies indicates:   Allergen Reactions    Codeine Itching     Other reaction(s): Itching       Home Medications:   Current Outpatient Medications on File Prior to Visit   Medication Sig Dispense Refill    acetaminophen (TYLENOL) 500 MG tablet Take 500 mg by mouth every 6 (six) hours as needed for Pain.      cholecalciferol, vitamin D3, (VITAMIN D3) 1,000 unit  capsule Take 1 capsule (1,000 Units total) by mouth once daily.  0    diclofenac sodium (VOLTAREN) 1 % Gel Apply 2 g topically once daily. 1 Tube 2    ferrous gluconate (FERGON) 240 (27 FE) MG tablet Take 1 tablet (240 mg total) by mouth once daily.      fluticasone (FLONASE) 50 mcg/actuation nasal spray 2 sprays by Each Nare route daily as needed. 2 Spray, Suspension Nasal Every day 16 g 5    gabapentin (NEURONTIN) 100 MG capsule Take 1 capsule (100 mg total) by mouth 3 (three) times daily. 90 capsule 2    glimepiride (AMARYL) 1 MG tablet TAKE ONE(1) TABLET BY MOUTH EVERY DAY 90 tablet 0    metoprolol succinate (TOPROL-XL) 25 MG 24 hr tablet Take 25 mg by mouth every evening.   1    naproxen sodium (ALEVE) 220 MG tablet Take 220 mg by mouth as needed.      rosuvastatin (CRESTOR) 10 MG tablet TAKE 1 TABLET BY MOUTH EVERY DAY 90 tablet 0    traZODone (DESYREL) 100 MG tablet TAKE 1 TABLET BY MOUTH AT BEDTIME 90 tablet 1    [DISCONTINUED] anastrozole (ARIMIDEX) 1 mg Tab TAKE 1 TABLET(1 MG) BY MOUTH EVERY DAY 90 tablet 0    ACCU-CHEK JD PLUS TEST STRP Strp TEST three times a day 100 strip 11    ALCOHOL PREP PADS PadM       lancets (ACCU-CHEK SOFTCLIX LANCETS) Misc 1 each by Misc.(Non-Drug; Combo Route) route 3 (three) times daily. 100 each 11    lancing device Misc       [DISCONTINUED] anastrozole (ARIMIDEX) 1 mg Tab TAKE 1 TABLET(1 MG) BY MOUTH EVERY DAY 90 tablet 0     Current Facility-Administered Medications on File Prior to Visit   Medication Dose Route Frequency Provider Last Rate Last Dose    sodium chloride 0.9% flush 10 mL  10 mL Intravenous PRN Nathaniel Moon MD        zoledronic acid-mannitol & water 5 mg/100 mL infusion 5 mg  5 mg Intravenous 1 time in Clinic/HOD Nathaniel Moon MD           Physical Exam:  BP (!) 154/86   Pulse 68   Ht 5' (1.524 m)   Wt 65.2 kg (143 lb 11.8 oz)   LMP 01/01/2004 (Within Months)   SpO2 98%   BMI 28.07 kg/m²   Body mass index is 28.07 kg/m².  Physical  Exam   Constitutional: She is oriented to person, place, and time. She appears well-developed and well-nourished. No distress.   HENT:   Head: Normocephalic and atraumatic.   Eyes: Pupils are equal, round, and reactive to light. EOM are normal. No scleral icterus.   Neck: Normal range of motion.   Cardiovascular: Normal rate, regular rhythm and normal heart sounds.   Pulmonary/Chest: Effort normal and breath sounds normal. No respiratory distress.   Abdominal: Soft. Bowel sounds are normal. She exhibits no distension and no mass. There is no tenderness. There is no guarding.   Musculoskeletal: Normal range of motion. She exhibits no deformity.   Neurological: She is alert and oriented to person, place, and time.   Skin: Skin is warm and dry. She is not diaphoretic. No erythema. No pallor.   Psychiatric: She has a normal mood and affect. Her behavior is normal.       Labs: Pertinent labs reviewed.  Endoscopy: 15 yrs ago  CRC Screenin  Anticoagulation: none    Assessment:  1. Dysphagia, unspecified type         Recommendations:  -EGD for dysphagia. Likely need for dilation.  -UTD on colon.  -COVID screening  -May increase OTC Nexium to 40mg per day.    Dysphagia, unspecified type  -     Ambulatory referral/consult to Gastroenterology  -     Case request GI: EGD (ESOPHAGOGASTRODUODENOSCOPY)  -     COVID-19 Routine Screening; Future; Expected date: 2020        No follow-ups on file.    Thank you so much for allowing me to participate in the care of Renea Lala PA-C

## 2020-05-27 NOTE — PROGRESS NOTES
COVID Screening     1. Have you had a fever in the last 7 days or have you used fever reducing medicines for a fever in the last 7 days?  no    2. Are you experiencing shortness of breath, cough, muscle aches, loss of taste or loss of smell?  no    3. Are you residing with anyone who has tested positive for Covid?  no    If answered yes to any of the above questions, the pt must be scheduled for an appointment with their PCP.    A message also needs to be sent to the endoscopist to ensure the patient gets rescheduled at a later date.     ENDO screening    1. Have you been admitted overnight to the hospital in the past 3 months? no   If yes, schedule an appointment with PCP before scheduling endoscopic procedure.     2. Have you had a stent placed in the last 12 months? no   If yes, for a screening visit, cancel and message the ordering provider.  The patient will need a new order when the time is appropriate.     3. Have you had a stroke or heart attack in the past 6 months? no   If yes, cancel and refer patient to ordering provider for clearance, also message ordering provider to inform.     4. Have you had any chest pain in the past 3 months? no   If yes, Have you been evaluated by your PCP and/or cardiologist and it was determined to not be heart related? not applicable   If No, Pt needs to be seen by PCP or Cardiologist .  Pt can be scheduled once clearance obtained by either of those providers.     5. Do you take prescription weight loss medications?  no   If yes, must stop for 2 weeks prior to procedure.     6. Have you been diagnosed with diverticulitis within the past 3 months? no   If yes, must have been seen by GI within the last 3 months, if not schedule with GI ANAT.    If pt has been seen by GI, schedule procedure 8-12 weeks post antibiotic treatment.     7. Are you on Dialysis? no  If yes, schedule procedure for the day AFTER dialysis.  Appt time should be 9am or later, patient arrival time is 2 hours  "prior.  Nulytely or    miralax prep for all patients with kidney disease.     8. Are you diabetic?  yes   If yes, schedule morning appt. Advise pt to hold all diabetic meds day of procedure.     9. If pt is older than 80 years of age and HAS NOT been seen by GI or PCP within the last 6 months, needs appt with GI ANAT.   If pt has been seen by the GI provider or PCP within the past 6  months AND meets criteria, schedule procedure AND send message to the endoscopist.     10. Is patient on a "high risk" medication (blood thinner/antiplatelet agent)?  no   If yes, has cardiac clearance been obtained within the last 60 days? N/A   If no, a new clearance needs to be obtained.       I have reviewed the last colonoscopy for recommendations regarding next procedure bowel prep.  yes  I have reviewed medications and allergies.  yes  I have verified the pharmacy information and appropriate prep sent if needed. yes  Prep instructions have been mailed or sent to portal per patient request. yes    If answers yes to any of the following, schedule at [facility] ONLY. If No, OK for either location.     Is BMI over 45?  NO  Any complaints of chest pain, new onset or at rest? NO  Does pt have an AICD? NO  Is there a diagnosis of heart failure? NO  Does patient have an insulin pump? NO  If procedure for esophageal banding? NO      "

## 2020-06-15 ENCOUNTER — OFFICE VISIT (OUTPATIENT)
Dept: HOME HEALTH SERVICES | Facility: CLINIC | Age: 70
End: 2020-06-15
Payer: MEDICARE

## 2020-06-15 VITALS
HEIGHT: 60 IN | DIASTOLIC BLOOD PRESSURE: 82 MMHG | TEMPERATURE: 98 F | HEART RATE: 68 BPM | WEIGHT: 140 LBS | OXYGEN SATURATION: 98 % | SYSTOLIC BLOOD PRESSURE: 146 MMHG | BODY MASS INDEX: 27.48 KG/M2

## 2020-06-15 DIAGNOSIS — F51.04 CHRONIC INSOMNIA: ICD-10-CM

## 2020-06-15 DIAGNOSIS — E55.9 VITAMIN D INSUFFICIENCY: ICD-10-CM

## 2020-06-15 DIAGNOSIS — E11.9 TYPE 2 DIABETES MELLITUS WITHOUT COMPLICATION, WITHOUT LONG-TERM CURRENT USE OF INSULIN: ICD-10-CM

## 2020-06-15 DIAGNOSIS — Z00.00 ENCOUNTER FOR PREVENTIVE HEALTH EXAMINATION: Primary | ICD-10-CM

## 2020-06-15 DIAGNOSIS — E78.5 HYPERLIPIDEMIA ASSOCIATED WITH TYPE 2 DIABETES MELLITUS: ICD-10-CM

## 2020-06-15 DIAGNOSIS — I49.3 PVC (PREMATURE VENTRICULAR CONTRACTION): ICD-10-CM

## 2020-06-15 DIAGNOSIS — K21.9 GASTROESOPHAGEAL REFLUX DISEASE, ESOPHAGITIS PRESENCE NOT SPECIFIED: ICD-10-CM

## 2020-06-15 DIAGNOSIS — K57.30 DIVERTICULOSIS OF COLON: ICD-10-CM

## 2020-06-15 DIAGNOSIS — M19.90 OSTEOARTHRITIS, UNSPECIFIED OSTEOARTHRITIS TYPE, UNSPECIFIED SITE: ICD-10-CM

## 2020-06-15 DIAGNOSIS — E11.59 HYPERTENSION ASSOCIATED WITH DIABETES: ICD-10-CM

## 2020-06-15 DIAGNOSIS — E11.69 HYPERLIPIDEMIA ASSOCIATED WITH TYPE 2 DIABETES MELLITUS: ICD-10-CM

## 2020-06-15 DIAGNOSIS — I15.2 HYPERTENSION ASSOCIATED WITH DIABETES: ICD-10-CM

## 2020-06-15 DIAGNOSIS — M85.80 OSTEOPENIA, UNSPECIFIED LOCATION: ICD-10-CM

## 2020-06-15 DIAGNOSIS — R13.10 DYSPHAGIA, UNSPECIFIED TYPE: ICD-10-CM

## 2020-06-15 PROBLEM — D64.9 ANEMIA: Status: RESOLVED | Noted: 2017-11-14 | Resolved: 2020-06-15

## 2020-06-15 PROBLEM — C50.411 MALIGNANT NEOPLASM OF UPPER-OUTER QUADRANT OF RIGHT BREAST IN FEMALE, ESTROGEN RECEPTOR POSITIVE: Status: RESOLVED | Noted: 2018-07-23 | Resolved: 2020-06-15

## 2020-06-15 PROBLEM — Z17.0 MALIGNANT NEOPLASM OF UPPER-OUTER QUADRANT OF RIGHT BREAST IN FEMALE, ESTROGEN RECEPTOR POSITIVE: Status: RESOLVED | Noted: 2018-07-23 | Resolved: 2020-06-15

## 2020-06-15 PROBLEM — M25.571 ACUTE RIGHT ANKLE PAIN: Status: RESOLVED | Noted: 2018-11-25 | Resolved: 2020-06-15

## 2020-06-15 PROBLEM — M22.41 CHONDROMALACIA OF RIGHT PATELLA: Status: RESOLVED | Noted: 2018-03-26 | Resolved: 2020-06-15

## 2020-06-15 PROBLEM — M25.522 LEFT ELBOW PAIN: Status: RESOLVED | Noted: 2018-11-25 | Resolved: 2020-06-15

## 2020-06-15 PROCEDURE — G0439 PR MEDICARE ANNUAL WELLNESS SUBSEQUENT VISIT: ICD-10-PCS | Mod: S$GLB,,, | Performed by: NURSE PRACTITIONER

## 2020-06-15 PROCEDURE — 3077F PR MOST RECENT SYSTOLIC BLOOD PRESSURE >= 140 MM HG: ICD-10-PCS | Mod: CPTII,S$GLB,, | Performed by: NURSE PRACTITIONER

## 2020-06-15 PROCEDURE — G0439 PPPS, SUBSEQ VISIT: HCPCS | Mod: S$GLB,,, | Performed by: NURSE PRACTITIONER

## 2020-06-15 PROCEDURE — 3044F PR MOST RECENT HEMOGLOBIN A1C LEVEL <7.0%: ICD-10-PCS | Mod: CPTII,S$GLB,, | Performed by: NURSE PRACTITIONER

## 2020-06-15 PROCEDURE — 3044F HG A1C LEVEL LT 7.0%: CPT | Mod: CPTII,S$GLB,, | Performed by: NURSE PRACTITIONER

## 2020-06-15 PROCEDURE — 3077F SYST BP >= 140 MM HG: CPT | Mod: CPTII,S$GLB,, | Performed by: NURSE PRACTITIONER

## 2020-06-15 PROCEDURE — 3079F DIAST BP 80-89 MM HG: CPT | Mod: CPTII,S$GLB,, | Performed by: NURSE PRACTITIONER

## 2020-06-15 PROCEDURE — 3079F PR MOST RECENT DIASTOLIC BLOOD PRESSURE 80-89 MM HG: ICD-10-PCS | Mod: CPTII,S$GLB,, | Performed by: NURSE PRACTITIONER

## 2020-06-15 NOTE — PATIENT INSTRUCTIONS
Counseling and Referral of Other Preventative  (Italic type indicates deductible and co-insurance are waived)    Patient Name: Renea Bourgeois  Today's Date: 6/15/2020    Health Maintenance       Date Due Completion Date    Shingles Vaccine (2 of 3) 01/03/2015 11/8/2014    Eye Exam 09/07/2019 9/7/2018    Override on 2/26/2013: Done    Urine Microalbumin 05/23/2020 5/23/2019    Foot Exam 06/19/2020 6/19/2019    Override on 5/19/2017: Done    Override on 11/18/2016: Done    Override on 10/1/2015: Done    Override on 8/11/2015: Done    Mammogram 07/29/2020 7/29/2019    Override on 4/16/2013: Done    Hemoglobin A1c 11/19/2020 5/19/2020    Lipid Panel 05/19/2021 5/19/2020    DEXA SCAN 05/27/2021 5/27/2019    Override on 8/25/2011: Done (osteopenia)    Low Dose Statin 06/15/2021 6/15/2020    Colorectal Cancer Screening 02/22/2023 2/22/2018    Override on 1/27/2006: Done (Repeat in 7 years)    TETANUS VACCINE 10/24/2024 10/24/2014        No orders of the defined types were placed in this encounter.    The following information is provided to all patients.  This information is to help you find resources for any of the problems found today that may be affecting your health:                Living healthy guide: www.[facility].[place].gov      Understanding Diabetes: www.diabetes.org      Eating healthy: www.cdc.gov/healthyweight      CDC home safety checklist: www.cdc.gov/steadi/patient.html      Agency on Aging: www.goea.[place].[facility]      Alcoholics anonymous (AA): www.aa.org      Physical Activity: www.arie.nih.gov/za4rjfu      Tobacco use: www.quitwithusla.org

## 2020-06-15 NOTE — PROGRESS NOTES
Renea Bourgeois presented for Medicare AWV today. The following components were reviewed and updated:    · Medical history  · Family History  · Social history  · Allergies and Current Medications  · Health Risk Assessment  · Health Maintenance  · Care Team    **See Completed Assessments for Annual Wellness visit with in the encounter summary    The following assessments were completed:  · Depression Screening  · Cognitive function Screening    · Timed Get Up Test  · Whisper Test    Vitals:    06/15/20 1055   BP: (!) 146/82   Pulse: 68   Temp: 97.8 °F (36.6 °C)   TempSrc: Temporal   SpO2: 98%   Weight: 63.5 kg (140 lb)   Height: 5' (1.524 m)     Body mass index is 27.34 kg/m².   ]    Physical Exam   Constitutional: She is oriented to person, place, and time and well-developed, well-nourished, and in no distress.   HENT:   Head: Normocephalic and atraumatic.   Eyes: Pupils are equal, round, and reactive to light. Conjunctivae are normal.   Neck: Normal range of motion. Neck supple.   Cardiovascular: Normal rate, regular rhythm and normal heart sounds.   Pulmonary/Chest: Effort normal and breath sounds normal.   Musculoskeletal: Normal range of motion.   Neurological: She is alert and oriented to person, place, and time.   Skin: Skin is warm and dry.   Psychiatric: Affect and judgment normal.   Vitals reviewed.  Protective Sensation (w/ 10 gram monofilament):  Right: Intact  Left: Intact    Visual Inspection:  Normal -  Bilateral    Pedal Pulses:   Right: Present  Left: Present    Posterior tibialis:   Right:Present  Left: Present      Current Outpatient Medications:     ACCU-CHEK JD PLUS TEST STRP Strp, TEST three times a day, Disp: 100 strip, Rfl: 11    acetaminophen (TYLENOL) 500 MG tablet, Take 500 mg by mouth every 6 (six) hours as needed for Pain., Disp: , Rfl:     ALCOHOL PREP PADS PadM, , Disp: , Rfl:     diclofenac sodium (VOLTAREN) 1 % Gel, Apply 2 g topically once daily., Disp: 1 Tube, Rfl: 2     ferrous gluconate (FERGON) 240 (27 FE) MG tablet, Take 1 tablet (240 mg total) by mouth once daily., Disp: , Rfl:     fluticasone (FLONASE) 50 mcg/actuation nasal spray, 2 sprays by Each Nare route daily as needed. 2 Spray, Suspension Nasal Every day, Disp: 16 g, Rfl: 5    gabapentin (NEURONTIN) 100 MG capsule, Take 1 capsule (100 mg total) by mouth 3 (three) times daily., Disp: 90 capsule, Rfl: 2    glimepiride (AMARYL) 1 MG tablet, TAKE ONE(1) TABLET BY MOUTH EVERY DAY, Disp: 90 tablet, Rfl: 0    lancets (ACCU-CHEK SOFTCLIX LANCETS) Misc, 1 each by Misc.(Non-Drug; Combo Route) route 3 (three) times daily., Disp: 100 each, Rfl: 11    lancing device Misc, , Disp: , Rfl:     metoprolol succinate (TOPROL-XL) 25 MG 24 hr tablet, Take 25 mg by mouth every evening. , Disp: , Rfl: 1    naproxen sodium (ALEVE) 220 MG tablet, Take 220 mg by mouth as needed., Disp: , Rfl:     rosuvastatin (CRESTOR) 10 MG tablet, TAKE 1 TABLET BY MOUTH EVERY DAY, Disp: 90 tablet, Rfl: 0    traZODone (DESYREL) 100 MG tablet, TAKE 1 TABLET BY MOUTH AT BEDTIME, Disp: 90 tablet, Rfl: 1    cholecalciferol, vitamin D3, (VITAMIN D3) 1,000 unit capsule, Take 1 capsule (1,000 Units total) by mouth once daily. (Patient not taking: Reported on 6/15/2020), Disp: , Rfl: 0  No current facility-administered medications for this visit.     Facility-Administered Medications Ordered in Other Visits:     sodium chloride 0.9% flush 10 mL, 10 mL, Intravenous, PRN, Nathaniel Moon MD    zoledronic acid-mannitol & water 5 mg/100 mL infusion 5 mg, 5 mg, Intravenous, 1 time in Clinic/HOD, Nathaniel Moon MD    Diagnoses and health risks identified today and associated recommendations/orders:  1. Encounter for preventive health examination  Medicare AWV completed. Foot exam done. Urine microalbumin ordered for 11/23/2020. Eye exam scheduled by patient. Discussed Shingles vaccine with patient.     2. Hyperlipidemia associated with type 2 diabetes  mellitus  Chronic and stable. Continue current management. See med lists above. Follow up with PCP.    3. Hypertension associated with diabetes  Chronic and stable. Continue current management. Follow up with PCP.     4. PVC (premature ventricular contraction)  Chronic and stable. Continue current management. Follow up with PCP.     5. Type 2 diabetes mellitus without complication, without long-term current use of insulin  Chronic and stable. Continue current management. Follow up with PCP.   - Microalbumin, Random Urine (w/o Creat); Future ordered for 11/23/2020    6. Vitamin D insufficiency  Chronic and stable. Continue current management on vitamin D. Follow up with PCP.     7. Diverticulosis of colon  Chronic and stable. No acute issues. Continue current management. Next colonoscopy 2023. Follow up with GI.     8. Dysphagia, unspecified type  Chronic and stable. States she is scheduled for an EGD 6/29/2020 with GI for dysphagia. No acute symptoms. Follow up with GI.     9. Gastroesophageal reflux disease, esophagitis presence not specified  Chronic and stable. Continue current management. Follow up with PCP and GI.     10. Osteopenia, unspecified location  Chronic and stable. Continue current management on Reclast. Follow up with PCP.     11. Osteoarthritis, unspecified osteoarthritis type, unspecified site  Chronic and stable. Continue current management. Follow up with PCP.     12. Chronic insomnia  Chronic and stable. Continue current management on Trazodone. Follow up with PCP.     Provided Renea with a 5-10 year written screening schedule and personal prevention plan. Recommendations were developed using the USPSTF age appropriate recommendations. Education, counseling, and referrals were provided as needed.  After Visit Summary printed and given to patient which includes a list of additional screenings\tests needed.    Follow up in about 1 year (around 6/15/2021) for annual wellness visit.      Perlita  LASHAE Chacon  I offered to discuss end of life issues, including information on how to make advance directives that the patient could use to name someone who would make medical decisions on their behalf if they became too ill to make themselves.    ___Patient declined  _X_Patient is interested, I provided paper work and offered to discuss.

## 2020-06-16 DIAGNOSIS — E11.9 TYPE 2 DIABETES MELLITUS WITHOUT COMPLICATION, WITHOUT LONG-TERM CURRENT USE OF INSULIN: Chronic | ICD-10-CM

## 2020-06-17 RX ORDER — GLIMEPIRIDE 1 MG/1
TABLET ORAL
Qty: 30 TABLET | Refills: 0 | Status: SHIPPED | OUTPATIENT
Start: 2020-06-17 | End: 2020-07-25 | Stop reason: SDUPTHER

## 2020-06-22 ENCOUNTER — IMMUNIZATION (OUTPATIENT)
Dept: PHARMACY | Facility: CLINIC | Age: 70
End: 2020-06-22
Payer: MEDICARE

## 2020-06-22 ENCOUNTER — PATIENT OUTREACH (OUTPATIENT)
Dept: ADMINISTRATIVE | Facility: OTHER | Age: 70
End: 2020-06-22

## 2020-06-22 ENCOUNTER — OFFICE VISIT (OUTPATIENT)
Dept: OPHTHALMOLOGY | Facility: CLINIC | Age: 70
End: 2020-06-22
Payer: MEDICARE

## 2020-06-22 DIAGNOSIS — Z96.1 PSEUDOPHAKIA OF BOTH EYES: ICD-10-CM

## 2020-06-22 DIAGNOSIS — H52.4 PRESBYOPIA: ICD-10-CM

## 2020-06-22 DIAGNOSIS — H52.13 MYOPIA OF BOTH EYES: ICD-10-CM

## 2020-06-22 DIAGNOSIS — H04.123 BILATERAL DRY EYES: ICD-10-CM

## 2020-06-22 DIAGNOSIS — E11.9 DIABETES MELLITUS TYPE 2 WITHOUT RETINOPATHY: Primary | ICD-10-CM

## 2020-06-22 PROCEDURE — 92014 PR EYE EXAM, EST PATIENT,COMPREHESV: ICD-10-PCS | Mod: HCNC,S$GLB,, | Performed by: OPTOMETRIST

## 2020-06-22 PROCEDURE — 92015 DETERMINE REFRACTIVE STATE: CPT | Mod: HCNC,S$GLB,, | Performed by: OPTOMETRIST

## 2020-06-22 PROCEDURE — 92015 PR REFRACTION: ICD-10-PCS | Mod: HCNC,S$GLB,, | Performed by: OPTOMETRIST

## 2020-06-22 PROCEDURE — 99999 PR PBB SHADOW E&M-EST. PATIENT-LVL III: ICD-10-PCS | Mod: PBBFAC,HCNC,, | Performed by: OPTOMETRIST

## 2020-06-22 PROCEDURE — 99499 RISK ADDL DX/OHS AUDIT: ICD-10-PCS | Mod: HCNC,S$GLB,, | Performed by: OPTOMETRIST

## 2020-06-22 PROCEDURE — 99499 UNLISTED E&M SERVICE: CPT | Mod: HCNC,S$GLB,, | Performed by: OPTOMETRIST

## 2020-06-22 PROCEDURE — 92014 COMPRE OPH EXAM EST PT 1/>: CPT | Mod: HCNC,S$GLB,, | Performed by: OPTOMETRIST

## 2020-06-22 PROCEDURE — 99999 PR PBB SHADOW E&M-EST. PATIENT-LVL III: CPT | Mod: PBBFAC,HCNC,, | Performed by: OPTOMETRIST

## 2020-06-22 NOTE — PROGRESS NOTES
HPI     NIDDM exam.   No visual complaints.  Last eye exam 09/07/2018 [facility].  New patient to [facility].   Patient wears OTC readers.   Update glasses RX.  Bilateral pseudophakia.  Lab Results       Component                Value               Date                       HGBA1C                   5.8 (H)             05/19/2020                Last edited by Shoshana Butcher on 6/22/2020 10:00 AM. (History)            Assessment /Plan     For exam results, see Encounter Report.    Diabetes mellitus type 2 without retinopathy    Pseudophakia of both eyes    Bilateral dry eyes    Myopia of both eyes    Presbyopia      No Background Diabetic Retinopathy    Stable IOL OU.    AT prn ou    Dispense Final Rx for glasses or may use OTC glasses.  RTC 1 year  Discussed above and answered questions.

## 2020-06-26 ENCOUNTER — LAB VISIT (OUTPATIENT)
Dept: OTOLARYNGOLOGY | Facility: CLINIC | Age: 70
End: 2020-06-26
Payer: MEDICARE

## 2020-06-26 DIAGNOSIS — R13.10 DYSPHAGIA, UNSPECIFIED TYPE: ICD-10-CM

## 2020-06-26 PROCEDURE — U0003 INFECTIOUS AGENT DETECTION BY NUCLEIC ACID (DNA OR RNA); SEVERE ACUTE RESPIRATORY SYNDROME CORONAVIRUS 2 (SARS-COV-2) (CORONAVIRUS DISEASE [COVID-19]), AMPLIFIED PROBE TECHNIQUE, MAKING USE OF HIGH THROUGHPUT TECHNOLOGIES AS DESCRIBED BY CMS-2020-01-R: HCPCS | Mod: HCNC

## 2020-06-29 ENCOUNTER — ANESTHESIA (OUTPATIENT)
Dept: ENDOSCOPY | Facility: HOSPITAL | Age: 70
End: 2020-06-29
Payer: MEDICARE

## 2020-06-29 ENCOUNTER — HOSPITAL ENCOUNTER (OUTPATIENT)
Facility: HOSPITAL | Age: 70
Discharge: HOME | End: 2020-06-29
Attending: INTERNAL MEDICINE | Admitting: INTERNAL MEDICINE
Payer: MEDICARE

## 2020-06-29 ENCOUNTER — ANESTHESIA EVENT (OUTPATIENT)
Dept: ENDOSCOPY | Facility: HOSPITAL | Age: 70
End: 2020-06-29
Payer: MEDICARE

## 2020-06-29 VITALS
TEMPERATURE: 98 F | HEART RATE: 60 BPM | OXYGEN SATURATION: 96 % | RESPIRATION RATE: 18 BRPM | BODY MASS INDEX: 27.66 KG/M2 | WEIGHT: 140.88 LBS | DIASTOLIC BLOOD PRESSURE: 77 MMHG | HEIGHT: 60 IN | SYSTOLIC BLOOD PRESSURE: 132 MMHG

## 2020-06-29 DIAGNOSIS — K22.2 SCHATZKI'S RING OF DISTAL ESOPHAGUS: Primary | Chronic | ICD-10-CM

## 2020-06-29 DIAGNOSIS — R13.12 DYSPHAGIA, OROPHARYNGEAL PHASE: ICD-10-CM

## 2020-06-29 LAB
POCT GLUCOSE: 102 MG/DL (ref 70–110)
SARS-COV-2 RNA RESP QL NAA+PROBE: NOT DETECTED

## 2020-06-29 PROCEDURE — 43248 EGD GUIDE WIRE INSERTION: CPT | Mod: HCNC | Performed by: INTERNAL MEDICINE

## 2020-06-29 PROCEDURE — 63600175 PHARM REV CODE 636 W HCPCS: Mod: HCNC | Performed by: NURSE ANESTHETIST, CERTIFIED REGISTERED

## 2020-06-29 PROCEDURE — 25000003 PHARM REV CODE 250: Mod: HCNC | Performed by: NURSE ANESTHETIST, CERTIFIED REGISTERED

## 2020-06-29 PROCEDURE — C1769 GUIDE WIRE: HCPCS | Mod: HCNC | Performed by: INTERNAL MEDICINE

## 2020-06-29 PROCEDURE — 43248 PR EGD, FLEX, W/DILATION OVER GUIDEWIRE: ICD-10-PCS | Mod: HCNC,,, | Performed by: INTERNAL MEDICINE

## 2020-06-29 PROCEDURE — 43248 EGD GUIDE WIRE INSERTION: CPT | Mod: HCNC,,, | Performed by: INTERNAL MEDICINE

## 2020-06-29 PROCEDURE — 37000009 HC ANESTHESIA EA ADD 15 MINS: Mod: HCNC | Performed by: INTERNAL MEDICINE

## 2020-06-29 PROCEDURE — 37000008 HC ANESTHESIA 1ST 15 MINUTES: Mod: HCNC | Performed by: INTERNAL MEDICINE

## 2020-06-29 RX ORDER — PROPOFOL 10 MG/ML
VIAL (ML) INTRAVENOUS
Status: DISCONTINUED | OUTPATIENT
Start: 2020-06-29 | End: 2020-06-29

## 2020-06-29 RX ORDER — SODIUM CHLORIDE, SODIUM LACTATE, POTASSIUM CHLORIDE, CALCIUM CHLORIDE 600; 310; 30; 20 MG/100ML; MG/100ML; MG/100ML; MG/100ML
INJECTION, SOLUTION INTRAVENOUS CONTINUOUS
Status: DISCONTINUED | OUTPATIENT
Start: 2020-06-29 | End: 2020-06-29 | Stop reason: HOSPADM

## 2020-06-29 RX ORDER — LIDOCAINE HYDROCHLORIDE 10 MG/ML
INJECTION, SOLUTION EPIDURAL; INFILTRATION; INTRACAUDAL; PERINEURAL
Status: DISCONTINUED | OUTPATIENT
Start: 2020-06-29 | End: 2020-06-29

## 2020-06-29 RX ORDER — SODIUM CHLORIDE, SODIUM LACTATE, POTASSIUM CHLORIDE, CALCIUM CHLORIDE 600; 310; 30; 20 MG/100ML; MG/100ML; MG/100ML; MG/100ML
INJECTION, SOLUTION INTRAVENOUS CONTINUOUS PRN
Status: DISCONTINUED | OUTPATIENT
Start: 2020-06-29 | End: 2020-06-29

## 2020-06-29 RX ADMIN — SODIUM CHLORIDE, SODIUM LACTATE, POTASSIUM CHLORIDE, AND CALCIUM CHLORIDE: 600; 310; 30; 20 INJECTION, SOLUTION INTRAVENOUS at 11:06

## 2020-06-29 RX ADMIN — PROPOFOL 20 MG: 10 INJECTION, EMULSION INTRAVENOUS at 11:06

## 2020-06-29 RX ADMIN — LIDOCAINE HYDROCHLORIDE 50 MG: 10 INJECTION, SOLUTION EPIDURAL; INFILTRATION; INTRACAUDAL; PERINEURAL at 11:06

## 2020-06-29 RX ADMIN — PROPOFOL 30 MG: 10 INJECTION, EMULSION INTRAVENOUS at 11:06

## 2020-06-29 RX ADMIN — PROPOFOL 50 MG: 10 INJECTION, EMULSION INTRAVENOUS at 11:06

## 2020-06-29 NOTE — ANESTHESIA PREPROCEDURE EVALUATION
06/29/2020  Renea Bourgeois is a 70 y.o., female.    Anesthesia Evaluation    I have reviewed the Patient Summary Reports.    I have reviewed the Nursing Notes. I have reviewed the NPO Status.   I have reviewed the Medications.     Review of Systems  Anesthesia Hx:  No problems with previous Anesthesia    Social:  Non-Smoker, No Alcohol Use    Hematology/Oncology:  Hematology Normal       -- Cancer in past history:  Breast right surgery, chemotherapy and radiation  Oncology Comments: 10 years ago     EENT/Dental:EENT/Dental Normal   Cardiovascular:   Hypertension, well controlled  Denies Angina. hyperlipidemia    Pulmonary:  Pulmonary Normal    Renal/:   renal calculi    Hepatic/GI:   GERD, poorly controlled    Musculoskeletal:   Arthritis     Neurological:  Neurology Normal    Endocrine:   Diabetes, well controlled    Dermatological:  Skin Normal    Psych:  Psychiatric Normal           Physical Exam  General:  Well nourished    Airway/Jaw/Neck:  Airway Findings: Mouth Opening: Normal Tongue: Normal  General Airway Assessment: Adult  Mallampati: II  TM Distance: Normal, at least 6 cm  Jaw/Neck Findings:  Neck ROM: Normal ROM      Dental:  Dental Findings: In tact   Chest/Lungs:  Chest/Lungs Findings: Clear to auscultation, Normal Respiratory Rate     Heart/Vascular:  Heart Findings: Rate: Normal  Rhythm: Regular Rhythm  Sounds: Normal        Mental Status:  Mental Status Findings:  Cooperative, Alert and Oriented         Anesthesia Plan  Type of Anesthesia, risks & benefits discussed:  Anesthesia Type:  MAC  Patient's Preference:   Intra-op Monitoring Plan: standard ASA monitors  Intra-op Monitoring Plan Comments:   Post Op Pain Control Plan:   Post Op Pain Control Plan Comments:   Induction:   IV  Beta Blocker:  Patient is on a Beta-Blocker and has received one dose within the past 24 hours (No further  documentation required).       Informed Consent: Patient understands risks and agrees with Anesthesia plan.  Questions answered. Anesthesia consent signed with patient.  ASA Score: 2     Day of Surgery Review of History & Physical: I have interviewed and examined the patient. I have reviewed the patient's H&P dated:  There are no significant changes.          Ready For Surgery From Anesthesia Perspective.

## 2020-06-29 NOTE — ANESTHESIA POSTPROCEDURE EVALUATION
Anesthesia Post Evaluation    Patient: Renea Bourgeois    Procedure(s) Performed: Procedure(s) (LRB):  EGD (ESOPHAGOGASTRODUODENOSCOPY) (N/A)    Final Anesthesia Type: MAC    Patient location during evaluation: GI PACU  Patient participation: Yes- Able to Participate  Level of consciousness: awake and alert and oriented  Post-procedure vital signs: reviewed and stable  Pain management: adequate  Airway patency: patent  DANYELLE mitigation strategies: Multimodal analgesia  PONV status at discharge: No PONV  Anesthetic complications: no      Cardiovascular status: hemodynamically stable  Respiratory status: unassisted, spontaneous ventilation and room air  Hydration status: euvolemic  Follow-up not needed.          Vitals Value Taken Time   /83 06/29/20 1158   Temp  06/29/20 1207   Pulse 62 06/29/20 1158   Resp 17 06/29/20 1158   SpO2 96 % 06/29/20 1158         No case tracking events are documented in the log.      Pain/Johnathan Score: Johnathan Score: 10 (6/29/2020 11:58 AM)

## 2020-06-29 NOTE — ANESTHESIA RELEASE NOTE
Anesthesia Release from PACU Note    Patient: Renea Bourgeois    Procedure(s) Performed: Procedure(s) (LRB):  EGD (ESOPHAGOGASTRODUODENOSCOPY) (N/A)    Anesthesia type: MAC    Post pain: Adequate analgesia    Post assessment: no apparent anesthetic complications and tolerated procedure well    Last Vitals:   Visit Vitals  BP (!) 146/83   Pulse 62   Temp 36.4 °C (97.5 °F)   Resp 17   Ht 5' (1.524 m)   Wt 63.9 kg (140 lb 14 oz)   LMP 01/01/2004 (Within Months)   SpO2 96%   Breastfeeding No   BMI 27.51 kg/m²       Post vital signs: stable    Level of consciousness: awake, alert  and oriented    Nausea/Vomiting: no nausea/no vomiting    Complications: none    Airway Patency: patent    Respiratory: unassisted, spontaneous ventilation, room air    Cardiovascular: stable and blood pressure at baseline    Hydration: euvolemic

## 2020-06-29 NOTE — [employer] PATIENT INSTRUCTIONS
Discharge Summary/Instructions after an Endoscopic Procedure  Patient Name: Renea Bourgeois  Patient MRN: [id number]  Patient YOB: 1950 Monday, June 29, 2020 Nancy Hahn MD  RESTRICTIONS:  During your procedure today, you received medications for sedation.  These   medications may affect your judgment, balance and coordination.  Therefore,   for 24 hours, you have the following restrictions:   - DO NOT drive a car, operate machinery, make legal/financial decisions,   sign important papers or drink alcohol.    ACTIVITY:  Today: no heavy lifting, straining or running due to procedural   sedation/anesthesia.  The following day: return to full activity including work.  DIET:  Eat and drink normally unless instructed otherwise.     TREATMENT FOR COMMON SIDE EFFECTS:  - Mild abdominal pain, nausea, belching, bloating or excessive gas:  rest,   eat lightly and use a heating pad.  - Sore Throat: treat with throat lozenges and/or gargle with warm salt   water.  - Because air was used during the procedure, expelling large amounts of air   from your rectum or belching is normal.  - If a bowel prep was taken, you may not have a bowel movement for 1-3 days.    This is normal.  SYMPTOMS TO WATCH FOR AND REPORT TO YOUR PHYSICIAN:  1. Abdominal pain or bloating, other than gas cramps.  2. Chest pain.  3. Back pain.  4. Signs of infection such as: chills or fever occurring within 24 hours   after the procedure.  5. Rectal bleeding, which would show as bright red, maroon, or black stools.   (A tablespoon of blood from the rectum is not serious, especially if   hemorrhoids are present.)  6. Vomiting.  7. Weakness or dizziness.  GO DIRECTLY TO THE NEAREST EMERGENCY ROOM IF YOU HAVE ANY OF THE FOLLOWING:      Difficulty breathing              Chills and/or fever over 101 F   Persistent vomiting and/or vomiting blood   Severe abdominal pain   Severe chest pain   Black, tarry stools   Bleeding- more than one tablespoon   Any  other symptom or condition that you feel may need urgent attention  Your doctor recommends these additional instructions:  If any biopsies were taken, your doctors clinic will contact you in 1 to 2   weeks with any results.  - Discharge patient to home (with escort).   - Clear liquid diet today.   - No aspirin, ibuprofen, naproxen, or other non-steroidal anti-inflammatory   drugs.   - Use Nexium (esomeprazole) 40 mg PO daily.   - Observe patient's clinical course.   - Return to GI clinic if symptoms worsen or do not improve.  For questions, problems or results please call your physician Nancy Hahn MD at Work:  [phone]  If you have any questions about the above instructions, call the GI   department at [phone] or call the endoscopy unit at [phone]   from 7am until 3 pm.  [facility] [place], EMERGENCY ROOM PHONE NUMBER:   [phone]  IF A COMPLICATION OR EMERGENCY SITUATION ARISES AND YOU ARE UNABLE TO REACH   YOUR PHYSICIAN - GO DIRECTLY TO THE EMERGENCY ROOM.  I have read or have had read to me these discharge instructions for my   procedure and have received a written copy.  I understand these   instructions and will follow-up with my physician if I have any questions.     __________________________________       _____________________________________  Nurse Signature                                          Patient/Designated   Responsible Party Signature  MD Nancy Phan MD  6/29/2020 11:52:48 AM  This report has been verified and signed electronically.  [employer]

## 2020-06-29 NOTE — TRANSFER OF CARE
Anesthesia Transfer of Care Note    Patient: Renea Bourgeois    Procedure(s) Performed: Procedure(s) (LRB):  EGD (ESOPHAGOGASTRODUODENOSCOPY) (N/A)    Patient location: GI    Anesthesia Type: MAC    Transport from OR: Transported from OR on room air with adequate spontaneous ventilation    Post pain: adequate analgesia    Post assessment: no apparent anesthetic complications and tolerated procedure well    Post vital signs: stable    Level of consciousness: responds to stimulation    Nausea/Vomiting: no nausea/vomiting    Complications: none    Transfer of care protocol was followed      Last vitals:   Visit Vitals  BP (!) 140/60 (BP Location: Left arm, Patient Position: Lying)   Pulse 66   Temp 36.4 °C (97.5 °F)   Resp 11   Ht 5' (1.524 m)   Wt 63.9 kg (140 lb 14 oz)   LMP 01/01/2004 (Within Months)   SpO2 96%   Breastfeeding No   BMI 27.51 kg/m²

## 2020-06-29 NOTE — H&P
PRE PROCEDURE H&P    Patient Name: Renea Bourgeois  MRN: [id number]  : 1950  Date of Procedure:  2020  Referring Physician: Marian Lala PA-C  Primary Physician: Kamini Newton MD  Procedure Physician: Nancy Hahn MD       Planned Procedure: EGD  Diagnosis: dysphagia  Chief Complaint: Same as above    HPI: Patient is an 70 y.o. female is here for the above. She saw Marian Lala PA-C for dysphagia back on 20 for this. She is on Nexium 40mg daily but reports persistent symptoms. States she has had a EGD with dilation in the past but I cannot find records of this in our system.     Past Medical History:   Past Medical History:   Diagnosis Date    Allergy     Anemia 2017    Angina pectoris     followed by cardiology    Arthritis     Breast cancer     Right T1 Ductal Ca in situ,high oncotype Dx 33, Estrogen positive. Lumpectomy, TAC chemo x 6 cyscles then RT,on aromatase inhibitor    Cataract     Chondromalacia of right patella 3/26/2018    Stable and controlled. Continue current treatment plan as previously prescribed with your PCP.      Diabetes mellitus type II     DM (diabetes mellitus)      am 2017    DM (diabetes mellitus)      am 2020    Elevated BP without diagnosis of hypertension 2018    GERD (gastroesophageal reflux disease)     History of colon polyps 2018    The patient had adenomatous colon polyps in .      History of renal calculi 2015    Right obstructing 13 - passed.     Hyperlipidemia     Hypertension     Kidney stone     right side, passed    Malignant neoplasm of upper-outer quadrant of right breast in female, estrogen receptor positive 2018    Followed by Dr. Jones    Nuclear sclerosis of both eyes 10/5/2015    Osteopenia     Osteoporosis 2019    Pseudophakia 10/15/2015    PVC (premature ventricular contraction)     on and off    Refractive error 10/5/2015    Trouble in  sleeping     Type 2 diabetes mellitus         Past Surgical History:  Past Surgical History:   Procedure Laterality Date    BREAST BIOPSY      BREAST LUMPECTOMY  2/11/2011    right    CATARACT EXTRACTION Bilateral 10/14/15    CHOLECYSTECTOMY  1989    open    COLONOSCOPY N/A 2/22/2018    Procedure: COLONOSCOPY;  Surgeon: Carlito Odonnell MD;  Location: [facility];  Service: Endoscopy;  Laterality: N/A;    CYST REMOVAL Left 11/2017    foot    DILATION AND CURETTAGE OF UTERUS  10/2010    [place]    GALLBLADDER SURGERY      removed in 1989    Nasal septal deviation repair  2009    toenail edges removed      TONSILLECTOMY  1959    TOTAL REDUCTION MAMMOPLASTY Left     2015        Home Medications:  Prior to Admission medications    Medication Sig Start Date End Date Taking? Authorizing Provider   cholecalciferol, vitamin D3, (VITAMIN D3) 1,000 unit capsule Take 1 capsule (1,000 Units total) by mouth once daily. 5/17/19  Yes Kamini Newton MD   ferrous gluconate (FERGON) 240 (27 FE) MG tablet Take 1 tablet (240 mg total) by mouth once daily. 11/15/17  Yes Kamini Newton MD   gabapentin (NEURONTIN) 100 MG capsule Take 1 capsule (100 mg total) by mouth 3 (three) times daily. 12/3/19 12/2/20 Yes Doreen Cortes, PA-C   glimepiride (AMARYL) 1 MG tablet TAKE 1 TABLET BY MOUTH EVERY DAY 6/17/20  Yes Andree Coyne, PhD, NP-C   metoprolol succinate (TOPROL-XL) 25 MG 24 hr tablet Take 25 mg by mouth every evening.  5/12/17  Yes Historical Provider, MD   naproxen sodium (ALEVE) 220 MG tablet Take 220 mg by mouth as needed.   Yes Historical Provider, MD   rosuvastatin (CRESTOR) 10 MG tablet TAKE 1 TABLET BY MOUTH EVERY DAY 6/14/20  Yes Ida Pittman MD   traZODone (DESYREL) 100 MG tablet TAKE 1 TABLET BY MOUTH AT BEDTIME 4/22/20  Yes Kamini Newton MD   ACCU-CHEK JD PLUS TEST STRP Strp TEST three times a day 10/20/17   Andree Coyne, PhD, NP-C   acetaminophen (TYLENOL) 500 MG  tablet Take 500 mg by mouth every 6 (six) hours as needed for Pain.    Historical Provider, MD   ALCOHOL PREP PADS PadM  8/16/13   Historical Provider, MD   diclofenac sodium (VOLTAREN) 1 % Gel Apply 2 g topically once daily. 5/23/19   Nathaniel Moon MD   fluticasone (FLONASE) 50 mcg/actuation nasal spray 2 sprays by Each Nare route daily as needed. 2 Spray, Suspension Nasal Every day 11/14/17   Kamini Newton MD   lancets (ACCU-CHEK SOFTCLIX LANCETS) Misc 1 each by Misc.(Non-Drug; Combo Route) route 3 (three) times daily. 9/26/16   Andree Coyne, PhD, NP-C   lancing device Misc  8/16/13   Historical Provider, MD   rosuvastatin (CRESTOR) 10 MG tablet TAKE 1 TABLET BY MOUTH EVERY DAY 3/15/20   Ida Pittman MD        Allergies:  Review of patient's allergies indicates:   Allergen Reactions    Codeine Itching     Other reaction(s): Itching        Social History:   Social History     Socioeconomic History    Marital status:      Spouse name: Agustín    Number of children: 4    Years of education: Not on file    Highest education level: Not on file   Occupational History    Occupation: Retired Teacher     Comment: [employer]   Social Needs    Financial resource strain: Not on file    Food insecurity     Worry: Not on file     Inability: Not on file    Transportation needs     Medical: Not on file     Non-medical: Not on file   Tobacco Use    Smoking status: Never Smoker    Smokeless tobacco: Never Used   Substance and Sexual Activity    Alcohol use: No     Alcohol/week: 0.0 standard drinks    Drug use: No    Sexual activity: Not Currently     Partners: Male     Birth control/protection: Post-menopausal   Lifestyle    Physical activity     Days per week: Not on file     Minutes per session: Not on file    Stress: Not on file   Relationships    Social connections     Talks on phone: Not on file     Gets together: Not on file     Attends Congregation service: Not on file      Active member of club or organization: Not on file     Attends meetings of clubs or organizations: Not on file     Relationship status: Not on file   Other Topics Concern    Are you pregnant or think you may be? 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 Caffeine intake;1-2 per week - dennis calvo. Decaffinated tea and most beveridges. Does have a living will - at home in her filing cabinet.        Family History:  Family History   Problem Relation Age of Onset    Diabetes Father     Hypertension Father     Stroke Father     Cancer Father 65        metastatic when diagnosed    Stroke Brother     Cancer Other         kidney    Atrial fibrillation Son         35    Heart disease Son     Alzheimer's disease Mother     Parkinsonism Brother     Cancer Paternal Grandfather         prostate    Glaucoma Maternal Grandmother     Psoriasis Cousin     Alcohol abuse Neg Hx     Drug abuse Neg Hx     COPD Neg Hx     Birth defects Neg Hx     Mental retardation Neg Hx     Mental illness Neg Hx     Kidney disease Neg Hx     Hyperlipidemia Neg Hx     Melanoma Neg Hx     Lupus Neg Hx        ROS: No acute cardiac events, no acute respiratory complaints.     Physical Exam (all patients):    BP (!) 140/60 (BP Location: Left arm, Patient Position: Lying)   Pulse 66   Temp 97.5 °F (36.4 °C)   Resp 11   Ht 5' (1.524 m)   Wt 63.9 kg (140 lb 14 oz)   LMP 01/01/2004 (Within Months)   SpO2 96%   Breastfeeding No   BMI 27.51 kg/m²   Lungs: Clear to auscultation bilaterally, respirations unlabored  Heart: Regular rate and rhythm, S1 and S2 normal, no obvious murmurs  Abdomen:         Soft, non-tender, bowel sounds normal, no masses, no organomegaly    Lab Results   Component Value Date    WBC 5.82 05/19/2020    MCV 96 05/19/2020    RDW 12.6 05/19/2020     05/19/2020    INR 1.0 03/19/2011    GLU 98 05/19/2020    HGBA1C 5.8  (H) 05/19/2020    BUN 14 05/19/2020     05/19/2020    K 4.0 05/19/2020     05/19/2020        SEDATION PLAN: per anesthesia      History reviewed, vital signs satisfactory, cardiopulmonary status satisfactory, sedation options, risks and plans have been discussed with the patient  All their questions were answered and the patient agrees to the sedation procedures as planned and the patient is deemed an appropriate candidate for the sedation as planned.    The risks, benefits and alternatives of the procedure were discussed with the patient in detail. This discussion was had in the presence of endoscopy staff. The risks include, risks of adverse reaction to sedation requiring the use of reversal agents, bleeding requiring blood transfusion, perforation requiring surgical intervention and technical failure. Other risks include aspiration leading to respiratory distress and respiratory failure resulting in endotracheal intubation and mechanical ventilation including death. If anesthesia is being utilized for this procedure, it is up to the anesthesiologist to determine airway safety including elective endotracheal intubation. Questions were answered, they agree to proceed. There was no language barriers.     Procedure explained to patient, informed consent obtained and placed in chart.    Nancy Hahn  6/29/2020  11:18 AM

## 2020-06-29 NOTE — PLAN OF CARE
Dr Hahn came to bedside and discussed findings. NO N/V,  no abdominal pain, no GI bleeding, and vitals stable.  Pt discharged from unit.

## 2020-07-08 DIAGNOSIS — M15.9 PRIMARY OSTEOARTHRITIS INVOLVING MULTIPLE JOINTS: ICD-10-CM

## 2020-07-09 RX ORDER — GABAPENTIN 100 MG/1
CAPSULE ORAL
Qty: 90 CAPSULE | Refills: 2 | Status: SHIPPED | OUTPATIENT
Start: 2020-07-09 | End: 2020-08-19 | Stop reason: SDUPTHER

## 2020-07-17 ENCOUNTER — PATIENT OUTREACH (OUTPATIENT)
Dept: ADMINISTRATIVE | Facility: HOSPITAL | Age: 70
End: 2020-07-17

## 2020-07-17 NOTE — PROGRESS NOTES
I am working the [employer] report patient's over due on hm:  I have attempted to contact patient to schedule her for the following: left voice mail    A1c-           11-23-20  Urine micro- 11-23-20  Eye exam-     6-22-20  Mammo        8-17-20  Pcp              5-20-20  Awv-           6-15-20

## 2020-07-21 ENCOUNTER — OFFICE VISIT (OUTPATIENT)
Dept: PAIN MEDICINE | Facility: CLINIC | Age: 70
End: 2020-07-21
Payer: MEDICARE

## 2020-07-21 VITALS
DIASTOLIC BLOOD PRESSURE: 76 MMHG | SYSTOLIC BLOOD PRESSURE: 139 MMHG | HEART RATE: 65 BPM | WEIGHT: 144.38 LBS | BODY MASS INDEX: 28.35 KG/M2 | HEIGHT: 60 IN

## 2020-07-21 DIAGNOSIS — M46.1 SACROILIITIS: ICD-10-CM

## 2020-07-21 DIAGNOSIS — M79.12 MYALGIA OF AUXILIARY MUSCLES, HEAD AND NECK: ICD-10-CM

## 2020-07-21 DIAGNOSIS — M75.21 BICEPS TENDINITIS OF RIGHT UPPER EXTREMITY: Primary | ICD-10-CM

## 2020-07-21 PROCEDURE — 99499 RISK ADDL DX/OHS AUDIT: ICD-10-PCS | Mod: S$GLB,,, | Performed by: PHYSICAL MEDICINE & REHABILITATION

## 2020-07-21 PROCEDURE — 3008F PR BODY MASS INDEX (BMI) DOCUMENTED: ICD-10-PCS | Mod: CPTII,S$GLB,, | Performed by: PHYSICAL MEDICINE & REHABILITATION

## 2020-07-21 PROCEDURE — 1101F PT FALLS ASSESS-DOCD LE1/YR: CPT | Mod: CPTII,S$GLB,, | Performed by: PHYSICAL MEDICINE & REHABILITATION

## 2020-07-21 PROCEDURE — 3075F PR MOST RECENT SYSTOLIC BLOOD PRESS GE 130-139MM HG: ICD-10-PCS | Mod: CPTII,S$GLB,, | Performed by: PHYSICAL MEDICINE & REHABILITATION

## 2020-07-21 PROCEDURE — 1125F AMNT PAIN NOTED PAIN PRSNT: CPT | Mod: S$GLB,,, | Performed by: PHYSICAL MEDICINE & REHABILITATION

## 2020-07-21 PROCEDURE — 3078F PR MOST RECENT DIASTOLIC BLOOD PRESSURE < 80 MM HG: ICD-10-PCS | Mod: CPTII,S$GLB,, | Performed by: PHYSICAL MEDICINE & REHABILITATION

## 2020-07-21 PROCEDURE — 1159F MED LIST DOCD IN RCRD: CPT | Mod: S$GLB,,, | Performed by: PHYSICAL MEDICINE & REHABILITATION

## 2020-07-21 PROCEDURE — 3008F BODY MASS INDEX DOCD: CPT | Mod: CPTII,S$GLB,, | Performed by: PHYSICAL MEDICINE & REHABILITATION

## 2020-07-21 PROCEDURE — 99999 PR PBB SHADOW E&M-EST. PATIENT-LVL IV: CPT | Mod: PBBFAC,,, | Performed by: PHYSICAL MEDICINE & REHABILITATION

## 2020-07-21 PROCEDURE — 3075F SYST BP GE 130 - 139MM HG: CPT | Mod: CPTII,S$GLB,, | Performed by: PHYSICAL MEDICINE & REHABILITATION

## 2020-07-21 PROCEDURE — 3078F DIAST BP <80 MM HG: CPT | Mod: CPTII,S$GLB,, | Performed by: PHYSICAL MEDICINE & REHABILITATION

## 2020-07-21 PROCEDURE — 99499 UNLISTED E&M SERVICE: CPT | Mod: S$GLB,,, | Performed by: PHYSICAL MEDICINE & REHABILITATION

## 2020-07-21 PROCEDURE — 1125F PR PAIN SEVERITY QUANTIFIED, PAIN PRESENT: ICD-10-PCS | Mod: S$GLB,,, | Performed by: PHYSICAL MEDICINE & REHABILITATION

## 2020-07-21 PROCEDURE — 99204 OFFICE O/P NEW MOD 45 MIN: CPT | Mod: S$GLB,,, | Performed by: PHYSICAL MEDICINE & REHABILITATION

## 2020-07-21 PROCEDURE — 99204 PR OFFICE/OUTPT VISIT, NEW, LEVL IV, 45-59 MIN: ICD-10-PCS | Mod: S$GLB,,, | Performed by: PHYSICAL MEDICINE & REHABILITATION

## 2020-07-21 PROCEDURE — 1101F PR PT FALLS ASSESS DOC 0-1 FALLS W/OUT INJ PAST YR: ICD-10-PCS | Mod: CPTII,S$GLB,, | Performed by: PHYSICAL MEDICINE & REHABILITATION

## 2020-07-21 PROCEDURE — 1159F PR MEDICATION LIST DOCUMENTED IN MEDICAL RECORD: ICD-10-PCS | Mod: S$GLB,,, | Performed by: PHYSICAL MEDICINE & REHABILITATION

## 2020-07-21 PROCEDURE — 99999 PR PBB SHADOW E&M-EST. PATIENT-LVL IV: ICD-10-PCS | Mod: PBBFAC,,, | Performed by: PHYSICAL MEDICINE & REHABILITATION

## 2020-07-21 RX ORDER — TURMERIC 400 MG
400 CAPSULE ORAL 2 TIMES DAILY PRN
Qty: 60 CAPSULE | Refills: 0 | Status: SHIPPED | OUTPATIENT
Start: 2020-07-21

## 2020-07-21 RX ORDER — METHOCARBAMOL 500 MG/1
500 TABLET, FILM COATED ORAL 2 TIMES DAILY PRN
Qty: 60 TABLET | Refills: 0 | Status: SHIPPED | OUTPATIENT
Start: 2020-07-21 | End: 2022-08-23

## 2020-07-21 NOTE — PROGRESS NOTES
New Patient Chronic Pain Note (Initial Visit)    Referring Physician: Adam Roche    PCP: Kamini Newton MD    Chief Complaint:   Chief Complaint   Patient presents with    Back Pain    Neck Pain        SUBJECTIVE:    Renea Bourgeois is a 70 y.o. female who presents to the clinic for the evaluation of neck and low back pain.  She is self referred.  Of note, patient has a past medical history of osteoporosis, history of breast cancer, DM2,  GERD, hypertension, hyperlipidemia, and multiple other medical comorbidities as listed below.  The pain started several months ago following increased amount of care that she has had to perform for has been ever since he developed a stroke and symptoms have been worsening.The pain is located in the lower cervical area and radiates to the right shoulder.  She also locates pain to the lower lumbosacral spine without radiation into the lower extremities.  The pain is described as Sharp and is rated as 5/10. The pain is rated with a score of  4/10 on the BEST day and a score of 7/10 on the WORST day.  Symptoms interfere with daily activity and caring for her . The pain is exacerbated by walking, lifting, bending.  The pain is mitigated by medications. The patient reports spending less than 2 hours per day reclining. The patient reports 5-7 hours of uninterrupted sleep per night.  She cares for her  who unfortunately suffered a stroke.  She reports that she has a home health aide that comes twice weekly and also has help from her granddaughter and her boyfriend to assist her with taking care of her .    Patient denies night fever/night sweats, urinary incontinence, bowel incontinence, significant weight loss, significant motor weakness and loss of sensations.    Pain Disability Index Review:  Last 3 PDI Scores 7/21/2020   Pain Disability Index (PDI) 35       Non-Pharmacologic Treatments:  Physical Therapy/Home Exercise: no  Ice/Heat:yes  TENS:  no  Acupuncture: no  Massage: no  Chiropractic: yes    Other: no      Pain Medications:  - Opioids:  None  - Adjuvant Medications:  Gabapentin, Tylenol, Voltaren gel, trazodone  - Anti-Coagulants:  None    * she has been told that she should avoid NSAIDs secondary to GI irritation*     report:  Reviewed and consistent with medication use as prescribed.  No controlled substances recently prescribed    Pain Procedures:   None      Imaging:   No pertinent imaging available to review    Past Medical History:   Diagnosis Date    Allergy     Anemia 11/14/2017    Angina pectoris     followed by cardiology    Arthritis     Breast cancer     Right T1 Ductal Ca in situ,high oncotype Dx 33, Estrogen positive. Lumpectomy, TAC chemo x 6 cyscles then RT,on aromatase inhibitor    Cataract     Chondromalacia of right patella 3/26/2018    Stable and controlled. Continue current treatment plan as previously prescribed with your PCP.      Diabetes mellitus type II 2011    DM (diabetes mellitus) 2011     am 08/04/2017    DM (diabetes mellitus) 2010     am 06/22/2020    Elevated BP without diagnosis of hypertension 11/27/2018    GERD (gastroesophageal reflux disease)     History of colon polyps 2/21/2018    The patient had adenomatous colon polyps in 2014.      History of renal calculi 8/11/2015    Right obstructing 8/20/13 - passed.     Hyperlipidemia     Hypertension     Kidney stone     right side, passed    Malignant neoplasm of upper-outer quadrant of right breast in female, estrogen receptor positive 7/23/2018    Followed by Dr. Jones    Nuclear sclerosis of both eyes 10/5/2015    Osteopenia     Osteoporosis 4/12/2019    Pseudophakia 10/15/2015    PVC (premature ventricular contraction)     on and off    Refractive error 10/5/2015    Trouble in sleeping     Type 2 diabetes mellitus      Past Surgical History:   Procedure Laterality Date    BREAST BIOPSY      BREAST LUMPECTOMY  2/11/2011     right    CATARACT EXTRACTION Bilateral 10/14/15    CHOLECYSTECTOMY  1989    open    COLONOSCOPY N/A 2/22/2018    Procedure: COLONOSCOPY;  Surgeon: Carlito Odonnell MD;  Location[facility];  Service: Endoscopy;  Laterality: N/A;    CYST REMOVAL Left 11/2017    foot    DILATION AND CURETTAGE OF UTERUS  10/2010    [place]    ESOPHAGOGASTRODUODENOSCOPY N/A 6/29/2020    Procedure: EGD (ESOPHAGOGASTRODUODENOSCOPY);  Surgeon: Nancy Hahn MD;  Location: [facility] ENDO;  Service: Endoscopy;  Laterality: N/A;    GALLBLADDER SURGERY      removed in 1989    Nasal septal deviation repair  2009    toenail edges removed      TONSILLECTOMY  1959    TOTAL REDUCTION MAMMOPLASTY Left     2015     Social History     Socioeconomic History    Marital status:      Spouse name: Agustín    Number of children: 4    Years of education: Not on file    Highest education level: Not on file   Occupational History    Occupation: Retired Teacher     Comment: [employer]   Social Needs    Financial resource strain: Not on file    Food insecurity     Worry: Not on file     Inability: Not on file    Transportation needs     Medical: Not on file     Non-medical: Not on file   Tobacco Use    Smoking status: Never Smoker    Smokeless tobacco: Never Used   Substance and Sexual Activity    Alcohol use: No     Alcohol/week: 0.0 standard drinks    Drug use: No    Sexual activity: Not Currently     Partners: Male     Birth control/protection: Post-menopausal   Lifestyle    Physical activity     Days per week: Not on file     Minutes per session: Not on file    Stress: Not on file   Relationships    Social connections     Talks on phone: Not on file     Gets together: Not on file     Attends Orthodoxy service: Not on file     Active member of club or organization: Not on file     Attends meetings of clubs or organizations: Not on file     Relationship status: Not on file   Other Topics Concern    Are you  pregnant or think you may be? 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 Caffeine intake;1-2 per week - dennis calvo. Decaffinated tea and most beveridges. Does have a living will - at home in her filing cabinet.      Family History   Problem Relation Age of Onset    Diabetes Father     Hypertension Father     Stroke Father     Cancer Father 65        metastatic when diagnosed    Stroke Brother     Cancer Other         kidney    Atrial fibrillation Son         35    Heart disease Son     Alzheimer's disease Mother     Parkinsonism Brother     Cancer Paternal Grandfather         prostate    Glaucoma Maternal Grandmother     Psoriasis Cousin     Alcohol abuse Neg Hx     Drug abuse Neg Hx     COPD Neg Hx     Birth defects Neg Hx     Mental retardation Neg Hx     Mental illness Neg Hx     Kidney disease Neg Hx     Hyperlipidemia Neg Hx     Melanoma Neg Hx     Lupus Neg Hx        Review of patient's allergies indicates:   Allergen Reactions    Codeine Itching     Other reaction(s): Itching       Current Outpatient Medications   Medication Sig    ACCU-CHEK JD PLUS TEST STRP Strp TEST three times a day    acetaminophen (TYLENOL) 500 MG tablet Take 500 mg by mouth every 6 (six) hours as needed for Pain.    ALCOHOL PREP PADS PadM     cholecalciferol, vitamin D3, (VITAMIN D3) 1,000 unit capsule Take 1 capsule (1,000 Units total) by mouth once daily.    diclofenac sodium (VOLTAREN) 1 % Gel Apply 2 g topically once daily.    ferrous gluconate (FERGON) 240 (27 FE) MG tablet Take 1 tablet (240 mg total) by mouth once daily.    fluticasone (FLONASE) 50 mcg/actuation nasal spray 2 sprays by Each Nare route daily as needed. 2 Spray, Suspension Nasal Every day    gabapentin (NEURONTIN) 100 MG capsule TAKE 1 CAPSULE(100 MG) BY MOUTH THREE TIMES DAILY    glimepiride (AMARYL) 1 MG tablet TAKE 1 TABLET BY MOUTH EVERY  DAY    lancets (ACCU-CHEK SOFTCLIX LANCETS) Misc 1 each by Misc.(Non-Drug; Combo Route) route 3 (three) times daily.    lancing device Misc     metoprolol succinate (TOPROL-XL) 25 MG 24 hr tablet Take 25 mg by mouth every evening.     rosuvastatin (CRESTOR) 10 MG tablet TAKE 1 TABLET BY MOUTH EVERY DAY    traZODone (DESYREL) 100 MG tablet TAKE 1 TABLET BY MOUTH AT BEDTIME    methocarbamoL (ROBAXIN) 500 MG Tab Take 1 tablet (500 mg total) by mouth 2 (two) times daily as needed (muscle spasms).    turmeric 400 mg Cap Take 400 mg by mouth 2 (two) times daily as needed.     No current facility-administered medications for this visit.      Facility-Administered Medications Ordered in Other Visits   Medication    sodium chloride 0.9% flush 10 mL    zoledronic acid-mannitol & water 5 mg/100 mL infusion 5 mg       Review of Systems     GENERAL:  No weight loss, malaise or fevers.  HEENT:   No recent changes in vision or hearing  NECK:  Negative for lumps, no difficulty with swallowing.  RESPIRATORY:  Negative for cough, wheezing or shortness of breath, patient denies any recent URI.  CARDIOVASCULAR:  Negative for chest pain, leg swelling or palpitations.  GI:  Negative for abdominal discomfort, blood in stools or black stools or change in bowel habits.  MUSCULOSKELETAL:  See HPI.  SKIN:  Negative for lesions, rash, and itching.  PSYCH:  No mood disorder or recent psychosocial stressors.  Patients sleep is not disturbed secondary to pain.  HEMATOLOGY/LYMPHOLOGY:  Negative for prolonged bleeding, bruising easily or swollen nodes.  Patient is not currently taking any anti-coagulants  NEURO:   No history of headaches, syncope, paralysis, seizures or tremors.  All other reviewed and negative other than HPI.    OBJECTIVE:    /76   Pulse 65   Ht 5' (1.524 m)   Wt 65.5 kg (144 lb 6.4 oz)   LMP 01/01/2004 (Within Months)   BMI 28.20 kg/m²         Physical Exam    GENERAL: Well appearing, in no acute distress,  alert and oriented x3.  PSYCH:  Mood and affect appropriate.  SKIN: Skin color, texture, turgor normal, no rashes or lesions.  HEAD/FACE:  Normocephalic, atraumatic. Cranial nerves grossly intact.  NECK:  Mild pain to palpation over the cervical paraspinous muscles. Spurling Negative.  Mild pain with neck flexion, extension, and lateral flexion.   CV: RRR with palpation of the radial artery.  PULM: No evidence of respiratory difficulty, symmetric chest rise.  GI:  Soft and non-tender.  BACK: Straight leg raising in the sitting and supine positions is negative to radicular pain.  Mild-to-moderate pain to palpation over the facet joints of the lumbar spine and lumbar paraspinals. Normal range of motion within mild amount of pain reproduction.  EXTREMITIES: Peripheral joint ROM is full and pain free without obvious instability or laxity in all four extremities. No deformities, edema, or skin discoloration. Good capillary refill.  MUSCULOSKELETAL: Shoulder, hip, and knee provocative maneuvers are negative with the exception of positive speed's test on the right.  There is tenderness palpation over the right biceps tendon.  There is mild-to-moderate pain with palpation over the sacroiliac joints bilaterally.  FABERs test is positive.  FADIRs test is equivocal.   Bilateral upper and lower extremity strength is normal and symmetric.  No atrophy or tone abnormalities are noted.  NEURO: Bilateral upper and lower extremity coordination and muscle stretch reflexes are physiologic and symmetric.  Plantar response are downgoing.  Negative Makeda.  No clonus.  No loss of sensation is noted.  GAIT: normal.      LABS:  Lab Results   Component Value Date    WBC 5.82 05/19/2020    HGB 13.8 05/19/2020    HCT 43.8 05/19/2020    MCV 96 05/19/2020     05/19/2020       CMP  Sodium   Date Value Ref Range Status   05/19/2020 141 136 - 145 mmol/L Final     Potassium   Date Value Ref Range Status   05/19/2020 4.0 3.5 - 5.1 mmol/L  Final     Chloride   Date Value Ref Range Status   05/19/2020 108 95 - 110 mmol/L Final     CO2   Date Value Ref Range Status   05/19/2020 26 23 - 29 mmol/L Final     Glucose   Date Value Ref Range Status   05/19/2020 98 70 - 110 mg/dL Final     BUN, Bld   Date Value Ref Range Status   05/19/2020 14 8 - 23 mg/dL Final     Creatinine   Date Value Ref Range Status   05/19/2020 0.9 0.5 - 1.4 mg/dL Final     Calcium   Date Value Ref Range Status   05/19/2020 8.9 8.7 - 10.5 mg/dL Final     Total Protein   Date Value Ref Range Status   05/19/2020 7.1 6.0 - 8.4 g/dL Final     Albumin   Date Value Ref Range Status   05/19/2020 4.3 3.5 - 5.2 g/dL Final     Total Bilirubin   Date Value Ref Range Status   05/19/2020 0.6 0.1 - 1.0 mg/dL Final     Comment:     For infants and newborns, interpretation of results should be based  on gestational age, weight and in agreement with clinical  observations.  Premature Infant recommended reference ranges:  Up to 24 hours.............<8.0 mg/dL  Up to 48 hours............<12.0 mg/dL  3-5 days..................<15.0 mg/dL  6-29 days.................<15.0 mg/dL       Alkaline Phosphatase   Date Value Ref Range Status   05/19/2020 76 55 - 135 U/L Final     AST   Date Value Ref Range Status   05/19/2020 17 10 - 40 U/L Final     ALT   Date Value Ref Range Status   05/19/2020 15 10 - 44 U/L Final     Anion Gap   Date Value Ref Range Status   05/19/2020 7 (L) 8 - 16 mmol/L Final     eGFR if    Date Value Ref Range Status   05/19/2020 >60.0 >60 mL/min/1.73 m^2 Final     eGFR if non    Date Value Ref Range Status   05/19/2020 >60.0 >60 mL/min/1.73 m^2 Final     Comment:     Calculation used to obtain the estimated glomerular filtration  rate (eGFR) is the CKD-EPI equation.          Lab Results   Component Value Date    HGBA1C 5.8 (H) 05/19/2020             ASSESSMENT: 70 y.o. year old female with chronic right shoulder, neck, and lower back pain, consistent with      1. Biceps tendinitis of right upper extremity  methocarbamoL (ROBAXIN) 500 MG Tab    turmeric 400 mg Cap   2. Sacroiliitis  methocarbamoL (ROBAXIN) 500 MG Tab    turmeric 400 mg Cap    Ambulatory referral/consult to Physical/Occupational Therapy   3. Myalgia of auxiliary muscles, head and neck  methocarbamoL (ROBAXIN) 500 MG Tab    turmeric 400 mg Cap    Ambulatory referral/consult to Physical/Occupational Therapy         PLAN:   - Interventions: None at this time.    - Anticoagulation use: no     - Medications: I have stressed the importance of physical activity and a home exercise plan to help with pain and improve health. and Patient can continue with medications for now since they are providing benefits, using them appropriately, and without side effects.  Advised patient to continue Voltaren gel to the right long head of the biceps up to 3 times a day as needed over the next 7-10 days and then transition to only as needed thereafter.  Also provide the patient with Robaxin 500 mg up to 2 times daily as needed for muscle related pain.  Advised the patient to utilize turmeric for its anti-inflammatory properties up to 2 times daily.    - Therapy:  Referring patient physical therapy for further evaluation treatment of the chronic neck and lower back pain.    - Psychological:  Discussed coping mechanisms to help address chronic pain issues    - Labs:  Reviewed    - Imaging:  No pertinent imaging available to review.  No new imaging indicated at this time    - Consults/Referrals:  Physical therapy    - Records:  Reviewed/Obtain old records from outside physicians and imaging    - Follow up visit: return to clinic in 3 months or as needed    - Counseled patient regarding the importance of activity modification and physical therapy    - This condition does not require this patient to take time off of work, and the primary goal of our Pain Management services is to improve the patient's functional capacity.    -  Patient Questions: Answered all of the patient's questions regarding diagnosis, therapy, and treatment        The above plan and management options were discussed at length with patient. Patient is in agreement with the above and verbalized understanding.    I discussed the goals of interventional chronic pain management with the patient on today's visit.  I explained the utility of injections for diagnostic and therapeutic purposes.  We discussed a multimodal approach to pain including treating the patient's given worst pain at any given time.  We will use a systematic approach to addressing pain.  We will also adopt a multimodal approach that includes injections, adjuvant medications, physical therapy, at times psychiatry.  There may be a limited role for opioid use intermittently in the treatment of pain, more particularly for acute pain although no one approach can be used as a sole treatment modality.    I emphasized the importance of regular exercise, core strengthening and stretching, diet and weight loss as a cornerstone of long-term pain management.      Harish Nieves MD  Interventional Pain Management  [facility] [place]    Disclaimer:  This note was prepared using voice recognition system and is likely to have sound alike errors that may have been overlooked even after proof reading.  Please call me with any questions

## 2020-07-21 NOTE — PATIENT INSTRUCTIONS
- Start Robaxin 500mg 2x daily as needed  - Start Turmeric 400-500mg up to 2x daily as needed.  - Use voltaren gel to right anterior shoulder 3x daily for 7-10 days and then as needed for pain  - Continue current medications as prescribed.  - Continue home based exercises and discussed the importance of a healthy and active lifestyle  - Return for follow up in 3 months or as needed.    Please do not hesitate to contact the clinic [phone] if you have any questions regarding your treatment plan.     Harish Nieves MD  Interventional Pain Medicine  [facility] [place]

## 2020-07-25 ENCOUNTER — OFFICE VISIT (OUTPATIENT)
Dept: DIABETES | Facility: CLINIC | Age: 70
End: 2020-07-25
Payer: MEDICARE

## 2020-07-25 VITALS
HEIGHT: 61 IN | BODY MASS INDEX: 26.47 KG/M2 | DIASTOLIC BLOOD PRESSURE: 88 MMHG | WEIGHT: 140.19 LBS | SYSTOLIC BLOOD PRESSURE: 130 MMHG

## 2020-07-25 DIAGNOSIS — E11.9 TYPE 2 DIABETES MELLITUS WITHOUT COMPLICATION, WITHOUT LONG-TERM CURRENT USE OF INSULIN: Primary | ICD-10-CM

## 2020-07-25 DIAGNOSIS — E78.5 HYPERLIPIDEMIA ASSOCIATED WITH TYPE 2 DIABETES MELLITUS: ICD-10-CM

## 2020-07-25 DIAGNOSIS — I15.2 HYPERTENSION ASSOCIATED WITH DIABETES: ICD-10-CM

## 2020-07-25 DIAGNOSIS — E11.59 HYPERTENSION ASSOCIATED WITH DIABETES: ICD-10-CM

## 2020-07-25 DIAGNOSIS — E11.69 HYPERLIPIDEMIA ASSOCIATED WITH TYPE 2 DIABETES MELLITUS: ICD-10-CM

## 2020-07-25 LAB — GLUCOSE SERPL-MCNC: 150 MG/DL (ref 70–110)

## 2020-07-25 PROCEDURE — 99999 PR PBB SHADOW E&M-EST. PATIENT-LVL IV: CPT | Mod: PBBFAC,,, | Performed by: NURSE PRACTITIONER

## 2020-07-25 PROCEDURE — 82962 POCT GLUCOSE, HAND-HELD DEVICE: ICD-10-PCS | Mod: S$GLB,,, | Performed by: NURSE PRACTITIONER

## 2020-07-25 PROCEDURE — 99213 PR OFFICE/OUTPT VISIT, EST, LEVL III, 20-29 MIN: ICD-10-PCS | Mod: S$GLB,,, | Performed by: NURSE PRACTITIONER

## 2020-07-25 PROCEDURE — 82962 GLUCOSE BLOOD TEST: CPT | Mod: S$GLB,,, | Performed by: NURSE PRACTITIONER

## 2020-07-25 PROCEDURE — 99213 OFFICE O/P EST LOW 20 MIN: CPT | Mod: S$GLB,,, | Performed by: NURSE PRACTITIONER

## 2020-07-25 PROCEDURE — 99999 PR PBB SHADOW E&M-EST. PATIENT-LVL IV: ICD-10-PCS | Mod: PBBFAC,,, | Performed by: NURSE PRACTITIONER

## 2020-07-25 RX ORDER — GLIMEPIRIDE 1 MG/1
1 TABLET ORAL DAILY
Qty: 90 TABLET | Refills: 3 | Status: SHIPPED | OUTPATIENT
Start: 2020-07-25 | End: 2021-07-24

## 2020-07-25 NOTE — PROGRESS NOTES
"PCP: Kamini Newton MD    Subjective:     Chief Complaint: Diabetes Follow Up    HISTORY OF PRESENT ILLNESS: 70 y.o.  female presenting, with , for diabetes follow up. Patient has had Type II diabetes since 2010 and has the following complications: none. She is to be enrolled in diabetes education classes.  She has a history of breast cancer, treated in 2010 with remission since then.  No longer taking metformin due to SE.      Blood glucose testing is performed regularly. Per meter, fasting BGs are 93 - 118, occasional 127. She denies any recent hospital admissions, emergency room visits, hypoglycemia, syncope, or diaphoresis.     Height: 5' 1" (154.9 cm)  //  Weight: 63.6 kg (140 lb 3.4 oz), Body mass index is 26.49 kg/m².    Her blood sugar in clinic today is: 150 mg/dl at 9:27 am ( just finished eating )      Labs Reviewed.       DM MEDICATIONS:   Glimepiride 1 mg before breakfast    Review of Systems   Constitutional: Negative for appetite change, fatigue and unexpected weight change.   Eyes: Negative for visual disturbance.   Gastrointestinal: Negative for abdominal pain, constipation, diarrhea and nausea.   Endocrine: Negative for polydipsia, polyphagia and polyuria.   Neurological: Negative for dizziness, numbness and headaches.   Psychiatric/Behavioral: Negative for confusion and decreased concentration. The patient is not nervous/anxious.        Diabetes Management Status  Statin: Taking  ACE/ARB: Not taking    Screening or Prevention Patient's value Goal Complete/Controlled?   HgA1C Testing and Control   Lab Results   Component Value Date    HGBA1C 5.8 (H) 05/19/2020      Annually/Less than 8% Yes   Lipid profile : 05/19/2020 Annually Yes   LDL control Lab Results   Component Value Date    LDLCALC 82.0 05/19/2020    Annually/Less than 100 mg/dl  Yes   Nephropathy screening Lab Results   Component Value Date    MICALBCREAT 7.5 05/23/2019     Lab Results   Component Value Date    " PROTEINUA Negative 08/20/2013    Annually Yes   Blood pressure BP Readings from Last 1 Encounters:   07/25/20 130/88    Less than 140/90 Yes   Dilated retinal exam : 06/22/2020 Annually Yes, Dr. Xiong   Foot exam   : 07/25/2020 Annually Yes     ACTIVITY LEVEL: Moderately Active. Discussed activities, benefits, methods, and precautions.  MEAL PLANNING: Patient reports number of meals per day to be 3 and number of snacks per day to be 2.   Patient is encouraged to carb count and consume no more than 45 - 60 grams of carbohydrates in each meal, and 1800 k / razia per day.      BLOOD GLUCOSE TESTING:  Accu-Chek meter, one time daily  SOCIAL HISTORY: . Never smoker.    Objective:      Physical Exam  Constitutional:       Appearance: She is well-developed.   HENT:      Head: Normocephalic and atraumatic.   Eyes:      Pupils: Pupils are equal, round, and reactive to light.   Neck:      Musculoskeletal: Normal range of motion.   Cardiovascular:      Rate and Rhythm: Normal rate and regular rhythm.      Pulses:           Dorsalis pedis pulses are 2+ on the right side and 2+ on the left side.   Pulmonary:      Effort: Pulmonary effort is normal.      Breath sounds: Normal breath sounds.   Musculoskeletal: Normal range of motion.   Feet:      Right foot:      Protective Sensation: 6 sites tested. 6 sites sensed.      Skin integrity: No ulcer, callus or dry skin.      Left foot:      Protective Sensation: 6 sites tested. 6 sites sensed.      Skin integrity: No ulcer, blister, callus or dry skin.   Skin:     General: Skin is warm and dry.      Findings: No rash.   Psychiatric:         Behavior: Behavior normal.         Thought Content: Thought content normal.         Judgment: Judgment normal.         Assessment / Plan:     1.) Type 2 diabetes mellitus without complication, without long-term current use of insulin  Comments:  -  Controlled. Continue glimepiride 1 mg before breakfast. She denies symptoms of  hypoglycemia.    Orders:  -     POCT Glucose, Hand-Held Device  - Future Labs scheduled: micral    2.) Hypertension associated with diabetes - continue meds as prescribed.    3.) Hyperlipidemia associated with type 2 diabetes mellitus- continue meds as prescribed.    Additional Plan Details:    1.) Continue monitoring blood sugar 1 x daily, rotating times. Discussed ADA goal for fasting blood sugar, 80 - 130 mg/dL; pp blood sugars below 180 mg/dl. Also, discussed prevention of hypoglycemia and the need to adjust goals to higher levels if persistent hypoglycemia.  Reminded to bring BG records or meter to each visit for review.  2.) Return to clinic in 1 year for follow up. The patient was explained the above plan and given opportunity to ask questions.  She understands, chooses and consents to this plan and accepts all the risks, which include but are not limited to the risks mentioned above. She understands the alternative of having no testing, interventions or treatments at this time. She left content and without further questions.     Andree Coyne, JULIUS-C, CDE    A total of 30 minutes was spent in face to face time, of which over 50 % was spent in counseling patient on disease process, complications, treatment, and side effects of medications.

## 2020-07-27 ENCOUNTER — CLINICAL SUPPORT (OUTPATIENT)
Dept: REHABILITATION | Facility: HOSPITAL | Age: 70
End: 2020-07-27
Attending: PHYSICAL MEDICINE & REHABILITATION
Payer: MEDICARE

## 2020-07-27 DIAGNOSIS — R29.898 WEAKNESS OF BOTH HIPS: ICD-10-CM

## 2020-07-27 DIAGNOSIS — M46.1 SACROILIITIS: ICD-10-CM

## 2020-07-27 DIAGNOSIS — M25.511 ACUTE PAIN OF RIGHT SHOULDER: ICD-10-CM

## 2020-07-27 DIAGNOSIS — R29.3 POSTURE IMBALANCE: ICD-10-CM

## 2020-07-27 DIAGNOSIS — M79.12 MYALGIA OF AUXILIARY MUSCLES, HEAD AND NECK: ICD-10-CM

## 2020-07-27 DIAGNOSIS — M53.82 NECK MUSCLE WEAKNESS: ICD-10-CM

## 2020-07-27 PROCEDURE — 97014 ELECTRIC STIMULATION THERAPY: CPT

## 2020-07-27 PROCEDURE — 97110 THERAPEUTIC EXERCISES: CPT

## 2020-07-27 PROCEDURE — 97161 PT EVAL LOW COMPLEX 20 MIN: CPT

## 2020-07-27 NOTE — PATIENT INSTRUCTIONS
Top 10 tips for back and neck pain    The spine is the pillar of the body, providing the foundation for the upper and lower extremities to attach.  Our spines withstand significant forces all day long.  There are many ways in which we can take care of our backs.  Here are a couple tips to help you keep your back in action.    1. Watch your posture in sitting.     Sit in chairs with back supports, and use a lumbar roll to maintain the normal curve of your low back. Ensure the height of your chair is such that your feet rest flat on the floor with your knees and hips level.  The average American sits 9 hours a day.  Do not sit longer than 1 hour without getting up to stretch or move.     2. Watch your posture in standing.   Maintain the normal curves of your spine in standing.   When standing tall, you should be able to draw a line down through your ear, shoulder, hip, and ankle.  Wear good shoes and consider using a standing desk mat if you stand a lot during work.  Take breaks from standing.    3. Lift correctly   Lift objects by using the strong muscles in your legs.  Get close to the object, bend your knees and your hips, and maintain the normal curve of your low back. Do not twist when lifting or carrying items. Think about the tasks you perform daily at work or home, and minimize lifting and carrying objects.  Use rolling carts or other strategies to reduce back strain.    4. Exercise regularly  Individuals who exercise regularly generally experience better health, reduced back pain, and less stress.  A good exercise program has a stretching component, a strengthening component, and an aerobic component.   Maintain the mobility of your spine by stretching daily. Strengthen your core and extremities several times a week.   Get regular cardiovascular exercise, 3-5/week.  Choose activities you like such as walking, swimming, dancing, or riding a bike.     5. Quit Smoking  Smoking increases the likelihood of back  pain.  It is thought that smoking reduces the blood supply to the discs between the vertebrae and this may lead to degeneration of these discs.  Talk to your Physician about quitting.  There are many smoking cessation options that may work for you.    6. Keep moving even when you have pain  Motion is lotion.   The majority of back pain is mechanical in nature, and will likely reduce with gentle movements, stretching, and walking.  As tempting as it may be to stay in bed when you are hurting, remember that you will likely feel better by getting up and gently moving and walking.  Limit bed rest.      7. Maintain a healthy diet  Try to maintain a healthy diet and weight.        8. Stay hydrated  The average adult is approximately 60 % water.  Staying hydrated is beneficial for all aspects of health.  In general, an adult should drink half of their body weight in ounces.  For example, if you weight 180 lbs, you should drink 90 ounces of water daily.     9. Get regular sleep   Ensure that you get a good nights rest on a regular basis. The discs in your spine hydrate when you lie down to sleep. Your spine needs the rest too.     10. See Your Physician    Make an appointment to see your Physician for back pain that is progressively worsening, and for back pain that is no better or worse with changing positions and activities.        Z LIE POSITION  Z Lie is a position that you can use to unload your back and assist with pain reduction.  Lie on your back and rest your calves on the seat on a chair or a bench.  Viewed from the side, you should resemble a Z.  Your therapist may suggest sliding the chair closer to you, so your knees are over your stomach.   Your therapist may also suggest a pillow under your buttock if needed.  Follow the directions from your therapist.  The goal of this position is to reduce your symptoms.    Z lie can be done in a variety of ways.  It can be done on a bed resting your legs on a light  and easy to lift chair

## 2020-07-27 NOTE — PLAN OF CARE
[facility]  Physical Therapy Initial Evaluation    Name: Renea Bourgeois  [facility] Number: [id number]    Therapy Diagnosis:   Encounter Diagnoses   Name Primary?    Sacroiliitis     Myalgia of auxiliary muscles, head and neck     Acute pain of right shoulder     Posture imbalance     Neck muscle weakness     Weakness of both hips      Physician: Harish Nieves MD    Physician Orders: PT Eval and Treat   Medical Diagnosis from Referral:   M46.1 (ICD-10-CM) - Sacroiliitis, not elsewhere classified   M79.12 (ICD-10-CM) - Myalgia of auxiliary muscles, head and neck       Evaluation Date: 7/27/2020  Authorization Period Expiration: 8/26/2020  Plan of Care Expiration: 8/26/2020  Visit # / Visits authorized: 1/ 8  FOTO: 1/10    Time In: 9:40  Time Out: 10:38  Total Billable Time: 15 minutes (LOW Complexity Evaluation, Therapeutic Exercise - 15, )    Precautions: Standard,     Subjective   Date of onset: 7/2020  History of current condition - Renea reports: right sided neck and back pain as well as right shoulder pain as a result of repetitive lifting of her spouse at home following his CVA. Reports pain has progressively gotten worst. Patient demonstrates poor body mechanics while lifting.      Medical History:   Past Medical History:   Diagnosis Date    Allergy     Anemia 11/14/2017    Angina pectoris     followed by cardiology    Arthritis     Breast cancer     Right T1 Ductal Ca in situ,high oncotype Dx 33, Estrogen positive. Lumpectomy, TAC chemo x 6 cyscles then RT,on aromatase inhibitor    Cataract     Chondromalacia of right patella 3/26/2018    Stable and controlled. Continue current treatment plan as previously prescribed with your PCP.      Diabetes mellitus type II 2011    DM (diabetes mellitus) 2011     am 08/04/2017    DM (diabetes mellitus) 2010     am 06/22/2020    Elevated BP without diagnosis of hypertension 11/27/2018    GERD (gastroesophageal  reflux disease)     History of colon polyps 2/21/2018    The patient had adenomatous colon polyps in 2014.      History of renal calculi 8/11/2015    Right obstructing 8/20/13 - passed.     Hyperlipidemia     Hypertension     Kidney stone     right side, passed    Malignant neoplasm of upper-outer quadrant of right breast in female, estrogen receptor positive 7/23/2018    Followed by Dr. Jones    Nuclear sclerosis of both eyes 10/5/2015    Osteopenia     Osteoporosis 4/12/2019    Pseudophakia 10/15/2015    PVC (premature ventricular contraction)     on and off    Refractive error 10/5/2015    Trouble in sleeping     Type 2 diabetes mellitus        Surgical History:   Renea Bourgeois  has a past surgical history that includes Tonsillectomy (1959); toenail edges removed; Dilation and curettage of uterus (10/2010); Cholecystectomy (1989); Nasal septal deviation repair (2009); Breast lumpectomy (2/11/2011); Total Reduction Mammoplasty (Left); Cyst Removal (Left, 11/2017); Colonoscopy (N/A, 2/22/2018); Breast biopsy; Gallbladder surgery; Cataract extraction (Bilateral, 10/14/15); and Esophagogastroduodenoscopy (N/A, 6/29/2020).    Medications:   Renea has a current medication list which includes the following prescription(s): accu-chek nancy plus test strp, acetaminophen, alcohol prep pads, cholecalciferol (vitamin d3), diclofenac sodium, ferrous gluconate, fluticasone propionate, gabapentin, glimepiride, lancets, lancing device, methocarbamol, metoprolol succinate, rosuvastatin, trazodone, turmeric, and rosuvastatin, and the following Facility-Administered Medications: sodium chloride 0.9% and zoledronic acid-mannitol & water.    Allergies:   Review of patient's allergies indicates:   Allergen Reactions    Codeine Itching     Other reaction(s): Itching        Imaging, none:     Prior Therapy: None for current problem list  Social History:  lives with their spouse  Occupation: Caregiver   Prior Level of  Function: Independent   Current Level of Function: Pain prevents patient from sleeping and has difficulty sitting prolonged periods    Pain:   Current 4/10, worst 8/10, best 0/10   Location: right back , neck  and shoulder   Description: Aching and Burning  Aggravating Factors: Sitting, Standing, Lifting and Getting out of bed/chair  Easing Factors: rest    Pts goals: Assist her  without causing pain    Objective     Posture: Poor sitting and standing posture due to bilateral scapula protraction leading to increased thoracic kyphosis and rounded shoulders.  Palpation: Tenderness noted to right SI  Sensation: Intact  DTRs: 2+ patella  Range of Motion/Strength:   CERVICAL AROM Pain/Dysfunction with Movement   Flexion WNL    Extension WNL    Right side bending WFL    Left side bending WFL stretch   Right rotation WFL    Left rotation WFL      Thoracolumbar AROM Pain/Dysfunction with Movement   Flexion WFL    Extension 50%    Right side bending WFL    Left side bending WFL    Right rotation WFL    Left rotation WFL uncomfortable     Shoulder Right Left Pain/Dysfunction with Movement   AROM/PROM      flexion WFL WFL    extension WFL WFL    abduction WFL WFL    adduction WFL WFL    Internal rotation WFL WFL    ER at 90° abd WFL WFL    ER at 0° abd WFL WFL        U/E MMT Right Left Pain/Dysfunction with Movement   Shoulder Flexion 4/5 4+/5    Shoulder Extension 4/5 4+/5    Shoulder Abduction 4-/5 4+/5    Shoulder Adduction 5/5 5/5    Shoulder IR 4+/5 5/5    Shoulder ER  @ 0* Abduction 4/5 5/5    Shoulder ER  @ 90* Abduction 3+/5 4+/5    Elbow Flexion  5/5 5/5    Elbow Extension 5/5 5/5    Rhomboids 3+/5 4/5    Mid Traps 3+/5 3+/5    Low Traps 3+/5 3+/5      Hip Right Left Pain/Dysfunction with Movement   AROM/PROM      flexion WNL WNL    extension WFL WFL Pain R   abduction WFL WFL    adduction WFL WFL    Internal rotation WFL WFL    External rotation WFL WFL          L/E MMT Right Left Pain/Dysfunction with  Movement   Hip Flexion 4/5 4/5    Hip Extension 3/5 3+/5    Hip Abduction 3+/5 4/5    Hip Adduction 4+/5 4+/5    Hip IR 3+/5 3+/5    Hip ER 3+/5 3+/5    Knee Flexion 5/5 5/5    Knee Extension 5/5 5/5    Ankle DF 5/5 5/5    Ankle PF 5/5 5/5    Ankle Inversion 5/5 5/5    Ankle Eversion 5/5 5/5    Big Toe Extension 5/5 5/5      Joint Mobility:   - Cervical: firm end feel    Flexibility: minimal flexibility deficits of bilateral hip flexors    Special Tests: (-) spurlings, negron sue, slump and SLR; (+) WILLIAMS on R    Gait Analysis:Without AD  Deviations: slouched posture with increased thoracic kyphosis      CMS Impairment/Limitation/Restriction for FOTO  Survey    Therapist reviewed FOTO scores for Renea Bourgeois on 7/27/2020.   FOTO documents entered into [employer] - see Media section.    Limitation Score: 41%  Category: Other    Current : CK = at least 40% but < 60% impaired, limited or restricted  Goal: CJ = at least 20% but < 40% impaired, limited or restricted         TREATMENT   Treatment Time In: 10:08  Treatment Time Out: 10:23  Total Treatment time separate from Evaluation: 15 minutes    Renea received therapeutic exercises to develop strength, flexibility, posture and core stabilization for 15   minutes including:  Bridges  Press ups  SLR EXT  B SH ER with retraction  SH ER  SH IR  Upper trap stretch  Levator scapula stretch      Renea received the following manual therapy techniques: NA were applied to the:  for  minutes, including:    TENS/MH applied to right SI x 15 minutes    Home Exercises Provided and Patient Education Provided     Education provided:   - posture, HEP, body mechanics while lifting    Written Home Exercises Provided: yes.  Exercises were reviewed and Renea was able to demonstrate them prior to the end of the session.  Renea demonstrated good  understanding of the education provided.     See EMR under Patient Instructions for exercises provided 7/27/2020.      Assessment   Renea is  a 70 y.o. female referred to outpatient Physical Therapy with a medical diagnosis of right SI, shoulder and neck pain as a result of frequent transferring of her . Pt presents with excessive scapula protraction and rounded shoulders with forward head which results in right shoulder impingement and increasing neck pain. Weakness is noted to hip and trunk extensors as well as right shoulder compared to left. Exhibits decreased trunk extension ROM. Will benefit from postural reeducation and strength training to allow for increased stability.     Pt prognosis is Good.   Pt will benefit from skilled outpatient Physical Therapy to address the deficits stated above and in the chart below, provide pt/family education, and to maximize pt's level of independence.     Plan of care discussed with patient: Yes  Pt's spiritual, cultural and educational needs considered and patient is agreeable to the plan of care and goals as stated below:     Anticipated Barriers for therapy: caregiver for spouse    Medical Necessity is demonstrated by the following  History  Co-morbidities and personal factors that may impact the plan of care Co-morbidities:   difficulty sleeping    Personal Factors:   no deficits     low   Examination  Body Structures and Functions, activity limitations and participation restrictions that may impact the plan of care Body Regions:   neck  back  upper extremities    Body Systems:    gross symmetry  strength    Participation Restrictions:   Caregiver for spouse    Activity limitations:   Learning and applying knowledge  no deficits    General Tasks and Commands  no deficits    Communication  no deficits    Mobility  lifting and carrying objects    Self care  no deficits    Domestic Life  shopping  cooking  doing house work (cleaning house, washing dishes, laundry)  assisting others    Interactions/Relationships  no deficits    Life Areas  no deficits    Community and Social Life  community life  recreation  and leisure         low   Clinical Presentation stable and uncomplicated low   Decision Making/ Complexity Score: low       Goals:  Short Term Goals: 2 weeks   1. Patient will be independent with HEP  2. Patient abby demonstrate correct sitting/standing posture    Long Term Goals: 4 weeks   1. Right shoulder strength abby improve to 4+/5 to allow for increased overhead activities  2. Patient will demonstrate correct body mechanics while lifting  3. FOTO will improve by 10 points  4. Pain will reduce by 20%    Plan   Plan of care Certification: 7/27/2020 to 8/26/2020.    Outpatient Physical Therapy 2 times weekly for 4 weeks to include the following interventions: Electrical Stimulation TENS, Manual Therapy, Moist Heat/ Ice, Neuromuscular Re-ed, Patient Education, Therapeutic Activites and Therapeutic Exercise, ASTYM, Kinesiotaping PRN, Functional Dry Needling    Anthony Pennington, PT, DPT

## 2020-07-30 LAB — MICROALBUM.,U,RANDOM: <7

## 2020-08-05 ENCOUNTER — DOCUMENTATION ONLY (OUTPATIENT)
Dept: REHABILITATION | Facility: HOSPITAL | Age: 70
End: 2020-08-05

## 2020-08-05 ENCOUNTER — CLINICAL SUPPORT (OUTPATIENT)
Dept: REHABILITATION | Facility: HOSPITAL | Age: 70
End: 2020-08-05
Attending: PHYSICAL MEDICINE & REHABILITATION
Payer: MEDICARE

## 2020-08-05 DIAGNOSIS — M79.12 MYALGIA OF AUXILIARY MUSCLES, HEAD AND NECK: ICD-10-CM

## 2020-08-05 DIAGNOSIS — M25.511 ACUTE PAIN OF RIGHT SHOULDER: ICD-10-CM

## 2020-08-05 DIAGNOSIS — M53.82 NECK MUSCLE WEAKNESS: ICD-10-CM

## 2020-08-05 DIAGNOSIS — M46.1 SACROILIITIS: Primary | ICD-10-CM

## 2020-08-05 DIAGNOSIS — R29.898 WEAKNESS OF BOTH HIPS: ICD-10-CM

## 2020-08-05 DIAGNOSIS — R29.3 POSTURE IMBALANCE: ICD-10-CM

## 2020-08-05 PROCEDURE — 97110 THERAPEUTIC EXERCISES: CPT | Mod: HCNC,CQ

## 2020-08-05 PROCEDURE — 97140 MANUAL THERAPY 1/> REGIONS: CPT | Mod: HCNC,CQ

## 2020-08-05 NOTE — PROGRESS NOTES
PT/PTA met face to face to discuss pt's treatment plan and progress towards established goals. Pt will be seen by a physical therapist minimally every 6th visit or every 30 days.        Cherie Ashley PTA

## 2020-08-05 NOTE — PROGRESS NOTES
Physical Therapist Assistant Treatment Note     Name: Renea NICOLE Apex  Clinic Number: [id number]    Therapy Diagnosis:   Encounter Diagnoses   Name Primary?    Acute pain of right shoulder     Posture imbalance     Neck muscle weakness     Weakness of both hips     Sacroiliitis Yes    Myalgia of auxiliary muscles, head and neck      Physician: Harish Nieves MD    Visit Date: 8/5/2020   Physician Orders: PT Eval and Treat  Medical Diagnosis from Referral  M46.1 (ICD-10-CM) - Sacroiliitis, not elsewhere classified   M79.12 (ICD-10-CM) - Myalgia of auxiliary muscles, head and neck     Evaluation Date: 7/27/2020  Authorization Period Expiration:7/28/21  Plan of Care Expiration: 8/26/2020  Visit # / Visits authorized: 1/20      Time In: 9:30  Time Out: 10:25  Total Billable Time: 54 minutes    Precautions: Standard    Subjective     Pt reports: minimal pain today; will be able to take a break from caring for her  as he is now in inpatient rehab; also taking a trip to visit family today through Sunday.  She was partially compliant with home exercise program.  Response to previous treatment: no issues  Functional change: none at this time    Pain: 3/10  Location: bilateral back      Objective     Renea received therapeutic exercises to develop flexibility, posture, core stabilization, strength  for 43 minutes including:  NuStep for mobility and joint nutrition  Side lying open books  Deep diaphragmatic breathing  PPT w/hold  Bridges w/TB  Dead bug holds  Prone hip extension  Supine dynamic hamstring stretches  Standing (against wall) horiz shoulder abd w/TB w/hold      Renea received the following manual therapy techniques:  for 10 minutes, including:  STM to low back    Renea participated in neuromuscular re-education activities to improve:  for 0 minutes. The following activities were included:  Na today    Renea participated in dynamic functional therapeutic activities to improve functional  performance for 0  minutes, including:  Na today    Renea received the following supervised modalities after being cleared for contradictions: na today    Renea received hot pack for 0 minutes to na today.      Home Exercises Provided and Patient Education Provided     Education provided:   - HEP review  - Abdominal bracing w/transitional movements    Written Home Exercises Provided: Patient instructed to cont prior HEP.  Exercises were reviewed and Renea was able to demonstrate them prior to the end of the session.  Renea demonstrated good  understanding of the education provided.     See EMR under Patient Instructions for exercises provided prior visit.    Assessment     Renea presents today with less pain overall , predominantly her complaints were low back pain. She will be able to decrease lifting and assisting her spouse for now as he is in inpatient rehab. Patient noted to have poor postural alignment with mild thoracic kyphosis and rounded shoulders with forward head. Moderate tone and tenderness to moderate depth palpation bilat low back along superior iliac crests/SI areas with decreased tension following manual and exercises. Renea required moderate verbal and tactile cues for proper exercise techniques and alignment. Renea reported decreased pain end of session.   Renea is progressing well towards her goals.   Pt prognosis is Good.     Pt will continue to benefit from skilled outpatient physical therapy to address the deficits listed in the problem list box on initial evaluation, provide pt/family education and to maximize pt's level of independence in the home and community environment.     Pt's spiritual, cultural and educational needs considered and pt agreeable to plan of care and goals.     Anticipated barriers to physical therapy: none at this time    Goals:     Short Term Goals: 2 weeks   1. Patient will be independent with HEP  2. Patient abby demonstrate correct sitting/standing  posture     Long Term Goals: 4 weeks   1. Right shoulder strength abby improve to 4+/5 to allow for increased overhead activities  2. Patient will demonstrate correct body mechanics while lifting  3. FOTO will improve by 10 points  4. Pain will reduce by 20%  Plan     Plan of care Certification: 7/27/2020 to 8/26/2020.     Outpatient Physical Therapy 2 times weekly for 4 weeks to include the following interventions: Electrical Stimulation TENS, Manual Therapy, Moist Heat/ Ice, Neuromuscular Re-ed, Patient Education, Therapeutic Activites and Therapeutic Exercise, ASTYM, Kinesiotaping PRN, Functional Dry Needling    Cherie Ashley, PTA

## 2020-08-11 NOTE — PROGRESS NOTES
Physical Therapist Assistant Treatment Note     Name: Renea NICOLE Bancroft  Clinic Number: [id number]    Therapy Diagnosis:   Encounter Diagnoses   Name Primary?    Acute pain of right shoulder     Posture imbalance     Neck muscle weakness     Weakness of both hips      Physician: Harish Nieves MD    Visit Date: 8/12/2020   Physician Orders: PT Eval and Treat  Medical Diagnosis from Referral  M46.1 (ICD-10-CM) - Sacroiliitis, not elsewhere classified   M79.12 (ICD-10-CM) - Myalgia of auxiliary muscles, head and neck     Evaluation Date: 7/27/2020  Authorization Period Expiration:7/28/21  Plan of Care Expiration: 8/26/2020  Visit # / Visits authorized: 2/20      Time In: 9:05  Time Out: 9:45  Total Billable Time: 40 minutes    Precautions: Standard    Subjective     Pt reports: she went on a road trip this weekend and her back was a little stiff after the drive but once she moved around, she felt better. Today her back isn't hurting her although had a little discomfort last night. She said her back is feeling much better since not having to  on her  while in [facility].  She was partially compliant with home exercise program.  Response to previous treatment: minimal soreness  Functional change: none at this time    Pain: 3/10  Location: bilateral back      Objective     Renea received therapeutic exercises to develop flexibility, posture, core stabilization, strength for 40 minutes including:  NuStep 6' for mobility and joint nutrition  Side lying open books  Deep diaphragmatic breathing- not today  PPT w/hold  Marching with PPT hold  Bridges w/TB  Dead bug holds  Prone hip extension  Supine dynamic hamstring stretches  Standing (against wall) horiz shoulder abd w/TB w/hold  Lifting technique with box and squatting technique with 10# KB    Renea received the following manual therapy techniques: for 0 minutes, including:  STM to low back    Renea participated in neuromuscular re-education activities  to improve:  for 0 minutes. The following activities were included:  Na today    Renea participated in dynamic functional therapeutic activities to improve functional performance for 0  minutes, including:  Na today    Renea received the following supervised modalities after being cleared for contradictions: na today    Renea received hot pack for 0 minutes to na today.      Home Exercises Provided and Patient Education Provided     Education provided:   - HEP review- added open books  - Abdominal bracing w/transitional movements  -Lifting/ squatting technique    Written Home Exercises Provided: Patient instructed to cont prior HEP.  Exercises were reviewed and Renea was able to demonstrate them prior to the end of the session.  Renea demonstrated good  understanding of the education provided.     See EMR under Patient Instructions for exercises provided prior visit.    Assessment     Renea presents with no complaints of low back pain because she hasn't had to do any heavy lifting recently. Focused on squatting and lifting technique to improve body mechanics for lifting when  returns home from inpatient rehab. Pt. Required cues for hip hinge and trunk flexion for proper technique. Tolerated therapy session without any increase in symptoms and will benefit from continued postural stability, education on body mechanics and LE strengthening    Renea is progressing well towards her goals.   Pt prognosis is Good.     Pt will continue to benefit from skilled outpatient physical therapy to address the deficits listed in the problem list box on initial evaluation, provide pt/family education and to maximize pt's level of independence in the home and community environment.     Pt's spiritual, cultural and educational needs considered and pt agreeable to plan of care and goals.     Anticipated barriers to physical therapy: none at this time    Goals:   Short Term Goals: 2 weeks   1. Patient will be independent  with HEP partially met  2. Patient abby demonstrate correct sitting/standing posture progressing, not met     Long Term Goals: 4 weeks   1. Right shoulder strength abby improve to 4+/5 to allow for increased overhead activities  2. Patient will demonstrate correct body mechanics while lifting  3. FOTO will improve by 10 points  4. Pain will reduce by 20%  Plan     Plan of care Certification: 7/27/2020 to 8/26/2020.     Outpatient Physical Therapy 2 times weekly for 4 weeks to include the following interventions: Electrical Stimulation TENS, Manual Therapy, Moist Heat/ Ice, Neuromuscular Re-ed, Patient Education, Therapeutic Activites and Therapeutic Exercise, ASTYM, Kinesiotaping PRN, Functional Dry Needling    Alexa Martinez, PTA

## 2020-08-12 ENCOUNTER — CLINICAL SUPPORT (OUTPATIENT)
Dept: REHABILITATION | Facility: HOSPITAL | Age: 70
End: 2020-08-12
Attending: PHYSICAL MEDICINE & REHABILITATION
Payer: MEDICARE

## 2020-08-12 ENCOUNTER — DOCUMENTATION ONLY (OUTPATIENT)
Dept: REHABILITATION | Facility: HOSPITAL | Age: 70
End: 2020-08-12

## 2020-08-12 DIAGNOSIS — R29.3 POSTURE IMBALANCE: ICD-10-CM

## 2020-08-12 DIAGNOSIS — R29.898 WEAKNESS OF BOTH HIPS: ICD-10-CM

## 2020-08-12 DIAGNOSIS — M25.511 ACUTE PAIN OF RIGHT SHOULDER: ICD-10-CM

## 2020-08-12 DIAGNOSIS — M53.82 NECK MUSCLE WEAKNESS: ICD-10-CM

## 2020-08-12 PROCEDURE — 97110 THERAPEUTIC EXERCISES: CPT | Mod: HCNC,CQ

## 2020-08-12 NOTE — PROGRESS NOTES
PT/PTA met face to face to discuss pt's treatment plan and progress towards established goals. Pt will be seen by a physical therapist minimally every 6th visit or every 30 days.    Alexa Martinez PTA

## 2020-08-17 ENCOUNTER — CLINICAL SUPPORT (OUTPATIENT)
Dept: REHABILITATION | Facility: HOSPITAL | Age: 70
End: 2020-08-17
Attending: PHYSICAL MEDICINE & REHABILITATION
Payer: MEDICARE

## 2020-08-17 ENCOUNTER — HOSPITAL ENCOUNTER (OUTPATIENT)
Dept: RADIOLOGY | Facility: HOSPITAL | Age: 70
Discharge: HOME | End: 2020-08-17
Attending: NURSE PRACTITIONER
Payer: MEDICARE

## 2020-08-17 VITALS — BODY MASS INDEX: 26.47 KG/M2 | WEIGHT: 140.19 LBS | HEIGHT: 61 IN

## 2020-08-17 DIAGNOSIS — Z12.31 ENCOUNTER FOR SCREENING MAMMOGRAM FOR MALIGNANT NEOPLASM OF BREAST: ICD-10-CM

## 2020-08-17 DIAGNOSIS — R29.898 WEAKNESS OF BOTH HIPS: ICD-10-CM

## 2020-08-17 DIAGNOSIS — R29.3 POSTURE IMBALANCE: ICD-10-CM

## 2020-08-17 DIAGNOSIS — M53.82 NECK MUSCLE WEAKNESS: ICD-10-CM

## 2020-08-17 DIAGNOSIS — C50.411 MALIGNANT NEOPLASM OF UPPER-OUTER QUADRANT OF RIGHT BREAST IN FEMALE, ESTROGEN RECEPTOR POSITIVE: ICD-10-CM

## 2020-08-17 DIAGNOSIS — Z17.0 MALIGNANT NEOPLASM OF UPPER-OUTER QUADRANT OF RIGHT BREAST IN FEMALE, ESTROGEN RECEPTOR POSITIVE: ICD-10-CM

## 2020-08-17 DIAGNOSIS — M25.511 ACUTE PAIN OF RIGHT SHOULDER: ICD-10-CM

## 2020-08-17 PROCEDURE — 77067 SCR MAMMO BI INCL CAD: CPT | Mod: TC,HCNC

## 2020-08-17 PROCEDURE — 97112 NEUROMUSCULAR REEDUCATION: CPT | Mod: HCNC

## 2020-08-17 PROCEDURE — 97110 THERAPEUTIC EXERCISES: CPT | Mod: HCNC

## 2020-08-17 PROCEDURE — 77063 BREAST TOMOSYNTHESIS BI: CPT | Mod: 26,HCNC,, | Performed by: RADIOLOGY

## 2020-08-17 PROCEDURE — 77067 MAMMO DIGITAL SCREENING BILAT WITH TOMOSYNTHESIS_CAD: ICD-10-PCS | Mod: 26,HCNC,, | Performed by: RADIOLOGY

## 2020-08-17 PROCEDURE — 77067 SCR MAMMO BI INCL CAD: CPT | Mod: 26,HCNC,, | Performed by: RADIOLOGY

## 2020-08-17 PROCEDURE — 77063 MAMMO DIGITAL SCREENING BILAT WITH TOMOSYNTHESIS_CAD: ICD-10-PCS | Mod: 26,HCNC,, | Performed by: RADIOLOGY

## 2020-08-17 NOTE — PROGRESS NOTES
Physical Therapist Treatment Note     Name: Renea NICOLE [place]  Clinic Number: [id number]    Therapy Diagnosis:   Encounter Diagnoses   Name Primary?    Acute pain of right shoulder     Posture imbalance     Neck muscle weakness     Weakness of both hips      Physician: Harish Nieves MD    Visit Date: 8/17/2020   Physician Orders: PT Eval and Treat  Medical Diagnosis from Referral  M46.1 (ICD-10-CM) - Sacroiliitis, not elsewhere classified   M79.12 (ICD-10-CM) - Myalgia of auxiliary muscles, head and neck     Evaluation Date: 7/27/2020  Authorization Period Expiration:7/28/21  Plan of Care Expiration: 8/26/2020  Visit # / Visits authorized: 4/20      Time In: 8:02  Time Out: 9:00  Total Billable Time: 54 minutes    Precautions: Standard    Subjective     Pt reports: driving many hours to visit family since  is in rehab. Reports minimal discomfort upon arrival but 0/10 pain at conclusion  She was partially compliant with home exercise program.  Response to previous treatment: minimal soreness  Functional change: none at this time    Pain: 3/10  Location: bilateral back      Objective     Renea received therapeutic exercises to develop flexibility, posture, core stabilization, strength for 40 minutes including:    Side lying open books    PPT w/hold-dead bugs    Bridges-single 8x2 each  Dead bug holds  Prone hip extension  SL hip ABD  Supine dynamic hamstring stretches  Standing (against wall) horiz shoulder abd w/TB w/hold-hold  Lifting technique with box and squatting technique with 10# KB  Dead lift-10#  ZOEY hip ext/ABD-RTB  MEDX 30# 0-60 15 reps    Renea participated in neuromuscular re-education activities to improve:  for 14 minutes. The following activities were included:  Biomechanics with lifting    Home Exercises Provided and Patient Education Provided     Education provided:   - HEP review- added open books  - Abdominal bracing w/transitional movements  -Lifting/ squatting  technique    Written Home Exercises Provided: Patient instructed to cont prior HEP.  Exercises were reviewed and Renea was able to demonstrate them prior to the end of the session.  Renea demonstrated good  understanding of the education provided.     See EMR under Patient Instructions for exercises provided prior visit.    Assessment     Renea presents with reports of minimal back pain due to prolonged driving. Reports intermittently performing HEP. Explained the importance of regular compliance. Participated in bilateral hip and trunk strength training with postural reeducation. Educated patient on proper lifting techniques and transfers to assist her spouse.   Renea is progressing well towards her goals.   Pt prognosis is Good.     Pt will continue to benefit from skilled outpatient physical therapy to address the deficits listed in the problem list box on initial evaluation, provide pt/family education and to maximize pt's level of independence in the home and community environment.     Pt's spiritual, cultural and educational needs considered and pt agreeable to plan of care and goals.     Anticipated barriers to physical therapy: none at this time    Goals:   Short Term Goals: 2 weeks   1. Patient will be independent with HEP partially met  2. Patient abby demonstrate correct sitting/standing posture progressing, not met     Long Term Goals: 4 weeks   1. Right shoulder strength abby improve to 4+/5 to allow for increased overhead activities  2. Patient will demonstrate correct body mechanics while lifting  3. FOTO will improve by 10 points  4. Pain will reduce by 20%  Plan     Plan of care Certification: 7/27/2020 to 8/26/2020.     Outpatient Physical Therapy 2 times weekly for 4 weeks to include the following interventions: Electrical Stimulation TENS, Manual Therapy, Moist Heat/ Ice, Neuromuscular Re-ed, Patient Education, Therapeutic Activites and Therapeutic Exercise, ASTYM, Kinesiotaping PRN, Functional  Dry Needling    Anthony Pennington, PT

## 2020-08-17 NOTE — TELEPHONE ENCOUNTER
Please let Ms. Bourgeois know that her MRI of the IAC was normal, and does not show any abnormality of the hearing and balance nerve as the cause for her hearing to be worse in one ear.  I would recommend she consider hearing aids when she is ready.   Ken is a previously healthy 7 yo M, who presented w/ pancytopenia, now with newly diagnosed B-cell ALL.  Nephrology consulted given elevated blood pressures. Renal sono w/ mildfullness of R. renal pelvis, otherwise, normal, and with good blood flow.  Started on Amlodipine 2.5mg qD yesterday, however, has continued to have persistently elevated BP's (130-140's/80-90's)  Given the degree of hypertension, will increase amlodipine dose today. Elevated BP's are likely multifactorial, given new onset illness, and fluid status (net positive 1500mL).      Recommendations:   - 95th percentile for age, gender and height is 115/75  - Increase amlodipine to 2.5mg BID  - Please administer Hydralazine IV 0.1mg/kg q6 PRN for BPs >125/85  - please send TFT's with next labs  - strict I/O's  - daily weights

## 2020-08-18 ENCOUNTER — PATIENT OUTREACH (OUTPATIENT)
Dept: ADMINISTRATIVE | Facility: OTHER | Age: 70
End: 2020-08-18

## 2020-08-18 NOTE — PROGRESS NOTES
Physical Therapist Assistant Treatment Note     Name: Renea Bourgeois  Clinic Number: [id number]    Therapy Diagnosis:   Encounter Diagnoses   Name Primary?    Acute pain of right shoulder     Posture imbalance     Neck muscle weakness     Weakness of both hips      Physician: Harish Nieves MD    Visit Date: 8/19/2020   Physician Orders: PT Eval and Treat  Medical Diagnosis from Referral  M46.1 (ICD-10-CM) - Sacroiliitis, not elsewhere classified   M79.12 (ICD-10-CM) - Myalgia of auxiliary muscles, head and neck     Evaluation Date: 7/27/2020  Authorization Period Expiration:7/28/21  Plan of Care Expiration: 8/26/2020  Visit # / Visits authorized: 5/20    Time In: 10:30  Time Out: 11:15  Total Billable Time: 45 minutes    Precautions: Standard    Subjective     Pt reports: she was lifting boxes with proper technique yesterday. She feels fine today although some soreness in low back.  She was partially compliant with home exercise program.  Response to previous treatment: minimal soreness  Functional change: none at this time    Pain: 2/10   Location: bilateral back      Objective     CMS Impairment/Limitation/Restriction for FOTO  Survey     Therapist reviewed FOTO scores for Renea Bourgeois on 8/19/2020.   FOTO documents entered into [employer] - see Media section.     Limitation Score: 38%  Category: Other     Current : CJ = at least 20% but < 40% impaired, limited or restricted  Goal: CJ = at least 20% but < 40% impaired, limited or restricted         Renea received therapeutic exercises to develop flexibility, posture, core stabilization, strength for 40 minutes including:  PPT w/hold-dead bugs  Bridges-single 8x2 each  Dead bug holds  Prone hip extension  SL hip ABD  Supine hamstring stretches  Standing (against wall) horiz shoulder abd w/TB w/hold-hold  Lifting technique with box and squatting technique with 10# KB  Dead lift-10#  ZOEY hip ext/ABD-RTB- not today  MEDX 30# 0-70 2x15 reps    Renea  participated in neuromuscular re-education activities to improve:  for 5 minutes. The following activities were included:  Biomechanics with lifting    Home Exercises Provided and Patient Education Provided     Education provided:   - Abdominal bracing w/transitional movements  -Lifting/ squatting technique    Written Home Exercises Provided: Patient instructed to cont prior HEP.  Exercises were reviewed and Renea was able to demonstrate them prior to the end of the session.  Renea demonstrated good  understanding of the education provided.     See EMR under Patient Instructions for exercises provided prior visit.    Assessment     Renea presents with complaints of soreness in low back. Performed exercises for core stability, proper body mechanics, and LE strength. Increased lumbar range of motion during medx lumbar extension machine within comfortable range. Pt. Continues to require verbal cues for hip hinging with squatting and dead lift which patient apprehensive about hip flexion. Fatigues quickly with increased repetitions today. FOTO score represents improvement in functional mobility since evaluation.    Renea is progressing well towards her goals.   Pt prognosis is Good.     Pt will continue to benefit from skilled outpatient physical therapy to address the deficits listed in the problem list box on initial evaluation, provide pt/family education and to maximize pt's level of independence in the home and community environment.     Pt's spiritual, cultural and educational needs considered and pt agreeable to plan of care and goals.     Anticipated barriers to physical therapy: none at this time    Goals:   Short Term Goals: 2 weeks   1. Patient will be independent with HEP partially met  2. Patient abby demonstrate correct sitting/standing posture improving     Long Term Goals: 4 weeks   1. Right shoulder strength abby improve to 4+/5 to allow for increased overhead activities  2. Patient will demonstrate  correct body mechanics while lifting  3. FOTO will improve by 10 points  4. Pain will reduce by 20%  Plan     Plan of care Certification: 7/27/2020 to 8/26/2020.     Outpatient Physical Therapy 2 times weekly for 4 weeks to include the following interventions: Electrical Stimulation TENS, Manual Therapy, Moist Heat/ Ice, Neuromuscular Re-ed, Patient Education, Therapeutic Activites and Therapeutic Exercise, ASTYM, Kinesiotaping PRN, Functional Dry Needling    Alexa Martinez, PTA

## 2020-08-18 NOTE — PROGRESS NOTES
Health Maintenance Due   Topic Date Due    Shingles Vaccine (3 of 3) 08/17/2020     Updates were requested from care everywhere.  Chart was reviewed for overdue Proactive [facility] Encounters (NASREEN) topics (CRS, Breast Cancer Screening, Eye exam)  Health Maintenance has been updated.  LINKS immunization registry triggered.  Immunizations were reconciled.

## 2020-08-19 ENCOUNTER — OFFICE VISIT (OUTPATIENT)
Dept: RHEUMATOLOGY | Facility: CLINIC | Age: 70
End: 2020-08-19
Payer: MEDICARE

## 2020-08-19 ENCOUNTER — CLINICAL SUPPORT (OUTPATIENT)
Dept: REHABILITATION | Facility: HOSPITAL | Age: 70
End: 2020-08-19
Attending: PHYSICAL MEDICINE & REHABILITATION
Payer: MEDICARE

## 2020-08-19 ENCOUNTER — INFUSION (OUTPATIENT)
Dept: INFUSION THERAPY | Facility: HOSPITAL | Age: 70
End: 2020-08-19
Attending: INTERNAL MEDICINE
Payer: MEDICARE

## 2020-08-19 VITALS
SYSTOLIC BLOOD PRESSURE: 144 MMHG | HEIGHT: 61 IN | DIASTOLIC BLOOD PRESSURE: 76 MMHG | BODY MASS INDEX: 26.76 KG/M2 | WEIGHT: 141.75 LBS | HEART RATE: 70 BPM

## 2020-08-19 VITALS
RESPIRATION RATE: 16 BRPM | TEMPERATURE: 97 F | HEART RATE: 70 BPM | DIASTOLIC BLOOD PRESSURE: 58 MMHG | OXYGEN SATURATION: 97 % | SYSTOLIC BLOOD PRESSURE: 146 MMHG

## 2020-08-19 DIAGNOSIS — M25.511 ACUTE PAIN OF RIGHT SHOULDER: ICD-10-CM

## 2020-08-19 DIAGNOSIS — M81.0 AGE-RELATED OSTEOPOROSIS WITHOUT CURRENT PATHOLOGICAL FRACTURE: Primary | ICD-10-CM

## 2020-08-19 DIAGNOSIS — Z71.89 COUNSELING ON HEALTH PROMOTION AND DISEASE PREVENTION: ICD-10-CM

## 2020-08-19 DIAGNOSIS — M53.82 NECK MUSCLE WEAKNESS: ICD-10-CM

## 2020-08-19 DIAGNOSIS — M15.9 PRIMARY OSTEOARTHRITIS INVOLVING MULTIPLE JOINTS: ICD-10-CM

## 2020-08-19 DIAGNOSIS — R29.898 WEAKNESS OF BOTH HIPS: ICD-10-CM

## 2020-08-19 DIAGNOSIS — R29.3 POSTURE IMBALANCE: ICD-10-CM

## 2020-08-19 PROCEDURE — 99999 PR PBB SHADOW E&M-EST. PATIENT-LVL III: CPT | Mod: PBBFAC,HCNC,, | Performed by: INTERNAL MEDICINE

## 2020-08-19 PROCEDURE — 99499 RISK ADDL DX/OHS AUDIT: ICD-10-PCS | Mod: S$GLB,,, | Performed by: INTERNAL MEDICINE

## 2020-08-19 PROCEDURE — 25000003 PHARM REV CODE 250: Mod: HCNC | Performed by: INTERNAL MEDICINE

## 2020-08-19 PROCEDURE — 63600175 PHARM REV CODE 636 W HCPCS: Mod: HCNC | Performed by: INTERNAL MEDICINE

## 2020-08-19 PROCEDURE — 1125F PR PAIN SEVERITY QUANTIFIED, PAIN PRESENT: ICD-10-PCS | Mod: HCNC,S$GLB,, | Performed by: INTERNAL MEDICINE

## 2020-08-19 PROCEDURE — 99999 PR PBB SHADOW E&M-EST. PATIENT-LVL III: ICD-10-PCS | Mod: PBBFAC,HCNC,, | Performed by: INTERNAL MEDICINE

## 2020-08-19 PROCEDURE — 3078F DIAST BP <80 MM HG: CPT | Mod: HCNC,CPTII,S$GLB, | Performed by: INTERNAL MEDICINE

## 2020-08-19 PROCEDURE — 96365 THER/PROPH/DIAG IV INF INIT: CPT | Mod: HCNC

## 2020-08-19 PROCEDURE — A4216 STERILE WATER/SALINE, 10 ML: HCPCS | Mod: HCNC | Performed by: INTERNAL MEDICINE

## 2020-08-19 PROCEDURE — 1125F AMNT PAIN NOTED PAIN PRSNT: CPT | Mod: HCNC,S$GLB,, | Performed by: INTERNAL MEDICINE

## 2020-08-19 PROCEDURE — 99214 PR OFFICE/OUTPT VISIT, EST, LEVL IV, 30-39 MIN: ICD-10-PCS | Mod: HCNC,S$GLB,, | Performed by: INTERNAL MEDICINE

## 2020-08-19 PROCEDURE — 1101F PT FALLS ASSESS-DOCD LE1/YR: CPT | Mod: HCNC,CPTII,S$GLB, | Performed by: INTERNAL MEDICINE

## 2020-08-19 PROCEDURE — 3078F PR MOST RECENT DIASTOLIC BLOOD PRESSURE < 80 MM HG: ICD-10-PCS | Mod: HCNC,CPTII,S$GLB, | Performed by: INTERNAL MEDICINE

## 2020-08-19 PROCEDURE — 97110 THERAPEUTIC EXERCISES: CPT | Mod: HCNC,CQ

## 2020-08-19 PROCEDURE — 99214 OFFICE O/P EST MOD 30 MIN: CPT | Mod: HCNC,S$GLB,, | Performed by: INTERNAL MEDICINE

## 2020-08-19 PROCEDURE — 1101F PR PT FALLS ASSESS DOC 0-1 FALLS W/OUT INJ PAST YR: ICD-10-PCS | Mod: HCNC,CPTII,S$GLB, | Performed by: INTERNAL MEDICINE

## 2020-08-19 PROCEDURE — 3008F PR BODY MASS INDEX (BMI) DOCUMENTED: ICD-10-PCS | Mod: HCNC,CPTII,S$GLB, | Performed by: INTERNAL MEDICINE

## 2020-08-19 PROCEDURE — 3077F PR MOST RECENT SYSTOLIC BLOOD PRESSURE >= 140 MM HG: ICD-10-PCS | Mod: HCNC,CPTII,S$GLB, | Performed by: INTERNAL MEDICINE

## 2020-08-19 PROCEDURE — 1159F PR MEDICATION LIST DOCUMENTED IN MEDICAL RECORD: ICD-10-PCS | Mod: HCNC,S$GLB,, | Performed by: INTERNAL MEDICINE

## 2020-08-19 PROCEDURE — 3008F BODY MASS INDEX DOCD: CPT | Mod: HCNC,CPTII,S$GLB, | Performed by: INTERNAL MEDICINE

## 2020-08-19 PROCEDURE — 3077F SYST BP >= 140 MM HG: CPT | Mod: HCNC,CPTII,S$GLB, | Performed by: INTERNAL MEDICINE

## 2020-08-19 PROCEDURE — 1159F MED LIST DOCD IN RCRD: CPT | Mod: HCNC,S$GLB,, | Performed by: INTERNAL MEDICINE

## 2020-08-19 PROCEDURE — 99499 UNLISTED E&M SERVICE: CPT | Mod: S$GLB,,, | Performed by: INTERNAL MEDICINE

## 2020-08-19 RX ORDER — SODIUM CHLORIDE 0.9 % (FLUSH) 0.9 %
10 SYRINGE (ML) INJECTION
Status: CANCELLED | OUTPATIENT
Start: 2020-08-19

## 2020-08-19 RX ORDER — ZOLEDRONIC ACID 5 MG/100ML
5 INJECTION, SOLUTION INTRAVENOUS
Status: CANCELLED | OUTPATIENT
Start: 2020-08-19

## 2020-08-19 RX ORDER — HEPARIN 100 UNIT/ML
500 SYRINGE INTRAVENOUS
Status: CANCELLED | OUTPATIENT
Start: 2020-08-19

## 2020-08-19 RX ORDER — ZOLEDRONIC ACID 5 MG/100ML
5 INJECTION, SOLUTION INTRAVENOUS
Status: COMPLETED | OUTPATIENT
Start: 2020-08-19 | End: 2020-08-19

## 2020-08-19 RX ORDER — GABAPENTIN 100 MG/1
100 CAPSULE ORAL 3 TIMES DAILY
Qty: 270 CAPSULE | Refills: 1 | Status: SHIPPED | OUTPATIENT
Start: 2020-08-19 | End: 2021-02-10

## 2020-08-19 RX ORDER — SODIUM CHLORIDE 0.9 % (FLUSH) 0.9 %
10 SYRINGE (ML) INJECTION
Status: DISCONTINUED | OUTPATIENT
Start: 2020-08-19 | End: 2020-08-19 | Stop reason: HOSPADM

## 2020-08-19 RX ADMIN — ZOLEDRONIC ACID 5 MG: 0.05 INJECTION, SOLUTION INTRAVENOUS at 09:08

## 2020-08-19 RX ADMIN — Medication 10 ML: at 09:08

## 2020-08-19 NOTE — PLAN OF CARE
"  Problem: Adult Inpatient Plan of Care  Goal: Plan of Care Review  Outcome: Ongoing, Progressing  Goal: Patient-Specific Goal (Individualization)  Outcome: Ongoing, Progressing  Flowsheets (Taken 8/19/2020 0936)  Individualized Care Needs: feet elevated, warm blanket, and IV on her left arm  Anxieties, Fears or Concerns: none  Patient-Specific Goals (Include Timeframe): to tolerate treatment today  Goal: Absence of Hospital-Acquired Illness or Injury  Outcome: Ongoing, Progressing  Goal: Optimal Comfort and Wellbeing  Outcome: Ongoing, Progressing  Goal: Readiness for Transition of Care  Outcome: Ongoing, Progressing  Goal: Rounds/Family Conference  Outcome: Ongoing, Progressing     Problem: Fall Injury Risk  Goal: Absence of Fall and Fall-Related Injury  Outcome: Ongoing, Progressing       Patient reports, having "stomach issues always"   "

## 2020-08-19 NOTE — PATIENT INSTRUCTIONS
Diclofenac skin gel  What is this medicine?  DICLOFENAC (dye BELIA beasley ak) is a non-steroidal anti-inflammatory drug (NSAID). The 1% skin gel is used to treat osteoarthritis of the hands or knees. The 3% skin gel is used to treat actinic keratosis.  How should I use this medicine?  This medicine is for external use only. Follow the directions on the prescription label. Wash hands before and after use. Do not get this medicine in your eyes. If you do, rinse out with plenty of cool tap water. Use your doses at regular intervals. Do not use your medicine more often than directed.  A special MedGuide will be given to you by the pharmacist with each prescription and refill of the 1% gel. Be sure to read this information carefully each time.  Talk to your pediatrician regarding the use of this medicine in children. Special care may be needed. The 3% gel is not approved for use in children.  What side effects may I notice from receiving this medicine?  Side effects that you should report to your doctor or health care professional as soon as possible:  · allergic reactions like skin rash, itching or hives, swelling of the face, lips, or tongue  · black or bloody stools, blood in the urine or vomit  · blurred vision  · chest pain  · difficulty breathing or wheezing  · nausea or vomiting  · redness, blistering, peeling or loosening of the skin, including inside the mouth  · slurred speech or weakness on one side of the body  · trouble passing urine or change in the amount of urine  · unexplained weight gain or swelling  · unusually weak or tired  · yellowing of eyes or skin  Side effects that usually do not require medical attention (report to your doctor or health care professional if they continue or are bothersome):  · dizziness  · dry skin  · headache  · heartburn  · increased sensitivity to the sun  · stomach pain  · tingling at the application site  What may interact with this medicine?  · aspirin  · NSAIDs, medicines  for pain and inflammation, like ibuprofen or naproxen  Do not use any other skin products without telling your doctor or health care professional.  What if I miss a dose?  If you miss a dose, use it as soon as you can. If it is almost time for your next dose, use only that dose. Do not use double or extra doses.  Where should I keep my medicine?  Keep out of the reach of children.  Store the 1% gel at room temperature between 15 and 30 degrees C (59 and 86 degrees F). Store the 3% gel at room temperature between 20 and 25 degrees C (68 and 77 degrees F). Protect from light. Throw away any unused medicine after the expiration date.  What should I tell my health care provider before I take this medicine?  They need to know if you have any of these conditions:  · asthma  · bleeding problems  · coronary artery bypass graft (CABG) surgery within the past 2 weeks  · heart disease  · high blood pressure  · if you frequently drink alcohol containing drinks  · kidney disease  · liver disease  · open or infected skin  · stomach problems  · an unusual or allergic reaction to diclofenac, aspirin, other NSAIDs, other medicines, benzyl alcohol (3% gel only), foods, dyes, or preservatives  · pregnant or trying to get pregnant  · breast-feeding  What should I watch for while using this medicine?  Tell your doctor or healthcare professional if your symptoms do not start to get better or if they get worse. You will need to follow up with your health care provider to monitor your progress. You may need to be treated for up to 3 months if you are using the 3% gel, but the full effect may not occur until 1 month after stopping treatment. If you develop a severe skin reaction, contact your doctor or health care professional immediately.  This medicine can make you more sensitive to the sun. Keep out of the sun. If you cannot avoid being in the sun, wear protective clothing and use sunscreen. Do not use sun lamps or tanning  beds/booths.  Do not take medicines such as ibuprofen and naproxen with this medicine. Side effects such as stomach upset, nausea, or ulcers may be more likely to occur. Many medicines available without a prescription should not be taken with this medicine.  This medicine does not prevent heart attack or stroke. In fact, this medicine may increase the chance of a heart attack or stroke. The chance may increase with longer use of this medicine and in people who have heart disease. If you take aspirin to prevent heart attack or stroke, talk with your doctor or health care professional.  This medicine can cause ulcers and bleeding in the stomach and intestines at any time during treatment. Do not smoke cigarettes or drink alcohol. These increase irritation to your stomach and can make it more susceptible to damage from this medicine. Ulcers and bleeding can happen without warning symptoms and can cause death.  You may get drowsy or dizzy. Do not drive, use machinery, or do anything that needs mental alertness until you know how this medicine affects you. Do not stand or sit up quickly, especially if you are an older patient. This reduces the risk of dizzy or fainting spells.  This medicine can cause you to bleed more easily. Try to avoid damage to your teeth and gums when you brush or floss your teeth.  NOTE:This sheet is a summary. It may not cover all possible information. If you have questions about this medicine, talk to your doctor, pharmacist, or health care provider. Copyright© 2017 [employer]

## 2020-08-19 NOTE — PROGRESS NOTES
Breast cancer follow-up  Renea Bourgeois  [id number]  [birth year]    Last Visit: 8/1/19    Oncologist: VIKAS Jones MD    Surgeon: Shubham Shepherd MD    Radiation Oncologist: Korina Moore MD    Internist: Kamini Newton MD    Diagnosis:   stage I, T1 C. N0 M0 ER positive/NE negative high risk breast cancer.  The patient self discovered a lesion in the medial upper portion of her right breast, underwent imaging and confirmatory biopsy in [place]. She ultimately had a lumpectomy and sentinel node biopsy at [facility] 2/11/11,  for a 1.3 cm Langston 8 adenocarcinoma. The sentinel lymph nodes were negative. ER positive/NE negative. Oncotype DX testing revealed that the patient was at high risk of recurrence so she received 6 cycles of TAC chemotherapy which was well-tolerated.      Pt presents today for her annual mammo and breast cancer follow-up    12/29/10 - Performed in [place]- Right breast biopsy - sonocore- 1 oclock: Poorly differentiated invasive ductal carcinoma - high histologic grade of 3/3. 0.6 cm with no definitive vascular or lymphatic stread.     Staging: Stage 1, T1N0M0, ER positive and NE negative, Her2 non amplified.     2/11/11: Oncotype DX 56- high recurrence score.    2/7/11 initial visit with Dr. Jones.     2/11/11 - Lumpectomy and sentinel node biopsy with Dr. Shepherd. 4 nodes sampled.     3/7/11 - Was considering [id number] randomization but it was decided to go with Cytoxan 500 mg/m2, Adriamycin 50 mg/m2 and taxotere 75 mg/m2 and neulasta support.     3/29/11 - first chemotherapy    4/8/11, 4/19/11, 4/28/11 - grade 2 mucositis on cycle 2 day 10 of TAC.    5/10/11 - C3D1 with 10% dose reduction in Adriamycin.     5/31/11 - C4D1 - 10 % dose reduction in Adriamycin    6/21/11- C5D1- 10% dose reduction in adriamycin dose    7/12/11- C6D1 Completed TAC and had bilateral lower extremity edema - Ultrasounds negative for clots.     8/16/11 - Initial visit with Dr. Moore for Radiation therapy.   August  17, 2011-September 21, 2011, the patient received 4500 cGy in 25 fractions over 35 elapsed days to a posed tangential right  breast field using 6 X photons followed by an electron boost from September 22, 2011-October 3, 2011 delivering 1600 cGy in 8 fractions over 11 elapsed days using 9  MeV electrons to a depth of Dmax. Total cumulative dose was 6100 cGy as of October 3, 2011. Radiotherapy was well-tolerated with minimal tanning and breast  lymphedema. Subsequently the patient has resumed her Arimidex and is tolerating this without side effects.    10/19/11- Christoval follow-up after completing radiation. Arimidex initiated. Zometa initiated - 4 mg IVPB for osteopenia.     4/16/12 - Pt did not want Zometa- switched to Prolia     5/2/12 - First dose of Prolia - diagnosed with bilateral carpel tunnel syndrome    11/16/12 - Prolia    6/24/13 - Prolia    12/27/13 - Prolia held due to dental work    6/2014  - Prolia held due to dental work.    12/5/14 - cough with negative chest x-ray. Prolia held due to an improved bone density test.    Age: 61 y/o age of diagnosis    Family History/Genetics: Father metastatic cancer of unknown origin    Surgery:   2/11/11-   Lumpectomy of the right breast with 4 sentinel nodes removed.     Chemotherapy Regimen: TAC:   Cytoxan 500 mg/m2   Adriamycin 50 mg/m2 with 10% dose reduction after the first treatment due to severe mucositis  taxotere 75 mg/m2 and neulasta support.    Dates: 3/29/11 - 7/12/11  Cycles: 6    Radiation therapy:  August 17, 2011-September 21, 2011, the patient received 4500 cGy in 25 fractions over 35 elapsed days to a posed tangential right  breast field using 6 X photons followed by an electron boost from September 22, 2011-October 3, 2011 delivering 1600 cGy in 8 fractions over 11 elapsed days using 9  MeV electrons to a depth of Dmax. Total cumulative dose was 6100 cGy as of October 3, 2011. Radiotherapy was well-tolerated with minimal tanning and  breast  lymphedema. Subsequently the patient has resumed her Arimidex and is tolerating this without side effects.    Hormone therapy: Arimidex 1 mg po daily X 10 years - completed 2/2020    Health Maintenance and Screening Recommendations:  Colonoscopy: 2/12/18 - 3 polyps- f/u recommended in 5 years (2023)  Dexa: 5/2019- osteopenia - improved bmd.  Pt taking vit D and calcium. Recommend 2- year follow-up - pt followed by Dr. Scherer Rheumatology- pt had reclast injection last week     Mammogram:  8/17/20-- wnl bilaterally  Pap: Dr. Sellers 7/24/14    Vaccines:   Pneumovax- 2013   Zostivax- 2014   FLU-     Lifestyle:   Work - retired  at [employer] couple of years   Exercise- walks 20 minutes most days a week  Diet: Water- lemon water - one quart a day   Fruits- not daily - 3 times a week one serving   Veg- not daily- 3 times a week one serving   Processed Foods- rare   Protein- one serving daily   Sodas- none    Lifestyle Recommendations:     Exercise: 30 minutes moderate intensity most days of the week.   Alcohol: does not drink  Walking 10 minutes 3 times a day occasionally    What is meaningful or important to you now?  Maintaining health and trying to get more exercise    What helps you feel calm and focused?  Being alone and reading  What are your personal goals?  one day at a time.      ROS: Signs and Symptoms to report: Denies any of the following    Potential late effects and risks of all cancer treatment:    Cough  SOB  Weaknes or fatigue  Bone pain that is unusual and not improving  Swelling of extremities  Chest pain  Headaches or dizziness  Unintentional wt loss  Decreased appetite  Change in bowel or bladder function or character  Breast lumps or changes  Skin nodules or rash  Fevers or night sweats  Lymphedema    Hormone therapy: Denies any of the following -completed her Arimidex 2/2020   Decrease in bone density with fractures  Endometrial changes/abnormalities and vaginal  discharge  Blood clots  Hot flashes  Vaginal dryness and sexual dysfunction.     PSYCHOSOCIAL ADJUSTMENT: Pt states she is doing great with no adjustment difficulties.    Depression   Anxiety   Work stability   Stressors    Adjustment difficulties   Relationship difficulties    Physical Exam   Constitutional: She is oriented to person, place, and time. She appears well-developed and well-nourished. She appears distressed.   HENT:   Head: Normocephalic and atraumatic.   Right Ear: External ear normal.   Left Ear: External ear normal.   Nose: Nose normal. Right sinus exhibits no maxillary sinus tenderness and no frontal sinus tenderness. Left sinus exhibits no maxillary sinus tenderness and no frontal sinus tenderness.   Mouth/Throat: Oropharynx is clear and moist. No oropharyngeal exudate.   Eyes: Pupils are equal, round, and reactive to light. Conjunctivae, EOM and lids are normal. Right eye exhibits no discharge. Left eye exhibits no discharge. Right conjunctiva is not injected. Right conjunctiva has no hemorrhage. Left conjunctiva is not injected. Left conjunctiva has no hemorrhage. No scleral icterus.   Neck: Normal range of motion. Neck supple. No JVD present. No tracheal deviation present. No thyromegaly present.   Cardiovascular: Normal rate and regular rhythm.   Pulmonary/Chest: Effort normal. No stridor. No respiratory distress. She exhibits no tenderness.   Abdominal: Soft. She exhibits no distension and no mass. There is no splenomegaly or hepatomegaly. There is no tenderness. There is no rebound.   Musculoskeletal: Normal range of motion. She exhibits no edema or tenderness.   Breast; no visible mass, erythema or rash. No nipple retraction. No nipple discharge bilaterally, no palpable breast mass bilaterally.   Lymphadenopathy:     She has no cervical adenopathy.     She has no axillary adenopathy.        Right: No supraclavicular adenopathy present.        Left: No supraclavicular adenopathy present.    Neurological: She is alert and oriented to person, place, and time. No cranial nerve deficit. Coordination normal.   Skin: Skin is dry. No rash noted. She is not diaphoretic. No erythema.   Psychiatric: She has a normal mood and affect. Her behavior is normal. Judgment and thought content normal.   Vitals reviewed.    Referrals: none    Follow-up Appointment:   1 year.     Assessment/Plan:  Personal history of high risk for recurrence breast cancer-stage I, T1 C. N0 M0 ER positive/CA negative high risk breast cancer. Chemotherapy Regimen: TAC:   Cytoxan 500 mg/m2,  Adriamycin 50 mg/m2 with 10% dose reduction after the first treatment due to severe mucositis, taxotere 75 mg/m2 and neulasta support.  Dates: 3/29/11 - 7/12/11  Cycles: 6  Right breast radiation therapy  Adjuvant therapy f/u - Completed Arimidex therapy    RTC one year with melany mammo and for exam.  Reviewed what to look for on exam and report and reviewed possible signs of recurrence to report    30 minutes was spent with the patient and family reviewing past records, identifying risk factors for complications, planning strategies for health promotion and disease prevention, dietary counseling and signs/symptoms to report. Discussed follow-up plan and rationale.

## 2020-08-19 NOTE — DISCHARGE INSTRUCTIONS
[facility]  [id number] [facility]  [address]  [phone] phone     Hours of Operation: Monday- Friday 8:00am- 5:00pm  After hours phone  [phone]    Dr. Juice Cortes, PA  Nancy Tony PA      Please call with any concerns regarding your appointment today.     WAYS TO HELP PREVENT INFECTION         WASH YOUR HANDS OFTEN DURING THE DAY, ESPECIALLY BEFORE YOU EAT, AFTER USING THE BATHROOM, AND AFTER TOUCHING ANIMALS     STAY AWAY FROM PEOPLE WHO HAVE ILLNESSES YOU CAN CATCH; SUCH AS COLDS, FLU, CHICKEN POX     TRY TO AVOID CROWDS     STAY AWAY FROM CHILDREN WHO RECENTLY HAVE RECEIVED LIVE VIRUS VACCINES     MAINTAIN GOOD MOUTH CARE     DO NOT SQUEEZE OR SCRATCH PIMPLES     CLEAN CUTS & SCRAPES RIGHT AWAY AND DAILY UNTIL HEALED WITH WARM WATER, SOAP & AN ANTISEPTIC     AVOID CONTACT WITH LITTER BOXES, BIRD CAGES, & FISH TANKS     AVOID STANDING WATER, IE., BIRD BATHS, FLOWER POTS/VASES, OR HUMIDIFIERS     WEAR GLOVES WHEN GARDENING OR CLEANING UP AFTER OTHERS, ESPECIALLY BABIES & SMALL CHILDREN     DO NOT EAT RAW FISH, SEAFOOD, MEAT, OR EGGS  FALL PREVENTION   Falls often occur due to slipping, tripping or losing your balance. Here are ways to reduce your risk of falling again.   Was there anything that caused your fall that can be fixed, removed or replaced?   Make your home safe by keeping walkways clear of objects you may trip over.   Use non-slip pads under rugs.   Do not walk in poorly lit areas.   Do not stand on chairs or wobbly ladders.   Use caution when reaching overhead or looking upward. This position can cause a loss of balance.   Be sure your shoes fit properly, have non-slip bottoms and are in good condition.   Be cautious when going up and down stairs, curbs, and when walking on uneven sidewalks.   If your balance is poor, consider using a cane or walker.   If your fall was related to alcohol  use, stop or limit alcohol intake.   If your fall was related to use of sleeping medicines, talk to your doctor about this. You may need to reduce your dosage at bedtime if you awaken during the night to go to the bathroom.   To reduce the need for nighttime bathroom trips:   Avoid drinking fluids for several hours before going to bed   Empty your bladder before going to bed   Men can keep a urinal at the bedside   © 5806-4898 [facility], [address] [place] [address], [place] [address]. All rights reserved. This information is not intended as a substitute for professional medical care. Always follow your healthcare professional's instructions.

## 2020-08-19 NOTE — PROGRESS NOTES
RHEUMATOLOGY OUTPATIENT CLINIC NOTE    8/19/2020    Attending Rheumatologist: Nathaniel Moon  Primary Care Provider: Kamini Newton MD   Physician Requesting Consultation: No referring provider defined for this encounter.  Chief Complaint/Reason For Consultation:  Osteoarthritis and osteoporosis.    Subjective:       HPI  Renea Bourgeois is a 70 y.o. White female with OA and osteoporosis comes for follow-up.    Today  Last seen on November.  Recommendations given for DJD.  Using gabapentin as needed with excellent response.  Main complaint is episodic right knee joint pain.  Worst in the evening, aggravated by range of motion/prolonged weight-bearing.  Relieved somewhat by rest and OTC pain medication.  Denies association with prolonged morning stiffness, redness, or joint swelling.  Denies episodes of falls of fractures since last visit.  Currently not planned for invasive dental procedures.    Review of Systems   Constitutional: Negative for chills and fever.   Eyes: Negative for pain and redness.   Respiratory: Negative for cough and shortness of breath.    Cardiovascular: Negative for chest pain and leg swelling.   Gastrointestinal: Negative for blood in stool and melena.   Genitourinary: Negative for dysuria and urgency.   Musculoskeletal: Positive for joint pain (Right knee joint, mechanical pattern). Negative for falls.   Skin: Negative for rash.   Neurological: Negative for tingling and weakness.   Psychiatric/Behavioral: Negative for memory loss. The patient does not have insomnia.      Chronic comorbid conditions affecting medical decision making today:  Past Medical History:   Diagnosis Date    Allergy     Anemia 11/14/2017    Angina pectoris     followed by cardiology    Arthritis     Breast cancer     Right T1 Ductal Ca in situ,high oncotype Dx 33, Estrogen positive. Lumpectomy, TAC chemo x 6 cyscles then RT,on aromatase inhibitor    Cataract     Chondromalacia of right patella 3/26/2018     Stable and controlled. Continue current treatment plan as previously prescribed with your PCP.      Diabetes mellitus type II 2011    DM (diabetes mellitus) 2011     am 08/04/2017    DM (diabetes mellitus) 2010     am 06/22/2020    Elevated BP without diagnosis of hypertension 11/27/2018    GERD (gastroesophageal reflux disease)     History of colon polyps 2/21/2018    The patient had adenomatous colon polyps in 2014.      History of renal calculi 8/11/2015    Right obstructing 8/20/13 - passed.     Hyperlipidemia     Hypertension     Kidney stone     right side, passed    Malignant neoplasm of upper-outer quadrant of right breast in female, estrogen receptor positive 7/23/2018    Followed by Dr. Jones    Nuclear sclerosis of both eyes 10/5/2015    Osteopenia     Osteoporosis 4/12/2019    Pseudophakia 10/15/2015    PVC (premature ventricular contraction)     on and off    Refractive error 10/5/2015    Trouble in sleeping     Type 2 diabetes mellitus      Past Surgical History:   Procedure Laterality Date    BREAST BIOPSY      BREAST LUMPECTOMY  2/11/2011    right    CATARACT EXTRACTION Bilateral 10/14/15    CHOLECYSTECTOMY  1989    open    COLONOSCOPY N/A 2/22/2018    Procedure: COLONOSCOPY;  Surgeon: Carlito Odonnell MD;  Location: [facility];  Service: Endoscopy;  Laterality: N/A;    CYST REMOVAL Left 11/2017    foot    DILATION AND CURETTAGE OF UTERUS  10/2010    [place]    ESOPHAGOGASTRODUODENOSCOPY N/A 6/29/2020    Procedure: EGD (ESOPHAGOGASTRODUODENOSCOPY);  Surgeon: Nancy Hahn MD;  Location: [facility];  Service: Endoscopy;  Laterality: N/A;    GALLBLADDER SURGERY      removed in 1989    Nasal septal deviation repair  2009    toenail edges removed      TONSILLECTOMY  1959    TOTAL REDUCTION MAMMOPLASTY Left     2015     Family History   Problem Relation Age of Onset    Diabetes Father     Hypertension Father     Stroke Father     Cancer Father 65         metastatic when diagnosed    Stroke Brother     Cancer Other         kidney    Atrial fibrillation Son         35    Heart disease Son     Alzheimer's disease Mother     Parkinsonism Brother     Cancer Paternal Grandfather         prostate    Glaucoma Maternal Grandmother     Psoriasis Cousin     Alcohol abuse Neg Hx     Drug abuse Neg Hx     COPD Neg Hx     Birth defects Neg Hx     Mental retardation Neg Hx     Mental illness Neg Hx     Kidney disease Neg Hx     Hyperlipidemia Neg Hx     Melanoma Neg Hx     Lupus Neg Hx      Social History     Substance and Sexual Activity   Alcohol Use No    Alcohol/week: 0.0 standard drinks     Social History     Tobacco Use   Smoking Status Never Smoker   Smokeless Tobacco Never Used     Social History     Substance and Sexual Activity   Drug Use No       Current Outpatient Medications:     ACCU-CHEK JD PLUS TEST STRP Strp, TEST three times a day, Disp: 100 strip, Rfl: 11    acetaminophen (TYLENOL) 500 MG tablet, Take 500 mg by mouth every 6 (six) hours as needed for Pain., Disp: , Rfl:     ALCOHOL PREP PADS PadM, , Disp: , Rfl:     cholecalciferol, vitamin D3, (VITAMIN D3) 1,000 unit capsule, Take 1 capsule (1,000 Units total) by mouth once daily., Disp: , Rfl: 0    diclofenac sodium (VOLTAREN) 1 % Gel, Apply 2 g topically once daily., Disp: 1 Tube, Rfl: 2    ferrous gluconate (FERGON) 240 (27 FE) MG tablet, Take 1 tablet (240 mg total) by mouth once daily., Disp: , Rfl:     fluticasone (FLONASE) 50 mcg/actuation nasal spray, 2 sprays by Each Nare route daily as needed. 2 Spray, Suspension Nasal Every day, Disp: 16 g, Rfl: 5    gabapentin (NEURONTIN) 100 MG capsule, Take 1 capsule (100 mg total) by mouth 3 (three) times daily., Disp: 270 capsule, Rfl: 1    glimepiride (AMARYL) 1 MG tablet, Take 1 tablet (1 mg total) by mouth once daily., Disp: 90 tablet, Rfl: 3    lancets (ACCU-CHEK SOFTCLIX LANCETS) Misc, 1 each by Misc.(Non-Drug; Combo  "Route) route 3 (three) times daily., Disp: 100 each, Rfl: 11    lancing device Misc, , Disp: , Rfl:     methocarbamoL (ROBAXIN) 500 MG Tab, Take 1 tablet (500 mg total) by mouth 2 (two) times daily as needed (muscle spasms)., Disp: 60 tablet, Rfl: 0    metoprolol succinate (TOPROL-XL) 25 MG 24 hr tablet, Take 25 mg by mouth every evening. , Disp: , Rfl: 1    rosuvastatin (CRESTOR) 10 MG tablet, TAKE 1 TABLET BY MOUTH EVERY DAY, Disp: 90 tablet, Rfl: 0    traZODone (DESYREL) 100 MG tablet, TAKE 1 TABLET BY MOUTH AT BEDTIME, Disp: 90 tablet, Rfl: 1    turmeric 400 mg Cap, Take 400 mg by mouth 2 (two) times daily as needed., Disp: 60 capsule, Rfl: 0  No current facility-administered medications for this visit.     Facility-Administered Medications Ordered in Other Visits:     sodium chloride 0.9% flush 10 mL, 10 mL, Intravenous, PRN, Nathaniel Moon MD    zoledronic acid-mannitol & water 5 mg/100 mL infusion 5 mg, 5 mg, Intravenous, 1 time in Clinic/HOD, Nathaniel Moon MD     Objective:         Vitals:    08/19/20 0852   BP: (!) 144/76   Pulse: 70     Physical Exam   Constitutional: No distress.   Estimated body mass index is 26.78 kg/m² as calculated from the following:    Height as of this encounter: 5' 1" (1.549 m).    Weight as of this encounter: 64.3 kg (141 lb 12.1 oz).    Wt Readings from Last 1 Encounters:  08/19/20 0852 : 64.3 kg (141 lb 12.1 oz)     HENT:   Head: Normocephalic and atraumatic.   Eyes: Conjunctivae are normal. Pupils are equal, round, and reactive to light.   Neck: Normal range of motion.   Cardiovascular: Normal rate and intact distal pulses.    Pulmonary/Chest: Effort normal. No respiratory distress.   Abdominal: Soft. She exhibits no distension.   Neurological: She is alert. Gait normal.   Skin: No rash noted. No erythema.     Musculoskeletal: Normal range of motion. Deformity (Ute's.  Decreased finger flexion some fingers due to osteophytes.) present. No tenderness.      " Comments: : strong  No synovitis or significant squeeze tenderness    AROM: intact  PROM: intact    Devices used by patient: none       Reviewed old and all outside pertinent medical records available.    All lab results personally reviewed and interpreted by me.  Lab Results   Component Value Date    WBC 5.82 05/19/2020    HGB 13.8 05/19/2020    HCT 43.8 05/19/2020    MCV 96 05/19/2020    MCH 30.1 05/19/2020    MCHC 31.5 (L) 05/19/2020    RDW 12.6 05/19/2020     05/19/2020    MPV 11.5 05/19/2020    PLTEST Sl decreased (A) 07/21/2011       Lab Results   Component Value Date     08/17/2020    K 3.6 08/17/2020     08/17/2020    CO2 27 08/17/2020     (H) 08/17/2020    BUN 5 (L) 08/17/2020    CALCIUM 9.4 08/17/2020    PROT 6.6 08/17/2020    ALBUMIN 3.9 08/17/2020    BILITOT 0.3 08/17/2020    AST 21 08/17/2020    ALKPHOS 74 08/17/2020    ALT 21 08/17/2020       Lab Results   Component Value Date    COLORU Dk straw 10/16/2013    APPEARANCEUA CLEAR 08/20/2013    SPECGRAV 1.010 10/16/2013    PHUR 6 10/16/2013    PROTEINUA Negative 08/20/2013    KETONESU neg 10/16/2013    LEUKOCYTESUR Negative 08/20/2013    NITRITE neg 10/16/2013    UROBILINOGEN normal 10/16/2013       No results found for: CRP    Lab Results   Component Value Date    SEDRATE 10 03/08/2010       Lab Results   Component Value Date    SANTINO Negative 09/29/2006    RF Negative 09/29/2006    SEDRATE 10 03/08/2010       No components found for: 25OHVITDTOT, 27OMJFNG2, 07QMNDNM8, METHODNOTE    No results found for: URICACID    No components found for: TSPOTTB    · Vitamin-D 22 (4/2019)    Rheum Labs:  SANTINO negative  Rheumatoid factor negative     Infectious Labs:  HCV nonreactive     Imaging:  All imaging reviewed and independently  interpreted by me.    X-ray knee March 2018  Minimal bilateral degenerative changes noted throughout the all 3 compartments.  Equivocal small right effusion.    X-ray hands February 2020  No acute fractures or  dislocations visualized.  Periarticular soft tissue edema noted at the IP joints of the 2nd, 3rd, and 5th digits.  There is moderate to severe osteoarthritis seen throughout the IP joints with a possible element of erosive OA at the 2nd, 3rd, and 5th digits.  There is mild osteoarthritis present at the 1st CMC joint.  No appreciable change from prior.    DEXA scan  December 2014  July 2017 May 2019  FN: -1.3   -1.5   -1.7  LS: -2    -2.5  -2.7     ASSESSMENT / PLAN:     Renea Bourgeois is a 70 y.o. White female with:    1. Primary osteoarthritis involving multiple joints - stable  - resting affected joint for brief periods (<12 h), activity modification  - PT/OT, joint braces/splints PRN  - stretching, massage, heat, paraffin wax PRN  - Acetaminophen prn -> standing up to 3 g per day  - good results with gabapentin.  Continue as tolerated.  - Topicals therapy: Capsaicin / NSAIDs   - corticosteroid injection offered right knee today, deferred by patient at this time.    2. Osteoporosis  - previously on Prolia, last given 2014  - remains at high risk of fragility fracture. Hx of breast Ca on AI.  - received Reclast on August 2019.  Due for repeat dose today.  - continue with adequate dietary calcium and vitamin-D intake    3. Other specified counseling  - avoid immobility, fall prevention.  - Nutrition and exercise counseling.  - Medication counseling provided.    Follow up in about 1 year (around 8/19/2021).    Method of contact with patient concerns: [facility]    Disclaimer:  This note is prepared using voice recognition software and as such is likely to have errors and has not been proof read. Please contact me for questions.     Time spent: 25 minutes in face to face discussion concerning diagnosis, prognosis, review of lab and test results, benefits of treatment as well as management of disease, counseling of patient and coordination of care between various health care providers.  Greater than  half the time spent was used for coordination of care and counseling of patient.    Nathaniel Moon M.D.  Rheumatology Department   [facility]

## 2020-08-19 NOTE — NURSING
Infusion # 2  Recent Labs? Reviewed  Recent Invasive or planned dental procedures? Denied  Premeds? None    Reclast 5 mg administered IV over 15 minutes.   See Vitals and MAR for further details.

## 2020-08-24 ENCOUNTER — OFFICE VISIT (OUTPATIENT)
Dept: SURGERY | Facility: CLINIC | Age: 70
End: 2020-08-24
Payer: MEDICARE

## 2020-08-24 ENCOUNTER — CLINICAL SUPPORT (OUTPATIENT)
Dept: REHABILITATION | Facility: HOSPITAL | Age: 70
End: 2020-08-24
Attending: PHYSICAL MEDICINE & REHABILITATION
Payer: MEDICARE

## 2020-08-24 VITALS
DIASTOLIC BLOOD PRESSURE: 75 MMHG | HEART RATE: 69 BPM | WEIGHT: 141 LBS | SYSTOLIC BLOOD PRESSURE: 131 MMHG | TEMPERATURE: 99 F | HEIGHT: 61 IN | BODY MASS INDEX: 26.62 KG/M2

## 2020-08-24 DIAGNOSIS — Z71.89 COUNSELING ON HEALTH PROMOTION AND DISEASE PREVENTION: ICD-10-CM

## 2020-08-24 DIAGNOSIS — Z85.3 ENCOUNTER FOR FOLLOW-UP SURVEILLANCE OF BREAST CANCER: Primary | ICD-10-CM

## 2020-08-24 DIAGNOSIS — Z71.89 COUNSELING AND COORDINATION OF CARE: ICD-10-CM

## 2020-08-24 DIAGNOSIS — R29.3 POSTURE IMBALANCE: ICD-10-CM

## 2020-08-24 DIAGNOSIS — R29.898 WEAKNESS OF BOTH HIPS: ICD-10-CM

## 2020-08-24 DIAGNOSIS — Z85.3 PERSONAL HISTORY OF BREAST CANCER: ICD-10-CM

## 2020-08-24 DIAGNOSIS — Z08 ENCOUNTER FOR FOLLOW-UP SURVEILLANCE OF BREAST CANCER: Primary | ICD-10-CM

## 2020-08-24 DIAGNOSIS — M25.511 ACUTE PAIN OF RIGHT SHOULDER: ICD-10-CM

## 2020-08-24 DIAGNOSIS — Z12.31 ENCOUNTER FOR SCREENING MAMMOGRAM FOR MALIGNANT NEOPLASM OF BREAST: ICD-10-CM

## 2020-08-24 DIAGNOSIS — M53.82 NECK MUSCLE WEAKNESS: ICD-10-CM

## 2020-08-24 DIAGNOSIS — Z71.9 HEALTH EDUCATION/COUNSELING: ICD-10-CM

## 2020-08-24 PROCEDURE — 3075F PR MOST RECENT SYSTOLIC BLOOD PRESS GE 130-139MM HG: ICD-10-PCS | Mod: HCNC,CPTII,S$GLB, | Performed by: NURSE PRACTITIONER

## 2020-08-24 PROCEDURE — 3078F PR MOST RECENT DIASTOLIC BLOOD PRESSURE < 80 MM HG: ICD-10-PCS | Mod: HCNC,CPTII,S$GLB, | Performed by: NURSE PRACTITIONER

## 2020-08-24 PROCEDURE — 99214 OFFICE O/P EST MOD 30 MIN: CPT | Mod: HCNC,S$GLB,, | Performed by: NURSE PRACTITIONER

## 2020-08-24 PROCEDURE — 99999 PR PBB SHADOW E&M-EST. PATIENT-LVL IV: ICD-10-PCS | Mod: PBBFAC,HCNC,, | Performed by: NURSE PRACTITIONER

## 2020-08-24 PROCEDURE — 1101F PR PT FALLS ASSESS DOC 0-1 FALLS W/OUT INJ PAST YR: ICD-10-PCS | Mod: HCNC,CPTII,S$GLB, | Performed by: NURSE PRACTITIONER

## 2020-08-24 PROCEDURE — 3008F BODY MASS INDEX DOCD: CPT | Mod: HCNC,CPTII,S$GLB, | Performed by: NURSE PRACTITIONER

## 2020-08-24 PROCEDURE — 99999 PR PBB SHADOW E&M-EST. PATIENT-LVL IV: CPT | Mod: PBBFAC,HCNC,, | Performed by: NURSE PRACTITIONER

## 2020-08-24 PROCEDURE — 3008F PR BODY MASS INDEX (BMI) DOCUMENTED: ICD-10-PCS | Mod: HCNC,CPTII,S$GLB, | Performed by: NURSE PRACTITIONER

## 2020-08-24 PROCEDURE — 97110 THERAPEUTIC EXERCISES: CPT | Mod: HCNC

## 2020-08-24 PROCEDURE — 1159F PR MEDICATION LIST DOCUMENTED IN MEDICAL RECORD: ICD-10-PCS | Mod: HCNC,S$GLB,, | Performed by: NURSE PRACTITIONER

## 2020-08-24 PROCEDURE — 1159F MED LIST DOCD IN RCRD: CPT | Mod: HCNC,S$GLB,, | Performed by: NURSE PRACTITIONER

## 2020-08-24 PROCEDURE — 99214 PR OFFICE/OUTPT VISIT, EST, LEVL IV, 30-39 MIN: ICD-10-PCS | Mod: HCNC,S$GLB,, | Performed by: NURSE PRACTITIONER

## 2020-08-24 PROCEDURE — 1101F PT FALLS ASSESS-DOCD LE1/YR: CPT | Mod: HCNC,CPTII,S$GLB, | Performed by: NURSE PRACTITIONER

## 2020-08-24 PROCEDURE — 1126F AMNT PAIN NOTED NONE PRSNT: CPT | Mod: HCNC,S$GLB,, | Performed by: NURSE PRACTITIONER

## 2020-08-24 PROCEDURE — 1126F PR PAIN SEVERITY QUANTIFIED, NO PAIN PRESENT: ICD-10-PCS | Mod: HCNC,S$GLB,, | Performed by: NURSE PRACTITIONER

## 2020-08-24 PROCEDURE — 3078F DIAST BP <80 MM HG: CPT | Mod: HCNC,CPTII,S$GLB, | Performed by: NURSE PRACTITIONER

## 2020-08-24 PROCEDURE — 3075F SYST BP GE 130 - 139MM HG: CPT | Mod: HCNC,CPTII,S$GLB, | Performed by: NURSE PRACTITIONER

## 2020-08-24 NOTE — PROGRESS NOTES
Physical Therapist Treatment Note     Name: Rneea NICOLE [place]  Clinic Number: [id number]    Therapy Diagnosis:   Encounter Diagnoses   Name Primary?    Acute pain of right shoulder     Posture imbalance     Neck muscle weakness     Weakness of both hips      Physician: Harish Nieves MD    Visit Date: 8/24/2020   Physician Orders: PT Eval and Treat  Medical Diagnosis from Referral  M46.1 (ICD-10-CM) - Sacroiliitis, not elsewhere classified   M79.12 (ICD-10-CM) - Myalgia of auxiliary muscles, head and neck     Evaluation Date: 7/27/2020  Authorization Period Expiration:7/28/21  Plan of Care Expiration: 8/26/2020  Visit # / Visits authorized: 4/20      Time In: 8:40  Time Out: 9:39  Total Billable Time: 54 minutes    Precautions: Standard    Subjective     Pt reports: no complaints. Spouse to return home from rehab next week.   She was partially compliant with home exercise program.  Response to previous treatment: minimal soreness  Functional change: none at this time    Pain: 2/10  Location: bilateral back      Objective     Renea received therapeutic exercises to develop flexibility, posture, core stabilization, strength for 54 minutes including:    Side lying open books    PPT w/hold-dead bugs  Squats 10x3  Bridges-single 8x2 each  Dead bug holds  Prone hip extension  SL hip ABD  Supine dynamic hamstring stretches  Standing (against wall) horiz shoulder abd w/TB w/hold-hold  Lifting technique with box and squatting technique with 10# KB  Dead lift-30#  ZOEY hip ext/ABD-RTB  MEDX 40# 0-60 15 reps    Home Exercises Provided and Patient Education Provided     Education provided:   - HEP review- added open books  - Abdominal bracing w/transitional movements  -Lifting/ squatting technique    Written Home Exercises Provided: Patient instructed to cont prior HEP.  Exercises were reviewed and Renea was able to demonstrate them prior to the end of the session.  Renea demonstrated good  understanding of the  education provided.     See EMR under Patient Instructions for exercises provided prior visit.    Assessment     Renea presents with reports of improved back pain. Reports modifying her home to accomodate her more efficiently. Participated in strength and core stabilization programs to allow for improved postural correction. Progressing well with strength training with correct body mechanics.   Renea is progressing well towards her goals.   Pt prognosis is Good.     Pt will continue to benefit from skilled outpatient physical therapy to address the deficits listed in the problem list box on initial evaluation, provide pt/family education and to maximize pt's level of independence in the home and community environment.     Pt's spiritual, cultural and educational needs considered and pt agreeable to plan of care and goals.     Anticipated barriers to physical therapy: none at this time    Goals:   Short Term Goals: 2 weeks   1. Patient will be independent with HEP partially met  2. Patient abby demonstrate correct sitting/standing posture progressing, not met     Long Term Goals: 4 weeks   1. Right shoulder strength abby improve to 4+/5 to allow for increased overhead activities  2. Patient will demonstrate correct body mechanics while lifting  3. FOTO will improve by 10 points  4. Pain will reduce by 20%  Plan     Plan of care Certification: 7/27/2020 to 8/26/2020.     Outpatient Physical Therapy 2 times weekly for 4 weeks to include the following interventions: Electrical Stimulation TENS, Manual Therapy, Moist Heat/ Ice, Neuromuscular Re-ed, Patient Education, Therapeutic Activites and Therapeutic Exercise, ASTYM, Kinesiotaping PRN, Functional Dry Needling    Anthony Pennington, PT

## 2020-08-26 ENCOUNTER — CLINICAL SUPPORT (OUTPATIENT)
Dept: REHABILITATION | Facility: HOSPITAL | Age: 70
End: 2020-08-26
Attending: PHYSICAL MEDICINE & REHABILITATION
Payer: MEDICARE

## 2020-08-26 DIAGNOSIS — M25.511 ACUTE PAIN OF RIGHT SHOULDER: ICD-10-CM

## 2020-08-26 DIAGNOSIS — R29.898 WEAKNESS OF BOTH HIPS: ICD-10-CM

## 2020-08-26 DIAGNOSIS — R29.3 POSTURE IMBALANCE: ICD-10-CM

## 2020-08-26 DIAGNOSIS — M53.82 NECK MUSCLE WEAKNESS: ICD-10-CM

## 2020-08-26 PROCEDURE — 97110 THERAPEUTIC EXERCISES: CPT | Mod: HCNC

## 2020-08-26 NOTE — PROGRESS NOTES
Physical Therapist Treatment Note     Name: Renea NICOLE [place]  Clinic Number: [id number]    Therapy Diagnosis:   Encounter Diagnoses   Name Primary?    Acute pain of right shoulder     Posture imbalance     Neck muscle weakness     Weakness of both hips      Physician: Harish Nieves MD    Visit Date: 8/26/2020   Physician Orders: PT Eval and Treat  Medical Diagnosis from Referral  M46.1 (ICD-10-CM) - Sacroiliitis, not elsewhere classified   M79.12 (ICD-10-CM) - Myalgia of auxiliary muscles, head and neck     Evaluation Date: 7/27/2020  Authorization Period Expiration:7/28/21  Plan of Care Expiration: 8/26/2020  Visit # / Visits authorized: 6/20      Time In: 9:30  Time Out: 10:14  Total Billable Time: 40 minutes    Precautions: Standard    Subjective     Pt reports: no complaints.   She was partially compliant with home exercise program.  Response to previous treatment: minimal soreness  Functional change: none at this time    Pain: 0/10  Location: bilateral back      Objective     Renea received therapeutic exercises to develop flexibility, posture, core stabilization, strength for 40 minutes including:    Side lying open books    PPT w/hold-dead bugs  Squats 10x3  Bridges-single 8x2 each  Dead bug holds  Prone hip extension  Lifting technique with box and squatting technique with 10# KB  Dead lift-30#  ZOEY hip ext/ABD-RTB  MEDX 40# 0-60 15 reps    Home Exercises Provided and Patient Education Provided     Education provided:   - HEP review- added open books  - Abdominal bracing w/transitional movements  -Lifting/ squatting technique    Written Home Exercises Provided: Patient instructed to cont prior HEP.  Exercises were reviewed and Renea was able to demonstrate them prior to the end of the session.  Renea demonstrated good  understanding of the education provided.     See EMR under Patient Instructions for exercises provided prior visit.    Assessment     Renea presents with reports of improved back  pain. Reports no pain since previous session. Participated in functional strength training to enhance lifting techniques with added postural protection. Progressing well but fatigues easily. Will benefit from further muscular endurance training with increasing resistance.   Renea is progressing well towards her goals.   Pt prognosis is Good.     Pt will continue to benefit from skilled outpatient physical therapy to address the deficits listed in the problem list box on initial evaluation, provide pt/family education and to maximize pt's level of independence in the home and community environment.     Pt's spiritual, cultural and educational needs considered and pt agreeable to plan of care and goals.     Anticipated barriers to physical therapy: none at this time    Goals:   Short Term Goals: 2 weeks   1. Patient will be independent with HEP partially met  2. Patient abby demonstrate correct sitting/standing posture progressing, not met     Long Term Goals: 4 weeks   1. Right shoulder strength abby improve to 4+/5 to allow for increased overhead activities  2. Patient will demonstrate correct body mechanics while lifting  3. FOTO will improve by 10 points  4. Pain will reduce by 20%  Plan     Plan of care Certification: 7/27/2020 to 8/26/2020.     Outpatient Physical Therapy 2 times weekly for 4 weeks to include the following interventions: Electrical Stimulation TENS, Manual Therapy, Moist Heat/ Ice, Neuromuscular Re-ed, Patient Education, Therapeutic Activites and Therapeutic Exercise, ASTYM, Kinesiotaping PRN, Functional Dry Needling    Anthony Pennington, PT

## 2020-08-31 ENCOUNTER — CLINICAL SUPPORT (OUTPATIENT)
Dept: REHABILITATION | Facility: HOSPITAL | Age: 70
End: 2020-08-31
Attending: PHYSICAL MEDICINE & REHABILITATION
Payer: MEDICARE

## 2020-08-31 DIAGNOSIS — R29.3 POSTURE IMBALANCE: ICD-10-CM

## 2020-08-31 DIAGNOSIS — M53.82 NECK MUSCLE WEAKNESS: ICD-10-CM

## 2020-08-31 DIAGNOSIS — M25.511 ACUTE PAIN OF RIGHT SHOULDER: ICD-10-CM

## 2020-08-31 DIAGNOSIS — R29.898 WEAKNESS OF BOTH HIPS: ICD-10-CM

## 2020-08-31 PROCEDURE — 97112 NEUROMUSCULAR REEDUCATION: CPT | Mod: HCNC

## 2020-08-31 PROCEDURE — 97110 THERAPEUTIC EXERCISES: CPT | Mod: HCNC

## 2020-08-31 NOTE — PROGRESS NOTES
Physical Therapist Treatment Note     Name: Renea NICOLE [place]  Clinic Number: [id number]    Therapy Diagnosis:   Encounter Diagnoses   Name Primary?    Acute pain of right shoulder     Posture imbalance     Neck muscle weakness     Weakness of both hips      Physician: Harish Nieves MD    Visit Date: 8/31/2020   Physician Orders: PT Eval and Treat  Medical Diagnosis from Referral  M46.1 (ICD-10-CM) - Sacroiliitis, not elsewhere classified   M79.12 (ICD-10-CM) - Myalgia of auxiliary muscles, head and neck     Evaluation Date: 7/27/2020  Authorization Period Expiration:7/28/21  Plan of Care Expiration: 8/26/2020  Visit # / Visits authorized: 7/20      Time In: 9:28  Time Out: 10:30  Total Billable Time: 55 minutes    Precautions: Standard    Subjective     Pt reports: no complaints.  returned home from rehab last week.    She was partially compliant with home exercise program.  Response to previous treatment: minimal soreness  Functional change: none at this time    Pain: 0/10  Location: bilateral back      Objective     Renea received therapeutic exercises to develop flexibility, posture, core stabilization, strength for 40 minutes including:      PPT w/hold-dead bugs  Squats 10x3 10# kettle bell to 16 inch  Bridges-single 8x2 each  Dead bug holds  Prone hip extension  Lifting technique with box and squatting technique with 10# KB  Dead lift-30# 8x3  ZOEY hip ext/ABD-RTB  MEDX 40# 0-60 15 reps  Matrix knee ext 10#    NEURO DENISE 15 minutes for transfer training mechanics, body mechanics while lifting    Home Exercises Provided and Patient Education Provided     Education provided:   - HEP review- added open books  - Abdominal bracing w/transitional movements  -Lifting/ squatting technique    Written Home Exercises Provided: Patient instructed to cont prior HEP.  Exercises were reviewed and Renea was able to demonstrate them prior to the end of the session.  Renea demonstrated good  understanding of  the education provided.     See EMR under Patient Instructions for exercises provided prior visit.    Assessment     Renea presents with reports of muscle soreness following her previous session but no pain. Reports ability to transfer her  who recently returned home from rehab with improved mechanics. Educated her on proper transfer techniques and body mechanics with progressive strength training and postural reeducation activities to activate gluts and core musculature.   Renea is progressing well towards her goals.   Pt prognosis is Good.     Pt will continue to benefit from skilled outpatient physical therapy to address the deficits listed in the problem list box on initial evaluation, provide pt/family education and to maximize pt's level of independence in the home and community environment.     Pt's spiritual, cultural and educational needs considered and pt agreeable to plan of care and goals.     Anticipated barriers to physical therapy: none at this time    Goals:   Short Term Goals: 2 weeks   1. Patient will be independent with HEP partially met  2. Patient abby demonstrate correct sitting/standing posture progressing, not met     Long Term Goals: 4 weeks   1. Right shoulder strength abby improve to 4+/5 to allow for increased overhead activities  2. Patient will demonstrate correct body mechanics while lifting  3. FOTO will improve by 10 points  4. Pain will reduce by 20%  Plan     Plan of care Certification: 7/27/2020 to 8/26/2020.     Outpatient Physical Therapy 2 times weekly for 4 weeks to include the following interventions: Electrical Stimulation TENS, Manual Therapy, Moist Heat/ Ice, Neuromuscular Re-ed, Patient Education, Therapeutic Activites and Therapeutic Exercise, ASTYM, Kinesiotaping PRN, Functional Dry Needling    Anthony Pennington, PT

## 2020-09-02 ENCOUNTER — CLINICAL SUPPORT (OUTPATIENT)
Dept: REHABILITATION | Facility: HOSPITAL | Age: 70
End: 2020-09-02
Attending: PHYSICAL MEDICINE & REHABILITATION
Payer: MEDICARE

## 2020-09-02 DIAGNOSIS — R29.3 POSTURE IMBALANCE: ICD-10-CM

## 2020-09-02 DIAGNOSIS — M53.82 NECK MUSCLE WEAKNESS: ICD-10-CM

## 2020-09-02 DIAGNOSIS — M25.511 ACUTE PAIN OF RIGHT SHOULDER: ICD-10-CM

## 2020-09-02 DIAGNOSIS — R29.898 WEAKNESS OF BOTH HIPS: ICD-10-CM

## 2020-09-02 PROCEDURE — 97110 THERAPEUTIC EXERCISES: CPT | Mod: HCNC,CQ

## 2020-09-02 NOTE — PLAN OF CARE
Outpatient Therapy Updated Plan of Care     Visit Date: 8/26/2020    Name: Renea [place]geois  Clinic Number: [id number]    Therapy Diagnosis:   Encounter Diagnoses   Name Primary?    Acute pain of right shoulder     Posture imbalance     Neck muscle weakness     Weakness of both hips      Physician: Harish Nieves MD    Physician Orders: PT Eval and Treat   Medical Diagnosis from Referral:   M46.1 (ICD-10-CM) - Sacroiliitis, not elsewhere classified   M79.12 (ICD-10-CM) - Myalgia of auxiliary muscles, head and neck         Evaluation Date: 7/27/2020  Authorization Period Expiration: 8/26/2020  Plan of Care Expiration: 8/26/2020  Visit # / Visits authorized: 6/ 20      Precautions: Standard  Functional Level Prior to Evaluation:  Pain prevented prolonged activity, transfers with     Subjective     Update: Patient had no complaints. Reported improved transfer and lifting techniques without pain.    Objective     Update:   Thoracolumbar AROM Pain/Dysfunction with Movement   Flexion WFL     Extension 75%     Right side bending WFL     Left side bending WFL     Right rotation WFL     Left rotation WFL       Shoulder Right Left Pain/Dysfunction with Movement   AROM/PROM         flexion WFL WFL     extension WFL WFL     abduction WFL WFL     adduction WFL WFL     Internal rotation WFL WFL     ER at 90° abd WFL WFL     ER at 0° abd WFL WFL           U/E MMT Right Left Pain/Dysfunction with Movement   Shoulder Flexion 4+/5 4+/5     Shoulder Extension 5/5 5/5     Shoulder Abduction 4/5 4+/5     Shoulder Adduction 5/5 5/5     Shoulder IR 5/5 5/5     Shoulder ER  @ 0* Abduction 4+/5 5/5     Shoulder ER  @ 90* Abduction 3+/5 4+/5     Elbow Flexion  5/5 5/5     Elbow Extension 5/5 5/5     Rhomboids 4/5 4/5     Mid Traps 4/5 4/5     Low Traps 4/5 4/5        Hip Right Left Pain/Dysfunction with Movement   AROM/PROM         flexion WNL WNL     extension WFL WFL No Pain R   abduction WFL WFL     adduction WFL WFL      Internal rotation WFL WFL     External rotation WFL WFL              L/E MMT Right Left Pain/Dysfunction with Movement   Hip Flexion 4+/5 4+/5     Hip Extension 3+/5 3+/5     Hip Abduction 4/5 4+/5     Hip Adduction 4+/5 4+/5     Hip IR 3+/5 3+/5     Hip ER 4/5 4/5     Knee Flexion 5/5 5/5     Knee Extension 5/5 5/5     Ankle DF 5/5 5/5     Ankle PF 5/5 5/5     Ankle Inversion 5/5 5/5     Ankle Eversion 5/5 5/5     Big Toe Extension 5/5 5/5            Assessment     Update: Renea presents with reports of improved back pain. Reports no pain since previous session. Participated in functional strength training to enhance lifting techniques with added postural protection. Progressing well but fatigues easily. Will benefit from further muscular endurance training with increasing resistance. Patient demonstrates significant strength gains but requires further strengthening and flexibility training to allow her to enhance postural support as well as ability to maintain body mechanics with transfers of .    Previous Short Term Goals Status:   Met  New Short Term Goals Status:   none  Long Term Goal Status:   continue per initial plan of care.  Reasons for Recertification of Therapy:   Progress strength, postural control and body mechanics to allow for pain to remain resolved and be able to transfer her  correctly    Plan     Updated Certification Period: 8/26/2020 to 9/26/2020  Recommended Treatment Plan: 2 times per week for 4 weeks: Manual Therapy, Moist Heat/ Ice, Neuromuscular Re-ed, Patient Education, Therapeutic Activites and Therapeutic Exercise  Other Recommendations: dry tarik Pennington, PT  8/26/2020      I CERTIFY THE NEED FOR THESE SERVICES FURNISHED UNDER THIS PLAN OF TREATMENT AND WHILE UNDER MY CARE    Physician's comments:        Physician's Signature: ___________________________________________________

## 2020-09-08 ENCOUNTER — CLINICAL SUPPORT (OUTPATIENT)
Dept: REHABILITATION | Facility: HOSPITAL | Age: 70
End: 2020-09-08
Payer: MEDICARE

## 2020-09-08 DIAGNOSIS — M53.82 NECK MUSCLE WEAKNESS: ICD-10-CM

## 2020-09-08 DIAGNOSIS — R29.898 WEAKNESS OF BOTH HIPS: ICD-10-CM

## 2020-09-08 DIAGNOSIS — M25.511 ACUTE PAIN OF RIGHT SHOULDER: ICD-10-CM

## 2020-09-08 DIAGNOSIS — R29.3 POSTURE IMBALANCE: ICD-10-CM

## 2020-09-08 PROCEDURE — 97110 THERAPEUTIC EXERCISES: CPT | Mod: HCNC

## 2020-09-08 NOTE — PROGRESS NOTES
Physical Therapist Treatment Note     Name: Renea NICOLE [place]  Clinic Number: [id number]    Therapy Diagnosis:   Encounter Diagnoses   Name Primary?    Acute pain of right shoulder     Posture imbalance     Neck muscle weakness     Weakness of both hips      Physician: Harish Nieves MD    Visit Date: 9/8/2020   Physician Orders: PT Eval and Treat  Medical Diagnosis from Referral  M46.1 (ICD-10-CM) - Sacroiliitis, not elsewhere classified   M79.12 (ICD-10-CM) - Myalgia of auxiliary muscles, head and neck     Evaluation Date: 7/27/2020  Authorization Period Expiration:7/28/21  Plan of Care Expiration: 9/26/2020  Visit # / Visits authorized: 9/20      Time In: 9:30  Time Out: 10:30  Total Billable Time: 54 minutes    Precautions: Standard    Subjective     Pt reports: no complaints. Reported minimal soreness after previous session.   She was partially compliant with home exercise program.  Response to previous treatment: minimal soreness  Functional change: none at this time    Pain: 0/10  Location: bilateral back      Objective     Renea received therapeutic exercises to develop flexibility, posture, core stabilization, strength for 54 minutes including:      PPT w/hold-dead bugs  Squats 10x3 10# kettle bell to 16 inch  Bridges-single 8x2 each  Dead bug holds  Prone hip extension   HOLD  Lifting technique with box and squatting technique with 10# KB  Dead lift-30# 8x3  ZOEY hip ext/ABD-RTB  MEDX 42# 0-60 15 reps  Matrix knee ext 10#  Trun rotation 10#  Trunk rotation 10# with seperated hands  Bailey carry 10# KB  Treadmill 10 min        Home Exercises Provided and Patient Education Provided     Education provided:   - HEP review- added open books  - Abdominal bracing w/transitional movements  -Lifting/ squatting technique    Written Home Exercises Provided: Patient instructed to cont prior HEP.  Exercises were reviewed and Renea was able to demonstrate them prior to the end of the session.  Reena  demonstrated good  understanding of the education provided.     See EMR under Patient Instructions for exercises provided prior visit.    Assessment     Renea presents with steady improvement in trunk and LE strength as well as cardiorespioratory training. Exhibits improved lifting techniques with control however requires cues to improve left LE weight shift while avoiding genu valgum.    Renea is progressing well towards her goals.   Pt prognosis is Good.     Pt will continue to benefit from skilled outpatient physical therapy to address the deficits listed in the problem list box on initial evaluation, provide pt/family education and to maximize pt's level of independence in the home and community environment.     Pt's spiritual, cultural and educational needs considered and pt agreeable to plan of care and goals.     Anticipated barriers to physical therapy: none at this time    Goals:   Short Term Goals: 2 weeks   1. Patient will be independent with HEP partially met  2. Patient abby demonstrate correct sitting/standing posture progressing, not met     Long Term Goals: 4 weeks   1. Right shoulder strength abby improve to 4+/5 to allow for increased overhead activities  2. Patient will demonstrate correct body mechanics while lifting  3. FOTO will improve by 10 points  4. Pain will reduce by 20%  Plan     Plan of care Certification: 7/27/2020 to 8/26/2020.     Outpatient Physical Therapy 2 times weekly for 4 weeks to include the following interventions: Electrical Stimulation TENS, Manual Therapy, Moist Heat/ Ice, Neuromuscular Re-ed, Patient Education, Therapeutic Activites and Therapeutic Exercise, ASTYM, Kinesiotaping PRN, Functional Dry Needling    Anthony Pennington, PT

## 2020-09-11 ENCOUNTER — CLINICAL SUPPORT (OUTPATIENT)
Dept: REHABILITATION | Facility: HOSPITAL | Age: 70
End: 2020-09-11
Payer: MEDICARE

## 2020-09-11 DIAGNOSIS — M25.511 ACUTE PAIN OF RIGHT SHOULDER: ICD-10-CM

## 2020-09-11 DIAGNOSIS — R29.898 WEAKNESS OF BOTH HIPS: ICD-10-CM

## 2020-09-11 DIAGNOSIS — R29.3 POSTURE IMBALANCE: ICD-10-CM

## 2020-09-11 DIAGNOSIS — M53.82 NECK MUSCLE WEAKNESS: ICD-10-CM

## 2020-09-11 PROCEDURE — 97110 THERAPEUTIC EXERCISES: CPT | Mod: HCNC,CQ

## 2020-09-11 NOTE — PROGRESS NOTES
"  Physical Therapist Assistant Treatment Note     Name: Renea NICOLE [place]  Clinic Number: [id number]    Therapy Diagnosis:   Encounter Diagnoses   Name Primary?    Acute pain of right shoulder     Posture imbalance     Neck muscle weakness     Weakness of both hips      Physician: Harish Nieves MD    Visit Date: 9/11/2020   Physician Orders: PT Eval and Treat  Medical Diagnosis from Referral  M46.1 (ICD-10-CM) - Sacroiliitis, not elsewhere classified   M79.12 (ICD-10-CM) - Myalgia of auxiliary muscles, head and neck     Evaluation Date: 7/27/2020  Authorization Period Expiration:7/28/21  Plan of Care Expiration: 9/26/2020  Visit # / Visits authorized: 10/20      Time In: 2:15  Time Out: 3:15  Total Billable Time: 55 minutes    Precautions: Standard    Subjective     Pt reports: "I feel so much better; I haven't taken any of the pain meds this week".   She was partially compliant with home exercise program.  Response to previous treatment: minimal soreness  Functional change: Lifting/moving with less pain    Pain: 0/10  Location: na today    Objective     Renea received therapeutic exercises to develop flexibility, posture, core stabilization, strength for 55 minutes including:    PPT w/hold-dead bugs  Squats 10x3 10# kettle bell to 16 inch  Bridges-single 8x2 each  Dead bug holds  Prone hip extension- deferred today  Lifting technique with box and squatting technique without weight initially and then 10# KB  Dead lift-30# 8x3  ZOEY hip ext/ABD-RTB w/out support  MEDX 42# 0-60 20 reps  Matrix knee ext 10# 2x10  MEDX trunk rotation x8 ea  [place] carry 2/10# KB 2 laps  Treadmill 10 min  Dynamic hamstring stretches bilat x15 ea        Home Exercises Provided and Patient Education Provided     Education provided:   - HEP review  - Abdominal bracing w/transitional movements  -Lifting/ squatting technique     Written Home Exercises Provided: Patient instructed to cont prior HEP.  Exercises were reviewed and Renea " was able to demonstrate them prior to the end of the session.  Renea demonstrated good  understanding of the education provided.     See EMR under Patient Instructions for exercises provided prior visit.    Assessment     Renea presents without complaints of pain today reporting her mobility is much better. She is progressing well however core strength/stability continues to be impaired requiring ongoing training. Her hamstring flexibility is significantly impaired and she was encouraged to continue to stretch at home. Renea verbalized understanding. She has been inconsistent with her exercises. She did not experience pain today however was fatigued end of session.   Renea is progressing well towards her goals.   Pt prognosis is Good.     Pt will continue to benefit from skilled outpatient physical therapy to address the deficits listed in the problem list box on initial evaluation, provide pt/family education and to maximize pt's level of independence in the home and community environment.     Pt's spiritual, cultural and educational needs considered and pt agreeable to plan of care and goals.     Anticipated barriers to physical therapy: none at this time    Goals:   Short Term Goals: 2 weeks   1. Patient will be independent with HEP partially met  2. Patient abby demonstrate correct sitting/standing posture progressing, not met     Long Term Goals: 4 weeks   1. Right shoulder strength abby improve to 4+/5 to allow for increased overhead activities  2. Patient will demonstrate correct body mechanics while lifting  3. FOTO will improve by 10 points  4. Pain will reduce by 20%  Plan     Plan of care Certification: 8/26/2020-9/26/2020     Outpatient Physical Therapy 2 times weekly for 4 weeks to include the following interventions: Electrical Stimulation TENS, Manual Therapy, Moist Heat/ Ice, Neuromuscular Re-ed, Patient Education, Therapeutic Activites and Therapeutic Exercise, ASTYM, Kinesiotaping PRN, Functional  Dry Needling    Cherie Ashley, PTA

## 2020-09-14 ENCOUNTER — CLINICAL SUPPORT (OUTPATIENT)
Dept: REHABILITATION | Facility: HOSPITAL | Age: 70
End: 2020-09-14
Payer: MEDICARE

## 2020-09-14 DIAGNOSIS — M53.82 NECK MUSCLE WEAKNESS: ICD-10-CM

## 2020-09-14 DIAGNOSIS — M25.511 ACUTE PAIN OF RIGHT SHOULDER: ICD-10-CM

## 2020-09-14 DIAGNOSIS — R29.3 POSTURE IMBALANCE: ICD-10-CM

## 2020-09-14 DIAGNOSIS — R29.898 WEAKNESS OF BOTH HIPS: ICD-10-CM

## 2020-09-14 PROCEDURE — 97110 THERAPEUTIC EXERCISES: CPT | Mod: HCNC,CQ

## 2020-09-14 NOTE — PROGRESS NOTES
"  Physical Therapist Assistant Treatment Note     Name: Renea NICOLE [place]  Clinic Number: [id number]    Therapy Diagnosis:   Encounter Diagnoses   Name Primary?    Acute pain of right shoulder     Posture imbalance     Neck muscle weakness     Weakness of both hips      Physician: Harish Nieves MD    Visit Date: 9/14/2020   Physician Orders: PT Eval and Treat  Medical Diagnosis from Referral  M46.1 (ICD-10-CM) - Sacroiliitis, not elsewhere classified   M79.12 (ICD-10-CM) - Myalgia of auxiliary muscles, head and neck     Evaluation Date: 7/27/2020  Authorization Period Expiration:7/28/21  Plan of Care Expiration: 9/26/2020  Visit # / Visits authorized: 11/20      Time In: 9:36  Time Out: 10:30  Total Billable Time: 54 minutes    Precautions: Standard    Subjective     Pt reports: "I feel so much better"   She was not  compliant with home exercise program.  Response to previous treatment: minimal soreness  Functional change: Lifting/moving with less pain    Pain: 0/10  Location: na today    Objective     Renea received therapeutic exercises to develop flexibility, posture, core stabilization, strength for 54 minutes including:  Hip hinge w/dowel and at wall followed by squats   Squats 10x3 2/10# DB to 16 inch  Bridges-single 10x2 each  Dead bug holds 15 sec x5  Prone hip extension- deferred today  Lifting technique with box and squatting technique   Dead lift-30# 8x3  ZOEY hip ext/ABD-RTB w/out support  MEDX 44# 0-60 15 reps  Matrix knee ext 10# 2x10  MEDX trunk rotation x8 ea  [place] carry 2/10# KB 2 laps  Treadmill 10 min  Dynamic hamstring stretches bilat x15 ea        Home Exercises Provided and Patient Education Provided     Education provided:   - HEP review  - Abdominal bracing w/transitional movements  -Lifting/ squatting technique     Written Home Exercises Provided: Patient instructed to cont prior HEP.  Exercises were reviewed and Renea was able to demonstrate them prior to the end of the session.  " Renea demonstrated good  understanding of the education provided.     See EMR under Patient Instructions for exercises provided prior visit.    Assessment     Renea presents without complaints of pain today reporting her mobility is much better. She is progressing well however core strength/stability continues to be impaired requiring ongoing training. Today she was a bit more challenged with her technique w/squats and deadlifts so increased focus with verbal, tactile, and visual cues was required for training good technique with hip hinge. She has been inconsistent with her exercises at home due to various responsibilities. She did not experience pain today however was fatigued end of session.   Renea is progressing well towards her goals.   Pt prognosis is Good.     Pt will continue to benefit from skilled outpatient physical therapy to address the deficits listed in the problem list box on initial evaluation, provide pt/family education and to maximize pt's level of independence in the home and community environment.     Pt's spiritual, cultural and educational needs considered and pt agreeable to plan of care and goals.     Anticipated barriers to physical therapy: none at this time    Goals:   Short Term Goals: 2 weeks   1. Patient will be independent with HEP partially met  2. Patient abby demonstrate correct sitting/standing posture progressing, not met     Long Term Goals: 4 weeks   1. Right shoulder strength abby improve to 4+/5 to allow for increased overhead activities  2. Patient will demonstrate correct body mechanics while lifting   3. FOTO will improve by 10 points  4. Pain will reduce by 20%  Plan     Plan of care Certification: 8/26/2020-9/26/2020     Outpatient Physical Therapy 2 times weekly for 4 weeks to include the following interventions: Electrical Stimulation TENS, Manual Therapy, Moist Heat/ Ice, Neuromuscular Re-ed, Patient Education, Therapeutic Activites and Therapeutic Exercise, GAURAV  Kinesiotaping PRN, Functional Dry Needling    Cherie Ashley, PTA

## 2020-09-16 ENCOUNTER — CLINICAL SUPPORT (OUTPATIENT)
Dept: REHABILITATION | Facility: HOSPITAL | Age: 70
End: 2020-09-16
Payer: MEDICARE

## 2020-09-16 DIAGNOSIS — R29.898 WEAKNESS OF BOTH HIPS: ICD-10-CM

## 2020-09-16 DIAGNOSIS — R29.3 POSTURE IMBALANCE: ICD-10-CM

## 2020-09-16 DIAGNOSIS — M25.511 ACUTE PAIN OF RIGHT SHOULDER: ICD-10-CM

## 2020-09-16 DIAGNOSIS — M53.82 NECK MUSCLE WEAKNESS: ICD-10-CM

## 2020-09-16 PROCEDURE — 97110 THERAPEUTIC EXERCISES: CPT | Mod: HCNC

## 2020-09-16 NOTE — PROGRESS NOTES
Physical Therapist  Treatment Note     Name: Renea NICOLE [facility] Number: [id number]    Therapy Diagnosis:   Encounter Diagnoses   Name Primary?    Acute pain of right shoulder     Posture imbalance     Neck muscle weakness     Weakness of both hips      Physician: Harish Nieves MD    Visit Date: 9/16/2020   Physician Orders: PT Eval and Treat  Medical Diagnosis from Referral  M46.1 (ICD-10-CM) - Sacroiliitis, not elsewhere classified   M79.12 (ICD-10-CM) - Myalgia of auxiliary muscles, head and neck     Evaluation Date: 7/27/2020  Authorization Period Expiration:7/28/21  Plan of Care Expiration: 9/26/2020  Visit # / Visits authorized: 12/20      Time In: 9:15  Time Out: 10:00  Total Billable Time: 40 minutes    Precautions: Standard    Subjective     Pt reports: Feeling stronger and more secure while assisting her . Treatment was reduced today due to patient not eating prior to arrival   She was compliant with home exercise program.  Response to previous treatment: minimal soreness  Functional change: Lifting/moving with less pain    Pain: 0/10  Location: na today    Objective     Renea received therapeutic exercises to develop flexibility, posture, core stabilization, strength for 40 minutes including:    Squats 10x3 20# KB  Bridges-single HEP  Dead bug holds 15 sec x5 HEP  Prone hip extension- HEP  Lifting technique with box and squatting technique   Dead lift-30# 8x3  ZOEY hip ext/ABD-RTB w/out support  MEDX 48# 0-60 15 reps  Matrix knee ext 10# 3x10  [place] carry 2/15# DB 2 laps  Nu step 8 min with 2.5 resistance        Home Exercises Provided and Patient Education Provided     Education provided:   - HEP review  - Abdominal bracing w/transitional movements  -Lifting/ squatting technique     Written Home Exercises Provided: Patient instructed to cont prior HEP.  Exercises were reviewed and Renea was able to demonstrate them prior to the end of the session.  Renea demonstrated good   understanding of the education provided.     See EMR under Patient Instructions for exercises provided prior visit.    Assessment     Patient is doing well with postural correction as well as trunk and hip extension strengthening which allows her to assist her  with transfers easier. Instructed on transfer techniques. Treatment limited today due to her not eating prior to arrival. Patient to be discharged next week.  Renea is progressing well towards her goals.   Pt prognosis is Good.     Pt will continue to benefit from skilled outpatient physical therapy to address the deficits listed in the problem list box on initial evaluation, provide pt/family education and to maximize pt's level of independence in the home and community environment.     Pt's spiritual, cultural and educational needs considered and pt agreeable to plan of care and goals.     Anticipated barriers to physical therapy: none at this time    Goals:   Short Term Goals: 2 weeks   1. Patient will be independent with HEP  met  2. Patient abby demonstrate correct sitting/standing posture  met     Long Term Goals: 4 weeks   1. Right shoulder strength abby improve to 4+/5 to allow for increased overhead activities  2. Patient will demonstrate correct body mechanics while lifting   3. FOTO will improve by 10 points  4. Pain will reduce by 20%  Plan     Plan of care Certification: 8/26/2020-9/26/2020     Outpatient Physical Therapy 2 times weekly for 4 weeks to include the following interventions: Electrical Stimulation TENS, Manual Therapy, Moist Heat/ Ice, Neuromuscular Re-ed, Patient Education, Therapeutic Activites and Therapeutic Exercise, ASTYM, Kinesiotaping PRN, Functional Dry Needling    Anthony Pennington, PT

## 2020-09-21 ENCOUNTER — CLINICAL SUPPORT (OUTPATIENT)
Dept: REHABILITATION | Facility: HOSPITAL | Age: 70
End: 2020-09-21
Attending: PHYSICAL MEDICINE & REHABILITATION
Payer: MEDICARE

## 2020-09-21 DIAGNOSIS — M25.511 ACUTE PAIN OF RIGHT SHOULDER: ICD-10-CM

## 2020-09-21 DIAGNOSIS — M53.82 NECK MUSCLE WEAKNESS: ICD-10-CM

## 2020-09-21 DIAGNOSIS — R29.3 POSTURE IMBALANCE: ICD-10-CM

## 2020-09-21 DIAGNOSIS — R29.898 WEAKNESS OF BOTH HIPS: ICD-10-CM

## 2020-09-21 PROCEDURE — 97110 THERAPEUTIC EXERCISES: CPT | Mod: HCNC

## 2020-09-21 NOTE — PROGRESS NOTES
Physical Therapist  Treatment Note     Name: Renea NICOLE [place]  Clinic Number: [id number]    Therapy Diagnosis:   Encounter Diagnoses   Name Primary?    Acute pain of right shoulder     Posture imbalance     Neck muscle weakness     Weakness of both hips      Physician: Harish Nieves MD    Visit Date: 9/21/2020   Physician Orders: PT Eval and Treat  Medical Diagnosis from Referral  M46.1 (ICD-10-CM) - Sacroiliitis, not elsewhere classified   M79.12 (ICD-10-CM) - Myalgia of auxiliary muscles, head and neck     Evaluation Date: 7/27/2020  Authorization Period Expiration:7/28/21  Plan of Care Expiration: 9/26/2020  Visit # / Visits authorized: 13/20      Time In: 7:15  Time Out: 8:03  Total Billable Time: 45 minutes    Precautions: Standard    Subjective     Pt reports:no complaints. Busy taking care of her spouse since he returned home from post acute rehab.   She was compliant with home exercise program.  Response to previous treatment: minimal soreness  Functional change: Lifting/moving with less pain    Pain: 0/10  Location: na today    Objective     Renea received therapeutic exercises to develop flexibility, posture, core stabilization, strength for 45 minutes including:    Squats 10x3 20# KB  Bridges-single HEP  Dead bug holds 15 sec x5 HEP  Prone hip extension- HEP  Lifting technique with box and squatting technique   Dead lift-40# 8x3  ZOEY hip ext/ABD-RTB w/out support  MEDX 50# 0-60 15 reps  Matrix knee ext 10# 3x10  [place] carry 2/15# DB 2 laps  Nu step 8 min with 2.5 resistance  Treadmill x 10 minutes      Home Exercises Provided and Patient Education Provided     Education provided:   - HEP review  - Abdominal bracing w/transitional movements  -Lifting/ squatting technique     Written Home Exercises Provided: Patient instructed to cont prior HEP.  Exercises were reviewed and Renea was able to demonstrate them prior to the end of the session.  Renea demonstrated good  understanding of the  education provided.     See EMR under Patient Instructions for exercises provided prior visit.    Assessment     Patient is doing well with as evidence by significant strength gains in trunk and LE's allowing her to perform transfers of spouse which are self reported. Patient lifting increased resistance with enhanced body mechanics and postural awareness. Patient provided information on silver sneakers to allow her to continue progress. Patient to be discharged next visit.   Renea is progressing well towards her goals.   Pt prognosis is Good.     Pt will continue to benefit from skilled outpatient physical therapy to address the deficits listed in the problem list box on initial evaluation, provide pt/family education and to maximize pt's level of independence in the home and community environment.     Pt's spiritual, cultural and educational needs considered and pt agreeable to plan of care and goals.     Anticipated barriers to physical therapy: none at this time    Goals:   Short Term Goals: 2 weeks   1. Patient will be independent with HEP  met  2. Patient abby demonstrate correct sitting/standing posture  met     Long Term Goals: 4 weeks   1. Right shoulder strength abby improve to 4+/5 to allow for increased overhead activities  2. Patient will demonstrate correct body mechanics while lifting   3. FOTO will improve by 10 points  4. Pain will reduce by 20%  Plan     Plan of care Certification: 8/26/2020-9/26/2020     Outpatient Physical Therapy 2 times weekly for 4 weeks to include the following interventions: Electrical Stimulation TENS, Manual Therapy, Moist Heat/ Ice, Neuromuscular Re-ed, Patient Education, Therapeutic Activites and Therapeutic Exercise, ASTYM, Kinesiotaping PRN, Functional Dry Needling    Anthony Pennington, PT

## 2020-09-24 ENCOUNTER — CLINICAL SUPPORT (OUTPATIENT)
Dept: REHABILITATION | Facility: HOSPITAL | Age: 70
End: 2020-09-24
Attending: PHYSICAL MEDICINE & REHABILITATION
Payer: MEDICARE

## 2020-09-24 DIAGNOSIS — M53.82 NECK MUSCLE WEAKNESS: ICD-10-CM

## 2020-09-24 DIAGNOSIS — R29.3 POSTURE IMBALANCE: ICD-10-CM

## 2020-09-24 DIAGNOSIS — M25.511 ACUTE PAIN OF RIGHT SHOULDER: ICD-10-CM

## 2020-09-24 DIAGNOSIS — R29.898 WEAKNESS OF BOTH HIPS: ICD-10-CM

## 2020-09-24 PROCEDURE — 97110 THERAPEUTIC EXERCISES: CPT | Mod: HCNC

## 2020-09-24 NOTE — PLAN OF CARE
Outpatient Therapy Discharge Summary     Name: Renea Bourgeois  [facility] Number: [id number]    Therapy Diagnosis:   Encounter Diagnoses   Name Primary?    Acute pain of right shoulder     Posture imbalance     Neck muscle weakness     Weakness of both hips      Physician: Harish Nieves MD  Physician Orders: PT Eval and Treat  Medical Diagnosis from Referral  M46.1 (ICD-10-CM) - Sacroiliitis, not elsewhere classified   M79.12 (ICD-10-CM) - Myalgia of auxiliary muscles, head and neck      Evaluation Date: 7/27/2020      Date of Last visit: 9/24/2020  Total Visits Received: 14  Cancelled Visits: 0  No Show Visits: 0    Assessment    Goals: ALL MET  Short Term Goals: 2 weeks   1. Patient will be independent with HEP    2. Patient abby demonstrate correct sitting/standing posture       Long Term Goals: 4 weeks   1. Right shoulder strength abby improve to 4+/5 to allow for increased overhead activities  2. Patient will demonstrate correct body mechanics while lifting   3. FOTO will improve by 10 points  4. Pain will reduce by 20%    Discharge reason: Patient is now asymptomatic and Patient has met all of his/her goals    Plan   This patient is discharged from Physical Therapy

## 2020-09-24 NOTE — PROGRESS NOTES
Physical Therapist  Discharge Note     Name: Renea NICOLE [facility] Number: [id number]    Therapy Diagnosis:   Encounter Diagnoses   Name Primary?    Acute pain of right shoulder     Posture imbalance     Neck muscle weakness     Weakness of both hips      Physician: Harish Nieves MD    Visit Date: 9/24/2020   Physician Orders: PT Eval and Treat  Medical Diagnosis from Referral  M46.1 (ICD-10-CM) - Sacroiliitis, not elsewhere classified   M79.12 (ICD-10-CM) - Myalgia of auxiliary muscles, head and neck     Evaluation Date: 7/27/2020  Authorization Period Expiration:7/28/21  Plan of Care Expiration: 9/26/2020  Visit # / Visits authorized: 14/20      Time In: 7:15  Time Out: 8:02  Total Billable Time: 45 minutes    Precautions: Standard    Subjective     Pt reports:no complaints. Denies pain   She was compliant with home exercise program.  Response to previous treatment: minimal soreness  Functional change: Lifting/moving with less pain    Pain: 0/10  Location: na today    Objective     Renea received therapeutic exercises to develop flexibility, posture, core stabilization, strength for 45 minutes including:    Squats 10x3 20# KB  Bridges-single HEP  Dead bug holds 15 sec x5 HEP  Prone hip extension- HEP  Lifting technique with box and squatting technique   Dead lift-40# 8x3  ZOEY hip ext/ABD-RTB w/out support  MEDX 50# 0-60 15 reps  Matrix knee ext 10# 3x10  [place] carry 2/15# DB 2 laps  Nu step 8 min with 2.5 resistance  Treadmill x 10 minutes    Bilateral hip strength 4+/5 grossly  Bilateral shoulder strength 4+/5 grossly  Exhibits enhanced body mechanics while lifting from the floor  Home Exercises Provided and Patient Education Provided     Education provided:   - HEP review  - Abdominal bracing w/transitional movements  -Lifting/ squatting technique     Written Home Exercises Provided: Patient instructed to cont prior HEP.  Exercises were reviewed and Renea was able to demonstrate them prior to  the end of the session.  Renea demonstrated good  understanding of the education provided.     See EMR under Patient Instructions for exercises provided prior visit.    Assessment     Renea has improved significantly since initial evaluation. Exhibits 4+/5 bilateral shoulder and hip strength and denies current pain. Reports improved ability to perform transfers with her spouse. Exhibits enhanced body mechanics with lifting allowing for reduced risk of future injury. FOTO improved by 11 points. Patient has met all goals and is independent with HEP.    Pt prognosis is Good.          Pt's spiritual, cultural and educational needs considered and pt agreeable to plan of care and goals.     Anticipated barriers to physical therapy: none at this time    Goals:   Short Term Goals: 2 weeks   1. Patient will be independent with HEP    2. Patient abby demonstrate correct sitting/standing posture       Long Term Goals: 4 weeks   1. Right shoulder strength abby improve to 4+/5 to allow for increased overhead activities  2. Patient will demonstrate correct body mechanics while lifting   3. FOTO will improve by 10 points  4. Pain will reduce by 20%  Plan     Plan of care Certification: 8/26/2020-9/26/2020     Outpatient Physical Therapy 2 times weekly for 4 weeks to include the following interventions: Electrical Stimulation TENS, Manual Therapy, Moist Heat/ Ice, Neuromuscular Re-ed, Patient Education, Therapeutic Activites and Therapeutic Exercise, ASTYM, Kinesiotaping PRN, Functional Dry Needling    Anthony Pennington, PT

## 2020-09-28 ENCOUNTER — PATIENT OUTREACH (OUTPATIENT)
Dept: ADMINISTRATIVE | Facility: HOSPITAL | Age: 70
End: 2020-09-28

## 2020-11-23 ENCOUNTER — LAB VISIT (OUTPATIENT)
Dept: LAB | Facility: HOSPITAL | Age: 70
End: 2020-11-23
Attending: FAMILY MEDICINE
Payer: MEDICARE

## 2020-11-23 DIAGNOSIS — E55.9 VITAMIN D DEFICIENCY: ICD-10-CM

## 2020-11-23 DIAGNOSIS — E11.69 HYPERLIPIDEMIA ASSOCIATED WITH TYPE 2 DIABETES MELLITUS: ICD-10-CM

## 2020-11-23 DIAGNOSIS — E78.5 HYPERLIPIDEMIA ASSOCIATED WITH TYPE 2 DIABETES MELLITUS: ICD-10-CM

## 2020-11-23 DIAGNOSIS — E11.9 TYPE 2 DIABETES MELLITUS WITHOUT COMPLICATION, WITHOUT LONG-TERM CURRENT USE OF INSULIN: ICD-10-CM

## 2020-11-23 DIAGNOSIS — E11.9 DIABETES MELLITUS TYPE 2 IN NONOBESE: ICD-10-CM

## 2020-11-23 LAB
25(OH)D3+25(OH)D2 SERPL-MCNC: 26 NG/ML (ref 30–96)
ALBUMIN SERPL BCP-MCNC: 4.3 G/DL (ref 3.5–5.2)
ALBUMIN/CREAT UR: 7.8 UG/MG (ref 0–30)
ALP SERPL-CCNC: 74 U/L (ref 55–135)
ALT SERPL W/O P-5'-P-CCNC: 21 U/L (ref 10–44)
ANION GAP SERPL CALC-SCNC: 8 MMOL/L (ref 8–16)
AST SERPL-CCNC: 21 U/L (ref 10–40)
BILIRUB SERPL-MCNC: 0.6 MG/DL (ref 0.1–1)
BUN SERPL-MCNC: 7 MG/DL (ref 8–23)
CALCIUM SERPL-MCNC: 9.2 MG/DL (ref 8.7–10.5)
CHLORIDE SERPL-SCNC: 104 MMOL/L (ref 95–110)
CHOLEST SERPL-MCNC: 155 MG/DL (ref 120–199)
CHOLEST/HDLC SERPL: 3.5 {RATIO} (ref 2–5)
CO2 SERPL-SCNC: 30 MMOL/L (ref 23–29)
CREAT SERPL-MCNC: 0.8 MG/DL (ref 0.5–1.4)
CREAT UR-MCNC: 166 MG/DL (ref 15–325)
EST. GFR  (AFRICAN AMERICAN): >60 ML/MIN/1.73 M^2
EST. GFR  (NON AFRICAN AMERICAN): >60 ML/MIN/1.73 M^2
GLUCOSE SERPL-MCNC: 93 MG/DL (ref 70–110)
HDLC SERPL-MCNC: 44 MG/DL (ref 40–75)
HDLC SERPL: 28.4 % (ref 20–50)
LDLC SERPL CALC-MCNC: 79.8 MG/DL (ref 63–159)
MICROALBUMIN UR DL<=1MG/L-MCNC: 13 UG/ML
NONHDLC SERPL-MCNC: 111 MG/DL
POTASSIUM SERPL-SCNC: 4 MMOL/L (ref 3.5–5.1)
PROT SERPL-MCNC: 7 G/DL (ref 6–8.4)
SODIUM SERPL-SCNC: 142 MMOL/L (ref 136–145)
TRIGL SERPL-MCNC: 156 MG/DL (ref 30–150)

## 2020-11-23 PROCEDURE — 80053 COMPREHEN METABOLIC PANEL: CPT | Mod: HCNC

## 2020-11-23 PROCEDURE — 82306 VITAMIN D 25 HYDROXY: CPT | Mod: HCNC

## 2020-11-23 PROCEDURE — 82043 UR ALBUMIN QUANTITATIVE: CPT | Mod: HCNC

## 2020-11-23 PROCEDURE — 83036 HEMOGLOBIN GLYCOSYLATED A1C: CPT | Mod: HCNC

## 2020-11-23 PROCEDURE — 80061 LIPID PANEL: CPT | Mod: HCNC

## 2020-11-23 PROCEDURE — 36415 COLL VENOUS BLD VENIPUNCTURE: CPT | Mod: HCNC

## 2020-11-23 NOTE — OR NURSING
+++++  1. Proximal ascending polyps. 2. Transverse polyp. Hemorrhoids noted Patient tolerated procedure well.   patient

## 2020-11-24 LAB
ESTIMATED AVG GLUCOSE: 108 MG/DL (ref 68–131)
HBA1C MFR BLD HPLC: 5.4 % (ref 4–5.6)

## 2020-11-27 ENCOUNTER — OFFICE VISIT (OUTPATIENT)
Dept: PAIN MEDICINE | Facility: CLINIC | Age: 70
End: 2020-11-27
Payer: MEDICARE

## 2020-11-27 VITALS
WEIGHT: 139.56 LBS | SYSTOLIC BLOOD PRESSURE: 128 MMHG | HEART RATE: 74 BPM | DIASTOLIC BLOOD PRESSURE: 78 MMHG | HEIGHT: 61 IN | BODY MASS INDEX: 26.35 KG/M2

## 2020-11-27 DIAGNOSIS — M18.11 PRIMARY OSTEOARTHRITIS OF FIRST CARPOMETACARPAL JOINT OF RIGHT HAND: Primary | ICD-10-CM

## 2020-11-27 DIAGNOSIS — M75.21 BICEPS TENDINITIS OF RIGHT UPPER EXTREMITY: ICD-10-CM

## 2020-11-27 DIAGNOSIS — M79.12 MYALGIA OF AUXILIARY MUSCLES, HEAD AND NECK: ICD-10-CM

## 2020-11-27 PROCEDURE — 3074F SYST BP LT 130 MM HG: CPT | Mod: HCNC,CPTII,S$GLB, | Performed by: PHYSICAL MEDICINE & REHABILITATION

## 2020-11-27 PROCEDURE — 1101F PR PT FALLS ASSESS DOC 0-1 FALLS W/OUT INJ PAST YR: ICD-10-PCS | Mod: HCNC,CPTII,S$GLB, | Performed by: PHYSICAL MEDICINE & REHABILITATION

## 2020-11-27 PROCEDURE — 3078F DIAST BP <80 MM HG: CPT | Mod: HCNC,CPTII,S$GLB, | Performed by: PHYSICAL MEDICINE & REHABILITATION

## 2020-11-27 PROCEDURE — 1159F MED LIST DOCD IN RCRD: CPT | Mod: HCNC,S$GLB,, | Performed by: PHYSICAL MEDICINE & REHABILITATION

## 2020-11-27 PROCEDURE — 1159F PR MEDICATION LIST DOCUMENTED IN MEDICAL RECORD: ICD-10-PCS | Mod: HCNC,S$GLB,, | Performed by: PHYSICAL MEDICINE & REHABILITATION

## 2020-11-27 PROCEDURE — 3288F PR FALLS RISK ASSESSMENT DOCUMENTED: ICD-10-PCS | Mod: HCNC,CPTII,S$GLB, | Performed by: PHYSICAL MEDICINE & REHABILITATION

## 2020-11-27 PROCEDURE — 20600 DRAIN/INJ JOINT/BURSA W/O US: CPT | Mod: HCNC,RT,S$GLB, | Performed by: PHYSICAL MEDICINE & REHABILITATION

## 2020-11-27 PROCEDURE — 20600 PR DRAIN/INJECT SMALL JOINT/BURSA: ICD-10-PCS | Mod: HCNC,RT,S$GLB, | Performed by: PHYSICAL MEDICINE & REHABILITATION

## 2020-11-27 PROCEDURE — 99213 PR OFFICE/OUTPT VISIT, EST, LEVL III, 20-29 MIN: ICD-10-PCS | Mod: 25,HCNC,S$GLB, | Performed by: PHYSICAL MEDICINE & REHABILITATION

## 2020-11-27 PROCEDURE — 99213 OFFICE O/P EST LOW 20 MIN: CPT | Mod: 25,HCNC,S$GLB, | Performed by: PHYSICAL MEDICINE & REHABILITATION

## 2020-11-27 PROCEDURE — 99999 PR PBB SHADOW E&M-EST. PATIENT-LVL III: ICD-10-PCS | Mod: PBBFAC,HCNC,, | Performed by: PHYSICAL MEDICINE & REHABILITATION

## 2020-11-27 PROCEDURE — 3078F PR MOST RECENT DIASTOLIC BLOOD PRESSURE < 80 MM HG: ICD-10-PCS | Mod: HCNC,CPTII,S$GLB, | Performed by: PHYSICAL MEDICINE & REHABILITATION

## 2020-11-27 PROCEDURE — 3074F PR MOST RECENT SYSTOLIC BLOOD PRESSURE < 130 MM HG: ICD-10-PCS | Mod: HCNC,CPTII,S$GLB, | Performed by: PHYSICAL MEDICINE & REHABILITATION

## 2020-11-27 PROCEDURE — 1125F PR PAIN SEVERITY QUANTIFIED, PAIN PRESENT: ICD-10-PCS | Mod: HCNC,S$GLB,, | Performed by: PHYSICAL MEDICINE & REHABILITATION

## 2020-11-27 PROCEDURE — 3288F FALL RISK ASSESSMENT DOCD: CPT | Mod: HCNC,CPTII,S$GLB, | Performed by: PHYSICAL MEDICINE & REHABILITATION

## 2020-11-27 PROCEDURE — 3008F PR BODY MASS INDEX (BMI) DOCUMENTED: ICD-10-PCS | Mod: HCNC,CPTII,S$GLB, | Performed by: PHYSICAL MEDICINE & REHABILITATION

## 2020-11-27 PROCEDURE — 99999 PR PBB SHADOW E&M-EST. PATIENT-LVL III: CPT | Mod: PBBFAC,HCNC,, | Performed by: PHYSICAL MEDICINE & REHABILITATION

## 2020-11-27 PROCEDURE — 1101F PT FALLS ASSESS-DOCD LE1/YR: CPT | Mod: HCNC,CPTII,S$GLB, | Performed by: PHYSICAL MEDICINE & REHABILITATION

## 2020-11-27 PROCEDURE — 1125F AMNT PAIN NOTED PAIN PRSNT: CPT | Mod: HCNC,S$GLB,, | Performed by: PHYSICAL MEDICINE & REHABILITATION

## 2020-11-27 PROCEDURE — 3008F BODY MASS INDEX DOCD: CPT | Mod: HCNC,CPTII,S$GLB, | Performed by: PHYSICAL MEDICINE & REHABILITATION

## 2020-11-27 RX ORDER — METHYLPREDNISOLONE ACETATE 40 MG/ML
40 INJECTION, SUSPENSION INTRA-ARTICULAR; INTRALESIONAL; INTRAMUSCULAR; SOFT TISSUE
Status: COMPLETED | OUTPATIENT
Start: 2020-11-27 | End: 2020-11-27

## 2020-11-27 RX ADMIN — METHYLPREDNISOLONE ACETATE 40 MG: 40 INJECTION, SUSPENSION INTRA-ARTICULAR; INTRALESIONAL; INTRAMUSCULAR; SOFT TISSUE at 01:11

## 2020-11-27 NOTE — PROGRESS NOTES
Established Patient Chronic Pain Note (Follow up visit)    Chief Complaint:   Chief Complaint   Patient presents with    Hand Pain       SUBJECTIVE:    Renea Bourgeois is a 70 y.o. female who presents to the clinic for a follow-up appointment for right thumb pain.  She previously had her right thumb injected by Orthopedics in May 2020 with significant relief.  Since the last visit, Renea Bourgeois states the pain has been worsening. Current pain intensity is 3/10.    Patient denies night fever/night sweats, urinary incontinence, bowel incontinence, significant weight loss, significant motor weakness and loss of sensations.    Pain Disability Index Review:  Last 3 PDI Scores 7/21/2020   Pain Disability Index (PDI) 35     Initial HPI 07/21/2020:  Renea Bourgeois is a 70 y.o. female who presents to the clinic for the evaluation of neck and low back pain.  She is self referred.  Of note, patient has a past medical history of osteoporosis, history of breast cancer, DM2,  GERD, hypertension, hyperlipidemia, and multiple other medical comorbidities as listed below.  The pain started several months ago following increased amount of care that she has had to perform for has been ever since he developed a stroke and symptoms have been worsening.The pain is located in the lower cervical area and radiates to the right shoulder.  She also locates pain to the lower lumbosacral spine without radiation into the lower extremities.  The pain is described as Sharp and is rated as 5/10. The pain is rated with a score of  4/10 on the BEST day and a score of 7/10 on the WORST day.  Symptoms interfere with daily activity and caring for her . The pain is exacerbated by walking, lifting, bending.  The pain is mitigated by medications. The patient reports spending less than 2 hours per day reclining. The patient reports 5-7 hours of uninterrupted sleep per night.  She cares for her  who unfortunately suffered a stroke.  She  reports that she has a home health aide that comes twice weekly and also has help from her granddaughter and her boyfriend to assist her with taking care of her .       Non-Pharmacologic Treatments:  Physical Therapy/Home Exercise: no  Ice/Heat:yes  TENS: no  Acupuncture: no  Massage: no  Chiropractic: yes    Other: no        Pain Medications:  - Opioids:  None  - Adjuvant Medications:  Gabapentin, Tylenol, Voltaren gel, trazodone  - Anti-Coagulants:  None     * she has been told that she should avoid NSAIDs secondary to GI irritation*      report:  Reviewed and consistent with medication use as prescribed.  No controlled substances recently prescribed     Pain Procedures:   -05/13/2020:  Right MCP joint with orthopedics        Imaging:   X-ray right hand 02/19/2020:  No acute fractures or dislocations visualized.  Periarticular soft tissue edema noted at the IP joints of the 2nd, 3rd, and 5th digits.  There is moderate to severe osteoarthritis seen throughout the IP joints with a possible element of erosive OA at the 2nd, 3rd, and 5th digits.  There is mild osteoarthritis present at the 1st CMC joint.  No appreciable change from prior.      PMHx,PSHx, Social history, and Family history:  I have reviewed the patient's medical, surgical, social, and family history in detail and updated the computerized patient record.    Review of patient's allergies indicates:   Allergen Reactions    Codeine Itching     Other reaction(s): Itching       Current Outpatient Medications   Medication Sig    ACCU-CHEK JD PLUS TEST STRP Strp TEST three times a day    acetaminophen (TYLENOL) 500 MG tablet Take 500 mg by mouth every 6 (six) hours as needed for Pain.    ALCOHOL PREP PADS PadM     cholecalciferol, vitamin D3, (VITAMIN D3) 1,000 unit capsule Take 1 capsule (1,000 Units total) by mouth once daily.    diclofenac sodium (VOLTAREN) 1 % Gel Apply 2 g topically once daily.    ferrous gluconate (FERGON) 240 (27 FE)  MG tablet Take 1 tablet (240 mg total) by mouth once daily.    fluticasone (FLONASE) 50 mcg/actuation nasal spray 2 sprays by Each Nare route daily as needed. 2 Spray, Suspension Nasal Every day    glimepiride (AMARYL) 1 MG tablet Take 1 tablet (1 mg total) by mouth once daily.    lancets (ACCU-CHEK SOFTCLIX LANCETS) Misc 1 each by Misc.(Non-Drug; Combo Route) route 3 (three) times daily.    lancing device Misc     methocarbamoL (ROBAXIN) 500 MG Tab Take 1 tablet (500 mg total) by mouth 2 (two) times daily as needed (muscle spasms).    metoprolol succinate (TOPROL-XL) 25 MG 24 hr tablet Take 25 mg by mouth every evening.     rosuvastatin (CRESTOR) 10 MG tablet TAKE 1 TABLET BY MOUTH EVERY DAY    traZODone (DESYREL) 100 MG tablet TAKE 1 TABLET BY MOUTH AT BEDTIME    turmeric 400 mg Cap Take 400 mg by mouth 2 (two) times daily as needed.    gabapentin (NEURONTIN) 100 MG capsule Take 1 capsule (100 mg total) by mouth 3 (three) times daily.     No current facility-administered medications for this visit.      Facility-Administered Medications Ordered in Other Visits   Medication    sodium chloride 0.9% flush 10 mL    zoledronic acid-mannitol & water 5 mg/100 mL infusion 5 mg         REVIEW OF SYSTEMS:    GENERAL:  No weight loss, malaise or fevers.  HEENT:   No recent changes in vision or hearing  NECK:  Negative for lumps, no difficulty with swallowing.  RESPIRATORY:  Negative for cough, wheezing or shortness of breath, patient denies any recent URI.  CARDIOVASCULAR:  Negative for chest pain, leg swelling or palpitations.  GI:  Negative for abdominal discomfort, blood in stools or black stools or change in bowel habits.  MUSCULOSKELETAL:  See HPI.  SKIN:  Negative for lesions, rash, and itching.  PSYCH:  No mood disorder or recent psychosocial stressors.  Patients sleep is not disturbed secondary to pain.  HEMATOLOGY/LYMPHOLOGY:  Negative for prolonged bleeding, bruising easily or swollen nodes.  Patient is  "not currently taking any anti-coagulants  NEURO:   No history of headaches, syncope, paralysis, seizures or tremors.  All other reviewed and negative other than HPI.    OBJECTIVE:    /78   Pulse 74   Ht 5' 1" (1.549 m)   Wt 63.3 kg (139 lb 8.8 oz)   LMP 01/01/2004 (Within Months)   BMI 26.37 kg/m²     PHYSICAL EXAMINATION:    GENERAL: Well appearing, in no acute distress, alert and oriented x3.  PSYCH:  Mood and affect appropriate.  SKIN: Skin color, texture, turgor normal, no rashes or lesions.  HEAD/FACE:  Normocephalic, atraumatic. Cranial nerves grossly intact.  NECK:  Mild pain to palpation over the cervical paraspinous muscles. Spurling Negative.  Mild pain with neck flexion, extension, and lateral flexion.   CV: RRR with palpation of the radial artery.  PULM: No evidence of respiratory difficulty, symmetric chest rise.  GI:  Soft and non-tender.  BACK: Straight leg raising in the sitting and supine positions is negative to radicular pain.  Mild-to-moderate pain to palpation over the facet joints of the lumbar spine and lumbar paraspinals. Normal range of motion within mild amount of pain reproduction.  EXTREMITIES: Peripheral joint ROM is full and pain free without obvious instability or laxity in all four extremities. No deformities, edema, or skin discoloration. Good capillary refill.  MUSCULOSKELETAL:  Tenderness to palpation of the right CMC joint.  Positive grind test over the right thumb.  Shoulder, hip, and knee provocative maneuvers are negative with the exception of positive speed's test on the right.  There is tenderness palpation over the right biceps tendon.  There is mild-to-moderate pain with palpation over the sacroiliac joints bilaterally.  FABERs test is positive.  FADIRs test is equivocal.   Bilateral upper and lower extremity strength is normal and symmetric.  No atrophy or tone abnormalities are noted.  NEURO: Bilateral upper and lower extremity coordination and muscle stretch " reflexes are physiologic and symmetric.  Plantar response are downgoing.  Negative Makeda.  No clonus.  No loss of sensation is noted.  GAIT: normal.    LABS:  Lab Results   Component Value Date    WBC 5.82 05/19/2020    HGB 13.8 05/19/2020    HCT 43.8 05/19/2020    MCV 96 05/19/2020     05/19/2020       CMP  Sodium   Date Value Ref Range Status   11/23/2020 142 136 - 145 mmol/L Final     Potassium   Date Value Ref Range Status   11/23/2020 4.0 3.5 - 5.1 mmol/L Final     Chloride   Date Value Ref Range Status   11/23/2020 104 95 - 110 mmol/L Final     CO2   Date Value Ref Range Status   11/23/2020 30 (H) 23 - 29 mmol/L Final     Glucose   Date Value Ref Range Status   11/23/2020 93 70 - 110 mg/dL Final     BUN   Date Value Ref Range Status   11/23/2020 7 (L) 8 - 23 mg/dL Final     Creatinine   Date Value Ref Range Status   11/23/2020 0.8 0.5 - 1.4 mg/dL Final     Calcium   Date Value Ref Range Status   11/23/2020 9.2 8.7 - 10.5 mg/dL Final     Total Protein   Date Value Ref Range Status   11/23/2020 7.0 6.0 - 8.4 g/dL Final     Albumin   Date Value Ref Range Status   11/23/2020 4.3 3.5 - 5.2 g/dL Final     Total Bilirubin   Date Value Ref Range Status   11/23/2020 0.6 0.1 - 1.0 mg/dL Final     Comment:     For infants and newborns, interpretation of results should be based  on gestational age, weight and in agreement with clinical  observations.  Premature Infant recommended reference ranges:  Up to 24 hours.............<8.0 mg/dL  Up to 48 hours............<12.0 mg/dL  3-5 days..................<15.0 mg/dL  6-29 days.................<15.0 mg/dL       Alkaline Phosphatase   Date Value Ref Range Status   11/23/2020 74 55 - 135 U/L Final     AST   Date Value Ref Range Status   11/23/2020 21 10 - 40 U/L Final     ALT   Date Value Ref Range Status   11/23/2020 21 10 - 44 U/L Final     Anion Gap   Date Value Ref Range Status   11/23/2020 8 8 - 16 mmol/L Final     eGFR if    Date Value Ref Range  Status   11/23/2020 >60.0 >60 mL/min/1.73 m^2 Final     eGFR if non    Date Value Ref Range Status   11/23/2020 >60.0 >60 mL/min/1.73 m^2 Final     Comment:     Calculation used to obtain the estimated glomerular filtration  rate (eGFR) is the CKD-EPI equation.          Lab Results   Component Value Date    HGBA1C 5.4 11/23/2020             ASSESSMENT: 70 y.o. year old female with right thumb pain, consistent with     1. Primary osteoarthritis of first carpometacarpal joint of right hand     2. Biceps tendinitis of right upper extremity     3. Myalgia of auxiliary muscles, head and neck           PLAN:   - Interventions:  Performed right CMC joint injection today in the clinic for diagnostic and therapeutic purposes.  Explained the procedure in detail, risks, benefits, and alternative the patient and she elected to pursue this intervention at this time.  Informed consent obtained, see procedure note below for more details.     - Anticoagulation use: no      - Medications: I have stressed the importance of physical activity and a home exercise plan to help with pain and improve health. and Patient can continue with medications for now since they are providing benefits, using them appropriately, and without side effects.       - Therapy:  Advised patient continue with activities and exercises as tolerated     - Psychological:  Discussed coping mechanisms to help address chronic pain issues     - Labs:  Reviewed     - Imaging:  No pertinent imaging available to review.  No new imaging indicated at this time     - Consults/Referrals:   none at this time     - Records:  Reviewed/Obtain old records from outside physicians and imaging     - Follow up visit: return to clinic in 3 months or as needed     - Counseled patient regarding the importance of activity modification and physical therapy     - This condition does not require this patient to take time off of work, and the primary goal of our Pain  Management services is to improve the patient's functional capacity.     - Patient Questions: Answered all of the patient's questions regarding diagnosis, therapy, and treatment    The above plan and management options were discussed at length with patient. Patient is in agreement with the above and verbalized understanding.      PROCEDURE NOTE:  Right CMC joint Injection:   The procedure was discussed with the patient including complications of nerve damage,  bleeding, infection, and failure of pain relief.   Bony landmarks were identified by palpation and marked. Alcohol swab prep of site done. A mixture of 0.25mL 1% lidocaine + 0.75 of Depo-Medrol 40 mg  was prepared (1mL total).   A 25-gauge needle was advanced into the right CMC joint, and the medication mixture  was injected after negative aspiration.Patient tolerated the procedure well and without complications. Patient was monitored for 15 min following the injection before discharged from the clinic.       Harish Nieves MD  Interventional Pain Management  [facility]    Disclaimer:  This note was prepared using voice recognition system and is likely to have sound alike errors that may have been overlooked even after proof reading.  Please call me with any questions

## 2020-11-30 ENCOUNTER — HOSPITAL ENCOUNTER (EMERGENCY)
Facility: HOSPITAL | Age: 70
Discharge: HOME | End: 2020-11-30
Attending: EMERGENCY MEDICINE
Payer: MEDICARE

## 2020-11-30 ENCOUNTER — OFFICE VISIT (OUTPATIENT)
Dept: INTERNAL MEDICINE | Facility: CLINIC | Age: 70
End: 2020-11-30
Payer: MEDICARE

## 2020-11-30 VITALS
HEIGHT: 61 IN | OXYGEN SATURATION: 100 % | DIASTOLIC BLOOD PRESSURE: 77 MMHG | TEMPERATURE: 99 F | RESPIRATION RATE: 18 BRPM | SYSTOLIC BLOOD PRESSURE: 169 MMHG | HEART RATE: 70 BPM | BODY MASS INDEX: 26.1 KG/M2 | WEIGHT: 138.25 LBS

## 2020-11-30 VITALS
TEMPERATURE: 98 F | WEIGHT: 138.69 LBS | BODY MASS INDEX: 26.19 KG/M2 | SYSTOLIC BLOOD PRESSURE: 148 MMHG | DIASTOLIC BLOOD PRESSURE: 78 MMHG | OXYGEN SATURATION: 96 % | HEIGHT: 61 IN | HEART RATE: 81 BPM

## 2020-11-30 DIAGNOSIS — E11.9 TYPE 2 DIABETES MELLITUS WITHOUT COMPLICATION, WITHOUT LONG-TERM CURRENT USE OF INSULIN: ICD-10-CM

## 2020-11-30 DIAGNOSIS — H53.2 DIPLOPIA: Primary | ICD-10-CM

## 2020-11-30 DIAGNOSIS — I63.9 STROKE: ICD-10-CM

## 2020-11-30 DIAGNOSIS — I15.2 HYPERTENSION ASSOCIATED WITH DIABETES: ICD-10-CM

## 2020-11-30 DIAGNOSIS — E11.59 HYPERTENSION ASSOCIATED WITH DIABETES: ICD-10-CM

## 2020-11-30 DIAGNOSIS — E78.5 HYPERLIPIDEMIA ASSOCIATED WITH TYPE 2 DIABETES MELLITUS: ICD-10-CM

## 2020-11-30 DIAGNOSIS — E11.69 HYPERLIPIDEMIA ASSOCIATED WITH TYPE 2 DIABETES MELLITUS: ICD-10-CM

## 2020-11-30 DIAGNOSIS — E55.9 VITAMIN D INSUFFICIENCY: ICD-10-CM

## 2020-11-30 LAB
ALBUMIN SERPL BCP-MCNC: 4.1 G/DL (ref 3.5–5.2)
ALP SERPL-CCNC: 75 U/L (ref 55–135)
ALT SERPL W/O P-5'-P-CCNC: 20 U/L (ref 10–44)
ANION GAP SERPL CALC-SCNC: 9 MMOL/L (ref 8–16)
AST SERPL-CCNC: 20 U/L (ref 10–40)
BASOPHILS # BLD AUTO: 0.02 K/UL (ref 0–0.2)
BASOPHILS NFR BLD: 0.4 % (ref 0–1.9)
BILIRUB SERPL-MCNC: 0.4 MG/DL (ref 0.1–1)
BUN SERPL-MCNC: 7 MG/DL (ref 8–23)
CALCIUM SERPL-MCNC: 8.9 MG/DL (ref 8.7–10.5)
CHLORIDE SERPL-SCNC: 107 MMOL/L (ref 95–110)
CO2 SERPL-SCNC: 27 MMOL/L (ref 23–29)
CREAT SERPL-MCNC: 0.7 MG/DL (ref 0.5–1.4)
DIFFERENTIAL METHOD: NORMAL
EOSINOPHIL # BLD AUTO: 0.1 K/UL (ref 0–0.5)
EOSINOPHIL NFR BLD: 1.5 % (ref 0–8)
ERYTHROCYTE [DISTWIDTH] IN BLOOD BY AUTOMATED COUNT: 11.7 % (ref 11.5–14.5)
EST. GFR  (AFRICAN AMERICAN): >60 ML/MIN/1.73 M^2
EST. GFR  (NON AFRICAN AMERICAN): >60 ML/MIN/1.73 M^2
GLUCOSE SERPL-MCNC: 97 MG/DL (ref 70–110)
HCT VFR BLD AUTO: 40.5 % (ref 37–48.5)
HCV AB SERPL QL IA: NEGATIVE
HGB BLD-MCNC: 13.7 G/DL (ref 12–16)
IMM GRANULOCYTES # BLD AUTO: 0.01 K/UL (ref 0–0.04)
IMM GRANULOCYTES NFR BLD AUTO: 0.2 % (ref 0–0.5)
INR PPP: 1 (ref 0.8–1.2)
LYMPHOCYTES # BLD AUTO: 1.3 K/UL (ref 1–4.8)
LYMPHOCYTES NFR BLD: 24.1 % (ref 18–48)
MCH RBC QN AUTO: 30.4 PG (ref 27–31)
MCHC RBC AUTO-ENTMCNC: 33.8 G/DL (ref 32–36)
MCV RBC AUTO: 90 FL (ref 82–98)
MONOCYTES # BLD AUTO: 0.4 K/UL (ref 0.3–1)
MONOCYTES NFR BLD: 8 % (ref 4–15)
NEUTROPHILS # BLD AUTO: 3.5 K/UL (ref 1.8–7.7)
NEUTROPHILS NFR BLD: 65.8 % (ref 38–73)
NRBC BLD-RTO: 0 /100 WBC
PLATELET # BLD AUTO: 155 K/UL (ref 150–350)
PMV BLD AUTO: 11.2 FL (ref 9.2–12.9)
POCT GLUCOSE: 114 MG/DL (ref 70–110)
POCT GLUCOSE: 96 MG/DL (ref 70–110)
POTASSIUM SERPL-SCNC: 3.5 MMOL/L (ref 3.5–5.1)
PROT SERPL-MCNC: 7 G/DL (ref 6–8.4)
PROTHROMBIN TIME: 10.6 SEC (ref 9–12.5)
RBC # BLD AUTO: 4.51 M/UL (ref 4–5.4)
SODIUM SERPL-SCNC: 143 MMOL/L (ref 136–145)
TSH SERPL DL<=0.005 MIU/L-ACNC: 1.4 UIU/ML (ref 0.4–4)
WBC # BLD AUTO: 5.35 K/UL (ref 3.9–12.7)

## 2020-11-30 PROCEDURE — 84443 ASSAY THYROID STIM HORMONE: CPT | Mod: HCNC

## 2020-11-30 PROCEDURE — 3078F DIAST BP <80 MM HG: CPT | Mod: HCNC,CPTII,S$GLB, | Performed by: FAMILY MEDICINE

## 2020-11-30 PROCEDURE — 1101F PT FALLS ASSESS-DOCD LE1/YR: CPT | Mod: HCNC,CPTII,S$GLB, | Performed by: FAMILY MEDICINE

## 2020-11-30 PROCEDURE — 1159F PR MEDICATION LIST DOCUMENTED IN MEDICAL RECORD: ICD-10-PCS | Mod: HCNC,S$GLB,, | Performed by: FAMILY MEDICINE

## 2020-11-30 PROCEDURE — 1126F AMNT PAIN NOTED NONE PRSNT: CPT | Mod: HCNC,S$GLB,, | Performed by: FAMILY MEDICINE

## 2020-11-30 PROCEDURE — 1101F PR PT FALLS ASSESS DOC 0-1 FALLS W/OUT INJ PAST YR: ICD-10-PCS | Mod: HCNC,CPTII,S$GLB, | Performed by: FAMILY MEDICINE

## 2020-11-30 PROCEDURE — 1126F PR PAIN SEVERITY QUANTIFIED, NO PAIN PRESENT: ICD-10-PCS | Mod: HCNC,S$GLB,, | Performed by: FAMILY MEDICINE

## 2020-11-30 PROCEDURE — 80053 COMPREHEN METABOLIC PANEL: CPT | Mod: HCNC

## 2020-11-30 PROCEDURE — 3078F PR MOST RECENT DIASTOLIC BLOOD PRESSURE < 80 MM HG: ICD-10-PCS | Mod: HCNC,CPTII,S$GLB, | Performed by: FAMILY MEDICINE

## 2020-11-30 PROCEDURE — 3008F BODY MASS INDEX DOCD: CPT | Mod: HCNC,CPTII,S$GLB, | Performed by: FAMILY MEDICINE

## 2020-11-30 PROCEDURE — 3008F PR BODY MASS INDEX (BMI) DOCUMENTED: ICD-10-PCS | Mod: HCNC,CPTII,S$GLB, | Performed by: FAMILY MEDICINE

## 2020-11-30 PROCEDURE — 99214 PR OFFICE/OUTPT VISIT, EST, LEVL IV, 30-39 MIN: ICD-10-PCS | Mod: HCNC,S$GLB,, | Performed by: FAMILY MEDICINE

## 2020-11-30 PROCEDURE — 93010 EKG 12-LEAD: ICD-10-PCS | Mod: HCNC,,, | Performed by: INTERNAL MEDICINE

## 2020-11-30 PROCEDURE — 80061 LIPID PANEL: CPT | Mod: HCNC

## 2020-11-30 PROCEDURE — 93005 ELECTROCARDIOGRAM TRACING: CPT | Mod: HCNC

## 2020-11-30 PROCEDURE — 25500020 PHARM REV CODE 255: Mod: HCNC | Performed by: EMERGENCY MEDICINE

## 2020-11-30 PROCEDURE — 1159F MED LIST DOCD IN RCRD: CPT | Mod: HCNC,S$GLB,, | Performed by: FAMILY MEDICINE

## 2020-11-30 PROCEDURE — 86803 HEPATITIS C AB TEST: CPT | Mod: HCNC

## 2020-11-30 PROCEDURE — 3288F FALL RISK ASSESSMENT DOCD: CPT | Mod: HCNC,CPTII,S$GLB, | Performed by: FAMILY MEDICINE

## 2020-11-30 PROCEDURE — A9585 GADOBUTROL INJECTION: HCPCS | Mod: HCNC | Performed by: EMERGENCY MEDICINE

## 2020-11-30 PROCEDURE — 3074F SYST BP LT 130 MM HG: CPT | Mod: HCNC,CPTII,S$GLB, | Performed by: FAMILY MEDICINE

## 2020-11-30 PROCEDURE — 3288F PR FALLS RISK ASSESSMENT DOCUMENTED: ICD-10-PCS | Mod: HCNC,CPTII,S$GLB, | Performed by: FAMILY MEDICINE

## 2020-11-30 PROCEDURE — 85025 COMPLETE CBC W/AUTO DIFF WBC: CPT | Mod: HCNC

## 2020-11-30 PROCEDURE — 85610 PROTHROMBIN TIME: CPT | Mod: HCNC

## 2020-11-30 PROCEDURE — 99999 PR PBB SHADOW E&M-EST. PATIENT-LVL V: ICD-10-PCS | Mod: PBBFAC,HCNC,, | Performed by: FAMILY MEDICINE

## 2020-11-30 PROCEDURE — 99291 CRITICAL CARE FIRST HOUR: CPT | Mod: 25,HCNC

## 2020-11-30 PROCEDURE — 3044F HG A1C LEVEL LT 7.0%: CPT | Mod: HCNC,CPTII,S$GLB, | Performed by: FAMILY MEDICINE

## 2020-11-30 PROCEDURE — 82962 GLUCOSE BLOOD TEST: CPT | Mod: HCNC,91

## 2020-11-30 PROCEDURE — 99214 OFFICE O/P EST MOD 30 MIN: CPT | Mod: HCNC,S$GLB,, | Performed by: FAMILY MEDICINE

## 2020-11-30 PROCEDURE — 99999 PR PBB SHADOW E&M-EST. PATIENT-LVL V: CPT | Mod: PBBFAC,HCNC,, | Performed by: FAMILY MEDICINE

## 2020-11-30 PROCEDURE — 3044F PR MOST RECENT HEMOGLOBIN A1C LEVEL <7.0%: ICD-10-PCS | Mod: HCNC,CPTII,S$GLB, | Performed by: FAMILY MEDICINE

## 2020-11-30 PROCEDURE — 93010 ELECTROCARDIOGRAM REPORT: CPT | Mod: HCNC,,, | Performed by: INTERNAL MEDICINE

## 2020-11-30 PROCEDURE — 3074F PR MOST RECENT SYSTOLIC BLOOD PRESSURE < 130 MM HG: ICD-10-PCS | Mod: HCNC,CPTII,S$GLB, | Performed by: FAMILY MEDICINE

## 2020-11-30 RX ORDER — GADOBUTROL 604.72 MG/ML
10 INJECTION INTRAVENOUS
Status: COMPLETED | OUTPATIENT
Start: 2020-11-30 | End: 2020-11-30

## 2020-11-30 RX ADMIN — GADOBUTROL 6 ML: 604.72 INJECTION INTRAVENOUS at 07:11

## 2020-11-30 NOTE — ED PROVIDER NOTES
1:05 PM: Double vision since Friday    Pt was placed back in [facility]. I explained to pt that due to lack of available rooms pt was seen by myself to begin the workup. Pt understands they will be seen and dispositioned by the next available physician. Pt voices understanding and agrees with plan. Pt also understands the longer than normal wait time. I am removing myself from the care of pt and pt will now be assigned to the next available physician.     Debbie Amin PA-C  1:05 PM    SCRIBE #1 NOTE: I, Martine Patton, am scribing for, and in the presence of, Miah Taylor Jr., MD. I have scribed the HPI, ROS, and PEx.     SCRIBE #2 NOTE: I, Mariel Mariano, am scribing for, and in the presence of,  Kishor Vu MD. I have scribed the remaining portions of the note not scribed by Scribe #1.      History     Chief Complaint   Patient presents with    Double Vision     pt sent to ED for further eval/tx of blurred vision, x4 days     Review of patient's allergies indicates:   Allergen Reactions    Codeine Itching     Other reaction(s): Itching         History of Present Illness     HPI    11/30/2020, 3:16 PM  History obtained from the patient      History of Present Illness: Renea Bourgeois is a 70 y.o. female patient with a PMHx of cataract who presents to the Emergency Department for further evaluation of double vision which onset 3 days PTA. Pt was referred by her primary care provider for further eval/tx. Pt reports blurred vision for 2 weeks that resolved after leaving her primary care doctor and started seeing double vision later that afternoon of which has been intermittent since. Pt states her double vision started up again 2 hours PTA. Symptoms are intermittent and moderate in severity. No associated sxs reported. Patient denies any fever, cough, chills, CP, SOB, n/v/d, and all other sxs at this time. No prior tx reported. No further complaints or concerns at this time. This is binocular diplopia.   Patient notes that symptoms resolved by covering 1 eye.      Arrival mode: Personal vehicle     PCP: Kamini Newton MD        Past Medical History:  Past Medical History:   Diagnosis Date    Allergy     Anemia 11/14/2017    Angina pectoris     followed by cardiology    Arthritis     Breast cancer     Right T1 Ductal Ca in situ,high oncotype Dx 33, Estrogen positive. Lumpectomy, TAC chemo x 6 cyscles then RT,on aromatase inhibitor    Cataract     Chondromalacia of right patella 3/26/2018    Stable and controlled. Continue current treatment plan as previously prescribed with your PCP.      Diabetes mellitus type II 2011    DM (diabetes mellitus) 2011     am 08/04/2017    DM (diabetes mellitus) 2010     am 06/22/2020    Elevated BP without diagnosis of hypertension 11/27/2018    GERD (gastroesophageal reflux disease)     History of colon polyps 2/21/2018    The patient had adenomatous colon polyps in 2014.      History of renal calculi 8/11/2015    Right obstructing 8/20/13 - passed.     Hyperlipidemia     Hypertension     Kidney stone     right side, passed    Malignant neoplasm of upper-outer quadrant of right breast in female, estrogen receptor positive 7/23/2018    Followed by Dr. Jones    Nuclear sclerosis of both eyes 10/5/2015    Osteopenia     Osteoporosis 4/12/2019    Pseudophakia 10/15/2015    PVC (premature ventricular contraction)     on and off    Refractive error 10/5/2015    Trouble in sleeping     Type 2 diabetes mellitus        Past Surgical History:  Past Surgical History:   Procedure Laterality Date    BREAST BIOPSY      BREAST LUMPECTOMY  2/11/2011    right    CATARACT EXTRACTION Bilateral 10/14/15    CHOLECYSTECTOMY  1989    open    COLONOSCOPY N/A 2/22/2018    Procedure: COLONOSCOPY;  Surgeon: Carlito Odonnell MD;  Location: [facility];  Service: Endoscopy;  Laterality: N/A;    CYST REMOVAL Left 11/2017    foot    DILATION AND CURETTAGE OF UTERUS   10/2010    [place]    ESOPHAGOGASTRODUODENOSCOPY N/A 6/29/2020    Procedure: EGD (ESOPHAGOGASTRODUODENOSCOPY);  Surgeon: Nancy Hahn MD;  Location[facility];  Service: Endoscopy;  Laterality: N/A;    GALLBLADDER SURGERY      removed in 1989    Nasal septal deviation repair  2009    toenail edges removed      TONSILLECTOMY  1959    TOTAL REDUCTION MAMMOPLASTY Left     2015         Family History:  Family History   Problem Relation Age of Onset    Diabetes Father     Hypertension Father     Stroke Father     Cancer Father 65        metastatic when diagnosed    Stroke Brother     Cancer Other         kidney    Atrial fibrillation Son         35    Heart disease Son     Alzheimer's disease Mother     Parkinsonism Brother     Cancer Paternal Grandfather         prostate    Glaucoma Maternal Grandmother     Psoriasis Cousin     Alcohol abuse Neg Hx     Drug abuse Neg Hx     COPD Neg Hx     Birth defects Neg Hx     Mental retardation Neg Hx     Mental illness Neg Hx     Kidney disease Neg Hx     Hyperlipidemia Neg Hx     Melanoma Neg Hx     Lupus Neg Hx        Social History:  Social History     Tobacco Use    Smoking status: Never Smoker    Smokeless tobacco: Never Used   Substance and Sexual Activity    Alcohol use: No     Alcohol/week: 0.0 standard drinks    Drug use: No    Sexual activity: Not Currently     Partners: Male     Birth control/protection: Post-menopausal        Review of Systems     Review of Systems   Constitutional: Negative for chills and fever.   HENT: Negative for congestion and sore throat.    Eyes: Positive for visual disturbance (double vision (intermittent)).        (+) blurred vision resolved   Respiratory: Negative for cough and shortness of breath.    Cardiovascular: Negative for chest pain.   Gastrointestinal: Negative for diarrhea, nausea and vomiting.   Genitourinary: Negative for dysuria.   Musculoskeletal: Negative for back pain.   Skin:  Negative for rash.   Neurological: Negative for weakness.   Hematological: Does not bruise/bleed easily.   All other systems reviewed and are negative.       Physical Exam     Initial Vitals [11/30/20 1305]   BP Pulse Resp Temp SpO2   137/74 61 18 98.6 °F (37 °C) 96 %      MAP       --          Physical Exam  Nursing Notes and Vital Signs Reviewed.  Constitutional: Patient is in no apparent distress. Well-developed and well-nourished.  Head: Atraumatic. Normocephalic.  Eyes: PERRL. EOM intact. Conjunctivae are not pale. No scleral icterus.  No evidence of entrapment.  No exophthalmos or enophthalmos.  Conjunctiva normal.  ENT: Mucous membranes are moist. Oropharynx is clear and symmetric.  nares clear.  No sinus tenderness.  Neck: Supple. Full ROM. No lymphadenopathy.  Cardiovascular: Regular rate. Regular rhythm. No murmurs, rubs, or gallops. Distal pulses are 2+ and symmetric.  Pulmonary/Chest: No respiratory distress. Clear to auscultation bilaterally. No wheezing or rales.  Abdominal: Soft and non-distended.  There is no tenderness.  No rebound, guarding, or rigidity. Good bowel sounds.  Genitourinary: No CVA tenderness.  No suprapubic tenderness  Musculoskeletal: Moves all extremities. No obvious deformities. No edema. No calf tenderness.  Skin: Warm and dry.  Neurological:  Alert, awake, and appropriate.  Normal speech.  No acute focal neurological deficits are appreciated.  Two through 12 intact bilaterally.  5 x 5 strength in all extremities.  Psychiatric: Normal affect. Good eye contact. Appropriate in content.     ED Course   Critical Care    Date/Time: 11/30/2020 9:50 PM  Performed by: Kishor Vu MD  Authorized by: Kishor Vu MD   Direct patient critical care time: 10 minutes  Additional history critical care time: 5 minutes  Ordering / reviewing critical care time: 10 minutes  Documentation critical care time: 10 minutes  Total critical care time (exclusive of procedural time) : 35  "minutes  Critical care time was exclusive of separately billable procedures and treating other patients and teaching time.  Critical care was necessary to treat or prevent imminent or life-threatening deterioration of the following conditions: stroke like sxs.  Critical care was time spent personally by me on the following activities: blood draw for specimens, development of treatment plan with patient or surrogate, interpretation of cardiac output measurements, discussions with consultants, evaluation of patient's response to treatment, examination of patient, obtaining history from patient or surrogate, ordering and performing treatments and interventions, ordering and review of laboratory studies, ordering and review of radiographic studies, pulse oximetry, re-evaluation of patient's condition and review of old charts.        ED Vital Signs:  Vitals:    11/30/20 1305 11/30/20 1309 11/30/20 1609 11/30/20 1633   BP: 137/74 137/74  (!) 174/81   Pulse: 61 70 66 67   Resp: 18 18  (!) 23   Temp: 98.6 °F (37 °C) 98.6 °F (37 °C)     TempSrc: Oral Oral     SpO2: 96% 97%  100%   Weight: 62.7 kg (138 lb 3.7 oz) 62.7 kg (138 lb 3.7 oz)     Height: 5' 1" (1.549 m) 5' 1" (1.549 m)      11/30/20 1902 11/30/20 2027   BP: (!) 165/73 (!) 162/72   Pulse: 67    Resp: (!) 31 20   Temp:     TempSrc:     SpO2: 96% 98%   Weight:     Height:         Abnormal Lab Results:  Labs Reviewed   COMPREHENSIVE METABOLIC PANEL - Abnormal; Notable for the following components:       Result Value    BUN 7 (*)     All other components within normal limits   POCT GLUCOSE - Abnormal; Notable for the following components:    POCT Glucose 114 (*)     All other components within normal limits   CBC W/ AUTO DIFFERENTIAL   PROTIME-INR   TSH   HEPATITIS C ANTIBODY   TSH   LIPID PANEL   POCT GLUCOSE, HAND-HELD DEVICE   POCT GLUCOSE        All Lab Results:  Results for orders placed or performed during the hospital encounter of 11/30/20   CBC W/ AUTO " DIFFERENTIAL   Result Value Ref Range    WBC 5.35 3.90 - 12.70 K/uL    RBC 4.51 4.00 - 5.40 M/uL    Hemoglobin 13.7 12.0 - 16.0 g/dL    Hematocrit 40.5 37.0 - 48.5 %    MCV 90 82 - 98 fL    MCH 30.4 27.0 - 31.0 pg    MCHC 33.8 32.0 - 36.0 g/dL    RDW 11.7 11.5 - 14.5 %    Platelets 155 150 - 350 K/uL    MPV 11.2 9.2 - 12.9 fL    Immature Granulocytes 0.2 0.0 - 0.5 %    Gran # (ANC) 3.5 1.8 - 7.7 K/uL    Immature Grans (Abs) 0.01 0.00 - 0.04 K/uL    Lymph # 1.3 1.0 - 4.8 K/uL    Mono # 0.4 0.3 - 1.0 K/uL    Eos # 0.1 0.0 - 0.5 K/uL    Baso # 0.02 0.00 - 0.20 K/uL    nRBC 0 0 /100 WBC    Gran % 65.8 38.0 - 73.0 %    Lymph % 24.1 18.0 - 48.0 %    Mono % 8.0 4.0 - 15.0 %    Eosinophil % 1.5 0.0 - 8.0 %    Basophil % 0.4 0.0 - 1.9 %    Differential Method Automated    Comprehensive metabolic panel   Result Value Ref Range    Sodium 143 136 - 145 mmol/L    Potassium 3.5 3.5 - 5.1 mmol/L    Chloride 107 95 - 110 mmol/L    CO2 27 23 - 29 mmol/L    Glucose 97 70 - 110 mg/dL    BUN 7 (L) 8 - 23 mg/dL    Creatinine 0.7 0.5 - 1.4 mg/dL    Calcium 8.9 8.7 - 10.5 mg/dL    Total Protein 7.0 6.0 - 8.4 g/dL    Albumin 4.1 3.5 - 5.2 g/dL    Total Bilirubin 0.4 0.1 - 1.0 mg/dL    Alkaline Phosphatase 75 55 - 135 U/L    AST 20 10 - 40 U/L    ALT 20 10 - 44 U/L    Anion Gap 9 8 - 16 mmol/L    eGFR if African American >60 >60 mL/min/1.73 m^2    eGFR if non African American >60 >60 mL/min/1.73 m^2   Protime-INR   Result Value Ref Range    Prothrombin Time 10.6 9.0 - 12.5 sec    INR 1.0 0.8 - 1.2   TSH   Result Value Ref Range    TSH 1.397 0.400 - 4.000 uIU/mL   Hepatitis C antibody   Result Value Ref Range    Hepatitis C Ab Negative Negative   POCT glucose   Result Value Ref Range    POCT Glucose 114 (H) 70 - 110 mg/dL   POCT glucose   Result Value Ref Range    POCT Glucose 96 70 - 110 mg/dL         Imaging Results:  Imaging Results          MRI Brain W WO Contrast (Final result)  Result time 11/30/20 20:28:59    Final result by Karlo  YESSY Cohen MD (11/30/20 20:28:59)                 Impression:      No acute abnormality.  Specifically no diffusion restriction to suggest infarction.      Electronically signed by: Karlo Cohen  Date:    11/30/2020  Time:    20:28             Narrative:    EXAMINATION:  MRI BRAIN W WO CONTRAST    CLINICAL HISTORY:  TIA, initial exam;Diplopia;.    TECHNIQUE:  Multiplanar multisequence MR imaging of the brain was performed before and after the administration of 6 mL Gadavist  intravenous contrast.    COMPARISON:  CT head without contrast 11/30/2020.    FINDINGS:  Intracranial compartment:    Ventricles and sulci are normal in size for age without evidence of hydrocephalus. No extra-axial blood or fluid collections.    Mild microvascular ischemic change as represented by T2 FLAIR hyperintensity in the periventricular region.  No mass lesion, acute hemorrhage, edema or acute infarct. No abnormal enhancement.    Normal vascular flow voids are preserved.    Skull/extracranial contents (limited evaluation): Bone marrow signal intensity is normal.                               CT Head Without Contrast (Final result)  Result time 11/30/20 15:07:59    Final result by Meng Lainez MD (11/30/20 15:07:59)                 Impression:      Chronic microvascular ischemic changes. If there is additional clinical concern for acute infarct, MRI with diffusion weighted imaging recommended.      Electronically signed by: Meng Lainez MD  Date:    11/30/2020  Time:    15:07             Narrative:    EXAMINATION:  CT HEAD WITHOUT CONTRAST    CLINICAL HISTORY:  Neuro deficit, acute, stroke suspected;    TECHNIQUE:  Low dose axial CT images obtained throughout the head without intravenous contrast. Sagittal and coronal reconstructions were performed.  All CT scans at this facility use dose modulation, iterative reconstruction and/or weight based dosing when appropriate to reduce radiation dose to as low as reasonably  achievable.    COMPARISON:  None.    FINDINGS:  Intracranial compartment:    The brain parenchyma demonstrates areas of decreased attenuation with moderate periventricular white matter consistent with chronic microvascular ischemic changes..  No parenchymal mass, hemorrhage, edema or major vascular distribution infarct.  Vascular calcifications are noted.    Mild prominence of the sulci and ventricles are consistent with age-related involutional changes.    No extra-axial blood or fluid collections.    Skull/extracranial contents (limited evaluation): No fracture. Mastoid air cells and paranasal sinuses are essentially clear.                                 The EKG was ordered, reviewed, and independently interpreted by the ED provider.  Interpretation time: 15:09  Rate: 72 BPM  Rhythm: normal sinus rhythm  Interpretation: Incomplete RBBB. No STEMI.           The Emergency Provider reviewed the vital signs and test results, which are outlined above.     ED Discussion     3:29 PM: Discussed pt's case with Dr. Chi (Neurology) who states tPA is not needed.    6:50 PM: Discussed pt's case with Dr. Mendoza (Neurology) who recommends brain MRI w and w/o contrast. If it's positive then it's a stroke, but if it's negative then d/c and have her f/u with her ophthalmologist as outpatient.    8:00 PM: Dr. Taylor transfers care of patient to Dr. Vu pending MRI and lab results.    9:41 PM: Reassessed pt at this time.  Pt states her condition has improved at this time. Discussed with pt all pertinent ED information and results. Discussed pt dx and plan of tx. Gave pt all f/u and return to the ED instructions. All questions and concerns were addressed at this time. Pt expresses understanding of information and instructions, and is comfortable with plan to discharge. Pt is stable for discharge.    I discussed with patient that evaluation in the ED does not suggest any emergent or life threatening medical conditions requiring  immediate intervention beyond what was provided in the ED, and I believe patient is safe for discharge.  Regardless, an unremarkable evaluation in the ED does not preclude the development or presence of a serious of life threatening condition. As such, patient was instructed to return immediately for any worsening or change in current symptoms.       Medical Decision Making:   Clinical Tests:   Lab Tests: Ordered and Reviewed  Radiological Study: Ordered and Reviewed  Medical Tests: Ordered and Reviewed           ED Medication(s):  Medications   gadobutroL (GADAVIST) injection 10 mL (6 mLs Intravenous Given 11/30/20 1959)       New Prescriptions    No medications on file       Follow-up Information     Mango Hernandez MD In 1 day.    Specialties: Ophthalmology, Surgery  Contact information:  [address]  [place] [address] [phone]             [facility]    Specialty: Emergency Medicine  Why: As needed, If symptoms worsen  Contact information:  [address] [facility] [address] [phone]                     Scribe Attestation:   Scribe #1: I performed the above scribed service and the documentation accurately describes the services I performed. I attest to the accuracy of the note.     Attending:   Physician Attestation Statement for Scribe #1: I, Miah Taylor Jr., MD, personally performed the services described in this documentation, as scribed by Martine Patton, in my presence, and it is both accurate and complete.       Scribe Attestation:   Scribe #2: I performed the above scribed service and the documentation accurately describes the services I performed. I attest to the accuracy of the note.    Attending Attestation:           Physician Attestation for Scribe:    Physician Attestation Statement for Scribe #2: I, Kishor Vu MD, reviewed documentation, as scribed by Mariel Mariano in my presence, and it is both accurate and  complete. I also acknowledge and confirm the content of the note done by Scribe #1.           Clinical Impression       ICD-10-CM ICD-9-CM   1. Diplopia  H53.2 368.2   2. Stroke  I63.9 434.91     Unable to remove stroke from diagnosis list, but pt does not have stroke.     Disposition:   Disposition: Discharged  Condition: Stable         Kishor Vu MD  12/01/20 0837

## 2020-11-30 NOTE — PROGRESS NOTES
Subjective:       Patient ID: Renea Bourgeois is a 70 y.o. female.    Chief Complaint: Follow-up and Diplopia    Patient presents to clinic today for followup of chronic conditions. She also reports double vision for 1 week. She reports her near vision is okay, but far vision she sees double. She reports this has been stable since last Friday. Denies other neurologic deficits. Patient is otherwise without concerns today.      Review of Systems   Constitutional: Negative for chills, fatigue, fever and unexpected weight change.   Eyes: Positive for visual disturbance.   Respiratory: Negative for shortness of breath.    Cardiovascular: Negative for chest pain.   Musculoskeletal: Negative for myalgias.   Neurological: Negative for headaches.         Objective:      Physical Exam  Vitals signs reviewed.   Constitutional:       General: She is not in acute distress.     Appearance: She is well-developed.   HENT:      Head: Normocephalic and atraumatic.   Eyes:      General: Lids are normal. No scleral icterus.     Extraocular Movements: Extraocular movements intact.      Conjunctiva/sclera: Conjunctivae normal.      Pupils: Pupils are equal, round, and reactive to light.   Cardiovascular:      Rate and Rhythm: Normal rate and regular rhythm.      Heart sounds: No murmur. No friction rub. No gallop.    Pulmonary:      Effort: Pulmonary effort is normal.      Breath sounds: Normal breath sounds. No decreased breath sounds, wheezing, rhonchi or rales.   Neurological:      Mental Status: She is alert and oriented to person, place, and time.      Cranial Nerves: No cranial nerve deficit.      Gait: Gait normal.   Psychiatric:         Mood and Affect: Mood and affect normal.         Assessment:       1. Diplopia    2. Type 2 diabetes mellitus without complication, without long-term current use of insulin    3. Vitamin D insufficiency    4. Hypertension associated with diabetes    5. Hyperlipidemia associated with type 2  diabetes mellitus        Plan:     Problem List Items Addressed This Visit     Diplopia - Primary    Current Assessment & Plan     Patient will go to ER for imaging to rule out stroke; scheduled for evaluation with optometry as well         Relevant Orders    Ambulatory referral/consult to Optometry    Hyperlipidemia associated with type 2 diabetes mellitus    Current Assessment & Plan     LDL 79; continue crestor         Hypertension associated with diabetes    Current Assessment & Plan     Above goal; likely elevated due to concerns regarding double vision; continue current meds and monitoring         Type 2 diabetes mellitus, without long-term current use of insulin    Current Assessment & Plan     Controlled, a1c 5.4, continue glimepiride         Vitamin D insufficiency    Current Assessment & Plan     Stable, continue supplement               Health Maintenance reviewed/updated. Flu vaccine given today.

## 2020-12-01 ENCOUNTER — PATIENT MESSAGE (OUTPATIENT)
Dept: OPHTHALMOLOGY | Facility: CLINIC | Age: 70
End: 2020-12-01

## 2020-12-01 LAB
CHOLEST SERPL-MCNC: 156 MG/DL (ref 120–199)
CHOLEST/HDLC SERPL: 3.5 {RATIO} (ref 2–5)
HDLC SERPL-MCNC: 44 MG/DL (ref 40–75)
HDLC SERPL: 28.2 % (ref 20–50)
LDLC SERPL CALC-MCNC: 82.4 MG/DL (ref 63–159)
NONHDLC SERPL-MCNC: 112 MG/DL
TRIGL SERPL-MCNC: 148 MG/DL (ref 30–150)

## 2020-12-01 NOTE — ED NOTES
Acknowledge transfer of care of patient. Patient resting in bed with no acute distress noted. Alert and oriented x 3,  and follows commands. Respirations easy and unlabored. IV site clear and patent. Able to make needs known, updated on plan of care. Cart in low position, side rails up x 2 and call bell in reach. Will continue to monitor ]

## 2020-12-03 ENCOUNTER — PATIENT OUTREACH (OUTPATIENT)
Dept: ADMINISTRATIVE | Facility: OTHER | Age: 70
End: 2020-12-03

## 2020-12-03 ENCOUNTER — OFFICE VISIT (OUTPATIENT)
Dept: OPHTHALMOLOGY | Facility: CLINIC | Age: 70
End: 2020-12-03
Payer: MEDICARE

## 2020-12-03 DIAGNOSIS — H49.22 LATERAL RECTUS PALSY, LEFT: ICD-10-CM

## 2020-12-03 DIAGNOSIS — H53.2 DIPLOPIA: Primary | ICD-10-CM

## 2020-12-03 PROCEDURE — 92012 INTRM OPH EXAM EST PATIENT: CPT | Mod: HCNC,S$GLB,, | Performed by: OPTOMETRIST

## 2020-12-03 PROCEDURE — 99999 PR PBB SHADOW E&M-EST. PATIENT-LVL III: ICD-10-PCS | Mod: PBBFAC,HCNC,, | Performed by: OPTOMETRIST

## 2020-12-03 PROCEDURE — 2023F PR DILATED RETINAL EXAM W/O EVID OF RETINOPATHY: ICD-10-PCS | Mod: HCNC,S$GLB,, | Performed by: OPTOMETRIST

## 2020-12-03 PROCEDURE — 99999 PR PBB SHADOW E&M-EST. PATIENT-LVL III: CPT | Mod: PBBFAC,HCNC,, | Performed by: OPTOMETRIST

## 2020-12-03 PROCEDURE — 2023F DILAT RTA XM W/O RTNOPTHY: CPT | Mod: HCNC,S$GLB,, | Performed by: OPTOMETRIST

## 2020-12-03 PROCEDURE — 92012 PR EYE EXAM, EST PATIENT,INTERMED: ICD-10-PCS | Mod: HCNC,S$GLB,, | Performed by: OPTOMETRIST

## 2020-12-03 NOTE — PROGRESS NOTES
Health Maintenance Due   Topic Date Due    Aspirin/Antiplatelet Therapy  01/09/1967    Influenza Vaccine (1) 08/01/2020    Shingles Vaccine (3 of 3) 08/17/2020     Updates were requested from care everywhere.  Chart was reviewed for overdue Proactive [facility] Encounters (NASREEN) topics (CRS, Breast Cancer Screening, Eye exam)  Health Maintenance has been updated.  LINKS immunization registry triggered.  Immunizations were reconciled.

## 2020-12-03 NOTE — PROGRESS NOTES
HPI     Pt c/o double vision starting Friday. Was seen at ER Monday pm.  Vision   is double when looking forward and in L gaze.  Clears with one eye closed,   or in right gaze objects are horizontal.  Pt had MRI and CT scan, and both   were negative.    Diabetic for 10 years.    DM x 2010  PCIOL OS 10/28/15  PCIOL OD 10/14/15    Last edited by Roopa Anderson on 12/3/2020 10:56 AM. (History)            Assessment /Plan     For exam results, see Encounter Report.    Diplopia  -     Ambulatory referral/consult to Optometry    Lateral rectus palsy, left      6th nerve palsy    Will see her again next week when she brings her  in for exam.  RTC prn

## 2020-12-03 NOTE — LETTER
December 3, 2020      Kamini Newton MD  [address] [place] [address] [address] Tish [place] [address]           [facility]  [address] [place] [address] [place] [address]  Phone: [phone]  Fax: [phone]          Patient: Renea Bourgeois   MR Number: [id number]   YOB: 1950   Date of Visit: 12/3/2020       Dear Dr. Kamini Newton:    Thank you for referring Renea Bourgeois to me for evaluation. Attached you will find relevant portions of my assessment and plan of care.    If you have questions, please do not hesitate to call me. I look forward to following Renea Bourgeois along with you.    Sincerely,    James Foote, OD    Enclosure  CC:  No Recipients    If you would like to receive this communication electronically, please contact [email or web address] or [phone] to request more information on [employer] access.    For providers and/or their staff who would like to refer a patient to [facility], please contact us through our one-stop-shop provider referral line, Jaden Zuleta, at [phone].    If you feel you have received this communication in error or would no longer like to receive these types of communications, please e-mail [email or web address]

## 2020-12-04 PROBLEM — H53.2 DIPLOPIA: Status: ACTIVE | Noted: 2020-12-04

## 2020-12-04 NOTE — ASSESSMENT & PLAN NOTE
Patient will go to ER for imaging to rule out stroke; scheduled for evaluation with optometry as well

## 2020-12-04 NOTE — ASSESSMENT & PLAN NOTE
Above goal; likely elevated due to concerns regarding double vision; continue current meds and monitoring

## 2020-12-21 ENCOUNTER — IMMUNIZATION (OUTPATIENT)
Dept: PHARMACY | Facility: CLINIC | Age: 70
End: 2020-12-21
Payer: MEDICARE

## 2020-12-21 ENCOUNTER — CLINICAL SUPPORT (OUTPATIENT)
Dept: INTERNAL MEDICINE | Facility: CLINIC | Age: 70
End: 2020-12-21
Payer: MEDICARE

## 2020-12-21 VITALS — SYSTOLIC BLOOD PRESSURE: 130 MMHG | DIASTOLIC BLOOD PRESSURE: 62 MMHG

## 2021-01-05 ENCOUNTER — IMMUNIZATION (OUTPATIENT)
Dept: INTERNAL MEDICINE | Facility: CLINIC | Age: 71
End: 2021-01-05
Payer: MEDICARE

## 2021-01-05 DIAGNOSIS — Z23 NEED FOR VACCINATION: ICD-10-CM

## 2021-01-05 PROCEDURE — 91300 COVID-19, MRNA, LNP-S, PF, 30 MCG/0.3 ML DOSE VACCINE: CPT | Mod: PBBFAC | Performed by: FAMILY MEDICINE

## 2021-01-26 ENCOUNTER — IMMUNIZATION (OUTPATIENT)
Dept: INTERNAL MEDICINE | Facility: CLINIC | Age: 71
End: 2021-01-26
Payer: MEDICARE

## 2021-01-26 DIAGNOSIS — Z23 NEED FOR VACCINATION: Primary | ICD-10-CM

## 2021-01-26 PROCEDURE — 0002A COVID-19, MRNA, LNP-S, PF, 30 MCG/0.3 ML DOSE VACCINE: CPT | Mod: PBBFAC | Performed by: FAMILY MEDICINE

## 2021-01-26 PROCEDURE — 91300 COVID-19, MRNA, LNP-S, PF, 30 MCG/0.3 ML DOSE VACCINE: CPT | Mod: PBBFAC | Performed by: FAMILY MEDICINE

## 2021-04-21 ENCOUNTER — OFFICE VISIT (OUTPATIENT)
Dept: ORTHOPEDICS | Facility: CLINIC | Age: 71
End: 2021-04-21
Payer: MEDICARE

## 2021-04-21 VITALS
BODY MASS INDEX: 26.06 KG/M2 | DIASTOLIC BLOOD PRESSURE: 73 MMHG | WEIGHT: 138 LBS | HEART RATE: 66 BPM | SYSTOLIC BLOOD PRESSURE: 143 MMHG | HEIGHT: 61 IN

## 2021-04-21 DIAGNOSIS — M18.11 PRIMARY OSTEOARTHRITIS OF FIRST CARPOMETACARPAL JOINT OF RIGHT HAND: Primary | ICD-10-CM

## 2021-04-21 PROCEDURE — 3072F PR LOW RISK FOR RETINOPATHY: ICD-10-PCS | Mod: S$GLB,,, | Performed by: ORTHOPAEDIC SURGERY

## 2021-04-21 PROCEDURE — 3008F PR BODY MASS INDEX (BMI) DOCUMENTED: ICD-10-PCS | Mod: CPTII,S$GLB,, | Performed by: ORTHOPAEDIC SURGERY

## 2021-04-21 PROCEDURE — 99999 PR PBB SHADOW E&M-EST. PATIENT-LVL III: ICD-10-PCS | Mod: PBBFAC,,, | Performed by: ORTHOPAEDIC SURGERY

## 2021-04-21 PROCEDURE — 99213 PR OFFICE/OUTPT VISIT, EST, LEVL III, 20-29 MIN: ICD-10-PCS | Mod: S$GLB,,, | Performed by: ORTHOPAEDIC SURGERY

## 2021-04-21 PROCEDURE — 1159F PR MEDICATION LIST DOCUMENTED IN MEDICAL RECORD: ICD-10-PCS | Mod: S$GLB,,, | Performed by: ORTHOPAEDIC SURGERY

## 2021-04-21 PROCEDURE — 99213 OFFICE O/P EST LOW 20 MIN: CPT | Mod: S$GLB,,, | Performed by: ORTHOPAEDIC SURGERY

## 2021-04-21 PROCEDURE — 1159F MED LIST DOCD IN RCRD: CPT | Mod: S$GLB,,, | Performed by: ORTHOPAEDIC SURGERY

## 2021-04-21 PROCEDURE — 3072F LOW RISK FOR RETINOPATHY: CPT | Mod: S$GLB,,, | Performed by: ORTHOPAEDIC SURGERY

## 2021-04-21 PROCEDURE — 1126F PR PAIN SEVERITY QUANTIFIED, NO PAIN PRESENT: ICD-10-PCS | Mod: S$GLB,,, | Performed by: ORTHOPAEDIC SURGERY

## 2021-04-21 PROCEDURE — 3288F PR FALLS RISK ASSESSMENT DOCUMENTED: ICD-10-PCS | Mod: CPTII,S$GLB,, | Performed by: ORTHOPAEDIC SURGERY

## 2021-04-21 PROCEDURE — 3008F BODY MASS INDEX DOCD: CPT | Mod: CPTII,S$GLB,, | Performed by: ORTHOPAEDIC SURGERY

## 2021-04-21 PROCEDURE — 1126F AMNT PAIN NOTED NONE PRSNT: CPT | Mod: S$GLB,,, | Performed by: ORTHOPAEDIC SURGERY

## 2021-04-21 PROCEDURE — 99999 PR PBB SHADOW E&M-EST. PATIENT-LVL III: CPT | Mod: PBBFAC,,, | Performed by: ORTHOPAEDIC SURGERY

## 2021-04-21 PROCEDURE — 3288F FALL RISK ASSESSMENT DOCD: CPT | Mod: CPTII,S$GLB,, | Performed by: ORTHOPAEDIC SURGERY

## 2021-04-21 PROCEDURE — 1101F PR PT FALLS ASSESS DOC 0-1 FALLS W/OUT INJ PAST YR: ICD-10-PCS | Mod: CPTII,S$GLB,, | Performed by: ORTHOPAEDIC SURGERY

## 2021-04-21 PROCEDURE — 1101F PT FALLS ASSESS-DOCD LE1/YR: CPT | Mod: CPTII,S$GLB,, | Performed by: ORTHOPAEDIC SURGERY

## 2021-04-23 DIAGNOSIS — F51.04 CHRONIC INSOMNIA: ICD-10-CM

## 2021-04-23 RX ORDER — TRAZODONE HYDROCHLORIDE 100 MG/1
TABLET ORAL
Qty: 90 TABLET | Refills: 0 | Status: SHIPPED | OUTPATIENT
Start: 2021-04-23 | End: 2021-07-24

## 2021-05-05 ENCOUNTER — OFFICE VISIT (OUTPATIENT)
Dept: URGENT CARE | Facility: CLINIC | Age: 71
End: 2021-05-05
Payer: MEDICARE

## 2021-05-05 VITALS
SYSTOLIC BLOOD PRESSURE: 159 MMHG | HEART RATE: 68 BPM | WEIGHT: 141.44 LBS | TEMPERATURE: 98 F | OXYGEN SATURATION: 98 % | BODY MASS INDEX: 26.72 KG/M2 | DIASTOLIC BLOOD PRESSURE: 94 MMHG

## 2021-05-05 DIAGNOSIS — H92.01 ACUTE OTALGIA, RIGHT: Primary | ICD-10-CM

## 2021-05-05 DIAGNOSIS — F45.8 BRUXISM: ICD-10-CM

## 2021-05-05 DIAGNOSIS — I15.2 HYPERTENSION ASSOCIATED WITH DIABETES: ICD-10-CM

## 2021-05-05 DIAGNOSIS — E11.59 HYPERTENSION ASSOCIATED WITH DIABETES: ICD-10-CM

## 2021-05-05 DIAGNOSIS — M62.838 MUSCLE SPASM: ICD-10-CM

## 2021-05-05 PROCEDURE — 99999 PR PBB SHADOW E&M-EST. PATIENT-LVL V: CPT | Mod: PBBFAC,,, | Performed by: NURSE PRACTITIONER

## 2021-05-05 PROCEDURE — 3072F PR LOW RISK FOR RETINOPATHY: ICD-10-PCS | Mod: S$GLB,,, | Performed by: NURSE PRACTITIONER

## 2021-05-05 PROCEDURE — 99499 RISK ADDL DX/OHS AUDIT: ICD-10-PCS | Mod: S$GLB,,, | Performed by: NURSE PRACTITIONER

## 2021-05-05 PROCEDURE — 99499 UNLISTED E&M SERVICE: CPT | Mod: S$GLB,,, | Performed by: NURSE PRACTITIONER

## 2021-05-05 PROCEDURE — 3008F PR BODY MASS INDEX (BMI) DOCUMENTED: ICD-10-PCS | Mod: CPTII,S$GLB,, | Performed by: NURSE PRACTITIONER

## 2021-05-05 PROCEDURE — 3072F LOW RISK FOR RETINOPATHY: CPT | Mod: S$GLB,,, | Performed by: NURSE PRACTITIONER

## 2021-05-05 PROCEDURE — 3288F PR FALLS RISK ASSESSMENT DOCUMENTED: ICD-10-PCS | Mod: CPTII,S$GLB,, | Performed by: NURSE PRACTITIONER

## 2021-05-05 PROCEDURE — 1159F PR MEDICATION LIST DOCUMENTED IN MEDICAL RECORD: ICD-10-PCS | Mod: S$GLB,,, | Performed by: NURSE PRACTITIONER

## 2021-05-05 PROCEDURE — 99214 PR OFFICE/OUTPT VISIT, EST, LEVL IV, 30-39 MIN: ICD-10-PCS | Mod: S$GLB,,, | Performed by: NURSE PRACTITIONER

## 2021-05-05 PROCEDURE — 1100F PTFALLS ASSESS-DOCD GE2>/YR: CPT | Mod: CPTII,S$GLB,, | Performed by: NURSE PRACTITIONER

## 2021-05-05 PROCEDURE — 3288F FALL RISK ASSESSMENT DOCD: CPT | Mod: CPTII,S$GLB,, | Performed by: NURSE PRACTITIONER

## 2021-05-05 PROCEDURE — 3008F BODY MASS INDEX DOCD: CPT | Mod: CPTII,S$GLB,, | Performed by: NURSE PRACTITIONER

## 2021-05-05 PROCEDURE — 99214 OFFICE O/P EST MOD 30 MIN: CPT | Mod: S$GLB,,, | Performed by: NURSE PRACTITIONER

## 2021-05-05 PROCEDURE — 99999 PR PBB SHADOW E&M-EST. PATIENT-LVL V: ICD-10-PCS | Mod: PBBFAC,,, | Performed by: NURSE PRACTITIONER

## 2021-05-05 PROCEDURE — 1159F MED LIST DOCD IN RCRD: CPT | Mod: S$GLB,,, | Performed by: NURSE PRACTITIONER

## 2021-05-05 PROCEDURE — 1125F PR PAIN SEVERITY QUANTIFIED, PAIN PRESENT: ICD-10-PCS | Mod: S$GLB,,, | Performed by: NURSE PRACTITIONER

## 2021-05-05 PROCEDURE — 1125F AMNT PAIN NOTED PAIN PRSNT: CPT | Mod: S$GLB,,, | Performed by: NURSE PRACTITIONER

## 2021-05-05 PROCEDURE — 1100F PR PT FALLS ASSESS DOC 2+ FALLS/FALL W/INJURY/YR: ICD-10-PCS | Mod: CPTII,S$GLB,, | Performed by: NURSE PRACTITIONER

## 2021-05-05 RX ORDER — FLUTICASONE PROPIONATE 50 MCG
2 SPRAY, SUSPENSION (ML) NASAL DAILY PRN
Qty: 16 G | Refills: 5 | Status: SHIPPED | OUTPATIENT
Start: 2021-05-05 | End: 2023-07-26

## 2021-05-05 RX ORDER — TIZANIDINE 4 MG/1
4 TABLET ORAL NIGHTLY
Qty: 10 TABLET | Refills: 0 | Status: SHIPPED | OUTPATIENT
Start: 2021-05-05 | End: 2021-05-15

## 2021-05-06 ENCOUNTER — PES CALL (OUTPATIENT)
Dept: ADMINISTRATIVE | Facility: CLINIC | Age: 71
End: 2021-05-06

## 2021-05-31 ENCOUNTER — OFFICE VISIT (OUTPATIENT)
Dept: INTERNAL MEDICINE | Facility: CLINIC | Age: 71
End: 2021-05-31
Payer: MEDICARE

## 2021-05-31 VITALS
OXYGEN SATURATION: 97 % | TEMPERATURE: 98 F | BODY MASS INDEX: 26.87 KG/M2 | DIASTOLIC BLOOD PRESSURE: 72 MMHG | HEART RATE: 78 BPM | HEIGHT: 61 IN | SYSTOLIC BLOOD PRESSURE: 136 MMHG | WEIGHT: 142.31 LBS

## 2021-05-31 DIAGNOSIS — F51.04 CHRONIC INSOMNIA: ICD-10-CM

## 2021-05-31 DIAGNOSIS — E11.9 TYPE 2 DIABETES MELLITUS WITHOUT COMPLICATION, WITHOUT LONG-TERM CURRENT USE OF INSULIN: ICD-10-CM

## 2021-05-31 DIAGNOSIS — E11.59 HYPERTENSION ASSOCIATED WITH DIABETES: ICD-10-CM

## 2021-05-31 DIAGNOSIS — Z00.00 ROUTINE GENERAL MEDICAL EXAMINATION AT A HEALTH CARE FACILITY: Primary | ICD-10-CM

## 2021-05-31 DIAGNOSIS — E55.9 VITAMIN D INSUFFICIENCY: ICD-10-CM

## 2021-05-31 DIAGNOSIS — I15.2 HYPERTENSION ASSOCIATED WITH DIABETES: ICD-10-CM

## 2021-05-31 DIAGNOSIS — E78.5 HYPERLIPIDEMIA ASSOCIATED WITH TYPE 2 DIABETES MELLITUS: ICD-10-CM

## 2021-05-31 DIAGNOSIS — E11.69 HYPERLIPIDEMIA ASSOCIATED WITH TYPE 2 DIABETES MELLITUS: ICD-10-CM

## 2021-05-31 DIAGNOSIS — Z78.0 POSTMENOPAUSAL: ICD-10-CM

## 2021-05-31 PROCEDURE — 1101F PR PT FALLS ASSESS DOC 0-1 FALLS W/OUT INJ PAST YR: ICD-10-PCS | Mod: CPTII,S$GLB,, | Performed by: FAMILY MEDICINE

## 2021-05-31 PROCEDURE — 3008F PR BODY MASS INDEX (BMI) DOCUMENTED: ICD-10-PCS | Mod: CPTII,S$GLB,, | Performed by: FAMILY MEDICINE

## 2021-05-31 PROCEDURE — 99397 PR PREVENTIVE VISIT,EST,65 & OVER: ICD-10-PCS | Mod: S$GLB,,, | Performed by: FAMILY MEDICINE

## 2021-05-31 PROCEDURE — 99999 PR PBB SHADOW E&M-EST. PATIENT-LVL V: CPT | Mod: PBBFAC,,, | Performed by: FAMILY MEDICINE

## 2021-05-31 PROCEDURE — 99999 PR PBB SHADOW E&M-EST. PATIENT-LVL V: ICD-10-PCS | Mod: PBBFAC,,, | Performed by: FAMILY MEDICINE

## 2021-05-31 PROCEDURE — 99397 PER PM REEVAL EST PAT 65+ YR: CPT | Mod: S$GLB,,, | Performed by: FAMILY MEDICINE

## 2021-05-31 PROCEDURE — 1126F AMNT PAIN NOTED NONE PRSNT: CPT | Mod: S$GLB,,, | Performed by: FAMILY MEDICINE

## 2021-05-31 PROCEDURE — 3288F FALL RISK ASSESSMENT DOCD: CPT | Mod: CPTII,S$GLB,, | Performed by: FAMILY MEDICINE

## 2021-05-31 PROCEDURE — 3072F PR LOW RISK FOR RETINOPATHY: ICD-10-PCS | Mod: S$GLB,,, | Performed by: FAMILY MEDICINE

## 2021-05-31 PROCEDURE — 3008F BODY MASS INDEX DOCD: CPT | Mod: CPTII,S$GLB,, | Performed by: FAMILY MEDICINE

## 2021-05-31 PROCEDURE — 3072F LOW RISK FOR RETINOPATHY: CPT | Mod: S$GLB,,, | Performed by: FAMILY MEDICINE

## 2021-05-31 PROCEDURE — 3288F PR FALLS RISK ASSESSMENT DOCUMENTED: ICD-10-PCS | Mod: CPTII,S$GLB,, | Performed by: FAMILY MEDICINE

## 2021-05-31 PROCEDURE — 1126F PR PAIN SEVERITY QUANTIFIED, NO PAIN PRESENT: ICD-10-PCS | Mod: S$GLB,,, | Performed by: FAMILY MEDICINE

## 2021-05-31 PROCEDURE — 1101F PT FALLS ASSESS-DOCD LE1/YR: CPT | Mod: CPTII,S$GLB,, | Performed by: FAMILY MEDICINE

## 2021-06-12 RX ORDER — ROSUVASTATIN CALCIUM 10 MG/1
TABLET, COATED ORAL
Qty: 90 TABLET | Refills: 1 | Status: SHIPPED | OUTPATIENT
Start: 2021-06-12 | End: 2024-01-31

## 2021-06-14 ENCOUNTER — LAB VISIT (OUTPATIENT)
Dept: LAB | Facility: HOSPITAL | Age: 71
End: 2021-06-14
Attending: FAMILY MEDICINE
Payer: MEDICARE

## 2021-06-14 DIAGNOSIS — E78.5 HYPERLIPIDEMIA ASSOCIATED WITH TYPE 2 DIABETES MELLITUS: ICD-10-CM

## 2021-06-14 DIAGNOSIS — E11.59 HYPERTENSION ASSOCIATED WITH DIABETES: ICD-10-CM

## 2021-06-14 DIAGNOSIS — E55.9 VITAMIN D INSUFFICIENCY: ICD-10-CM

## 2021-06-14 DIAGNOSIS — E11.9 TYPE 2 DIABETES MELLITUS WITHOUT COMPLICATION, WITHOUT LONG-TERM CURRENT USE OF INSULIN: ICD-10-CM

## 2021-06-14 DIAGNOSIS — E11.69 HYPERLIPIDEMIA ASSOCIATED WITH TYPE 2 DIABETES MELLITUS: ICD-10-CM

## 2021-06-14 DIAGNOSIS — I15.2 HYPERTENSION ASSOCIATED WITH DIABETES: ICD-10-CM

## 2021-06-14 LAB
25(OH)D3+25(OH)D2 SERPL-MCNC: 24 NG/ML (ref 30–96)
ALBUMIN SERPL BCP-MCNC: 4.1 G/DL (ref 3.5–5.2)
ALP SERPL-CCNC: 70 U/L (ref 55–135)
ALT SERPL W/O P-5'-P-CCNC: 18 U/L (ref 10–44)
ANION GAP SERPL CALC-SCNC: 8 MMOL/L (ref 8–16)
AST SERPL-CCNC: 19 U/L (ref 10–40)
BASOPHILS # BLD AUTO: 0.03 K/UL (ref 0–0.2)
BASOPHILS NFR BLD: 0.6 % (ref 0–1.9)
BILIRUB SERPL-MCNC: 0.6 MG/DL (ref 0.1–1)
BUN SERPL-MCNC: 9 MG/DL (ref 8–23)
CALCIUM SERPL-MCNC: 9.6 MG/DL (ref 8.7–10.5)
CHLORIDE SERPL-SCNC: 106 MMOL/L (ref 95–110)
CHOLEST SERPL-MCNC: 162 MG/DL (ref 120–199)
CHOLEST/HDLC SERPL: 3.4 {RATIO} (ref 2–5)
CO2 SERPL-SCNC: 28 MMOL/L (ref 23–29)
CREAT SERPL-MCNC: 0.8 MG/DL (ref 0.5–1.4)
DIFFERENTIAL METHOD: NORMAL
EOSINOPHIL # BLD AUTO: 0.1 K/UL (ref 0–0.5)
EOSINOPHIL NFR BLD: 1.8 % (ref 0–8)
ERYTHROCYTE [DISTWIDTH] IN BLOOD BY AUTOMATED COUNT: 12.5 % (ref 11.5–14.5)
EST. GFR  (AFRICAN AMERICAN): >60 ML/MIN/1.73 M^2
EST. GFR  (NON AFRICAN AMERICAN): >60 ML/MIN/1.73 M^2
ESTIMATED AVG GLUCOSE: 111 MG/DL (ref 68–131)
GLUCOSE SERPL-MCNC: 89 MG/DL (ref 70–110)
HBA1C MFR BLD: 5.5 % (ref 4–5.6)
HCT VFR BLD AUTO: 40.8 % (ref 37–48.5)
HDLC SERPL-MCNC: 48 MG/DL (ref 40–75)
HDLC SERPL: 29.6 % (ref 20–50)
HGB BLD-MCNC: 13.6 G/DL (ref 12–16)
IMM GRANULOCYTES # BLD AUTO: 0.02 K/UL (ref 0–0.04)
IMM GRANULOCYTES NFR BLD AUTO: 0.4 % (ref 0–0.5)
LDLC SERPL CALC-MCNC: 88 MG/DL (ref 63–159)
LYMPHOCYTES # BLD AUTO: 1.4 K/UL (ref 1–4.8)
LYMPHOCYTES NFR BLD: 27 % (ref 18–48)
MCH RBC QN AUTO: 30.2 PG (ref 27–31)
MCHC RBC AUTO-ENTMCNC: 33.3 G/DL (ref 32–36)
MCV RBC AUTO: 91 FL (ref 82–98)
MONOCYTES # BLD AUTO: 0.3 K/UL (ref 0.3–1)
MONOCYTES NFR BLD: 6.4 % (ref 4–15)
NEUTROPHILS # BLD AUTO: 3.2 K/UL (ref 1.8–7.7)
NEUTROPHILS NFR BLD: 63.8 % (ref 38–73)
NONHDLC SERPL-MCNC: 114 MG/DL
NRBC BLD-RTO: 0 /100 WBC
PLATELET # BLD AUTO: 165 K/UL (ref 150–450)
PMV BLD AUTO: 11.5 FL (ref 9.2–12.9)
POTASSIUM SERPL-SCNC: 3.8 MMOL/L (ref 3.5–5.1)
PROT SERPL-MCNC: 6.9 G/DL (ref 6–8.4)
RBC # BLD AUTO: 4.51 M/UL (ref 4–5.4)
SODIUM SERPL-SCNC: 142 MMOL/L (ref 136–145)
TRIGL SERPL-MCNC: 130 MG/DL (ref 30–150)
TSH SERPL DL<=0.005 MIU/L-ACNC: 2.42 UIU/ML (ref 0.4–4)
WBC # BLD AUTO: 5 K/UL (ref 3.9–12.7)

## 2021-06-14 PROCEDURE — 85025 COMPLETE CBC W/AUTO DIFF WBC: CPT | Performed by: FAMILY MEDICINE

## 2021-06-14 PROCEDURE — 36415 COLL VENOUS BLD VENIPUNCTURE: CPT | Performed by: FAMILY MEDICINE

## 2021-06-14 PROCEDURE — 84443 ASSAY THYROID STIM HORMONE: CPT | Performed by: FAMILY MEDICINE

## 2021-06-14 PROCEDURE — 83036 HEMOGLOBIN GLYCOSYLATED A1C: CPT | Performed by: FAMILY MEDICINE

## 2021-06-14 PROCEDURE — 80061 LIPID PANEL: CPT | Performed by: FAMILY MEDICINE

## 2021-06-14 PROCEDURE — 80053 COMPREHEN METABOLIC PANEL: CPT | Performed by: FAMILY MEDICINE

## 2021-06-14 PROCEDURE — 82306 VITAMIN D 25 HYDROXY: CPT | Performed by: FAMILY MEDICINE

## 2021-06-17 ENCOUNTER — PES CALL (OUTPATIENT)
Dept: ADMINISTRATIVE | Facility: CLINIC | Age: 71
End: 2021-06-17

## 2021-06-18 ENCOUNTER — OFFICE VISIT (OUTPATIENT)
Dept: INTERNAL MEDICINE | Facility: CLINIC | Age: 71
End: 2021-06-18
Payer: MEDICARE

## 2021-06-18 VITALS
OXYGEN SATURATION: 95 % | WEIGHT: 142.44 LBS | DIASTOLIC BLOOD PRESSURE: 74 MMHG | HEART RATE: 71 BPM | HEIGHT: 61 IN | BODY MASS INDEX: 26.89 KG/M2 | SYSTOLIC BLOOD PRESSURE: 138 MMHG

## 2021-06-18 DIAGNOSIS — E78.5 HYPERLIPIDEMIA ASSOCIATED WITH TYPE 2 DIABETES MELLITUS: ICD-10-CM

## 2021-06-18 DIAGNOSIS — E11.69 HYPERLIPIDEMIA ASSOCIATED WITH TYPE 2 DIABETES MELLITUS: ICD-10-CM

## 2021-06-18 DIAGNOSIS — I10 HYPERTENSION, UNSPECIFIED TYPE: ICD-10-CM

## 2021-06-18 DIAGNOSIS — Z85.3 HISTORY OF BREAST CANCER: ICD-10-CM

## 2021-06-18 DIAGNOSIS — R05.3 CHRONIC COUGH: ICD-10-CM

## 2021-06-18 DIAGNOSIS — M85.80 OSTEOPENIA, UNSPECIFIED LOCATION: ICD-10-CM

## 2021-06-18 DIAGNOSIS — Z91.89 POTENTIAL FOR COGNITIVE IMPAIRMENT: ICD-10-CM

## 2021-06-18 DIAGNOSIS — Z00.00 ENCOUNTER FOR PREVENTIVE HEALTH EXAMINATION: Primary | ICD-10-CM

## 2021-06-18 PROCEDURE — 1125F AMNT PAIN NOTED PAIN PRSNT: CPT | Mod: S$GLB,,, | Performed by: NURSE PRACTITIONER

## 2021-06-18 PROCEDURE — 1124F ACP DISCUSS-NO DSCNMKR DOCD: CPT | Mod: S$GLB,,, | Performed by: NURSE PRACTITIONER

## 2021-06-18 PROCEDURE — 1101F PR PT FALLS ASSESS DOC 0-1 FALLS W/OUT INJ PAST YR: ICD-10-PCS | Mod: CPTII,S$GLB,, | Performed by: NURSE PRACTITIONER

## 2021-06-18 PROCEDURE — 1125F PR PAIN SEVERITY QUANTIFIED, PAIN PRESENT: ICD-10-PCS | Mod: S$GLB,,, | Performed by: NURSE PRACTITIONER

## 2021-06-18 PROCEDURE — G0439 PR MEDICARE ANNUAL WELLNESS SUBSEQUENT VISIT: ICD-10-PCS | Mod: S$GLB,,, | Performed by: NURSE PRACTITIONER

## 2021-06-18 PROCEDURE — 3288F FALL RISK ASSESSMENT DOCD: CPT | Mod: CPTII,S$GLB,, | Performed by: NURSE PRACTITIONER

## 2021-06-18 PROCEDURE — 3008F PR BODY MASS INDEX (BMI) DOCUMENTED: ICD-10-PCS | Mod: CPTII,S$GLB,, | Performed by: NURSE PRACTITIONER

## 2021-06-18 PROCEDURE — 99999 PR PBB SHADOW E&M-EST. PATIENT-LVL IV: CPT | Mod: PBBFAC,,, | Performed by: NURSE PRACTITIONER

## 2021-06-18 PROCEDURE — 3008F BODY MASS INDEX DOCD: CPT | Mod: CPTII,S$GLB,, | Performed by: NURSE PRACTITIONER

## 2021-06-18 PROCEDURE — 3072F PR LOW RISK FOR RETINOPATHY: ICD-10-PCS | Mod: S$GLB,,, | Performed by: NURSE PRACTITIONER

## 2021-06-18 PROCEDURE — 3072F LOW RISK FOR RETINOPATHY: CPT | Mod: S$GLB,,, | Performed by: NURSE PRACTITIONER

## 2021-06-18 PROCEDURE — 1124F PR ADV CARE PLAN DISCUSSED, UNABLE/UNWILL DOC PLAN OR SURROGATE: ICD-10-PCS | Mod: S$GLB,,, | Performed by: NURSE PRACTITIONER

## 2021-06-18 PROCEDURE — G0439 PPPS, SUBSEQ VISIT: HCPCS | Mod: S$GLB,,, | Performed by: NURSE PRACTITIONER

## 2021-06-18 PROCEDURE — 99999 PR PBB SHADOW E&M-EST. PATIENT-LVL IV: ICD-10-PCS | Mod: PBBFAC,,, | Performed by: NURSE PRACTITIONER

## 2021-06-18 PROCEDURE — 3288F PR FALLS RISK ASSESSMENT DOCUMENTED: ICD-10-PCS | Mod: CPTII,S$GLB,, | Performed by: NURSE PRACTITIONER

## 2021-06-18 PROCEDURE — 1101F PT FALLS ASSESS-DOCD LE1/YR: CPT | Mod: CPTII,S$GLB,, | Performed by: NURSE PRACTITIONER

## 2021-07-24 ENCOUNTER — PATIENT MESSAGE (OUTPATIENT)
Dept: DIABETES | Facility: CLINIC | Age: 71
End: 2021-07-24

## 2021-07-24 DIAGNOSIS — F51.04 CHRONIC INSOMNIA: ICD-10-CM

## 2021-07-24 RX ORDER — TRAZODONE HYDROCHLORIDE 100 MG/1
TABLET ORAL
Qty: 90 TABLET | Refills: 3 | Status: SHIPPED | OUTPATIENT
Start: 2021-07-24 | End: 2023-04-17

## 2021-08-10 DIAGNOSIS — M81.0 AGE-RELATED OSTEOPOROSIS WITHOUT CURRENT PATHOLOGICAL FRACTURE: Primary | ICD-10-CM

## 2021-08-12 ENCOUNTER — PATIENT MESSAGE (OUTPATIENT)
Dept: INTERNAL MEDICINE | Facility: CLINIC | Age: 71
End: 2021-08-12

## 2021-08-12 DIAGNOSIS — F41.8 SITUATIONAL ANXIETY: Primary | ICD-10-CM

## 2021-08-12 RX ORDER — ALPRAZOLAM 0.25 MG/1
0.25 TABLET ORAL 2 TIMES DAILY PRN
Qty: 60 TABLET | Refills: 0 | Status: SHIPPED | OUTPATIENT
Start: 2021-08-12 | End: 2022-08-23

## 2021-08-18 ENCOUNTER — HOSPITAL ENCOUNTER (OUTPATIENT)
Dept: RADIOLOGY | Facility: HOSPITAL | Age: 71
Discharge: HOME | End: 2021-08-18
Attending: NURSE PRACTITIONER
Payer: MEDICARE

## 2021-08-18 VITALS — BODY MASS INDEX: 26.89 KG/M2 | HEIGHT: 61 IN | WEIGHT: 142.44 LBS

## 2021-08-18 DIAGNOSIS — Z12.31 ENCOUNTER FOR SCREENING MAMMOGRAM FOR MALIGNANT NEOPLASM OF BREAST: ICD-10-CM

## 2021-08-18 DIAGNOSIS — Z08 ENCOUNTER FOR FOLLOW-UP SURVEILLANCE OF BREAST CANCER: ICD-10-CM

## 2021-08-18 DIAGNOSIS — Z85.3 ENCOUNTER FOR FOLLOW-UP SURVEILLANCE OF BREAST CANCER: ICD-10-CM

## 2021-08-18 PROCEDURE — 77067 MAMMO DIGITAL SCREENING BILAT WITH TOMOSYNTHESIS_CAD: ICD-10-PCS | Mod: 26,,, | Performed by: RADIOLOGY

## 2021-08-18 PROCEDURE — 77067 SCR MAMMO BI INCL CAD: CPT | Mod: TC

## 2021-08-18 PROCEDURE — 77063 BREAST TOMOSYNTHESIS BI: CPT | Mod: 26,,, | Performed by: RADIOLOGY

## 2021-08-18 PROCEDURE — 77067 SCR MAMMO BI INCL CAD: CPT | Mod: 26,,, | Performed by: RADIOLOGY

## 2021-08-18 PROCEDURE — 77063 MAMMO DIGITAL SCREENING BILAT WITH TOMOSYNTHESIS_CAD: ICD-10-PCS | Mod: 26,,, | Performed by: RADIOLOGY

## 2021-08-19 ENCOUNTER — OFFICE VISIT (OUTPATIENT)
Dept: INTERNAL MEDICINE | Facility: CLINIC | Age: 71
End: 2021-08-19
Payer: MEDICARE

## 2021-08-19 DIAGNOSIS — E11.59 HYPERTENSION ASSOCIATED WITH DIABETES: ICD-10-CM

## 2021-08-19 DIAGNOSIS — Z20.822 EXPOSURE TO COVID-19 VIRUS: Primary | ICD-10-CM

## 2021-08-19 DIAGNOSIS — I15.2 HYPERTENSION ASSOCIATED WITH DIABETES: ICD-10-CM

## 2021-08-19 DIAGNOSIS — F43.21 GRIEF REACTION: ICD-10-CM

## 2021-08-19 PROCEDURE — 1126F AMNT PAIN NOTED NONE PRSNT: CPT | Mod: CPTII,95,, | Performed by: FAMILY MEDICINE

## 2021-08-19 PROCEDURE — 99213 OFFICE O/P EST LOW 20 MIN: CPT | Mod: 95,,, | Performed by: FAMILY MEDICINE

## 2021-08-19 PROCEDURE — 1159F PR MEDICATION LIST DOCUMENTED IN MEDICAL RECORD: ICD-10-PCS | Mod: CPTII,95,, | Performed by: FAMILY MEDICINE

## 2021-08-19 PROCEDURE — 99213 PR OFFICE/OUTPT VISIT, EST, LEVL III, 20-29 MIN: ICD-10-PCS | Mod: 95,,, | Performed by: FAMILY MEDICINE

## 2021-08-19 PROCEDURE — 3077F PR MOST RECENT SYSTOLIC BLOOD PRESSURE >= 140 MM HG: ICD-10-PCS | Mod: CPTII,95,, | Performed by: FAMILY MEDICINE

## 2021-08-19 PROCEDURE — 3072F LOW RISK FOR RETINOPATHY: CPT | Mod: CPTII,95,, | Performed by: FAMILY MEDICINE

## 2021-08-19 PROCEDURE — 3078F PR MOST RECENT DIASTOLIC BLOOD PRESSURE < 80 MM HG: ICD-10-PCS | Mod: CPTII,95,, | Performed by: FAMILY MEDICINE

## 2021-08-19 PROCEDURE — 3288F FALL RISK ASSESSMENT DOCD: CPT | Mod: CPTII,95,, | Performed by: FAMILY MEDICINE

## 2021-08-19 PROCEDURE — 3044F PR MOST RECENT HEMOGLOBIN A1C LEVEL <7.0%: ICD-10-PCS | Mod: CPTII,95,, | Performed by: FAMILY MEDICINE

## 2021-08-19 PROCEDURE — 1101F PR PT FALLS ASSESS DOC 0-1 FALLS W/OUT INJ PAST YR: ICD-10-PCS | Mod: CPTII,95,, | Performed by: FAMILY MEDICINE

## 2021-08-19 PROCEDURE — 1101F PT FALLS ASSESS-DOCD LE1/YR: CPT | Mod: CPTII,95,, | Performed by: FAMILY MEDICINE

## 2021-08-19 PROCEDURE — 3288F PR FALLS RISK ASSESSMENT DOCUMENTED: ICD-10-PCS | Mod: CPTII,95,, | Performed by: FAMILY MEDICINE

## 2021-08-19 PROCEDURE — 3077F SYST BP >= 140 MM HG: CPT | Mod: CPTII,95,, | Performed by: FAMILY MEDICINE

## 2021-08-19 PROCEDURE — 1160F PR REVIEW ALL MEDS BY PRESCRIBER/CLIN PHARMACIST DOCUMENTED: ICD-10-PCS | Mod: CPTII,95,, | Performed by: FAMILY MEDICINE

## 2021-08-19 PROCEDURE — 3008F PR BODY MASS INDEX (BMI) DOCUMENTED: ICD-10-PCS | Mod: CPTII,95,, | Performed by: FAMILY MEDICINE

## 2021-08-19 PROCEDURE — 1126F PR PAIN SEVERITY QUANTIFIED, NO PAIN PRESENT: ICD-10-PCS | Mod: CPTII,95,, | Performed by: FAMILY MEDICINE

## 2021-08-19 PROCEDURE — 3044F HG A1C LEVEL LT 7.0%: CPT | Mod: CPTII,95,, | Performed by: FAMILY MEDICINE

## 2021-08-19 PROCEDURE — 3008F BODY MASS INDEX DOCD: CPT | Mod: CPTII,95,, | Performed by: FAMILY MEDICINE

## 2021-08-19 PROCEDURE — 3078F DIAST BP <80 MM HG: CPT | Mod: CPTII,95,, | Performed by: FAMILY MEDICINE

## 2021-08-19 PROCEDURE — 1159F MED LIST DOCD IN RCRD: CPT | Mod: CPTII,95,, | Performed by: FAMILY MEDICINE

## 2021-08-19 PROCEDURE — 1160F RVW MEDS BY RX/DR IN RCRD: CPT | Mod: CPTII,95,, | Performed by: FAMILY MEDICINE

## 2021-08-19 PROCEDURE — 3072F PR LOW RISK FOR RETINOPATHY: ICD-10-PCS | Mod: CPTII,95,, | Performed by: FAMILY MEDICINE

## 2021-08-19 RX ORDER — ATORVASTATIN CALCIUM 40 MG/1
TABLET, FILM COATED ORAL
COMMUNITY
End: 2021-12-02 | Stop reason: ALTCHOICE

## 2021-08-19 RX ORDER — METOPROLOL TARTRATE 50 MG/1
TABLET ORAL
COMMUNITY
End: 2022-06-02

## 2021-08-20 ENCOUNTER — LAB VISIT (OUTPATIENT)
Dept: PRIMARY CARE CLINIC | Facility: OTHER | Age: 71
End: 2021-08-20
Payer: MEDICARE

## 2021-08-20 VITALS
HEIGHT: 61 IN | WEIGHT: 142.44 LBS | SYSTOLIC BLOOD PRESSURE: 155 MMHG | BODY MASS INDEX: 26.89 KG/M2 | DIASTOLIC BLOOD PRESSURE: 78 MMHG

## 2021-08-20 DIAGNOSIS — R05.9 COUGH: ICD-10-CM

## 2021-08-20 PROBLEM — F43.20 GRIEF REACTION: Status: ACTIVE | Noted: 2021-08-20

## 2021-08-20 PROBLEM — F43.21 GRIEF REACTION: Status: ACTIVE | Noted: 2021-08-20

## 2021-08-20 PROCEDURE — U0003 INFECTIOUS AGENT DETECTION BY NUCLEIC ACID (DNA OR RNA); SEVERE ACUTE RESPIRATORY SYNDROME CORONAVIRUS 2 (SARS-COV-2) (CORONAVIRUS DISEASE [COVID-19]), AMPLIFIED PROBE TECHNIQUE, MAKING USE OF HIGH THROUGHPUT TECHNOLOGIES AS DESCRIBED BY CMS-2020-01-R: HCPCS

## 2021-08-22 ENCOUNTER — PATIENT OUTREACH (OUTPATIENT)
Dept: ADMINISTRATIVE | Facility: OTHER | Age: 71
End: 2021-08-22

## 2021-08-23 LAB
SARS-COV-2 RNA RESP QL NAA+PROBE: NOT DETECTED
SARS-COV-2- CYCLE NUMBER: NORMAL

## 2021-08-24 ENCOUNTER — TELEPHONE (OUTPATIENT)
Dept: RHEUMATOLOGY | Facility: CLINIC | Age: 71
End: 2021-08-24

## 2021-08-24 ENCOUNTER — OFFICE VISIT (OUTPATIENT)
Dept: ORTHOPEDICS | Facility: CLINIC | Age: 71
End: 2021-08-24
Payer: MEDICARE

## 2021-08-24 ENCOUNTER — PATIENT MESSAGE (OUTPATIENT)
Dept: RHEUMATOLOGY | Facility: CLINIC | Age: 71
End: 2021-08-24

## 2021-08-24 VITALS
HEART RATE: 70 BPM | BODY MASS INDEX: 26.55 KG/M2 | HEIGHT: 61 IN | WEIGHT: 140.63 LBS | SYSTOLIC BLOOD PRESSURE: 114 MMHG | DIASTOLIC BLOOD PRESSURE: 71 MMHG

## 2021-08-24 DIAGNOSIS — M18.11 PRIMARY OSTEOARTHRITIS OF FIRST CARPOMETACARPAL JOINT OF RIGHT HAND: Primary | ICD-10-CM

## 2021-08-24 PROCEDURE — 1159F MED LIST DOCD IN RCRD: CPT | Mod: CPTII,S$GLB,, | Performed by: ORTHOPAEDIC SURGERY

## 2021-08-24 PROCEDURE — 3044F PR MOST RECENT HEMOGLOBIN A1C LEVEL <7.0%: ICD-10-PCS | Mod: CPTII,S$GLB,, | Performed by: ORTHOPAEDIC SURGERY

## 2021-08-24 PROCEDURE — 3072F PR LOW RISK FOR RETINOPATHY: ICD-10-PCS | Mod: CPTII,S$GLB,, | Performed by: ORTHOPAEDIC SURGERY

## 2021-08-24 PROCEDURE — 1125F PR PAIN SEVERITY QUANTIFIED, PAIN PRESENT: ICD-10-PCS | Mod: CPTII,S$GLB,, | Performed by: ORTHOPAEDIC SURGERY

## 2021-08-24 PROCEDURE — 1159F PR MEDICATION LIST DOCUMENTED IN MEDICAL RECORD: ICD-10-PCS | Mod: CPTII,S$GLB,, | Performed by: ORTHOPAEDIC SURGERY

## 2021-08-24 PROCEDURE — 3008F PR BODY MASS INDEX (BMI) DOCUMENTED: ICD-10-PCS | Mod: CPTII,S$GLB,, | Performed by: ORTHOPAEDIC SURGERY

## 2021-08-24 PROCEDURE — 99999 PR PBB SHADOW E&M-EST. PATIENT-LVL III: CPT | Mod: PBBFAC,,, | Performed by: ORTHOPAEDIC SURGERY

## 2021-08-24 PROCEDURE — 3078F PR MOST RECENT DIASTOLIC BLOOD PRESSURE < 80 MM HG: ICD-10-PCS | Mod: CPTII,S$GLB,, | Performed by: ORTHOPAEDIC SURGERY

## 2021-08-24 PROCEDURE — 20600 SMALL JOINT ASPIRATION/INJECTION: R THUMB CMC: ICD-10-PCS | Mod: RT,S$GLB,, | Performed by: ORTHOPAEDIC SURGERY

## 2021-08-24 PROCEDURE — 20600 DRAIN/INJ JOINT/BURSA W/O US: CPT | Mod: RT,S$GLB,, | Performed by: ORTHOPAEDIC SURGERY

## 2021-08-24 PROCEDURE — 3074F SYST BP LT 130 MM HG: CPT | Mod: CPTII,S$GLB,, | Performed by: ORTHOPAEDIC SURGERY

## 2021-08-24 PROCEDURE — 3044F HG A1C LEVEL LT 7.0%: CPT | Mod: CPTII,S$GLB,, | Performed by: ORTHOPAEDIC SURGERY

## 2021-08-24 PROCEDURE — 3074F PR MOST RECENT SYSTOLIC BLOOD PRESSURE < 130 MM HG: ICD-10-PCS | Mod: CPTII,S$GLB,, | Performed by: ORTHOPAEDIC SURGERY

## 2021-08-24 PROCEDURE — 1160F PR REVIEW ALL MEDS BY PRESCRIBER/CLIN PHARMACIST DOCUMENTED: ICD-10-PCS | Mod: CPTII,S$GLB,, | Performed by: ORTHOPAEDIC SURGERY

## 2021-08-24 PROCEDURE — 1160F RVW MEDS BY RX/DR IN RCRD: CPT | Mod: CPTII,S$GLB,, | Performed by: ORTHOPAEDIC SURGERY

## 2021-08-24 PROCEDURE — 99213 PR OFFICE/OUTPT VISIT, EST, LEVL III, 20-29 MIN: ICD-10-PCS | Mod: 25,S$GLB,, | Performed by: ORTHOPAEDIC SURGERY

## 2021-08-24 PROCEDURE — 99999 PR PBB SHADOW E&M-EST. PATIENT-LVL III: ICD-10-PCS | Mod: PBBFAC,,, | Performed by: ORTHOPAEDIC SURGERY

## 2021-08-24 PROCEDURE — 3078F DIAST BP <80 MM HG: CPT | Mod: CPTII,S$GLB,, | Performed by: ORTHOPAEDIC SURGERY

## 2021-08-24 PROCEDURE — 3072F LOW RISK FOR RETINOPATHY: CPT | Mod: CPTII,S$GLB,, | Performed by: ORTHOPAEDIC SURGERY

## 2021-08-24 PROCEDURE — 99213 OFFICE O/P EST LOW 20 MIN: CPT | Mod: 25,S$GLB,, | Performed by: ORTHOPAEDIC SURGERY

## 2021-08-24 PROCEDURE — 1125F AMNT PAIN NOTED PAIN PRSNT: CPT | Mod: CPTII,S$GLB,, | Performed by: ORTHOPAEDIC SURGERY

## 2021-08-24 PROCEDURE — 3008F BODY MASS INDEX DOCD: CPT | Mod: CPTII,S$GLB,, | Performed by: ORTHOPAEDIC SURGERY

## 2021-08-24 RX ORDER — TRIAMCINOLONE ACETONIDE 40 MG/ML
40 INJECTION, SUSPENSION INTRA-ARTICULAR; INTRAMUSCULAR
Status: DISCONTINUED | OUTPATIENT
Start: 2021-08-24 | End: 2021-08-24 | Stop reason: HOSPADM

## 2021-08-24 RX ADMIN — TRIAMCINOLONE ACETONIDE 40 MG: 40 INJECTION, SUSPENSION INTRA-ARTICULAR; INTRAMUSCULAR at 10:08

## 2021-08-25 ENCOUNTER — INFUSION (OUTPATIENT)
Dept: INFUSION THERAPY | Facility: HOSPITAL | Age: 71
End: 2021-08-25
Attending: INTERNAL MEDICINE
Payer: MEDICARE

## 2021-08-25 ENCOUNTER — OFFICE VISIT (OUTPATIENT)
Dept: RHEUMATOLOGY | Facility: CLINIC | Age: 71
End: 2021-08-25
Payer: MEDICARE

## 2021-08-25 VITALS
SYSTOLIC BLOOD PRESSURE: 160 MMHG | HEART RATE: 64 BPM | OXYGEN SATURATION: 100 % | TEMPERATURE: 97 F | DIASTOLIC BLOOD PRESSURE: 76 MMHG | RESPIRATION RATE: 16 BRPM

## 2021-08-25 VITALS
HEIGHT: 61 IN | SYSTOLIC BLOOD PRESSURE: 130 MMHG | WEIGHT: 140 LBS | BODY MASS INDEX: 26.43 KG/M2 | DIASTOLIC BLOOD PRESSURE: 68 MMHG | HEART RATE: 74 BPM

## 2021-08-25 DIAGNOSIS — M15.9 PRIMARY OSTEOARTHRITIS INVOLVING MULTIPLE JOINTS: ICD-10-CM

## 2021-08-25 DIAGNOSIS — Z71.89 COUNSELING ON HEALTH PROMOTION AND DISEASE PREVENTION: ICD-10-CM

## 2021-08-25 DIAGNOSIS — M81.0 AGE-RELATED OSTEOPOROSIS WITHOUT CURRENT PATHOLOGICAL FRACTURE: Primary | ICD-10-CM

## 2021-08-25 PROCEDURE — 63600175 PHARM REV CODE 636 W HCPCS: Performed by: INTERNAL MEDICINE

## 2021-08-25 PROCEDURE — 3044F PR MOST RECENT HEMOGLOBIN A1C LEVEL <7.0%: ICD-10-PCS | Mod: CPTII,S$GLB,, | Performed by: INTERNAL MEDICINE

## 2021-08-25 PROCEDURE — 3078F PR MOST RECENT DIASTOLIC BLOOD PRESSURE < 80 MM HG: ICD-10-PCS | Mod: CPTII,S$GLB,, | Performed by: INTERNAL MEDICINE

## 2021-08-25 PROCEDURE — 99999 PR PBB SHADOW E&M-EST. PATIENT-LVL IV: CPT | Mod: PBBFAC,,, | Performed by: INTERNAL MEDICINE

## 2021-08-25 PROCEDURE — 3075F PR MOST RECENT SYSTOLIC BLOOD PRESS GE 130-139MM HG: ICD-10-PCS | Mod: CPTII,S$GLB,, | Performed by: INTERNAL MEDICINE

## 2021-08-25 PROCEDURE — 3072F PR LOW RISK FOR RETINOPATHY: ICD-10-PCS | Mod: CPTII,S$GLB,, | Performed by: INTERNAL MEDICINE

## 2021-08-25 PROCEDURE — 3008F BODY MASS INDEX DOCD: CPT | Mod: CPTII,S$GLB,, | Performed by: INTERNAL MEDICINE

## 2021-08-25 PROCEDURE — 3288F PR FALLS RISK ASSESSMENT DOCUMENTED: ICD-10-PCS | Mod: CPTII,S$GLB,, | Performed by: INTERNAL MEDICINE

## 2021-08-25 PROCEDURE — 96365 THER/PROPH/DIAG IV INF INIT: CPT

## 2021-08-25 PROCEDURE — 1159F MED LIST DOCD IN RCRD: CPT | Mod: CPTII,S$GLB,, | Performed by: INTERNAL MEDICINE

## 2021-08-25 PROCEDURE — 99499 UNLISTED E&M SERVICE: CPT | Mod: HCNC,S$GLB,, | Performed by: INTERNAL MEDICINE

## 2021-08-25 PROCEDURE — 99999 PR PBB SHADOW E&M-EST. PATIENT-LVL IV: ICD-10-PCS | Mod: PBBFAC,,, | Performed by: INTERNAL MEDICINE

## 2021-08-25 PROCEDURE — 1101F PR PT FALLS ASSESS DOC 0-1 FALLS W/OUT INJ PAST YR: ICD-10-PCS | Mod: CPTII,S$GLB,, | Performed by: INTERNAL MEDICINE

## 2021-08-25 PROCEDURE — 1125F AMNT PAIN NOTED PAIN PRSNT: CPT | Mod: CPTII,S$GLB,, | Performed by: INTERNAL MEDICINE

## 2021-08-25 PROCEDURE — 99499 RISK ADDL DX/OHS AUDIT: ICD-10-PCS | Mod: HCNC,S$GLB,, | Performed by: INTERNAL MEDICINE

## 2021-08-25 PROCEDURE — 3288F FALL RISK ASSESSMENT DOCD: CPT | Mod: CPTII,S$GLB,, | Performed by: INTERNAL MEDICINE

## 2021-08-25 PROCEDURE — 3072F LOW RISK FOR RETINOPATHY: CPT | Mod: CPTII,S$GLB,, | Performed by: INTERNAL MEDICINE

## 2021-08-25 PROCEDURE — 99214 OFFICE O/P EST MOD 30 MIN: CPT | Mod: S$GLB,,, | Performed by: INTERNAL MEDICINE

## 2021-08-25 PROCEDURE — 3044F HG A1C LEVEL LT 7.0%: CPT | Mod: CPTII,S$GLB,, | Performed by: INTERNAL MEDICINE

## 2021-08-25 PROCEDURE — 1125F PR PAIN SEVERITY QUANTIFIED, PAIN PRESENT: ICD-10-PCS | Mod: CPTII,S$GLB,, | Performed by: INTERNAL MEDICINE

## 2021-08-25 PROCEDURE — 1160F PR REVIEW ALL MEDS BY PRESCRIBER/CLIN PHARMACIST DOCUMENTED: ICD-10-PCS | Mod: CPTII,S$GLB,, | Performed by: INTERNAL MEDICINE

## 2021-08-25 PROCEDURE — 1101F PT FALLS ASSESS-DOCD LE1/YR: CPT | Mod: CPTII,S$GLB,, | Performed by: INTERNAL MEDICINE

## 2021-08-25 PROCEDURE — 3075F SYST BP GE 130 - 139MM HG: CPT | Mod: CPTII,S$GLB,, | Performed by: INTERNAL MEDICINE

## 2021-08-25 PROCEDURE — 1159F PR MEDICATION LIST DOCUMENTED IN MEDICAL RECORD: ICD-10-PCS | Mod: CPTII,S$GLB,, | Performed by: INTERNAL MEDICINE

## 2021-08-25 PROCEDURE — 99214 PR OFFICE/OUTPT VISIT, EST, LEVL IV, 30-39 MIN: ICD-10-PCS | Mod: S$GLB,,, | Performed by: INTERNAL MEDICINE

## 2021-08-25 PROCEDURE — 3008F PR BODY MASS INDEX (BMI) DOCUMENTED: ICD-10-PCS | Mod: CPTII,S$GLB,, | Performed by: INTERNAL MEDICINE

## 2021-08-25 PROCEDURE — 3078F DIAST BP <80 MM HG: CPT | Mod: CPTII,S$GLB,, | Performed by: INTERNAL MEDICINE

## 2021-08-25 PROCEDURE — 1160F RVW MEDS BY RX/DR IN RCRD: CPT | Mod: CPTII,S$GLB,, | Performed by: INTERNAL MEDICINE

## 2021-08-25 RX ORDER — SODIUM CHLORIDE 0.9 % (FLUSH) 0.9 %
10 SYRINGE (ML) INJECTION
Status: CANCELLED | OUTPATIENT
Start: 2021-08-25

## 2021-08-25 RX ORDER — ZOLEDRONIC ACID 5 MG/100ML
5 INJECTION, SOLUTION INTRAVENOUS
Status: COMPLETED | OUTPATIENT
Start: 2021-08-25 | End: 2021-08-25

## 2021-08-25 RX ORDER — HEPARIN 100 UNIT/ML
500 SYRINGE INTRAVENOUS
Status: CANCELLED | OUTPATIENT
Start: 2021-08-25

## 2021-08-25 RX ORDER — SODIUM CHLORIDE 0.9 % (FLUSH) 0.9 %
10 SYRINGE (ML) INJECTION
OUTPATIENT
Start: 2021-08-25

## 2021-08-25 RX ORDER — MULTIVITAMIN
1 TABLET ORAL 2 TIMES DAILY
Qty: 60 TABLET | Refills: 3 | Status: SHIPPED | OUTPATIENT
Start: 2021-08-25

## 2021-08-25 RX ORDER — ZOLEDRONIC ACID 5 MG/100ML
5 INJECTION, SOLUTION INTRAVENOUS
OUTPATIENT
Start: 2021-08-25

## 2021-08-25 RX ORDER — HEPARIN 100 UNIT/ML
500 SYRINGE INTRAVENOUS
OUTPATIENT
Start: 2021-08-25

## 2021-08-25 RX ORDER — ZOLEDRONIC ACID 5 MG/100ML
5 INJECTION, SOLUTION INTRAVENOUS
Status: CANCELLED | OUTPATIENT
Start: 2021-08-25

## 2021-08-25 RX ADMIN — ZOLEDRONIC ACID 5 MG: 0.05 INJECTION, SOLUTION INTRAVENOUS at 10:08

## 2021-09-02 ENCOUNTER — OFFICE VISIT (OUTPATIENT)
Dept: HEMATOLOGY/ONCOLOGY | Facility: CLINIC | Age: 71
End: 2021-09-02
Payer: MEDICARE

## 2021-09-02 VITALS
TEMPERATURE: 99 F | OXYGEN SATURATION: 97 % | HEART RATE: 66 BPM | SYSTOLIC BLOOD PRESSURE: 140 MMHG | HEIGHT: 61 IN | RESPIRATION RATE: 14 BRPM | WEIGHT: 140 LBS | BODY MASS INDEX: 26.43 KG/M2 | DIASTOLIC BLOOD PRESSURE: 77 MMHG

## 2021-09-02 DIAGNOSIS — Z08 ENCOUNTER FOR FOLLOW-UP SURVEILLANCE OF BREAST CANCER: Primary | ICD-10-CM

## 2021-09-02 DIAGNOSIS — Z71.9 HEALTH EDUCATION/COUNSELING: ICD-10-CM

## 2021-09-02 DIAGNOSIS — Z71.89 COUNSELING ON HEALTH PROMOTION AND DISEASE PREVENTION: ICD-10-CM

## 2021-09-02 DIAGNOSIS — Z12.39 ENCOUNTER FOR BREAST CANCER SCREENING USING NON-MAMMOGRAM MODALITY: ICD-10-CM

## 2021-09-02 DIAGNOSIS — Z85.3 ENCOUNTER FOR FOLLOW-UP SURVEILLANCE OF BREAST CANCER: Primary | ICD-10-CM

## 2021-09-02 PROCEDURE — 3077F SYST BP >= 140 MM HG: CPT | Mod: CPTII,S$GLB,, | Performed by: NURSE PRACTITIONER

## 2021-09-02 PROCEDURE — 99214 PR OFFICE/OUTPT VISIT, EST, LEVL IV, 30-39 MIN: ICD-10-PCS | Mod: S$GLB,,, | Performed by: NURSE PRACTITIONER

## 2021-09-02 PROCEDURE — 1126F AMNT PAIN NOTED NONE PRSNT: CPT | Mod: CPTII,S$GLB,, | Performed by: NURSE PRACTITIONER

## 2021-09-02 PROCEDURE — 3044F PR MOST RECENT HEMOGLOBIN A1C LEVEL <7.0%: ICD-10-PCS | Mod: CPTII,S$GLB,, | Performed by: NURSE PRACTITIONER

## 2021-09-02 PROCEDURE — 1159F MED LIST DOCD IN RCRD: CPT | Mod: CPTII,S$GLB,, | Performed by: NURSE PRACTITIONER

## 2021-09-02 PROCEDURE — 3072F LOW RISK FOR RETINOPATHY: CPT | Mod: CPTII,S$GLB,, | Performed by: NURSE PRACTITIONER

## 2021-09-02 PROCEDURE — 1101F PT FALLS ASSESS-DOCD LE1/YR: CPT | Mod: CPTII,S$GLB,, | Performed by: NURSE PRACTITIONER

## 2021-09-02 PROCEDURE — 3288F FALL RISK ASSESSMENT DOCD: CPT | Mod: CPTII,S$GLB,, | Performed by: NURSE PRACTITIONER

## 2021-09-02 PROCEDURE — 3288F PR FALLS RISK ASSESSMENT DOCUMENTED: ICD-10-PCS | Mod: CPTII,S$GLB,, | Performed by: NURSE PRACTITIONER

## 2021-09-02 PROCEDURE — 1159F PR MEDICATION LIST DOCUMENTED IN MEDICAL RECORD: ICD-10-PCS | Mod: CPTII,S$GLB,, | Performed by: NURSE PRACTITIONER

## 2021-09-02 PROCEDURE — 1126F PR PAIN SEVERITY QUANTIFIED, NO PAIN PRESENT: ICD-10-PCS | Mod: CPTII,S$GLB,, | Performed by: NURSE PRACTITIONER

## 2021-09-02 PROCEDURE — 3077F PR MOST RECENT SYSTOLIC BLOOD PRESSURE >= 140 MM HG: ICD-10-PCS | Mod: CPTII,S$GLB,, | Performed by: NURSE PRACTITIONER

## 2021-09-02 PROCEDURE — 99999 PR PBB SHADOW E&M-EST. PATIENT-LVL V: CPT | Mod: PBBFAC,,, | Performed by: NURSE PRACTITIONER

## 2021-09-02 PROCEDURE — 3078F PR MOST RECENT DIASTOLIC BLOOD PRESSURE < 80 MM HG: ICD-10-PCS | Mod: CPTII,S$GLB,, | Performed by: NURSE PRACTITIONER

## 2021-09-02 PROCEDURE — 1160F PR REVIEW ALL MEDS BY PRESCRIBER/CLIN PHARMACIST DOCUMENTED: ICD-10-PCS | Mod: CPTII,S$GLB,, | Performed by: NURSE PRACTITIONER

## 2021-09-02 PROCEDURE — 3008F PR BODY MASS INDEX (BMI) DOCUMENTED: ICD-10-PCS | Mod: CPTII,S$GLB,, | Performed by: NURSE PRACTITIONER

## 2021-09-02 PROCEDURE — 3044F HG A1C LEVEL LT 7.0%: CPT | Mod: CPTII,S$GLB,, | Performed by: NURSE PRACTITIONER

## 2021-09-02 PROCEDURE — 3078F DIAST BP <80 MM HG: CPT | Mod: CPTII,S$GLB,, | Performed by: NURSE PRACTITIONER

## 2021-09-02 PROCEDURE — 99214 OFFICE O/P EST MOD 30 MIN: CPT | Mod: S$GLB,,, | Performed by: NURSE PRACTITIONER

## 2021-09-02 PROCEDURE — 3008F BODY MASS INDEX DOCD: CPT | Mod: CPTII,S$GLB,, | Performed by: NURSE PRACTITIONER

## 2021-09-02 PROCEDURE — 99999 PR PBB SHADOW E&M-EST. PATIENT-LVL V: ICD-10-PCS | Mod: PBBFAC,,, | Performed by: NURSE PRACTITIONER

## 2021-09-02 PROCEDURE — 1160F RVW MEDS BY RX/DR IN RCRD: CPT | Mod: CPTII,S$GLB,, | Performed by: NURSE PRACTITIONER

## 2021-09-02 PROCEDURE — 1101F PR PT FALLS ASSESS DOC 0-1 FALLS W/OUT INJ PAST YR: ICD-10-PCS | Mod: CPTII,S$GLB,, | Performed by: NURSE PRACTITIONER

## 2021-09-02 PROCEDURE — 3072F PR LOW RISK FOR RETINOPATHY: ICD-10-PCS | Mod: CPTII,S$GLB,, | Performed by: NURSE PRACTITIONER

## 2021-09-15 ENCOUNTER — TELEPHONE (OUTPATIENT)
Dept: INTERNAL MEDICINE | Facility: CLINIC | Age: 71
End: 2021-09-15

## 2021-09-15 ENCOUNTER — OFFICE VISIT (OUTPATIENT)
Dept: DIABETES | Facility: CLINIC | Age: 71
End: 2021-09-15
Payer: MEDICARE

## 2021-09-15 VITALS
WEIGHT: 137.25 LBS | SYSTOLIC BLOOD PRESSURE: 176 MMHG | HEIGHT: 61 IN | DIASTOLIC BLOOD PRESSURE: 77 MMHG | HEART RATE: 60 BPM | BODY MASS INDEX: 25.91 KG/M2

## 2021-09-15 DIAGNOSIS — I15.2 HYPERTENSION ASSOCIATED WITH DIABETES: ICD-10-CM

## 2021-09-15 DIAGNOSIS — E78.5 HYPERLIPIDEMIA ASSOCIATED WITH TYPE 2 DIABETES MELLITUS: ICD-10-CM

## 2021-09-15 DIAGNOSIS — E11.9 TYPE 2 DIABETES MELLITUS WITHOUT COMPLICATION, WITHOUT LONG-TERM CURRENT USE OF INSULIN: Primary | ICD-10-CM

## 2021-09-15 DIAGNOSIS — E11.69 HYPERLIPIDEMIA ASSOCIATED WITH TYPE 2 DIABETES MELLITUS: ICD-10-CM

## 2021-09-15 DIAGNOSIS — E11.59 HYPERTENSION ASSOCIATED WITH DIABETES: ICD-10-CM

## 2021-09-15 LAB — GLUCOSE SERPL-MCNC: 106 MG/DL (ref 70–110)

## 2021-09-15 PROCEDURE — 3072F PR LOW RISK FOR RETINOPATHY: ICD-10-PCS | Mod: HCNC,CPTII,S$GLB, | Performed by: NURSE PRACTITIONER

## 2021-09-15 PROCEDURE — 1159F MED LIST DOCD IN RCRD: CPT | Mod: HCNC,CPTII,S$GLB, | Performed by: NURSE PRACTITIONER

## 2021-09-15 PROCEDURE — 3077F SYST BP >= 140 MM HG: CPT | Mod: HCNC,CPTII,S$GLB, | Performed by: NURSE PRACTITIONER

## 2021-09-15 PROCEDURE — 1126F PR PAIN SEVERITY QUANTIFIED, NO PAIN PRESENT: ICD-10-PCS | Mod: HCNC,CPTII,S$GLB, | Performed by: NURSE PRACTITIONER

## 2021-09-15 PROCEDURE — 99214 PR OFFICE/OUTPT VISIT, EST, LEVL IV, 30-39 MIN: ICD-10-PCS | Mod: HCNC,S$GLB,, | Performed by: NURSE PRACTITIONER

## 2021-09-15 PROCEDURE — 1101F PT FALLS ASSESS-DOCD LE1/YR: CPT | Mod: HCNC,CPTII,S$GLB, | Performed by: NURSE PRACTITIONER

## 2021-09-15 PROCEDURE — 3044F PR MOST RECENT HEMOGLOBIN A1C LEVEL <7.0%: ICD-10-PCS | Mod: HCNC,CPTII,S$GLB, | Performed by: NURSE PRACTITIONER

## 2021-09-15 PROCEDURE — 99999 PR PBB SHADOW E&M-EST. PATIENT-LVL IV: ICD-10-PCS | Mod: PBBFAC,HCNC,, | Performed by: NURSE PRACTITIONER

## 2021-09-15 PROCEDURE — 3288F PR FALLS RISK ASSESSMENT DOCUMENTED: ICD-10-PCS | Mod: HCNC,CPTII,S$GLB, | Performed by: NURSE PRACTITIONER

## 2021-09-15 PROCEDURE — 3077F PR MOST RECENT SYSTOLIC BLOOD PRESSURE >= 140 MM HG: ICD-10-PCS | Mod: HCNC,CPTII,S$GLB, | Performed by: NURSE PRACTITIONER

## 2021-09-15 PROCEDURE — 1160F PR REVIEW ALL MEDS BY PRESCRIBER/CLIN PHARMACIST DOCUMENTED: ICD-10-PCS | Mod: HCNC,CPTII,S$GLB, | Performed by: NURSE PRACTITIONER

## 2021-09-15 PROCEDURE — 82962 GLUCOSE BLOOD TEST: CPT | Mod: HCNC,S$GLB,, | Performed by: NURSE PRACTITIONER

## 2021-09-15 PROCEDURE — 82962 POCT GLUCOSE, HAND-HELD DEVICE: ICD-10-PCS | Mod: HCNC,S$GLB,, | Performed by: NURSE PRACTITIONER

## 2021-09-15 PROCEDURE — 3072F LOW RISK FOR RETINOPATHY: CPT | Mod: HCNC,CPTII,S$GLB, | Performed by: NURSE PRACTITIONER

## 2021-09-15 PROCEDURE — 3008F BODY MASS INDEX DOCD: CPT | Mod: HCNC,CPTII,S$GLB, | Performed by: NURSE PRACTITIONER

## 2021-09-15 PROCEDURE — 99999 PR PBB SHADOW E&M-EST. PATIENT-LVL IV: CPT | Mod: PBBFAC,HCNC,, | Performed by: NURSE PRACTITIONER

## 2021-09-15 PROCEDURE — 1101F PR PT FALLS ASSESS DOC 0-1 FALLS W/OUT INJ PAST YR: ICD-10-PCS | Mod: HCNC,CPTII,S$GLB, | Performed by: NURSE PRACTITIONER

## 2021-09-15 PROCEDURE — 1160F RVW MEDS BY RX/DR IN RCRD: CPT | Mod: HCNC,CPTII,S$GLB, | Performed by: NURSE PRACTITIONER

## 2021-09-15 PROCEDURE — 3288F FALL RISK ASSESSMENT DOCD: CPT | Mod: HCNC,CPTII,S$GLB, | Performed by: NURSE PRACTITIONER

## 2021-09-15 PROCEDURE — 3008F PR BODY MASS INDEX (BMI) DOCUMENTED: ICD-10-PCS | Mod: HCNC,CPTII,S$GLB, | Performed by: NURSE PRACTITIONER

## 2021-09-15 PROCEDURE — 3078F DIAST BP <80 MM HG: CPT | Mod: HCNC,CPTII,S$GLB, | Performed by: NURSE PRACTITIONER

## 2021-09-15 PROCEDURE — 1126F AMNT PAIN NOTED NONE PRSNT: CPT | Mod: HCNC,CPTII,S$GLB, | Performed by: NURSE PRACTITIONER

## 2021-09-15 PROCEDURE — 3044F HG A1C LEVEL LT 7.0%: CPT | Mod: HCNC,CPTII,S$GLB, | Performed by: NURSE PRACTITIONER

## 2021-09-15 PROCEDURE — 99214 OFFICE O/P EST MOD 30 MIN: CPT | Mod: HCNC,S$GLB,, | Performed by: NURSE PRACTITIONER

## 2021-09-15 PROCEDURE — 1159F PR MEDICATION LIST DOCUMENTED IN MEDICAL RECORD: ICD-10-PCS | Mod: HCNC,CPTII,S$GLB, | Performed by: NURSE PRACTITIONER

## 2021-09-15 PROCEDURE — 3078F PR MOST RECENT DIASTOLIC BLOOD PRESSURE < 80 MM HG: ICD-10-PCS | Mod: HCNC,CPTII,S$GLB, | Performed by: NURSE PRACTITIONER

## 2021-09-15 RX ORDER — GLIMEPIRIDE 1 MG/1
TABLET ORAL
Qty: 90 TABLET | Refills: 3 | Status: SHIPPED | OUTPATIENT
Start: 2021-09-15 | End: 2023-05-24 | Stop reason: SDUPTHER

## 2021-09-17 ENCOUNTER — CLINICAL SUPPORT (OUTPATIENT)
Dept: INTERNAL MEDICINE | Facility: CLINIC | Age: 71
End: 2021-09-17
Payer: MEDICARE

## 2021-09-17 VITALS — DIASTOLIC BLOOD PRESSURE: 78 MMHG | SYSTOLIC BLOOD PRESSURE: 140 MMHG

## 2021-10-17 ENCOUNTER — PATIENT OUTREACH (OUTPATIENT)
Dept: ADMINISTRATIVE | Facility: OTHER | Age: 71
End: 2021-10-17

## 2021-10-17 NOTE — ASSESSMENT & PLAN NOTE
Stable and controlled. Continue current treatment plan as previously prescribed with your PCP.      Pt seen supine in bed w/no lines attached, (+) ace bandage of right foot s/p debridement. pt was cooperative during assessment Pt seen supine in bed w/no lines attached, (+) ace bandage of right foot s/p debridement. pt was cooperative during assessment

## 2021-10-19 ENCOUNTER — OFFICE VISIT (OUTPATIENT)
Dept: PODIATRY | Facility: CLINIC | Age: 71
End: 2021-10-19
Payer: MEDICARE

## 2021-10-19 DIAGNOSIS — E11.69 TYPE 2 DIABETES MELLITUS WITH OTHER SPECIFIED COMPLICATION, WITHOUT LONG-TERM CURRENT USE OF INSULIN: ICD-10-CM

## 2021-10-19 DIAGNOSIS — M19.079 ARTHRITIS OF MIDFOOT: Primary | ICD-10-CM

## 2021-10-19 PROCEDURE — 99203 OFFICE O/P NEW LOW 30 MIN: CPT | Mod: HCNC,S$GLB,, | Performed by: PODIATRIST

## 2021-10-19 PROCEDURE — 1159F PR MEDICATION LIST DOCUMENTED IN MEDICAL RECORD: ICD-10-PCS | Mod: HCNC,CPTII,S$GLB, | Performed by: PODIATRIST

## 2021-10-19 PROCEDURE — 99203 PR OFFICE/OUTPT VISIT, NEW, LEVL III, 30-44 MIN: ICD-10-PCS | Mod: HCNC,S$GLB,, | Performed by: PODIATRIST

## 2021-10-19 PROCEDURE — 1160F RVW MEDS BY RX/DR IN RCRD: CPT | Mod: HCNC,CPTII,S$GLB, | Performed by: PODIATRIST

## 2021-10-19 PROCEDURE — 99999 PR PBB SHADOW E&M-EST. PATIENT-LVL III: CPT | Mod: PBBFAC,HCNC,, | Performed by: PODIATRIST

## 2021-10-19 PROCEDURE — 3072F PR LOW RISK FOR RETINOPATHY: ICD-10-PCS | Mod: HCNC,CPTII,S$GLB, | Performed by: PODIATRIST

## 2021-10-19 PROCEDURE — 1101F PT FALLS ASSESS-DOCD LE1/YR: CPT | Mod: HCNC,CPTII,S$GLB, | Performed by: PODIATRIST

## 2021-10-19 PROCEDURE — 1159F MED LIST DOCD IN RCRD: CPT | Mod: HCNC,CPTII,S$GLB, | Performed by: PODIATRIST

## 2021-10-19 PROCEDURE — 3288F PR FALLS RISK ASSESSMENT DOCUMENTED: ICD-10-PCS | Mod: HCNC,CPTII,S$GLB, | Performed by: PODIATRIST

## 2021-10-19 PROCEDURE — 3044F PR MOST RECENT HEMOGLOBIN A1C LEVEL <7.0%: ICD-10-PCS | Mod: HCNC,CPTII,S$GLB, | Performed by: PODIATRIST

## 2021-10-19 PROCEDURE — 99499 UNLISTED E&M SERVICE: CPT | Mod: S$GLB,,, | Performed by: PODIATRIST

## 2021-10-19 PROCEDURE — 99499 RISK ADDL DX/OHS AUDIT: ICD-10-PCS | Mod: S$GLB,,, | Performed by: PODIATRIST

## 2021-10-19 PROCEDURE — 3288F FALL RISK ASSESSMENT DOCD: CPT | Mod: HCNC,CPTII,S$GLB, | Performed by: PODIATRIST

## 2021-10-19 PROCEDURE — 3072F LOW RISK FOR RETINOPATHY: CPT | Mod: HCNC,CPTII,S$GLB, | Performed by: PODIATRIST

## 2021-10-19 PROCEDURE — 1101F PR PT FALLS ASSESS DOC 0-1 FALLS W/OUT INJ PAST YR: ICD-10-PCS | Mod: HCNC,CPTII,S$GLB, | Performed by: PODIATRIST

## 2021-10-19 PROCEDURE — 3044F HG A1C LEVEL LT 7.0%: CPT | Mod: HCNC,CPTII,S$GLB, | Performed by: PODIATRIST

## 2021-10-19 PROCEDURE — 1160F PR REVIEW ALL MEDS BY PRESCRIBER/CLIN PHARMACIST DOCUMENTED: ICD-10-PCS | Mod: HCNC,CPTII,S$GLB, | Performed by: PODIATRIST

## 2021-10-19 PROCEDURE — 99999 PR PBB SHADOW E&M-EST. PATIENT-LVL III: ICD-10-PCS | Mod: PBBFAC,HCNC,, | Performed by: PODIATRIST

## 2021-11-09 ENCOUNTER — IMMUNIZATION (OUTPATIENT)
Dept: PHARMACY | Facility: CLINIC | Age: 71
End: 2021-11-09
Payer: MEDICARE

## 2021-11-09 DIAGNOSIS — Z23 NEED FOR VACCINATION: Primary | ICD-10-CM

## 2021-11-15 ENCOUNTER — LAB VISIT (OUTPATIENT)
Dept: LAB | Facility: HOSPITAL | Age: 71
End: 2021-11-15
Attending: FAMILY MEDICINE
Payer: MEDICARE

## 2021-11-15 DIAGNOSIS — E55.9 VITAMIN D INSUFFICIENCY: ICD-10-CM

## 2021-11-15 DIAGNOSIS — E11.9 TYPE 2 DIABETES MELLITUS WITHOUT COMPLICATION, WITHOUT LONG-TERM CURRENT USE OF INSULIN: ICD-10-CM

## 2021-11-15 DIAGNOSIS — E11.69 HYPERLIPIDEMIA ASSOCIATED WITH TYPE 2 DIABETES MELLITUS: ICD-10-CM

## 2021-11-15 DIAGNOSIS — E78.5 HYPERLIPIDEMIA ASSOCIATED WITH TYPE 2 DIABETES MELLITUS: ICD-10-CM

## 2021-11-15 LAB
25(OH)D3+25(OH)D2 SERPL-MCNC: 26 NG/ML (ref 30–96)
ALBUMIN SERPL BCP-MCNC: 4.1 G/DL (ref 3.5–5.2)
ALP SERPL-CCNC: 80 U/L (ref 55–135)
ALT SERPL W/O P-5'-P-CCNC: 33 U/L (ref 10–44)
ANION GAP SERPL CALC-SCNC: 10 MMOL/L (ref 8–16)
AST SERPL-CCNC: 28 U/L (ref 10–40)
BILIRUB SERPL-MCNC: 0.5 MG/DL (ref 0.1–1)
BUN SERPL-MCNC: 5 MG/DL (ref 8–23)
CALCIUM SERPL-MCNC: 9.4 MG/DL (ref 8.7–10.5)
CHLORIDE SERPL-SCNC: 103 MMOL/L (ref 95–110)
CHOLEST SERPL-MCNC: 190 MG/DL (ref 120–199)
CHOLEST/HDLC SERPL: 3.8 {RATIO} (ref 2–5)
CO2 SERPL-SCNC: 26 MMOL/L (ref 23–29)
CREAT SERPL-MCNC: 0.8 MG/DL (ref 0.5–1.4)
EST. GFR  (AFRICAN AMERICAN): >60 ML/MIN/1.73 M^2
EST. GFR  (NON AFRICAN AMERICAN): >60 ML/MIN/1.73 M^2
ESTIMATED AVG GLUCOSE: 114 MG/DL (ref 68–131)
GLUCOSE SERPL-MCNC: 92 MG/DL (ref 70–110)
HBA1C MFR BLD: 5.6 % (ref 4–5.6)
HDLC SERPL-MCNC: 50 MG/DL (ref 40–75)
HDLC SERPL: 26.3 % (ref 20–50)
LDLC SERPL CALC-MCNC: 105.8 MG/DL (ref 63–159)
NONHDLC SERPL-MCNC: 140 MG/DL
POTASSIUM SERPL-SCNC: 4 MMOL/L (ref 3.5–5.1)
PROT SERPL-MCNC: 7 G/DL (ref 6–8.4)
SODIUM SERPL-SCNC: 139 MMOL/L (ref 136–145)
TRIGL SERPL-MCNC: 171 MG/DL (ref 30–150)

## 2021-11-15 PROCEDURE — 80061 LIPID PANEL: CPT | Mod: HCNC | Performed by: FAMILY MEDICINE

## 2021-11-15 PROCEDURE — 80053 COMPREHEN METABOLIC PANEL: CPT | Mod: HCNC | Performed by: FAMILY MEDICINE

## 2021-11-15 PROCEDURE — 82306 VITAMIN D 25 HYDROXY: CPT | Mod: HCNC | Performed by: FAMILY MEDICINE

## 2021-11-15 PROCEDURE — 83036 HEMOGLOBIN GLYCOSYLATED A1C: CPT | Mod: HCNC | Performed by: FAMILY MEDICINE

## 2021-11-15 PROCEDURE — 36415 COLL VENOUS BLD VENIPUNCTURE: CPT | Mod: HCNC | Performed by: FAMILY MEDICINE

## 2021-12-02 ENCOUNTER — PATIENT OUTREACH (OUTPATIENT)
Dept: ADMINISTRATIVE | Facility: OTHER | Age: 71
End: 2021-12-02
Payer: MEDICARE

## 2021-12-02 ENCOUNTER — OFFICE VISIT (OUTPATIENT)
Dept: INTERNAL MEDICINE | Facility: CLINIC | Age: 71
End: 2021-12-02
Payer: MEDICARE

## 2021-12-02 VITALS
BODY MASS INDEX: 25.87 KG/M2 | DIASTOLIC BLOOD PRESSURE: 74 MMHG | SYSTOLIC BLOOD PRESSURE: 118 MMHG | WEIGHT: 136.88 LBS | TEMPERATURE: 98 F | HEART RATE: 74 BPM | OXYGEN SATURATION: 97 %

## 2021-12-02 DIAGNOSIS — E78.5 HYPERLIPIDEMIA ASSOCIATED WITH TYPE 2 DIABETES MELLITUS: Primary | ICD-10-CM

## 2021-12-02 DIAGNOSIS — I15.2 HYPERTENSION ASSOCIATED WITH DIABETES: ICD-10-CM

## 2021-12-02 DIAGNOSIS — E11.69 HYPERLIPIDEMIA ASSOCIATED WITH TYPE 2 DIABETES MELLITUS: Primary | ICD-10-CM

## 2021-12-02 DIAGNOSIS — E11.59 HYPERTENSION ASSOCIATED WITH DIABETES: ICD-10-CM

## 2021-12-02 DIAGNOSIS — F43.21 GRIEF REACTION: ICD-10-CM

## 2021-12-02 DIAGNOSIS — E55.9 VITAMIN D INSUFFICIENCY: ICD-10-CM

## 2021-12-02 DIAGNOSIS — E11.69 TYPE 2 DIABETES MELLITUS WITH OTHER SPECIFIED COMPLICATION, WITHOUT LONG-TERM CURRENT USE OF INSULIN: ICD-10-CM

## 2021-12-02 DIAGNOSIS — F51.04 CHRONIC INSOMNIA: ICD-10-CM

## 2021-12-02 PROCEDURE — 99214 PR OFFICE/OUTPT VISIT, EST, LEVL IV, 30-39 MIN: ICD-10-PCS | Mod: HCNC,S$GLB,, | Performed by: FAMILY MEDICINE

## 2021-12-02 PROCEDURE — 3072F PR LOW RISK FOR RETINOPATHY: ICD-10-PCS | Mod: HCNC,CPTII,S$GLB, | Performed by: FAMILY MEDICINE

## 2021-12-02 PROCEDURE — 99214 OFFICE O/P EST MOD 30 MIN: CPT | Mod: HCNC,S$GLB,, | Performed by: FAMILY MEDICINE

## 2021-12-02 PROCEDURE — 3072F LOW RISK FOR RETINOPATHY: CPT | Mod: HCNC,CPTII,S$GLB, | Performed by: FAMILY MEDICINE

## 2021-12-02 PROCEDURE — 99999 PR PBB SHADOW E&M-EST. PATIENT-LVL III: ICD-10-PCS | Mod: PBBFAC,HCNC,, | Performed by: FAMILY MEDICINE

## 2021-12-02 PROCEDURE — 99999 PR PBB SHADOW E&M-EST. PATIENT-LVL III: CPT | Mod: PBBFAC,HCNC,, | Performed by: FAMILY MEDICINE

## 2021-12-02 RX ORDER — VIT C/E/ZN/COPPR/LUTEIN/ZEAXAN 250MG-90MG
2000 CAPSULE ORAL DAILY
Refills: 0 | Status: CANCELLED | COMMUNITY
Start: 2021-12-02

## 2021-12-06 ENCOUNTER — OFFICE VISIT (OUTPATIENT)
Dept: OPHTHALMOLOGY | Facility: CLINIC | Age: 71
End: 2021-12-06
Payer: MEDICARE

## 2021-12-06 DIAGNOSIS — E11.69 TYPE 2 DIABETES MELLITUS WITH OTHER SPECIFIED COMPLICATION, WITHOUT LONG-TERM CURRENT USE OF INSULIN: ICD-10-CM

## 2021-12-06 DIAGNOSIS — H52.7 REFRACTIVE ERRORS: ICD-10-CM

## 2021-12-06 DIAGNOSIS — Z96.1 PSEUDOPHAKIA OF BOTH EYES: ICD-10-CM

## 2021-12-06 DIAGNOSIS — E11.9 DIABETES MELLITUS TYPE 2 WITHOUT RETINOPATHY: Primary | ICD-10-CM

## 2021-12-06 PROCEDURE — 2023F DILAT RTA XM W/O RTNOPTHY: CPT | Mod: HCNC,CPTII,S$GLB, | Performed by: OPTOMETRIST

## 2021-12-06 PROCEDURE — 99999 PR PBB SHADOW E&M-EST. PATIENT-LVL I: ICD-10-PCS | Mod: PBBFAC,HCNC,, | Performed by: OPTOMETRIST

## 2021-12-06 PROCEDURE — 92015 DETERMINE REFRACTIVE STATE: CPT | Mod: HCNC,S$GLB,, | Performed by: OPTOMETRIST

## 2021-12-06 PROCEDURE — 92014 PR EYE EXAM, EST PATIENT,COMPREHESV: ICD-10-PCS | Mod: HCNC,S$GLB,, | Performed by: OPTOMETRIST

## 2021-12-06 PROCEDURE — 99999 PR PBB SHADOW E&M-EST. PATIENT-LVL I: CPT | Mod: PBBFAC,HCNC,, | Performed by: OPTOMETRIST

## 2021-12-06 PROCEDURE — 92014 COMPRE OPH EXAM EST PT 1/>: CPT | Mod: HCNC,S$GLB,, | Performed by: OPTOMETRIST

## 2021-12-06 PROCEDURE — 2023F PR DILATED RETINAL EXAM W/O EVID OF RETINOPATHY: ICD-10-PCS | Mod: HCNC,CPTII,S$GLB, | Performed by: OPTOMETRIST

## 2021-12-06 PROCEDURE — 92015 PR REFRACTION: ICD-10-PCS | Mod: HCNC,S$GLB,, | Performed by: OPTOMETRIST

## 2021-12-14 ENCOUNTER — PATIENT OUTREACH (OUTPATIENT)
Dept: ADMINISTRATIVE | Facility: HOSPITAL | Age: 71
End: 2021-12-14
Payer: MEDICARE

## 2021-12-14 ENCOUNTER — OFFICE VISIT (OUTPATIENT)
Dept: ORTHOPEDICS | Facility: CLINIC | Age: 71
End: 2021-12-14
Payer: MEDICARE

## 2021-12-14 VITALS
DIASTOLIC BLOOD PRESSURE: 64 MMHG | HEIGHT: 61 IN | WEIGHT: 136 LBS | HEART RATE: 61 BPM | SYSTOLIC BLOOD PRESSURE: 135 MMHG | BODY MASS INDEX: 25.68 KG/M2

## 2021-12-14 DIAGNOSIS — M18.11 PRIMARY OSTEOARTHRITIS OF FIRST CARPOMETACARPAL JOINT OF RIGHT HAND: Primary | ICD-10-CM

## 2021-12-14 PROCEDURE — 20600 DRAIN/INJ JOINT/BURSA W/O US: CPT | Mod: HCNC,RT,S$GLB, | Performed by: ORTHOPAEDIC SURGERY

## 2021-12-14 PROCEDURE — 99213 PR OFFICE/OUTPT VISIT, EST, LEVL III, 20-29 MIN: ICD-10-PCS | Mod: 25,HCNC,S$GLB, | Performed by: ORTHOPAEDIC SURGERY

## 2021-12-14 PROCEDURE — 3072F PR LOW RISK FOR RETINOPATHY: ICD-10-PCS | Mod: HCNC,CPTII,S$GLB, | Performed by: ORTHOPAEDIC SURGERY

## 2021-12-14 PROCEDURE — 99999 PR PBB SHADOW E&M-EST. PATIENT-LVL III: ICD-10-PCS | Mod: PBBFAC,HCNC,, | Performed by: ORTHOPAEDIC SURGERY

## 2021-12-14 PROCEDURE — 3072F LOW RISK FOR RETINOPATHY: CPT | Mod: HCNC,CPTII,S$GLB, | Performed by: ORTHOPAEDIC SURGERY

## 2021-12-14 PROCEDURE — 99999 PR PBB SHADOW E&M-EST. PATIENT-LVL III: CPT | Mod: PBBFAC,HCNC,, | Performed by: ORTHOPAEDIC SURGERY

## 2021-12-14 PROCEDURE — 20600 SMALL JOINT ASPIRATION/INJECTION: R THUMB CMC: ICD-10-PCS | Mod: HCNC,RT,S$GLB, | Performed by: ORTHOPAEDIC SURGERY

## 2021-12-14 PROCEDURE — 99213 OFFICE O/P EST LOW 20 MIN: CPT | Mod: 25,HCNC,S$GLB, | Performed by: ORTHOPAEDIC SURGERY

## 2021-12-14 RX ORDER — TRIAMCINOLONE ACETONIDE 40 MG/ML
40 INJECTION, SUSPENSION INTRA-ARTICULAR; INTRAMUSCULAR
Status: DISCONTINUED | OUTPATIENT
Start: 2021-12-14 | End: 2021-12-14 | Stop reason: HOSPADM

## 2021-12-14 RX ADMIN — TRIAMCINOLONE ACETONIDE 40 MG: 40 INJECTION, SUSPENSION INTRA-ARTICULAR; INTRAMUSCULAR at 10:12

## 2022-01-03 RX ORDER — TRIAMCINOLONE ACETONIDE 40 MG/ML
40 INJECTION, SUSPENSION INTRA-ARTICULAR; INTRAMUSCULAR
Status: DISCONTINUED | OUTPATIENT
Start: 2021-12-14 | End: 2023-07-26

## 2022-01-20 ENCOUNTER — PATIENT MESSAGE (OUTPATIENT)
Dept: DIABETES | Facility: CLINIC | Age: 72
End: 2022-01-20
Payer: MEDICARE

## 2022-03-15 ENCOUNTER — OFFICE VISIT (OUTPATIENT)
Dept: ORTHOPEDICS | Facility: CLINIC | Age: 72
End: 2022-03-15
Payer: MEDICARE

## 2022-03-15 VITALS — HEIGHT: 61 IN | BODY MASS INDEX: 25.68 KG/M2 | WEIGHT: 136 LBS

## 2022-03-15 DIAGNOSIS — M18.11 ARTHRITIS OF CARPOMETACARPAL (CMC) JOINT OF RIGHT THUMB: Primary | ICD-10-CM

## 2022-03-15 PROCEDURE — 3288F FALL RISK ASSESSMENT DOCD: CPT | Mod: CPTII,S$GLB,, | Performed by: ORTHOPAEDIC SURGERY

## 2022-03-15 PROCEDURE — 3008F BODY MASS INDEX DOCD: CPT | Mod: CPTII,S$GLB,, | Performed by: ORTHOPAEDIC SURGERY

## 2022-03-15 PROCEDURE — 3072F PR LOW RISK FOR RETINOPATHY: ICD-10-PCS | Mod: CPTII,S$GLB,, | Performed by: ORTHOPAEDIC SURGERY

## 2022-03-15 PROCEDURE — 99999 PR PBB SHADOW E&M-EST. PATIENT-LVL III: CPT | Mod: PBBFAC,,, | Performed by: ORTHOPAEDIC SURGERY

## 2022-03-15 PROCEDURE — 3072F LOW RISK FOR RETINOPATHY: CPT | Mod: CPTII,S$GLB,, | Performed by: ORTHOPAEDIC SURGERY

## 2022-03-15 PROCEDURE — 1101F PR PT FALLS ASSESS DOC 0-1 FALLS W/OUT INJ PAST YR: ICD-10-PCS | Mod: CPTII,S$GLB,, | Performed by: ORTHOPAEDIC SURGERY

## 2022-03-15 PROCEDURE — 1126F AMNT PAIN NOTED NONE PRSNT: CPT | Mod: CPTII,S$GLB,, | Performed by: ORTHOPAEDIC SURGERY

## 2022-03-15 PROCEDURE — 1126F PR PAIN SEVERITY QUANTIFIED, NO PAIN PRESENT: ICD-10-PCS | Mod: CPTII,S$GLB,, | Performed by: ORTHOPAEDIC SURGERY

## 2022-03-15 PROCEDURE — 3008F PR BODY MASS INDEX (BMI) DOCUMENTED: ICD-10-PCS | Mod: CPTII,S$GLB,, | Performed by: ORTHOPAEDIC SURGERY

## 2022-03-15 PROCEDURE — 1101F PT FALLS ASSESS-DOCD LE1/YR: CPT | Mod: CPTII,S$GLB,, | Performed by: ORTHOPAEDIC SURGERY

## 2022-03-15 PROCEDURE — 1159F MED LIST DOCD IN RCRD: CPT | Mod: CPTII,S$GLB,, | Performed by: ORTHOPAEDIC SURGERY

## 2022-03-15 PROCEDURE — 99213 PR OFFICE/OUTPT VISIT, EST, LEVL III, 20-29 MIN: ICD-10-PCS | Mod: 25,S$GLB,, | Performed by: ORTHOPAEDIC SURGERY

## 2022-03-15 PROCEDURE — 20600 DRAIN/INJ JOINT/BURSA W/O US: CPT | Mod: RT,S$GLB,, | Performed by: ORTHOPAEDIC SURGERY

## 2022-03-15 PROCEDURE — 99999 PR PBB SHADOW E&M-EST. PATIENT-LVL III: ICD-10-PCS | Mod: PBBFAC,,, | Performed by: ORTHOPAEDIC SURGERY

## 2022-03-15 PROCEDURE — 3288F PR FALLS RISK ASSESSMENT DOCUMENTED: ICD-10-PCS | Mod: CPTII,S$GLB,, | Performed by: ORTHOPAEDIC SURGERY

## 2022-03-15 PROCEDURE — 99213 OFFICE O/P EST LOW 20 MIN: CPT | Mod: 25,S$GLB,, | Performed by: ORTHOPAEDIC SURGERY

## 2022-03-15 PROCEDURE — 20600 SMALL JOINT ASPIRATION/INJECTION: R THUMB CMC: ICD-10-PCS | Mod: RT,S$GLB,, | Performed by: ORTHOPAEDIC SURGERY

## 2022-03-15 PROCEDURE — 1159F PR MEDICATION LIST DOCUMENTED IN MEDICAL RECORD: ICD-10-PCS | Mod: CPTII,S$GLB,, | Performed by: ORTHOPAEDIC SURGERY

## 2022-03-15 RX ORDER — TRIAMCINOLONE ACETONIDE 40 MG/ML
40 INJECTION, SUSPENSION INTRA-ARTICULAR; INTRAMUSCULAR
Status: DISCONTINUED | OUTPATIENT
Start: 2022-03-15 | End: 2022-03-15 | Stop reason: HOSPADM

## 2022-03-15 RX ADMIN — TRIAMCINOLONE ACETONIDE 40 MG: 40 INJECTION, SUSPENSION INTRA-ARTICULAR; INTRAMUSCULAR at 09:03

## 2022-03-15 NOTE — PROGRESS NOTES
Subjective:     Patient ID: Renea Bourgeois is a 72 y.o. female.    Chief Complaint: Pain of the Right Hand      HPI:  The patient is a 72-year-old female with osteoarthritis right thumb basal joint.  She has tried splints with minimal improvement.  She was last injected 12/14/2021 with good relief until recently and requests reinjection today.    Past Medical History:   Diagnosis Date    Allergy     Anemia 11/14/2017    Angina pectoris     followed by cardiology    Arthritis     Breast cancer     Right T1 Ductal Ca in situ,high oncotype Dx 33, Estrogen positive. Lumpectomy, TAC chemo x 6 cyscles then RT,on aromatase inhibitor    Cataract     Chondromalacia of right patella 3/26/2018    Stable and controlled. Continue current treatment plan as previously prescribed with your PCP.      Diabetes mellitus type II 2011    DM (diabetes mellitus) 2011     am 08/04/2017    DM (diabetes mellitus) 2010     am 06/22/2020    Elevated BP without diagnosis of hypertension 11/27/2018    GERD (gastroesophageal reflux disease)     History of colon polyps 2/21/2018    The patient had adenomatous colon polyps in 2014.      History of renal calculi 8/11/2015    Right obstructing 8/20/13 - passed.     Hyperlipidemia     Hypertension     Kidney stone     right side, passed    Malignant neoplasm of upper-outer quadrant of right breast in female, estrogen receptor positive 7/23/2018    Followed by Dr. Robert Lozano disease     reported per pt    Nuclear sclerosis of both eyes 10/5/2015    Osteopenia     Osteoporosis 4/12/2019    Pseudophakia 10/15/2015    PVC (premature ventricular contraction)     on and off    Refractive error 10/5/2015    Trouble in sleeping     Type 2 diabetes mellitus      Past Surgical History:   Procedure Laterality Date    BREAST BIOPSY      BREAST LUMPECTOMY  2/11/2011    right    CATARACT EXTRACTION Bilateral 10/14/15    CHOLECYSTECTOMY  1989    open     COLONOSCOPY N/A 2/22/2018    Procedure: COLONOSCOPY;  Surgeon: Carlito Odonnell MD;  Location: [facility] ENDO;  Service: Endoscopy;  Laterality: N/A;    CYST REMOVAL Left 11/2017    foot    DILATION AND CURETTAGE OF UTERUS  10/2010    [place]    ESOPHAGOGASTRODUODENOSCOPY N/A 6/29/2020    Procedure: EGD (ESOPHAGOGASTRODUODENOSCOPY);  Surgeon: Nancy Hahn MD;  Location: [facility] ENDO;  Service: Endoscopy;  Laterality: N/A;    EYE SURGERY  2012    cataract    GALLBLADDER SURGERY      removed in 1989    HAND SURGERY Left 2015    Nasal septal deviation repair  2009    toenail edges removed      TONSILLECTOMY  1959    TOTAL REDUCTION MAMMOPLASTY Left     2015    TUBAL LIGATION  1981     Family History   Problem Relation Age of Onset    Diabetes Father     Hypertension Father     Stroke Father     Cancer Father 65        metastatic when diagnosed    Stroke Brother         Stoke    Cancer Other         kidney    Atrial fibrillation Son         35    Heart disease Son     Alzheimer's disease Mother     Parkinsonism Brother     Cancer Paternal Grandfather         prostate    Glaucoma Maternal Grandmother     Psoriasis Cousin     Alcohol abuse Neg Hx     Drug abuse Neg Hx     COPD Neg Hx     Birth defects Neg Hx     Mental retardation Neg Hx     Mental illness Neg Hx     Kidney disease Neg Hx     Hyperlipidemia Neg Hx     Melanoma Neg Hx     Lupus Neg Hx      Social History     Socioeconomic History    Marital status:      Spouse name: Don    Number of children: 4   Occupational History    Occupation: Retired Teacher     Comment: [employer]   Tobacco Use    Smoking status: Never Smoker    Smokeless tobacco: Never Used   Substance and Sexual Activity    Alcohol use: No     Alcohol/week: 0.0 standard drinks    Drug use: No    Sexual activity: Not Currently     Partners: Male     Birth control/protection: Post-menopausal   Social History Narrative    aretired from  homeschooling/,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 Caffeine intake;1-2 per week - dennis calvo. Decaffinated tea and most beveridges. Does have a living will - at home in her filing cabinet.      Social Determinants of Health     Financial Resource Strain: Low Risk     Difficulty of Paying Living Expenses: Not hard at all   Food Insecurity: No Food Insecurity    Worried About Running Out of Food in the Last Year: Never true    Ran Out of Food in the Last Year: Never true   Transportation Needs: No Transportation Needs    Lack of Transportation (Medical): No    Lack of Transportation (Non-Medical): No   Physical Activity: Inactive    Days of Exercise per Week: 0 days    Minutes of Exercise per Session: 0 min   Stress: No Stress Concern Present    Feeling of Stress : Not at all   Social Connections: Socially Integrated    Frequency of Communication with Friends and Family: More than three times a week    Frequency of Social Gatherings with Friends and Family: Three times a week    Attends Jewish Services: 1 to 4 times per year    Active Member of Clubs or Organizations: Yes    Attends Club or Organization Meetings: More than 4 times per year    Marital Status:    Housing Stability: Low Risk     Unable to Pay for Housing in the Last Year: No    Number of Places Lived in the Last Year: 1    Unstable Housing in the Last Year: No     Medication List with Changes/Refills   Current Medications    ACCU-CHEK JD PLUS TEST STRP STRP    TEST three times a day    ACETAMINOPHEN (TYLENOL) 500 MG TABLET    Take 500 mg by mouth every 6 (six) hours as needed for Pain.    ALCOHOL PREP PADS PADM        ALPRAZOLAM (XANAX) 0.25 MG TABLET    Take 1 tablet (0.25 mg total) by mouth 2 (two) times daily as needed for Anxiety.    CALCIUM-VITAMIN D (CALCIUM 600 + D,3,) 600 MG(1,500MG) -400 UNIT TAB    Take 1 tablet by mouth 2 (two) times daily.    CHOLECALCIFEROL, VITAMIN D3, (VITAMIN D3) 1,000 UNIT CAPSULE    Take 1  capsule (1,000 Units total) by mouth once daily.    DICLOFENAC SODIUM (VOLTAREN) 1 % GEL    Apply 2 g topically once daily.    ESOMEPRAZOLE MAGNESIUM (NEXIUM ORAL)    Take by mouth.    FERROUS GLUCONATE (FERGON) 240 (27 FE) MG TABLET    Take 1 tablet (240 mg total) by mouth once daily.    FLUTICASONE PROPIONATE (FLONASE) 50 MCG/ACTUATION NASAL SPRAY    2 sprays (100 mcg total) by Each Nostril route daily as needed. 2 Spray, Suspension Nasal Every day    GABAPENTIN (NEURONTIN) 100 MG CAPSULE    TAKE 1 CAPSULE(100 MG) BY MOUTH THREE TIMES DAILY    GLIMEPIRIDE (AMARYL) 1 MG TABLET    TAKE 1 TABLET(1 MG) BY MOUTH EVERY DAY    LANCETS (ACCU-CHEK SOFTCLIX LANCETS) MISC    1 each by Misc.(Non-Drug; Combo Route) route 3 (three) times daily.    LANCING DEVICE MISC        MAGNESIUM ASPARTATE HCL 61 MG (615 MG) TBEC    Take by mouth once.    METHOCARBAMOL (ROBAXIN) 500 MG TAB    Take 1 tablet (500 mg total) by mouth 2 (two) times daily as needed (muscle spasms).    METOPROLOL TARTRATE (LOPRESSOR) 50 MG TABLET    metoprolol tartrate Take (oral) 1 time per day No date recorded tablet 1 time per day oral No set duration recorded No set duration amount recorded active 50 mg    ROSUVASTATIN (CRESTOR) 10 MG TABLET    TAKE 1 TABLET BY MOUTH EVERY DAY    TRAZODONE (DESYREL) 100 MG TABLET    TAKE 1 TABLET BY MOUTH AT BEDTIME    TURMERIC 400 MG CAP    Take 400 mg by mouth 2 (two) times daily as needed.     Review of patient's allergies indicates:   Allergen Reactions    Codeine Itching     Other reaction(s): Itching     Review of Systems   Constitutional: Negative for malaise/fatigue.   HENT: Negative for hearing loss.    Eyes: Positive for double vision. Negative for visual disturbance.   Cardiovascular: Positive for chest pain and irregular heartbeat.   Respiratory: Negative for shortness of breath.    Endocrine: Negative for cold intolerance.   Hematologic/Lymphatic: Does not bruise/bleed easily.   Skin: Negative for poor wound  healing and suspicious lesions.   Musculoskeletal: Positive for arthritis, joint pain and joint swelling. Negative for gout.   Gastrointestinal: Positive for dysphagia and heartburn. Negative for nausea and vomiting.   Genitourinary: Negative for dysuria.   Neurological: Negative for numbness, paresthesias and sensory change.   Psychiatric/Behavioral: Positive for altered mental status. Negative for depression, memory loss and substance abuse. The patient has insomnia. The patient is not nervous/anxious.    Allergic/Immunologic: Negative for persistent infections.       Objective:   Body mass index is 25.7 kg/m².  There were no vitals filed for this visit.             General    Constitutional: She is oriented to person, place, and time. She appears well-developed and well-nourished. No distress.   HENT:   Head: Normocephalic.   Eyes: EOM are normal.   Pulmonary/Chest: Effort normal.   Neurological: She is oriented to person, place, and time.   Psychiatric: She has a normal mood and affect.             Right Hand/Wrist Exam     Inspection   Scars: Wrist - absent Hand -  absent  Effusion: Wrist - absent Hand -  absent    Pain   Wrist - The patient exhibits pain of the CMC.    Other     Neuorologic Exam    Median Distribution: normal  Ulnar Distribution: normal  Radial Distribution: normal    Comments:  The patient has tenderness right thumb basal joint with a positive axial circumduction grind test.  There are no motor or sensory deficits.          Vascular Exam       Capillary Refill  Right Hand: normal capillary refill      Relevant imaging results reviewed and interpreted by me, discussed with the patient and / or family today radiographs right hand showed osteoarthritic changes in multiple small joints and basal joint  Assessment:     Encounter Diagnosis   Name Primary?    Arthritis of carpometacarpal (CMC) joint of right thumb Yes        Plan:     The patient injected right thumb basal joint with 0.5 cc of  Kenalog and 0.5 cc of 2% plain lidocaine under sterile technique.  She already has a thumb spica splint.  She will return for reinjection as needed                Disclaimer: This note was prepared using a voice recognition system and is likely to have sound alike errors within the text.

## 2022-03-15 NOTE — PROCEDURES
Small Joint Aspiration/Injection: R thumb CMC    Date/Time: 3/15/2022 9:45 AM  Performed by: Iban Kuhn MD  Authorized by: Iban Kuhn MD     Consent Done?:  Yes (Verbal)  Indications:  Arthritis  Timeout: prior to procedure the correct patient, procedure, and site was verified    Prep: patient was prepped and draped in usual sterile fashion      Local anesthesia used?: Yes    Local anesthetic:  Lidocaine 2% without epinephrine  Anesthetic total (ml):  0.5    Location:  Thumb  Site:  R thumb CMC  Ultrasonic guidance for needle placement?: No    Needle size:  25 G  Approach:  Dorsal  Medications:  40 mg triamcinolone acetonide 40 mg/mL

## 2022-05-26 ENCOUNTER — LAB VISIT (OUTPATIENT)
Dept: LAB | Facility: HOSPITAL | Age: 72
End: 2022-05-26
Attending: FAMILY MEDICINE
Payer: MEDICARE

## 2022-05-26 DIAGNOSIS — I15.2 HYPERTENSION ASSOCIATED WITH DIABETES: ICD-10-CM

## 2022-05-26 DIAGNOSIS — E55.9 VITAMIN D INSUFFICIENCY: ICD-10-CM

## 2022-05-26 DIAGNOSIS — E11.59 HYPERTENSION ASSOCIATED WITH DIABETES: ICD-10-CM

## 2022-05-26 DIAGNOSIS — E11.69 TYPE 2 DIABETES MELLITUS WITH OTHER SPECIFIED COMPLICATION, WITHOUT LONG-TERM CURRENT USE OF INSULIN: ICD-10-CM

## 2022-05-26 DIAGNOSIS — E78.5 HYPERLIPIDEMIA ASSOCIATED WITH TYPE 2 DIABETES MELLITUS: ICD-10-CM

## 2022-05-26 DIAGNOSIS — E11.69 HYPERLIPIDEMIA ASSOCIATED WITH TYPE 2 DIABETES MELLITUS: ICD-10-CM

## 2022-05-26 LAB
25(OH)D3+25(OH)D2 SERPL-MCNC: 49 NG/ML (ref 30–96)
ALBUMIN SERPL BCP-MCNC: 3.6 G/DL (ref 3.5–5.2)
ALP SERPL-CCNC: 77 U/L (ref 55–135)
ALT SERPL W/O P-5'-P-CCNC: 27 U/L (ref 10–44)
ANION GAP SERPL CALC-SCNC: 14 MMOL/L (ref 8–16)
AST SERPL-CCNC: 28 U/L (ref 10–40)
BASOPHILS # BLD AUTO: 0.03 K/UL (ref 0–0.2)
BASOPHILS NFR BLD: 0.5 % (ref 0–1.9)
BILIRUB SERPL-MCNC: 0.4 MG/DL (ref 0.1–1)
BUN SERPL-MCNC: 6 MG/DL (ref 8–23)
CALCIUM SERPL-MCNC: 9.2 MG/DL (ref 8.7–10.5)
CHLORIDE SERPL-SCNC: 104 MMOL/L (ref 95–110)
CHOLEST SERPL-MCNC: 156 MG/DL (ref 120–199)
CHOLEST/HDLC SERPL: 4.5 {RATIO} (ref 2–5)
CO2 SERPL-SCNC: 25 MMOL/L (ref 23–29)
CREAT SERPL-MCNC: 0.8 MG/DL (ref 0.5–1.4)
DIFFERENTIAL METHOD: ABNORMAL
EOSINOPHIL # BLD AUTO: 0.1 K/UL (ref 0–0.5)
EOSINOPHIL NFR BLD: 2 % (ref 0–8)
ERYTHROCYTE [DISTWIDTH] IN BLOOD BY AUTOMATED COUNT: 12.2 % (ref 11.5–14.5)
EST. GFR  (AFRICAN AMERICAN): >60 ML/MIN/1.73 M^2
EST. GFR  (NON AFRICAN AMERICAN): >60 ML/MIN/1.73 M^2
ESTIMATED AVG GLUCOSE: 120 MG/DL (ref 68–131)
GLUCOSE SERPL-MCNC: 103 MG/DL (ref 70–110)
HBA1C MFR BLD: 5.8 % (ref 4–5.6)
HBA1C MFR BLD: 5.8 % (ref ?–5.7)
HCT VFR BLD AUTO: 41.2 % (ref 37–48.5)
HDLC SERPL-MCNC: 35 MG/DL (ref 40–75)
HDLC SERPL: 22.4 % (ref 20–50)
HGB BLD-MCNC: 13.7 G/DL (ref 12–16)
IMM GRANULOCYTES # BLD AUTO: 0.04 K/UL (ref 0–0.04)
IMM GRANULOCYTES NFR BLD AUTO: 0.7 % (ref 0–0.5)
LDLC SERPL CALC-MCNC: 72.8 MG/DL (ref 63–159)
LYMPHOCYTES # BLD AUTO: 1.5 K/UL (ref 1–4.8)
LYMPHOCYTES NFR BLD: 24.4 % (ref 18–48)
MCH RBC QN AUTO: 30 PG (ref 27–31)
MCHC RBC AUTO-ENTMCNC: 33.3 G/DL (ref 32–36)
MCV RBC AUTO: 90 FL (ref 82–98)
MONOCYTES # BLD AUTO: 0.4 K/UL (ref 0.3–1)
MONOCYTES NFR BLD: 7 % (ref 4–15)
NEUTROPHILS # BLD AUTO: 4 K/UL (ref 1.8–7.7)
NEUTROPHILS NFR BLD: 65.4 % (ref 38–73)
NONHDLC SERPL-MCNC: 121 MG/DL
NRBC BLD-RTO: 0 /100 WBC
PLATELET # BLD AUTO: 230 K/UL (ref 150–450)
PMV BLD AUTO: 11 FL (ref 9.2–12.9)
POTASSIUM SERPL-SCNC: 4 MMOL/L (ref 3.5–5.1)
PROT SERPL-MCNC: 6.7 G/DL (ref 6–8.4)
RBC # BLD AUTO: 4.57 M/UL (ref 4–5.4)
SODIUM SERPL-SCNC: 143 MMOL/L (ref 136–145)
TRIGL SERPL-MCNC: 241 MG/DL (ref 30–150)
TSH SERPL DL<=0.005 MIU/L-ACNC: 1.61 UIU/ML (ref 0.4–4)
WBC # BLD AUTO: 6.15 K/UL (ref 3.9–12.7)

## 2022-05-26 PROCEDURE — 85025 COMPLETE CBC W/AUTO DIFF WBC: CPT | Performed by: FAMILY MEDICINE

## 2022-05-26 PROCEDURE — 80061 LIPID PANEL: CPT | Performed by: FAMILY MEDICINE

## 2022-05-26 PROCEDURE — 36415 COLL VENOUS BLD VENIPUNCTURE: CPT | Performed by: FAMILY MEDICINE

## 2022-05-26 PROCEDURE — 84443 ASSAY THYROID STIM HORMONE: CPT | Performed by: FAMILY MEDICINE

## 2022-05-26 PROCEDURE — 80053 COMPREHEN METABOLIC PANEL: CPT | Performed by: FAMILY MEDICINE

## 2022-05-26 PROCEDURE — 83036 HEMOGLOBIN GLYCOSYLATED A1C: CPT | Performed by: FAMILY MEDICINE

## 2022-05-26 PROCEDURE — 82306 VITAMIN D 25 HYDROXY: CPT | Performed by: FAMILY MEDICINE

## 2022-06-02 ENCOUNTER — OFFICE VISIT (OUTPATIENT)
Dept: INTERNAL MEDICINE | Facility: CLINIC | Age: 72
End: 2022-06-02
Payer: MEDICARE

## 2022-06-02 ENCOUNTER — LAB VISIT (OUTPATIENT)
Dept: LAB | Facility: HOSPITAL | Age: 72
End: 2022-06-02
Attending: FAMILY MEDICINE
Payer: MEDICARE

## 2022-06-02 VITALS
BODY MASS INDEX: 26.19 KG/M2 | HEART RATE: 65 BPM | RESPIRATION RATE: 18 BRPM | OXYGEN SATURATION: 97 % | WEIGHT: 138.69 LBS | HEIGHT: 61 IN | DIASTOLIC BLOOD PRESSURE: 70 MMHG | SYSTOLIC BLOOD PRESSURE: 120 MMHG | TEMPERATURE: 98 F

## 2022-06-02 DIAGNOSIS — E78.5 HYPERLIPIDEMIA ASSOCIATED WITH TYPE 2 DIABETES MELLITUS: ICD-10-CM

## 2022-06-02 DIAGNOSIS — E11.59 HYPERTENSION ASSOCIATED WITH DIABETES: ICD-10-CM

## 2022-06-02 DIAGNOSIS — C50.411 MALIGNANT NEOPLASM OF UPPER-OUTER QUADRANT OF RIGHT BREAST IN FEMALE, ESTROGEN RECEPTOR POSITIVE: ICD-10-CM

## 2022-06-02 DIAGNOSIS — K21.9 GASTROESOPHAGEAL REFLUX DISEASE, UNSPECIFIED WHETHER ESOPHAGITIS PRESENT: ICD-10-CM

## 2022-06-02 DIAGNOSIS — I15.2 HYPERTENSION ASSOCIATED WITH DIABETES: ICD-10-CM

## 2022-06-02 DIAGNOSIS — E11.69 TYPE 2 DIABETES MELLITUS WITH OTHER SPECIFIED COMPLICATION, WITHOUT LONG-TERM CURRENT USE OF INSULIN: ICD-10-CM

## 2022-06-02 DIAGNOSIS — E55.9 VITAMIN D DEFICIENCY: ICD-10-CM

## 2022-06-02 DIAGNOSIS — I20.89 OTHER FORMS OF ANGINA PECTORIS: ICD-10-CM

## 2022-06-02 DIAGNOSIS — Z12.31 ENCOUNTER FOR SCREENING MAMMOGRAM FOR BREAST CANCER: ICD-10-CM

## 2022-06-02 DIAGNOSIS — M85.80 OSTEOPENIA, UNSPECIFIED LOCATION: ICD-10-CM

## 2022-06-02 DIAGNOSIS — Z00.00 ROUTINE GENERAL MEDICAL EXAMINATION AT A HEALTH CARE FACILITY: Primary | ICD-10-CM

## 2022-06-02 DIAGNOSIS — Z17.0 MALIGNANT NEOPLASM OF UPPER-OUTER QUADRANT OF RIGHT BREAST IN FEMALE, ESTROGEN RECEPTOR POSITIVE: ICD-10-CM

## 2022-06-02 DIAGNOSIS — M46.1 SACROILIITIS: ICD-10-CM

## 2022-06-02 DIAGNOSIS — E11.69 HYPERLIPIDEMIA ASSOCIATED WITH TYPE 2 DIABETES MELLITUS: ICD-10-CM

## 2022-06-02 DIAGNOSIS — F51.04 CHRONIC INSOMNIA: ICD-10-CM

## 2022-06-02 PROBLEM — R13.10 DYSPHAGIA: Status: RESOLVED | Noted: 2020-05-20 | Resolved: 2022-06-02

## 2022-06-02 PROBLEM — Z85.3 PERSONAL HISTORY OF BREAST CANCER: Status: RESOLVED | Noted: 2020-08-24 | Resolved: 2022-06-02

## 2022-06-02 LAB
ALBUMIN/CREAT UR: 5.8 UG/MG (ref 0–30)
CREAT UR-MCNC: 223 MG/DL (ref 15–325)
MICROALBUMIN UR DL<=1MG/L-MCNC: 13 UG/ML

## 2022-06-02 PROCEDURE — 3288F FALL RISK ASSESSMENT DOCD: CPT | Mod: CPTII,S$GLB,, | Performed by: PHYSICIAN ASSISTANT

## 2022-06-02 PROCEDURE — 3008F BODY MASS INDEX DOCD: CPT | Mod: CPTII,S$GLB,, | Performed by: PHYSICIAN ASSISTANT

## 2022-06-02 PROCEDURE — 1126F PR PAIN SEVERITY QUANTIFIED, NO PAIN PRESENT: ICD-10-PCS | Mod: CPTII,S$GLB,, | Performed by: PHYSICIAN ASSISTANT

## 2022-06-02 PROCEDURE — 99397 PER PM REEVAL EST PAT 65+ YR: CPT | Mod: S$GLB,,, | Performed by: PHYSICIAN ASSISTANT

## 2022-06-02 PROCEDURE — 99999 PR PBB SHADOW E&M-EST. PATIENT-LVL V: ICD-10-PCS | Mod: PBBFAC,,, | Performed by: PHYSICIAN ASSISTANT

## 2022-06-02 PROCEDURE — 99499 UNLISTED E&M SERVICE: CPT | Mod: HCNC,S$GLB,, | Performed by: PHYSICIAN ASSISTANT

## 2022-06-02 PROCEDURE — 3078F DIAST BP <80 MM HG: CPT | Mod: CPTII,S$GLB,, | Performed by: PHYSICIAN ASSISTANT

## 2022-06-02 PROCEDURE — 82570 ASSAY OF URINE CREATININE: CPT | Performed by: PHYSICIAN ASSISTANT

## 2022-06-02 PROCEDURE — 1159F MED LIST DOCD IN RCRD: CPT | Mod: CPTII,S$GLB,, | Performed by: PHYSICIAN ASSISTANT

## 2022-06-02 PROCEDURE — 1160F RVW MEDS BY RX/DR IN RCRD: CPT | Mod: CPTII,S$GLB,, | Performed by: PHYSICIAN ASSISTANT

## 2022-06-02 PROCEDURE — 99499 RISK ADDL DX/OHS AUDIT: ICD-10-PCS | Mod: HCNC,S$GLB,, | Performed by: PHYSICIAN ASSISTANT

## 2022-06-02 PROCEDURE — 3078F PR MOST RECENT DIASTOLIC BLOOD PRESSURE < 80 MM HG: ICD-10-PCS | Mod: CPTII,S$GLB,, | Performed by: PHYSICIAN ASSISTANT

## 2022-06-02 PROCEDURE — 99999 PR PBB SHADOW E&M-EST. PATIENT-LVL V: CPT | Mod: PBBFAC,,, | Performed by: PHYSICIAN ASSISTANT

## 2022-06-02 PROCEDURE — 3074F SYST BP LT 130 MM HG: CPT | Mod: CPTII,S$GLB,, | Performed by: PHYSICIAN ASSISTANT

## 2022-06-02 PROCEDURE — 3044F PR MOST RECENT HEMOGLOBIN A1C LEVEL <7.0%: ICD-10-PCS | Mod: CPTII,S$GLB,, | Performed by: PHYSICIAN ASSISTANT

## 2022-06-02 PROCEDURE — 1159F PR MEDICATION LIST DOCUMENTED IN MEDICAL RECORD: ICD-10-PCS | Mod: CPTII,S$GLB,, | Performed by: PHYSICIAN ASSISTANT

## 2022-06-02 PROCEDURE — 1100F PR PT FALLS ASSESS DOC 2+ FALLS/FALL W/INJURY/YR: ICD-10-PCS | Mod: CPTII,S$GLB,, | Performed by: PHYSICIAN ASSISTANT

## 2022-06-02 PROCEDURE — 1100F PTFALLS ASSESS-DOCD GE2>/YR: CPT | Mod: CPTII,S$GLB,, | Performed by: PHYSICIAN ASSISTANT

## 2022-06-02 PROCEDURE — 82043 UR ALBUMIN QUANTITATIVE: CPT | Performed by: PHYSICIAN ASSISTANT

## 2022-06-02 PROCEDURE — 3008F PR BODY MASS INDEX (BMI) DOCUMENTED: ICD-10-PCS | Mod: CPTII,S$GLB,, | Performed by: PHYSICIAN ASSISTANT

## 2022-06-02 PROCEDURE — 3072F LOW RISK FOR RETINOPATHY: CPT | Mod: CPTII,S$GLB,, | Performed by: PHYSICIAN ASSISTANT

## 2022-06-02 PROCEDURE — 3288F PR FALLS RISK ASSESSMENT DOCUMENTED: ICD-10-PCS | Mod: CPTII,S$GLB,, | Performed by: PHYSICIAN ASSISTANT

## 2022-06-02 PROCEDURE — 3072F PR LOW RISK FOR RETINOPATHY: ICD-10-PCS | Mod: CPTII,S$GLB,, | Performed by: PHYSICIAN ASSISTANT

## 2022-06-02 PROCEDURE — 99397 PR PREVENTIVE VISIT,EST,65 & OVER: ICD-10-PCS | Mod: S$GLB,,, | Performed by: PHYSICIAN ASSISTANT

## 2022-06-02 PROCEDURE — 1126F AMNT PAIN NOTED NONE PRSNT: CPT | Mod: CPTII,S$GLB,, | Performed by: PHYSICIAN ASSISTANT

## 2022-06-02 PROCEDURE — 3074F PR MOST RECENT SYSTOLIC BLOOD PRESSURE < 130 MM HG: ICD-10-PCS | Mod: CPTII,S$GLB,, | Performed by: PHYSICIAN ASSISTANT

## 2022-06-02 PROCEDURE — 1160F PR REVIEW ALL MEDS BY PRESCRIBER/CLIN PHARMACIST DOCUMENTED: ICD-10-PCS | Mod: CPTII,S$GLB,, | Performed by: PHYSICIAN ASSISTANT

## 2022-06-02 PROCEDURE — 3044F HG A1C LEVEL LT 7.0%: CPT | Mod: CPTII,S$GLB,, | Performed by: PHYSICIAN ASSISTANT

## 2022-06-02 RX ORDER — METOPROLOL SUCCINATE 25 MG/1
25 TABLET, EXTENDED RELEASE ORAL DAILY
COMMUNITY
Start: 2022-05-05

## 2022-06-02 NOTE — PATIENT INSTRUCTIONS
Check with your pharmacist regarding shingrix vaccine.     The FDA determined that the known and potential benefits of a second COVID-19 booster outweigh any known or potential risks. During the recent Omicron surge, those who were boosted were 21 times less likely to die from COVID-19 compared to those who were unvaccinated, and seven times less likely to be hospitalized.    Eligibility criteria for a second booster dose include:  Individuals 50 years of age and older at least four months after receipt of a first booster dose of any approved COVID-19 vaccine  Immunocompromised individuals 12 years of age and older at least four months after receipt of a first booster dose of any approved COVID-19    These individuals are eligible for a second booster dose regardless of which COVID-19 vaccine they received for their initial series. While all of the available COVID-19 vaccines were approved for the first booster dose, only Pfizer and Moderna have been approved as options for the second booster dose.  Pfizer is authorized for individuals 12 years of age and older  Moderna is authorized for individuals 18 years of age and older    Second booster shot appointments can be scheduled via [facility] or by calling [phone]. Walk-ins are also accepted.

## 2022-06-02 NOTE — PROGRESS NOTES
Subjective:       Patient ID: Renea Bourgeois is a 72 y.o. female.    Chief Complaint: Annual Exam      Patient Care Team:  Kamini Newton MD as PCP - General (Family Medicine)  Jovan Lewis (Inactive)  Perlita Zafar LPN as Care Coordinator (Internal Medicine)  Vishnu Cox MD as Consulting Physician (Orthopedic Surgery)  Andree Coyne, PhD, NP-C as Nurse Practitioner (Family Medicine)  Marija Wood NP as Nurse Practitioner (Hematology and Oncology)  Nathaniel Moon MD as Consulting Physician (Rheumatology)  James Foote OD as Consulting Physician (Optometry)  Iban Kuhn MD as Consulting Physician (Hand Surgery)  Flaca Howell MD as Consulting Physician (Otolaryngology)  Elvia Villegas PA-C as Physician Assistant (Internal Medicine)    Patient presents to clinic today for annual physical exam.      Review of Systems   Constitutional: Negative for chills, fatigue, fever and unexpected weight change.   HENT: Negative for congestion, dental problem, ear pain, hearing loss, rhinorrhea and trouble swallowing.    Eyes: Negative for pain and visual disturbance.   Respiratory: Negative for cough and shortness of breath.    Cardiovascular: Negative for chest pain, palpitations and leg swelling.   Gastrointestinal: Negative for abdominal distention, abdominal pain, blood in stool, constipation, diarrhea, nausea and vomiting.   Genitourinary: Negative for difficulty urinating and vaginal discharge.   Musculoskeletal: Positive for arthralgias ( chronic arthritic pain in hands, R>L, sees Dr Kuhn). Negative for myalgias.   Skin: Negative for rash.   Neurological: Negative for dizziness, weakness, numbness and headaches.   Hematological: Negative for adenopathy. Does not bruise/bleed easily.   Psychiatric/Behavioral: Negative for dysphoric mood and sleep disturbance. The patient is not nervous/anxious.        Objective:      Physical Exam  Vitals and nursing note  reviewed.   Constitutional:       General: She is not in acute distress.     Appearance: Normal appearance. She is well-developed.   HENT:      Head: Normocephalic and atraumatic.      Right Ear: Hearing, tympanic membrane, ear canal and external ear normal.      Left Ear: Hearing, tympanic membrane, ear canal and external ear normal.      Nose: Nose normal. No mucosal edema or rhinorrhea.      Mouth/Throat:      Lips: Pink.      Mouth: Mucous membranes are moist.      Pharynx: Oropharynx is clear. Uvula midline.   Eyes:      General: Lids are normal. No scleral icterus.     Extraocular Movements: Extraocular movements intact.      Conjunctiva/sclera: Conjunctivae normal.      Pupils: Pupils are equal, round, and reactive to light.   Neck:      Thyroid: No thyromegaly.   Cardiovascular:      Rate and Rhythm: Normal rate and regular rhythm.      Pulses: Normal pulses.   Pulmonary:      Effort: Pulmonary effort is normal.      Breath sounds: Normal breath sounds. No wheezing, rhonchi or rales.   Abdominal:      General: Bowel sounds are normal. There is no distension.      Palpations: Abdomen is soft. There is no mass.      Tenderness: There is no abdominal tenderness.   Musculoskeletal:         General: Normal range of motion.      Cervical back: Normal range of motion and neck supple.      Right lower leg: No edema.      Left lower leg: No edema.   Lymphadenopathy:      Cervical: No cervical adenopathy.   Skin:     General: Skin is warm and dry.      Findings: No lesion or rash.      Nails: There is no clubbing.   Neurological:      Mental Status: She is alert.      Cranial Nerves: No cranial nerve deficit.      Sensory: No sensory deficit.   Psychiatric:         Mood and Affect: Mood and affect normal.         Protective Sensation (w/ 10 gram monofilament):  Right: Intact  Left: Intact    Visual Inspection:  Onychomycosis -  Bilateral    Pedal Pulses:   Right: Present  Left: Present    Posterior tibialis:    Right:Present  Left: Present      Assessment:       1. Routine general medical examination at a health care facility    2. Hypertension associated with diabetes    3. Hyperlipidemia associated with type 2 diabetes mellitus    4. Vitamin D deficiency    5. Type 2 diabetes mellitus with other specified complication, without long-term current use of insulin    6. Gastroesophageal reflux disease, unspecified whether esophagitis present    7. Chronic insomnia    8. Osteopenia, unspecified location    9. Encounter for screening mammogram for breast cancer    10. Sacroiliitis    11. Other forms of angina pectoris    12. Malignant neoplasm of upper-outer quadrant of right breast in female, estrogen receptor positive        Plan:     Problem List Items Addressed This Visit        Cardiac/Vascular    Hyperlipidemia associated with type 2 diabetes mellitus    Current Assessment & Plan     Controlled on rosuvastatin  Lab Results   Component Value Date    CHOL 156 05/26/2022    CHOL 190 11/15/2021    CHOL 162 06/14/2021    LDLCALC 72.8 05/26/2022    LDLCALC 105.8 11/15/2021    LDLCALC 88.0 06/14/2021    TRIG 241 (H) 05/26/2022    HDL 35 (L) 05/26/2022    ALT 27 05/26/2022    AST 28 05/26/2022    ALKPHOS 77 05/26/2022              Relevant Orders    Comprehensive Metabolic Panel    Lipid Panel    Hypertension associated with diabetes    Current Assessment & Plan     /70, controlled, continue metoprolol  Lab Results   Component Value Date     05/26/2022    K 4.0 05/26/2022    BUN 6 (L) 05/26/2022    CREATININE 0.8 05/26/2022    EGFRNONAA >60.0 05/26/2022    ESTGFRAFRICA >60.0 05/26/2022                 Oncology    Malignant neoplasm of upper-outer quadrant of right breast in female, estrogen receptor positive    Overview     Followed by Dr. Jones              Endocrine    Type 2 diabetes mellitus, without long-term current use of insulin    Current Assessment & Plan     Controlled on glimepiride   Lab Results    Component Value Date    HGBA1C 5.8 (H) 05/26/2022    HGBA1C 5.6 11/15/2021    HGBA1C 5.5 06/14/2021    LDLCALC 72.8 05/26/2022    LABMICR 13.0 11/23/2020    CREATRANDUR 166.0 11/23/2020    MICALBCREAT 7.8 11/23/2020              Relevant Orders    Hemoglobin A1C    Microalbumin/Creatinine Ratio, Urine    Vitamin D deficiency    Current Assessment & Plan     Cont supplement  Lab Results   Component Value Date    [id number] 49 05/26/2022    [id number] 26 (L) 11/15/2021    [id number] 24 (L) 06/14/2021              Relevant Orders    Vitamin D       GI    GERD (gastroesophageal reflux disease)    Current Assessment & Plan     Stable on Nexium              Orthopedic    Osteopenia    Overview     Followed by Rheumatology           Current Assessment & Plan     DEXA UTD           Sacroiliitis    Overview     Followed by Pain Mgmt              Other    Chronic insomnia    Current Assessment & Plan     Stable on Trazodone             Other Visit Diagnoses     Routine general medical examination at a health care facility    -  Primary    Encounter for screening mammogram for breast cancer        Relevant Orders    Mammo Digital Screening Bilat w/ [facility]    Other forms of angina pectoris            Recent labs reviewed with patient.    Discussed shingrix vaccine, advised can be obtained at pharmacy.  Discussed Covid booster, scheduling information given.  MMG ordered  6 month f/u with Dr. Newton scheduled with fasting labs PTA  Health Maintenance reviewed/updated.

## 2022-06-03 NOTE — ASSESSMENT & PLAN NOTE
Cont supplement  Lab Results   Component Value Date    [id number] 49 05/26/2022    [id number] 26 (L) 11/15/2021    [id number] 24 (L) 06/14/2021

## 2022-06-03 NOTE — ASSESSMENT & PLAN NOTE
/70, controlled, continue metoprolol  Lab Results   Component Value Date     05/26/2022    K 4.0 05/26/2022    BUN 6 (L) 05/26/2022    CREATININE 0.8 05/26/2022    EGFRNONAA >60.0 05/26/2022    ESTGFRAFRICA >60.0 05/26/2022

## 2022-06-03 NOTE — ASSESSMENT & PLAN NOTE
Controlled on glimepiride   Lab Results   Component Value Date    HGBA1C 5.8 (H) 05/26/2022    HGBA1C 5.6 11/15/2021    HGBA1C 5.5 06/14/2021    LDLCALC 72.8 05/26/2022    LABMICR 13.0 11/23/2020    CREATRANDUR 166.0 11/23/2020    MICALBCREAT 7.8 11/23/2020

## 2022-06-03 NOTE — ASSESSMENT & PLAN NOTE
Controlled on rosuvastatin  Lab Results   Component Value Date    CHOL 156 05/26/2022    CHOL 190 11/15/2021    CHOL 162 06/14/2021    LDLCALC 72.8 05/26/2022    LDLCALC 105.8 11/15/2021    LDLCALC 88.0 06/14/2021    TRIG 241 (H) 05/26/2022    HDL 35 (L) 05/26/2022    ALT 27 05/26/2022    AST 28 05/26/2022    ALKPHOS 77 05/26/2022

## 2022-06-09 ENCOUNTER — PATIENT OUTREACH (OUTPATIENT)
Dept: ADMINISTRATIVE | Facility: HOSPITAL | Age: 72
End: 2022-06-09
Payer: MEDICARE

## 2022-07-04 NOTE — ASSESSMENT & PLAN NOTE
Mother reports sore on kneecap that has worsened & spread, looks like brush-burn/blister, today has more small blisters that came out between thigh & knee, denies fever, reports pt has large red area over kneecap, states kids at school recently had hand, foot, & mouth. Advised mother pt needs to see dr within 4hrs per protocol, verbalized understanding.     Reason for Disposition   [1] Looks infected AND [2] large red area or streak (> 2 in. or 5 cm)    Additional Information   Negative: [1] Widespread blisters AND [2] cause unknown   Negative: Child sounds very sick or weak to the triager   Negative: [1] Looks infected (spreading redness, red streak) AND [2] fever    Protocols used: BLISTERS-P-AH       Stable on trazodone

## 2022-08-10 ENCOUNTER — PES CALL (OUTPATIENT)
Dept: ADMINISTRATIVE | Facility: CLINIC | Age: 72
End: 2022-08-10
Payer: MEDICARE

## 2022-08-11 NOTE — PROGRESS NOTES
Breast cancer follow-up  Renea oBurgeois  [id number]  [birth year]    Last Visit: 8/24/2020    Oncologist: VIKAS Jones MD    Surgeon: Shubham Shepherd MD    Radiation Oncologist: Korina Moore MD    Internist: Kamini Newton MD    Diagnosis:   stage I, T1 C. N0 M0 ER positive/IL negative high risk breast cancer.  The patient self discovered a lesion in the medial upper portion of her right breast, underwent imaging and confirmatory biopsy in [place]. She ultimately had a lumpectomy and sentinel node biopsy at [facility] 2/11/11,  for a 1.3 cm Ambrose 8 adenocarcinoma. The sentinel lymph nodes were negative. ER positive/IL negative. Oncotype DX testing revealed that the patient was at high risk of recurrence so she received 6 cycles of TAC chemotherapy which was well-tolerated.      Pt presents today for her annual mammo and breast cancer follow-up    8/23/2022 mammo- wnl    12/29/10 - Performed in [place]- Right breast biopsy - sonocore- 1 oclock: Poorly differentiated invasive ductal carcinoma - high histologic grade of 3/3. 0.6 cm with no definitive vascular or lymphatic stread.     Staging: Stage 1, T1N0M0, ER positive and IL negative, Her2 non amplified.     2/11/11: Oncotype DX 56- high recurrence score.    2/7/11 initial visit with Dr. Jones.     2/11/11 - Lumpectomy and sentinel node biopsy with Dr. Shepherd. 4 nodes sampled.     3/7/11 - Was considering [id number] randomization but it was decided to go with Cytoxan 500 mg/m2, Adriamycin 50 mg/m2 and taxotere 75 mg/m2 and neulasta support.     3/29/11 - first chemotherapy    4/8/11, 4/19/11, 4/28/11 - grade 2 mucositis on cycle 2 day 10 of TAC.    5/10/11 - C3D1 with 10% dose reduction in Adriamycin.     5/31/11 - C4D1 - 10 % dose reduction in Adriamycin    6/21/11- C5D1- 10% dose reduction in adriamycin dose    7/12/11- C6D1 Completed TAC and had bilateral lower extremity edema - Ultrasounds negative for clots.     8/16/11 - Initial visit with Dr. Moore for  Radiation therapy.   August 17, 2011-September 21, 2011, the patient received 4500 cGy in 25 fractions over 35 elapsed days to a posed tangential right  breast field using 6 X photons followed by an electron boost from September 22, 2011-October 3, 2011 delivering 1600 cGy in 8 fractions over 11 elapsed days using 9  MeV electrons to a depth of Dmax. Total cumulative dose was 6100 cGy as of October 3, 2011. Radiotherapy was well-tolerated with minimal tanning and breast  lymphedema. Subsequently the patient has resumed her Arimidex and is tolerating this without side effects.    10/19/11- [place] follow-up after completing radiation. Arimidex initiated. Zometa initiated - 4 mg IVPB for osteopenia.     4/16/12 - Pt did not want Zometa- switched to Prolia     5/2/12 - First dose of Prolia -   12/27/13 - Prolia held due to dental work    6/2014  - Prolia held due to dental work.    12/5/14 - cough with negative chest x-ray. Prolia held due to an improved bone density test.    Age: 61 y/o age of diagnosis    Family History/Genetics: Father metastatic cancer of unknown origin      Hormone therapy: Arimidex 1 mg po daily X 10 years - completed 2/2020    Health Maintenance and Screening Recommendations:  Colonoscopy: 2/12/18 - 3 polyps- f/u recommended in 5 years (2023)  Dexa: 5/2019- osteopenia - improved bmd.  Pt taking vit D and calcium. Recommend 2- year follow-up - pt followed by Dr. Scherer Rheumatology- pt had reclast injection last week     Mammogram:  8/23/2022-- wnl bilaterally  Pap: Dr. Sellers 7/24/14    Vaccines:   Pneumovax- 2013   Zostivax- 2014   FLU- due in Sept   Covid- vaccinated    Lifestyle:   Work - retired  at [employer] couple of years   Exercise- walks 20 minutes most days a week  Diet: Water- lemon water - one quart a day   Fruits- not daily - 3 times a week one serving   Veg- not daily- 3 times a week one serving   Processed Foods- rare   Protein- one serving daily   Sodas-  none    Lifestyle Recommendations:     Exercise: 30 minutes moderate intensity most days of the week.   Alcohol: does not drink  Walking 10 minutes 3 times a day occasionally    What is meaningful or important to you now?  Maintaining health and trying to get more exercise    What helps you feel calm and focused?  Being alone and reading  What are your personal goals?  one day at a time.      ROS: Signs and Symptoms to report: Denies any of the following    Potential late effects and risks of all cancer treatment:    Cough  SOB  Weaknes or fatigue  Bone pain that is unusual and not improving  Swelling of extremities  Chest pain  Headaches or dizziness  Unintentional wt loss  Decreased appetite  Change in bowel or bladder function or character  Breast lumps or changes  Skin nodules or rash  Fevers or night sweats  Lymphedema  Has persistent right chest wall soreness since radiation- location in prior scar area.    Hormone therapy: Denies any of the following -completed her Arimidex 2/2020   Decrease in bone density with fractures  Endometrial changes/abnormalities and vaginal discharge  Blood clots  Hot flashes  Vaginal dryness and sexual dysfunction.     PSYCHOSOCIAL ADJUSTMENT: Pt states she is grieving the loss of her  2 weeks ago    Depression   Anxiety   Work stability   Stressors    Adjustment difficulties- death of    Relationship difficulties    Physical Exam   Constitutional: She is oriented to person, place, and time. She appears well-developed and well-nourished. She appears distressed.   HENT:   Head: Normocephalic and atraumatic.   Right Ear: External ear normal.   Left Ear: External ear normal.   Nose: Nose normal. Right sinus exhibits no maxillary sinus tenderness and no frontal sinus tenderness. Left sinus exhibits no maxillary sinus tenderness and no frontal sinus tenderness.   Mouth/Throat: Oropharynx is clear and moist. No oropharyngeal exudate.   Eyes: Pupils are equal, round, and  reactive to light. Conjunctivae, EOM and lids are normal. Right eye exhibits no discharge. Left eye exhibits no discharge. Right conjunctiva is not injected. Right conjunctiva has no hemorrhage. Left conjunctiva is not injected. Left conjunctiva has no hemorrhage. No scleral icterus.   Neck: Normal range of motion. Neck supple. No JVD present. No tracheal deviation present. No thyromegaly present.   Cardiovascular: Normal rate and regular rhythm.   Pulmonary/Chest: Effort normal. No stridor. No respiratory distress. She exhibits no tenderness.   Abdominal: Soft. She exhibits no distension and no mass. There is no splenomegaly or hepatomegaly. There is no tenderness. There is no rebound.   Musculoskeletal: Normal range of motion. She exhibits no edema or tenderness.   Breast; no visible mass, erythema or rash. No nipple retraction. No nipple discharge bilaterally, no palpable breast mass bilaterally.   Lymphadenopathy:     She has no cervical adenopathy.     She has no axillary adenopathy.        Right: No supraclavicular adenopathy present.        Left: No supraclavicular adenopathy present.   Neurological: She is alert and oriented to person, place, and time. No cranial nerve deficit. Coordination normal.   Skin: Skin is dry. No rash noted. She is not diaphoretic. No erythema.   Psychiatric: She has a normal mood and affect. Her behavior is normal. Judgment and thought content normal.   Vitals reviewed.      Follow-up Appointment:   1 year.     Assessment/Plan:  Personal history of high risk for recurrence breast cancer-stage I, T1 C. N0 M0 ER positive/KS negative high risk breast cancer. Chemotherapy Regimen: TAC:   Cytoxan 500 mg/m2,  Adriamycin 50 mg/m2 with 10% dose reduction after the first treatment due to severe mucositis, taxotere 75 mg/m2 and neulasta support.  Dates: 3/29/11 - 7/12/11  Cycles: 6  Right breast radiation therapy  Adjuvant therapy f/u - Completed Arimidex therapy  Negative clinical  exam    RTC one year with melany mammo and for exam.  Reviewed what to look for on exam and report and reviewed possible signs of recurrence to report      30 minutes was spent with the patient and family reviewing past records, identifying risk factors for complications, planning strategies for health promotion and disease prevention, dietary counseling and signs/symptoms to report. Discussed follow-up plan and rationale.

## 2022-08-22 ENCOUNTER — TELEPHONE (OUTPATIENT)
Dept: ADMINISTRATIVE | Facility: CLINIC | Age: 72
End: 2022-08-22
Payer: MEDICARE

## 2022-08-23 ENCOUNTER — OFFICE VISIT (OUTPATIENT)
Dept: INTERNAL MEDICINE | Facility: CLINIC | Age: 72
End: 2022-08-23
Payer: MEDICARE

## 2022-08-23 ENCOUNTER — HOSPITAL ENCOUNTER (OUTPATIENT)
Dept: RADIOLOGY | Facility: HOSPITAL | Age: 72
Discharge: HOME | End: 2022-08-23
Attending: PHYSICIAN ASSISTANT
Payer: MEDICARE

## 2022-08-23 ENCOUNTER — IMMUNIZATION (OUTPATIENT)
Dept: PHARMACY | Facility: CLINIC | Age: 72
End: 2022-08-23
Payer: MEDICARE

## 2022-08-23 ENCOUNTER — OFFICE VISIT (OUTPATIENT)
Dept: ORTHOPEDICS | Facility: CLINIC | Age: 72
End: 2022-08-23
Payer: MEDICARE

## 2022-08-23 VITALS — WEIGHT: 138 LBS | HEIGHT: 61 IN | BODY MASS INDEX: 26.06 KG/M2

## 2022-08-23 VITALS
DIASTOLIC BLOOD PRESSURE: 90 MMHG | HEART RATE: 67 BPM | SYSTOLIC BLOOD PRESSURE: 158 MMHG | WEIGHT: 138 LBS | TEMPERATURE: 97 F | BODY MASS INDEX: 26.06 KG/M2 | HEIGHT: 61 IN | OXYGEN SATURATION: 95 %

## 2022-08-23 VITALS
HEIGHT: 61 IN | WEIGHT: 138.88 LBS | OXYGEN SATURATION: 95 % | HEART RATE: 67 BPM | BODY MASS INDEX: 26.22 KG/M2 | DIASTOLIC BLOOD PRESSURE: 74 MMHG | SYSTOLIC BLOOD PRESSURE: 124 MMHG

## 2022-08-23 DIAGNOSIS — E11.69 HYPERLIPIDEMIA ASSOCIATED WITH TYPE 2 DIABETES MELLITUS: ICD-10-CM

## 2022-08-23 DIAGNOSIS — I10 HYPERTENSION, UNSPECIFIED TYPE: ICD-10-CM

## 2022-08-23 DIAGNOSIS — F41.8 SITUATIONAL ANXIETY: Primary | ICD-10-CM

## 2022-08-23 DIAGNOSIS — R07.89 CHEST HEAVINESS: ICD-10-CM

## 2022-08-23 DIAGNOSIS — Z85.3 HISTORY OF BREAST CANCER: ICD-10-CM

## 2022-08-23 DIAGNOSIS — Z12.31 ENCOUNTER FOR SCREENING MAMMOGRAM FOR BREAST CANCER: ICD-10-CM

## 2022-08-23 DIAGNOSIS — M18.11 ARTHRITIS OF CARPOMETACARPAL (CMC) JOINT OF RIGHT THUMB: Primary | ICD-10-CM

## 2022-08-23 DIAGNOSIS — Z23 NEED FOR VACCINATION: Primary | ICD-10-CM

## 2022-08-23 DIAGNOSIS — M85.80 OSTEOPENIA, UNSPECIFIED LOCATION: ICD-10-CM

## 2022-08-23 DIAGNOSIS — E78.5 HYPERLIPIDEMIA ASSOCIATED WITH TYPE 2 DIABETES MELLITUS: ICD-10-CM

## 2022-08-23 DIAGNOSIS — F43.9 STRESS: ICD-10-CM

## 2022-08-23 DIAGNOSIS — Z00.00 ENCOUNTER FOR PREVENTIVE HEALTH EXAMINATION: Primary | ICD-10-CM

## 2022-08-23 DIAGNOSIS — R05.3 CHRONIC COUGH: ICD-10-CM

## 2022-08-23 PROCEDURE — 3061F NEG MICROALBUMINURIA REV: CPT | Mod: CPTII,S$GLB,, | Performed by: NURSE PRACTITIONER

## 2022-08-23 PROCEDURE — 99999 PR PBB SHADOW E&M-EST. PATIENT-LVL IV: ICD-10-PCS | Mod: PBBFAC,,, | Performed by: PHYSICIAN ASSISTANT

## 2022-08-23 PROCEDURE — 3288F PR FALLS RISK ASSESSMENT DOCUMENTED: ICD-10-PCS | Mod: CPTII,S$GLB,, | Performed by: PHYSICIAN ASSISTANT

## 2022-08-23 PROCEDURE — G0439 PPPS, SUBSEQ VISIT: HCPCS | Mod: S$GLB,,, | Performed by: NURSE PRACTITIONER

## 2022-08-23 PROCEDURE — 3074F PR MOST RECENT SYSTOLIC BLOOD PRESSURE < 130 MM HG: ICD-10-PCS | Mod: CPTII,S$GLB,, | Performed by: NURSE PRACTITIONER

## 2022-08-23 PROCEDURE — 1159F PR MEDICATION LIST DOCUMENTED IN MEDICAL RECORD: ICD-10-PCS | Mod: CPTII,S$GLB,, | Performed by: PHYSICIAN ASSISTANT

## 2022-08-23 PROCEDURE — 3077F PR MOST RECENT SYSTOLIC BLOOD PRESSURE >= 140 MM HG: ICD-10-PCS | Mod: CPTII,S$GLB,, | Performed by: PHYSICIAN ASSISTANT

## 2022-08-23 PROCEDURE — 1125F AMNT PAIN NOTED PAIN PRSNT: CPT | Mod: CPTII,S$GLB,, | Performed by: ORTHOPAEDIC SURGERY

## 2022-08-23 PROCEDURE — 1160F PR REVIEW ALL MEDS BY PRESCRIBER/CLIN PHARMACIST DOCUMENTED: ICD-10-PCS | Mod: CPTII,S$GLB,, | Performed by: NURSE PRACTITIONER

## 2022-08-23 PROCEDURE — 1160F RVW MEDS BY RX/DR IN RCRD: CPT | Mod: CPTII,S$GLB,, | Performed by: PHYSICIAN ASSISTANT

## 2022-08-23 PROCEDURE — 3288F FALL RISK ASSESSMENT DOCD: CPT | Mod: CPTII,S$GLB,, | Performed by: ORTHOPAEDIC SURGERY

## 2022-08-23 PROCEDURE — 20600 SMALL JOINT ASPIRATION/INJECTION: R THUMB CMC: ICD-10-PCS | Mod: RT,S$GLB,, | Performed by: ORTHOPAEDIC SURGERY

## 2022-08-23 PROCEDURE — 1159F PR MEDICATION LIST DOCUMENTED IN MEDICAL RECORD: ICD-10-PCS | Mod: CPTII,S$GLB,, | Performed by: ORTHOPAEDIC SURGERY

## 2022-08-23 PROCEDURE — 1170F FXNL STATUS ASSESSED: CPT | Mod: CPTII,S$GLB,, | Performed by: NURSE PRACTITIONER

## 2022-08-23 PROCEDURE — 1170F PR FUNCTIONAL STATUS ASSESSED: ICD-10-PCS | Mod: CPTII,S$GLB,, | Performed by: NURSE PRACTITIONER

## 2022-08-23 PROCEDURE — 1125F PR PAIN SEVERITY QUANTIFIED, PAIN PRESENT: ICD-10-PCS | Mod: CPTII,S$GLB,, | Performed by: ORTHOPAEDIC SURGERY

## 2022-08-23 PROCEDURE — 1101F PT FALLS ASSESS-DOCD LE1/YR: CPT | Mod: CPTII,S$GLB,, | Performed by: NURSE PRACTITIONER

## 2022-08-23 PROCEDURE — 77067 SCR MAMMO BI INCL CAD: CPT | Mod: 26,,, | Performed by: RADIOLOGY

## 2022-08-23 PROCEDURE — 3077F SYST BP >= 140 MM HG: CPT | Mod: CPTII,S$GLB,, | Performed by: PHYSICIAN ASSISTANT

## 2022-08-23 PROCEDURE — 3074F SYST BP LT 130 MM HG: CPT | Mod: CPTII,S$GLB,, | Performed by: NURSE PRACTITIONER

## 2022-08-23 PROCEDURE — 3288F FALL RISK ASSESSMENT DOCD: CPT | Mod: CPTII,S$GLB,, | Performed by: PHYSICIAN ASSISTANT

## 2022-08-23 PROCEDURE — 99999 PR PBB SHADOW E&M-EST. PATIENT-LVL V: CPT | Mod: PBBFAC,,, | Performed by: NURSE PRACTITIONER

## 2022-08-23 PROCEDURE — 3080F DIAST BP >= 90 MM HG: CPT | Mod: CPTII,S$GLB,, | Performed by: PHYSICIAN ASSISTANT

## 2022-08-23 PROCEDURE — 3072F LOW RISK FOR RETINOPATHY: CPT | Mod: CPTII,S$GLB,, | Performed by: NURSE PRACTITIONER

## 2022-08-23 PROCEDURE — 77067 SCR MAMMO BI INCL CAD: CPT | Mod: TC

## 2022-08-23 PROCEDURE — 77067 MAMMO DIGITAL SCREENING BILAT WITH TOMO: ICD-10-PCS | Mod: 26,,, | Performed by: RADIOLOGY

## 2022-08-23 PROCEDURE — 1126F AMNT PAIN NOTED NONE PRSNT: CPT | Mod: CPTII,S$GLB,, | Performed by: NURSE PRACTITIONER

## 2022-08-23 PROCEDURE — G0439 PR MEDICARE ANNUAL WELLNESS SUBSEQUENT VISIT: ICD-10-PCS | Mod: S$GLB,,, | Performed by: NURSE PRACTITIONER

## 2022-08-23 PROCEDURE — 99214 PR OFFICE/OUTPT VISIT, EST, LEVL IV, 30-39 MIN: ICD-10-PCS | Mod: S$GLB,,, | Performed by: PHYSICIAN ASSISTANT

## 2022-08-23 PROCEDURE — 3061F PR NEG MICROALBUMINURIA RESULT DOCUMENTED/REVIEW: ICD-10-PCS | Mod: CPTII,S$GLB,, | Performed by: NURSE PRACTITIONER

## 2022-08-23 PROCEDURE — 1101F PT FALLS ASSESS-DOCD LE1/YR: CPT | Mod: CPTII,S$GLB,, | Performed by: ORTHOPAEDIC SURGERY

## 2022-08-23 PROCEDURE — 3078F PR MOST RECENT DIASTOLIC BLOOD PRESSURE < 80 MM HG: ICD-10-PCS | Mod: CPTII,S$GLB,, | Performed by: NURSE PRACTITIONER

## 2022-08-23 PROCEDURE — 20600 DRAIN/INJ JOINT/BURSA W/O US: CPT | Mod: RT,S$GLB,, | Performed by: ORTHOPAEDIC SURGERY

## 2022-08-23 PROCEDURE — 1160F PR REVIEW ALL MEDS BY PRESCRIBER/CLIN PHARMACIST DOCUMENTED: ICD-10-PCS | Mod: CPTII,S$GLB,, | Performed by: PHYSICIAN ASSISTANT

## 2022-08-23 PROCEDURE — 1159F MED LIST DOCD IN RCRD: CPT | Mod: CPTII,S$GLB,, | Performed by: PHYSICIAN ASSISTANT

## 2022-08-23 PROCEDURE — 3066F PR DOCUMENTATION OF TREATMENT FOR NEPHROPATHY: ICD-10-PCS | Mod: CPTII,S$GLB,, | Performed by: ORTHOPAEDIC SURGERY

## 2022-08-23 PROCEDURE — 3066F PR DOCUMENTATION OF TREATMENT FOR NEPHROPATHY: ICD-10-PCS | Mod: CPTII,S$GLB,, | Performed by: PHYSICIAN ASSISTANT

## 2022-08-23 PROCEDURE — 1126F PR PAIN SEVERITY QUANTIFIED, NO PAIN PRESENT: ICD-10-PCS | Mod: CPTII,S$GLB,, | Performed by: NURSE PRACTITIONER

## 2022-08-23 PROCEDURE — 3072F PR LOW RISK FOR RETINOPATHY: ICD-10-PCS | Mod: CPTII,S$GLB,, | Performed by: PHYSICIAN ASSISTANT

## 2022-08-23 PROCEDURE — 99213 PR OFFICE/OUTPT VISIT, EST, LEVL III, 20-29 MIN: ICD-10-PCS | Mod: 25,S$GLB,, | Performed by: ORTHOPAEDIC SURGERY

## 2022-08-23 PROCEDURE — 3061F PR NEG MICROALBUMINURIA RESULT DOCUMENTED/REVIEW: ICD-10-PCS | Mod: CPTII,S$GLB,, | Performed by: PHYSICIAN ASSISTANT

## 2022-08-23 PROCEDURE — 3044F PR MOST RECENT HEMOGLOBIN A1C LEVEL <7.0%: ICD-10-PCS | Mod: CPTII,S$GLB,, | Performed by: PHYSICIAN ASSISTANT

## 2022-08-23 PROCEDURE — 3061F NEG MICROALBUMINURIA REV: CPT | Mod: CPTII,S$GLB,, | Performed by: PHYSICIAN ASSISTANT

## 2022-08-23 PROCEDURE — 3044F PR MOST RECENT HEMOGLOBIN A1C LEVEL <7.0%: ICD-10-PCS | Mod: CPTII,S$GLB,, | Performed by: ORTHOPAEDIC SURGERY

## 2022-08-23 PROCEDURE — 3080F PR MOST RECENT DIASTOLIC BLOOD PRESSURE >= 90 MM HG: ICD-10-PCS | Mod: CPTII,S$GLB,, | Performed by: PHYSICIAN ASSISTANT

## 2022-08-23 PROCEDURE — 3044F HG A1C LEVEL LT 7.0%: CPT | Mod: CPTII,S$GLB,, | Performed by: ORTHOPAEDIC SURGERY

## 2022-08-23 PROCEDURE — 77063 BREAST TOMOSYNTHESIS BI: CPT | Mod: 26,,, | Performed by: RADIOLOGY

## 2022-08-23 PROCEDURE — 3288F PR FALLS RISK ASSESSMENT DOCUMENTED: ICD-10-PCS | Mod: CPTII,S$GLB,, | Performed by: NURSE PRACTITIONER

## 2022-08-23 PROCEDURE — 3072F PR LOW RISK FOR RETINOPATHY: ICD-10-PCS | Mod: CPTII,S$GLB,, | Performed by: NURSE PRACTITIONER

## 2022-08-23 PROCEDURE — 1101F PT FALLS ASSESS-DOCD LE1/YR: CPT | Mod: CPTII,S$GLB,, | Performed by: PHYSICIAN ASSISTANT

## 2022-08-23 PROCEDURE — 1159F PR MEDICATION LIST DOCUMENTED IN MEDICAL RECORD: ICD-10-PCS | Mod: CPTII,S$GLB,, | Performed by: NURSE PRACTITIONER

## 2022-08-23 PROCEDURE — 3066F PR DOCUMENTATION OF TREATMENT FOR NEPHROPATHY: ICD-10-PCS | Mod: CPTII,S$GLB,, | Performed by: NURSE PRACTITIONER

## 2022-08-23 PROCEDURE — 3061F PR NEG MICROALBUMINURIA RESULT DOCUMENTED/REVIEW: ICD-10-PCS | Mod: CPTII,S$GLB,, | Performed by: ORTHOPAEDIC SURGERY

## 2022-08-23 PROCEDURE — 3072F LOW RISK FOR RETINOPATHY: CPT | Mod: CPTII,S$GLB,, | Performed by: PHYSICIAN ASSISTANT

## 2022-08-23 PROCEDURE — 3072F PR LOW RISK FOR RETINOPATHY: ICD-10-PCS | Mod: CPTII,S$GLB,, | Performed by: ORTHOPAEDIC SURGERY

## 2022-08-23 PROCEDURE — 3008F PR BODY MASS INDEX (BMI) DOCUMENTED: ICD-10-PCS | Mod: CPTII,S$GLB,, | Performed by: PHYSICIAN ASSISTANT

## 2022-08-23 PROCEDURE — 3008F BODY MASS INDEX DOCD: CPT | Mod: CPTII,S$GLB,, | Performed by: PHYSICIAN ASSISTANT

## 2022-08-23 PROCEDURE — 3066F NEPHROPATHY DOC TX: CPT | Mod: CPTII,S$GLB,, | Performed by: NURSE PRACTITIONER

## 2022-08-23 PROCEDURE — 1101F PR PT FALLS ASSESS DOC 0-1 FALLS W/OUT INJ PAST YR: ICD-10-PCS | Mod: CPTII,S$GLB,, | Performed by: PHYSICIAN ASSISTANT

## 2022-08-23 PROCEDURE — 3072F LOW RISK FOR RETINOPATHY: CPT | Mod: CPTII,S$GLB,, | Performed by: ORTHOPAEDIC SURGERY

## 2022-08-23 PROCEDURE — 1160F RVW MEDS BY RX/DR IN RCRD: CPT | Mod: CPTII,S$GLB,, | Performed by: NURSE PRACTITIONER

## 2022-08-23 PROCEDURE — 1159F MED LIST DOCD IN RCRD: CPT | Mod: CPTII,S$GLB,, | Performed by: ORTHOPAEDIC SURGERY

## 2022-08-23 PROCEDURE — 3044F HG A1C LEVEL LT 7.0%: CPT | Mod: CPTII,S$GLB,, | Performed by: NURSE PRACTITIONER

## 2022-08-23 PROCEDURE — 3044F HG A1C LEVEL LT 7.0%: CPT | Mod: CPTII,S$GLB,, | Performed by: PHYSICIAN ASSISTANT

## 2022-08-23 PROCEDURE — 99999 PR PBB SHADOW E&M-EST. PATIENT-LVL III: CPT | Mod: PBBFAC,,, | Performed by: ORTHOPAEDIC SURGERY

## 2022-08-23 PROCEDURE — 99214 OFFICE O/P EST MOD 30 MIN: CPT | Mod: S$GLB,,, | Performed by: PHYSICIAN ASSISTANT

## 2022-08-23 PROCEDURE — 3044F PR MOST RECENT HEMOGLOBIN A1C LEVEL <7.0%: ICD-10-PCS | Mod: CPTII,S$GLB,, | Performed by: NURSE PRACTITIONER

## 2022-08-23 PROCEDURE — 3061F NEG MICROALBUMINURIA REV: CPT | Mod: CPTII,S$GLB,, | Performed by: ORTHOPAEDIC SURGERY

## 2022-08-23 PROCEDURE — 3066F NEPHROPATHY DOC TX: CPT | Mod: CPTII,S$GLB,, | Performed by: ORTHOPAEDIC SURGERY

## 2022-08-23 PROCEDURE — 3066F NEPHROPATHY DOC TX: CPT | Mod: CPTII,S$GLB,, | Performed by: PHYSICIAN ASSISTANT

## 2022-08-23 PROCEDURE — 1159F MED LIST DOCD IN RCRD: CPT | Mod: CPTII,S$GLB,, | Performed by: NURSE PRACTITIONER

## 2022-08-23 PROCEDURE — 99999 PR PBB SHADOW E&M-EST. PATIENT-LVL V: ICD-10-PCS | Mod: PBBFAC,,, | Performed by: NURSE PRACTITIONER

## 2022-08-23 PROCEDURE — 77063 MAMMO DIGITAL SCREENING BILAT WITH TOMO: ICD-10-PCS | Mod: 26,,, | Performed by: RADIOLOGY

## 2022-08-23 PROCEDURE — 99213 OFFICE O/P EST LOW 20 MIN: CPT | Mod: 25,S$GLB,, | Performed by: ORTHOPAEDIC SURGERY

## 2022-08-23 PROCEDURE — 99999 PR PBB SHADOW E&M-EST. PATIENT-LVL III: ICD-10-PCS | Mod: PBBFAC,,, | Performed by: ORTHOPAEDIC SURGERY

## 2022-08-23 PROCEDURE — 99999 PR PBB SHADOW E&M-EST. PATIENT-LVL IV: CPT | Mod: PBBFAC,,, | Performed by: PHYSICIAN ASSISTANT

## 2022-08-23 PROCEDURE — 3288F PR FALLS RISK ASSESSMENT DOCUMENTED: ICD-10-PCS | Mod: CPTII,S$GLB,, | Performed by: ORTHOPAEDIC SURGERY

## 2022-08-23 PROCEDURE — 1101F PR PT FALLS ASSESS DOC 0-1 FALLS W/OUT INJ PAST YR: ICD-10-PCS | Mod: CPTII,S$GLB,, | Performed by: ORTHOPAEDIC SURGERY

## 2022-08-23 PROCEDURE — 3288F FALL RISK ASSESSMENT DOCD: CPT | Mod: CPTII,S$GLB,, | Performed by: NURSE PRACTITIONER

## 2022-08-23 PROCEDURE — 1101F PR PT FALLS ASSESS DOC 0-1 FALLS W/OUT INJ PAST YR: ICD-10-PCS | Mod: CPTII,S$GLB,, | Performed by: NURSE PRACTITIONER

## 2022-08-23 PROCEDURE — 3008F PR BODY MASS INDEX (BMI) DOCUMENTED: ICD-10-PCS | Mod: CPTII,S$GLB,, | Performed by: NURSE PRACTITIONER

## 2022-08-23 PROCEDURE — 3078F DIAST BP <80 MM HG: CPT | Mod: CPTII,S$GLB,, | Performed by: NURSE PRACTITIONER

## 2022-08-23 PROCEDURE — 3008F BODY MASS INDEX DOCD: CPT | Mod: CPTII,S$GLB,, | Performed by: NURSE PRACTITIONER

## 2022-08-23 RX ORDER — TRIAMCINOLONE ACETONIDE 40 MG/ML
40 INJECTION, SUSPENSION INTRA-ARTICULAR; INTRAMUSCULAR
Status: DISCONTINUED | OUTPATIENT
Start: 2022-08-23 | End: 2022-08-23 | Stop reason: HOSPADM

## 2022-08-23 RX ORDER — ALPRAZOLAM 0.25 MG/1
0.25 TABLET ORAL 3 TIMES DAILY PRN
Qty: 30 TABLET | Refills: 0 | Status: ON HOLD | OUTPATIENT
Start: 2022-08-23 | End: 2023-02-24

## 2022-08-23 RX ADMIN — TRIAMCINOLONE ACETONIDE 40 MG: 40 INJECTION, SUSPENSION INTRA-ARTICULAR; INTRAMUSCULAR at 10:08

## 2022-08-23 NOTE — PROGRESS NOTES
"  Renea Bourgeois presented for a  Medicare AWV and comprehensive Health Risk Assessment today. The following components were reviewed and updated:    · Medical history  · Family History  · Social history  · Allergies and Current Medications  · Health Risk Assessment  · Health Maintenance  · Care Team         ** See Completed Assessments for Annual Wellness Visit within the encounter summary.**         The following assessments were completed:  · Living Situation  · CAGE  · Depression Screening  · Timed Get Up and Go  · Whisper Test-na  · Cognitive Function Screening  · Nutrition Screening  · ADL Screening  · PAQ Screening        Vitals:    08/23/22 1242   BP: 124/74   BP Location: Left arm   Patient Position: Sitting   Pulse: 67   SpO2: 95%   Weight: 63 kg (138 lb 14.2 oz)   Height: 5' 1" (1.549 m)     Body mass index is 26.24 kg/m².  Physical Exam  Vitals and nursing note reviewed.   Constitutional:       Appearance: She is well-developed.   HENT:      Head: Normocephalic.   Cardiovascular:      Rate and Rhythm: Normal rate and regular rhythm.      Heart sounds: Normal heart sounds.   Pulmonary:      Effort: Pulmonary effort is normal. No respiratory distress.      Breath sounds: Normal breath sounds.   Abdominal:      Palpations: Abdomen is soft. There is no mass.      Tenderness: There is no abdominal tenderness.   Musculoskeletal:         General: Normal range of motion.   Skin:     General: Skin is warm and dry.   Neurological:      Mental Status: She is alert and oriented to person, place, and time.      Motor: No abnormal muscle tone.   Psychiatric:         Speech: Speech normal.         Behavior: Behavior normal.               Diagnoses and health risks identified today and associated recommendations/orders:    1. Encounter for preventive health examination  MA to schedule  Internal medicine- Today. Pt needs to be seen prior to leaving out of state    Discussed receiving Shingrix at pharmacy.      2. " "Hyperlipidemia associated with type 2 diabetes mellitus  a1c 5.8  Lipid- triglycerides elevated  Continue current treatment plan as previously prescribed with your  pcp and dm management    3. Hypertension, unspecified type  Stable and controlled. Continue current treatment plan as previously prescribed with your PCP.     4. Osteopenia, unspecified location  dexa 6/21  Continue current treatment plan as previously prescribed with your  Rheumatologist.     5. History of breast cancer  Continue current treatment plan as previously prescribed with your  Oncologist.     6. Stress  Reports stress  PHQ 2-2  Caring for her brother out of state who is terminal. She is traveling back and forth. Lost , recent significant date. Was in grief counseling, but can not go since out of state.   Reports for about a month has had an intermittent feeling of an "elephant sitting on my chest". Denies at this time. Denies associated symptoms. Occurs randomly. She goes to lay down and resolves. Reports she walks a dog while with her brother, denies feeling chest heaviness at that time. Reports chest heaviness is not occurring since she has been back home. Myrtle has had this in summer of 2021, also a lot of stress at that time. Myrtle has had a negative stress test a couple of years ago. Sees outside cardiologist.   Discussed s/s of MI   (patient denies any s/s) and advised to go to the ER/911 if occur. Patient expressed understanding.   discussed counseling.   Advised to follow up with PCP(today), psychiatry, and cardiologist for further evaluation and recommendations. Patient expressed understanding.    - Ambulatory referral/consult to Psychiatry; Future    7. Chest heaviness  See #6    8. Chronic cough  Nonproductive  Reports she is at her respiratory baseline.   Patient counseled on dietary changes and tips to control acid.    Advised to follow up with PCP, ENT, GI for further evaluation and recommendations. Patient expressed " understanding.          Provided Renea with a 5-10 year written screening schedule and personal prevention plan. Recommendations were developed using the USPSTF age appropriate recommendations. Education, counseling, and referrals were provided as needed. After Visit Summary printed and given to patient which includes a list of additional screenings\tests needed.    Follow up in about 1 year (around 8/23/2023) for awv.    Merced Trujillo, NP  I offered to discuss advanced care planning, including how to pick a person who would make decisions for you if you were unable to make them for yourself, called a health care power of , and what kind of decisions you might make such as use of life sustaining treatments such as ventilators and tube feeding when faced with a life limiting illness recorded on a living will that they will need to know. (How you want to be cared for as you near the end of your natural life)     X  Patient has advanced directives written and agrees to provide copies to the institution.

## 2022-08-23 NOTE — PROCEDURES
Small Joint Aspiration/Injection: R thumb CMC    Date/Time: 8/23/2022 10:15 AM  Performed by: Iban Kuhn MD  Authorized by: Iban Kuhn MD     Consent Done?:  Yes (Verbal)  Indications:  Arthritis  Timeout: prior to procedure the correct patient, procedure, and site was verified    Prep: patient was prepped and draped in usual sterile fashion      Local anesthesia used?: Yes    Local anesthetic:  Lidocaine 2% without epinephrine  Anesthetic total (ml):  0.5    Location:  Thumb  Site:  R thumb CMC  Ultrasonic guidance for needle placement?: No    Needle size:  25 G  Approach:  Dorsal  Medications:  40 mg triamcinolone acetonide 40 mg/mL       Left arm;

## 2022-08-23 NOTE — PROGRESS NOTES
Subjective:       Patient ID: Renea Bourgeois is a 72 y.o. female.    Chief Complaint: Stress      Patient Care Team:  Kamini Newton MD as PCP - General (Family Medicine)  Perlita Zafar LPN as Care Coordinator (Internal Medicine)  Vishnu Cox MD as Consulting Physician (Orthopedic Surgery)  Andree Coyne, PhD, NP-C as Nurse Practitioner (Family Medicine)  Marija Wood NP as Nurse Practitioner (Hematology and Oncology)  Nathaniel Moon MD as Consulting Physician (Rheumatology)  James Foote OD as Consulting Physician (Optometry)  Iban Kuhn MD as Consulting Physician (Hand Surgery)  Flaca Howell MD as Consulting Physician (Otolaryngology)  Elvia Villegas PA-C as Physician Assistant (Internal Medicine)    HPI    Renea Bourgeois is a 72 y.o. 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 She is travelling back and forth to [place] to care for him. She also reports the death of her 's anniversary is approaching (lost him last year) which is influencing her anxiety. She denies self harm. Prefers as needed medication. Reports some difficulty with sleeping. Reports chest heaviness that relieves with rest. No SOB, sweats or N/V.     Review of Systems   Constitutional: Negative for chills and fever.   Psychiatric/Behavioral: Positive for dysphoric mood. Negative for self-injury and sleep disturbance. The patient is nervous/anxious.        Objective:      Physical Exam  Vitals and nursing note reviewed.   Constitutional:       General: She is not in acute distress.     Appearance: She is well-developed.   HENT:      Head: Normocephalic and atraumatic.   Eyes:      General: Lids are normal. No scleral icterus.     Extraocular Movements: Extraocular movements intact.      Conjunctiva/sclera: Conjunctivae normal.    Cardiovascular:      Rate and Rhythm: Normal rate and regular rhythm.   Pulmonary:      Effort: Pulmonary effort is normal.      Breath sounds: Normal breath sounds. No decreased breath sounds, wheezing, rhonchi or rales.   Neurological:      Mental Status: She is alert.      Cranial Nerves: No cranial nerve deficit.   Psychiatric:         Mood and Affect: Mood and affect normal.         Assessment:       1. Situational anxiety        Plan:   1. Situational anxiety  -     ALPRAZolam (XANAX) 0.25 MG tablet

## 2022-08-23 NOTE — PROGRESS NOTES
Subjective:     Patient ID: Renea Bourgeois is a 72 y.o. female.    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  She was last injected 03/15/2022 and requests reinjection today.    Past Medical History:   Diagnosis Date    Allergy     Anemia 11/14/2017    Angina pectoris     followed by cardiology    Arthritis     Breast cancer     Right T1 Ductal Ca in situ,high oncotype Dx 33, Estrogen positive. Lumpectomy, TAC chemo x 6 cyscles then RT,on aromatase inhibitor    Cataract     Chondromalacia of right patella 3/26/2018    Stable and controlled. Continue current treatment plan as previously prescribed with your PCP.      Diabetes mellitus type II 2011    DM (diabetes mellitus) 2011     am 08/04/2017    DM (diabetes mellitus) 2010     am 06/22/2020    Elevated BP without diagnosis of hypertension 11/27/2018    GERD (gastroesophageal reflux disease)     History of colon polyps 2/21/2018    The patient had adenomatous colon polyps in 2014.      History of renal calculi 8/11/2015    Right obstructing 8/20/13 - passed.     Hyperlipidemia     Hypertension     Kidney stone     right side, passed    Malignant neoplasm of upper-outer quadrant of right breast in female, estrogen receptor positive 7/23/2018    Followed by Dr. Robert Lozano disease     reported per pt    Nuclear sclerosis of both eyes 10/5/2015    Osteopenia     Osteoporosis 4/12/2019    Pseudophakia 10/15/2015    PVC (premature ventricular contraction)     on and off    Refractive error 10/5/2015    Trouble in sleeping     Type 2 diabetes mellitus      Past Surgical History:   Procedure Laterality Date    BREAST BIOPSY      BREAST LUMPECTOMY  2/11/2011    right    CATARACT EXTRACTION Bilateral 10/14/15    CHOLECYSTECTOMY  1989    open    COLONOSCOPY N/A 2/22/2018    Procedure: COLONOSCOPY;  Surgeon: Carlito Odonnell,  MD;  Location: [facility] ENDO;  Service: Endoscopy;  Laterality: N/A;    CYST REMOVAL Left 11/2017    foot    DILATION AND CURETT[place]US  10[place]2010    [place]    ESOPHAGOGASTRODUODENOSCOPY N/A 6/29/2020    Procedure: EGD (ESOPHAGOGASTRODUODENOSCOPY);  Surgeon: Nancy Hahn MD;  Location: [facility] ENDO;  Service: Endoscopy;  Laterality: N/A;    EYE SURGERY  2012    cataract    GALLBLADDER SURGERY      removed in 1989    HAND SURGERY Left 2015    Nasal septal deviation repair  2009    toenail edges removed      TONSILLECTOMY  1959    TOTAL REDUCTION MAMMOPLASTY Left     2015    TUBAL LIGATION  1981     Family History   Problem Relation Age of Onset    Diabetes Father     Hypertension Father     Stroke Father     Cancer Father 65        metastatic when diagnosed    Stroke Brother         Stoke    Cancer Other         kidney    Atrial fibrillation Son         35    Heart disease Son     Alzheimer's disease Mother     Parkinsonism Brother     Cancer Paternal Grandfather         prostate    Glaucoma Maternal Grandmother     Psoriasis Cousin     Alcohol abuse Neg Hx     Drug abuse Neg Hx     COPD Neg Hx     Birth defects Neg Hx     Mental retardation Neg Hx     Mental illness Neg Hx     Kidney disease Neg Hx     Hyperlipidemia Neg Hx     Melanoma Neg Hx     Lupus Neg Hx      Social History     Socioeconomic History    Marital status:      Spouse name: Agsutín    Number of children: 4   Occupational History    Occupation: Retired Teacher     Comment: [employer]   Tobacco Use    Smoking status: Never Smoker    Smokeless tobacco: Never Used   Substance and Sexual Activity    Alcohol use: No     Alcohol/week: 0.0 standard drinks    Drug use: No    Sexual activity: Not Currently     Partners: Male     Birth control/protection: Post-menopausal   Social History Narrative    aretired from homeschooling/,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 Caffeine intake;1-2 per week - dennis  [employer]. Decaffinated tea and most Relevant e-solutionidges. Does have a living will - at home in her filing cabinet.      Medication List with Changes/Refills   Current Medications    ACCU-CHEK JD PLUS TEST STRP STRP    TEST three times a day    ACETAMINOPHEN (TYLENOL) 500 MG TABLET    Take 500 mg by mouth every 6 (six) hours as needed for Pain.    ALCOHOL PREP PADS PADM        ALPRAZOLAM (XANAX) 0.25 MG TABLET    Take 1 tablet (0.25 mg total) by mouth 2 (two) times daily as needed for Anxiety.    CALCIUM-VITAMIN D (CALCIUM 600 + D,3,) 600 MG(1,500MG) -400 UNIT TAB    Take 1 tablet by mouth 2 (two) times daily.    CHOLECALCIFEROL, VITAMIN D3, (VITAMIN D3) 1,000 UNIT CAPSULE    Take 1 capsule (1,000 Units total) by mouth once daily.    COVID-19 VAC, VIRIDIANA,PFIZER,,PF, (PFIZER COVID-19 VIRIDIANA VACCN,PF,) 30 MCG/0.3 ML INJECTION    Inject into the muscle.    DICLOFENAC SODIUM (VOLTAREN) 1 % GEL    Apply 2 g topically once daily.    ESOMEPRAZOLE MAGNESIUM (NEXIUM ORAL)    Take by mouth.    FERROUS GLUCONATE (FERGON) 240 (27 FE) MG TABLET    Take 1 tablet (240 mg total) by mouth once daily.    FLUTICASONE PROPIONATE (FLONASE) 50 MCG/ACTUATION NASAL SPRAY    2 sprays (100 mcg total) by Each Nostril route daily as needed. 2 Spray, Suspension Nasal Every day    GABAPENTIN (NEURONTIN) 100 MG CAPSULE    TAKE 1 CAPSULE(100 MG) BY MOUTH THREE TIMES DAILY    GLIMEPIRIDE (AMARYL) 1 MG TABLET    TAKE 1 TABLET(1 MG) BY MOUTH EVERY DAY    LANCETS (ACCU-CHEK SOFTCLIX LANCETS) MISC    1 each by Misc.(Non-Drug; Combo Route) route 3 (three) times daily.    LANCING DEVICE MISC        MAGNESIUM ASPARTATE HCL 61 MG (615 MG) TBEC    Take by mouth once.    METOPROLOL SUCCINATE (TOPROL-XL) 25 MG 24 HR TABLET    Take 25 mg by mouth once daily.    ROSUVASTATIN (CRESTOR) 10 MG TABLET    TAKE 1 TABLET BY MOUTH EVERY DAY    TRAZODONE (DESYREL) 100 MG TABLET    TAKE 1 TABLET BY MOUTH AT BEDTIME    TURMERIC 400 MG CAP    Take 400 mg by mouth 2 (two) times daily as  needed.   Discontinued Medications    METHOCARBAMOL (ROBAXIN) 500 MG TAB    Take 1 tablet (500 mg total) by mouth 2 (two) times daily as needed (muscle spasms).     Review of patient's allergies indicates:   Allergen Reactions    Codeine Itching     Other reaction(s): Itching     Review of Systems   Constitutional: Negative for malaise/fatigue.   HENT: Negative for hearing loss.    Eyes: Positive for double vision. Negative for visual disturbance.   Cardiovascular: Positive for irregular heartbeat. Negative for chest pain.   Respiratory: Negative for shortness of breath.    Endocrine: Negative for cold intolerance.   Hematologic/Lymphatic: Does not bruise/bleed easily.   Skin: Negative for poor wound healing and suspicious lesions.   Musculoskeletal: Positive for arthritis, joint pain and joint swelling. Negative for gout.   Gastrointestinal: Positive for abdominal pain, dysphagia and heartburn. Negative for nausea and vomiting.   Genitourinary: Negative for dysuria.   Neurological: Negative for numbness, paresthesias and sensory change.   Psychiatric/Behavioral: Positive for altered mental status. Negative for depression, memory loss and substance abuse. The patient has insomnia. The patient is not nervous/anxious.    Allergic/Immunologic: Negative for persistent infections.       Objective:   There is no height or weight on file to calculate BMI.  There were no vitals filed for this visit.             General    Constitutional: She is oriented to person, place, and time. She appears well-developed and well-nourished. No distress.   HENT:   Head: Normocephalic.   Eyes: EOM are normal.   Pulmonary/Chest: Effort normal.   Neurological: She is oriented to person, place, and time.   Psychiatric: She has a normal mood and affect.             Right Hand/Wrist Exam     Inspection   Scars: Wrist - absent Hand -  absent  Effusion: Wrist - absent Hand -  absent    Pain   Wrist - The patient exhibits pain of the  CMC.    Other     Neuorologic Exam    Median Distribution: normal  Ulnar Distribution: normal  Radial Distribution: normal    Comments:  The patient has tenderness well localized to the right thumb basal joint with a positive axial circumduction grind test.  There are no motor or sensory deficits.          Vascular Exam       Capillary Refill  Right Hand: normal capillary refill          radiographs were not obtained today  Assessment:     Encounter Diagnosis   Name Primary?    Arthritis of carpometacarpal (CMC) joint of right thumb Yes        Plan:     The patient injected right thumb basal joint with 0.5 cc of Kenalog and 0.5 cc of 2% plain lidocaine under sterile technique.  She will wait at least 3 months between injections.                Disclaimer: This note was prepared using a voice recognition system and is likely to have sound alike errors within the text.

## 2022-08-23 NOTE — PATIENT INSTRUCTIONS
Counseling and Referral of Other Preventative  (Italic type indicates deductible and co-insurance are waived)    Patient Name: Renea Bourgeois  Today's Date: 8/23/2022    Health Maintenance       Date Due Completion Date    Shingles Vaccine (2 of 2) 08/17/2020 6/22/2020    Mammogram 08/18/2022 8/18/2021    Influenza Vaccine (1) 09/01/2022 11/23/2021    Hemoglobin A1c 11/26/2022 5/26/2022    Eye Exam 12/06/2022 12/6/2021    Colorectal Cancer Screening 02/22/2023 2/22/2018    Lipid Panel 05/26/2023 5/26/2022    Diabetes Urine Screening 06/02/2023 6/2/2022    Foot Exam 06/02/2023 6/2/2022    DEXA Scan 06/14/2023 6/14/2021    TETANUS VACCINE 10/24/2024 10/24/2014        Orders Placed This Encounter   Procedures    Ambulatory referral/consult to Psychiatry     The following information is provided to all patients.  This information is to help you find resources for any of the problems found today that may be affecting your health:                Living healthy guide: www.[facility].[place].gov      Understanding Diabetes: www.diabetes.org      Eating healthy: www.cdc.gov/healthyweight      CDC home safety checklist: www.cdc.gov/steadi/patient.html      Agency on Aging: www.goea.[place].[facility]      Alcoholics anonymous (AA): www.aa.org      Physical Activity: www.arie.nih.gov/xf4bxbi      Tobacco use: www.quitwithusla.org

## 2022-08-25 ENCOUNTER — OFFICE VISIT (OUTPATIENT)
Dept: RHEUMATOLOGY | Facility: CLINIC | Age: 72
End: 2022-08-25
Payer: MEDICARE

## 2022-08-25 ENCOUNTER — OFFICE VISIT (OUTPATIENT)
Dept: HEMATOLOGY/ONCOLOGY | Facility: CLINIC | Age: 72
End: 2022-08-25
Payer: MEDICARE

## 2022-08-25 ENCOUNTER — LAB VISIT (OUTPATIENT)
Dept: LAB | Facility: HOSPITAL | Age: 72
End: 2022-08-25
Attending: INTERNAL MEDICINE
Payer: MEDICARE

## 2022-08-25 VITALS
HEART RATE: 62 BPM | BODY MASS INDEX: 26.14 KG/M2 | HEIGHT: 61 IN | SYSTOLIC BLOOD PRESSURE: 169 MMHG | DIASTOLIC BLOOD PRESSURE: 74 MMHG | WEIGHT: 138.44 LBS

## 2022-08-25 VITALS
TEMPERATURE: 98 F | WEIGHT: 138.25 LBS | SYSTOLIC BLOOD PRESSURE: 149 MMHG | OXYGEN SATURATION: 98 % | RESPIRATION RATE: 20 BRPM | BODY MASS INDEX: 27.14 KG/M2 | HEIGHT: 60 IN | DIASTOLIC BLOOD PRESSURE: 82 MMHG | HEART RATE: 60 BPM

## 2022-08-25 DIAGNOSIS — Z12.31 ENCOUNTER FOR SCREENING MAMMOGRAM FOR MALIGNANT NEOPLASM OF BREAST: ICD-10-CM

## 2022-08-25 DIAGNOSIS — M15.9 PRIMARY OSTEOARTHRITIS INVOLVING MULTIPLE JOINTS: ICD-10-CM

## 2022-08-25 DIAGNOSIS — M81.0 AGE-RELATED OSTEOPOROSIS WITHOUT CURRENT PATHOLOGICAL FRACTURE: ICD-10-CM

## 2022-08-25 DIAGNOSIS — Z08 ENCOUNTER FOR FOLLOW-UP SURVEILLANCE OF BREAST CANCER: Primary | ICD-10-CM

## 2022-08-25 DIAGNOSIS — Z71.89 COUNSELING ON HEALTH PROMOTION AND DISEASE PREVENTION: ICD-10-CM

## 2022-08-25 DIAGNOSIS — Z51.81 MEDICATION MONITORING ENCOUNTER: ICD-10-CM

## 2022-08-25 DIAGNOSIS — Z12.39 ENCOUNTER FOR BREAST CANCER SCREENING USING NON-MAMMOGRAM MODALITY: ICD-10-CM

## 2022-08-25 DIAGNOSIS — Z85.3 ENCOUNTER FOR FOLLOW-UP SURVEILLANCE OF BREAST CANCER: Primary | ICD-10-CM

## 2022-08-25 DIAGNOSIS — M81.0 AGE-RELATED OSTEOPOROSIS WITHOUT CURRENT PATHOLOGICAL FRACTURE: Primary | ICD-10-CM

## 2022-08-25 LAB
25(OH)D3+25(OH)D2 SERPL-MCNC: 57 NG/ML (ref 30–96)
ALBUMIN SERPL BCP-MCNC: 3.8 G/DL (ref 3.5–5.2)
ALBUMIN SERPL BCP-MCNC: 4.1 G/DL (ref 3.5–5.2)
ALP SERPL-CCNC: 91 U/L (ref 55–135)
ALP SERPL-CCNC: 92 U/L (ref 55–135)
ALT SERPL W/O P-5'-P-CCNC: 26 U/L (ref 10–44)
ALT SERPL W/O P-5'-P-CCNC: 29 U/L (ref 10–44)
ANION GAP SERPL CALC-SCNC: 11 MMOL/L (ref 8–16)
ANION GAP SERPL CALC-SCNC: 9 MMOL/L (ref 8–16)
AST SERPL-CCNC: 25 U/L (ref 10–40)
AST SERPL-CCNC: 26 U/L (ref 10–40)
BILIRUB SERPL-MCNC: 0.3 MG/DL (ref 0.1–1)
BILIRUB SERPL-MCNC: 0.3 MG/DL (ref 0.1–1)
BUN SERPL-MCNC: 8 MG/DL (ref 8–23)
BUN SERPL-MCNC: 8 MG/DL (ref 8–23)
CALCIUM SERPL-MCNC: 9 MG/DL (ref 8.7–10.5)
CALCIUM SERPL-MCNC: 9.2 MG/DL (ref 8.7–10.5)
CHLORIDE SERPL-SCNC: 107 MMOL/L (ref 95–110)
CHLORIDE SERPL-SCNC: 108 MMOL/L (ref 95–110)
CO2 SERPL-SCNC: 23 MMOL/L (ref 23–29)
CO2 SERPL-SCNC: 25 MMOL/L (ref 23–29)
CREAT SERPL-MCNC: 0.8 MG/DL (ref 0.5–1.4)
CREAT SERPL-MCNC: 0.8 MG/DL (ref 0.5–1.4)
EST. GFR  (NO RACE VARIABLE): >60 ML/MIN/1.73 M^2
EST. GFR  (NO RACE VARIABLE): >60 ML/MIN/1.73 M^2
GLUCOSE SERPL-MCNC: 97 MG/DL (ref 70–110)
GLUCOSE SERPL-MCNC: 98 MG/DL (ref 70–110)
POTASSIUM SERPL-SCNC: 3.5 MMOL/L (ref 3.5–5.1)
POTASSIUM SERPL-SCNC: 3.5 MMOL/L (ref 3.5–5.1)
PROT SERPL-MCNC: 7 G/DL (ref 6–8.4)
PROT SERPL-MCNC: 7 G/DL (ref 6–8.4)
PTH-INTACT SERPL-MCNC: 87 PG/ML (ref 9–77)
SODIUM SERPL-SCNC: 141 MMOL/L (ref 136–145)
SODIUM SERPL-SCNC: 142 MMOL/L (ref 136–145)

## 2022-08-25 PROCEDURE — 3061F PR NEG MICROALBUMINURIA RESULT DOCUMENTED/REVIEW: ICD-10-PCS | Mod: CPTII,S$GLB,, | Performed by: NURSE PRACTITIONER

## 2022-08-25 PROCEDURE — 3078F DIAST BP <80 MM HG: CPT | Mod: CPTII,S$GLB,, | Performed by: INTERNAL MEDICINE

## 2022-08-25 PROCEDURE — 3008F BODY MASS INDEX DOCD: CPT | Mod: CPTII,S$GLB,, | Performed by: NURSE PRACTITIONER

## 2022-08-25 PROCEDURE — 3077F PR MOST RECENT SYSTOLIC BLOOD PRESSURE >= 140 MM HG: ICD-10-PCS | Mod: CPTII,S$GLB,, | Performed by: INTERNAL MEDICINE

## 2022-08-25 PROCEDURE — 3066F PR DOCUMENTATION OF TREATMENT FOR NEPHROPATHY: ICD-10-PCS | Mod: CPTII,S$GLB,, | Performed by: NURSE PRACTITIONER

## 2022-08-25 PROCEDURE — 82306 VITAMIN D 25 HYDROXY: CPT | Performed by: INTERNAL MEDICINE

## 2022-08-25 PROCEDURE — 99999 PR PBB SHADOW E&M-EST. PATIENT-LVL IV: ICD-10-PCS | Mod: PBBFAC,,, | Performed by: INTERNAL MEDICINE

## 2022-08-25 PROCEDURE — 3072F PR LOW RISK FOR RETINOPATHY: ICD-10-PCS | Mod: CPTII,S$GLB,, | Performed by: INTERNAL MEDICINE

## 2022-08-25 PROCEDURE — 1126F AMNT PAIN NOTED NONE PRSNT: CPT | Mod: CPTII,S$GLB,, | Performed by: INTERNAL MEDICINE

## 2022-08-25 PROCEDURE — 3044F PR MOST RECENT HEMOGLOBIN A1C LEVEL <7.0%: ICD-10-PCS | Mod: CPTII,S$GLB,, | Performed by: NURSE PRACTITIONER

## 2022-08-25 PROCEDURE — 3066F PR DOCUMENTATION OF TREATMENT FOR NEPHROPATHY: ICD-10-PCS | Mod: CPTII,S$GLB,, | Performed by: INTERNAL MEDICINE

## 2022-08-25 PROCEDURE — 3288F PR FALLS RISK ASSESSMENT DOCUMENTED: ICD-10-PCS | Mod: CPTII,S$GLB,, | Performed by: INTERNAL MEDICINE

## 2022-08-25 PROCEDURE — 3061F NEG MICROALBUMINURIA REV: CPT | Mod: CPTII,S$GLB,, | Performed by: NURSE PRACTITIONER

## 2022-08-25 PROCEDURE — 99999 PR PBB SHADOW E&M-EST. PATIENT-LVL IV: CPT | Mod: PBBFAC,,, | Performed by: NURSE PRACTITIONER

## 2022-08-25 PROCEDURE — 3078F PR MOST RECENT DIASTOLIC BLOOD PRESSURE < 80 MM HG: ICD-10-PCS | Mod: CPTII,S$GLB,, | Performed by: INTERNAL MEDICINE

## 2022-08-25 PROCEDURE — 1159F MED LIST DOCD IN RCRD: CPT | Mod: CPTII,S$GLB,, | Performed by: NURSE PRACTITIONER

## 2022-08-25 PROCEDURE — 99214 PR OFFICE/OUTPT VISIT, EST, LEVL IV, 30-39 MIN: ICD-10-PCS | Mod: S$GLB,,, | Performed by: NURSE PRACTITIONER

## 2022-08-25 PROCEDURE — 3066F NEPHROPATHY DOC TX: CPT | Mod: CPTII,S$GLB,, | Performed by: INTERNAL MEDICINE

## 2022-08-25 PROCEDURE — 1126F PR PAIN SEVERITY QUANTIFIED, NO PAIN PRESENT: ICD-10-PCS | Mod: CPTII,S$GLB,, | Performed by: NURSE PRACTITIONER

## 2022-08-25 PROCEDURE — 99214 OFFICE O/P EST MOD 30 MIN: CPT | Mod: S$GLB,,, | Performed by: NURSE PRACTITIONER

## 2022-08-25 PROCEDURE — 99214 PR OFFICE/OUTPT VISIT, EST, LEVL IV, 30-39 MIN: ICD-10-PCS | Mod: S$GLB,,, | Performed by: INTERNAL MEDICINE

## 2022-08-25 PROCEDURE — 3072F LOW RISK FOR RETINOPATHY: CPT | Mod: CPTII,S$GLB,, | Performed by: NURSE PRACTITIONER

## 2022-08-25 PROCEDURE — 99999 PR PBB SHADOW E&M-EST. PATIENT-LVL IV: CPT | Mod: PBBFAC,,, | Performed by: INTERNAL MEDICINE

## 2022-08-25 PROCEDURE — 3066F NEPHROPATHY DOC TX: CPT | Mod: CPTII,S$GLB,, | Performed by: NURSE PRACTITIONER

## 2022-08-25 PROCEDURE — 1159F PR MEDICATION LIST DOCUMENTED IN MEDICAL RECORD: ICD-10-PCS | Mod: CPTII,S$GLB,, | Performed by: NURSE PRACTITIONER

## 2022-08-25 PROCEDURE — 1159F MED LIST DOCD IN RCRD: CPT | Mod: CPTII,S$GLB,, | Performed by: INTERNAL MEDICINE

## 2022-08-25 PROCEDURE — 3044F PR MOST RECENT HEMOGLOBIN A1C LEVEL <7.0%: ICD-10-PCS | Mod: CPTII,S$GLB,, | Performed by: INTERNAL MEDICINE

## 2022-08-25 PROCEDURE — 3008F PR BODY MASS INDEX (BMI) DOCUMENTED: ICD-10-PCS | Mod: CPTII,S$GLB,, | Performed by: INTERNAL MEDICINE

## 2022-08-25 PROCEDURE — 1126F AMNT PAIN NOTED NONE PRSNT: CPT | Mod: CPTII,S$GLB,, | Performed by: NURSE PRACTITIONER

## 2022-08-25 PROCEDURE — 1101F PT FALLS ASSESS-DOCD LE1/YR: CPT | Mod: CPTII,S$GLB,, | Performed by: INTERNAL MEDICINE

## 2022-08-25 PROCEDURE — 3288F FALL RISK ASSESSMENT DOCD: CPT | Mod: CPTII,S$GLB,, | Performed by: NURSE PRACTITIONER

## 2022-08-25 PROCEDURE — 3072F LOW RISK FOR RETINOPATHY: CPT | Mod: CPTII,S$GLB,, | Performed by: INTERNAL MEDICINE

## 2022-08-25 PROCEDURE — 1101F PR PT FALLS ASSESS DOC 0-1 FALLS W/OUT INJ PAST YR: ICD-10-PCS | Mod: CPTII,S$GLB,, | Performed by: INTERNAL MEDICINE

## 2022-08-25 PROCEDURE — 99214 OFFICE O/P EST MOD 30 MIN: CPT | Mod: S$GLB,,, | Performed by: INTERNAL MEDICINE

## 2022-08-25 PROCEDURE — 99999 PR PBB SHADOW E&M-EST. PATIENT-LVL IV: ICD-10-PCS | Mod: PBBFAC,,, | Performed by: NURSE PRACTITIONER

## 2022-08-25 PROCEDURE — 1101F PT FALLS ASSESS-DOCD LE1/YR: CPT | Mod: CPTII,S$GLB,, | Performed by: NURSE PRACTITIONER

## 2022-08-25 PROCEDURE — 1126F PR PAIN SEVERITY QUANTIFIED, NO PAIN PRESENT: ICD-10-PCS | Mod: CPTII,S$GLB,, | Performed by: INTERNAL MEDICINE

## 2022-08-25 PROCEDURE — 3079F PR MOST RECENT DIASTOLIC BLOOD PRESSURE 80-89 MM HG: ICD-10-PCS | Mod: CPTII,S$GLB,, | Performed by: NURSE PRACTITIONER

## 2022-08-25 PROCEDURE — 1159F PR MEDICATION LIST DOCUMENTED IN MEDICAL RECORD: ICD-10-PCS | Mod: CPTII,S$GLB,, | Performed by: INTERNAL MEDICINE

## 2022-08-25 PROCEDURE — 80053 COMPREHEN METABOLIC PANEL: CPT | Mod: 91 | Performed by: INTERNAL MEDICINE

## 2022-08-25 PROCEDURE — 3077F SYST BP >= 140 MM HG: CPT | Mod: CPTII,S$GLB,, | Performed by: INTERNAL MEDICINE

## 2022-08-25 PROCEDURE — 3008F PR BODY MASS INDEX (BMI) DOCUMENTED: ICD-10-PCS | Mod: CPTII,S$GLB,, | Performed by: NURSE PRACTITIONER

## 2022-08-25 PROCEDURE — 3077F SYST BP >= 140 MM HG: CPT | Mod: CPTII,S$GLB,, | Performed by: NURSE PRACTITIONER

## 2022-08-25 PROCEDURE — 3072F PR LOW RISK FOR RETINOPATHY: ICD-10-PCS | Mod: CPTII,S$GLB,, | Performed by: NURSE PRACTITIONER

## 2022-08-25 PROCEDURE — 3044F HG A1C LEVEL LT 7.0%: CPT | Mod: CPTII,S$GLB,, | Performed by: INTERNAL MEDICINE

## 2022-08-25 PROCEDURE — 1101F PR PT FALLS ASSESS DOC 0-1 FALLS W/OUT INJ PAST YR: ICD-10-PCS | Mod: CPTII,S$GLB,, | Performed by: NURSE PRACTITIONER

## 2022-08-25 PROCEDURE — 83970 ASSAY OF PARATHORMONE: CPT | Performed by: INTERNAL MEDICINE

## 2022-08-25 PROCEDURE — 3288F PR FALLS RISK ASSESSMENT DOCUMENTED: ICD-10-PCS | Mod: CPTII,S$GLB,, | Performed by: NURSE PRACTITIONER

## 2022-08-25 PROCEDURE — 3061F NEG MICROALBUMINURIA REV: CPT | Mod: CPTII,S$GLB,, | Performed by: INTERNAL MEDICINE

## 2022-08-25 PROCEDURE — 3079F DIAST BP 80-89 MM HG: CPT | Mod: CPTII,S$GLB,, | Performed by: NURSE PRACTITIONER

## 2022-08-25 PROCEDURE — 3288F FALL RISK ASSESSMENT DOCD: CPT | Mod: CPTII,S$GLB,, | Performed by: INTERNAL MEDICINE

## 2022-08-25 PROCEDURE — 3044F HG A1C LEVEL LT 7.0%: CPT | Mod: CPTII,S$GLB,, | Performed by: NURSE PRACTITIONER

## 2022-08-25 PROCEDURE — 3061F PR NEG MICROALBUMINURIA RESULT DOCUMENTED/REVIEW: ICD-10-PCS | Mod: CPTII,S$GLB,, | Performed by: INTERNAL MEDICINE

## 2022-08-25 PROCEDURE — 3077F PR MOST RECENT SYSTOLIC BLOOD PRESSURE >= 140 MM HG: ICD-10-PCS | Mod: CPTII,S$GLB,, | Performed by: NURSE PRACTITIONER

## 2022-08-25 PROCEDURE — 3008F BODY MASS INDEX DOCD: CPT | Mod: CPTII,S$GLB,, | Performed by: INTERNAL MEDICINE

## 2022-08-25 NOTE — PROGRESS NOTES
Follow-up encounter for osteoporosis and high FRAX score.  No past fragility fractures.  History of GERD.  Patient voices no acute complaints.  Denies falls or fractures since last encounter.  Exam nonfocal, no significant tenderness present.  Labs revealed kidney function stable with no hypocalcemia.  Previously on Prolia.  Resumed Reclast x3 consecutively, last given on 8/2021.  Last densitometry on file with statistically significant improvement on T-score at the hip, stable otherwise.  Drug holiday appropriate, plan on repeating densitometry test next year to reassess fragility fracture risk.  Continue calcium and vitamin-D supplementation.  Avoid immobility, fall prevention.

## 2022-08-26 ENCOUNTER — OFFICE VISIT (OUTPATIENT)
Dept: DERMATOLOGY | Facility: CLINIC | Age: 72
End: 2022-08-26
Payer: MEDICARE

## 2022-08-26 DIAGNOSIS — D22.9 MULTIPLE BENIGN NEVI: ICD-10-CM

## 2022-08-26 DIAGNOSIS — L82.1 SEBORRHEIC KERATOSES: Primary | ICD-10-CM

## 2022-08-26 DIAGNOSIS — Z12.83 SCREENING, MALIGNANT NEOPLASM, SKIN: ICD-10-CM

## 2022-08-26 DIAGNOSIS — D18.01 CHERRY ANGIOMA: ICD-10-CM

## 2022-08-26 PROCEDURE — 3072F PR LOW RISK FOR RETINOPATHY: ICD-10-PCS | Mod: CPTII,S$GLB,, | Performed by: STUDENT IN AN ORGANIZED HEALTH CARE EDUCATION/TRAINING PROGRAM

## 2022-08-26 PROCEDURE — 3044F PR MOST RECENT HEMOGLOBIN A1C LEVEL <7.0%: ICD-10-PCS | Mod: CPTII,S$GLB,, | Performed by: STUDENT IN AN ORGANIZED HEALTH CARE EDUCATION/TRAINING PROGRAM

## 2022-08-26 PROCEDURE — 1159F MED LIST DOCD IN RCRD: CPT | Mod: CPTII,S$GLB,, | Performed by: STUDENT IN AN ORGANIZED HEALTH CARE EDUCATION/TRAINING PROGRAM

## 2022-08-26 PROCEDURE — 99203 OFFICE O/P NEW LOW 30 MIN: CPT | Mod: S$GLB,,, | Performed by: STUDENT IN AN ORGANIZED HEALTH CARE EDUCATION/TRAINING PROGRAM

## 2022-08-26 PROCEDURE — 1126F AMNT PAIN NOTED NONE PRSNT: CPT | Mod: CPTII,S$GLB,, | Performed by: STUDENT IN AN ORGANIZED HEALTH CARE EDUCATION/TRAINING PROGRAM

## 2022-08-26 PROCEDURE — 99999 PR PBB SHADOW E&M-EST. PATIENT-LVL III: ICD-10-PCS | Mod: PBBFAC,,, | Performed by: STUDENT IN AN ORGANIZED HEALTH CARE EDUCATION/TRAINING PROGRAM

## 2022-08-26 PROCEDURE — 1160F RVW MEDS BY RX/DR IN RCRD: CPT | Mod: CPTII,S$GLB,, | Performed by: STUDENT IN AN ORGANIZED HEALTH CARE EDUCATION/TRAINING PROGRAM

## 2022-08-26 PROCEDURE — 3066F PR DOCUMENTATION OF TREATMENT FOR NEPHROPATHY: ICD-10-PCS | Mod: CPTII,S$GLB,, | Performed by: STUDENT IN AN ORGANIZED HEALTH CARE EDUCATION/TRAINING PROGRAM

## 2022-08-26 PROCEDURE — 3288F FALL RISK ASSESSMENT DOCD: CPT | Mod: CPTII,S$GLB,, | Performed by: STUDENT IN AN ORGANIZED HEALTH CARE EDUCATION/TRAINING PROGRAM

## 2022-08-26 PROCEDURE — 1101F PT FALLS ASSESS-DOCD LE1/YR: CPT | Mod: CPTII,S$GLB,, | Performed by: STUDENT IN AN ORGANIZED HEALTH CARE EDUCATION/TRAINING PROGRAM

## 2022-08-26 PROCEDURE — 3066F NEPHROPATHY DOC TX: CPT | Mod: CPTII,S$GLB,, | Performed by: STUDENT IN AN ORGANIZED HEALTH CARE EDUCATION/TRAINING PROGRAM

## 2022-08-26 PROCEDURE — 1159F PR MEDICATION LIST DOCUMENTED IN MEDICAL RECORD: ICD-10-PCS | Mod: CPTII,S$GLB,, | Performed by: STUDENT IN AN ORGANIZED HEALTH CARE EDUCATION/TRAINING PROGRAM

## 2022-08-26 PROCEDURE — 99203 PR OFFICE/OUTPT VISIT, NEW, LEVL III, 30-44 MIN: ICD-10-PCS | Mod: S$GLB,,, | Performed by: STUDENT IN AN ORGANIZED HEALTH CARE EDUCATION/TRAINING PROGRAM

## 2022-08-26 PROCEDURE — 3072F LOW RISK FOR RETINOPATHY: CPT | Mod: CPTII,S$GLB,, | Performed by: STUDENT IN AN ORGANIZED HEALTH CARE EDUCATION/TRAINING PROGRAM

## 2022-08-26 PROCEDURE — 1101F PR PT FALLS ASSESS DOC 0-1 FALLS W/OUT INJ PAST YR: ICD-10-PCS | Mod: CPTII,S$GLB,, | Performed by: STUDENT IN AN ORGANIZED HEALTH CARE EDUCATION/TRAINING PROGRAM

## 2022-08-26 PROCEDURE — 3288F PR FALLS RISK ASSESSMENT DOCUMENTED: ICD-10-PCS | Mod: CPTII,S$GLB,, | Performed by: STUDENT IN AN ORGANIZED HEALTH CARE EDUCATION/TRAINING PROGRAM

## 2022-08-26 PROCEDURE — 3061F NEG MICROALBUMINURIA REV: CPT | Mod: CPTII,S$GLB,, | Performed by: STUDENT IN AN ORGANIZED HEALTH CARE EDUCATION/TRAINING PROGRAM

## 2022-08-26 PROCEDURE — 99999 PR PBB SHADOW E&M-EST. PATIENT-LVL III: CPT | Mod: PBBFAC,,, | Performed by: STUDENT IN AN ORGANIZED HEALTH CARE EDUCATION/TRAINING PROGRAM

## 2022-08-26 PROCEDURE — 3044F HG A1C LEVEL LT 7.0%: CPT | Mod: CPTII,S$GLB,, | Performed by: STUDENT IN AN ORGANIZED HEALTH CARE EDUCATION/TRAINING PROGRAM

## 2022-08-26 PROCEDURE — 1160F PR REVIEW ALL MEDS BY PRESCRIBER/CLIN PHARMACIST DOCUMENTED: ICD-10-PCS | Mod: CPTII,S$GLB,, | Performed by: STUDENT IN AN ORGANIZED HEALTH CARE EDUCATION/TRAINING PROGRAM

## 2022-08-26 PROCEDURE — 3061F PR NEG MICROALBUMINURIA RESULT DOCUMENTED/REVIEW: ICD-10-PCS | Mod: CPTII,S$GLB,, | Performed by: STUDENT IN AN ORGANIZED HEALTH CARE EDUCATION/TRAINING PROGRAM

## 2022-08-26 PROCEDURE — 1126F PR PAIN SEVERITY QUANTIFIED, NO PAIN PRESENT: ICD-10-PCS | Mod: CPTII,S$GLB,, | Performed by: STUDENT IN AN ORGANIZED HEALTH CARE EDUCATION/TRAINING PROGRAM

## 2022-08-26 NOTE — PROGRESS NOTES
Subjective:       Patient ID:  Renea Bourgeois is a 72 y.o. female who presents for   Chief Complaint   Patient presents with    Skin Check     History of Present Illness: The patient presents with chief complaint of a skin examination and a few lesions on the back.  Location: back  Duration: few months  Signs/Symptoms: scaly areas. Denies any rapidly growing, bleeding or non healing skin lesions  Prior treatments: none  Denies any history of skin cancer.         Review of Systems   Constitutional: Negative for fever and chills.   Skin: Positive for activity-related sunscreen use. Negative for itching, rash and dry skin.        Objective:    Physical Exam   Constitutional: She appears well-developed and well-nourished. No distress.   Neurological: She is alert and oriented to person, place, and time. She is not disoriented.   Psychiatric: She has a normal mood and affect.   Skin:   Areas Examined (abnormalities noted in diagram):   Scalp / Hair Palpated and Inspected  Head / Face Inspection Performed  Neck Inspection Performed  Chest / Axilla Inspection Performed  Abdomen Inspection Performed  Genitals / Buttocks / Groin Inspection Performed  Back Inspection Performed  RUE Inspected  LUE Inspection Performed  RLE Inspected  LLE Inspection Performed  Nails and Digits Inspection Performed                   Diagram Legend     Erythematous scaling macule/papule c/w actinic keratosis       Vascular papule c/w angioma      Pigmented verrucoid papule/plaque c/w seborrheic keratosis      Yellow umbilicated papule c/w sebaceous hyperplasia      Irregularly shaped tan macule c/w lentigo     1-2 mm smooth white papules consistent with Milia      Movable subcutaneous cyst with punctum c/w epidermal inclusion cyst      Subcutaneous movable cyst c/w pilar cyst      Firm pink to brown papule c/w dermatofibroma      Pedunculated fleshy papule(s) c/w skin tag(s)      Evenly pigmented macule c/w junctional nevus     Mildly  variegated pigmented, slightly irregular-bordered macule c/w mildly atypical nevus      Flesh colored to evenly pigmented papule c/w intradermal nevus       Pink pearly papule/plaque c/w basal cell carcinoma      Erythematous hyperkeratotic cursted plaque c/w SCC      Surgical scar with no sign of skin cancer recurrence      Open and closed comedones      Inflammatory papules and pustules      Verrucoid papule consistent consistent with wart     Erythematous eczematous patches and plaques     Dystrophic onycholytic nail with subungual debris c/w onychomycosis     Umbilicated papule    Erythematous-base heme-crusted tan verrucoid plaque consistent with inflamed seborrheic keratosis     Erythematous Silvery Scaling Plaque c/w Psoriasis     See annotation      Assessment / Plan:        Seborrheic keratoses  These are benign inherited growths without a malignant potential. Reassurance given to patient. No treatment is necessary.     Cherry angioma  This is a benign vascular lesion. Reassurance given. No treatment required.     Multiple benign nevi  Screening, malignant neoplasm, skin  total body skin examination performed today including at least 12 points as noted in physical examination. No lesions suspicious for malignancy noted.  Reassurance provided.    Instructed patient to observe lesion(s) for changes and follow up in clinic if changes are noted. Patient to monitor skin at home for new or changing lesions and follow up in clinic if noted.    Discussed ABCDE's of moles and brochure provided.             Follow up in about 1 year (around 8/26/2023).

## 2022-08-29 ENCOUNTER — PATIENT MESSAGE (OUTPATIENT)
Dept: INTERNAL MEDICINE | Facility: CLINIC | Age: 72
End: 2022-08-29
Payer: MEDICARE

## 2022-10-10 DIAGNOSIS — M25.512 LEFT SHOULDER PAIN, UNSPECIFIED CHRONICITY: Primary | ICD-10-CM

## 2022-10-31 DIAGNOSIS — M15.9 PRIMARY OSTEOARTHRITIS INVOLVING MULTIPLE JOINTS: ICD-10-CM

## 2022-11-01 RX ORDER — GABAPENTIN 100 MG/1
100 CAPSULE ORAL 3 TIMES DAILY
Qty: 270 CAPSULE | Refills: 11 | Status: SHIPPED | OUTPATIENT
Start: 2022-11-01 | End: 2023-11-22

## 2022-11-02 ENCOUNTER — HOSPITAL ENCOUNTER (OUTPATIENT)
Dept: RADIOLOGY | Facility: HOSPITAL | Age: 72
Discharge: HOME | End: 2022-11-02
Attending: STUDENT IN AN ORGANIZED HEALTH CARE EDUCATION/TRAINING PROGRAM
Payer: MEDICARE

## 2022-11-02 ENCOUNTER — OFFICE VISIT (OUTPATIENT)
Dept: ORTHOPEDICS | Facility: CLINIC | Age: 72
End: 2022-11-02
Payer: MEDICARE

## 2022-11-02 VITALS — HEIGHT: 61 IN | BODY MASS INDEX: 26.06 KG/M2 | WEIGHT: 138 LBS

## 2022-11-02 DIAGNOSIS — M75.42 SUBACROMIAL IMPINGEMENT OF LEFT SHOULDER: Primary | ICD-10-CM

## 2022-11-02 DIAGNOSIS — M25.512 LEFT SHOULDER PAIN, UNSPECIFIED CHRONICITY: ICD-10-CM

## 2022-11-02 PROCEDURE — 3044F HG A1C LEVEL LT 7.0%: CPT | Mod: CPTII,S$GLB,, | Performed by: STUDENT IN AN ORGANIZED HEALTH CARE EDUCATION/TRAINING PROGRAM

## 2022-11-02 PROCEDURE — 99999 PR PBB SHADOW E&M-EST. PATIENT-LVL IV: ICD-10-PCS | Mod: PBBFAC,,, | Performed by: STUDENT IN AN ORGANIZED HEALTH CARE EDUCATION/TRAINING PROGRAM

## 2022-11-02 PROCEDURE — 1160F PR REVIEW ALL MEDS BY PRESCRIBER/CLIN PHARMACIST DOCUMENTED: ICD-10-PCS | Mod: CPTII,S$GLB,, | Performed by: STUDENT IN AN ORGANIZED HEALTH CARE EDUCATION/TRAINING PROGRAM

## 2022-11-02 PROCEDURE — 3072F PR LOW RISK FOR RETINOPATHY: ICD-10-PCS | Mod: CPTII,S$GLB,, | Performed by: STUDENT IN AN ORGANIZED HEALTH CARE EDUCATION/TRAINING PROGRAM

## 2022-11-02 PROCEDURE — 3008F PR BODY MASS INDEX (BMI) DOCUMENTED: ICD-10-PCS | Mod: CPTII,S$GLB,, | Performed by: STUDENT IN AN ORGANIZED HEALTH CARE EDUCATION/TRAINING PROGRAM

## 2022-11-02 PROCEDURE — 3066F NEPHROPATHY DOC TX: CPT | Mod: CPTII,S$GLB,, | Performed by: STUDENT IN AN ORGANIZED HEALTH CARE EDUCATION/TRAINING PROGRAM

## 2022-11-02 PROCEDURE — 99999 PR PBB SHADOW E&M-EST. PATIENT-LVL IV: CPT | Mod: PBBFAC,,, | Performed by: STUDENT IN AN ORGANIZED HEALTH CARE EDUCATION/TRAINING PROGRAM

## 2022-11-02 PROCEDURE — 3072F LOW RISK FOR RETINOPATHY: CPT | Mod: CPTII,S$GLB,, | Performed by: STUDENT IN AN ORGANIZED HEALTH CARE EDUCATION/TRAINING PROGRAM

## 2022-11-02 PROCEDURE — 1159F MED LIST DOCD IN RCRD: CPT | Mod: CPTII,S$GLB,, | Performed by: STUDENT IN AN ORGANIZED HEALTH CARE EDUCATION/TRAINING PROGRAM

## 2022-11-02 PROCEDURE — 1101F PT FALLS ASSESS-DOCD LE1/YR: CPT | Mod: CPTII,S$GLB,, | Performed by: STUDENT IN AN ORGANIZED HEALTH CARE EDUCATION/TRAINING PROGRAM

## 2022-11-02 PROCEDURE — 99213 OFFICE O/P EST LOW 20 MIN: CPT | Mod: S$GLB,,, | Performed by: STUDENT IN AN ORGANIZED HEALTH CARE EDUCATION/TRAINING PROGRAM

## 2022-11-02 PROCEDURE — 3061F PR NEG MICROALBUMINURIA RESULT DOCUMENTED/REVIEW: ICD-10-PCS | Mod: CPTII,S$GLB,, | Performed by: STUDENT IN AN ORGANIZED HEALTH CARE EDUCATION/TRAINING PROGRAM

## 2022-11-02 PROCEDURE — 3061F NEG MICROALBUMINURIA REV: CPT | Mod: CPTII,S$GLB,, | Performed by: STUDENT IN AN ORGANIZED HEALTH CARE EDUCATION/TRAINING PROGRAM

## 2022-11-02 PROCEDURE — 1160F RVW MEDS BY RX/DR IN RCRD: CPT | Mod: CPTII,S$GLB,, | Performed by: STUDENT IN AN ORGANIZED HEALTH CARE EDUCATION/TRAINING PROGRAM

## 2022-11-02 PROCEDURE — 3288F FALL RISK ASSESSMENT DOCD: CPT | Mod: CPTII,S$GLB,, | Performed by: STUDENT IN AN ORGANIZED HEALTH CARE EDUCATION/TRAINING PROGRAM

## 2022-11-02 PROCEDURE — 73030 X-RAY EXAM OF SHOULDER: CPT | Mod: TC,LT

## 2022-11-02 PROCEDURE — 3288F PR FALLS RISK ASSESSMENT DOCUMENTED: ICD-10-PCS | Mod: CPTII,S$GLB,, | Performed by: STUDENT IN AN ORGANIZED HEALTH CARE EDUCATION/TRAINING PROGRAM

## 2022-11-02 PROCEDURE — 1125F PR PAIN SEVERITY QUANTIFIED, PAIN PRESENT: ICD-10-PCS | Mod: CPTII,S$GLB,, | Performed by: STUDENT IN AN ORGANIZED HEALTH CARE EDUCATION/TRAINING PROGRAM

## 2022-11-02 PROCEDURE — 3044F PR MOST RECENT HEMOGLOBIN A1C LEVEL <7.0%: ICD-10-PCS | Mod: CPTII,S$GLB,, | Performed by: STUDENT IN AN ORGANIZED HEALTH CARE EDUCATION/TRAINING PROGRAM

## 2022-11-02 PROCEDURE — 99213 PR OFFICE/OUTPT VISIT, EST, LEVL III, 20-29 MIN: ICD-10-PCS | Mod: S$GLB,,, | Performed by: STUDENT IN AN ORGANIZED HEALTH CARE EDUCATION/TRAINING PROGRAM

## 2022-11-02 PROCEDURE — 1101F PR PT FALLS ASSESS DOC 0-1 FALLS W/OUT INJ PAST YR: ICD-10-PCS | Mod: CPTII,S$GLB,, | Performed by: STUDENT IN AN ORGANIZED HEALTH CARE EDUCATION/TRAINING PROGRAM

## 2022-11-02 PROCEDURE — 73030 XR SHOULDER COMPLETE 2 OR MORE VIEWS LEFT: ICD-10-PCS | Mod: 26,LT,, | Performed by: RADIOLOGY

## 2022-11-02 PROCEDURE — 73030 X-RAY EXAM OF SHOULDER: CPT | Mod: 26,LT,, | Performed by: RADIOLOGY

## 2022-11-02 PROCEDURE — 1159F PR MEDICATION LIST DOCUMENTED IN MEDICAL RECORD: ICD-10-PCS | Mod: CPTII,S$GLB,, | Performed by: STUDENT IN AN ORGANIZED HEALTH CARE EDUCATION/TRAINING PROGRAM

## 2022-11-02 PROCEDURE — 1125F AMNT PAIN NOTED PAIN PRSNT: CPT | Mod: CPTII,S$GLB,, | Performed by: STUDENT IN AN ORGANIZED HEALTH CARE EDUCATION/TRAINING PROGRAM

## 2022-11-02 PROCEDURE — 3008F BODY MASS INDEX DOCD: CPT | Mod: CPTII,S$GLB,, | Performed by: STUDENT IN AN ORGANIZED HEALTH CARE EDUCATION/TRAINING PROGRAM

## 2022-11-02 PROCEDURE — 3066F PR DOCUMENTATION OF TREATMENT FOR NEPHROPATHY: ICD-10-PCS | Mod: CPTII,S$GLB,, | Performed by: STUDENT IN AN ORGANIZED HEALTH CARE EDUCATION/TRAINING PROGRAM

## 2022-11-02 NOTE — PROGRESS NOTES
Orthopaedics Sports Medicine     Shoulder Initial Visit         11/2/2022    Referring MD: Self, Aaareferral    Chief Complaint   Patient presents with    Left Shoulder - Pain         History of Present Illness:   Renea Bourgeois is a 72 y.o. right-hand dominant female who presents with left shoulder pain and dysfunction.    Onset of the symptoms was 2 months ago     Inciting event: no specific injury, symptoms began while taking care of her terminally ill brother     Current symptoms include pain in the shoulder, localized laterally, reports popping and sharp pain    Pain is aggravated by movement, especially overhead reaching, laying at night any movements       Evaluation to date: X-Ray      Treatment to date: Rest, activity modification, tumeric, ibuprofen, tylenol    Past Medical History:   Past Medical History:   Diagnosis Date    Allergy     Anemia 11/14/2017    Angina pectoris     followed by cardiology    Arthritis     Breast cancer     Right T1 Ductal Ca in situ,high oncotype Dx 33, Estrogen positive. Lumpectomy, TAC chemo x 6 cyscles then RT,on aromatase inhibitor    Cataract     Chondromalacia of right patella 3/26/2018    Stable and controlled. Continue current treatment plan as previously prescribed with your PCP.      Diabetes mellitus type II 2011    DM (diabetes mellitus) 2011     am 08/04/2017    DM (diabetes mellitus) 2010     am 06/22/2020    Elevated BP without diagnosis of hypertension 11/27/2018    GERD (gastroesophageal reflux disease)     History of colon polyps 2/21/2018    The patient had adenomatous colon polyps in 2014.      History of renal calculi 8/11/2015    Right obstructing 8/20/13 - passed.     Hyperlipidemia     Hypertension     Kidney stone     right side, passed    Malignant neoplasm of upper-outer quadrant of right breast in female, estrogen receptor positive 7/23/2018    Followed by Dr. Robert Lozano disease     reported per pt    Nuclear sclerosis of both  eyes 10/5/2015    Osteopenia     Osteoporosis 4/12/2019    Pseudophakia 10/15/2015    PVC (premature ventricular contraction)     on and off    Refractive error 10/5/2015    Trouble in sleeping     Type 2 diabetes mellitus        Past Surgical History:   Past Surgical History:   Procedure Laterality Date    BREAST BIOPSY      BREAST LUMPECTOMY  2/11/2011    right    CATARACT EXTRACTION Bilateral 10/14/15    CHOLECYSTECTOMY  1989    open    COLONOSCOPY N/A 2/22/2018    Procedure: COLONOSCOPY;  Surgeon: Carlito Odonnell MD;  Location: [facility];  Service: Endoscopy;  Laterality: N/A;    CYST REMOVAL Left 11/2017    foot    DILATION AND CURETTAGE OF UTERUS  10/2010    [place]    ESOPHAGOGASTRODUODENOSCOPY N/A 6/29/2020    Procedure: EGD (ESOPHAGOGASTRODUODENOSCOPY);  Surgeon: Nancy Hahn MD;  Location: [facility];  Service: Endoscopy;  Laterality: N/A;    EYE SURGERY  2012    cataract    GALLBLADDER SURGERY      removed in 1989    HAND SURGERY Left 2015    Nasal septal deviation repair  2009    toenail edges removed      TONSILLECTOMY  1959    TOTAL REDUCTION MAMMOPLASTY Left     2015    TUBAL LIGATION  1981       Medications:  Patient's Medications   New Prescriptions    No medications on file   Previous Medications    ACCU-CHEK JD PLUS TEST STRP STRP    TEST three times a day    ACETAMINOPHEN (TYLENOL) 500 MG TABLET    Take 500 mg by mouth every 6 (six) hours as needed for Pain.    ALCOHOL PREP PADS PADM        ALPRAZOLAM (XANAX) 0.25 MG TABLET    Take 1 tablet (0.25 mg total) by mouth 3 (three) times daily as needed for Anxiety.    CALCIUM-VITAMIN D (CALCIUM 600 + D,3,) 600 MG(1,500MG) -400 UNIT TAB    Take 1 tablet by mouth 2 (two) times daily.    CHOLECALCIFEROL, VITAMIN D3, (VITAMIN D3) 1,000 UNIT CAPSULE    Take 1 capsule (1,000 Units total) by mouth once daily.    COVID-19 VAC, VIRIDIANA,PFIZER,,PF, (PFIZER COVID-19 VIRIDIANA VACCN,PF,) 30 MCG/0.3 ML INJECTION    Inject into the muscle.    DICLOFENAC SODIUM  (VOLTAREN) 1 % GEL    Apply 2 g topically once daily.    ESOMEPRAZOLE MAGNESIUM (NEXIUM ORAL)    Take by mouth.    FERROUS GLUCONATE (FERGON) 240 (27 FE) MG TABLET    Take 1 tablet (240 mg total) by mouth once daily.    FLUTICASONE PROPIONATE (FLONASE) 50 MCG/ACTUATION NASAL SPRAY    2 sprays (100 mcg total) by Each Nostril route daily as needed. 2 Spray, Suspension Nasal Every day    GABAPENTIN (NEURONTIN) 100 MG CAPSULE    Take 1 capsule (100 mg total) by mouth 3 (three) times daily.    GLIMEPIRIDE (AMARYL) 1 MG TABLET    TAKE 1 TABLET(1 MG) BY MOUTH EVERY DAY    LANCETS (ACCU-CHEK SOFTCLIX LANCETS) MISC    1 each by Misc.(Non-Drug; Combo Route) route 3 (three) times daily.    LANCING DEVICE MISC        MAGNESIUM ASPARTATE HCL 61 MG (615 MG) TBEC    Take by mouth once.    METOPROLOL SUCCINATE (TOPROL-XL) 25 MG 24 HR TABLET    Take 25 mg by mouth once daily.    ROSUVASTATIN (CRESTOR) 10 MG TABLET    TAKE 1 TABLET BY MOUTH EVERY DAY    TRAZODONE (DESYREL) 100 MG TABLET    TAKE 1 TABLET BY MOUTH AT BEDTIME    TURMERIC 400 MG CAP    Take 400 mg by mouth 2 (two) times daily as needed.   Modified Medications    No medications on file   Discontinued Medications    No medications on file       Allergies:   Review of patient's allergies indicates:   Allergen Reactions    Codeine Itching     Other reaction(s): Itching       Social History:   Home town: [place]  Occupation: Retired  Alcohol use: She reports no history of alcohol use.  Tobacco use: She reports that she has never smoked. She has never used smokeless tobacco.    Review of systems:  History of recent illness, fevers, shakes, or chills: no  History of cardiac problems or chest pain: no  History of pulmonary problems or asthma: no  History of diabetes: Type 2  History of prior dvt or clotting problems: no  History of sleep apnea: no      Physical Examination:  Estimated body mass index is 26.07 kg/m² as calculated from the following:    Height as of this  "encounter: 5' 1" (1.549 m).    Weight as of this encounter: 62.6 kg (138 lb).    General  Healthy appearing female in no acute distress  Alert and oriented, normal mood, appropriate affect    Shoulder Examination:  Patient is alert and oriented, no distress. Skin is intact. Neuro is normal with no focal motor or sensory findings.    Cervical exam is unremarkable. Intact cervical ROM. Negative Spurling's test    Physical Exam:  RIGHT    LEFT    Scap Dyskinesis/Winging (-)    (-)    Tenderness:          Greater Tuberosity             (-)    +  Bicipital Groove  (-)    (-)  AC joint   (-)    (-)  Other:     ROM:  Forward Elevation 180    180  Abduction  120    120  ER (at side)  80    80  IR   T8    T8    Strength:   Supraspinatus  5/5    5/5  Infraspinatus  5/5    5/5  Subscap / IR  5/5    5/5     Special Tests:   Neer:    (-)    +   Vaz:   (-)    +   SS Stress:   (-)    (-)   Bear Hug:   (-)    (-)   [place]'s:   (-)    +   Resisted Thrower's:   (-)    +   Cross Arm Abduction:  (-)    (-)    Neurovascular examination  - Motor grossly intact bilaterally to shoulder abduction, elbow flexion and extension, wrist flexion and extension, and intrinsic hand musculature  - Sensation intact to light touch bilaterally in axillary, median, radial, and ulnar distributions  - Symmetrical radial pulses      Imaging:  XR Results:  Results for orders placed during the hospital encounter of 11/02/22    X-Ray Shoulder 2 or More Views Left    Narrative  EXAMINATION:  XR SHOULDER COMPLETE 2 OR MORE VIEWS LEFT    CLINICAL HISTORY:  Pain in left shoulder    TECHNIQUE:  Two or three views of the left shoulder were performed.    COMPARISON:  None    FINDINGS:  No acute fracture or dislocation.  No significant soft tissue swelling.    Humeral head normally positioned with the glenoid cavity.  Glenohumeral joint space preserved.   AC joint is intact.    Visualized left lung is clear.    Impression  No acute radiographic findings of the " left shoulder.      Electronically signed by: Geovanny Chase  Date:    11/02/2022  Time:    10:05    MRI Results:  No results found for this or any previous visit.    CT Results:  No results found for this or any previous visit.      Physician Read: I agree with the above impression.      Impression:  72 y.o. female with subacromial impingement and bursitis      Plan:  Discussed diagnosis and treatment options with patient today. Her history, physical exam, and imaging findings are most consistent with subacromial impingement and bursitis. I suspect this is from overuse while taking care of her brother.  I recommend proceeding with intra-articular corticosteroid injection into the left glenohumeral joint. The patient is in agreement with this plan.   Corticosteroid injection into the left glenohumeral joint performed today. Patient tolerated the procedure well with no immediate complications.   At least 15 minutes were spent developing, teaching, and performing a home exercise program.  A written summary was provided and all questions were answered. This service was performed by Nikki Alford Sports Medicine Assistant under direction of Mango Ramon MD. CPT 74611-YQ  Follow up as needed            Mango Ramon MD

## 2022-11-02 NOTE — PATIENT INSTRUCTIONS
STRETCHING EXERCISES                    STRENGTHENING EXERCISES                    If you have any difficulties reading this information, you may visit the online version using the following link: Rotator Cuff and Shoulder Conditioning Program (https://[email or web address].aaos.org/globalassets/pdfs/[email or web address]-cuff-and-shoulder-conditioning-program.pdf)

## 2022-11-03 ENCOUNTER — PATIENT MESSAGE (OUTPATIENT)
Dept: ORTHOPEDICS | Facility: CLINIC | Age: 72
End: 2022-11-03
Payer: MEDICARE

## 2022-11-03 DIAGNOSIS — M79.641 RIGHT HAND PAIN: Primary | ICD-10-CM

## 2022-11-16 ENCOUNTER — OFFICE VISIT (OUTPATIENT)
Dept: ORTHOPEDICS | Facility: CLINIC | Age: 72
End: 2022-11-16
Payer: MEDICARE

## 2022-11-16 ENCOUNTER — HOSPITAL ENCOUNTER (OUTPATIENT)
Dept: RADIOLOGY | Facility: HOSPITAL | Age: 72
Discharge: HOME | End: 2022-11-16
Attending: ORTHOPAEDIC SURGERY
Payer: MEDICARE

## 2022-11-16 VITALS — HEIGHT: 61 IN | WEIGHT: 138 LBS | BODY MASS INDEX: 26.06 KG/M2

## 2022-11-16 DIAGNOSIS — M18.11 ARTHRITIS OF CARPOMETACARPAL (CMC) JOINT OF RIGHT THUMB: Primary | ICD-10-CM

## 2022-11-16 DIAGNOSIS — Z01.818 PREOP TESTING: ICD-10-CM

## 2022-11-16 DIAGNOSIS — M79.641 RIGHT HAND PAIN: ICD-10-CM

## 2022-11-16 PROCEDURE — 3066F NEPHROPATHY DOC TX: CPT | Mod: CPTII,S$GLB,, | Performed by: ORTHOPAEDIC SURGERY

## 2022-11-16 PROCEDURE — 1159F PR MEDICATION LIST DOCUMENTED IN MEDICAL RECORD: ICD-10-PCS | Mod: CPTII,S$GLB,, | Performed by: ORTHOPAEDIC SURGERY

## 2022-11-16 PROCEDURE — 1126F AMNT PAIN NOTED NONE PRSNT: CPT | Mod: CPTII,S$GLB,, | Performed by: ORTHOPAEDIC SURGERY

## 2022-11-16 PROCEDURE — 73130 X-RAY EXAM OF HAND: CPT | Mod: TC,RT

## 2022-11-16 PROCEDURE — 99999 PR PBB SHADOW E&M-EST. PATIENT-LVL III: CPT | Mod: PBBFAC,,, | Performed by: ORTHOPAEDIC SURGERY

## 2022-11-16 PROCEDURE — 3008F PR BODY MASS INDEX (BMI) DOCUMENTED: ICD-10-PCS | Mod: CPTII,S$GLB,, | Performed by: ORTHOPAEDIC SURGERY

## 2022-11-16 PROCEDURE — 73130 XR HAND COMPLETE 3 VIEW RIGHT: ICD-10-PCS | Mod: 26,RT,, | Performed by: RADIOLOGY

## 2022-11-16 PROCEDURE — 99999 PR PBB SHADOW E&M-EST. PATIENT-LVL III: ICD-10-PCS | Mod: PBBFAC,,, | Performed by: ORTHOPAEDIC SURGERY

## 2022-11-16 PROCEDURE — 3072F LOW RISK FOR RETINOPATHY: CPT | Mod: CPTII,S$GLB,, | Performed by: ORTHOPAEDIC SURGERY

## 2022-11-16 PROCEDURE — 3072F PR LOW RISK FOR RETINOPATHY: ICD-10-PCS | Mod: CPTII,S$GLB,, | Performed by: ORTHOPAEDIC SURGERY

## 2022-11-16 PROCEDURE — 73130 X-RAY EXAM OF HAND: CPT | Mod: 26,RT,, | Performed by: RADIOLOGY

## 2022-11-16 PROCEDURE — 3066F PR DOCUMENTATION OF TREATMENT FOR NEPHROPATHY: ICD-10-PCS | Mod: CPTII,S$GLB,, | Performed by: ORTHOPAEDIC SURGERY

## 2022-11-16 PROCEDURE — 3288F PR FALLS RISK ASSESSMENT DOCUMENTED: ICD-10-PCS | Mod: CPTII,S$GLB,, | Performed by: ORTHOPAEDIC SURGERY

## 2022-11-16 PROCEDURE — 3061F PR NEG MICROALBUMINURIA RESULT DOCUMENTED/REVIEW: ICD-10-PCS | Mod: CPTII,S$GLB,, | Performed by: ORTHOPAEDIC SURGERY

## 2022-11-16 PROCEDURE — 3288F FALL RISK ASSESSMENT DOCD: CPT | Mod: CPTII,S$GLB,, | Performed by: ORTHOPAEDIC SURGERY

## 2022-11-16 PROCEDURE — 3044F HG A1C LEVEL LT 7.0%: CPT | Mod: CPTII,S$GLB,, | Performed by: ORTHOPAEDIC SURGERY

## 2022-11-16 PROCEDURE — 99214 PR OFFICE/OUTPT VISIT, EST, LEVL IV, 30-39 MIN: ICD-10-PCS | Mod: S$GLB,,, | Performed by: ORTHOPAEDIC SURGERY

## 2022-11-16 PROCEDURE — 1101F PT FALLS ASSESS-DOCD LE1/YR: CPT | Mod: CPTII,S$GLB,, | Performed by: ORTHOPAEDIC SURGERY

## 2022-11-16 PROCEDURE — 3044F PR MOST RECENT HEMOGLOBIN A1C LEVEL <7.0%: ICD-10-PCS | Mod: CPTII,S$GLB,, | Performed by: ORTHOPAEDIC SURGERY

## 2022-11-16 PROCEDURE — 99214 OFFICE O/P EST MOD 30 MIN: CPT | Mod: S$GLB,,, | Performed by: ORTHOPAEDIC SURGERY

## 2022-11-16 PROCEDURE — 3061F NEG MICROALBUMINURIA REV: CPT | Mod: CPTII,S$GLB,, | Performed by: ORTHOPAEDIC SURGERY

## 2022-11-16 PROCEDURE — 1126F PR PAIN SEVERITY QUANTIFIED, NO PAIN PRESENT: ICD-10-PCS | Mod: CPTII,S$GLB,, | Performed by: ORTHOPAEDIC SURGERY

## 2022-11-16 PROCEDURE — 3008F BODY MASS INDEX DOCD: CPT | Mod: CPTII,S$GLB,, | Performed by: ORTHOPAEDIC SURGERY

## 2022-11-16 PROCEDURE — 1159F MED LIST DOCD IN RCRD: CPT | Mod: CPTII,S$GLB,, | Performed by: ORTHOPAEDIC SURGERY

## 2022-11-16 PROCEDURE — 1101F PR PT FALLS ASSESS DOC 0-1 FALLS W/OUT INJ PAST YR: ICD-10-PCS | Mod: CPTII,S$GLB,, | Performed by: ORTHOPAEDIC SURGERY

## 2022-11-16 NOTE — PROGRESS NOTES
Subjective:     Patient ID: Renea Bourgeois is a 72 y.o. female.    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  She had a left thumb basal joint arthroplasty in 2015.  She wishes to have a right thumb basal joint arthroplasty.  She was last injected 08/23/2022.    Past Medical History:   Diagnosis Date    Allergy     Anemia 11/14/2017    Angina pectoris     followed by cardiology    Arthritis     Breast cancer     Right T1 Ductal Ca in situ,high oncotype Dx 33, Estrogen positive. Lumpectomy, TAC chemo x 6 cyscles then RT,on aromatase inhibitor    Cataract     Chondromalacia of right patella 3/26/2018    Stable and controlled. Continue current treatment plan as previously prescribed with your PCP.      Diabetes mellitus type II 2011    DM (diabetes mellitus) 2011     am 08/04/2017    DM (diabetes mellitus) 2010     am 06/22/2020    Elevated BP without diagnosis of hypertension 11/27/2018    GERD (gastroesophageal reflux disease)     History of colon polyps 2/21/2018    The patient had adenomatous colon polyps in 2014.      History of renal calculi 8/11/2015    Right obstructing 8/20/13 - passed.     Hyperlipidemia     Hypertension     Kidney stone     right side, passed    Malignant neoplasm of upper-outer quadrant of right breast in female, estrogen receptor positive 7/23/2018    Followed by Dr. Robert Lozano disease     reported per pt    Nuclear sclerosis of both eyes 10/5/2015    Osteopenia     Osteoporosis 4/12/2019    Pseudophakia 10/15/2015    PVC (premature ventricular contraction)     on and off    Refractive error 10/5/2015    Trouble in sleeping     Type 2 diabetes mellitus      Past Surgical History:   Procedure Laterality Date    BREAST BIOPSY      BREAST LUMPECTOMY  2/11/2011    right    CATARACT EXTRACTION Bilateral 10/14/15    CHOLECYSTECTOMY  1989    open    COLONOSCOPY N/A 2/22/2018     Procedure: COLONOSCOPY;  Surgeon: Carlito Odonnell MD;  Location: [facility] ENDO;  Service: Endoscopy;  Laterality: N/A;    CYST REMOVAL Left 11/2017    foot    DILATION AND CURETTAGE OF UTERUS  10/2010    In [place]    ESOPHAGOGASTRODUODENOSCOPY N/A 6/29/2020    Procedure: EGD (ESOPHAGOGASTRODUODENOSCOPY);  Surgeon: Nancy Hahn MD;  Location: [facility] ENDO;  Service: Endoscopy;  Laterality: N/A;    EYE SURGERY  2012    cataract    GALLBLADDER SURGERY      removed in 1989    HAND SURGERY Left 2015    Nasal septal deviation repair  2009    toenail edges removed      TONSILLECTOMY  1959    TOTAL REDUCTION MAMMOPLASTY Left     2015    TUBAL LIGATION  1981     Family History   Problem Relation Age of Onset    Diabetes Father     Hypertension Father     Stroke Father     Cancer Father 65        metastatic when diagnosed    Stroke Brother         Stoke    Cancer Other         kidney    Atrial fibrillation Son         35    Heart disease Son     Alzheimer's disease Mother     Parkinsonism Brother     Cancer Paternal Grandfather         prostate    Glaucoma Maternal Grandmother     Psoriasis Cousin     Alcohol abuse Neg Hx     Drug abuse Neg Hx     COPD Neg Hx     Birth defects Neg Hx     Mental retardation Neg Hx     Mental illness Neg Hx     Kidney disease Neg Hx     Hyperlipidemia Neg Hx     Melanoma Neg Hx     Lupus Neg Hx      Social History     Socioeconomic History    Marital status:      Spouse name: Don    Number of children: 4   Occupational History    Occupation: Retired Teacher     Comment: [employer]   Tobacco Use    Smoking status: Never    Smokeless tobacco: Never   Substance and Sexual Activity    Alcohol use: No     Alcohol/week: 0.0 standard drinks    Drug use: No    Sexual activity: Not Currently     Partners: Male     Birth control/protection: Post-menopausal   Social History Narrative    aretired from homeschooling/,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 Caffeine intake;1-2 per week - dennis  umesh. Decaffinated tea and most beveridges. Does have a living will - at home in her filing cabinet.      Social Determinants of Health     Financial Resource Strain: Low Risk     Difficulty of Paying Living Expenses: Not hard at all   Food Insecurity: No Food Insecurity    Worried About Running Out of Food in the Last Year: Never true    Ran Out of Food in the Last Year: Never true   Transportation Needs: No Transportation Needs    Lack of Transportation (Medical): No    Lack of Transportation (Non-Medical): No   Physical Activity: Insufficiently Active    Days of Exercise per Week: 6 days    Minutes of Exercise per Session: 20 min   Stress: Stress Concern Present    Feeling of Stress : To some extent   Social Connections: Moderately Integrated    Frequency of Communication with Friends and Family: More than three times a week    Frequency of Social Gatherings with Friends and Family: More than three times a week    Attends [employer] Services: More than 4 times per year    Active Member of Clubs or Organizations: Yes    Attends Club or Organization Meetings: More than 4 times per year    Marital Status:    Housing Stability: Low Risk     Unable to Pay for Housing in the Last Year: No    Number of Places Lived in the Last Year: 1    Unstable Housing in the Last Year: No     Medication List with Changes/Refills   Current Medications    ACCU-CHEK JD PLUS TEST STRP STRP    TEST three times a day    ACETAMINOPHEN (TYLENOL) 500 MG TABLET    Take 500 mg by mouth every 6 (six) hours as needed for Pain.    ALCOHOL PREP PADS PADM        ALPRAZOLAM (XANAX) 0.25 MG TABLET    Take 1 tablet (0.25 mg total) by mouth 3 (three) times daily as needed for Anxiety.    CALCIUM-VITAMIN D (CALCIUM 600 + D,3,) 600 MG(1,500MG) -400 UNIT TAB    Take 1 tablet by mouth 2 (two) times daily.    CHOLECALCIFEROL, VITAMIN D3, (VITAMIN D3) 1,000 UNIT CAPSULE    Take 1 capsule (1,000 Units total) by mouth once daily.    COVID-19 VAC,  VIRIDIANA,PFIZER,,PF, (PFIZER COVID-19 VIRIDIANA VACCN,PF,) 30 MCG/0.3 ML INJECTION    Inject into the muscle.    DICLOFENAC SODIUM (VOLTAREN) 1 % GEL    Apply 2 g topically once daily.    ESOMEPRAZOLE MAGNESIUM (NEXIUM ORAL)    Take by mouth.    FERROUS GLUCONATE (FERGON) 240 (27 FE) MG TABLET    Take 1 tablet (240 mg total) by mouth once daily.    FLUTICASONE PROPIONATE (FLONASE) 50 MCG/ACTUATION NASAL SPRAY    2 sprays (100 mcg total) by Each Nostril route daily as needed. 2 Spray, Suspension Nasal Every day    GABAPENTIN (NEURONTIN) 100 MG CAPSULE    Take 1 capsule (100 mg total) by mouth 3 (three) times daily.    GLIMEPIRIDE (AMARYL) 1 MG TABLET    TAKE 1 TABLET(1 MG) BY MOUTH EVERY DAY    LANCETS (ACCU-CHEK SOFTCLIX LANCETS) MISC    1 each by Misc.(Non-Drug; Combo Route) route 3 (three) times daily.    LANCING DEVICE MISC        MAGNESIUM ASPARTATE HCL 61 MG (615 MG) TBEC    Take by mouth once.    METOPROLOL SUCCINATE (TOPROL-XL) 25 MG 24 HR TABLET    Take 25 mg by mouth once daily.    ROSUVASTATIN (CRESTOR) 10 MG TABLET    TAKE 1 TABLET BY MOUTH EVERY DAY    TRAZODONE (DESYREL) 100 MG TABLET    TAKE 1 TABLET BY MOUTH AT BEDTIME    TURMERIC 400 MG CAP    Take 400 mg by mouth 2 (two) times daily as needed.     Review of patient's allergies indicates:   Allergen Reactions    Codeine Itching     Other reaction(s): Itching     Review of Systems   Constitutional: Negative for malaise/fatigue.   HENT:  Negative for hearing loss.    Eyes:  Positive for visual disturbance. Negative for double vision.   Cardiovascular:  Positive for irregular heartbeat. Negative for chest pain.   Respiratory:  Negative for shortness of breath.    Endocrine: Negative for cold intolerance.   Hematologic/Lymphatic: Does not bruise/bleed easily.   Skin:  Negative for poor wound healing and suspicious lesions.   Musculoskeletal:  Positive for arthritis, joint pain and joint swelling. Negative for gout.   Gastrointestinal:  Positive for abdominal  pain, dysphagia and heartburn. Negative for nausea and vomiting.   Genitourinary:  Negative for dysuria.   Neurological:  Negative for numbness, paresthesias and sensory change.   Psychiatric/Behavioral:  Positive for altered mental status. Negative for depression, memory loss and substance abuse. The patient has insomnia. The patient is not nervous/anxious.    Allergic/Immunologic: Negative for persistent infections.     Objective:   Body mass index is 26.07 kg/m².  There were no vitals filed for this visit.             General    Constitutional: She is oriented to person, place, and time. She appears well-developed and well-nourished. No distress.   HENT:   Head: Normocephalic.   Mouth/Throat: Oropharynx is clear and moist.   Eyes: EOM are normal.   Cardiovascular:  Normal rate.            Pulmonary/Chest: Effort normal.   Abdominal: Soft.   Neurological: She is alert and oriented to person, place, and time. No cranial nerve deficit.   Psychiatric: She has a normal mood and affect.             Right Hand/Wrist Exam     Inspection   Scars: Wrist - absent Hand -  absent  Effusion: Wrist - absent Hand -  absent    Pain   Wrist - The patient exhibits pain of the CMC.    Other     Neuorologic Exam    Median Distribution: normal  Ulnar Distribution: normal  Radial Distribution: normal    Comments:  The patient has tenderness right thumb basal joint with a positive axial circumduction grind test.  There are no motor or sensory deficits.          Vascular Exam       Capillary Refill  Right Hand: normal capillary refill        Relevant imaging results reviewed and interpreted by me, discussed with the patient and / or family today radiographs right thumb showed basal joint arthritis  Assessment:     Right thumb basal joint degenerative joint disease  Plan:       The patient declines injection.  She wished to schedule a right thumb basal joint arthroplasty using Arthrex internal brace system.  Risk complications and  alternatives were discussed including the risk of infection, anesthetic risk, injury to nerves and vessels, loss of motion, and possible need for additional surgeries were discussed.  She seems to understand and agree that surgery.  All questions were answered.              Disclaimer: This note was prepared using a voice recognition system and is likely to have sound alike errors within the text.

## 2022-12-02 ENCOUNTER — HOSPITAL ENCOUNTER (OUTPATIENT)
Dept: RADIOLOGY | Facility: HOSPITAL | Age: 72
Discharge: HOME | End: 2022-12-02
Attending: ORTHOPAEDIC SURGERY
Payer: MEDICARE

## 2022-12-02 ENCOUNTER — HOSPITAL ENCOUNTER (OUTPATIENT)
Dept: CARDIOLOGY | Facility: HOSPITAL | Age: 72
Discharge: HOME | End: 2022-12-02
Attending: ORTHOPAEDIC SURGERY
Payer: MEDICARE

## 2022-12-02 DIAGNOSIS — Z01.818 PREOP TESTING: ICD-10-CM

## 2022-12-02 PROCEDURE — 71046 X-RAY EXAM CHEST 2 VIEWS: CPT | Mod: 26,,, | Performed by: RADIOLOGY

## 2022-12-02 PROCEDURE — 71046 X-RAY EXAM CHEST 2 VIEWS: CPT | Mod: TC

## 2022-12-02 PROCEDURE — 93010 EKG 12-LEAD: ICD-10-PCS | Mod: ,,, | Performed by: INTERNAL MEDICINE

## 2022-12-02 PROCEDURE — 93010 ELECTROCARDIOGRAM REPORT: CPT | Mod: ,,, | Performed by: INTERNAL MEDICINE

## 2022-12-02 PROCEDURE — 71046 XR CHEST PA AND LATERAL PRE-OP: ICD-10-PCS | Mod: 26,,, | Performed by: RADIOLOGY

## 2022-12-02 PROCEDURE — 93005 ELECTROCARDIOGRAM TRACING: CPT

## 2022-12-06 ENCOUNTER — OFFICE VISIT (OUTPATIENT)
Dept: OPHTHALMOLOGY | Facility: CLINIC | Age: 72
End: 2022-12-06
Payer: MEDICARE

## 2022-12-06 ENCOUNTER — PATIENT MESSAGE (OUTPATIENT)
Dept: OPHTHALMOLOGY | Facility: CLINIC | Age: 72
End: 2022-12-06

## 2022-12-06 DIAGNOSIS — Z96.1 PSEUDOPHAKIA OF BOTH EYES: ICD-10-CM

## 2022-12-06 DIAGNOSIS — H52.7 REFRACTIVE ERRORS: ICD-10-CM

## 2022-12-06 DIAGNOSIS — E11.9 DIABETES MELLITUS TYPE 2 WITHOUT RETINOPATHY: Primary | ICD-10-CM

## 2022-12-06 PROCEDURE — 92015 PR REFRACTION: ICD-10-PCS | Mod: HCNC,S$GLB,, | Performed by: OPTOMETRIST

## 2022-12-06 PROCEDURE — 3044F PR MOST RECENT HEMOGLOBIN A1C LEVEL <7.0%: ICD-10-PCS | Mod: HCNC,CPTII,S$GLB, | Performed by: OPTOMETRIST

## 2022-12-06 PROCEDURE — 92014 PR EYE EXAM, EST PATIENT,COMPREHESV: ICD-10-PCS | Mod: HCNC,S$GLB,, | Performed by: OPTOMETRIST

## 2022-12-06 PROCEDURE — 99999 PR PBB SHADOW E&M-EST. PATIENT-LVL III: CPT | Mod: PBBFAC,,, | Performed by: OPTOMETRIST

## 2022-12-06 PROCEDURE — 3066F NEPHROPATHY DOC TX: CPT | Mod: HCNC,CPTII,S$GLB, | Performed by: OPTOMETRIST

## 2022-12-06 PROCEDURE — 1159F MED LIST DOCD IN RCRD: CPT | Mod: HCNC,CPTII,S$GLB, | Performed by: OPTOMETRIST

## 2022-12-06 PROCEDURE — 1159F PR MEDICATION LIST DOCUMENTED IN MEDICAL RECORD: ICD-10-PCS | Mod: HCNC,CPTII,S$GLB, | Performed by: OPTOMETRIST

## 2022-12-06 PROCEDURE — 3044F HG A1C LEVEL LT 7.0%: CPT | Mod: HCNC,CPTII,S$GLB, | Performed by: OPTOMETRIST

## 2022-12-06 PROCEDURE — 3061F NEG MICROALBUMINURIA REV: CPT | Mod: HCNC,CPTII,S$GLB, | Performed by: OPTOMETRIST

## 2022-12-06 PROCEDURE — 3066F PR DOCUMENTATION OF TREATMENT FOR NEPHROPATHY: ICD-10-PCS | Mod: HCNC,CPTII,S$GLB, | Performed by: OPTOMETRIST

## 2022-12-06 PROCEDURE — 3061F PR NEG MICROALBUMINURIA RESULT DOCUMENTED/REVIEW: ICD-10-PCS | Mod: HCNC,CPTII,S$GLB, | Performed by: OPTOMETRIST

## 2022-12-06 PROCEDURE — 92014 COMPRE OPH EXAM EST PT 1/>: CPT | Mod: HCNC,S$GLB,, | Performed by: OPTOMETRIST

## 2022-12-06 PROCEDURE — 92015 DETERMINE REFRACTIVE STATE: CPT | Mod: HCNC,S$GLB,, | Performed by: OPTOMETRIST

## 2022-12-06 PROCEDURE — 99999 PR PBB SHADOW E&M-EST. PATIENT-LVL III: ICD-10-PCS | Mod: PBBFAC,,, | Performed by: OPTOMETRIST

## 2022-12-06 NOTE — PROGRESS NOTES
HPI    NIDDM exam.   Blurred vision at near and distance.  Last eye exam 12/06/2021 TRF.  Patient wears OTC readers.   Update glasses RX.  Bilateral pseudophakia.  Lab Results       Component                Value               Date                       HGBA1C                   5.8 (H)             12/02/2022              Last edited by Shoshana Butcher MA on 12/6/2022  4:00 PM.            Assessment /Plan     For exam results, see Encounter Report.    Diabetes mellitus type 2 without retinopathy    Pseudophakia of both eyes    Refractive errors      No Background Diabetic Retinopathy    Stable IOL OU.    Dispense Final Rx for glasses.  RTC 1 year  Discussed above and answered questions.

## 2022-12-29 ENCOUNTER — HOSPITAL ENCOUNTER (OUTPATIENT)
Dept: PREADMISSION TESTING | Facility: HOSPITAL | Age: 72
Discharge: HOME | End: 2022-12-29
Attending: ORTHOPAEDIC SURGERY
Payer: MEDICARE

## 2022-12-29 VITALS
OXYGEN SATURATION: 99 % | SYSTOLIC BLOOD PRESSURE: 150 MMHG | TEMPERATURE: 98 F | RESPIRATION RATE: 19 BRPM | DIASTOLIC BLOOD PRESSURE: 73 MMHG | HEART RATE: 70 BPM

## 2022-12-29 DIAGNOSIS — K22.2 SCHATZKI'S RING OF DISTAL ESOPHAGUS: Chronic | ICD-10-CM

## 2022-12-29 DIAGNOSIS — Z86.79 HISTORY OF PULMONARY HYPERTENSION: ICD-10-CM

## 2022-12-29 DIAGNOSIS — I49.3 PVC (PREMATURE VENTRICULAR CONTRACTION): ICD-10-CM

## 2022-12-29 DIAGNOSIS — I15.2 HYPERTENSION ASSOCIATED WITH DIABETES: ICD-10-CM

## 2022-12-29 DIAGNOSIS — K21.9 GASTROESOPHAGEAL REFLUX DISEASE, UNSPECIFIED WHETHER ESOPHAGITIS PRESENT: ICD-10-CM

## 2022-12-29 DIAGNOSIS — E11.59 HYPERTENSION ASSOCIATED WITH DIABETES: ICD-10-CM

## 2022-12-29 PROBLEM — M19.041 OSTEOARTHRITIS OF RIGHT HAND: Status: ACTIVE | Noted: 2022-12-29

## 2022-12-29 NOTE — H&P
Preoperative History and Physical  [facility]                                                                   Chief Complaint: Preoperative evaluation     History of Present Illness:      Renea Bourgeois is a 72 y.o. female with PMH of HTN, DM2, GERD, h/o breast CA 2010 s/p radiation, chemo and sx, mild MR who presents to the office today for a preoperative consultation at the request of Dr. Kuhn  who plans on performing R  thumb basal joint arthroplasty on January 5.     Functional Status:      The patient is able to climb a flight of stairs. The patient is able to ambulate  without difficulty. The patient's functional status is affected by the surgical problem. The patient's functional status is not affected by shortness of breath, chest pain, dyspnea on exertion and fatigue.  Active , house work no issues   MET score greater than 4    Past Medical History:      Past Medical History:   Diagnosis Date    Allergy     Anemia 11/14/2017    Angina pectoris     followed by cardiology    Arthritis     Breast cancer     Right T1 Ductal Ca in situ,high oncotype Dx 33, Estrogen positive. Lumpectomy, TAC chemo x 6 cyscles then RT,on aromatase inhibitor    Cataract     Chondromalacia of right patella 03/26/2018    Stable and controlled. Continue current treatment plan as previously prescribed with your PCP.      Diabetes mellitus type II 2011    DM (diabetes mellitus) 2011     am 08/04/2017    DM (diabetes mellitus) 2010     am 06/22/2020    Elevated BP without diagnosis of hypertension 11/27/2018    GERD (gastroesophageal reflux disease)     History of colon polyps 02/21/2018    The patient had adenomatous colon polyps in 2014.      History of renal calculi 08/11/2015    Right obstructing 8/20/13 - passed.     Hyperlipidemia     Hypertension     Kidney stone     right side, passed    Malignant neoplasm of upper-outer quadrant of right breast in female,  estrogen receptor positive 07/23/2018    Followed by Dr. Robert Lozano disease     reported per pt    Nuclear sclerosis of both eyes 10/05/2015    Osteopenia     Osteoporosis 04/12/2019    Pseudophakia 10/15/2015    PVC (premature ventricular contraction)     on and off    Refractive error 10/05/2015    Trouble in sleeping     Type 2 diabetes mellitus 2010     am 12/06/2022        Past Surgical History:      Past Surgical History:   Procedure Laterality Date    BREAST BIOPSY      BREAST LUMPECTOMY  2/11/2011    right    CATARACT EXTRACTION Bilateral 10/14/15    CHOLECYSTECTOMY  1989    open    COLONOSCOPY N/A 2/22/2018    Procedure: COLONOSCOPY;  Surgeon: Carlito Odonnell MD;  Location: [facility];  Service: Endoscopy;  Laterality: N/A;    CYST REMOVAL Left 11/2017    foot    DILATION AND CURETTAGE OF UTERUS  10/2010    [place]    ESOPHAGOGASTRODUODENOSCOPY N/A 6/29/2020    Procedure: EGD (ESOPHAGOGASTRODUODENOSCOPY);  Surgeon: Nancy Hahn MD;  Location: [facility];  Service: Endoscopy;  Laterality: N/A;    EYE SURGERY  2012    cataract    GALLBLADDER SURGERY      removed in 1989    HAND SURGERY Left 2015    Nasal septal deviation repair  2009    toenail edges removed      TONSILLECTOMY  1959    TOTAL REDUCTION MAMMOPLASTY Left     2015    TUBAL LIGATION  1981        Social History:      Social History     Socioeconomic History    Marital status:      Spouse name: Agustín    Number of children: 4   Occupational History    Occupation: Retired Teacher     Comment: [employer]   Tobacco Use    Smoking status: Never    Smokeless tobacco: Never   Substance and Sexual Activity    Alcohol use: No     Alcohol/week: 0.0 standard drinks    Drug use: No    Sexual activity: Not Currently     Partners: Male     Birth control/protection: Post-menopausal   Social History Narrative    aretired from homeschooling/,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 Caffeine intake;1-2 per week - dennis calvo. Decaffinated  tea and most beveridges. Does have a living will - at home in her filing cabinet.      Social Determinants of Health     Financial Resource Strain: Low Risk     Difficulty of Paying Living Expenses: Not hard at all   Food Insecurity: No Food Insecurity    Worried About Running Out of Food in the Last Year: Never true    Ran Out of Food in the Last Year: Never true   Transportation Needs: No Transportation Needs    Lack of Transportation (Medical): No    Lack of Transportation (Non-Medical): No   Physical Activity: Insufficiently Active    Days of Exercise per Week: 6 days    Minutes of Exercise per Session: 20 min   Stress: Stress Concern Present    Feeling of Stress : To some extent   Social Connections: Moderately Integrated    Frequency of Communication with Friends and Family: More than three times a week    Frequency of Social Gatherings with Friends and Family: More than three times a week    Attends Orthodox Services: More than 4 times per year    Active Member of Clubs or Organizations: Yes    Attends Club or Organization Meetings: More than 4 times per year    Marital Status:    Housing Stability: Low Risk     Unable to Pay for Housing in the Last Year: No    Number of Places Lived in the Last Year: 1    Unstable Housing in the Last Year: No        Family History:      Family History   Problem Relation Age of Onset    Alzheimer's disease Mother     Diabetes Father     Hypertension Father     Stroke Father     Cancer Father 65        metastatic when diagnosed    Cataracts Sister     Stroke Brother         Stoke    Cataracts Brother     Parkinsonism Brother     Glaucoma Maternal Grandmother     Cancer Paternal Grandfather         prostate    Atrial fibrillation Son         35    Heart disease Son     Psoriasis Cousin     Cancer Other         kidney    Alcohol abuse Neg Hx     Drug abuse Neg Hx     COPD Neg Hx     Birth defects Neg Hx     Mental retardation Neg Hx     Mental illness Neg Hx     Kidney  disease Neg Hx     Hyperlipidemia Neg Hx     Melanoma Neg Hx     Lupus Neg Hx        Allergies:      Review of patient's allergies indicates:   Allergen Reactions    Codeine Itching     Other reaction(s): Itching       Medications:      Current Outpatient Medications   Medication Sig    ACCU-CHEK JD PLUS TEST STRP Strp TEST three times a day    acetaminophen (TYLENOL) 500 MG tablet Take 500 mg by mouth every 6 (six) hours as needed for Pain.    ALCOHOL PREP PADS PadM     calcium-vitamin D (CALCIUM 600 + D,3,) 600 mg(1,500mg) -400 unit Tab Take 1 tablet by mouth 2 (two) times daily.    cholecalciferol, vitamin D3, (VITAMIN D3) 1,000 unit capsule Take 1 capsule (1,000 Units total) by mouth once daily.    esomeprazole magnesium (NEXIUM ORAL) Take by mouth.    ferrous gluconate (FERGON) 240 (27 FE) MG tablet Take 1 tablet (240 mg total) by mouth once daily.    fluticasone propionate (FLONASE) 50 mcg/actuation nasal spray 2 sprays (100 mcg total) by Each Nostril route daily as needed. 2 Spray, Suspension Nasal Every day    gabapentin (NEURONTIN) 100 MG capsule Take 1 capsule (100 mg total) by mouth 3 (three) times daily.    glimepiride (AMARYL) 1 MG tablet TAKE 1 TABLET(1 MG) BY MOUTH EVERY DAY    lancets (ACCU-CHEK SOFTCLIX LANCETS) Misc 1 each by Misc.(Non-Drug; Combo Route) route 3 (three) times daily.    lancing device Misc     magnesium aspartate HCl 61 mg (615 mg) TbEC Take by mouth once.    metoprolol succinate (TOPROL-XL) 25 MG 24 hr tablet Take 25 mg by mouth once daily.    rosuvastatin (CRESTOR) 10 MG tablet TAKE 1 TABLET BY MOUTH EVERY DAY    traZODone (DESYREL) 100 MG tablet TAKE 1 TABLET BY MOUTH AT BEDTIME    turmeric 400 mg Cap Take 400 mg by mouth 2 (two) times daily as needed.    ALPRAZolam (XANAX) 0.25 MG tablet Take 1 tablet (0.25 mg total) by mouth 3 (three) times daily as needed for Anxiety. (Patient not taking: Reported on 12/29/2022)    COVID-19 vac, viridiana,Pfizer,,PF, (PFIZER COVID-19 VIRIDIANA  CATARINA KAPOOR,) 30 mcg/0.3 mL injection Inject into the muscle. (Patient not taking: Reported on 12/29/2022)    diclofenac sodium (VOLTAREN) 1 % Gel Apply 2 g topically once daily. (Patient not taking: Reported on 12/29/2022)     No current facility-administered medications for this encounter.     Facility-Administered Medications Ordered in Other Encounters   Medication    sodium chloride 0.9% flush 10 mL    triamcinolone acetonide injection 40 mg    zoledronic acid-mannitol & water 5 mg/100 mL infusion 5 mg       Vitals:      Vitals:    12/29/22 0818   BP: (!) 150/73   Pulse: 70   Resp: 19   Temp: 98.1 °F (36.7 °C)       Review of Systems:        Constitutional: Negative for fever, chills, weight loss, malaise/fatigue and diaphoresis.   HENT: Negative for hearing loss, ear pain, nosebleeds, congestion, sore throat, neck pain, tinnitus and ear discharge.    Eyes: Negative for blurred vision, double vision, photophobia, pain, discharge and redness.   Respiratory: Negative for cough, hemoptysis, sputum production, shortness of breath, wheezing and stridor.    Cardiovascular: Negative for chest pain, palpitations, orthopnea, claudication, leg swelling and PND.   Gastrointestinal: Negative for , nausea, vomiting, abdominal pain, diarrhea, constipation,  and melena. + GERD- well controlled; Constipation related to iron ; hemorrhoids that occasionally bleed   Genitourinary: Negative for dysuria, urgency, frequency, hematuria and flank pain.   Musculoskeletal: Negative for myalgias, back pain, joint pain and falls. + hip pain ; trip and fall 1 week ago no head injury   Skin: Negative for itching and rash.   Neurological: Negative for dizziness, tingling, tremors, sensory change, speech change, focal weakness, seizures, loss of consciousness, weakness and headaches. Decreased  strength R   Endo/Heme/Allergies: Negative for environmental allergies and polydipsia. Does not bruise/bleed easily.   Psychiatric/Behavioral:  Negative for depression, no anxiety currently   Physical Exam:      Constitutional: Appears well-developed, well-nourished and in no acute distress.  Patient is oriented to person, place, and time.   Head: Normocephalic and atraumatic. Mucous membranes moist.  Neck: Neck supple no mass.   Cardiovascular: Normal rate and regular rhythm.  S1 S2 appreciated by ascultation.  Pulmonary/Chest: Effort normal and clear to auscultation bilaterally. No respiratory distress.   Abdomen: Soft. Non-tender and non-distended. Bowel sounds are normal.   Neurological: Patient is alert and oriented to person, place and time. Moves all extremities.  Skin: Warm and dry. No lesions.  Extremities: No clubbing, cyanosis or edema.    Laboratory data:      Reviewed and noted in plan where applicable. Please see chart for full laboratory data.    No results for input(s): CPK, CPKMB, TROPONINI, MB in the last 24 hours. No results for input(s): POCTGLUCOSE in the last 24 hours.     Lab Results   Component Value Date    INR 1.0 2020    INR 1.0 2011    INR 1.0 2009       Lab Results   Component Value Date    WBC 4.61 2022    HGB 13.6 2022    HCT 43.6 2022    MCV 97 2022     2022       No results for input(s): GLU, NA, K, CL, CO2, BUN, CREATININE, CALCIUM, MG in the last 24 hours.    Predictors of intubation difficulty:       Morbid obesity? no   Anatomically abnormal facies? no   Prominent incisors? no   Receding mandible? no   Short, thick neck? no   Neck range of motion: normal   Dentition:  full upper   Based on the Modified Mallampati, patient is a mallampati score: II (hard and soft palate, upper portion of tonsils anduvula visible)    Cardiographics:      EC22 NSR   Echocardiogram:  requested    Imaging:      Chest x-ray:  22    FINDINGS:  The lungs are clear, with normal appearance of pulmonary vasculature and no pleural effusion or pneumothorax.     The cardiac  silhouette is normal in size. The hilar and mediastinal contours are unremarkable.     Bones are intact.     Impression:     No acute abnormality.  Assessment and Plan:      Osteoarthritis of right hand  - pt presents at the request of Dr. Kuhn who plans on performing a finger arthroplasty on 1/5/22  - Known risk factors for perioperative complications: Anemia  Diabetes mellitus  HTN    Difficulty with intubation is not anticipated, but possible - pt is s/p uvula surgery has narrow, steep  airway , good neck extension. Airway assessed by MDA, difficult airway not anticipated, glidescope  if needed      Cardiac Risk Estimation:  Per Revised Cardiac Risk Index patient is a Class I risk with a 3.9% risk of a major cardiac event.      1.) Preoperative workup as follows: ECG, hemoglobin, hematocrit, electrolytes, creatinine, glucose, liver function studies.  2.) Change in medication regimen before surgery: hold NSAIDS, no voltaren, hold herbals.  3.) Prophylaxis for cardiac events with perioperative beta-blockers: take metoprolol evening prior as scheduled.  4.) Invasive hemodynamic monitoring perioperatively: not indicated.  5.) Deep vein thrombosis prophylaxis postoperatively: intermittent pneumatic compression boots and regimen to be chosen by surgical team.  6.) Surveillance for postoperative MI with ECG immediately postoperatively and on postoperati ve days 1 and 2 AND troponin levels 24 hours postoperatively and on day 4 or hospital discharge (whichever comes first): not indicated.  7.) Current medications which may produce withdrawal symptoms if withheld perioperatively: none  8.) Other measures: Cards clearance pending, saw Dr. Lewis last month, notes and testing requested       Hypertension associated with diabetes  - fairly controlled  - continue home metoprolol- check BP at home  - Continue to follow up with PCP and cards      GERD (gastroesophageal reflux disease)  Well controlled on PPI, take AM of  surgery   - Continue to follow up with GI     Schatzki's ring of distal esophagus  - s/p dilation 2020  - dysphagia only with bread     History of pulmonary hypertension  - mild MR  - mild Pulm HTN with prior PAP 3/2011 of 36  - no CP no Sob with good functional status   - cardiac notes and testing requested for review     PVC (premature ventricular contraction)  - no palpitations currently   - continue home metoprolol

## 2022-12-29 NOTE — DISCHARGE INSTRUCTIONS
To confirm, your doctor has instructed you that surgery is scheduled for 1/5/2023.       Pre admit office will call the afternoon prior to surgery between 1PM and 3PM with arrival time.    Surgery will be at [facility],  The address is [address]. [place]  [address].      IMPORTANT INSTRUCTIONS!    Do not eat or drink after 12 midnight, including water. Do not smoke or use chewing tobacco after 12 midnight  OK to brush teeth, but no gum, candy, or mints!      Take only these medicines with a small swallow of water-morning of surgery.     Nexium         ____ Stop Aspirin, Ibuprofen, Motrin and Aleve at least 5-7 days before surgery, unless otherwise instructed by your doctor, or the nurse.   You MAY use Tylenol/acetaminophen until day of surgery.      ____  If you take diabetic medication, do NOT take morning of surgery unless instructed by Doctor. Metformin must be stopped 24 hrs prior to surgery time.       ____ Stop taking any Fish Oil supplements or Vitamins at least 5 days prior to surgery, unless instructed otherwise by your Doctor.       Bathing Instructions: The night before surgery and the morning prior to coming to the hospital:    - Shower & rinse your body as usual with anti-bacterial Soap (Dial or Lever 2000)   -Hibiclens (if indicated) use AFTER anti-bacterial soap; 1 packet PM/1 packet in AM on surgical site only   -Do not use hibiclens on your head, face, or genitals.    -Do not wash with anti-bacterial soap after you use the hibiclens.    -Do not shave surgical site 5-7 days prior to surgery.    -Pubic hair 7 days prior to surgery (gyn pt's).      Pediatric patients do not need to use anti-bacterial soap or Hibiclens.             After Bathing:   __ No powder, lotions, creams. perfume to skin     __No deodorant for any breast procedure, PORT, or upper arm surgery     __ No makeup, mascara, nail polish or artificial nails      __ Please remove all piercings and leave all  jewelry at home.    **SURGERY WILL BE CANCELLED IF PIERCINGS ARE PRESENT!!!**       __Dentures, Hearing Aids and Contact Lens need to be removed prior to the start of surgery.       __ Wear clean, loose-fitting clothing. Allow for dressings/bandages/surgical equipment      __ You must have transportation, and they MUST stay the entire time.       [facility] Visitor/Ride Policy:   Only 1 adult allowed (over the age of 18) to accompany you into Pre-op/Recovery Surgery Dept and must stay through the entire length of admission.     Must have a ride home from a responsible adult that you know and trust.      Pediatric Patients are allowed 2 adult visitors.     Medical Transport, Uber or Lyft can only be used if patient has a responsible adult to accompany them during ride home.           Post-Op Instructions: You will receive surgery post-op instructions by your Discharge Nurse prior to going home.     Surgical Site Infection:   Prevention of surgical site infections:   -Keep incisions clean and dry.   -Do not soak/submerge incisions in water until completely healed.   -Do not apply lotions, powders, creams, or deodorants to site.   -Always make sure hands are cleaned with antibacterial soap/ alcohol-based   prior to touching the surgical site.       Signs and symptoms:               -Redness and pain around the area where you had surgery               -Drainage of cloudy fluid from your surgical wound               -Fever over 100.4 or chills     >>>Call Surgeon office/on-call Surgeon if you experience any of these signs & symptoms post-surgery.        *Please Call [facility] Pre-Admissions Department with surgery instruction questions at [phone].     *Insurance Questions, please call [phone] or [phone]

## 2022-12-29 NOTE — ASSESSMENT & PLAN NOTE
- pt presents at the request of Dr. Kuhn who plans on performing a finger arthroplasty on 1/5/22  - Known risk factors for perioperative complications: Anemia  Diabetes mellitus  HTN    Difficulty with intubation is not anticipated, but possible - pt is s/p uvula surgery has narrow, steep  airway , good neck extension. Airway assessed by MDA, difficult airway not anticipated, glidescope  if needed      Cardiac Risk Estimation:  Per Revised Cardiac Risk Index patient is a Class I risk with a 3.9% risk of a major cardiac event.      1.) Preoperative workup as follows: ECG, hemoglobin, hematocrit, electrolytes, creatinine, glucose, liver function studies.  2.) Change in medication regimen before surgery: hold NSAIDS, no voltaren, hold herbals.  3.) Prophylaxis for cardiac events with perioperative beta-blockers: take metoprolol evening prior as scheduled.  4.) Invasive hemodynamic monitoring perioperatively: not indicated.  5.) Deep vein thrombosis prophylaxis postoperatively: intermittent pneumatic compression boots and regimen to be chosen by surgical team.  6.) Surveillance for postoperative MI with ECG immediately postoperatively and on postoperati ve days 1 and 2 AND troponin levels 24 hours postoperatively and on day 4 or hospital discharge (whichever comes first): not indicated.  7.) Current medications which may produce withdrawal symptoms if withheld perioperatively: none  8.) Other measures: Cards clearance pending, saw Dr. Lewis last month, notes and testing requested

## 2022-12-29 NOTE — ASSESSMENT & PLAN NOTE
- mild MR  - mild Pulm HTN with prior PAP 3/2011 of 36  - no CP no Sob with good functional status   - cardiac notes and testing requested for review

## 2022-12-29 NOTE — ASSESSMENT & PLAN NOTE
- fairly controlled  - continue home metoprolol- check BP at home  - Continue to follow up with PCP and cards

## 2023-01-04 ENCOUNTER — ANESTHESIA EVENT (OUTPATIENT)
Dept: SURGERY | Facility: HOSPITAL | Age: 73
End: 2023-01-04
Payer: MEDICARE

## 2023-01-04 NOTE — ANESTHESIA PREPROCEDURE EVALUATION
01/04/2023  Renea Bourgeois is a 72 y.o., female.    Past Medical History:   Diagnosis Date    Allergy     Anemia 11/14/2017    Angina pectoris     followed by cardiology    Arthritis     Breast cancer     Right T1 Ductal Ca in situ,high oncotype Dx 33, Estrogen positive. Lumpectomy, TAC chemo x 6 cyscles then RT,on aromatase inhibitor    Cataract     Chondromalacia of right patella 03/26/2018    Stable and controlled. Continue current treatment plan as previously prescribed with your PCP.      Diabetes mellitus type II 2011    DM (diabetes mellitus) 2011     am 08/04/2017    DM (diabetes mellitus) 2010     am 06/22/2020    Elevated BP without diagnosis of hypertension 11/27/2018    GERD (gastroesophageal reflux disease)     History of colon polyps 02/21/2018    The patient had adenomatous colon polyps in 2014.      History of renal calculi 08/11/2015    Right obstructing 8/20/13 - passed.     Hyperlipidemia     Hypertension     Kidney stone     right side, passed    Malignant neoplasm of upper-outer quadrant of right breast in female, estrogen receptor positive 07/23/2018    Followed by Dr. Robert Lozano disease     reported per pt    Nuclear sclerosis of both eyes 10/05/2015    Osteopenia     Osteoporosis 04/12/2019    Pseudophakia 10/15/2015    PVC (premature ventricular contraction)     on and off    Refractive error 10/05/2015    Trouble in sleeping     Type 2 diabetes mellitus 2010     am 12/06/2022     Past Surgical History:   Procedure Laterality Date    BREAST BIOPSY      BREAST LUMPECTOMY  02/11/2011    right    CATARACT EXTRACTION Bilateral 10/14/2015    CHOLECYSTECTOMY  1989    open    COLONOSCOPY N/A 02/22/2018    Procedure: COLONOSCOPY;  Surgeon: Carlito Odonnell MD;  [facility];  Service: Endoscopy;  Laterality: N/A;    CYST  REMOVAL Left 11/2017    foot    DILATION AND CURETTAGE OF [place]TERUS  10/2010    [place]    ESOPHAGOGASTRODUODENOSCOPY N/A 06/29/2020    Procedure: EGD (ESOPHAGOGASTRODUODENOSCOPY);  Surgeon: Nancy Hahn MD;  [facility];  Service: Endoscopy;  Laterality: N/A;    EYE SURGERY  2012    cataract    GALLBLADDER SURGERY      removed in 1989    HAND SURGERY Left 2015    Nasal septal deviation repair  2009    toenail edges removed      TONSILLECTOMY  1959    TOTAL REDUCTION MAMMOPLASTY Left     2015    TUBAL LIGATION  1981    UVULOPALATOPLASTY         Pre-op Assessment    I have reviewed the Patient Summary Reports.     I have reviewed the Nursing Notes. I have reviewed the NPO Status.   I have reviewed the Medications.     Review of Systems  Anesthesia Hx:  No problems with previous Anesthesia  History of prior surgery of interest to airway management or planning: Previous anesthesia: General Denies Family Hx of Anesthesia complications.   Denies Personal Hx of Anesthesia complications.   Social:  Non-Smoker    Hematology/Oncology:  Hematology Normal       -- Cancer in past history:  Breast   Cardiovascular:   Hypertension hyperlipidemia Pulm HTN, asymptomatic.   Pulmonary:  Pulmonary Normal    Renal/:   renal calculi    Hepatic/GI:   GERD    Musculoskeletal:   Arthritis     Neurological:  Neurology Normal    Endocrine:   Diabetes, type 2    Psych:  Psychiatric Normal           Physical Exam  General: Alert and Oriented    Airway:  Mallampati: IV / III  Mouth Opening: Small, but > 3cm  TM Distance: 4 - 6 cm  Tongue: Normal  Neck ROM: Extension Decreased    Dental:  Partial Dentures    Chest/Lungs:  Clear to auscultation, Normal Respiratory Rate    Heart:  Rate: Normal  Rhythm: Regular Rhythm        Anesthesia Plan  Type of Anesthesia, risks & benefits discussed:    Anesthesia Type: Gen Supraglottic Airway  Intra-op Monitoring Plan: Standard ASA Monitors  Post Op Pain Control Plan: multimodal  analgesia, IV/PO Opioids PRN and peripheral nerve block  Induction:  IV  Informed Consent: Informed consent signed with the Patient and all parties understand the risks and agree with anesthesia plan.  All questions answered.   ASA Score: 3  Day of Surgery Review of History & Physical: H&P Update referred to the surgeon/provider.    Ready For Surgery From Anesthesia Perspective.     .

## 2023-01-04 NOTE — DISCHARGE INSTRUCTIONS
Nozin Instructions  Goal: the goal of Nozin is to reduce the risk of post-procedural infections by bacteria in the nasal cavity. Think of it as hand  for your nose.    How to use:    1. Shake Nozin bottle well    2. Take a cotton swab and apply 4 drops to one tip    3. Insert cotton tip into one nostril, being sure not to go deeper into nose than tip of the swab.    4. Swab nostril 6 times counterclockwise and 6 times clockwise. Make sure to swab the inside front pocket of the nostril.    5. Take swab out and apply 2 drops to the same cotton tip. Repeat steps 3 and 4 in the other nostril.        Do steps 1-5 twice a day for 7 days.        After Hand Surgery  After surgery, the better you take care of yourself--especially your hand--the sooner it will heal. Follow your surgeons instructions. Try not to bump your hand, and dont move or lift anything while youre still wearing bandages, a splint, or a cast.  Care for your hand    Keep your hand elevated above heart level as much as possible for the first several days after surgery. This helps reduce swelling and pain.  To help prevent infection and speed healing, take care not to get your cast or bandages wet.  Keep dressing clean, dry, & intact until your follow up appointment.   Relieve pain as directed  Your surgeon may prescribe pain medicine or suggest you take an anti-inflammatory medicine. You might also be instructed to apply ice (or another cold source) to your hand. If you use ice cubes, put them in a plastic bag and rest it on top of your bandages. Leave the cold source on your hand for as long as its comfortable. Do this several times a day for the first few days after surgery. It may take several minutes before you can feel the cold through the cast or bandages.  Follow up with your surgeon  During a follow-up visit after surgery, your surgeon will check your progress. The stitches, bandages, splint, or cast may be removed. A new cast or splint  may be placed. If your hand has healed enough, your surgeon may prescribe exercises.  Do prescribed hand exercises  Your surgeon may recommend that you do exercises. These may be done under the guidance of a physical or occupational therapist. The exercises strengthen your hand, help you regain flexibility, and restore proper function. Do the exercises as advised.  Call your surgeon if you have...  A fever higher than 100.4°F (38.0°C) taken by mouth  Side effects from your medicine, such as prolonged nausea  A wet or loose dressing, or a dressing that is too tight  Excessive bleeding  Increased, ongoing pain or numbness  Signs of infection (such as drainage, warmth, or redness) at the incision site   Date Last Reviewed: 11/11/2015  © 8643-3531 [employer]. [address] [place] [address], [place] [address]. All rights reserved. This information is not intended as a substitute for professional medical care. Always follow your healthcare professional's instructions.

## 2023-01-05 ENCOUNTER — ANESTHESIA (OUTPATIENT)
Dept: SURGERY | Facility: HOSPITAL | Age: 73
End: 2023-01-05
Payer: MEDICARE

## 2023-01-05 ENCOUNTER — HOSPITAL ENCOUNTER (OUTPATIENT)
Facility: HOSPITAL | Age: 73
Discharge: HOME | End: 2023-01-05
Attending: ORTHOPAEDIC SURGERY | Admitting: ORTHOPAEDIC SURGERY
Payer: MEDICARE

## 2023-01-05 ENCOUNTER — HOSPITAL ENCOUNTER (OUTPATIENT)
Dept: RADIOLOGY | Facility: HOSPITAL | Age: 73
Discharge: HOME | End: 2023-01-05
Attending: ORTHOPAEDIC SURGERY | Admitting: ORTHOPAEDIC SURGERY
Payer: MEDICARE

## 2023-01-05 VITALS
RESPIRATION RATE: 15 BRPM | DIASTOLIC BLOOD PRESSURE: 60 MMHG | HEIGHT: 61 IN | HEART RATE: 74 BPM | SYSTOLIC BLOOD PRESSURE: 126 MMHG | TEMPERATURE: 97 F | OXYGEN SATURATION: 95 % | WEIGHT: 134.56 LBS | BODY MASS INDEX: 25.41 KG/M2

## 2023-01-05 DIAGNOSIS — M18.11 ARTHRITIS OF CARPOMETACARPAL (CMC) JOINT OF RIGHT THUMB: ICD-10-CM

## 2023-01-05 DIAGNOSIS — M18.11 PRIMARY OSTEOARTHRITIS OF FIRST CARPOMETACARPAL JOINT OF RIGHT HAND: Primary | ICD-10-CM

## 2023-01-05 LAB
POCT GLUCOSE: 101 MG/DL (ref 70–110)
POCT GLUCOSE: 107 MG/DL (ref 70–110)

## 2023-01-05 PROCEDURE — 76942 ECHO GUIDE FOR BIOPSY: CPT | Mod: HCNC | Performed by: ANESTHESIOLOGY

## 2023-01-05 PROCEDURE — 73120 XR HAND 2 VIEW RIGHT: ICD-10-PCS | Mod: 26,HCNC,RT, | Performed by: RADIOLOGY

## 2023-01-05 PROCEDURE — 73120 X-RAY EXAM OF HAND: CPT | Mod: TC,HCNC,RT

## 2023-01-05 PROCEDURE — D9220A PRA ANESTHESIA: ICD-10-PCS | Mod: HCNC,CRNA,, | Performed by: NURSE ANESTHETIST, CERTIFIED REGISTERED

## 2023-01-05 PROCEDURE — 64450 NJX AA&/STRD OTHER PN/BRANCH: CPT | Mod: HCNC,59 | Performed by: ORTHOPAEDIC SURGERY

## 2023-01-05 PROCEDURE — 82962 GLUCOSE BLOOD TEST: CPT | Mod: HCNC | Performed by: ORTHOPAEDIC SURGERY

## 2023-01-05 PROCEDURE — C1713 ANCHOR/SCREW BN/BN,TIS/BN: HCPCS | Mod: HCNC | Performed by: ORTHOPAEDIC SURGERY

## 2023-01-05 PROCEDURE — 71000033 HC RECOVERY, INTIAL HOUR: Mod: HCNC | Performed by: ORTHOPAEDIC SURGERY

## 2023-01-05 PROCEDURE — 37000008 HC ANESTHESIA 1ST 15 MINUTES: Mod: HCNC | Performed by: ORTHOPAEDIC SURGERY

## 2023-01-05 PROCEDURE — 63600175 PHARM REV CODE 636 W HCPCS: Mod: HCNC | Performed by: ANESTHESIOLOGY

## 2023-01-05 PROCEDURE — 63600175 PHARM REV CODE 636 W HCPCS: Mod: HCNC

## 2023-01-05 PROCEDURE — D9220A PRA ANESTHESIA: ICD-10-PCS | Mod: HCNC,ANES,, | Performed by: ANESTHESIOLOGY

## 2023-01-05 PROCEDURE — 36000709 HC OR TIME LEV III EA ADD 15 MIN: Mod: HCNC | Performed by: ORTHOPAEDIC SURGERY

## 2023-01-05 PROCEDURE — D9220A PRA ANESTHESIA: Mod: HCNC,CRNA,, | Performed by: NURSE ANESTHETIST, CERTIFIED REGISTERED

## 2023-01-05 PROCEDURE — D9220A PRA ANESTHESIA: Mod: HCNC,ANES,, | Performed by: ANESTHESIOLOGY

## 2023-01-05 PROCEDURE — 25447 PR REPAIR INTERCARP/CARP-METACARP JT: ICD-10-PCS | Mod: HCNC,RT,, | Performed by: ORTHOPAEDIC SURGERY

## 2023-01-05 PROCEDURE — 37000009 HC ANESTHESIA EA ADD 15 MINS: Mod: HCNC | Performed by: ORTHOPAEDIC SURGERY

## 2023-01-05 PROCEDURE — 73120 X-RAY EXAM OF HAND: CPT | Mod: 26,HCNC,RT, | Performed by: RADIOLOGY

## 2023-01-05 PROCEDURE — 64417 PERIPHERAL BLOCK: ICD-10-PCS | Mod: HCNC,59,RT, | Performed by: ANESTHESIOLOGY

## 2023-01-05 PROCEDURE — 25000003 PHARM REV CODE 250: Mod: HCNC | Performed by: NURSE ANESTHETIST, CERTIFIED REGISTERED

## 2023-01-05 PROCEDURE — 63600175 PHARM REV CODE 636 W HCPCS: Mod: HCNC | Performed by: NURSE ANESTHETIST, CERTIFIED REGISTERED

## 2023-01-05 PROCEDURE — 25447 ARTHRP NTRCRP/CRP/MTCR NTRPS: CPT | Mod: HCNC,RT,, | Performed by: ORTHOPAEDIC SURGERY

## 2023-01-05 PROCEDURE — 71000015 HC POSTOP RECOV 1ST HR: Mod: HCNC | Performed by: ORTHOPAEDIC SURGERY

## 2023-01-05 PROCEDURE — 64417 NJX AA&/STRD AX NERVE IMG: CPT | Mod: HCNC,59,RT, | Performed by: ANESTHESIOLOGY

## 2023-01-05 PROCEDURE — 36000708 HC OR TIME LEV III 1ST 15 MIN: Mod: HCNC | Performed by: ORTHOPAEDIC SURGERY

## 2023-01-05 DEVICE — KIT INTERNALBRACE HAND/WRIST: Type: IMPLANTABLE DEVICE | Site: THUMB | Status: FUNCTIONAL

## 2023-01-05 RX ORDER — FENTANYL CITRATE 50 UG/ML
25 INJECTION, SOLUTION INTRAMUSCULAR; INTRAVENOUS EVERY 5 MIN PRN
Status: DISCONTINUED | OUTPATIENT
Start: 2023-01-05 | End: 2023-01-05 | Stop reason: HOSPADM

## 2023-01-05 RX ORDER — CHLORHEXIDINE GLUCONATE ORAL RINSE 1.2 MG/ML
10 SOLUTION DENTAL 2 TIMES DAILY
Status: DISCONTINUED | OUTPATIENT
Start: 2023-01-05 | End: 2023-01-05 | Stop reason: HOSPADM

## 2023-01-05 RX ORDER — PROPOFOL 10 MG/ML
INJECTION, EMULSION INTRAVENOUS
Status: DISCONTINUED | OUTPATIENT
Start: 2023-01-05 | End: 2023-01-05

## 2023-01-05 RX ORDER — BUPIVACAINE HYDROCHLORIDE 2.5 MG/ML
INJECTION, SOLUTION EPIDURAL; INFILTRATION; INTRACAUDAL
Status: DISCONTINUED
Start: 2023-01-05 | End: 2023-01-05 | Stop reason: HOSPADM

## 2023-01-05 RX ORDER — LIDOCAINE HCL/PF 100 MG/5ML
SYRINGE (ML) INTRAVENOUS
Status: DISCONTINUED | OUTPATIENT
Start: 2023-01-05 | End: 2023-01-05

## 2023-01-05 RX ORDER — ONDANSETRON 2 MG/ML
4 INJECTION INTRAMUSCULAR; INTRAVENOUS ONCE AS NEEDED
Status: DISCONTINUED | OUTPATIENT
Start: 2023-01-05 | End: 2023-01-05 | Stop reason: HOSPADM

## 2023-01-05 RX ORDER — HYDROCODONE BITARTRATE AND ACETAMINOPHEN 5; 325 MG/1; MG/1
1 TABLET ORAL EVERY 4 HOURS PRN
Status: DISCONTINUED | OUTPATIENT
Start: 2023-01-05 | End: 2023-01-05 | Stop reason: HOSPADM

## 2023-01-05 RX ORDER — EPHEDRINE SULFATE 50 MG/ML
INJECTION, SOLUTION INTRAVENOUS
Status: DISCONTINUED | OUTPATIENT
Start: 2023-01-05 | End: 2023-01-05

## 2023-01-05 RX ORDER — OXYCODONE AND ACETAMINOPHEN 10; 325 MG/1; MG/1
1 TABLET ORAL EVERY 6 HOURS PRN
Qty: 28 TABLET | Refills: 0 | Status: ON HOLD | OUTPATIENT
Start: 2023-01-05 | End: 2023-02-24

## 2023-01-05 RX ORDER — ROPIVACAINE HYDROCHLORIDE 5 MG/ML
INJECTION, SOLUTION EPIDURAL; INFILTRATION; PERINEURAL
Status: COMPLETED | OUTPATIENT
Start: 2023-01-05 | End: 2023-01-05

## 2023-01-05 RX ORDER — ONDANSETRON 2 MG/ML
INJECTION INTRAMUSCULAR; INTRAVENOUS
Status: DISCONTINUED | OUTPATIENT
Start: 2023-01-05 | End: 2023-01-05

## 2023-01-05 RX ORDER — DEXAMETHASONE SODIUM PHOSPHATE 4 MG/ML
INJECTION, SOLUTION INTRA-ARTICULAR; INTRALESIONAL; INTRAMUSCULAR; INTRAVENOUS; SOFT TISSUE
Status: DISCONTINUED | OUTPATIENT
Start: 2023-01-05 | End: 2023-01-05

## 2023-01-05 RX ORDER — FENTANYL CITRATE 50 UG/ML
INJECTION, SOLUTION INTRAMUSCULAR; INTRAVENOUS
Status: DISCONTINUED | OUTPATIENT
Start: 2023-01-05 | End: 2023-01-05

## 2023-01-05 RX ORDER — SODIUM CHLORIDE 0.9 % (FLUSH) 0.9 %
10 SYRINGE (ML) INJECTION
Status: DISCONTINUED | OUTPATIENT
Start: 2023-01-05 | End: 2023-01-05 | Stop reason: HOSPADM

## 2023-01-05 RX ORDER — CEFAZOLIN SODIUM 2 G/50ML
2 SOLUTION INTRAVENOUS
Status: COMPLETED | OUTPATIENT
Start: 2023-01-05 | End: 2023-01-05

## 2023-01-05 RX ORDER — SODIUM CHLORIDE, SODIUM LACTATE, POTASSIUM CHLORIDE, CALCIUM CHLORIDE 600; 310; 30; 20 MG/100ML; MG/100ML; MG/100ML; MG/100ML
INJECTION, SOLUTION INTRAVENOUS CONTINUOUS
Status: DISCONTINUED | OUTPATIENT
Start: 2023-01-05 | End: 2023-01-05 | Stop reason: HOSPADM

## 2023-01-05 RX ORDER — DIPHENHYDRAMINE HYDROCHLORIDE 50 MG/ML
25 INJECTION INTRAMUSCULAR; INTRAVENOUS EVERY 6 HOURS PRN
Status: DISCONTINUED | OUTPATIENT
Start: 2023-01-05 | End: 2023-01-05 | Stop reason: HOSPADM

## 2023-01-05 RX ORDER — MEPERIDINE HYDROCHLORIDE 25 MG/ML
12.5 INJECTION INTRAMUSCULAR; INTRAVENOUS; SUBCUTANEOUS ONCE
Status: DISCONTINUED | OUTPATIENT
Start: 2023-01-05 | End: 2023-01-05 | Stop reason: HOSPADM

## 2023-01-05 RX ORDER — MIDAZOLAM HYDROCHLORIDE 1 MG/ML
INJECTION, SOLUTION INTRAMUSCULAR; INTRAVENOUS
Status: DISCONTINUED | OUTPATIENT
Start: 2023-01-05 | End: 2023-01-05

## 2023-01-05 RX ORDER — CHLORHEXIDINE GLUCONATE ORAL RINSE 1.2 MG/ML
10 SOLUTION DENTAL
Status: DISCONTINUED | OUTPATIENT
Start: 2023-01-05 | End: 2023-07-26

## 2023-01-05 RX ADMIN — Medication 50 MG: at 09:01

## 2023-01-05 RX ADMIN — PROPOFOL 150 MG: 10 INJECTION, EMULSION INTRAVENOUS at 09:01

## 2023-01-05 RX ADMIN — DEXAMETHASONE SODIUM PHOSPHATE 4 MG: 4 INJECTION, SOLUTION INTRA-ARTICULAR; INTRALESIONAL; INTRAMUSCULAR; INTRAVENOUS; SOFT TISSUE at 10:01

## 2023-01-05 RX ADMIN — EPHEDRINE SULFATE 5 MG: 50 INJECTION INTRAVENOUS at 10:01

## 2023-01-05 RX ADMIN — MIDAZOLAM 2 MG: 1 INJECTION INTRAMUSCULAR; INTRAVENOUS at 08:01

## 2023-01-05 RX ADMIN — SODIUM CHLORIDE, SODIUM LACTATE, POTASSIUM CHLORIDE, AND CALCIUM CHLORIDE: 600; 310; 30; 20 INJECTION, SOLUTION INTRAVENOUS at 09:01

## 2023-01-05 RX ADMIN — ROPIVACAINE HYDROCHLORIDE 20 ML: 5 INJECTION, SOLUTION EPIDURAL; INFILTRATION; PERINEURAL at 08:01

## 2023-01-05 RX ADMIN — ONDANSETRON 4 MG: 2 INJECTION, SOLUTION INTRAMUSCULAR; INTRAVENOUS at 10:01

## 2023-01-05 RX ADMIN — FENTANYL CITRATE 50 MCG: 50 INJECTION, SOLUTION INTRAMUSCULAR; INTRAVENOUS at 08:01

## 2023-01-05 RX ADMIN — CEFAZOLIN SODIUM 2 G: 2 SOLUTION INTRAVENOUS at 09:01

## 2023-01-05 NOTE — ANESTHESIA PROCEDURE NOTES
Peripheral Block    Patient location during procedure: pre-op   Block not for primary anesthetic.  Reason for block: at surgeon's request and post-op pain management   Post-op Pain Location: Right arm   Start time: 1/5/2023 8:29 AM  Timeout: 1/5/2023 8:29 AM   End time: 1/5/2023 8:37 AM    Staffing  Authorizing Provider: Priya Fan MD  Performing Provider: Priya Fan MD    Preanesthetic Checklist  Completed: patient identified, IV checked, site marked, risks and benefits discussed, surgical consent, monitors and equipment checked, pre-op evaluation and timeout performed  Peripheral Block  Patient position: supine  Prep: ChloraPrep  Patient monitoring: heart rate, cardiac monitor, continuous pulse ox, continuous capnometry and frequent blood pressure checks  Block type: axillary  Laterality: right  Injection technique: single shot  Needle  Needle type: Stimuplex   Needle gauge: 21 G  Needle length: 4 in  Needle localization: anatomical landmarks, ultrasound guidance and nerve stimulator   -ultrasound image captured on disc.  Assessment  Injection assessment: negative aspiration and negative parasthesia  Paresthesia pain: none  Heart rate change: no  Slow fractionated injection: yes  Pain Tolerance: comfortable throughout block and no complaints  Medications:    Medications: ropivacaine (NAROPIN) injection 0.5% - Perineural   20 mL - 1/5/2023 8:37:00 AM    Additional Notes  VSS.  [facility] RN monitoring vitals throughout procedure.  Patient tolerated procedure well.

## 2023-01-05 NOTE — ANESTHESIA PROCEDURE NOTES
Intubation    Date/Time: 1/5/2023 9:39 AM  Performed by: Ovi Topete CRNA  Authorized by: Priya Fan MD     Intubation:     Induction:  Intravenous    Intubated:  Postinduction    Mask Ventilation:  Not attempted    Attempts:  1    Attempted By:  CRNA    Difficult Airway Encountered?: No      Complications:  None    Airway Device:  Supraglottic airway/LMA    Style/Cuff Inflation:  Cuffed    Secured at:  The lips    Placement Verified By:  Capnometry    Complicating Factors:  None    Findings Post-Intubation:  BS equal bilateral and atraumatic/condition of teeth unchanged  Notes:      #3 LMA placed without difficulty

## 2023-01-05 NOTE — DISCHARGE SUMMARY
[facility]  Discharge Note  Short Stay    Procedure(s) (LRB):  ARTHROPLASTY, FINGER (Right) thumb basal joint using Arthrex internal brace system      OUTCOME: Patient tolerated treatment/procedure well without complication and is now ready for discharge.    DISPOSITION: Home or Self Care    FINAL DIAGNOSIS:  Arthritis right thumb basal joint    FOLLOWUP: In clinic    DISCHARGE INSTRUCTIONS:    Discharge Procedure Orders   Diet general     Call MD for:  temperature >100.4     Call MD for:  persistent nausea and vomiting     Call MD for:  severe uncontrolled pain     Call MD for:  difficulty breathing, headache or visual disturbances     Call MD for:  redness, tenderness, or signs of infection (pain, swelling, redness, odor or green/yellow discharge around incision site)     Call MD for:  hives     Call MD for:  persistent dizziness or light-headedness     Call MD for:  extreme fatigue        TIME SPENT ON DISCHARGE:  20 minutes

## 2023-01-05 NOTE — H&P
Preoperative History and Physical  [facility]                                                                   Chief Complaint: Preoperative evaluation     History of Present Illness:      Renea Bourgeois is a 72 y.o. female with PMH of HTN, DM2, GERD, h/o breast CA 2010 s/p radiation, chemo and sx, mild MR who presents to the office today for a preoperative consultation at the request of Dr. Kuhn  who plans on performing R  thumb basal joint arthroplasty on January 5.     Functional Status:      The patient is able to climb a flight of stairs. The patient is able to ambulate  without difficulty. The patient's functional status is affected by the surgical problem. The patient's functional status is not affected by shortness of breath, chest pain, dyspnea on exertion and fatigue.  Active , house work no issues   MET score greater than 4    Past Medical History:      Past Medical History:   Diagnosis Date    Allergy     Anemia 11/14/2017    Angina pectoris     followed by cardiology    Arthritis     Breast cancer     Right T1 Ductal Ca in situ,high oncotype Dx 33, Estrogen positive. Lumpectomy, TAC chemo x 6 cyscles then RT,on aromatase inhibitor    Cataract     Chondromalacia of right patella 03/26/2018    Stable and controlled. Continue current treatment plan as previously prescribed with your PCP.      Diabetes mellitus type II 2011    DM (diabetes mellitus) 2011     am 08/04/2017    DM (diabetes mellitus) 2010     am 06/22/2020    Elevated BP without diagnosis of hypertension 11/27/2018    GERD (gastroesophageal reflux disease)     History of colon polyps 02/21/2018    The patient had adenomatous colon polyps in 2014.      History of renal calculi 08/11/2015    Right obstructing 8/20/13 - passed.     Hyperlipidemia     Hypertension     Kidney stone     right side, passed    Malignant neoplasm of upper-outer quadrant of right breast in female,  estrogen receptor positive 07/23/2018    Followed by Dr. Robert Lozano disease     reported per pt    Nuclear sclerosis of both eyes 10/05/2015    Osteopenia     Osteoporosis 04/12/2019    Pseudophakia 10/15/2015    PVC (premature ventricular contraction)     on and off    Refractive error 10/05/2015    Trouble in sleeping     Type 2 diabetes mellitus 2010     am 12/06/2022        Past Surgical History:      Past Surgical History:   Procedure Laterality Date    BREAST BIOPSY      BREAST LUMPECTOMY  02/11/2011    right    CATARACT EXTRACTION Bilateral 10/14/2015    CHOLECYSTECTOMY  1989    open    COLONOSCOPY N/A 02/22/2018    Procedure: COLONOSCOPY;  Surgeon: Carlito Odonnell MD;  Location: [facility] END[facility];  Service: Endoscopy;  Laterality: N/A;    CYST REMOVAL Left 11/2017    foot    DILATION AND CURETTAGE OF UTERUS  10/2010    [place]    ESOPHAGOGASTRODUODENOSCOPY N/A 06/29/2020    Procedure: EGD (ESOPHAGOGASTRODUODENOSCOPY);  Surgeon: Nancy Hahn MD;  Location: [facility];  Service: Endoscopy;  Laterality: N/A;    EYE SURGERY  2012    cataract    GALLBLADDER SURGERY      removed in 1989    HAND SURGERY Left 2015    Nasal septal deviation repair  2009    toenail edges removed      TONSILLECTOMY  1959    TOTAL REDUCTION MAMMOPLASTY Left     2015    TUBAL LIGATION  1981    UVULOPALATOPLASTY          Social History:      Social History     Socioeconomic History    Marital status:      Spouse name: Agustín    Number of children: 4   Occupational History    Occupation: Retired Teacher     Comment: [employer]   Tobacco Use    Smoking status: Never    Smokeless tobacco: Never   Substance and Sexual Activity    Alcohol use: No     Alcohol/week: 0.0 standard drinks    Drug use: No    Sexual activity: Not Currently     Partners: Male     Birth control/protection: Post-menopausal   Social History Narrative    aretired from homeschooling/,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 Caffeine intake;1-2 per week -  dennis calvo. Decaffinated tea and most beveridges. Does have a living will - at home in her filing cabinet.      Social Determinants of Health     Financial Resource Strain: Low Risk     Difficulty of Paying Living Expenses: Not hard at all   Food Insecurity: No Food Insecurity    Worried About Running Out of Food in the Last Year: Never true    Ran Out of Food in the Last Year: Never true   Transportation Needs: No Transportation Needs    Lack of Transportation (Medical): No    Lack of Transportation (Non-Medical): No   Physical Activity: Insufficiently Active    Days of Exercise per Week: 6 days    Minutes of Exercise per Session: 20 min   Stress: Stress Concern Present    Feeling of Stress : To some extent   Social Connections: Moderately Integrated    Frequency of Communication with Friends and Family: More than three times a week    Frequency of Social Gatherings with Friends and Family: More than three times a week    Attends Evangelical Services: More than 4 times per year    Active Member of Clubs or Organizations: Yes    Attends Club or Organization Meetings: More than 4 times per year    Marital Status:    Housing Stability: Low Risk     Unable to Pay for Housing in the Last Year: No    Number of Places Lived in the Last Year: 1    Unstable Housing in the Last Year: No        Family History:      Family History   Problem Relation Age of Onset    Alzheimer's disease Mother     Diabetes Father     Hypertension Father     Stroke Father     Cancer Father 65        metastatic when diagnosed    Cataracts Sister     Stroke Brother         Stoke    Cataracts Brother     Parkinsonism Brother     Glaucoma Maternal Grandmother     Cancer Paternal Grandfather         prostate    Atrial fibrillation Son         35    Heart disease Son     Psoriasis Cousin     Cancer Other         kidney    Alcohol abuse Neg Hx     Drug abuse Neg Hx     COPD Neg Hx     Birth defects Neg Hx     Mental retardation Neg Hx     Mental  illness Neg Hx     Kidney disease Neg Hx     Hyperlipidemia Neg Hx     Melanoma Neg Hx     Lupus Neg Hx        Allergies:      Review of patient's allergies indicates:   Allergen Reactions    Codeine Itching     Other reaction(s): Itching       Medications:      No current facility-administered medications for this encounter.     Current Outpatient Medications   Medication Sig    ACCU-CHEK JD PLUS TEST STRP Strp TEST three times a day    acetaminophen (TYLENOL) 500 MG tablet Take 500 mg by mouth every 6 (six) hours as needed for Pain.    ALCOHOL PREP PADS PadM     ALPRAZolam (XANAX) 0.25 MG tablet Take 1 tablet (0.25 mg total) by mouth 3 (three) times daily as needed for Anxiety. (Patient not taking: Reported on 12/29/2022)    calcium-vitamin D (CALCIUM 600 + D,3,) 600 mg(1,500mg) -400 unit Tab Take 1 tablet by mouth 2 (two) times daily.    cholecalciferol, vitamin D3, (VITAMIN D3) 1,000 unit capsule Take 1 capsule (1,000 Units total) by mouth once daily.    COVID-19 vac, viridiana,Pfizer,,PF, (PFIZER COVID-19 VIRIDIANA VACCN,PF,) 30 mcg/0.3 mL injection Inject into the muscle. (Patient not taking: Reported on 12/29/2022)    diclofenac sodium (VOLTAREN) 1 % Gel Apply 2 g topically once daily. (Patient not taking: Reported on 12/29/2022)    esomeprazole magnesium (NEXIUM ORAL) Take by mouth.    ferrous gluconate (FERGON) 240 (27 FE) MG tablet Take 1 tablet (240 mg total) by mouth once daily.    fluticasone propionate (FLONASE) 50 mcg/actuation nasal spray 2 sprays (100 mcg total) by Each Nostril route daily as needed. 2 Spray, Suspension Nasal Every day    gabapentin (NEURONTIN) 100 MG capsule Take 1 capsule (100 mg total) by mouth 3 (three) times daily.    glimepiride (AMARYL) 1 MG tablet TAKE 1 TABLET(1 MG) BY MOUTH EVERY DAY    lancets (ACCU-CHEK SOFTCLIX LANCETS) Misc 1 each by Misc.(Non-Drug; Combo Route) route 3 (three) times daily.    lancing device Misc     magnesium aspartate HCl 61 mg (615 mg) TbEC Take by mouth  once.    metoprolol succinate (TOPROL-XL) 25 MG 24 hr tablet Take 25 mg by mouth once daily.    rosuvastatin (CRESTOR) 10 MG tablet TAKE 1 TABLET BY MOUTH EVERY DAY    traZODone (DESYREL) 100 MG tablet TAKE 1 TABLET BY MOUTH AT BEDTIME    turmeric 400 mg Cap Take 400 mg by mouth 2 (two) times daily as needed.     Facility-Administered Medications Ordered in Other Encounters   Medication    sodium chloride 0.9% flush 10 mL    triamcinolone acetonide injection 40 mg    zoledronic acid-mannitol & water 5 mg/100 mL infusion 5 mg       Vitals:      There were no vitals filed for this visit.      Review of Systems:        Constitutional: Negative for fever, chills, weight loss, malaise/fatigue and diaphoresis.   HENT: Negative for hearing loss, ear pain, nosebleeds, congestion, sore throat, neck pain, tinnitus and ear discharge.    Eyes: Negative for blurred vision, double vision, photophobia, pain, discharge and redness.   Respiratory: Negative for cough, hemoptysis, sputum production, shortness of breath, wheezing and stridor.    Cardiovascular: Negative for chest pain, palpitations, orthopnea, claudication, leg swelling and PND.   Gastrointestinal: Negative for , nausea, vomiting, abdominal pain, diarrhea, constipation,  and melena. + GERD- well controlled; Constipation related to iron ; hemorrhoids that occasionally bleed   Genitourinary: Negative for dysuria, urgency, frequency, hematuria and flank pain.   Musculoskeletal: Negative for myalgias, back pain, joint pain and falls. + hip pain ; trip and fall 1 week ago no head injury   Skin: Negative for itching and rash.   Neurological: Negative for dizziness, tingling, tremors, sensory change, speech change, focal weakness, seizures, loss of consciousness, weakness and headaches. Decreased  strength R   Endo/Heme/Allergies: Negative for environmental allergies and polydipsia. Does not bruise/bleed easily.   Psychiatric/Behavioral: Negative for depression, no  anxiety currently   Physical Exam:      Constitutional: Appears well-developed, well-nourished and in no acute distress.  Patient is oriented to person, place, and time.   Head: Normocephalic and atraumatic. Mucous membranes moist.  Neck: Neck supple no mass.   Cardiovascular: Normal rate and regular rhythm.  S1 S2 appreciated by ascultation.  Pulmonary/Chest: Effort normal and clear to auscultation bilaterally. No respiratory distress.   Abdomen: Soft. Non-tender and non-distended. Bowel sounds are normal.   Neurological: Patient is alert and oriented to person, place and time. Moves all extremities.  Skin: Warm and dry. No lesions.  Extremities: No clubbing, cyanosis or edema.    Laboratory data:      Reviewed and noted in plan where applicable. Please see chart for full laboratory data.    No results for input(s): CPK, CPKMB, TROPONINI, MB in the last 24 hours. No results for input(s): POCTGLUCOSE in the last 24 hours.     Lab Results   Component Value Date    INR 1.0 2020    INR 1.0 2011    INR 1.0 2009       Lab Results   Component Value Date    WBC 4.61 2022    HGB 13.6 2022    HCT 43.6 2022    MCV 97 2022     2022       No results for input(s): GLU, NA, K, CL, CO2, BUN, CREATININE, CALCIUM, MG in the last 24 hours.    Predictors of intubation difficulty:       Morbid obesity? no   Anatomically abnormal facies? no   Prominent incisors? no   Receding mandible? no   Short, thick neck? no   Neck range of motion: normal   Dentition:  full upper   Based on the Modified Mallampati, patient is a mallampati score: II (hard and soft palate, upper portion of tonsils anduvula visible)    Cardiographics:      EC22 NSR   Echocardiogram:  requested    Imaging:      Chest x-ray:  22    FINDINGS:  The lungs are clear, with normal appearance of pulmonary vasculature and no pleural effusion or pneumothorax.     The cardiac silhouette is normal in size. The  hilar and mediastinal contours are unremarkable.     Bones are intact.     Impression:     No acute abnormality.  Assessment and Plan:      No problem-specific Assessment & Plan notes found for this encounter.            Subjective:     Patient ID: Renea Bourgeois is a 72 y.o. female.    Chief Complaint: No chief complaint on file.      HPI:  The patient is a 72-year-old female with right thumb basal joint degenerative joint disease.  She had a left thumb basal joint arthroplasty in 2015.  She wishes to have a right thumb basal joint arthroplasty.  She was last injected 08/23/2022.    Past Medical History:   Diagnosis Date    Allergy     Anemia 11/14/2017    Angina pectoris     followed by cardiology    Arthritis     Breast cancer     Right T1 Ductal Ca in situ,high oncotype Dx 33, Estrogen positive. Lumpectomy, TAC chemo x 6 cyscles then RT,on aromatase inhibitor    Cataract     Chondromalacia of right patella 03/26/2018    Stable and controlled. Continue current treatment plan as previously prescribed with your PCP.      Diabetes mellitus type II 2011    DM (diabetes mellitus) 2011     am 08/04/2017    DM (diabetes mellitus) 2010     am 06/22/2020    Elevated BP without diagnosis of hypertension 11/27/2018    GERD (gastroesophageal reflux disease)     History of colon polyps 02/21/2018    The patient had adenomatous colon polyps in 2014.      History of renal calculi 08/11/2015    Right obstructing 8/20/13 - passed.     Hyperlipidemia     Hypertension     Kidney stone     right side, passed    Malignant neoplasm of upper-outer quadrant of right breast in female, estrogen receptor positive 07/23/2018    Followed by Dr. Robert Lozano disease     reported per pt    Nuclear sclerosis of both eyes 10/05/2015    Osteopenia     Osteoporosis 04/12/2019    Pseudophakia 10/15/2015    PVC (premature ventricular contraction)     on and off    Refractive error 10/05/2015    Trouble in sleeping     Type 2  diabetes mellitus 2010     am 12/06/2022     Past Surgical History:   Procedure Laterality Date    BREAST BIOPSY      BREAST LUMPECTOMY  02/11/2011    right    CATARACT EXTRACTION Bilateral 10/14/2015    CHOLECYSTECTOMY  1989    open    COLONOSCOPY N/A 02/22/2018    Procedure: COLONOSCOPY;  Surgeon: Carlito Odonnell MD;  Location: [facility];  Service: Endoscopy;  Laterality: N/A;    CYST REMOVAL Left 11/2017    foot    DILATION AND CURETTAGE OF UTERUS  10/2010    In [place]    ESOPHAGOGASTRODUODENOSCOPY N/A 06/29/2020    Procedure: EGD (ESOPHAGOGASTRODUODENOSCOPY);  Surgeon: Nancy Hahn MD;  Location: [facility];  Service: Endoscopy;  Laterality: N/A;    EYE SURGERY  2012    cataract    GALLBLADDER SURGERY      removed in 1989    HAND SURGERY Left 2015    Nasal septal deviation repair  2009    toenail edges removed      TONSILLECTOMY  1959    TOTAL REDUCTION MAMMOPLASTY Left     2015    TUBAL LIGATION  1981    UVULOPALATOPLASTY       Family History   Problem Relation Age of Onset    Alzheimer's disease Mother     Diabetes Father     Hypertension Father     Stroke Father     Cancer Father 65        metastatic when diagnosed    Cataracts Sister     Stroke Brother         Stoke    Cataracts Brother     Parkinsonism Brother     Glaucoma Maternal Grandmother     Cancer Paternal Grandfather         prostate    Atrial fibrillation Son         35    Heart disease Son     Psoriasis Cousin     Cancer Other         kidney    Alcohol abuse Neg Hx     Drug abuse Neg Hx     COPD Neg Hx     Birth defects Neg Hx     Mental retardation Neg Hx     Mental illness Neg Hx     Kidney disease Neg Hx     Hyperlipidemia Neg Hx     Melanoma Neg Hx     Lupus Neg Hx      Social History     Socioeconomic History    Marital status:      Spouse name: Don    Number of children: 4   Occupational History    Occupation: Retired Teacher     Comment: [employer]   Tobacco Use    Smoking status: Never    Smokeless tobacco:  Never   Substance and Sexual Activity    Alcohol use: No     Alcohol/week: 0.0 standard drinks    Drug use: No    Sexual activity: Not Currently     Partners: Male     Birth control/protection: Post-menopausal   Social History Narrative    aretired from homeschooling/,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 Caffeine intake;1-2 per week - dennis calvo. Decaffinated tea and most beveridges. Does have a living will - at home in her filing cabinet.      Social Determinants of Health     Financial Resource Strain: Low Risk     Difficulty of Paying Living Expenses: Not hard at all   Food Insecurity: No Food Insecurity    Worried About Running Out of Food in the Last Year: Never true    Ran Out of Food in the Last Year: Never true   Transportation Needs: No Transportation Needs    Lack of Transportation (Medical): No    Lack of Transportation (Non-Medical): No   Physical Activity: Insufficiently Active    Days of Exercise per Week: 6 days    Minutes of Exercise per Session: 20 min   Stress: Stress Concern Present    Feeling of Stress : To some extent   Social Connections: Moderately Integrated    Frequency of Communication with Friends and Family: More than three times a week    Frequency of Social Gatherings with Friends and Family: More than three times a week    Attends Rastafari Services: More than 4 times per year    Active Member of Clubs or Organizations: Yes    Attends Club or Organization Meetings: More than 4 times per year    Marital Status:    Housing Stability: Low Risk     Unable to Pay for Housing in the Last Year: No    Number of Places Lived in the Last Year: 1    Unstable Housing in the Last Year: No     Medication List with Changes/Refills   Current Medications    ACCU-CHEK JD PLUS TEST STRP STRP    TEST three times a day    ACETAMINOPHEN (TYLENOL) 500 MG TABLET    Take 500 mg by mouth every 6 (six) hours as needed for Pain.    ALCOHOL PREP PADS PADM        ALPRAZOLAM (XANAX) 0.25 MG TABLET    Take 1  tablet (0.25 mg total) by mouth 3 (three) times daily as needed for Anxiety.    CALCIUM-VITAMIN D (CALCIUM 600 + D,3,) 600 MG(1,500MG) -400 UNIT TAB    Take 1 tablet by mouth 2 (two) times daily.    CHOLECALCIFEROL, VITAMIN D3, (VITAMIN D3) 1,000 UNIT CAPSULE    Take 1 capsule (1,000 Units total) by mouth once daily.    COVID-19 VAC, VIRIDIANA,PFIZER,,PF, (PFIZER COVID-19 VIRIDIANA VACCN,PF,) 30 MCG/0.3 ML INJECTION    Inject into the muscle.    DICLOFENAC SODIUM (VOLTAREN) 1 % GEL    Apply 2 g topically once daily.    ESOMEPRAZOLE MAGNESIUM (NEXIUM ORAL)    Take by mouth.    FERROUS GLUCONATE (FERGON) 240 (27 FE) MG TABLET    Take 1 tablet (240 mg total) by mouth once daily.    FLUTICASONE PROPIONATE (FLONASE) 50 MCG/ACTUATION NASAL SPRAY    2 sprays (100 mcg total) by Each Nostril route daily as needed. 2 Spray, Suspension Nasal Every day    GABAPENTIN (NEURONTIN) 100 MG CAPSULE    Take 1 capsule (100 mg total) by mouth 3 (three) times daily.    GLIMEPIRIDE (AMARYL) 1 MG TABLET    TAKE 1 TABLET(1 MG) BY MOUTH EVERY DAY    LANCETS (ACCU-CHEK SOFTCLIX LANCETS) MISC    1 each by Misc.(Non-Drug; Combo Route) route 3 (three) times daily.    LANCING DEVICE MISC        MAGNESIUM ASPARTATE HCL 61 MG (615 MG) TBEC    Take by mouth once.    METOPROLOL SUCCINATE (TOPROL-XL) 25 MG 24 HR TABLET    Take 25 mg by mouth once daily.    ROSUVASTATIN (CRESTOR) 10 MG TABLET    TAKE 1 TABLET BY MOUTH EVERY DAY    TRAZODONE (DESYREL) 100 MG TABLET    TAKE 1 TABLET BY MOUTH AT BEDTIME    TURMERIC 400 MG CAP    Take 400 mg by mouth 2 (two) times daily as needed.     Review of patient's allergies indicates:   Allergen Reactions    Codeine Itching     Other reaction(s): Itching     Review of Systems   Constitutional: Negative for malaise/fatigue.   HENT:  Negative for hearing loss.    Eyes:  Positive for visual disturbance. Negative for double vision.   Cardiovascular:  Positive for irregular heartbeat. Negative for chest pain.   Respiratory:   Negative for shortness of breath.    Endocrine: Negative for cold intolerance.   Hematologic/Lymphatic: Does not bruise/bleed easily.   Skin:  Negative for poor wound healing and suspicious lesions.   Musculoskeletal:  Positive for arthritis, joint pain and joint swelling. Negative for gout.   Gastrointestinal:  Positive for abdominal pain, dysphagia and heartburn. Negative for nausea and vomiting.   Genitourinary:  Negative for dysuria.   Neurological:  Negative for numbness, paresthesias and sensory change.   Psychiatric/Behavioral:  Positive for altered mental status. Negative for depression, memory loss and substance abuse. The patient has insomnia. The patient is not nervous/anxious.    Allergic/Immunologic: Negative for persistent infections.     Objective:   There is no height or weight on file to calculate BMI.  There were no vitals filed for this visit.             General    Constitutional: She is oriented to person, place, and time. She appears well-developed and well-nourished. No distress.   HENT:   Head: Normocephalic.   Mouth/Throat: Oropharynx is clear and moist.   Eyes: EOM are normal.   Cardiovascular:  Normal rate.            Pulmonary/Chest: Effort normal.   Abdominal: Soft.   Neurological: She is alert and oriented to person, place, and time. No cranial nerve deficit.   Psychiatric: She has a normal mood and affect.             Right Hand/Wrist Exam     Inspection   Scars: Wrist - absent Hand -  absent  Effusion: Wrist - absent Hand -  absent    Pain   Wrist - The patient exhibits pain of the CMC.    Other     Neuorologic Exam    Median Distribution: normal  Ulnar Distribution: normal  Radial Distribution: normal    Comments:  The patient has tenderness right thumb basal joint with a positive axial circumduction grind test.  There are no motor or sensory deficits.          Vascular Exam       Capillary Refill  Right Hand: normal capillary refill        Relevant imaging results reviewed and  interpreted by me, discussed with the patient and / or family today radiographs right thumb showed basal joint arthritis  Assessment:     Right thumb basal joint degenerative joint disease  Plan:       The patient declines injection.  She wished to schedule a right thumb basal joint arthroplasty using Arthrex internal brace system.  Risk complications and alternatives were discussed including the risk of infection, anesthetic risk, injury to nerves and vessels, loss of motion, and possible need for additional surgeries were discussed.  She seems to understand and agree that surgery.  All questions were answered.              Disclaimer: This note was prepared using a voice recognition system and is likely to have sound alike errors within the text.

## 2023-01-05 NOTE — OP NOTE
[facility]  Orthopedic Surgery  Operative Note    SUMMARY     Date of Procedure: 1/5/2023   Assistant: None    Procedure: Procedure(s) (LRB):  ARTHROPLASTY, FINGER (Right)   thumb basal joint using Arthrex internal brace system    Surgeon(s) and Role:     * Iban Kuhn MD - Primary    Assisting Surgeon: None    Pre-Operative Diagnosis: Arthritis of carpometacarpal (CMC) joint of right thumb [M18.11]    Post-Operative Diagnosis: Post-Op Diagnosis Codes:     * Arthritis of carpometacarpal (CMC) joint of right thumb [M18.11]    Anesthesia:  General and regional    Technical Procedures Used:  right thumb basal joint arthroplasty using Arthrex internal brace system    Description of the Findings of the Procedure:  The patient was taken to the operating room where general anesthesia was administered.  Regional anesthesia had been administered in the holding area by the anesthesia service.  Satisfactory anesthesia had been achieved the right  arm was pre prepped and draped in usual sterile fashion. After exsanguination of the extremity a proximal tourniquet was inflated to 250 mm of mercury after the patient received 2 g of Ancef intravenously preoperatively.  At this time a longitudinal oblique incision was made over the trapezium.  Cutaneous neurovascular structures were identified and carefully retracted the capsule was opened.  The trapezium was removed with the McGlamry elevator and rongeur.  The bone was quite soft..  Intraoperative images showed satisfactory resection of the trapezium.  At this time using the Arthrex internal brace system a guidewire was placed in the base of the 2nd metacarpal.  This was reamed with the stop guide using the smallest drill.  A SwiveLock 3.5 suture anchor was deployed with good pullout strength.  A 2nd guidewire was placed in the base of the 1st metacarpal.  This also was reamed with a stop guide.  The 2nd Arthrex SwiveLock suture anchor fork was placed  around the 2 mm FiberTape and deployed in the base of the 1st metacarpal.  Good stability of the basal joint was achieved.  Closure of the capsule was performed using 3 0 Vicryl suture.. Subcutaneous tissue was closed using 3 0 Vicryl suture. Skin was closed using horizontal mattress 4 0 nylon suture. Xeroform gauze 4 by 4s and 2 ABD pads were overwrapped with a 3 in gauze dressing.  A well-padded thumb spica splint was applied.  A sling was applied.  The patient tolerated the procedure well and was transferred to the recovery room in satisfactory condition.      Complications: No    Estimated Blood Loss (EBL): 5cc             Condition: Good    Disposition: PACU - hemodynamically stable.    Attestation: I was present and scrubbed for the entire procedure.

## 2023-01-05 NOTE — TRANSFER OF CARE
"Anesthesia Transfer of Care Note    Patient: Renea Bourgeois    Procedure(s) Performed: Procedure(s) (LRB):  ARTHROPLASTY, FINGER (Right)    Patient location: PACU    Anesthesia Type: general    Transport from OR: Transported from OR on room air with adequate spontaneous ventilation    Post pain: adequate analgesia    Post assessment: no apparent anesthetic complications and tolerated procedure well    Post vital signs: stable    Level of consciousness: awake    Nausea/Vomiting: no nausea/vomiting    Complications: none    Transfer of care protocol was followed      Last vitals:   Visit Vitals  /82 (BP Location: Left arm, Patient Position: Lying)   Pulse 64   Temp 36.6 °C (97.8 °F) (Temporal)   Resp 18   Ht 5' 1" (1.549 m)   Wt 61 kg (134 lb 9.5 oz)   LMP 01/01/2004 (Within Months)   SpO2 95%   Breastfeeding No   BMI 25.43 kg/m²     "

## 2023-01-05 NOTE — ANESTHESIA POSTPROCEDURE EVALUATION
Anesthesia Post Evaluation    Patient: Renea Bourgeois    Procedure(s) Performed: Procedure(s) (LRB):  ARTHROPLASTY, FINGER (Right)    Final Anesthesia Type: general      Patient location during evaluation: PACU  Patient participation: Yes- Able to Participate  Level of consciousness: awake and alert and oriented  Post-procedure vital signs: reviewed and stable  Pain management: adequate  Airway patency: patent    PONV status at discharge: No PONV  Anesthetic complications: no      Cardiovascular status: blood pressure returned to baseline, stable and hemodynamically stable  Respiratory status: unassisted  Hydration status: euvolemic  Follow-up not needed.          Vitals Value Taken Time   /60 01/05/23 1130   Temp 36.3 °C (97.3 °F) 01/05/23 1030   Pulse 74 01/05/23 1131   Resp 12 01/05/23 1131   SpO2 95 % 01/05/23 1131   Vitals shown include unvalidated device data.      Event Time   Out of Recovery 10:45:00         Pain/Johnathan Score: Johnathan Score: 9 (1/5/2023 11:30 AM)

## 2023-01-05 NOTE — PLAN OF CARE
Right axillary peripheral nerve block performed sucessfully by Dr. Fan. Patient tolerated well. VSS. Cardiac monitor in place.

## 2023-01-10 ENCOUNTER — OFFICE VISIT (OUTPATIENT)
Dept: ORTHOPEDICS | Facility: CLINIC | Age: 73
End: 2023-01-10
Payer: MEDICARE

## 2023-01-10 VITALS — BODY MASS INDEX: 25.39 KG/M2 | WEIGHT: 134.5 LBS | HEIGHT: 61 IN

## 2023-01-10 DIAGNOSIS — Z98.890 POST-OPERATIVE STATE: ICD-10-CM

## 2023-01-10 DIAGNOSIS — M18.11 ARTHRITIS OF CARPOMETACARPAL (CMC) JOINT OF RIGHT THUMB: Primary | ICD-10-CM

## 2023-01-10 PROCEDURE — 3288F PR FALLS RISK ASSESSMENT DOCUMENTED: ICD-10-PCS | Mod: HCNC,CPTII,S$GLB,

## 2023-01-10 PROCEDURE — 1101F PT FALLS ASSESS-DOCD LE1/YR: CPT | Mod: HCNC,CPTII,S$GLB,

## 2023-01-10 PROCEDURE — 1159F MED LIST DOCD IN RCRD: CPT | Mod: HCNC,CPTII,S$GLB,

## 2023-01-10 PROCEDURE — 3008F BODY MASS INDEX DOCD: CPT | Mod: HCNC,CPTII,S$GLB,

## 2023-01-10 PROCEDURE — 1126F AMNT PAIN NOTED NONE PRSNT: CPT | Mod: HCNC,CPTII,S$GLB,

## 2023-01-10 PROCEDURE — 99999 PR PBB SHADOW E&M-EST. PATIENT-LVL III: ICD-10-PCS | Mod: PBBFAC,HCNC,,

## 2023-01-10 PROCEDURE — 1159F PR MEDICATION LIST DOCUMENTED IN MEDICAL RECORD: ICD-10-PCS | Mod: HCNC,CPTII,S$GLB,

## 2023-01-10 PROCEDURE — 99999 PR PBB SHADOW E&M-EST. PATIENT-LVL III: CPT | Mod: PBBFAC,HCNC,,

## 2023-01-10 PROCEDURE — 1101F PR PT FALLS ASSESS DOC 0-1 FALLS W/OUT INJ PAST YR: ICD-10-PCS | Mod: HCNC,CPTII,S$GLB,

## 2023-01-10 PROCEDURE — 99024 PR POST-OP FOLLOW-UP VISIT: ICD-10-PCS | Mod: HCNC,S$GLB,,

## 2023-01-10 PROCEDURE — 99024 POSTOP FOLLOW-UP VISIT: CPT | Mod: HCNC,S$GLB,,

## 2023-01-10 PROCEDURE — 3008F PR BODY MASS INDEX (BMI) DOCUMENTED: ICD-10-PCS | Mod: HCNC,CPTII,S$GLB,

## 2023-01-10 PROCEDURE — 1126F PR PAIN SEVERITY QUANTIFIED, NO PAIN PRESENT: ICD-10-PCS | Mod: HCNC,CPTII,S$GLB,

## 2023-01-10 PROCEDURE — 3288F FALL RISK ASSESSMENT DOCD: CPT | Mod: HCNC,CPTII,S$GLB,

## 2023-01-10 NOTE — PROGRESS NOTES
SUBJECTIVE:      Patient ID: Renea Bourgeois is a 73 y.o. female.    HPI: Ms. Bourgeois is here today for post-operative visit #1.  She is 5 days status post ARTHROPLASTY, FINGER (Right) thumb basal joint using Arthrex internal brace system by Dr. Kuhn on 1/5/23. She reports that she is doing well.  Pain is 0/10.  She takes ibuprofen as needed for pain.  She has been compliant with postop instructions and keeping the extremity dry. She denies fever, chills, and sweats since the time of the surgery.     Past Medical History:   Diagnosis Date    Allergy     Anemia 11/14/2017    Angina pectoris     followed by cardiology    Arthritis     Breast cancer     Right T1 Ductal Ca in situ,high oncotype Dx 33, Estrogen positive. Lumpectomy, TAC chemo x 6 cyscles then RT,on aromatase inhibitor    Cataract     Chondromalacia of right patella 03/26/2018    Stable and controlled. Continue current treatment plan as previously prescribed with your PCP.      Diabetes mellitus type II 2011    DM (diabetes mellitus) 2011     am 08/04/2017    DM (diabetes mellitus) 2010     am 06/22/2020    Elevated BP without diagnosis of hypertension 11/27/2018    GERD (gastroesophageal reflux disease)     History of colon polyps 02/21/2018    The patient had adenomatous colon polyps in 2014.      History of renal calculi 08/11/2015    Right obstructing 8/20/13 - passed.     Hyperlipidemia     Hypertension     Kidney stone     right side, passed    Malignant neoplasm of upper-outer quadrant of right breast in female, estrogen receptor positive 07/23/2018    Followed by Dr. Robert Lozano disease     reported per pt    Nuclear sclerosis of both eyes 10/05/2015    Osteopenia     Osteoporosis 04/12/2019    Pseudophakia 10/15/2015    PVC (premature ventricular contraction)     on and off    Refractive error 10/05/2015    Trouble in sleeping     Type 2 diabetes mellitus 2010     am 12/06/2022     Past Surgical History:   Procedure  Laterality Date    ARTHROPLASTY OF JOINT OF FINGER Right 1/5/2023    Procedure: ARTHROPLASTY, FINGER;  Surgeon: Iban Kuhn MD;  [facility];  Service: Orthopedics;  Laterality: Right;  right thumb basal joint arthroplasty    BREAST BIOPSY      BREAST LUMPECTOMY  02/11/2011    right    CATARACT EXTRACTION Bilateral 10/14/2015    CHOLECYSTECTOMY  1989    open    COLONOSCOPY N/A 02/22/2018    Procedure: COLONOSCOPY;  Surgeon: Carlito Odonnell MD;  Location: [facility];  Service: Endoscopy;  Laterality: N/A;    CYST REMOVAL Left 11/2017    foot    DILATION AND CURETTAGE OF UTERUS  10/2010    In [place]    ESOPHAGOGASTRODUODENOSCOPY N/A 06/29/2020    Procedure: EGD (ESOPHAGOGASTRODUODENOSCOPY);  Surgeon: Nancy Hahn MD;  Location: [facility];  Service: Endoscopy;  Laterality: N/A;    EYE SURGERY  2012    cataract    GALLBLADDER SURGERY      removed in 1989    HAND SURGERY Left 2015    Nasal septal deviation repair  2009    toenail edges removed      TONSILLECTOMY  1959    TOTAL REDUCTION MAMMOPLASTY Left     2015    TUBAL LIGATION  1981    UVULOPALATOPLASTY       Family History   Problem Relation Age of Onset    Alzheimer's disease Mother     Diabetes Father     Hypertension Father     Stroke Father     Cancer Father 65        metastatic when diagnosed    Cataracts Sister     Stroke Brother         Stoke    Cataracts Brother     Parkinsonism Brother     Glaucoma Maternal Grandmother     Cancer Paternal Grandfather         prostate    Atrial fibrillation Son         35    Heart disease Son     Psoriasis Cousin     Cancer Other         kidney    Alcohol abuse Neg Hx     Drug abuse Neg Hx     COPD Neg Hx     Birth defects Neg Hx     Mental retardation Neg Hx     Mental illness Neg Hx     Kidney disease Neg Hx     Hyperlipidemia Neg Hx     Melanoma Neg Hx     Lupus Neg Hx      Social History     Socioeconomic History    Marital status:      Spouse name: Don    Number of children: 4    Occupational History    Occupation: Retired Teacher     Comment: [employer]   Tobacco Use    Smoking status: Never    Smokeless tobacco: Never   Substance and Sexual Activity    Alcohol use: No     Alcohol/week: 0.0 standard drinks    Drug use: No    Sexual activity: Not Currently     Partners: Male     Birth control/protection: Post-menopausal   Social History Narrative    aretired from homeschooling/,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 Caffeine intake;1-2 per week - manily coke. Decaffinated tea and most beveridges. Does have a living will - at home in her filing cabinet.      Social Determinants of Health     Financial Resource Strain: Low Risk     Difficulty of Paying Living Expenses: Not hard at all   Food Insecurity: No Food Insecurity    Worried About Running Out of Food in the Last Year: Never true    Ran Out of Food in the Last Year: Never true   Transportation Needs: No Transportation Needs    Lack of Transportation (Medical): No    Lack of Transportation (Non-Medical): No   Physical Activity: Insufficiently Active    Days of Exercise per Week: 6 days    Minutes of Exercise per Session: 20 min   Stress: Stress Concern Present    Feeling of Stress : To some extent   Social Connections: Moderately Integrated    Frequency of Communication with Friends and Family: More than three times a week    Frequency of Social Gatherings with Friends and Family: More than three times a week    Attends Hindu Services: More than 4 times per year    Active Member of Clubs or Organizations: Yes    Attends Club or Organization Meetings: More than 4 times per year    Marital Status:    Housing Stability: Low Risk     Unable to Pay for Housing in the Last Year: No    Number of Places Lived in the Last Year: 1    Unstable Housing in the Last Year: No     Medication List with Changes/Refills   Current Medications    ACCU-CHEK JD PLUS TEST STRP STRP    TEST three times a day    ACETAMINOPHEN (TYLENOL) 500 MG  TABLET    Take 500 mg by mouth every 6 (six) hours as needed for Pain.    ALCOHOL PREP PADS PADM        ALPRAZOLAM (XANAX) 0.25 MG TABLET    Take 1 tablet (0.25 mg total) by mouth 3 (three) times daily as needed for Anxiety.    CALCIUM-VITAMIN D (CALCIUM 600 + D,3,) 600 MG(1,500MG) -400 UNIT TAB    Take 1 tablet by mouth 2 (two) times daily.    CHOLECALCIFEROL, VITAMIN D3, (VITAMIN D3) 1,000 UNIT CAPSULE    Take 1 capsule (1,000 Units total) by mouth once daily.    COVID-19 VAC, VIRIDIANA,PFIZER,,PF, (PFIZER COVID-19 VIRIDIANA VACCN,PF,) 30 MCG/0.3 ML INJECTION    Inject into the muscle.    DICLOFENAC SODIUM (VOLTAREN) 1 % GEL    Apply 2 g topically once daily.    ESOMEPRAZOLE MAGNESIUM (NEXIUM ORAL)    Take by mouth.    FERROUS GLUCONATE (FERGON) 240 (27 FE) MG TABLET    Take 1 tablet (240 mg total) by mouth once daily.    FLUTICASONE PROPIONATE (FLONASE) 50 MCG/ACTUATION NASAL SPRAY    2 sprays (100 mcg total) by Each Nostril route daily as needed. 2 Spray, Suspension Nasal Every day    GABAPENTIN (NEURONTIN) 100 MG CAPSULE    Take 1 capsule (100 mg total) by mouth 3 (three) times daily.    GLIMEPIRIDE (AMARYL) 1 MG TABLET    TAKE 1 TABLET(1 MG) BY MOUTH EVERY DAY    LANCETS (ACCU-CHEK SOFTCLIX LANCETS) MISC    1 each by Misc.(Non-Drug; Combo Route) route 3 (three) times daily.    LANCING DEVICE MISC        MAGNESIUM ASPARTATE HCL 61 MG (615 MG) TBEC    Take by mouth once.    METOPROLOL SUCCINATE (TOPROL-XL) 25 MG 24 HR TABLET    Take 25 mg by mouth once daily.    OXYCODONE-ACETAMINOPHEN (PERCOCET)  MG PER TABLET    Take 1 tablet by mouth every 6 (six) hours as needed for Pain.    ROSUVASTATIN (CRESTOR) 10 MG TABLET    TAKE 1 TABLET BY MOUTH EVERY DAY    TRAZODONE (DESYREL) 100 MG TABLET    TAKE 1 TABLET BY MOUTH AT BEDTIME    TURMERIC 400 MG CAP    Take 400 mg by mouth 2 (two) times daily as needed.     Review of patient's allergies indicates:  No Known Allergies    OBJECTIVE:     Physical exam:    Vitals:     "01/10/23 1128   Weight: 61 kg (134 lb 7.7 oz)   Height: 5' 1" (1.549 m)   PainSc: 0-No pain   PainLoc: Hand     Vital signs are stable, patient is afebrile.  Patient is well dressed and well groomed, no acute distress.  Alert and oriented to person, place, and time.    Right UE:  Post op dressing taken down.   Incision is clean, dry, and intact.   There is no erythema or exudate. There is no sign of any infection.   Mild swelling and ecchymosis to hand  She is NVI.   Sutures in place.   2+ pulses noted.  Cap refill <2 seconds  Motor intact to hand    ASSESSMENT         Encounter Diagnoses   Name Primary?    Arthritis of carpometacarpal (CMC) joint of right thumb Yes    Post-operative state             5 days status post ARTHROPLASTY, FINGER (Right) thumb basal joint using Arthrex internal brace system    PLAN:           Renea was seen today for post-op evaluation.    Diagnoses and all orders for this visit:    Arthritis of carpometacarpal (CMC) joint of right thumb    Post-operative state      - PO instruction reviewed and provided to patient  - The incision was cleaned with hydrogen peroxide and normal saline. A sterile Band-Aid was applied.   - Patient may clean the incision daily as above.   - right thumb spica brace given today.  Instructed to wear full-time for 4 weeks  - Patient should notify the office of any signs or symptoms of infection including fevers, erythema, purulent drainage, increasing pain.    - Follow up for suture removal     POST OPERATIVE INSTRUCTIONS - Visit #1    BANDAGES/DRESSING/BATHING:  We have removed the dressing, cleaned your incision, and put on a band-aid at your first post op visit today. You may clean the incision daily as we did today. Keep the incision clean and dry. When showering, you may cover the incision with a plastic bag/umbrella bag.   Do not use Neosporin or other topical ointments or creams on the incision. If you are concerned about any redness or drainage of the " incisions, please call the office.    SWELLING  Some swelling of your arm, hand and fingers is normal. The swelling can be decreased by  elevating your arm on a few pillows when lying down. Swelling can be further controlled by cold therapy over the surgical site. Flexion/extension of the fingers (opening and closing your hands) will also help to relieve swelling and prevent stiffness.    ACTIVITY  You may use the hand and wrist as tolerated for light activity. You are encouraged to bend the wrist, elbow and fingers as soon as the initial surgical dressing is removed. Avoid lifting, pushing, or pulling any object greater than 5-10 pounds for the first 10-14 days.     BRACE  Wear thumb spica brace full-time.  you may take it off for hygienic purposes.    MEDICATIONS  Take pain medicines exactly as directed.  If the doctor gave you a prescription medicine for pain, take it as prescribed.  If you are not taking a prescription pain medicine, take an over-the-counter medicine such as acetaminophen (Tylenol), ibuprofen (Advil, Motrin), or naproxen (Aleve) as long as you do not have an allergy or medical condition that prevents you from taking them.  Do not take two or more pain medicines at the same time unless the doctor told you to. Many pain medicines have acetaminophen, which is Tylenol. Too much acetaminophen (Tylenol) can be harmful.    FOLLOW -UP  You should be scheduled for a post-op appointment within the 10-14 days following surgery, at which time we will review your surgery, remove sutures and evaluate incisions, review your post-operative program and answer any of your questions.          Dulce Naqvi PA-C   [facility]

## 2023-01-18 NOTE — PROGRESS NOTES
SUBJECTIVE:      Patient ID: Renea Bourgeois is a 73 y.o. female.    HPI: Ms. Bourgeois is here today for post-operative visit #2.  She is 14 days status post ARTHROPLASTY, FINGER (Right) thumb basal joint using Arthrex internal brace system by Dr. Kuhn on 1/5/23. She reports that she is doing well.  Pain is 1/10.  She is taking ibuprofen and Tylenol as needed for pain.  She states that the brace has been digging in her thumb.  She has been compliant with postop instructions and keeping the extremity dry. She denies fever, chills, and sweats since the time of the surgery.     Interval hx 1/10/23: Ms. Bourgeois is here today for post-operative visit #1.  She is 5 days status post ARTHROPLASTY, FINGER (Right) thumb basal joint using Arthrex internal brace system by Dr. Kuhn on 1/5/23. She reports that she is doing well.  Pain is 0/10.  She takes ibuprofen as needed for pain.  She has been compliant with postop instructions and keeping the extremity dry. She denies fever, chills, and sweats since the time of the surgery.     Past Medical History:   Diagnosis Date    Allergy     Anemia 11/14/2017    Angina pectoris     followed by cardiology    Arthritis     Breast cancer     Right T1 Ductal Ca in situ,high oncotype Dx 33, Estrogen positive. Lumpectomy, TAC chemo x 6 cyscles then RT,on aromatase inhibitor    Cataract     Chondromalacia of right patella 03/26/2018    Stable and controlled. Continue current treatment plan as previously prescribed with your PCP.      Diabetes mellitus type II 2011    DM (diabetes mellitus) 2011     am 08/04/2017    DM (diabetes mellitus) 2010     am 06/22/2020    Elevated BP without diagnosis of hypertension 11/27/2018    GERD (gastroesophageal reflux disease)     History of colon polyps 02/21/2018    The patient had adenomatous colon polyps in 2014.      History of renal calculi 08/11/2015    Right obstructing 8/20/13 - passed.     Hyperlipidemia     Hypertension     Kidney  stone     right side, passed    Malignant neoplasm of upper-outer quadrant of right breast in female, estrogen receptor positive 07/23/2018    Followed by Dr. Robert Lozano disease     reported per pt    Nuclear sclerosis of both eyes 10/05/2015    Osteopenia     Osteoporosis 04/12/2019    Pseudophakia 10/15/2015    PVC (premature ventricular contraction)     on and off    Refractive error 10/05/2015    Trouble in sleeping     Type 2 diabetes mellitus 2010     am 12/06/2022     Past Surgical History:   Procedure Laterality Date    ARTHROPLASTY OF JOINT OF FINGER Right 1/5/2023    Procedure: ARTHROPLASTY, FINGER;  Surgeon: Iban Kuhn MD;  Location: [facility];  Service: Orthopedics;  Laterality: Right;  right thumb basal joint arthroplasty    BREAST BIOPSY      BREAST LUMPECTOMY  02/11/2011    right    CATARACT EXTRACTION Bilateral 10/14/2015    CHOLECYSTECTOMY  1989    open    COLONOSCOPY N/A 02/22/2018    Procedure: COLONOSCOPY;  Surgeon: Carlito Odonnell MD;  Location: [facility];  Service: Endoscopy;  Laterality: N/A;    CYST REMOVAL Left 11/2017    foot    DILATION AND CURETTAGE OF UTERUS  10/2010    In [place]    ESOPHAGOGASTRODUODENOSCOPY N/A 06/29/2020    Procedure: EGD (ESOPHAGOGASTRODUODENOSCOPY);  Surgeon: Nancy Hahn MD;  Location: [facility];  Service: Endoscopy;  Laterality: N/A;    EYE SURGERY  2012    cataract    GALLBLADDER SURGERY      removed in 1989    HAND SURGERY Left 2015    Nasal septal deviation repair  2009    toenail edges removed      TONSILLECTOMY  1959    TOTAL REDUCTION MAMMOPLASTY Left     2015    TUBAL LIGATION  1981    UVULOPALATOPLASTY       Family History   Problem Relation Age of Onset    Alzheimer's disease Mother     Diabetes Father     Hypertension Father     Stroke Father     Cancer Father 65        metastatic when diagnosed    Cataracts Sister     Stroke Brother         Stoke    Cataracts Brother     Parkinsonism Brother     Glaucoma Maternal  Grandmother     Cancer Paternal Grandfather         prostate    Atrial fibrillation Son         35    Heart disease Son     Psoriasis Cousin     Cancer Other         kidney    Alcohol abuse Neg Hx     Drug abuse Neg Hx     COPD Neg Hx     Birth defects Neg Hx     Mental retardation Neg Hx     Mental illness Neg Hx     Kidney disease Neg Hx     Hyperlipidemia Neg Hx     Melanoma Neg Hx     Lupus Neg Hx      Social History     Socioeconomic History    Marital status:      Spouse name: Don    Number of children: 4   Occupational History    Occupation: Retired Teacher     Comment: [employer]   Tobacco Use    Smoking status: Never    Smokeless tobacco: Never   Substance and Sexual Activity    Alcohol use: No     Alcohol/week: 0.0 standard drinks    Drug use: No    Sexual activity: Not Currently     Partners: Male     Birth control/protection: Post-menopausal   Social History Narrative    aretired from homeschooling/,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 Caffeine intake;1-2 per week - dennis calvo. Decaffinated tea and most beveridges. Does have a living will - at home in her filing cabinet.      Social Determinants of Health     Financial Resource Strain: Low Risk     Difficulty of Paying Living Expenses: Not hard at all   Food Insecurity: No Food Insecurity    Worried About Running Out of Food in the Last Year: Never true    Ran Out of Food in the Last Year: Never true   Transportation Needs: No Transportation Needs    Lack of Transportation (Medical): No    Lack of Transportation (Non-Medical): No   Physical Activity: Insufficiently Active    Days of Exercise per Week: 6 days    Minutes of Exercise per Session: 20 min   Stress: Stress Concern Present    Feeling of Stress : To some extent   Social Connections: Moderately Integrated    Frequency of Communication with Friends and Family: More than three times a week    Frequency of Social Gatherings with Friends and Family: More than three times a week    Attends  Jew Services: More than 4 times per year    Active Member of Clubs or Organizations: Yes    Attends Club or Organization Meetings: More than 4 times per year    Marital Status:    Housing Stability: Low Risk     Unable to Pay for Housing in the Last Year: No    Number of Places Lived in the Last Year: 1    Unstable Housing in the Last Year: No     Medication List with Changes/Refills   Current Medications    ACCU-CHEK JD PLUS TEST STRP STRP    TEST three times a day    ACETAMINOPHEN (TYLENOL) 500 MG TABLET    Take 500 mg by mouth every 6 (six) hours as needed for Pain.    ALCOHOL PREP PADS PADM        ALPRAZOLAM (XANAX) 0.25 MG TABLET    Take 1 tablet (0.25 mg total) by mouth 3 (three) times daily as needed for Anxiety.    CALCIUM-VITAMIN D (CALCIUM 600 + D,3,) 600 MG(1,500MG) -400 UNIT TAB    Take 1 tablet by mouth 2 (two) times daily.    CHOLECALCIFEROL, VITAMIN D3, (VITAMIN D3) 1,000 UNIT CAPSULE    Take 1 capsule (1,000 Units total) by mouth once daily.    COVID-19 VAC, VIRIDIANA,PFIZER,,PF, (PFIZER COVID-19 VIRIDIANA VACCN,PF,) 30 MCG/0.3 ML INJECTION    Inject into the muscle.    DICLOFENAC SODIUM (VOLTAREN) 1 % GEL    Apply 2 g topically once daily.    ESOMEPRAZOLE MAGNESIUM (NEXIUM ORAL)    Take by mouth.    FERROUS GLUCONATE (FERGON) 240 (27 FE) MG TABLET    Take 1 tablet (240 mg total) by mouth once daily.    FLUTICASONE PROPIONATE (FLONASE) 50 MCG/ACTUATION NASAL SPRAY    2 sprays (100 mcg total) by Each Nostril route daily as needed. 2 Spray, Suspension Nasal Every day    GABAPENTIN (NEURONTIN) 100 MG CAPSULE    Take 1 capsule (100 mg total) by mouth 3 (three) times daily.    GLIMEPIRIDE (AMARYL) 1 MG TABLET    TAKE 1 TABLET(1 MG) BY MOUTH EVERY DAY    LANCETS (ACCU-CHEK SOFTCLIX LANCETS) MISC    1 each by Misc.(Non-Drug; Combo Route) route 3 (three) times daily.    LANCING DEVICE MISC        MAGNESIUM ASPARTATE HCL 61 MG (615 MG) TBEC    Take by mouth once.    METOPROLOL SUCCINATE (TOPROL-XL) 25  "MG 24 HR TABLET    Take 25 mg by mouth once daily.    OXYCODONE-ACETAMINOPHEN (PERCOCET)  MG PER TABLET    Take 1 tablet by mouth every 6 (six) hours as needed for Pain.    ROSUVASTATIN (CRESTOR) 10 MG TABLET    TAKE 1 TABLET BY MOUTH EVERY DAY    TRAZODONE (DESYREL) 100 MG TABLET    TAKE 1 TABLET BY MOUTH AT BEDTIME    TURMERIC 400 MG CAP    Take 400 mg by mouth 2 (two) times daily as needed.     Review of patient's allergies indicates:  No Known Allergies    OBJECTIVE:     Physical exam:    Vitals:    01/19/23 1006   Weight: 61 kg (134 lb 7.7 oz)   Height: 5' 1" (1.549 m)   PainSc:   1   PainLoc: Hand     Vital signs are stable, patient is afebrile.  Patient is well dressed and well groomed, no acute distress.  Alert and oriented to person, place, and time.    Right UE:  Incision is clean, dry, and intact.   There is no erythema or exudate. There is no sign of any infection.   Mild swelling to hand  Small abrasion to dorsal thumb from brace  She is NVI.   Sutures in place.   2+ pulses noted.  Cap refill <2 seconds  Motor intact to hand    ASSESSMENT         Encounter Diagnoses   Name Primary?    Arthritis of carpometacarpal (CMC) joint of right thumb Yes    Post-operative state             14 days status post ARTHROPLASTY, FINGER (Right) thumb basal joint using Arthrex internal brace system    PLAN:           Renea was seen today for post-op evaluation.    Diagnoses and all orders for this visit:    Arthritis of carpometacarpal (CMC) joint of right thumb  -     Ambulatory referral/consult to Physical/Occupational Therapy; Future    Post-operative state  -     Ambulatory referral/consult to Physical/Occupational Therapy; Future        - PO instruction reviewed and provided to patient  - The incision was cleaned with hydrogen peroxide. Sutures removed with no difficulty. Steri-Strips applied.   - continue brace full-time for 2 more weeks, then wean  - moleskin provided today for discomfort from brace  - " Patient should notify the office of any signs or symptoms of infection including fevers, erythema, purulent drainage, increasing pain.    - Follow up PRN     POST OPERATIVE VISIT INSTRUCTIONS - Visit #2    1. No soaking the incision for at least 7-10 days. You may get it wet in the shower and use regular soap.    2. To avoid a hard, painful scar, we recommend you use Mederma and/or Silicone Scar patches. You may also use Cocoa Butter, Vitamin E oil, Coconut oil, etc.    You may start this 5-7 days after stitches are removed and when wound is completely closed.    - Mederma scar cream: scar massage over incision 1-2 times per day. You may use topical lotion or Vitamin E oil. Massage an additional few times a day to help the scar become soft and less sensitive.    - Silicone Scar Patches: cut and place over the incision, then massage over the patch to help with scarring and sensitivity. Can be reused up to 10 days if you rinse it clean, let it dry out, then reapply.    Brands: Mepiform Silicone Scar Sheets (recommended), Scar Away, [employer] brand or generic pharmacy brand (Inbilin, [employer], [employer])    3. Continue range of motion exercises as instructed at todays visit.    4. You may take Tylenol 500mg and/or Ibuprofen 400mg every 4-6 hours with food for pain as tolerated as long as you do not have an allergy or medical condition that prevents you from taking them.    5. Therapy recommended:  OT referral placed    6. Immobilization:  Thumb spica brace for 2 more weeks full-time, then wean    7. Follow up:  As needed         Randi Seneca, PA-C [facility]

## 2023-01-19 ENCOUNTER — OFFICE VISIT (OUTPATIENT)
Dept: ORTHOPEDICS | Facility: CLINIC | Age: 73
End: 2023-01-19
Payer: MEDICARE

## 2023-01-19 ENCOUNTER — OFFICE VISIT (OUTPATIENT)
Dept: INTERNAL MEDICINE | Facility: CLINIC | Age: 73
End: 2023-01-19
Payer: MEDICARE

## 2023-01-19 VITALS
WEIGHT: 136 LBS | OXYGEN SATURATION: 97 % | HEART RATE: 67 BPM | SYSTOLIC BLOOD PRESSURE: 122 MMHG | RESPIRATION RATE: 16 BRPM | DIASTOLIC BLOOD PRESSURE: 74 MMHG | BODY MASS INDEX: 26.7 KG/M2 | TEMPERATURE: 98 F | HEIGHT: 60 IN

## 2023-01-19 VITALS — HEIGHT: 61 IN | BODY MASS INDEX: 25.39 KG/M2 | WEIGHT: 134.5 LBS

## 2023-01-19 DIAGNOSIS — E78.5 HYPERLIPIDEMIA ASSOCIATED WITH TYPE 2 DIABETES MELLITUS: ICD-10-CM

## 2023-01-19 DIAGNOSIS — E11.69 HYPERLIPIDEMIA ASSOCIATED WITH TYPE 2 DIABETES MELLITUS: ICD-10-CM

## 2023-01-19 DIAGNOSIS — Z98.890 POST-OPERATIVE STATE: ICD-10-CM

## 2023-01-19 DIAGNOSIS — H93.13 TINNITUS, BILATERAL: ICD-10-CM

## 2023-01-19 DIAGNOSIS — I15.2 HYPERTENSION ASSOCIATED WITH DIABETES: ICD-10-CM

## 2023-01-19 DIAGNOSIS — Z85.3 HISTORY OF BREAST CANCER: ICD-10-CM

## 2023-01-19 DIAGNOSIS — M85.80 OSTEOPENIA, UNSPECIFIED LOCATION: ICD-10-CM

## 2023-01-19 DIAGNOSIS — E11.59 HYPERTENSION ASSOCIATED WITH DIABETES: ICD-10-CM

## 2023-01-19 DIAGNOSIS — E11.69 TYPE 2 DIABETES MELLITUS WITH OTHER SPECIFIED COMPLICATION, WITHOUT LONG-TERM CURRENT USE OF INSULIN: Primary | ICD-10-CM

## 2023-01-19 DIAGNOSIS — E55.9 VITAMIN D DEFICIENCY: ICD-10-CM

## 2023-01-19 DIAGNOSIS — M18.11 ARTHRITIS OF CARPOMETACARPAL (CMC) JOINT OF RIGHT THUMB: Primary | ICD-10-CM

## 2023-01-19 DIAGNOSIS — Z12.11 COLON CANCER SCREENING: ICD-10-CM

## 2023-01-19 PROCEDURE — 1125F AMNT PAIN NOTED PAIN PRSNT: CPT | Mod: HCNC,CPTII,S$GLB,

## 2023-01-19 PROCEDURE — 1125F PR PAIN SEVERITY QUANTIFIED, PAIN PRESENT: ICD-10-PCS | Mod: HCNC,CPTII,S$GLB, | Performed by: FAMILY MEDICINE

## 2023-01-19 PROCEDURE — 3078F PR MOST RECENT DIASTOLIC BLOOD PRESSURE < 80 MM HG: ICD-10-PCS | Mod: HCNC,CPTII,S$GLB, | Performed by: FAMILY MEDICINE

## 2023-01-19 PROCEDURE — 1159F MED LIST DOCD IN RCRD: CPT | Mod: HCNC,CPTII,S$GLB, | Performed by: FAMILY MEDICINE

## 2023-01-19 PROCEDURE — 1100F PTFALLS ASSESS-DOCD GE2>/YR: CPT | Mod: HCNC,CPTII,S$GLB, | Performed by: FAMILY MEDICINE

## 2023-01-19 PROCEDURE — 99214 OFFICE O/P EST MOD 30 MIN: CPT | Mod: HCNC,S$GLB,, | Performed by: FAMILY MEDICINE

## 2023-01-19 PROCEDURE — 3008F PR BODY MASS INDEX (BMI) DOCUMENTED: ICD-10-PCS | Mod: HCNC,CPTII,S$GLB,

## 2023-01-19 PROCEDURE — 99214 PR OFFICE/OUTPT VISIT, EST, LEVL IV, 30-39 MIN: ICD-10-PCS | Mod: HCNC,S$GLB,, | Performed by: FAMILY MEDICINE

## 2023-01-19 PROCEDURE — 1101F PR PT FALLS ASSESS DOC 0-1 FALLS W/OUT INJ PAST YR: ICD-10-PCS | Mod: HCNC,CPTII,S$GLB,

## 2023-01-19 PROCEDURE — 3288F PR FALLS RISK ASSESSMENT DOCUMENTED: ICD-10-PCS | Mod: HCNC,CPTII,S$GLB,

## 2023-01-19 PROCEDURE — 1125F AMNT PAIN NOTED PAIN PRSNT: CPT | Mod: HCNC,CPTII,S$GLB, | Performed by: FAMILY MEDICINE

## 2023-01-19 PROCEDURE — 3078F DIAST BP <80 MM HG: CPT | Mod: HCNC,CPTII,S$GLB, | Performed by: FAMILY MEDICINE

## 2023-01-19 PROCEDURE — 3288F FALL RISK ASSESSMENT DOCD: CPT | Mod: HCNC,CPTII,S$GLB,

## 2023-01-19 PROCEDURE — 99999 PR PBB SHADOW E&M-EST. PATIENT-LVL V: CPT | Mod: PBBFAC,HCNC,, | Performed by: FAMILY MEDICINE

## 2023-01-19 PROCEDURE — 3288F FALL RISK ASSESSMENT DOCD: CPT | Mod: HCNC,CPTII,S$GLB, | Performed by: FAMILY MEDICINE

## 2023-01-19 PROCEDURE — 1159F PR MEDICATION LIST DOCUMENTED IN MEDICAL RECORD: ICD-10-PCS | Mod: HCNC,CPTII,S$GLB, | Performed by: FAMILY MEDICINE

## 2023-01-19 PROCEDURE — 3074F SYST BP LT 130 MM HG: CPT | Mod: HCNC,CPTII,S$GLB, | Performed by: FAMILY MEDICINE

## 2023-01-19 PROCEDURE — 99024 PR POST-OP FOLLOW-UP VISIT: ICD-10-PCS | Mod: HCNC,S$GLB,,

## 2023-01-19 PROCEDURE — 3074F PR MOST RECENT SYSTOLIC BLOOD PRESSURE < 130 MM HG: ICD-10-PCS | Mod: HCNC,CPTII,S$GLB, | Performed by: FAMILY MEDICINE

## 2023-01-19 PROCEDURE — 99024 POSTOP FOLLOW-UP VISIT: CPT | Mod: HCNC,S$GLB,,

## 2023-01-19 PROCEDURE — 99999 PR PBB SHADOW E&M-EST. PATIENT-LVL II: ICD-10-PCS | Mod: PBBFAC,HCNC,,

## 2023-01-19 PROCEDURE — 99999 PR PBB SHADOW E&M-EST. PATIENT-LVL V: ICD-10-PCS | Mod: PBBFAC,HCNC,, | Performed by: FAMILY MEDICINE

## 2023-01-19 PROCEDURE — 99999 PR PBB SHADOW E&M-EST. PATIENT-LVL II: CPT | Mod: PBBFAC,HCNC,,

## 2023-01-19 PROCEDURE — 3288F PR FALLS RISK ASSESSMENT DOCUMENTED: ICD-10-PCS | Mod: HCNC,CPTII,S$GLB, | Performed by: FAMILY MEDICINE

## 2023-01-19 PROCEDURE — 3008F BODY MASS INDEX DOCD: CPT | Mod: HCNC,CPTII,S$GLB, | Performed by: FAMILY MEDICINE

## 2023-01-19 PROCEDURE — 1100F PR PT FALLS ASSESS DOC 2+ FALLS/FALL W/INJURY/YR: ICD-10-PCS | Mod: HCNC,CPTII,S$GLB, | Performed by: FAMILY MEDICINE

## 2023-01-19 PROCEDURE — 3008F PR BODY MASS INDEX (BMI) DOCUMENTED: ICD-10-PCS | Mod: HCNC,CPTII,S$GLB, | Performed by: FAMILY MEDICINE

## 2023-01-19 PROCEDURE — 1125F PR PAIN SEVERITY QUANTIFIED, PAIN PRESENT: ICD-10-PCS | Mod: HCNC,CPTII,S$GLB,

## 2023-01-19 PROCEDURE — 1101F PT FALLS ASSESS-DOCD LE1/YR: CPT | Mod: HCNC,CPTII,S$GLB,

## 2023-01-19 PROCEDURE — 3008F BODY MASS INDEX DOCD: CPT | Mod: HCNC,CPTII,S$GLB,

## 2023-01-19 NOTE — PATIENT INSTRUCTIONS
"Check with your pharmacist regarding shingrix vaccine #2.    The bivalent covid booster is now available. You can schedule an appointment for the vaccine through [facility] or ask  to assist you with scheduling an appointment for the vaccine.    The bivalent vaccines, known as the "Omicron" vaccines, target both the original strain of COVID-19 as well as the Omicron variant, which is now the predominant strain in the [place].    The Omicron booster is authorized for individuals 12 years and older and is given two months after last dose of primary or booster shot.   "

## 2023-01-19 NOTE — PROGRESS NOTES
Subjective:       Patient ID: Renea Bourgeois is a 73 y.o. female.    Chief Complaint: Medicare AWV Follow Up    Patient presents to clinic today for followup of chronic conditions and review HRA.    Review of Systems   Constitutional:  Negative for chills, fatigue, fever and unexpected weight change.   Eyes:  Negative for visual disturbance.   Respiratory:  Negative for cough and shortness of breath.    Cardiovascular:  Negative for chest pain.   Musculoskeletal:  Negative for myalgias.   Neurological:  Negative for headaches.       Objective:      Physical Exam  Vitals reviewed.   Constitutional:       General: She is not in acute distress.     Appearance: She is well-developed.   HENT:      Head: Normocephalic and atraumatic.   Eyes:      General: Lids are normal. No scleral icterus.     Extraocular Movements: Extraocular movements intact.      Conjunctiva/sclera: Conjunctivae normal.      Pupils: Pupils are equal, round, and reactive to light.   Pulmonary:      Effort: Pulmonary effort is normal.   Neurological:      Mental Status: She is alert and oriented to person, place, and time.      Cranial Nerves: No cranial nerve deficit.      Gait: Gait normal.   Psychiatric:         Mood and Affect: Mood and affect normal.       Assessment:       1. Type 2 diabetes mellitus with other specified complication, without long-term current use of insulin    2. Hypertension associated with diabetes    3. Hyperlipidemia associated with type 2 diabetes mellitus    4. Vitamin D deficiency    5. Tinnitus, bilateral    6. Colon cancer screening    7. Osteopenia, unspecified location    8. History of breast cancer        Plan:   1. Type 2 diabetes mellitus with other specified complication, without long-term current use of insulin  Assessment & Plan:  Controlled, continue current medications    Lab Results   Component Value Date    HGBA1C 5.8 (H) 12/02/2022    HGBA1C 5.8 (H) 05/26/2022    LDLCALC 113.4 12/02/2022    LABMICR 13.0  06/02/2022    CREATRANDUR 223.0 06/02/2022    MICALBCREAT 5.8 06/02/2022         Orders:  -     Hemoglobin A1C; Future; Expected date: 07/19/2023    2. Hypertension associated with diabetes  Overview:  Followed by Cardiology, continue current treatment plan, Dr. Jovan Urbina @ [facility]    Assessment & Plan:  Controlled, continue current medications    Orders:  -     TSH; Future; Expected date: 07/19/2023  -     CBC Auto Differential; Future; Expected date: 07/19/2023    3. Hyperlipidemia associated with type 2 diabetes mellitus  Assessment & Plan:  Above goal, work on diet, continue crestor    Lab Results   Component Value Date    CHOL 201 (H) 12/02/2022    LDLCALC 113.4 12/02/2022    TRIG 143 12/02/2022    HDL 59 12/02/2022    ALT 18 12/02/2022    AST 19 12/02/2022    ALKPHOS 60 12/02/2022         Orders:  -     Comprehensive Metabolic Panel; Future; Expected date: 07/19/2023  -     Lipid Panel; Future; Expected date: 07/19/2023    4. Vitamin D deficiency  Assessment & Plan:  Controlled, continue supplement     Orders:  -     Vitamin D; Future; Expected date: 07/19/2023    5. Tinnitus, bilateral  -     Ambulatory referral/consult to ENT; Future; Expected date: 01/26/2023    6. Colon cancer screening  -     Ambulatory referral/consult to Endo Procedure ; Future; Expected date: 02/22/2023    7. Osteopenia, unspecified location  Overview:  Followed by Rheumatology, continue current treatment plan       8. History of breast cancer  Overview:  Followed by Hematology/Oncology, continue current treatment plan         HRA reviewed. Reports cough has resolved.  Health Maintenance reviewed/updated.

## 2023-01-28 PROBLEM — H93.13 TINNITUS, BILATERAL: Status: ACTIVE | Noted: 2023-01-28

## 2023-01-28 NOTE — ASSESSMENT & PLAN NOTE
Above goal, work on diet, continue crestor    Lab Results   Component Value Date    CHOL 201 (H) 12/02/2022    LDLCALC 113.4 12/02/2022    TRIG 143 12/02/2022    HDL 59 12/02/2022    ALT 18 12/02/2022    AST 19 12/02/2022    ALKPHOS 60 12/02/2022

## 2023-01-28 NOTE — ASSESSMENT & PLAN NOTE
Controlled, continue current medications    Lab Results   Component Value Date    HGBA1C 5.8 (H) 12/02/2022    HGBA1C 5.8 (H) 05/26/2022    LDLCALC 113.4 12/02/2022    LABMICR 13.0 06/02/2022    CREATRANDUR 223.0 06/02/2022    MICALBCREAT 5.8 06/02/2022

## 2023-01-30 ENCOUNTER — OFFICE VISIT (OUTPATIENT)
Dept: OTOLARYNGOLOGY | Facility: CLINIC | Age: 73
End: 2023-01-30
Payer: MEDICARE

## 2023-01-30 ENCOUNTER — HOSPITAL ENCOUNTER (OUTPATIENT)
Dept: PREADMISSION TESTING | Facility: HOSPITAL | Age: 73
Discharge: HOME | End: 2023-01-30
Attending: ORTHOPAEDIC SURGERY
Payer: MEDICARE

## 2023-01-30 ENCOUNTER — CLINICAL SUPPORT (OUTPATIENT)
Dept: AUDIOLOGY | Facility: CLINIC | Age: 73
End: 2023-01-30
Payer: MEDICARE

## 2023-01-30 VITALS — BODY MASS INDEX: 26.87 KG/M2 | WEIGHT: 137.56 LBS | TEMPERATURE: 97 F

## 2023-01-30 DIAGNOSIS — H90.3 SENSORINEURAL HEARING LOSS, BILATERAL: Primary | ICD-10-CM

## 2023-01-30 DIAGNOSIS — H93.13 TINNITUS, BILATERAL: ICD-10-CM

## 2023-01-30 DIAGNOSIS — H90.3 SENSORINEURAL HEARING LOSS (SNHL), BILATERAL: Primary | ICD-10-CM

## 2023-01-30 DIAGNOSIS — Z12.11 COLON CANCER SCREENING: Primary | ICD-10-CM

## 2023-01-30 PROCEDURE — 99999 PR PBB SHADOW E&M-EST. PATIENT-LVL I: ICD-10-PCS | Mod: PBBFAC,HCNC,, | Performed by: AUDIOLOGIST-HEARING AID FITTER

## 2023-01-30 PROCEDURE — 1100F PR PT FALLS ASSESS DOC 2+ FALLS/FALL W/INJURY/YR: ICD-10-PCS | Mod: HCNC,CPTII,S$GLB, | Performed by: STUDENT IN AN ORGANIZED HEALTH CARE EDUCATION/TRAINING PROGRAM

## 2023-01-30 PROCEDURE — 92567 TYMPANOMETRY: CPT | Mod: HCNC,S$GLB,, | Performed by: AUDIOLOGIST-HEARING AID FITTER

## 2023-01-30 PROCEDURE — 1126F AMNT PAIN NOTED NONE PRSNT: CPT | Mod: HCNC,CPTII,S$GLB, | Performed by: STUDENT IN AN ORGANIZED HEALTH CARE EDUCATION/TRAINING PROGRAM

## 2023-01-30 PROCEDURE — 3008F PR BODY MASS INDEX (BMI) DOCUMENTED: ICD-10-PCS | Mod: HCNC,CPTII,S$GLB, | Performed by: STUDENT IN AN ORGANIZED HEALTH CARE EDUCATION/TRAINING PROGRAM

## 2023-01-30 PROCEDURE — 99999 PR PBB SHADOW E&M-EST. PATIENT-LVL III: ICD-10-PCS | Mod: PBBFAC,HCNC,, | Performed by: STUDENT IN AN ORGANIZED HEALTH CARE EDUCATION/TRAINING PROGRAM

## 2023-01-30 PROCEDURE — 99999 PR PBB SHADOW E&M-EST. PATIENT-LVL I: CPT | Mod: PBBFAC,HCNC,, | Performed by: AUDIOLOGIST-HEARING AID FITTER

## 2023-01-30 PROCEDURE — 92557 PR COMPREHENSIVE HEARING TEST: ICD-10-PCS | Mod: HCNC,S$GLB,, | Performed by: AUDIOLOGIST-HEARING AID FITTER

## 2023-01-30 PROCEDURE — 1159F PR MEDICATION LIST DOCUMENTED IN MEDICAL RECORD: ICD-10-PCS | Mod: HCNC,CPTII,S$GLB, | Performed by: STUDENT IN AN ORGANIZED HEALTH CARE EDUCATION/TRAINING PROGRAM

## 2023-01-30 PROCEDURE — 1159F MED LIST DOCD IN RCRD: CPT | Mod: HCNC,CPTII,S$GLB, | Performed by: STUDENT IN AN ORGANIZED HEALTH CARE EDUCATION/TRAINING PROGRAM

## 2023-01-30 PROCEDURE — 99204 PR OFFICE/OUTPT VISIT, NEW, LEVL IV, 45-59 MIN: ICD-10-PCS | Mod: HCNC,S$GLB,, | Performed by: STUDENT IN AN ORGANIZED HEALTH CARE EDUCATION/TRAINING PROGRAM

## 2023-01-30 PROCEDURE — 3288F FALL RISK ASSESSMENT DOCD: CPT | Mod: HCNC,CPTII,S$GLB, | Performed by: STUDENT IN AN ORGANIZED HEALTH CARE EDUCATION/TRAINING PROGRAM

## 2023-01-30 PROCEDURE — 1126F PR PAIN SEVERITY QUANTIFIED, NO PAIN PRESENT: ICD-10-PCS | Mod: HCNC,CPTII,S$GLB, | Performed by: STUDENT IN AN ORGANIZED HEALTH CARE EDUCATION/TRAINING PROGRAM

## 2023-01-30 PROCEDURE — 92557 COMPREHENSIVE HEARING TEST: CPT | Mod: HCNC,S$GLB,, | Performed by: AUDIOLOGIST-HEARING AID FITTER

## 2023-01-30 PROCEDURE — 3008F BODY MASS INDEX DOCD: CPT | Mod: HCNC,CPTII,S$GLB, | Performed by: STUDENT IN AN ORGANIZED HEALTH CARE EDUCATION/TRAINING PROGRAM

## 2023-01-30 PROCEDURE — 1100F PTFALLS ASSESS-DOCD GE2>/YR: CPT | Mod: HCNC,CPTII,S$GLB, | Performed by: STUDENT IN AN ORGANIZED HEALTH CARE EDUCATION/TRAINING PROGRAM

## 2023-01-30 PROCEDURE — 3288F PR FALLS RISK ASSESSMENT DOCUMENTED: ICD-10-PCS | Mod: HCNC,CPTII,S$GLB, | Performed by: STUDENT IN AN ORGANIZED HEALTH CARE EDUCATION/TRAINING PROGRAM

## 2023-01-30 PROCEDURE — 99204 OFFICE O/P NEW MOD 45 MIN: CPT | Mod: HCNC,S$GLB,, | Performed by: STUDENT IN AN ORGANIZED HEALTH CARE EDUCATION/TRAINING PROGRAM

## 2023-01-30 PROCEDURE — 92567 PR TYMPA2METRY: ICD-10-PCS | Mod: HCNC,S$GLB,, | Performed by: AUDIOLOGIST-HEARING AID FITTER

## 2023-01-30 PROCEDURE — 99999 PR PBB SHADOW E&M-EST. PATIENT-LVL III: CPT | Mod: PBBFAC,HCNC,, | Performed by: STUDENT IN AN ORGANIZED HEALTH CARE EDUCATION/TRAINING PROGRAM

## 2023-01-30 NOTE — PROGRESS NOTES
SUBJECTIVE:      Patient ID: Renea Bourgeois is a 73 y.o. female.    HPI: The patient presents for wound check. She is 3 weeks, 5 days s/p ARTHROPLASTY, FINGER (Right) thumb basal joint using Arthrex internal brace system by Dr. Kuhn on 1/5/23.  States that she is concerned about an open area to her incision.  She has been compliant with all postop instructions thus far.  She starts therapy on Thursday.  Denies fever, chills, sweats.    Interval hx 1/19/23: Ms. Bourgeois is here today for post-operative visit #2.  She is 14 days status post ARTHROPLASTY, FINGER (Right) thumb basal joint using Arthrex internal brace system by Dr. Kuhn on 1/5/23. She reports that she is doing well.  Pain is 1/10.  She is taking ibuprofen and Tylenol as needed for pain.  She states that the brace has been digging in her thumb.  She has been compliant with postop instructions and keeping the extremity dry. She denies fever, chills, and sweats since the time of the surgery.     Interval hx 1/10/23: Ms. Bourgeois is here today for post-operative visit #1.  She is 5 days status post ARTHROPLASTY, FINGER (Right) thumb basal joint using Arthrex internal brace system by Dr. Kuhn on 1/5/23. She reports that she is doing well.  Pain is 0/10.  She takes ibuprofen as needed for pain.  She has been compliant with postop instructions and keeping the extremity dry. She denies fever, chills, and sweats since the time of the surgery.     Past Medical History:   Diagnosis Date    Allergy     Anemia 11/14/2017    Angina pectoris     followed by cardiology    Arthritis     Breast cancer     Right T1 Ductal Ca in situ,high oncotype Dx 33, Estrogen positive. Lumpectomy, TAC chemo x 6 cyscles then RT,on aromatase inhibitor    Cataract     Chondromalacia of right patella 03/26/2018    Stable and controlled. Continue current treatment plan as previously prescribed with your PCP.      Diabetes mellitus type II 2011    DM (diabetes mellitus) 2011    BS  112 am 08/04/2017    DM (diabetes mellitus) 2010     am 06/22/2020    Elevated BP without diagnosis of hypertension 11/27/2018    GERD (gastroesophageal reflux disease)     History of colon polyps 02/21/2018    The patient had adenomatous colon polyps in 2014.      History of renal calculi 08/11/2015    Right obstructing 8/20/13 - passed.     Hyperlipidemia     Hypertension     Kidney stone     right side, passed    Malignant neoplasm of upper-outer quadrant of right breast in female, estrogen receptor positive 07/23/2018    Followed by Dr. Robert Lozano disease     reported per pt    Nuclear sclerosis of both eyes 10/05/2015    Osteopenia     Osteoporosis 04/12/2019    Pseudophakia 10/15/2015    PVC (premature ventricular contraction)     on and off    Refractive error 10/05/2015    Trouble in sleeping     Type 2 diabetes mellitus 2010     am 12/06/2022     Past Surgical History:   Procedure Laterality Date    ARTHROPLASTY OF JOINT OF FINGER Right 1/5/2023    Procedure: ARTHROPLASTY, FINGER;  Surgeon: Iban Kuhn MD;  Location: [facility];  Service: Orthopedics;  Laterality: Right;  right thumb basal joint arthroplasty    BREAST BIOPSY      BREAST LUMPECTOMY  02/11/2011    right    CATARACT EXTRACTION Bilateral 10/14/2015    CHOLECYSTECTOMY  1989    open    COLONOSCOPY N/A 02/22/2018    Procedure: COLONOSCOPY;  Surgeon: Carlito Odonnell MD;  Location: [facility];  Service: Endoscopy;  Laterality: N/A;    CYST REMOVAL Left 11/2017    foot    DILATION AND CURETTAGE OF UTERUS  10/2010    In [place]    ESOPHAGOGASTRODUODENOSCOPY N/A 06/29/2020    Procedure: EGD (ESOPHAGOGASTRODUODENOSCOPY);  Surgeon: Nancy Hahn MD;  Location: [facility];  Service: Endoscopy;  Laterality: N/A;    EYE SURGERY  2012    cataract    GALLBLADDER SURGERY      removed in 1989    HAND SURGERY Left 2015    Nasal septal deviation repair  2009    toenail edges removed      TONSILLECTOMY  1959    TOTAL REDUCTION  MAMMOPLASTY Left     2015    TUBAL LIGATION  1981    UVULOPALATOPLASTY       Family History   Problem Relation Age of Onset    Alzheimer's disease Mother     Diabetes Father     Hypertension Father     Stroke Father     Cancer Father 65        metastatic when diagnosed    Cataracts Sister     Stroke Brother         Stoke    Cataracts Brother     Parkinsonism Brother     Glaucoma Maternal Grandmother     Cancer Paternal Grandfather         prostate    Atrial fibrillation Son         35    Heart disease Son     Psoriasis Cousin     Cancer Other         kidney    Alcohol abuse Neg Hx     Drug abuse Neg Hx     COPD Neg Hx     Birth defects Neg Hx     Mental retardation Neg Hx     Mental illness Neg Hx     Kidney disease Neg Hx     Hyperlipidemia Neg Hx     Melanoma Neg Hx     Lupus Neg Hx      Social History     Socioeconomic History    Marital status:      Spouse name: Agustín    Number of children: 4   Occupational History    Occupation: Retired Teacher     [place] [employer]   Tobacco Use    Smoking status: Never    Smokeless tobacco: Never   Substance and Sexual Activity    Alcohol use: No     Alcohol/week: 0.0 standard drinks    Drug use: No    Sexual activity: Not Currently     Partners: Male     Birth control/protection: Post-menopausal   Social History Narrative    aretired from homeschooling/,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 Caffeine intake;1-2 per week - dennis calvo. Decaffinated tea and most beveridges. Does have a living will - at home in her filing cabinet.      Social Determinants of Health     Financial Resource Strain: Low Risk     Difficulty of Paying Living Expenses: Not hard at all   Food Insecurity: No Food Insecurity    Worried About Running Out of Food in the Last Year: Never true    Ran Out of Food in the Last Year: Never true   Transportation Needs: No Transportation Needs    Lack of Transportation (Medical): No    Lack of Transportation (Non-Medical): No   Physical Activity:  Insufficiently Active    Days of Exercise per Week: 6 days    Minutes of Exercise per Session: 20 min   Stress: Stress Concern Present    Feeling of Stress : To some extent   Social Connections: Moderately Integrated    Frequency of Communication with Friends and Family: More than three times a week    Frequency of Social Gatherings with Friends and Family: More than three times a week    Attends Alevism Services: More than 4 times per year    Active Member of Clubs or Organizations: Yes    Attends Club or Organization Meetings: More than 4 times per year    Marital Status:    Housing Stability: Low Risk     Unable to Pay for Housing in the Last Year: No    Number of Places Lived in the Last Year: 1    Unstable Housing in the Last Year: No     Medication List with Changes/Refills   Current Medications    ACCU-CHEK JD PLUS TEST STRP STRP    TEST three times a day    ACETAMINOPHEN (TYLENOL) 500 MG TABLET    Take 500 mg by mouth every 6 (six) hours as needed for Pain.    ALCOHOL PREP PADS PADM        ALPRAZOLAM (XANAX) 0.25 MG TABLET    Take 1 tablet (0.25 mg total) by mouth 3 (three) times daily as needed for Anxiety.    CALCIUM-VITAMIN D (CALCIUM 600 + D,3,) 600 MG(1,500MG) -400 UNIT TAB    Take 1 tablet by mouth 2 (two) times daily.    CHOLECALCIFEROL, VITAMIN D3, (VITAMIN D3) 1,000 UNIT CAPSULE    Take 1 capsule (1,000 Units total) by mouth once daily.    COVID-19 VAC, VIRIDIANA,PFIZER,,PF, (PFIZER COVID-19 VIRIDIANA VACCN,PF,) 30 MCG/0.3 ML INJECTION    Inject into the muscle.    DICLOFENAC SODIUM (VOLTAREN) 1 % GEL    Apply 2 g topically once daily.    ESOMEPRAZOLE MAGNESIUM (NEXIUM ORAL)    Take by mouth.    FERROUS GLUCONATE (FERGON) 240 (27 FE) MG TABLET    Take 1 tablet (240 mg total) by mouth once daily.    FLUTICASONE PROPIONATE (FLONASE) 50 MCG/ACTUATION NASAL SPRAY    2 sprays (100 mcg total) by Each Nostril route daily as needed. 2 Spray, Suspension Nasal Every day    GABAPENTIN (NEURONTIN) 100 MG  CAPSULE    Take 1 capsule (100 mg total) by mouth 3 (three) times daily.    GLIMEPIRIDE (AMARYL) 1 MG TABLET    TAKE 1 TABLET(1 MG) BY MOUTH EVERY DAY    LANCETS (ACCU-CHEK SOFTCLIX LANCETS) MISC    1 each by Misc.(Non-Drug; Combo Route) route 3 (three) times daily.    LANCING DEVICE MISC        MAGNESIUM ASPARTATE HCL 61 MG (615 MG) TBEC    Take by mouth once.    METOPROLOL SUCCINATE (TOPROL-XL) 25 MG 24 HR TABLET    Take 25 mg by mouth once daily.    OXYCODONE-ACETAMINOPHEN (PERCOCET)  MG PER TABLET    Take 1 tablet by mouth every 6 (six) hours as needed for Pain.    POLYETHYLENE GLYCOL (COLYTE) 240-22.72-6.72 -5.84 GRAM SOLR    Take 4,000 mLs by mouth once. for 1 dose    ROSUVASTATIN (CRESTOR) 10 MG TABLET    TAKE 1 TABLET BY MOUTH EVERY DAY    TRAZODONE (DESYREL) 100 MG TABLET    TAKE 1 TABLET BY MOUTH AT BEDTIME    TURMERIC 400 MG CAP    Take 400 mg by mouth 2 (two) times daily as needed.    VARICELLA-ZOSTER GE-AS01B, PF, (SHINGRIX, PF,) 50 MCG/0.5 ML INJECTION    Inject 0.5 mLs into the muscle once. for 1 dose     Review of patient's allergies indicates:  No Known Allergies    OBJECTIVE:     Physical exam:    Vitals:    01/31/23 1024   Weight: 62.5 kg (137 lb 12.6 oz)   Height: 5' (1.524 m)   PainSc: 0-No pain   PainLoc: Hand       Vital signs are stable, patient is afebrile.  Patient is well dressed and well groomed, no acute distress.  Alert and oriented to person, place, and time.    Right UE:  Incision is clean, dry, and intact. Healing well. No open area to incision.  There is no erythema or exudate. There is no sign of any infection.   She is NVI.   2+ pulses noted.  Cap refill <2 seconds  Motor intact to hand    ASSESSMENT         Encounter Diagnoses   Name Primary?    Arthritis of carpometacarpal (CMC) joint of right thumb Yes    Post-operative state     Encounter for post surgical wound check                Three weeks 5 days status post ARTHROPLASTY, FINGER (Right) thumb basal joint using  Arthrex internal brace system    PLAN:           Renea was seen today for post-op evaluation.    Diagnoses and all orders for this visit:    Arthritis of carpometacarpal (CMC) joint of right thumb    Post-operative state    Encounter for post surgical wound check        - PO instruction reviewed and provided to patient  - Reassured that the incision is healing well and that there are no open areas. She is OK to start therapy Thursday.  - The incision was cleaned with hydrogen peroxide.  Band-Aid applied.  - Continue thumb spica brace  - Patient should notify the office of any signs or symptoms of infection including fevers, erythema, purulent drainage, increasing pain.    - Follow up PRN         Dulce Naqvi PA-C   [facility]

## 2023-01-30 NOTE — PROGRESS NOTES
Referring provider: Dr. Lamar Bourgeois was seen 01/30/2023 for an audiological evaluation.  Patient complains of tinnitus. Onset of symptoms was gradual starting a few years ago with gradually worsening course since that time. Patient describes the tinnitus as constant located in the bilateral ear. The quality is described as medium range pitch. The pattern is nonpulsatile with an intensity that is medium. Patient describes her level of annoyance as minimally annoying, always aware. Associated symptoms include no hearing loss, pain, dizziness, drainage or recurrent otitis.  Previous treatments include none. She has history of mild HF-SNHL and tinnitus AU for years. She does not appreciate acute changes in her hearing acuity. She admits to increased stress over the past few years. Her last audiogram was in 2019 indicating a right asymmetry. Had a normal MRI brain.     Results reveal a normal-to-moderate sensorineural hearing loss 250-8000 Hz for the right ear, and a normal-to-mild sensorineural hearing loss 250-8000 Hz for the left ear.   Speech Reception Thresholds were 15 dBHL for the right ear and 15 dBHL for the left ear.   Word recognition scores were excellent for the right ear and excellent for the left ear.   Tympanograms were Type A for the right ear and Type A for the left ear.    Patient was counseled on the above findings.    Recommendations:  ENT at completion of this visit.   Patient may consider hearing aids for tinnitus management.

## 2023-01-30 NOTE — PATIENT INSTRUCTIONS
Tinnitus (ringing in the ears)  Tinnitus is the perception of sound when no actual external noise is present. While it is commonly referred to as ringing in the ears, tinnitus can manifest many different perceptions of sound, including buzzing, hissing, whistling, swooshing, and clicking.     Preventative measures to help prevent and minimize tinnitus include:     Reduce exposure to extremely loud noise  Avoid total silence  Decrease salt intake  Monitor one's blood pressure  Avoid stimulants such as caffeine and nicotine  Exercise  Reduce fatigue  Manage stress    Sound Therapy: Sound therapy is a broad term that may be used in many ways. In general, sound therapy means the use of external noise in order to alter your perception of tinnitus. Like other tinnitus treatments, sound therapies do not cure the condition, but they may significantly lower the burden and intensity of tinnitus. Some options for sound therapy include:    Portable sound generator/sound machines: these can be purchased at certain electronic suppliers. They can produce soothing sounds that help to dampen your brain's recognition of the tinnitus.   Example of a portable and affordable sound machine: https://[employer]/Portable-Relaxing-Soothing-Charging-Auto-Off/dp/E38B9LCHXX/ref=pd_lpo_2?pd_rd_i=[email or web address]&psc=1    Home masking: such as the use of electric fans, radios or television to dampen the tinnitus.    Music therapy: Many patients find that music, particularly classical passages that don't contain wide variations in loudness (ampliltude) can be both soothing to the limbic system (the emotional processor in the brain that is commonly negatively linked to a patient's reaction to tinnitus) and stimulating to the auditory cortex. There are several Apps available that have preselected music that can help with tinnitus. Some examples include: [employer], [place], [employer] Tinnitus Calmer, and MyNoise, among others.    Amplification: The use  of hearing aids and a combination of hearing aids and maskers are often effective ways to minimize tinnitus. While it is not clear whether hearing aids help by amplifying background sounds that can mask out the tinnitus or by actually altering the production of tinnitus, most hearing aid wearers report at least some reduction in their tinnitus. This may be due to the reduction in contrast between tinnitus and silence, or because of the new stimulation provided to the brain. This an especially helpful option when the patient also suffers from hearing loss as the hearing aids may simultaneously improving the hearing and tinnitus.    Tinnitus Retraining Therapy (TRT) does exist, but is not offered by any providers in our state. If all other measures fail and your tinnitus is debilitating it might be worth searching for TRT providers in nearby states. These techniques consist of two main components -- directive counseling and low level sound generators. Directive counseling provides intensive, individualized education regarding the causes and effects of tinnitus on the ear, the brain, and the coping mechanism. Low-level sound generators may help the brain relearn a pattern that will de-emphasize the importance of the tinnitus. These devices also may be helpful in desensitizing patients who are overly sensitive to sound.    There are also support groups that exist which can be helpful for some patients.     More helpful information can be found on the American Tinnitus Association website: https://www.marcial.org/

## 2023-01-30 NOTE — PROGRESS NOTES
Chief complaint:   Chief Complaint   Patient presents with    Tinnitus     Pt states has been going on x years, louder in last year          Referring Provider:  Kamini [place]  [address] [facility] [place] [address]    History of Present Illness:     Ms. Bourgeois is a 73 y.o. female presenting for evaluation of tinnitus.     Patient presents with tinnitus. Onset of symptoms was gradual starting a few years ago with gradually worsening course since that time. Patient describes the tinnitus as constant located in the bilateral ear. The quality is described as medium range pitch. The pattern is nonpulsatile with an intensity that is medium. Patient describes her level of annoyance as minimally annoying, always aware. Associated symptoms include no hearing loss, pain, dizziness, drainage or recurrent otitis.  Previous treatments include none.    The patient denies significant eustachian tube risk factors: ear pressure, ear pain, sensation of clogging, ear symptoms with a cold or sinusitis, popping or crackling sensation, ringing in the ear, and muffled hearing.     The patient also denies pain deep within the ear, tenderness around the ear canal or pre-auricular area, or headaches.         History        Past Medical History:   Past Medical History:   Diagnosis Date    Allergy     Anemia 11/14/2017    Angina pectoris     followed by cardiology    Arthritis     Breast cancer     Right T1 Ductal Ca in situ,high oncotype Dx 33, Estrogen positive. Lumpectomy, TAC chemo x 6 cyscles then RT,on aromatase inhibitor    Cataract     Chondromalacia of right patella 03/26/2018    Stable and controlled. Continue current treatment plan as previously prescribed with your PCP.      Diabetes mellitus type II 2011    DM (diabetes mellitus) 2011     am 08/04/2017    DM (diabetes mellitus) 2010     am 06/22/2020    Elevated BP without diagnosis of hypertension 11/27/2018    GERD (gastroesophageal reflux  disease)     History of colon polyps 02/21/2018    The patient had adenomatous colon polyps in 2014.      History of renal calculi 08/11/2015    Right obstructing 8/20/13 - passed.     Hyperlipidemia     Hypertension     Kidney stone     right side, passed    Malignant neoplasm of upper-outer quadrant of right breast in female, estrogen receptor positive 07/23/2018    Followed by Dr. Robert Lozano disease     reported per pt    Nuclear sclerosis of both eyes 10/05/2015    Osteopenia     Osteoporosis 04/12/2019    Pseudophakia 10/15/2015    PVC (premature ventricular contraction)     on and off    Refractive error 10/05/2015    Trouble in sleeping     Type 2 diabetes mellitus 2010     am 12/06/2022    .          Past Surgical History:  Past Surgical History:   Procedure Laterality Date    ARTHROPLASTY OF JOINT OF FINGER Right 1/5/2023    Procedure: ARTHROPLASTY, FINGER;  Surgeon: Iban Kuhn MD;  Location: [facility];  Service: Orthopedics;  Laterality: Right;  right thumb basal joint arthroplasty    BREAST BIOPSY      BREAST LUMPECTOMY  02/11/2011    right    CATARACT EXTRACTION Bilateral 10/14/2015    CHOLECYSTECTOMY  1989    open    COLONOSCOPY N/A 02/22/2018    Procedure: COLONOSCOPY;  Surgeon: Carlito Odonnell MD;  Location: [facility];  Service: Endoscopy;  Laterality: N/A;    CYST REMOVAL Left 11/2017    foot    DILATION AND CURETTAGE OF UTERUS  10/2010    [place]    ESOPHAGOGASTRODUODENOSCOPY N/A 06/29/2020    Procedure: EGD (ESOPHAGOGASTRODUODENOSCOPY);  Surgeon: Nancy Hahn MD;  Location: [facility];  Service: Endoscopy;  Laterality: N/A;    EYE SURGERY  2012    cataract    GALLBLADDER SURGERY      removed in 1989    HAND SURGERY Left 2015    Nasal septal deviation repair  2009    toenail edges removed      TONSILLECTOMY  1959    TOTAL REDUCTION MAMMOPLASTY Left     2015    TUBAL LIGATION  1981    UVULOPALATOPLASTY     .         Medications: Medication list was reviewed. She  has  a current medication list which includes the following prescription(s): accu-chek nancy plus test strp, acetaminophen, alcohol prep pads, calcium-vitamin d, cholecalciferol (vitamin d3), pfizer covid-19 seth vaccn(pf), esomeprazole magnesium, ferrous gluconate, fluticasone propionate, gabapentin, glimepiride, lancets, lancing device, magnesium aspartate hcl, metoprolol succinate, rosuvastatin, trazodone, turmeric, alprazolam, diclofenac sodium, and oxycodone-acetaminophen, and the following Facility-Administered Medications: chlorhexidine, sodium chloride 0.9%, triamcinolone acetonide, and zoledronic acid-mannitol & water.         Allergies: Review of patient's allergies indicates:  No Known Allergies         Family history: family history includes Alzheimer's disease in her mother; Atrial fibrillation in her son; Cancer in her paternal grandfather and another family member; Cancer (age of onset: 65) in her father; Cataracts in her brother and sister; Diabetes in her father; Glaucoma in her maternal grandmother; Heart disease in her son; Hypertension in her father; Parkinsonism in her brother; Psoriasis in her cousin; Stroke in her brother and father.         Social History          Alcohol use:  reports no history of alcohol use.            Tobacco:  reports that she has never smoked. She has never used smokeless tobacco.         Please see the patient's intake form for full details of past medical history, past surgical history, family history, social history and review of systems. ?This information was reviewed by me and noted.      Physical Examination     General: Well developed, well nourished, well hydrated. Verbal with a strong voice and not dysphonic.     Head/Face: Normocephalic, atraumatic. No scars or lesions. Facial musculature equal.     Eyes: No scleral icterus or conjunctival hemorrhage. EOMI. PERRLA.     Ears:     Right ear: No gross deformity. EAC is clear of debris and erythema. The TM is intact  with a pneumatized middle ear. No signs of retraction, fluid or infection.      Left ear: No gross deformity. EAC is clear of debris and erythema. The TM is intact with a pneumatized middle ear. No signs of retraction, fluid or infection.       Neurologic: Moving all extremities without gross abnormality.CN II-XII grossly intact. House-Brackmann 1/6. No signs of nystagmus.      Psych: Alert and oriented to person, place, and time with an appropriate mood and affect.       Audiogram     Audiogram was independently reviewed        Sloping mild Sensorineural Hearing Loss AU  Excellent word rec  Type A tymps     Assessment     1. Sensorineural hearing loss (SNHL), bilateral    2. Tinnitus, bilateral        Plan:      The diagnosis and options of management were discussed at length with the patient including hearing function, hearing conservation, and tinnitus management. We spent a considerable amount of time discussing drug therapy, antioxidants, biofeedback, masking and sound therapy for tinnitus.     We discussed some preventative measures to help prevent and minimize tinnitus including:     Reduce exposure to extremely loud noise  Avoid total silence  Decrease salt intake  Monitor one's blood pressure  Avoid stimulants such as caffeine and nicotine  Exercise  Reduce fatigue  Manage stress    We also discussed different types of Sound Therapy. Sound therapy is a broad term that may be used in many ways, depending on the specific product, clinical setting, or individual clinician. In general, sound therapy means the use of external noise in order to alter your perception of, or reaction to, tinnitus. Like other tinnitus treatments, sound therapies do not cure the condition, but they may significantly lower the perceived burden and intensity of tinnitus. Some options for sound therapy include:    Portable sound generator/sound machines: these can be purchased at certain electronic suppliers. They can produce soothing  sounds that help to dampen your brain's recognition of the tinnitus.  Home masking: such as the use of electric fans, radios or television.  Music therapy: Many patients find that music, particularly classical passages that don't contain wide variations in loudness (ampliltude) can be both soothing to the limbic system (the emotional processor in the brain that is commonly negatively linked to a patient's reaction to tinnitus) and stimulating to the auditory cortex. There are several Apps available that have preselected music that can help with tinnitus. Some examples include: Audio Care, Kemar, Beltone Tinnitus Calmer, and MyNoise, among others.  Amplification: The use of hearing aids and a combination of hearing aids and maskers are often effective ways to minimize tinnitus. While it is not clear whether hearing aids help by amplifying background sounds that can mask out the tinnitus or by actually altering the production of tinnitus, most hearing aid wearers report at least some reduction in their tinnitus. This may be due to the reduction in contrast between tinnitus and silence, or because of the new stimulation provided to the brain. This an especially helpful option when the patient also suffers from hearing loss as the hearing aids may simultaneously improving the hearing and tinnitus.    Formal Tinnitus Retraining Therapy (TRT) does exist, but is not offered by any providers in our state. If all other measures fail and your tinnitus is debilitating it might be worth searching for TRT providers in nearby states. These techniques consist of two main components -- directive counseling and low level sound generators. Directive counseling provides intensive, individualized education regarding the causes and effects of tinnitus on the ear, the brain, and the coping mechanism. Low-level sound generators may help the brain relearn a pattern that will de-emphasize the importance of the tinnitus. These devices also may  be helpful in desensitizing patients who are overly sensitive to sound.    There are also support groups that exist which can be helpful for some patients.     More helpful information can be found ton the American Tinnitus Association website: https://www.marcial.org/    I recommend conservative preventative measures and sound therapy techniques listed above.      I also recommend repeat audio in 1 year, sooner if notices worsening.            Luis Carlos Najera MD  [facility]   [facility] - [address]  [place] [address]  P: [phone]  F: [phone]

## 2023-01-31 ENCOUNTER — OFFICE VISIT (OUTPATIENT)
Dept: ORTHOPEDICS | Facility: CLINIC | Age: 73
End: 2023-01-31
Payer: MEDICARE

## 2023-01-31 VITALS — BODY MASS INDEX: 27.06 KG/M2 | HEIGHT: 60 IN | WEIGHT: 137.81 LBS

## 2023-01-31 DIAGNOSIS — Z48.89 ENCOUNTER FOR POST SURGICAL WOUND CHECK: ICD-10-CM

## 2023-01-31 DIAGNOSIS — M18.11 ARTHRITIS OF CARPOMETACARPAL (CMC) JOINT OF RIGHT THUMB: Primary | ICD-10-CM

## 2023-01-31 DIAGNOSIS — Z98.890 POST-OPERATIVE STATE: ICD-10-CM

## 2023-01-31 PROCEDURE — 1126F AMNT PAIN NOTED NONE PRSNT: CPT | Mod: HCNC,CPTII,S$GLB,

## 2023-01-31 PROCEDURE — 1101F PR PT FALLS ASSESS DOC 0-1 FALLS W/OUT INJ PAST YR: ICD-10-PCS | Mod: HCNC,CPTII,S$GLB,

## 2023-01-31 PROCEDURE — 3288F FALL RISK ASSESSMENT DOCD: CPT | Mod: HCNC,CPTII,S$GLB,

## 2023-01-31 PROCEDURE — 1101F PT FALLS ASSESS-DOCD LE1/YR: CPT | Mod: HCNC,CPTII,S$GLB,

## 2023-01-31 PROCEDURE — 99024 PR POST-OP FOLLOW-UP VISIT: ICD-10-PCS | Mod: HCNC,S$GLB,,

## 2023-01-31 PROCEDURE — 3008F BODY MASS INDEX DOCD: CPT | Mod: HCNC,CPTII,S$GLB,

## 2023-01-31 PROCEDURE — 99999 PR PBB SHADOW E&M-EST. PATIENT-LVL III: ICD-10-PCS | Mod: PBBFAC,HCNC,,

## 2023-01-31 PROCEDURE — 99024 POSTOP FOLLOW-UP VISIT: CPT | Mod: HCNC,S$GLB,,

## 2023-01-31 PROCEDURE — 1126F PR PAIN SEVERITY QUANTIFIED, NO PAIN PRESENT: ICD-10-PCS | Mod: HCNC,CPTII,S$GLB,

## 2023-01-31 PROCEDURE — 3288F PR FALLS RISK ASSESSMENT DOCUMENTED: ICD-10-PCS | Mod: HCNC,CPTII,S$GLB,

## 2023-01-31 PROCEDURE — 1159F PR MEDICATION LIST DOCUMENTED IN MEDICAL RECORD: ICD-10-PCS | Mod: HCNC,CPTII,S$GLB,

## 2023-01-31 PROCEDURE — 99999 PR PBB SHADOW E&M-EST. PATIENT-LVL III: CPT | Mod: PBBFAC,HCNC,,

## 2023-01-31 PROCEDURE — 1159F MED LIST DOCD IN RCRD: CPT | Mod: HCNC,CPTII,S$GLB,

## 2023-01-31 PROCEDURE — 3008F PR BODY MASS INDEX (BMI) DOCUMENTED: ICD-10-PCS | Mod: HCNC,CPTII,S$GLB,

## 2023-02-02 ENCOUNTER — CLINICAL SUPPORT (OUTPATIENT)
Dept: REHABILITATION | Facility: HOSPITAL | Age: 73
End: 2023-02-02
Payer: MEDICARE

## 2023-02-02 DIAGNOSIS — M25.641 DECREASED RANGE OF MOTION OF RIGHT THUMB: ICD-10-CM

## 2023-02-02 DIAGNOSIS — M25.631 DECREASED RANGE OF MOTION OF RIGHT WRIST: ICD-10-CM

## 2023-02-02 DIAGNOSIS — M18.11 ARTHRITIS OF CARPOMETACARPAL (CMC) JOINT OF RIGHT THUMB: ICD-10-CM

## 2023-02-02 DIAGNOSIS — R29.898 DECREASED GRIP STRENGTH OF RIGHT HAND: ICD-10-CM

## 2023-02-02 DIAGNOSIS — Z98.890 POST-OPERATIVE STATE: ICD-10-CM

## 2023-02-02 PROCEDURE — 97110 THERAPEUTIC EXERCISES: CPT | Mod: HCNC,PN

## 2023-02-02 PROCEDURE — 97535 SELF CARE MNGMENT TRAINING: CPT | Mod: HCNC,PN

## 2023-02-02 PROCEDURE — 97165 OT EVAL LOW COMPLEX 30 MIN: CPT | Mod: HCNC,PN

## 2023-02-02 NOTE — PLAN OF CARE
" [facility] Occupational Therapy  Initial Evaluation     Date: 2/2/2023  Name: Renea Bourgeois  Clinic Number: [id number]    Therapy Diagnosis:   Encounter Diagnoses   Name Primary?    Arthritis of carpometacarpal (CMC) joint of right thumb     Post-operative state     Decreased range of motion of right thumb     Decreased range of motion of right wrist     Decreased  strength of right hand      Physician: Dulce Naqvi PA-C    Physician Orders: OT eval and tx  Medical Diagnosis: Arthritis of carpometacarpal (CMC) joint of right thumb [M18.11], Post-operative state [Z98.890]  Evaluation Date: 2/2/2023  Insurance Authorization Period Expiration: 1/19/2024  Plan of Care Certification Period: 2/2/2023 to 4/2/2023  Progress Note Due: 30 days (or 3/2/2023)     Visit # / Visits authorized: 1/1  FOTO: 1/3    Surgical Procedure: ARTHROPLASTY, FINGER (Right) thumb basal joint using Arthrex internal brace system  Date of Surgery: 1/5/2023  Date of Return to MD: PRN    Precautions:  Standard; pt completed 4 weeks post-op on 2/2/2023    Time In: 0900  Time Out: 0945  Total Appointment Time (timed & untimed codes): 45 minutes    Subjective     Involved Side: right  Dominant Side: Right    Date of Onset: chronic right thumb pain; surgery on 1/5/2023  Mechanism of Injury/ History of Current Condition: Pt reports that she was "bone-on-bone" prior to the surgery. Since the surgery, she reports spikes of pain if she accidentally bumps her thumb and reports that she hasn't actually moved her thumb yet. She reports pain at her thumb IP joint post-operatively 2* pressure point from previous thumb spica. Pt reports getting issued a new, more comfortable thumb spica since then. She reports hx of left thumb arthritis and surgery as well.    Per PA Notes on 1/30/2023: The patient presents for wound check. She is 3 weeks, 5 days s/p ARTHROPLASTY, FINGER (Right) thumb basal joint using Arthrex internal brace system by Dr." "Hattie on 1/5/23.  States that she is concerned about an open area to her incision.  She has been compliant with all postop instructions thus far.  Incision is clean, dry, and intact. Healing well. No open area to incision.  There is no erythema or exudate. There is no sign of any infection.   - Reassured that the incision is healing well and that there are no open areas. She is OK to start therapy Thursday.  - The incision was cleaned with hydrogen peroxide. Band-Aid applied.  - Continue thumb spica brace.    Imaging: none since surgery date on 1/5/2023:  Intraoperative images showed satisfactory resection of the trapezium.    Previous Therapy: hand therapy for left hand post-operatively, but no therapy for her right hand (or post-operatively)    Patient's Goals for Therapy: "be able to move my thumb again"    Pain:  Functional Pain Scale Rating 0-10:   5/10 on average  n/a/10 at best  10/10 at worst  Location: right thumb  Description: Aching, Throbbing, and Variable  Aggravating Factors: Bending and movement or bumping it  Easing Factors: pain medication, rest, and brace    Functional Limitations/Social History:    Previous Functional Status: Modified independent with all ADL/IADL tasks but had lost ability to  with her right hand (unable to  or turn door knobs) - son had to help open jars  Current Functional Status: has not been able to move right thumb (or use her right hand) yet since her surgery 4 weeks ago    Home/Living environment: her son lives with her    Occupation: business owner  Working presently: retired but works part time  Duties: supervising business; household chores        Past Medical History/Physical Systems Review:   Medical History:   Past Medical History:   Diagnosis Date    Allergy     Anemia 11/14/2017    Angina pectoris     followed by cardiology    Arthritis     Breast cancer     Right T1 Ductal Ca in situ,high oncotype Dx 33, Estrogen positive. Lumpectomy, TAC chemo x 6 " cyscles then RT,on aromatase inhibitor    Cataract     Chondromalacia of right patella 03/26/2018    Stable and controlled. Continue current treatment plan as previously prescribed with your PCP.      Diabetes mellitus type II 2011    DM (diabetes mellitus) 2011     am 08/04/2017    DM (diabetes mellitus) 2010     am 06/22/2020    Elevated BP without diagnosis of hypertension 11/27/2018    GERD (gastroesophageal reflux disease)     History of colon polyps 02/21/2018    The patient had adenomatous colon polyps in 2014.      History of renal calculi 08/11/2015    Right obstructing 8/20/13 - passed.     Hyperlipidemia     Hypertension     Kidney stone     right side, passed    Malignant neoplasm of upper-outer quadrant of right breast in female, estrogen receptor positive 07/23/2018    Followed by Dr. Robert Lozano disease     reported per pt    Nuclear sclerosis of both eyes 10/05/2015    Osteopenia     Osteoporosis 04/12/2019    Pseudophakia 10/15/2015    PVC (premature ventricular contraction)     on and off    Refractive error 10/05/2015    Trouble in sleeping     Type 2 diabetes mellitus 2010     am 12/06/2022       Surgical History:    has a past surgical history that includes Tonsillectomy (1959); toenail edges removed; Dilation and curettage of uterus (10/2010); Cholecystectomy (1989); Nasal septal deviation repair (2009); Breast lumpectomy (02/11/2011); Total Reduction Mammoplasty (Left); Cyst Removal (Left, 11/2017); Colonoscopy (N/A, 02/22/2018); Breast biopsy; Gallbladder surgery; Cataract extraction (Bilateral, 10/14/2015); Esophagogastroduodenoscopy (N/A, 06/29/2020); Hand surgery (Left, 2015); Eye surgery (2012); Tubal ligation (1981); Uvulopalatoplasty; and Arthroplasty of joint of finger (Right, 1/5/2023).    Medications:   has a current medication list which includes the following prescription(s): accu-chek nancy plus test strp, acetaminophen, alcohol prep pads, alprazolam,  calcium-vitamin d, cholecalciferol (vitamin d3), pfizer covid-19 seth vaccn(pf), diclofenac sodium, esomeprazole magnesium, ferrous gluconate, fluticasone propionate, gabapentin, glimepiride, lancets, lancing device, magnesium aspartate hcl, metoprolol succinate, oxycodone-acetaminophen, rosuvastatin, trazodone, and turmeric, and the following Facility-Administered Medications: chlorhexidine, sodium chloride 0.9%, triamcinolone acetonide, and zoledronic acid-mannitol & water.    Allergies:   Review of patient's allergies indicates:  No Known Allergies       Objective     Observation/Inspection:  Mild swelling of IP joint and small scab from pressure point of previous splint    Sensation: Pt denies paresthesias. Pt denies hypersensitivity. Intact to light touch.   Scar: No tenderness to palpation. Scar is mildly thickened and raised, with mild adherence.     Right Upper Extremity Active Range of Motion:     Right   ROM (in degrees) 2/2/2023   Radial deviation 10   Ulnar deviation 18   Wrist flexion 20   Wrist extension 50      Right Thumb Active Range of Motion:   (Ext/Flex) 2/2/2023   MCP Jt 0/20°   IP Jt -5/30°   Opposition 6cm to DPC   Palmar Abd 35°   Radial Abd 45°                                                             and Pinch Strength: Not tested at this time 2* post-op status      CMS Impairment/Limitation/Restriction for FOTO Hand Survey    Therapist reviewed FOTO scores for Renea Bourgeois on 2/2/2023.   FOTO documents entered into [id number] - see Media section.    Limitation Score: 52%          Treatment     Total Treatment time (time-based codes) separate from Evaluation: 25 minutes    Renea received therapeutic exercises for 15 minutes including:  - HEP    Home Exercise Program/Education:    Education provided: (self-care [id number] x 10 minutes)  - anatomy and physiology of wrist and thumb  - joint protection  - scar massage    Written Home Exercises Provided: Yes  Exercises were reviewed and Renea  was able to demonstrate them prior to the end of the session. Renea demonstrated good understanding of the education provided. See EMR under Patient Instructions for exercises provided during therapy sessions.     Pt was advised to perform these exercises free of pain, and to stop performing them if pain occurs.    Patient/Family Education: role of OT, goals for OT, scheduling/cancellations - pt verbalized understanding    Assessment     Renea Bourgeois is a 73 y.o. female referred to outpatient occupational therapy and presents with a medical diagnosis of Arthritis of carpometacarpal (CMC) joint of right thumb [M18.11], Post-operative state [Z98.890].  Patient presents with the following therapy deficits: Decreased ROM, Decreased  strength, Decreased pinch strength, Decreased muscle strength, Decreased functional hand use, Increased pain, Joint Stiffness, Scar Adhesions, and Diminished/Impaired Coordination and demonstrates limitations as described in the chart below.     Following medical record review, it is determined that the pt will benefit from occupational therapy services in order to maximize pain free and/or functional use of her right hand/thumb. The following goals were discussed with the patient and patient is in agreement with them as to be addressed in the treatment plan. The patient's rehab potential is Good.     Anticipated barriers to occupational therapy: none  Pt has no cultural, educational or language barriers to learning provided.    Profile and History Assessment of Occupational Performance Level of Clinical Decision Making Complexity Score   Occupational Profile:   Renea Bourgeois is a 73 y.o. female who lives with their son and is currently retired. Renea Bourgeois has difficulty withhousework/household chores  affecting her daily functional abilities. her main goal for therapy is to be able to move her thumb again and  with her hand.     Comorbidities:    has a past medical  history of Allergy, Anemia, Angina pectoris, Arthritis, Breast cancer, Cataract, Chondromalacia of right patella, Diabetes mellitus type II, DM (diabetes mellitus), DM (diabetes mellitus), Elevated BP without diagnosis of hypertension, GERD (gastroesophageal reflux disease), History of colon polyps, History of renal calculi, Hyperlipidemia, Hypertension, Kidney stone, Malignant neoplasm of upper-outer quadrant of right breast in female, estrogen receptor positive, Meniere disease, Nuclear sclerosis of both eyes, Osteopenia, Osteoporosis, Pseudophakia, PVC (premature ventricular contraction), Refractive error, Trouble in sleeping, and Type 2 diabetes mellitus.    Medical and Therapy History Review:   Expanded               Performance Deficits    Physical:  Joint Mobility  Joint Stability  Muscle Power/Strength  Muscle Endurance  Skin Integrity/Scar Formation   Strength  Pinch Strength  Fine Motor Coordination  Pain    Cognitive:  Safety Awareness/Insight to Disability    Psychosocial:    Habits  Routines     Clinical Decision Making:  low    Assessment Process:  Problem-Focused Assessments    Modification/Need for Assistance:  Not Necessary    Intervention Selection:  Several Treatment Options       low  Based on PMHX, comorbidities, data from assessments and functional level of assistance required with task and clinical presentation directly impacting function.       The following goals were discussed with the patient and patient is in agreement with them as to be addressed in the treatment plan.     Goals:   Short Term Goals: (4 weeks)  1. Pt will be independent with HEP in 2 visits.  2. Pt will report decreased right thumb pain to a 5-6/10 at worst.   3. Pt will increase right wrist AROM by 5-10 degrees to enable dressing, grooming activities.  4. Pt will increase right thumb ORTEGA by 10-15 degrees to enable in-hand manipulation.  5. Pt will report a decrease in FOTO limitation score of < 50%, which would  indicate an improvement in quality of life.     Long Term Goals: (8 weeks)  1. Pt will report decreased right thumb pain to 1-2/10 with ADL/IADL tasks.   2. Pt will increase right thumb ORTEGA by 30 degrees to enable independence with self-care and meal preparation.  3. Pt will exhibit right  strength of 60-75% of left hand to allow a firm grasp on cooking utensils, steering wheel, etc.  4. Pt will exhibit 7-9# of functional right pinch strength to allow writing, opening containers, and turning keys.  5. Pt will report a decrease in FOTO limitation score of < 40%, which would indicate an improvement in quality of life.    Plan   Plan of Care Certification: 2/2/2023 to 4/2/2023    Outpatient Occupational Therapy 2 times weekly for 6-8 weeks to include the following interventions: Electrical Stimulation NMES, Fluidotherapy, Manual Therapy, Moist Heat/ Ice, Neuromuscular Re-ed, Orthotic Management and Training, Paraffin, Patient Education, Self Care, Therapeutic Activities, Therapeutic Exercise, and Ultrasound      CHINYERE CORONA OT

## 2023-02-06 ENCOUNTER — CLINICAL SUPPORT (OUTPATIENT)
Dept: REHABILITATION | Facility: HOSPITAL | Age: 73
End: 2023-02-06
Payer: MEDICARE

## 2023-02-06 DIAGNOSIS — M25.641 DECREASED RANGE OF MOTION OF RIGHT THUMB: Primary | ICD-10-CM

## 2023-02-06 DIAGNOSIS — R29.898 DECREASED GRIP STRENGTH OF RIGHT HAND: ICD-10-CM

## 2023-02-06 DIAGNOSIS — M25.631 DECREASED RANGE OF MOTION OF RIGHT WRIST: ICD-10-CM

## 2023-02-06 PROCEDURE — 97035 APP MDLTY 1+ULTRASOUND EA 15: CPT | Mod: HCNC,PN

## 2023-02-06 PROCEDURE — 97110 THERAPEUTIC EXERCISES: CPT | Mod: HCNC,PN

## 2023-02-06 NOTE — PROGRESS NOTES
"  Occupational Therapy Treatment Note     Date: 2/6/2023  Name: Renea Bourgeois  Clinic Number: [id number]    Therapy Diagnosis:   Encounter Diagnoses   Name Primary?    Decreased range of motion of right thumb Yes    Decreased range of motion of right wrist     Decreased  strength of right hand      Physician: Dulce Naqvi PA-C    Physician Orders: OT eval and tx  Medical Diagnosis: Arthritis of carpometacarpal (CMC) joint of right thumb [M18.11], Post-operative state [Z98.890]  Evaluation Date: 2/2/2023  Insurance Authorization Period Expiration: 4/6/2023  Plan of Care Certification Period: 2/2/2023 to 4/2/2023  Progress Note Due: 30 days (or 3/2/2023)      Visit # / Visits authorized: 1/12  FOTO: 1/3     Surgical Procedure: ARTHROPLASTY, FINGER (Right) thumb basal joint using Arthrex internal brace system  Date of Surgery: 1/5/2023  Date of Return to MD: PRN     Precautions:  Standard; pt completed 4 weeks post-op on 2/2/2023    Time In: 0810 (pt was 10 min late to apt)  Time Out: 0850  Total Billable Time: 40 minutes    Subjective     Pt reports: "I'm a little sore but I think it's from driving. I had to take my granddaughter to school this morning."    she was compliant with home exercise program given on evaluation.  Response to previous treatment: good  Functional change: decreased pain    Pain: 2/10 (5/10 on evaluation)  Location: right thumb    Objective   Objective measures updated at progress report unless specified.    Treatment     Renea received the following direct contact modalities after being cleared for contraindications for her right thumb for 8 minutes:  - Ultrasound:  3.3 Mhz, 100% duty cycle, 1.0 w/cm2 over right thumb to decrease pain and improve circulation    Renea received the following manual therapy techniques for 2 minutes:   - scar massage    Renea received therapeutic exercises for her right hand for 26 minutes including:  - thumb AROM: radial and palmar abd/add, circumduction " (both directions), opposition to tabletop, and lifts x 10 reps each  - thumb IP and MCP flex/ext range of motion x 10 reps each with joint blocking  - wrist range of motion with dowel, forearm 3 ways over bolster, x 15 reps each  - wrist flexor and extensor stretching x 30 sec hold each  - forearm rotations with 1# DB x 20 reps, 2 rounds    Renea participated in dynamic functional therapeutic activities to improve functional performance for 2 minutes including:  - right composite gripping with yellow sponge as tolerated    Home Exercises and Education Provided     Education provided:   - reviewed home exercise program issued at evaluation    Written Home Exercises Provided: Patient instructed to cont prior HEP  Exercises were reviewed and Renea was able to demonstrate them prior to the end of the session. Renea demonstrated good understanding of the HEP provided.     See EMR under Patient Instructions for exercises provided during prior visit.        Assessment     Renea was seen for her first tx session on this date since her initial evaluation on 2/2/2023. She reported decreased thumb pain upon arrival. She has been compliant with her home exercise program and tolerated tx well.     Renea is progressing towards her goals and there are no updates to goals at this time. Pt prognosis is Good.     Pt will continue to benefit from skilled outpatient occupational therapy to address the deficits listed in the problem list on initial evaluation provide pt/family education and to maximize pt's level of independence in the home and community environment.     Pt's spiritual, cultural and educational needs considered and pt agreeable to plan of care and goals.    Anticipated barriers to occupational therapy: none    Goals:  Short Term Goals: (4 weeks)  1. Pt will be independent with HEP in 2 visits.  2. Pt will report decreased right thumb pain to a 5-6/10 at worst.   3. Pt will increase right wrist AROM by 5-10 degrees  to enable dressing, grooming activities.  4. Pt will increase right thumb ORTEGA by 10-15 degrees to enable in-hand manipulation.  5. Pt will report a decrease in FOTO limitation score of < 50%, which would indicate an improvement in quality of life.     Long Term Goals: (8 weeks)  1. Pt will report decreased right thumb pain to 1-2/10 with ADL/IADL tasks.   2. Pt will increase right thumb ORTEGA by 30 degrees to enable independence with self-care and meal preparation.  3. Pt will exhibit right  strength of 60-75% of left hand to allow a firm grasp on cooking utensils, steering wheel, etc.  4. Pt will exhibit 7-9# of functional right pinch strength to allow writing, opening containers, and turning keys.  5. Pt will report a decrease in FOTO limitation score of < 40%, which would indicate an improvement in quality of life.    Plan     Plan of Care Certification: 2/2/2023 to 4/2/2023     Outpatient Occupational Therapy 2 times weekly for 6-8 weeks to include the following interventions: Electrical Stimulation NMES, Fluidotherapy, Manual Therapy, Moist Heat/ Ice, Neuromuscular Re-ed, Orthotic Management and Training, Paraffin, Patient Education, Self Care, Therapeutic Activities, Therapeutic Exercise, and Ultrasound      CHINYERE CORONA OT

## 2023-02-07 DIAGNOSIS — Z00.00 ENCOUNTER FOR MEDICARE ANNUAL WELLNESS EXAM: ICD-10-CM

## 2023-02-09 ENCOUNTER — CLINICAL SUPPORT (OUTPATIENT)
Dept: REHABILITATION | Facility: HOSPITAL | Age: 73
End: 2023-02-09
Payer: MEDICARE

## 2023-02-09 DIAGNOSIS — R29.898 DECREASED GRIP STRENGTH OF RIGHT HAND: ICD-10-CM

## 2023-02-09 DIAGNOSIS — M25.641 DECREASED RANGE OF MOTION OF RIGHT THUMB: Primary | ICD-10-CM

## 2023-02-09 DIAGNOSIS — Z00.00 ENCOUNTER FOR MEDICARE ANNUAL WELLNESS EXAM: ICD-10-CM

## 2023-02-09 DIAGNOSIS — M25.631 DECREASED RANGE OF MOTION OF RIGHT WRIST: ICD-10-CM

## 2023-02-09 PROCEDURE — 97110 THERAPEUTIC EXERCISES: CPT | Mod: HCNC,PN

## 2023-02-09 PROCEDURE — 97140 MANUAL THERAPY 1/> REGIONS: CPT | Mod: HCNC,PN

## 2023-02-09 NOTE — PROGRESS NOTES
"  Occupational Therapy Treatment Note     Date: 2/9/2023  Name: Renea Bourgeois  Clinic Number: [id number]    Therapy Diagnosis:   Encounter Diagnoses   Name Primary?    Decreased range of motion of right thumb Yes    Decreased range of motion of right wrist     Decreased  strength of right hand      Physician: Dulce Naqvi PA-C    Physician Orders: OT eval and tx  Medical Diagnosis: Arthritis of carpometacarpal (CMC) joint of right thumb [M18.11], Post-operative state [Z98.890]  Evaluation Date: 2/2/2023  Insurance Authorization Period Expiration: 4/6/2023  Plan of Care Certification Period: 2/2/2023 to 4/6/2023  Progress Note Due: 30 days (or 3/2/2023)      Visit # / Visits authorized: 2/12  FOTO: 1/3     Surgical Procedure: ARTHROPLASTY, FINGER (Right) thumb basal joint using Arthrex internal brace system  Date of Surgery: 1/5/2023  Date of Return to MD: PRN     Precautions:  Standard; pt completed 5 weeks post-op on 2/9/2023    Time In: 0820  Time Out: 0900  Total Billable Time: 40 minutes    Subjective     Pt reports: "My thumb is feeling pretty good. I slept without the brace last night. I didn't have any issues."    she was compliant with home exercise program given on evaluation.  Response to previous treatment: good  Functional change: improved wrist ROM    Pain: 2/10 (5/10 on evaluation)  Location: right thumb    Objective   Objective measures updated at progress report unless specified.    Treatment     Renea received the following direct contact modalities after being cleared for contraindications for her right thumb for 8 minutes:  - Ultrasound:  3.3 Mhz, 100% duty cycle, 1.0 w/cm2 over right thumb to decrease pain and improve circulation    Renea received the following manual therapy techniques for 10 minutes:   - scar massage  - instrument-assisted soft tissue mobilization over scar to decrease adhesions and increase tissue extensibility    Renea received therapeutic exercises for her right hand " for 24 minutes including:  - thumb AROM: radial and palmar abd/add, circumduction (both directions), opposition to tabletop, and lifts x 10 reps each  - thumb IP and MCP flex/ext range of motion x 10 reps each with joint blocking  - wrist range of motion with dowel, forearm 3 ways over bolster, x 15 reps each  - wrist flexor and extensor stretching x 30 sec hold each  - forearm rotations with 1# DB x 20 reps, 2 rounds    Renea participated in dynamic functional therapeutic activities to improve functional performance for 0 minutes including:  - NT    Home Exercises and Education Provided     Education provided:   - recommended silicone gel pads to decrease tenderness of scar and to flatten appearance of scar    Written Home Exercises Provided: Patient instructed to cont prior HEP  Exercises were reviewed and Renea was able to demonstrate them prior to the end of the session. Renea demonstrated good understanding of the HEP provided.     See EMR under Patient Instructions for exercises provided during prior visit.        Assessment     Renea was seen for her 2nd tx session on this date. She tolerated right thumb range of motion exercises better and exhibited improved right wrist range of motion. She is making great progress.    Renea is progressing towards her goals and there are no updates to goals at this time. Pt prognosis is Good.     Pt will continue to benefit from skilled outpatient occupational therapy to address the deficits listed in the problem list on initial evaluation provide pt/family education and to maximize pt's level of independence in the home and community environment.     Pt's spiritual, cultural and educational needs considered and pt agreeable to plan of care and goals.    Anticipated barriers to occupational therapy: none    Goals:  Short Term Goals: (4 weeks)  1. Pt will be independent with HEP in 2 visits. - MET 2/0/2023  2. Pt will report decreased right thumb pain to a 5-6/10 at  worst. - MET 2/0/2023  3. Pt will increase right wrist AROM by 5-10 degrees to enable dressing, grooming activities.  4. Pt will increase right thumb ORTEGA by 10-15 degrees to enable in-hand manipulation.  5. Pt will report a decrease in FOTO limitation score of < 50%, which would indicate an improvement in quality of life.     Long Term Goals: (8 weeks)  1. Pt will report decreased right thumb pain to 1-2/10 with ADL/IADL tasks. - MET 2/0/2023   2. Pt will increase right thumb ORTEGA by 30 degrees to enable independence with self-care and meal preparation.  3. Pt will exhibit right  strength of 60-75% of left hand to allow a firm grasp on cooking utensils, steering wheel, etc.  4. Pt will exhibit 7-9# of functional right pinch strength to allow writing, opening containers, and turning keys.  5. Pt will report a decrease in FOTO limitation score of < 40%, which would indicate an improvement in quality of life.    Plan     Plan of Care Certification: 2/2/2023 to 4/2/2023     Outpatient Occupational Therapy 2 times weekly for 6-8 weeks to include the following interventions: Electrical Stimulation NMES, Fluidotherapy, Manual Therapy, Moist Heat/ Ice, Neuromuscular Re-ed, Orthotic Management and Training, Paraffin, Patient Education, Self Care, Therapeutic Activities, Therapeutic Exercise, and Ultrasound      CHINYERE CORONA OT

## 2023-02-13 ENCOUNTER — CLINICAL SUPPORT (OUTPATIENT)
Dept: REHABILITATION | Facility: HOSPITAL | Age: 73
End: 2023-02-13
Payer: MEDICARE

## 2023-02-13 DIAGNOSIS — R29.898 DECREASED GRIP STRENGTH OF RIGHT HAND: ICD-10-CM

## 2023-02-13 DIAGNOSIS — M25.631 DECREASED RANGE OF MOTION OF RIGHT WRIST: ICD-10-CM

## 2023-02-13 DIAGNOSIS — M25.641 DECREASED RANGE OF MOTION OF RIGHT THUMB: Primary | ICD-10-CM

## 2023-02-13 PROCEDURE — 97110 THERAPEUTIC EXERCISES: CPT | Mod: HCNC,PN

## 2023-02-13 PROCEDURE — 97140 MANUAL THERAPY 1/> REGIONS: CPT | Mod: HCNC,PN

## 2023-02-13 NOTE — PROGRESS NOTES
"  Occupational Therapy Treatment Note     Date: 2/13/2023  Name: Renea Bourgeois  Clinic Number: [id number]    Therapy Diagnosis:   Encounter Diagnoses   Name Primary?    Decreased range of motion of right thumb Yes    Decreased range of motion of right wrist     Decreased  strength of right hand      Physician: Dulce Naqvi PA-C    Physician Orders: OT eval and tx  Medical Diagnosis: Arthritis of carpometacarpal (CMC) joint of right thumb [M18.11], Post-operative state [Z98.890]  Evaluation Date: 2/2/2023  Insurance Authorization Period Expiration: 4/6/2023  Plan of Care Certification Period: 2/2/2023 to 4/6/2023  Progress Note Due: 30 days (or 3/2/2023)      Visit # / Visits authorized: 3/12  FOTO: 1/3     Surgical Procedure: ARTHROPLASTY, FINGER (Right) thumb basal joint using Arthrex internal brace system  Date of Surgery: 1/5/2023  Date of Return to MD: PRN     Precautions:  Standard; pt completed 5 weeks post-op on 2/9/2023    Time In: 0800  Time Out: 0845  Total Billable Time: 45 minutes    Subjective     Pt reports: "I've been doing my exercises."    she was compliant with home exercise program given on evaluation.  Response to previous treatment: good  Functional change: decreased pain    Pain: 0-1/10 (5/10 on evaluation)  Location: right thumb    Objective   Objective measures updated at progress report unless specified.    Treatment     Renea received the following direct contact modalities after being cleared for contraindications for her right thumb for 8 minutes:  - Ultrasound:  3.3 Mhz, 100% duty cycle, 1.0 w/cm2 over right thumb to decrease pain and improve circulation    Renea received the following manual therapy techniques for 8 minutes:   - scar massage  - instrument-assisted soft tissue mobilization over scar to decrease adhesions and increase tissue extensibility    Renea received therapeutic exercises for her right hand for 25 minutes including:  - thumb AROM: radial and palmar abd/add, " circumduction (both directions), opposition to tabletop, and lifts x 10 reps each  - thumb IP and MCP flex/ext range of motion x 10 reps each with joint blocking  - wrist range of motion with dowel, forearm 3 ways over bolster, x 20 reps each  - wrist flexor and extensor stretching x 30 sec hold each  - forearm rotations with 1# DB x 20 reps, 2 rounds    Renea participated in therapeutic activities to improve functional performance for her right hand for 3 minutes including:  - composite  strengthening with red sponge x 20 reps  - lateral pinch strengthening with red sponge x 20 reps    Home Exercises and Education Provided     Education provided:   - progress toward goals    Written Home Exercises Provided: Patient instructed to cont prior HEP  Exercises were reviewed and Renea was able to demonstrate them prior to the end of the session. Renea demonstrated good understanding of the HEP provided.     See EMR under Patient Instructions for exercises provided during prior visit.        Assessment     Renea was seen for her 3rd tx session on this date. She reported decreased pain upon arrival and has been compliant with her home exercise program. She tolerated session well and is making good progress.    Renea is progressing towards her goals and there are no updates to goals at this time. Pt prognosis is Good.     Pt will continue to benefit from skilled outpatient occupational therapy to address the deficits listed in the problem list on initial evaluation provide pt/family education and to maximize pt's level of independence in the home and community environment.     Pt's spiritual, cultural and educational needs considered and pt agreeable to plan of care and goals.    Anticipated barriers to occupational therapy: none    Goals:  Short Term Goals: (4 weeks)  1. Pt will be independent with HEP in 2 visits. - MET 2/0/2023  2. Pt will report decreased right thumb pain to a 5-6/10 at worst. - MET 2/0/2023  3.  Pt will increase right wrist AROM by 5-10 degrees to enable dressing, grooming activities.  4. Pt will increase right thumb ORTEGA by 10-15 degrees to enable in-hand manipulation.  5. Pt will report a decrease in FOTO limitation score of < 50%, which would indicate an improvement in quality of life.     Long Term Goals: (8 weeks)  1. Pt will report decreased right thumb pain to 1-2/10 with ADL/IADL tasks. - MET 2/0/2023   2. Pt will increase right thumb ORTEGA by 30 degrees to enable independence with self-care and meal preparation.  3. Pt will exhibit right  strength of 60-75% of left hand to allow a firm grasp on cooking utensils, steering wheel, etc.  4. Pt will exhibit 7-9# of functional right pinch strength to allow writing, opening containers, and turning keys.  5. Pt will report a decrease in FOTO limitation score of < 40%, which would indicate an improvement in quality of life.    Plan     Plan of Care Certification: 2/2/2023 to 4/2/2023     Outpatient Occupational Therapy 2 times weekly for 6-8 weeks to include the following interventions: Electrical Stimulation NMES, Fluidotherapy, Manual Therapy, Moist Heat/ Ice, Neuromuscular Re-ed, Orthotic Management and Training, Paraffin, Patient Education, Self Care, Therapeutic Activities, Therapeutic Exercise, and Ultrasound      CHINYERE CORONA OT

## 2023-02-16 ENCOUNTER — CLINICAL SUPPORT (OUTPATIENT)
Dept: REHABILITATION | Facility: HOSPITAL | Age: 73
End: 2023-02-16
Payer: MEDICARE

## 2023-02-16 DIAGNOSIS — M25.631 DECREASED RANGE OF MOTION OF RIGHT WRIST: ICD-10-CM

## 2023-02-16 DIAGNOSIS — M25.641 DECREASED RANGE OF MOTION OF RIGHT THUMB: Primary | ICD-10-CM

## 2023-02-16 DIAGNOSIS — R29.898 DECREASED GRIP STRENGTH OF RIGHT HAND: ICD-10-CM

## 2023-02-16 PROCEDURE — 97110 THERAPEUTIC EXERCISES: CPT | Mod: HCNC,PN

## 2023-02-16 PROCEDURE — 97530 THERAPEUTIC ACTIVITIES: CPT | Mod: HCNC,PN

## 2023-02-16 NOTE — PROGRESS NOTES
"  Occupational Therapy Treatment Note     Date: 2/16/2023  Name: Renea Bourgeois  Clinic Number: [id number]    Therapy Diagnosis:   Encounter Diagnoses   Name Primary?    Decreased range of motion of right thumb Yes    Decreased range of motion of right wrist     Decreased  strength of right hand      Physician: Dulce Naqvi PA-C    Physician Orders: OT eval and tx  Medical Diagnosis: Arthritis of carpometacarpal (CMC) joint of right thumb [M18.11], Post-operative state [Z98.890]  Evaluation Date: 2/2/2023  Insurance Authorization Period Expiration: 4/6/2023  Plan of Care Certification Period: 2/2/2023 to 4/6/2023  Progress Note Due: 30 days (or 3/2/2023)      Visit # / Visits authorized: 4/12  FOTO: 1/3     Surgical Procedure: ARTHROPLASTY, FINGER (Right) thumb basal joint using Arthrex internal brace system  Date of Surgery: 1/5/2023  Date of Return to MD: PRN     Precautions:  Standard; pt completed 6 weeks post-op on 2/16/2023    Time In: 0815  Time Out: 0900  Total Billable Time: 45 minutes    Subjective     Pt reports: "My thumb is ok. I'm starting to get where I can pinch more now."    she was compliant with home exercise program given on evaluation.  Response to previous treatment: good  Functional change: tolerance for /pinch strengthening initiation    Pain: 0-1/10   Location: right thumb    Objective   Objective measures updated at progress report unless specified.    Treatment     Renea received the following direct contact modalities after being cleared for contraindications for her right thumb for 8 minutes:  - Ultrasound:  3.3 Mhz, 100% duty cycle, 1.0 w/cm2 over right thumb to decrease pain and improve circulation    Renea received the following manual therapy techniques for 2 minutes:   - scar massage  - instrument-assisted soft tissue mobilization over scar to decrease adhesions and increase tissue extensibility (not today)    Renea received therapeutic exercises for her right hand for 25 " minutes including:  - thumb AROM: radial and palmar abd/add, circumduction (both directions), opposition to tabletop, and lifts x 10 reps each  - thumb IP and MCP flex/ext range of motion x 10 reps each with joint blocking  - wrist range of motion with 1# DB, forearm 3 ways over bolster, x 20 reps each  - wrist flexor and extensor stretching x 30 sec hold each, 2 rounds  - forearm rotations with 1# pronator wand (hand 3/4 way up handle) x 10 reps, 2 rounds    Renea participated in therapeutic activities to improve functional performance for her right hand for 10 minutes including:  - composite  strengthening with red digiciser 5x10  - lateral pinch strengthening with red clothespin 2x10    Home Exercises and Education Provided     Education provided:   - progress toward goals    Written Home Exercises Provided: Patient instructed to cont prior HEP  Exercises were reviewed and Renea was able to demonstrate them prior to the end of the session. Renea demonstrated good understanding of the HEP provided.     See EMR under Patient Instructions for exercises provided during prior visit.        Assessment     Renea was seen for her 4th tx session on this date. She completes 6 weeks post-op on this date, and exhibited improved right thumb active range of motion. Gradual /pinch strengthening exercises were introduced. She tolerated well.    Renea is progressing towards her goals and there are no updates to goals at this time. Pt prognosis is Good.     Pt will continue to benefit from skilled outpatient occupational therapy to address the deficits listed in the problem list on initial evaluation provide pt/family education and to maximize pt's level of independence in the home and community environment.     Pt's spiritual, cultural and educational needs considered and pt agreeable to plan of care and goals.    Anticipated barriers to occupational therapy: none    Goals:  Short Term Goals: (4 weeks)  1. Pt will be  independent with HEP in 2 visits. - MET 2/0/2023  2. Pt will report decreased right thumb pain to a 5-6/10 at worst. - MET 2/0/2023  3. Pt will increase right wrist AROM by 5-10 degrees to enable dressing, grooming activities.  4. Pt will increase right thumb ORTEGA by 10-15 degrees to enable in-hand manipulation.  5. Pt will report a decrease in FOTO limitation score of < 50%, which would indicate an improvement in quality of life.     Long Term Goals: (8 weeks)  1. Pt will report decreased right thumb pain to 1-2/10 with ADL/IADL tasks. - MET 2/0/2023   2. Pt will increase right thumb ORTEGA by 30 degrees to enable independence with self-care and meal preparation.  3. Pt will exhibit right  strength of 60-75% of left hand to allow a firm grasp on cooking utensils, steering wheel, etc.  4. Pt will exhibit 7-9# of functional right pinch strength to allow writing, opening containers, and turning keys.  5. Pt will report a decrease in FOTO limitation score of < 40%, which would indicate an improvement in quality of life.    Plan     Plan of Care Certification: 2/2/2023 to 4/2/2023     Outpatient Occupational Therapy 2 times weekly for 6-8 weeks to include the following interventions: Electrical Stimulation NMES, Fluidotherapy, Manual Therapy, Moist Heat/ Ice, Neuromuscular Re-ed, Orthotic Management and Training, Paraffin, Patient Education, Self Care, Therapeutic Activities, Therapeutic Exercise, and Ultrasound      CHINYERE CORONA OT

## 2023-02-22 ENCOUNTER — CLINICAL SUPPORT (OUTPATIENT)
Dept: REHABILITATION | Facility: HOSPITAL | Age: 73
End: 2023-02-22
Payer: MEDICARE

## 2023-02-22 DIAGNOSIS — M25.631 DECREASED RANGE OF MOTION OF RIGHT WRIST: ICD-10-CM

## 2023-02-22 DIAGNOSIS — M25.641 DECREASED RANGE OF MOTION OF RIGHT THUMB: Primary | ICD-10-CM

## 2023-02-22 DIAGNOSIS — R29.898 DECREASED GRIP STRENGTH OF RIGHT HAND: ICD-10-CM

## 2023-02-22 PROCEDURE — 97110 THERAPEUTIC EXERCISES: CPT | Mod: HCNC,PN

## 2023-02-22 PROCEDURE — 97530 THERAPEUTIC ACTIVITIES: CPT | Mod: HCNC,PN

## 2023-02-22 NOTE — PROGRESS NOTES
"  Occupational Therapy Treatment Note & Progress Note     Date: 2/22/2023  Name: Renea Bourgeois  Clinic Number: [id number]    Therapy Diagnosis:   Encounter Diagnoses   Name Primary?    Decreased range of motion of right thumb Yes    Decreased range of motion of right wrist     Decreased  strength of right hand      Physician: Dulce Naqvi PA-C    Physician Orders: OT eval and tx  Medical Diagnosis: Arthritis of carpometacarpal (CMC) joint of right thumb [M18.11], Post-operative state [Z98.890]  Evaluation Date: 2/2/2023  Insurance Authorization Period Expiration: 4/6/2023  Plan of Care Certification Period: 2/2/2023 to 4/6/2023  Progress Note Due: 30 days (3/22/2023)     Visit # / Visits authorized: 5/12  FOTO: 2/3     Surgical Procedure: ARTHROPLASTY, FINGER (Right) thumb basal joint using Arthrex internal brace system  Date of Surgery: 1/5/2023  Date of Return to MD: PRN     Precautions:  Standard; pt completes 7 weeks post-op on 2/23/2023    Time In: 0930  Time Out: 1015  Total Billable Time: 45 minutes    Subjective     Pt reports: "My thumb is ok. I'm starting to get where I can pinch more now."    she was compliant with home exercise program given on evaluation.  Response to previous treatment: good  Functional change: tolerance for /pinch strengthening initiation    Pain: 0-1/10   Location: right thumb    Objective   Objective measures updated on this date.    Right Upper Extremity Active Range of Motion:    ROM (in degrees) 2/22/2023 2/2/2023   Radial deviation WNL 10   Ulnar deviation 20 18   Wrist flexion 73 20   Wrist extension 66 50      Right Thumb Active Range of Motion:   (Ext/Flex) 2/22/2023 2/2/2023   MCP Jt 0/40 0/20°   IP Jt 0/35 -5/30°   Opposition 1.5cm to DPC 6cm to DPC   Palmar Abd 47* 35°   Radial Abd 54* 45°      and Pinch Strength (in pounds, psi's):   Left Right    2/22/2023 2/22/2023    II 45 22   Lateral 7 1   Tripod 4 1   Tip 3 0.5   /pinch strength not today on day " of evaluation 2* post-op status    CMS Impairment/Limitation/Restriction for FOTO Hand Survey     Therapist reviewed FOTO scores for Renea Bourgeois on 2/22/2023.   FOTO documents entered into EPIC - see Media section.     Limitation Score: 37%  - 52% on evaluation on 2/2/2023          Treatment     Renea received the following direct contact modalities after being cleared for contraindications for her right thumb for 0 minutes:  - Ultrasound:  3.3 Mhz, 100% duty cycle, 1.0 w/cm2 over right thumb to decrease pain and improve circulation (not today)    Renea received the following manual therapy techniques for 3 minutes:   - scar massage (using vibration) to decrease adhesions and increase tissue extensibility    Renea received therapeutic exercises for her right hand for 28 minutes including:  - thumb AROM: radial and palmar abd/add, circumduction (both directions), opposition to tabletop, and lifts x 10 reps each  - thumb IP and MCP flex/ext range of motion x 10 reps each with joint blocking  - wrist range of motion with 2# DB, forearm 3 ways over bolster, x 20 reps each  - wrist flexor and extensor stretching x 30 sec hold each, 2 rounds  - forearm rotations with 1# pronator wand (hand 3/4 way up handle) x 20 reps    Renea participated in therapeutic activities to improve functional performance for her right hand for 12 minutes including:  - composite  strengthening with green digiciser 5x10  - lateral pinch strengthening with green clothespin 2x10  - 3pt pinch strengthening with red clothespin 2x10  - 2pt pinch strengthening with yellow clothespin 2x10    Home Exercises and Education Provided     Education provided:   - progress toward goals    Written Home Exercises Provided: Patient instructed to cont prior HEP  Exercises were reviewed and Renea was able to demonstrate them prior to the end of the session. Renea demonstrated good understanding of the HEP provided.     See EMR under Patient  Instructions for exercises provided during prior visit.        Assessment     Renea was seen for her 5th tx session on this date. Objective measures updated / progress note completed. She is exhibiting improved right wrist and thumb active range of motion. /pinch strength was assessed now that pt is past 6 weeks post-operatively. She is making great progress with therapy and has already met all of her STG's. She continues to be limited by /pinch weakness and discomfort with strengthening exercises, but she is tolerating sessions well and will continue to benefit from skilled occupational therapy services.    Renea is progressing towards her goals and there are no updates to goals at this time. Continue unmet LTG's.  Pt prognosis is Good.     Pt will continue to benefit from skilled outpatient occupational therapy to address the deficits listed in the problem list on initial evaluation provide pt/family education and to maximize pt's level of independence in the home and community environment.     Pt's spiritual, cultural and educational needs considered and pt agreeable to plan of care and goals.    Anticipated barriers to occupational therapy: none    Goals:  Short Term Goals: (4 weeks)  1. Pt will be independent with HEP in 2 visits. - MET 2/0/2023  2. Pt will report decreased right thumb pain to a 5-6/10 at worst. - MET 2/0/2023  3. Pt will increase right wrist AROM by 5-10 degrees to enable dressing, grooming activities. - MET 2/22/2023  4. Pt will increase right thumb ORTEGA by 10-15 degrees to enable in-hand manipulation. - MET 2/22/2023  5. Pt will report a decrease in FOTO limitation score of < 50%, which would indicate an improvement in quality of life. - MET 2/22/2023     Long Term Goals: (8 weeks)  1. Pt will report decreased right thumb pain to 1-2/10 with ADL/IADL tasks. - MET 2/0/2023   2. Pt will increase right thumb ORTEGA by 30 degrees to enable independence with self-care and meal preparation.  - MET 2/22/2023  3. Pt will exhibit right  strength of 60-75% of left hand to allow a firm grasp on cooking utensils, steering wheel, etc. - progressing, continue goal 2/22/2023  4. Pt will exhibit 7-9# of functional right pinch strength to allow writing, opening containers, and turning keys. - progressing, continue goal 2/22/2023  5. Pt will report a decrease in FOTO limitation score of < 40%, which would indicate an improvement in quality of life. - MET 2/22/2023    Plan     Plan of Care Certification: 2/2/2023 to 4/2/2023     Outpatient Occupational Therapy 2 times weekly for 6-8 weeks to include the following interventions: Electrical Stimulation NMES, Fluidotherapy, Manual Therapy, Moist Heat/ Ice, Neuromuscular Re-ed, Orthotic Management and Training, Paraffin, Patient Education, Self Care, Therapeutic Activities, Therapeutic Exercise, and Ultrasound      CHINYERE CORONA OT

## 2023-02-24 ENCOUNTER — ANESTHESIA (OUTPATIENT)
Dept: ENDOSCOPY | Facility: HOSPITAL | Age: 73
End: 2023-02-24
Payer: MEDICARE

## 2023-02-24 ENCOUNTER — ANESTHESIA EVENT (OUTPATIENT)
Dept: ENDOSCOPY | Facility: HOSPITAL | Age: 73
End: 2023-02-24
Payer: MEDICARE

## 2023-02-24 ENCOUNTER — HOSPITAL ENCOUNTER (OUTPATIENT)
Facility: HOSPITAL | Age: 73
Discharge: HOME | End: 2023-02-24
Attending: INTERNAL MEDICINE | Admitting: INTERNAL MEDICINE
Payer: MEDICARE

## 2023-02-24 DIAGNOSIS — Z86.010 HISTORY OF COLON POLYPS: Primary | ICD-10-CM

## 2023-02-24 LAB — POCT GLUCOSE: 118 MG/DL (ref 70–110)

## 2023-02-24 PROCEDURE — 88305 TISSUE EXAM BY PATHOLOGIST: CPT | Mod: HCNC | Performed by: PATHOLOGY

## 2023-02-24 PROCEDURE — 63600175 PHARM REV CODE 636 W HCPCS: Mod: HCNC | Performed by: NURSE ANESTHETIST, CERTIFIED REGISTERED

## 2023-02-24 PROCEDURE — 45380 COLONOSCOPY AND BIOPSY: CPT | Mod: PT,HCNC | Performed by: INTERNAL MEDICINE

## 2023-02-24 PROCEDURE — 37000009 HC ANESTHESIA EA ADD 15 MINS: Mod: HCNC | Performed by: INTERNAL MEDICINE

## 2023-02-24 PROCEDURE — 25000003 PHARM REV CODE 250: Mod: HCNC | Performed by: NURSE ANESTHETIST, CERTIFIED REGISTERED

## 2023-02-24 PROCEDURE — 88305 TISSUE EXAM BY PATHOLOGIST: CPT | Mod: 26,HCNC,, | Performed by: PATHOLOGY

## 2023-02-24 PROCEDURE — 45380 COLONOSCOPY AND BIOPSY: CPT | Mod: PT,HCNC,, | Performed by: INTERNAL MEDICINE

## 2023-02-24 PROCEDURE — 37000008 HC ANESTHESIA 1ST 15 MINUTES: Mod: HCNC | Performed by: INTERNAL MEDICINE

## 2023-02-24 PROCEDURE — 27201012 HC FORCEPS, HOT/COLD, DISP: Mod: HCNC | Performed by: INTERNAL MEDICINE

## 2023-02-24 PROCEDURE — 88305 TISSUE EXAM BY PATHOLOGIST: ICD-10-PCS | Mod: 26,HCNC,, | Performed by: PATHOLOGY

## 2023-02-24 PROCEDURE — 25000003 PHARM REV CODE 250: Mod: HCNC | Performed by: INTERNAL MEDICINE

## 2023-02-24 PROCEDURE — 45380 PR COLONOSCOPY,BIOPSY: ICD-10-PCS | Mod: PT,HCNC,, | Performed by: INTERNAL MEDICINE

## 2023-02-24 RX ORDER — SODIUM CHLORIDE, SODIUM LACTATE, POTASSIUM CHLORIDE, CALCIUM CHLORIDE 600; 310; 30; 20 MG/100ML; MG/100ML; MG/100ML; MG/100ML
INJECTION, SOLUTION INTRAVENOUS CONTINUOUS
Status: DISCONTINUED | OUTPATIENT
Start: 2023-02-24 | End: 2023-02-24 | Stop reason: HOSPADM

## 2023-02-24 RX ORDER — PROPOFOL 10 MG/ML
VIAL (ML) INTRAVENOUS
Status: DISCONTINUED | OUTPATIENT
Start: 2023-02-24 | End: 2023-02-24

## 2023-02-24 RX ORDER — DEXTROMETHORPHAN/PSEUDOEPHED 2.5-7.5/.8
DROPS ORAL
Status: DISCONTINUED | OUTPATIENT
Start: 2023-02-24 | End: 2023-02-24 | Stop reason: HOSPADM

## 2023-02-24 RX ADMIN — PROPOFOL 30 MG: 10 INJECTION, EMULSION INTRAVENOUS at 07:02

## 2023-02-24 RX ADMIN — SODIUM CHLORIDE, POTASSIUM CHLORIDE, SODIUM LACTATE AND CALCIUM CHLORIDE: 600; 310; 30; 20 INJECTION, SOLUTION INTRAVENOUS at 07:02

## 2023-02-24 RX ADMIN — PROPOFOL 50 MG: 10 INJECTION, EMULSION INTRAVENOUS at 07:02

## 2023-02-24 RX ADMIN — PROPOFOL 70 MG: 10 INJECTION, EMULSION INTRAVENOUS at 07:02

## 2023-02-24 RX ADMIN — GLYCOPYRROLATE 0.2 MG: 0.2 INJECTION, SOLUTION INTRAMUSCULAR; INTRAVITREAL at 07:02

## 2023-02-24 NOTE — H&P
PRE PROCEDURE H&P    Patient Name: Renea Bourgeois  MRN: [id number]  : 1950  Date of Procedure:  2023  Referring Physician: Kamini Newton MD  Primary Physician: Kamini Newton MD  Procedure Physician: Yvonne Saldana MD       Planned Procedure: Colonoscopy  Diagnosis: previous adenomatous polyp  Chief Complaint: Same as above    HPI: Patient is an 73 y.o. female is here for the above.     Last colonoscopy: 2018  Family history: brother   Anticoagulation: none     Past Medical History:   Past Medical History:   Diagnosis Date    Allergy     Anemia 2017    Angina pectoris     followed by cardiology    Arthritis     Breast cancer     Right T1 Ductal Ca in situ,high oncotype Dx 33, Estrogen positive. Lumpectomy, TAC chemo x 6 cyscles then RT,on aromatase inhibitor    Cataract     Chondromalacia of right patella 2018    Stable and controlled. Continue current treatment plan as previously prescribed with your PCP.      Diabetes mellitus type II     DM (diabetes mellitus)      am 2017    DM (diabetes mellitus)      am 2020    Elevated BP without diagnosis of hypertension 2018    GERD (gastroesophageal reflux disease)     History of colon polyps 2018    The patient had adenomatous colon polyps in .      History of renal calculi 2015    Right obstructing 13 - passed.     Hyperlipidemia     Hypertension     Kidney stone     right side, passed    Malignant neoplasm of upper-outer quadrant of right breast in female, estrogen receptor positive 2018    Followed by Dr. Robert Lozano disease     reported per pt    Nuclear sclerosis of both eyes 10/05/2015    Osteopenia     Osteoporosis 2019    Pseudophakia 10/15/2015    PVC (premature ventricular contraction)     on and off    Refractive error 10/05/2015    Trouble in sleeping     Type 2 diabetes mellitus      am 2022        Past Surgical History:  Past  Surgical History:   Procedure Laterality Date    ARTHROPLASTY OF JOINT OF FINGER Right 1/5/2023    Procedure: ARTHROPLASTY, FINGER;  Surgeon: Iban Kuhn MD;  Location: [facility];  Service: Orthopedics;  Laterality: Right;  right thumb basal joint arthroplasty    BREAST BIOPSY      BREAST LUMPECTOMY  02/11/2011    right    CATARACT EXTRACTION Bilateral 10/14/2015    CHOLECYSTECTOMY  1989    open    COLONOSCOPY N/A 02/22/2018    Procedure: COLONOSCOPY;  Surgeon: Carlito Odonnell MD;  Location: [facility];  Service: Endoscopy;  Laterality: N/A;    CYST REMOVAL Left 11/2017    foot    DILATION AND CURETTAGE OF UTERUS  10/2010    In [place]    ESOPHAGOGASTRODUODENOSCOPY N/A 06/29/2020    Procedure: EGD (ESOPHAGOGASTRODUODENOSCOPY);  Surgeon: Nancy Hahn MD;  Location: [facility];  Service: Endoscopy;  Laterality: N/A;    EYE SURGERY  2012    cataract    GALLBLADDER SURGERY      removed in 1989    HAND SURGERY Left 2015    Nasal septal deviation repair  2009    toenail edges removed      TONSILLECTOMY  1959    TOTAL REDUCTION MAMMOPLASTY Left     2015    TUBAL LIGATION  1981    UVULOPALATOPLASTY          Home Medications:  Prior to Admission medications    Medication Sig Start Date End Date Taking? Authorizing Provider   acetaminophen (TYLENOL) 500 MG tablet Take 500 mg by mouth every 6 (six) hours as needed for Pain.   Yes Historical Provider   calcium-vitamin D (CALCIUM 600 + D,3,) 600 mg(1,500mg) -400 unit Tab Take 1 tablet by mouth 2 (two) times daily. 8/25/21  Yes Nathaniel Moon MD   cholecalciferol, vitamin D3, (VITAMIN D3) 1,000 unit capsule Take 1 capsule (1,000 Units total) by mouth once daily. 5/17/19  Yes Kamini Newton MD   esomeprazole magnesium (NEXIUM ORAL) Take by mouth.   Yes Historical Provider   ferrous gluconate (FERGON) 240 (27 FE) MG tablet Take 1 tablet (240 mg total) by mouth once daily. 11/15/17  Yes Kamini Newton MD   gabapentin (NEURONTIN) 100 MG capsule Take 1 capsule  (100 mg total) by mouth 3 (three) times daily. 11/1/22  Yes Nathaniel Moon MD   glimepiride (AMARYL) 1 MG tablet TAKE 1 TABLET(1 MG) BY MOUTH EVERY DAY 9/15/21  Yes Andree Coyne, PhD, NP-C   magnesium aspartate HCl 61 mg (615 mg) TbEC Take by mouth once.   Yes Historical Provider   metoprolol succinate (TOPROL-XL) 25 MG 24 hr tablet Take 25 mg by mouth once daily. 5/5/22  Yes Historical Provider   rosuvastatin (CRESTOR) 10 MG tablet TAKE 1 TABLET BY MOUTH EVERY DAY 6/12/21  Yes Kamini Newton MD   traZODone (DESYREL) 100 MG tablet TAKE 1 TABLET BY MOUTH AT BEDTIME 7/24/21  Yes Kamini Newton MD   turmeric 400 mg Cap Take 400 mg by mouth 2 (two) times daily as needed. 7/21/20  Yes Harish Nieves MD   ACCU-CHEK JD PLUS TEST STRP Strp TEST three times a day 10/20/17   Andree Coyne, PhD, NP-C   ALCOHOL PREP PADS PadM  8/16/13   Historical Provider   COVID-19 vac, viridiana,Pfizer,,PF, (PFIZER COVID-19 VIRIDIANA VACCN,PF,) 30 mcg/0.3 mL injection Inject into the muscle. 8/23/22   Meng Parsons, PharmD   diclofenac sodium (VOLTAREN) 1 % Gel Apply 2 g topically once daily.  Patient not taking: Reported on 12/29/2022 5/23/19   Nathaniel Moon MD   fluticasone propionate (FLONASE) 50 mcg/actuation nasal spray 2 sprays (100 mcg total) by Each Nostril route daily as needed. 2 Spray, Suspension Nasal Every day 5/5/21   Amelie Fan NP   lancets (ACCU-CHEK SOFTCLIX LANCETS) Misc 1 each by Misc.(Non-Drug; Combo Route) route 3 (three) times daily. 9/26/16   Andree Coyne, PhD, NP-C   lancing device Misc  8/16/13   Historical Provider   ALPRAZolam (XANAX) 0.25 MG tablet Take 1 tablet (0.25 mg total) by mouth 3 (three) times daily as needed for Anxiety.  Patient not taking: Reported on 1/19/2023 8/23/22 2/24/23  Elvia Villegas PA-C   oxyCODONE-acetaminophen (PERCOCET)  mg per tablet Take 1 tablet by mouth every 6 (six) hours as needed for Pain.  Patient not taking: Reported on  1/10/2023 1/5/23 2/24/23  Iban Kuhn MD        Allergies:  Review of patient's allergies indicates:  No Known Allergies     Social History:   Social History     Socioeconomic History    Marital status:      Spouse name: Agustín    Number of children: 4   Occupational History    Occupation: Retired Teacher     Comment: [employer]   Tobacco Use    Smoking status: Never    Smokeless tobacco: Never   Substance and Sexual Activity    Alcohol use: No     Alcohol/week: 0.0 standard drinks    Drug use: No    Sexual activity: Not Currently     Partners: Male     Birth control/protection: Post-menopausal   Social History Narrative    aretired from homeschooling/,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 Caffeine intake;1-2 per week - manily coke. Decaffinated tea and most beveridges. Does have a living will - at home in her filing cabinet.      Social Determinants of Health     Financial Resource Strain: Low Risk     Difficulty of Paying Living Expenses: Not hard at all   Food Insecurity: No Food Insecurity    Worried About Running Out of Food in the Last Year: Never true    Ran Out of Food in the Last Year: Never true   Transportation Needs: No Transportation Needs    Lack of Transportation (Medical): No    Lack of Transportation (Non-Medical): No   Physical Activity: Insufficiently Active    Days of Exercise per Week: 6 days    Minutes of Exercise per Session: 20 min   Stress: Stress Concern Present    Feeling of Stress : To some extent   Social Connections: Moderately Integrated    Frequency of Communication with Friends and Family: More than three times a week    Frequency of Social Gatherings with Friends and Family: More than three times a week    Attends Sikh Services: More than 4 times per year    Active Member of Clubs or Organizations: Yes    Attends Club or Organization Meetings: More than 4 times per year    Marital Status:    Housing Stability: Low Risk     Unable to Pay for Housing in  the Last Year: No    Number of Places Lived in the Last Year: 1    Unstable Housing in the Last Year: No       Family History:  Family History   Problem Relation Age of Onset    Alzheimer's disease Mother     Diabetes Father     Hypertension Father     Stroke Father     Cancer Father 65        metastatic when diagnosed    Cataracts Sister     Stroke Brother         Stoke    Cataracts Brother     Parkinsonism Brother     Glaucoma Maternal Grandmother     Cancer Paternal Grandfather         prostate    Atrial fibrillation Son         35    Heart disease Son     Psoriasis Cousin     Cancer Other         kidney    Alcohol abuse Neg Hx     Drug abuse Neg Hx     COPD Neg Hx     Birth defects Neg Hx     Mental retardation Neg Hx     Mental illness Neg Hx     Kidney disease Neg Hx     Hyperlipidemia Neg Hx     Melanoma Neg Hx     Lupus Neg Hx        ROS: No acute cardiac events, no acute respiratory complaints.     Physical Exam (all patients):    /65 (BP Location: Left arm, Patient Position: Lying)   Pulse 79   Temp 98.6 °F (37 °C)   Resp 16   Ht 5' (1.524 m)   Wt 61.2 kg (135 lb)   LMP 01/01/2004 (Within Months)   SpO2 97%   Breastfeeding No   BMI 26.37 kg/m²   Lungs: Clear to auscultation bilaterally, respirations unlabored  Heart: Regular rate and rhythm, S1 and S2 normal, no obvious murmurs  Abdomen:         Soft, non-tender, bowel sounds normal, no masses, no organomegaly    Lab Results   Component Value Date    WBC 4.61 12/02/2022    MCV 97 12/02/2022    RDW 12.5 12/02/2022     12/02/2022    INR 1.0 11/30/2020    GLU 79 12/02/2022    HGBA1C 5.8 (H) 12/02/2022    BUN 11 12/02/2022     12/02/2022    K 3.8 12/02/2022     12/02/2022        SEDATION PLAN: per anesthesia      History reviewed, vital signs satisfactory, cardiopulmonary status satisfactory, sedation options, risks and plans have been discussed with the patient  All their questions were answered and the patient agrees to the  sedation procedures as planned and the patient is deemed an appropriate candidate for the sedation as planned.    Procedure explained to patient, informed consent obtained and placed in chart.    Yvonne Saldana  2/24/2023  7:13 AM

## 2023-02-24 NOTE — TRANSFER OF CARE
Anesthesia Transfer of Care Note    Patient: Renea Bourgeois    Procedure(s) Performed: Procedure(s) (LRB):  COLONOSCOPY (N/A)    Patient location: GI    Anesthesia Type: MAC    Transport from OR: Transported from OR on room air with adequate spontaneous ventilation    Post pain: adequate analgesia    Post assessment: no apparent anesthetic complications    Post vital signs: stable    Level of consciousness: awake, alert and oriented    Nausea/Vomiting: no nausea/vomiting    Complications: none    Transfer of care protocol was followed      Last vitals:   Visit Vitals  /65 (BP Location: Left arm, Patient Position: Lying)   Pulse 79   Temp 37 °C (98.6 °F)   Resp 16   Ht 5' (1.524 m)   Wt 61.2 kg (135 lb)   LMP 01/01/2004 (Within Months)   SpO2 97%   Breastfeeding No   BMI 26.37 kg/m²

## 2023-02-24 NOTE — PROVATION PATIENT INSTRUCTIONS
Discharge Summary/Instructions after an Endoscopic Procedure  Patient Name: Renea Bourgeois  Patient MRN: [id number]  Patient YOB: 1950 Friday, February 24, 2023 Yvonne Saldana MD  Dear patient,  As a result of recent federal legislation (The Federal Cures Act), you may   receive lab or pathology results from your procedure in your [facility]   account before your physician is able to contact you. Your physician or   their representative will relay the results to you with their   recommendations at their soonest availability.  Thank you,  RESTRICTIONS:  During your procedure today, you received medications for sedation.  These   medications may affect your judgment, balance and coordination.  Therefore,   for 24 hours, you have the following restrictions:   - DO NOT drive a car, operate machinery, make legal/financial decisions,   sign important papers or drink alcohol.    ACTIVITY:  Today: no heavy lifting, straining or running due to procedural   sedation/anesthesia.  The following day: return to full activity including work.  DIET:  Eat and drink normally unless instructed otherwise.     TREATMENT FOR COMMON SIDE EFFECTS:  - Mild abdominal pain, nausea, belching, bloating or excessive gas:  rest,   eat lightly and use a heating pad.  - Sore Throat: treat with throat lozenges and/or gargle with warm salt   water.  - Because air was used during the procedure, expelling large amounts of air   from your rectum or belching is normal.  - If a bowel prep was taken, you may not have a bowel movement for 1-3 days.    This is normal.  SYMPTOMS TO WATCH FOR AND REPORT TO YOUR PHYSICIAN:  1. Abdominal pain or bloating, other than gas cramps.  2. Chest pain.  3. Back pain.  4. Signs of infection such as: chills or fever occurring within 24 hours   after the procedure.  5. Rectal bleeding, which would show as bright red, maroon, or black stools.   (A tablespoon of blood from the rectum is not serious, especially if    hemorrhoids are present.)  6. Vomiting.  7. Weakness or dizziness.  GO DIRECTLY TO THE NEAREST EMERGENCY ROOM IF YOU HAVE ANY OF THE FOLLOWING:      Difficulty breathing              Chills and/or fever over 101 F   Persistent vomiting and/or vomiting blood   Severe abdominal pain   Severe chest pain   Black, tarry stools   Bleeding- more than one tablespoon   Any other symptom or condition that you feel may need urgent attention  Your doctor recommends these additional instructions:  If any biopsies were taken, your doctors clinic will contact you in 1 to 2   weeks with any results.  - Patient has a contact number available for emergencies.  The signs and   symptoms of potential delayed complications were discussed with the   patient.  Return to normal activities tomorrow.  Written discharge   instructions were provided to the patient.   - Resume previous diet today.   - Continue present medications.   - Await pathology results.   - Repeat colonoscopy in 5 years for surveillance.   - Discharge patient to home (via wheelchair).  For questions, problems or results please call your physician Yvonne Saldana MD at Work:  [phone]  If you have any questions about the above instructions, call the GI   department at [phone] or call the endoscopy unit at [phone]   from 7am until 3 pm.  [facility] [place], EMERGENCY ROOM PHONE NUMBER:   [phone]  IF A COMPLICATION OR EMERGENCY SITUATION ARISES AND YOU ARE UNABLE TO REACH   YOUR PHYSICIAN - GO DIRECTLY TO THE EMERGENCY ROOM.  I have read or have had read to me these discharge instructions for my   procedure and have received a written copy.  I understand these   instructions and will follow-up with my physician if I have any questions.     __________________________________       _____________________________________  Nurse Signature                                          Patient/Designated   Responsible Party Signature  Yvonne  MD Yvonne Saldana MD  2/24/2023 7:37:10 AM  This report has been verified and signed electronically.  Dear patient,  As a result of recent federal legislation (The Federal Cures Act), you may   receive lab or pathology results from your procedure in your [facility]   account before your physician is able to contact you. Your physician or   their representative will relay the results to you with their   recommendations at their soonest availability.  Thank you,  PROVATION

## 2023-02-24 NOTE — ANESTHESIA PREPROCEDURE EVALUATION
02/24/2023  Renea Bourgeois is a 73 y.o., female.    Surgical History    TONSILLECTOMY toenail edges removed   DILATION AND CURETTAGE OF UTERUS CHOLECYSTECTOMY   Nasal septal deviation repair BREAST LUMPECTOMY   TOTAL REDUCTION MAMMOPLASTY CYST REMOVAL   COLONOSCOPY BREAST BIOPSY   GALLBLADDER SURGERY CATARACT EXTRACTION   ESOPHAGOGASTRODUODENOSCOPY HAND SURGERY   EYE SURGERY TUBAL LIGATION   UVULOPALATOPLASTY ARTHROPLASTY OF JOINT OF FINGER       Pre-op Assessment    I have reviewed the Patient Summary Reports.     I have reviewed the Nursing Notes. I have reviewed the NPO Status.   I have reviewed the Medications.     Review of Systems  Anesthesia Hx:  No problems with previous Anesthesia    Social:  Non-Smoker, No Alcohol Use    Hematology/Oncology:         -- Cancer in past history: Breast   Cardiovascular:   Hypertension hyperlipidemia Pulm. HTN     Normal sinus rhythm   Normal ECG   When compared with ECG of 30-NOV-2020 15:09,   Questionable change in The axis   Confirmed by QUETA DANIEL, PHILOMENA ([id number]) on 12/2/2022 10:28:43 PM    Pulmonary:  Pulmonary Normal    Renal/:   Chronic Renal Disease    Hepatic/GI:   GERD Dysphagia  Diverticulosis    Musculoskeletal:   Arthritis     Neurological:  Neurology Normal    Endocrine:   Diabetes, type 2    Psych:  Psychiatric Normal           Physical Exam  General: Alert, Oriented, Cooperative and Well nourished    Airway:  Mallampati: III   Mouth Opening: Small, but > 3cm  TM Distance: 4 - 6 cm  Tongue: Normal  Neck ROM: Normal ROM    Dental:  Partial Dentures        Anesthesia Plan  Type of Anesthesia, risks & benefits discussed:    Anesthesia Type: MAC  Intra-op Monitoring Plan: Standard ASA Monitors  Post Op Pain Control Plan: IV/PO Opioids PRN  Induction:  IV  Informed Consent: Informed consent signed with the Patient and all parties understand the risks and agree  with anesthesia plan.  All questions answered.   ASA Score: 3  Day of Surgery Review of History & Physical: H&P Update referred to the surgeon/provider.I have interviewed and examined the patient. I have reviewed the patient's H&P dated: There are no significant changes.     Ready For Surgery From Anesthesia Perspective.     .

## 2023-02-24 NOTE — ANESTHESIA POSTPROCEDURE EVALUATION
Anesthesia Post Evaluation    Patient: Renea Bourgeois    Procedure(s) Performed: Procedure(s) (LRB):  COLONOSCOPY (N/A)    Final Anesthesia Type: MAC      Patient location during evaluation: GI PACU  Patient participation: Yes- Able to Participate  Level of consciousness: awake and alert  Post-procedure vital signs: reviewed and stable  Pain management: adequate  Airway patency: patent  DANYELLE mitigation strategies: Multimodal analgesia  PONV status at discharge: No PONV  Anesthetic complications: no      Cardiovascular status: hemodynamically stable  Respiratory status: unassisted, room air and spontaneous ventilation  Hydration status: euvolemic  Follow-up not needed.          Vitals Value Taken Time   /72 02/24/23 0754   Temp 36.8 °C (98.2 °F) 02/24/23 0754   Pulse 70 02/24/23 0754   Resp 15 02/24/23 0754   SpO2 96 % 02/24/23 0754         Event Time   Out of Recovery 08:15:50         Pain/Johnathan Score: Johnathan Score: 10 (2/24/2023  7:54 AM)

## 2023-02-27 ENCOUNTER — CLINICAL SUPPORT (OUTPATIENT)
Dept: REHABILITATION | Facility: HOSPITAL | Age: 73
End: 2023-02-27
Payer: MEDICARE

## 2023-02-27 VITALS
WEIGHT: 135 LBS | OXYGEN SATURATION: 96 % | BODY MASS INDEX: 26.5 KG/M2 | SYSTOLIC BLOOD PRESSURE: 130 MMHG | HEART RATE: 70 BPM | HEIGHT: 60 IN | RESPIRATION RATE: 15 BRPM | TEMPERATURE: 98 F | DIASTOLIC BLOOD PRESSURE: 72 MMHG

## 2023-02-27 DIAGNOSIS — M25.631 DECREASED RANGE OF MOTION OF RIGHT WRIST: ICD-10-CM

## 2023-02-27 DIAGNOSIS — M25.641 DECREASED RANGE OF MOTION OF RIGHT THUMB: Primary | ICD-10-CM

## 2023-02-27 DIAGNOSIS — R29.898 DECREASED GRIP STRENGTH OF RIGHT HAND: ICD-10-CM

## 2023-02-27 PROCEDURE — 97530 THERAPEUTIC ACTIVITIES: CPT | Mod: HCNC,PN

## 2023-02-27 PROCEDURE — 97110 THERAPEUTIC EXERCISES: CPT | Mod: HCNC,PN

## 2023-02-27 NOTE — PROGRESS NOTES
"  Occupational Therapy Treatment Note     Date: 2/27/2023  Name: Renea Bourgeois  Clinic Number: [id number]    Therapy Diagnosis:   Encounter Diagnoses   Name Primary?    Decreased range of motion of right thumb Yes    Decreased range of motion of right wrist     Decreased  strength of right hand      Physician: Dulce Naqvi PA-C    Physician Orders: OT eval and tx  Medical Diagnosis: Arthritis of carpometacarpal (CMC) joint of right thumb [M18.11], Post-operative state [Z98.890]  Evaluation Date: 2/2/2023  Insurance Authorization Period Expiration: 4/6/2023  Plan of Care Certification Period: 2/2/2023 to 4/6/2023  Progress Note Due: 30 days (3/22/2023)     Visit # / Visits authorized: 6/12  FOTO: 2/3     Surgical Procedure: ARTHROPLASTY, FINGER (Right) thumb basal joint using Arthrex internal brace system  Date of Surgery: 1/5/2023  Date of Return to MD: PRN     Precautions:  Standard; pt completed 7 weeks post-op on 2/23/2023    Time In: 0800  Time Out: 0845  Total Billable Time: 45 minutes    Subjective     Pt reports: "My thumb bothers me every now and then, but that's to be expected."    she was compliant with home exercise program given on evaluation.  Response to previous treatment: good  Functional change: improved tolerance for progressive strengthening    Pain: 0-1/10   Location: right thumb    Objective   Objective measures updated at progress note unless specified.    Right Upper Extremity Active Range of Motion:    ROM (in degrees) 2/22/2023 2/2/2023   Radial deviation WNL 10   Ulnar deviation 20 18   Wrist flexion 73 20   Wrist extension 66 50      Right Thumb Active Range of Motion:   (Ext/Flex) 2/22/2023 2/2/2023   MCP Jt 0/40 0/20°   IP Jt 0/35 -5/30°   Opposition 1.5cm to DPC 6cm to DPC   Palmar Abd 47* 35°   Radial Abd 54* 45°      and Pinch Strength (in pounds, psi's):   Left Right    2/22/2023 2/22/2023    II 45 22   Lateral 7 1   Tripod 4 1   Tip 3 0.5   /pinch strength not today " on day of evaluation 2* post-op status    CMS Impairment/Limitation/Restriction for FOTO Hand Survey     Therapist reviewed FOTO scores for Renea Bourgeois on 2/22/2023.   FOTO documents entered into [employer] - see Media section.     Limitation Score: 37%  - 52% on evaluation on 2/2/2023          Treatment     Renea received the following manual therapy techniques for 3 minutes:   - scar massage (using vibration) to decrease adhesions and increase tissue extensibility    Renea received therapeutic exercises for her right hand for 15 minutes including:  - thumb AROM: radial and palmar abd/add, circumduction (both directions), opposition to tabletop, and lifts x 10 reps each  - thumb IP and MCP flex/ext range of motion x 10 reps each with joint blocking  - wrist range of motion with 2# DB, forearm 3 ways over bolster, x 20 reps each  - wrist flexor and extensor stretching x 30 sec hold each, 2 rounds  - forearm rotations with 1# pronator wand (hand 3/4 way up handle) x 20 reps    Renea participated in therapeutic activities to improve functional performance for her right hand for 25 minutes including:  - composite  strengthening with green digiciser 5x10  - lateral pinch strengthening with green clothespin 2x10  - 3pt pinch strengthening with red clothespin 2x10  - 2pt pinch strengthening with yellow clothespin 2x10  - resisted pronation and supination with green flexbar x 20 reps each  - resisted wrist flexion and extension with red flexbar x 20 reps each    Home Exercises and Education Provided     Education provided:   - progress toward goals    Written Home Exercises Provided: Patient instructed to cont prior HEP  Exercises were reviewed and Renea was able to demonstrate them prior to the end of the session. Renea demonstrated good understanding of the HEP provided.     See EMR under Patient Instructions for exercises provided during prior visit.        Assessment     Renea was seen for her 6th tx session  on this date. She tolerated progressive strengthening exercises better with less rest breaks.    Renea is progressing towards her goals and there are no updates to goals at this time.   Pt prognosis is Good.     Pt will continue to benefit from skilled outpatient occupational therapy to address the deficits listed in the problem list on initial evaluation provide pt/family education and to maximize pt's level of independence in the home and community environment.     Pt's spiritual, cultural and educational needs considered and pt agreeable to plan of care and goals.    Anticipated barriers to occupational therapy: none    Goals:  Short Term Goals: (4 weeks)  1. Pt will be independent with HEP in 2 visits. - MET 2/0/2023  2. Pt will report decreased right thumb pain to a 5-6/10 at worst. - MET 2/0/2023  3. Pt will increase right wrist AROM by 5-10 degrees to enable dressing, grooming activities. - MET 2/22/2023  4. Pt will increase right thumb ORTEGA by 10-15 degrees to enable in-hand manipulation. - MET 2/22/2023  5. Pt will report a decrease in FOTO limitation score of < 50%, which would indicate an improvement in quality of life. - MET 2/22/2023     Long Term Goals: (8 weeks)  1. Pt will report decreased right thumb pain to 1-2/10 with ADL/IADL tasks. - MET 2/0/2023   2. Pt will increase right thumb ORTEGA by 30 degrees to enable independence with self-care and meal preparation. - MET 2/22/2023  3. Pt will exhibit right  strength of 60-75% of left hand to allow a firm grasp on cooking utensils, steering wheel, etc. - progressing, continue goal 2/22/2023  4. Pt will exhibit 7-9# of functional right pinch strength to allow writing, opening containers, and turning keys. - progressing, continue goal 2/22/2023  5. Pt will report a decrease in FOTO limitation score of < 40%, which would indicate an improvement in quality of life. - MET 2/22/2023    Plan     Plan of Care Certification: 2/2/2023 to 4/2/2023     Outpatient  Occupational Therapy 2 times weekly for 6-8 weeks to include the following interventions: Electrical Stimulation NMES, Fluidotherapy, Manual Therapy, Moist Heat/ Ice, Neuromuscular Re-ed, Orthotic Management and Training, Paraffin, Patient Education, Self Care, Therapeutic Activities, Therapeutic Exercise, and Ultrasound      CHINYERE CORONA OT

## 2023-03-01 LAB
FINAL PATHOLOGIC DIAGNOSIS: NORMAL
Lab: NORMAL

## 2023-03-02 ENCOUNTER — CLINICAL SUPPORT (OUTPATIENT)
Dept: REHABILITATION | Facility: HOSPITAL | Age: 73
End: 2023-03-02
Payer: MEDICARE

## 2023-03-02 DIAGNOSIS — M25.641 DECREASED RANGE OF MOTION OF RIGHT THUMB: Primary | ICD-10-CM

## 2023-03-02 DIAGNOSIS — R29.898 DECREASED GRIP STRENGTH OF RIGHT HAND: ICD-10-CM

## 2023-03-02 DIAGNOSIS — M25.631 DECREASED RANGE OF MOTION OF RIGHT WRIST: ICD-10-CM

## 2023-03-02 PROCEDURE — 97530 THERAPEUTIC ACTIVITIES: CPT | Mod: HCNC,PN

## 2023-03-02 PROCEDURE — 97110 THERAPEUTIC EXERCISES: CPT | Mod: HCNC,PN

## 2023-03-02 NOTE — PROGRESS NOTES
"  Occupational Therapy Treatment Note & Discharge Summary     Date: 3/2/2023  Name: Renea Bourgeois  Clinic Number: [id number]    Therapy Diagnosis:   Encounter Diagnoses   Name Primary?    Decreased range of motion of right thumb Yes    Decreased range of motion of right wrist     Decreased  strength of right hand      Physician: Dulce Naqvi PA-C    Physician Orders: OT eval and tx  Medical Diagnosis: Arthritis of carpometacarpal (CMC) joint of right thumb [M18.11], Post-operative state [Z98.890]  Evaluation Date: 2/2/2023  Insurance Authorization Period Expiration: 4/6/2023  Plan of Care Certification Period: 2/2/2023 to 4/6/2023     Visit # / Visits authorized: 7/12  FOTO: 3/3     Surgical Procedure: ARTHROPLASTY, FINGER (Right) thumb basal joint using Arthrex internal brace system  Date of Surgery: 1/5/2023  Date of Return to MD: PRN     Precautions:  Standard; pt completed 8 weeks post-op on 3/2/2023    Time In: 0815  Time Out: 0900  Total Billable Time: 45 minutes    Subjective     Pt reports: "I mopped my living room yesterday, but I had to keep changing hands."    she was compliant with home exercise program given on evaluation.  Response to previous treatment: good  Functional change: improved right /pinch strength    Pain: 0-1/10   Location: right thumb    Objective   Objective measures updated on this date.      and Pinch Strength (in pounds, psi's):   Left Right Right    2/22/2023 3/2/2023 2/22/2023    II 45 32 22   Lateral 7 3 1   Tripod 4 3 1   Tip 3 1 0.5   /pinch strength not tested on day of evaluation 2* post-op status    CMS Impairment/Limitation/Restriction for FOTO Hand Survey     Therapist reviewed FOTO scores for Renea Bourgeois on 3/2/2023.   FOTO documents entered into [employer] - see Media section.     Limitation Score: 29%  - 37% on 2/22/2023  - 52% on evaluation on 2/2/2023          Treatment     Renea received therapeutic exercises for her right hand for 25 minutes " including:  - thumb AROM: radial and palmar abd/add, circumduction (both directions), opposition to tabletop, and lifts x 10 reps each  - thumb IP and MCP flex/ext range of motion x 10 reps each with joint blocking  - wrist range of motion with 2# DB, forearm 3 ways over bolster, x 20 reps each  - wrist flexor and extensor stretching x 30 sec hold each, 2 rounds  - forearm rotations with 1# pronator wand (hand 3/4 way up handle) x 20 reps    Renea participated in therapeutic activities to improve functional performance for her right hand for 20 minutes including:  - composite  strengthening with blue digiciser 3x10  - lateral pinch strengthening with green clothespin 2x10  - 3pt pinch strengthening with red clothespin 2x10  - 2pt pinch strengthening with yellow clothespin 2x10    Home Exercises and Education Provided     Education provided:   - progress toward goals    Written Home Exercises Provided: Patient instructed to cont prior HEP  Exercises were reviewed and Renea was able to demonstrate them prior to the end of the session. Renea demonstrated good understanding of the HEP provided.     See EMR under Patient Instructions for exercises provided during prior visit.        Assessment     Renea was seen for her 7th tx session on this date and requested discharge 2* her excellent progress thus far and being independent with her home exercise program. She is exhibiting improved right thumb and wrist active range of motion and strength, and reports almost complete resolution of pain. Her  and pinch strength will continue to progress within the next several weeks post-operatively. She is safe to discharge at this time and will continue to benefit from completing her home exercise program.     Goals:  Short Term Goals: (4 weeks)  1. Pt will be independent with HEP in 2 visits. - MET 2/0/2023  2. Pt will report decreased right thumb pain to a 5-6/10 at worst. - MET 2/0/2023  3. Pt will increase right wrist  AROM by 5-10 degrees to enable dressing, grooming activities. - MET 2/22/2023  4. Pt will increase right thumb ORTEGA by 10-15 degrees to enable in-hand manipulation. - MET 2/22/2023  5. Pt will report a decrease in FOTO limitation score of < 50%, which would indicate an improvement in quality of life. - MET 2/22/2023     Long Term Goals: (8 weeks)  1. Pt will report decreased right thumb pain to 1-2/10 with ADL/IADL tasks. - MET 2/0/2023   2. Pt will increase right thumb ORTEGA by 30 degrees to enable independence with self-care and meal preparation. - MET 2/22/2023  3. Pt will exhibit right  strength of 60-75% of left hand to allow a firm grasp on cooking utensils, steering wheel, etc. - MET 3/2/2023  4. Pt will exhibit 7-9# of functional right pinch strength to allow writing, opening containers, and turning keys. - partially met 3/2/2023  5. Pt will report a decrease in FOTO limitation score of < 40%, which would indicate an improvement in quality of life. - MET 2/22/2023    Plan     Discharge occupational therapy services.      CHINYERE CORONA OT

## 2023-04-03 ENCOUNTER — TELEPHONE (OUTPATIENT)
Dept: INTERNAL MEDICINE | Facility: CLINIC | Age: 73
End: 2023-04-03
Payer: MEDICARE

## 2023-04-03 ENCOUNTER — HOSPITAL ENCOUNTER (EMERGENCY)
Facility: HOSPITAL | Age: 73
Discharge: HOME | End: 2023-04-03
Attending: EMERGENCY MEDICINE
Payer: MEDICARE

## 2023-04-03 ENCOUNTER — NURSE TRIAGE (OUTPATIENT)
Dept: ADMINISTRATIVE | Facility: CLINIC | Age: 73
End: 2023-04-03
Payer: MEDICARE

## 2023-04-03 VITALS
SYSTOLIC BLOOD PRESSURE: 178 MMHG | WEIGHT: 142.44 LBS | BODY MASS INDEX: 27.96 KG/M2 | RESPIRATION RATE: 18 BRPM | OXYGEN SATURATION: 96 % | HEIGHT: 60 IN | TEMPERATURE: 98 F | DIASTOLIC BLOOD PRESSURE: 81 MMHG | HEART RATE: 71 BPM

## 2023-04-03 DIAGNOSIS — T17.208A FOREIGN BODY IN PHARYNX, INITIAL ENCOUNTER: Primary | ICD-10-CM

## 2023-04-03 PROCEDURE — 99282 EMERGENCY DEPT VISIT SF MDM: CPT | Mod: HCNC

## 2023-04-03 NOTE — TELEPHONE ENCOUNTER
Contacted patient to inform her that its recommend she go to ER.  Patient stated that she has went and is now ok.

## 2023-04-03 NOTE — ED PROVIDER NOTES
Encounter Date: 4/3/2023       History     Chief Complaint   Patient presents with    food bolus     Pt states was eating, ate a piece of meat, didn't go all the way down. All of a sudden in Lobby it felt like some relief, but scared its till there.     73-year-old female with complaint of food bolus stuck in throat for the past couple hours.  Patient reports that she was eating roast beef and she felt like she had food stuck in her throat.  Patient reports she was having difficulty swallowing saliva.  Patient reports that it appears the food bolus has resolved itself as she walked into the emergency room.      Review of patient's allergies indicates:  No Known Allergies  Past Medical History:   Diagnosis Date    Allergy     Anemia 11/14/2017    Angina pectoris     followed by cardiology    Arthritis     Breast cancer     Right T1 Ductal Ca in situ,high oncotype Dx 33, Estrogen positive. Lumpectomy, TAC chemo x 6 cyscles then RT,on aromatase inhibitor    Cataract     Chondromalacia of right patella 03/26/2018    Stable and controlled. Continue current treatment plan as previously prescribed with your PCP.      Diabetes mellitus type II 2011    DM (diabetes mellitus) 2011     am 08/04/2017    DM (diabetes mellitus) 2010     am 06/22/2020    Elevated BP without diagnosis of hypertension 11/27/2018    GERD (gastroesophageal reflux disease)     History of colon polyps 02/21/2018    The patient had adenomatous colon polyps in 2014.      History of renal calculi 08/11/2015    Right obstructing 8/20/13 - passed.     Hyperlipidemia     Hypertension     Kidney stone     right side, passed    Malignant neoplasm of upper-outer quadrant of right breast in female, estrogen receptor positive 07/23/2018    Followed by Dr. Robert Lozano disease     reported per pt    Nuclear sclerosis of both eyes 10/05/2015    Osteopenia     Osteoporosis 04/12/2019    Pseudophakia 10/15/2015    PVC (premature ventricular  contraction)     on and off    Refractive error 10/05/2015    Trouble in sleeping     Type 2 diabetes mellitus 2010     am 12/06/2022     Past Surgical History:   Procedure Laterality Date    ARTHROPLASTY OF JOINT OF FINGER Right 1/5/2023    Procedure: ARTHROPLASTY, FINGER;  Surgeon: Iban Kuhn MD;  Location: [facility];  Service: Orthopedics;  Laterality: Right;  right thumb basal joint arthroplasty    BREAST BIOPSY      BREAST LUMPECTOMY  02/11/2011    right    CATARACT EXTRACTION Bilateral 10/14/2015    CHOLECYSTECTOMY  1989    open    COLONOSCOPY N/A 02/22/2018    Procedure: COLONOSCOPY;  Surgeon: Carlito Odonnell MD;  Location: [facility] ENDO;  Service: Endoscopy;  Laterality: N/A;    COLONOSCOPY N/A 2/24/2023    Procedure: COLONOSCOPY;  Surgeon: Yvonne Saldana MD;  Location: [facility];  Service: Endoscopy;  Laterality: N/A;    CYST REMOVAL Left 11/2017    foot    DILATION AND CURETTAGE OF UTERUS  10/2010    [place]    ESOPHAGOGASTRODUODENOSCOPY N/A 06/29/2020    Procedure: EGD (ESOPHAGOGASTRODUODENOSCOPY);  Surgeon: Nancy Hahn MD;  Location: [facility];  Service: Endoscopy;  Laterality: N/A;    EYE SURGERY  2012    cataract    GALLBLADDER SURGERY      removed in 1989    HAND SURGERY Left 2015    Nasal septal deviation repair  2009    toenail edges removed      TONSILLECTOMY  1959    TOTAL REDUCTION MAMMOPLASTY Left     2015    TUBAL LIGATION  1981    UVULOPALATOPLASTY       Family History   Problem Relation Age of Onset    Alzheimer's disease Mother     Diabetes Father     Hypertension Father     Stroke Father     Cancer Father 65        metastatic when diagnosed    Cataracts Sister     Stroke Brother         Stoke    Cataracts Brother     Parkinsonism Brother     Glaucoma Maternal Grandmother     Cancer Paternal Grandfather         prostate    Atrial fibrillation Son         35    Heart disease Son     Psoriasis Cousin     Cancer Other         kidney    Alcohol abuse Neg Hx     Drug  abuse Neg Hx     COPD Neg Hx     Birth defects Neg Hx     Mental retardation Neg Hx     Mental illness Neg Hx     Kidney disease Neg Hx     Hyperlipidemia Neg Hx     Melanoma Neg Hx     Lupus Neg Hx      Social History     Tobacco Use    Smoking status: Never    Smokeless tobacco: Never   Substance Use Topics    Alcohol use: No     Alcohol/week: 0.0 standard drinks    Drug use: No     Review of Systems   Constitutional:  Negative for fever.   HENT:  Negative for sore throat.    Respiratory:  Negative for shortness of breath.    Cardiovascular:  Negative for chest pain.   Gastrointestinal:  Negative for nausea.   Genitourinary:  Negative for dysuria.   Musculoskeletal:  Negative for back pain.   Skin:  Negative for rash.   Neurological:  Negative for weakness.   Hematological:  Does not bruise/bleed easily.     Physical Exam     Initial Vitals [04/03/23 1549]   BP Pulse Resp Temp SpO2   (!) 178/81 71 18 98 °F (36.7 °C) 96 %      MAP       --         Physical Exam    Nursing note and vitals reviewed.  Constitutional: She appears well-developed and well-nourished.   HENT:   Head: Normocephalic and atraumatic.   Eyes: Conjunctivae and EOM are normal. Pupils are equal, round, and reactive to light.   Neck: Neck supple.   Normal range of motion.  Cardiovascular:  Normal rate, regular rhythm, normal heart sounds and intact distal pulses.           Pulmonary/Chest: Breath sounds normal.   Abdominal: Abdomen is soft. There is no abdominal tenderness. There is no rebound and no guarding.   Musculoskeletal:         General: Normal range of motion.      Cervical back: Normal range of motion and neck supple.     Neurological: She is alert and oriented to person, place, and time. She has normal strength and normal reflexes.   Skin: Skin is warm and dry.   Psychiatric: She has a normal mood and affect. Her behavior is normal. Thought content normal.       ED Course   Procedures  Labs Reviewed   HIV 1 / 2 ANTIBODY   HEPATITIS C  ANTIBODY   HEP C VIRUS HOLD SPECIMEN          Imaging Results    None          Medications - No data to display      4:30 PM  Patient was able to swallow without difficulty.  Patient tolerated 8 oz of water with no problems.                     Clinical Impression:   Final diagnoses:  [T17.208A] Foreign body in pharynx, initial encounter (Primary)        ED Disposition Condition    Discharge Stable          ED Prescriptions    None       Follow-up Information       Follow up With Specialties Details Why Contact Info    Kamini Newton MD Family Medicine Schedule an appointment as soon as possible for a visit  As needed [address] [place] [address] DR Tish PERALTA [id number]  [phone]               Jluis Rodriges NP  04/03/23 [id number]

## 2023-04-03 NOTE — TELEPHONE ENCOUNTER
Patient stated that she she has a piece of Meat stuck in her  esophagus lungs and stomach and that she cant drink any liquids, any liquids she drinks it comes right back up. Informed patient that if she experiences chocking she should go to the emergency room patient stated that she is waiting on her son to get back.       Please advise per patient portal message

## 2023-04-03 NOTE — TELEPHONE ENCOUNTER
OOC RN  Patient calling swallow.anything and it comes right back up.   Been drinking coke.   Tried to swallow a piece of meat and it is stuck in her lower esophagus.  Care advise is to call 911 now.   Patient wishes to drive herself or wait for her son, I advised per the protocol for her to call 911    Reason for Disposition   SEVERE difficulty swallowing (e.g., drooling or spitting) and started suddenly after taking a medicine or allergic foods    Protocols used: Swallowing Difficulty-A-[place]

## 2023-04-15 ENCOUNTER — PATIENT MESSAGE (OUTPATIENT)
Dept: DIABETES | Facility: CLINIC | Age: 73
End: 2023-04-15
Payer: MEDICARE

## 2023-04-15 DIAGNOSIS — E11.9 TYPE 2 DIABETES MELLITUS WITHOUT COMPLICATION, WITHOUT LONG-TERM CURRENT USE OF INSULIN: ICD-10-CM

## 2023-04-17 RX ORDER — GLIMEPIRIDE 1 MG/1
TABLET ORAL
Qty: 90 TABLET | Refills: 3 | OUTPATIENT
Start: 2023-04-17

## 2023-04-27 ENCOUNTER — TELEPHONE (OUTPATIENT)
Dept: ADMINISTRATIVE | Facility: HOSPITAL | Age: 73
End: 2023-04-27
Payer: MEDICARE

## 2023-05-24 DIAGNOSIS — E11.9 TYPE 2 DIABETES MELLITUS WITHOUT COMPLICATION, WITHOUT LONG-TERM CURRENT USE OF INSULIN: ICD-10-CM

## 2023-05-24 RX ORDER — GLIMEPIRIDE 1 MG/1
TABLET ORAL
Qty: 90 TABLET | Refills: 0 | Status: SHIPPED | OUTPATIENT
Start: 2023-05-24 | End: 2023-09-06

## 2023-07-13 DIAGNOSIS — F51.04 CHRONIC INSOMNIA: ICD-10-CM

## 2023-07-13 RX ORDER — TRAZODONE HYDROCHLORIDE 100 MG/1
TABLET ORAL
Qty: 90 TABLET | Refills: 0 | Status: SHIPPED | OUTPATIENT
Start: 2023-07-13 | End: 2023-10-12

## 2023-07-13 NOTE — TELEPHONE ENCOUNTER
No care due was identified.  [facility] Embedded Care Due Messages. Reference number: [id number].   7/13/2023 10:10:29 AM CDT

## 2023-07-13 NOTE — TELEPHONE ENCOUNTER
Provider Staff:  Action required. This patient has received emergency care.     Please schedule patient for a follow up appointment within next 90 days.     Thanks!  [facility]     Appointments      Date Provider   Last Visit   1/19/2023 Kamini Newton MD   Next Visit   Visit date not found Kamini Newton MD        Refill Decision Note   Renea Bourgeois  is requesting a refill authorization.  Brief Assessment and Rationale for Refill:  Approve     Medication Therapy Plan:  ED documentation reviewed. No changes to therapy noted.      Extended chart review required: Yes   Comments:     Note composed:4:18 PM 07/13/2023

## 2023-07-17 ENCOUNTER — PATIENT OUTREACH (OUTPATIENT)
Dept: ADMINISTRATIVE | Facility: HOSPITAL | Age: 73
End: 2023-07-17
Payer: MEDICARE

## 2023-07-17 DIAGNOSIS — E11.69 TYPE 2 DIABETES MELLITUS WITH OTHER SPECIFIED COMPLICATION, WITHOUT LONG-TERM CURRENT USE OF INSULIN: Primary | ICD-10-CM

## 2023-07-19 ENCOUNTER — LAB VISIT (OUTPATIENT)
Dept: LAB | Facility: HOSPITAL | Age: 73
End: 2023-07-19
Attending: FAMILY MEDICINE
Payer: MEDICARE

## 2023-07-19 DIAGNOSIS — E55.9 VITAMIN D DEFICIENCY: ICD-10-CM

## 2023-07-19 DIAGNOSIS — I15.2 HYPERTENSION ASSOCIATED WITH DIABETES: ICD-10-CM

## 2023-07-19 DIAGNOSIS — E11.69 HYPERLIPIDEMIA ASSOCIATED WITH TYPE 2 DIABETES MELLITUS: ICD-10-CM

## 2023-07-19 DIAGNOSIS — E11.59 HYPERTENSION ASSOCIATED WITH DIABETES: ICD-10-CM

## 2023-07-19 DIAGNOSIS — E78.5 HYPERLIPIDEMIA ASSOCIATED WITH TYPE 2 DIABETES MELLITUS: ICD-10-CM

## 2023-07-19 DIAGNOSIS — E11.69 TYPE 2 DIABETES MELLITUS WITH OTHER SPECIFIED COMPLICATION, WITHOUT LONG-TERM CURRENT USE OF INSULIN: ICD-10-CM

## 2023-07-19 LAB
25(OH)D3+25(OH)D2 SERPL-MCNC: 57 NG/ML (ref 30–96)
ALBUMIN SERPL BCP-MCNC: 4 G/DL (ref 3.5–5.2)
ALBUMIN/CREAT UR: NORMAL UG/MG (ref 0–30)
ALP SERPL-CCNC: 75 U/L (ref 55–135)
ALT SERPL W/O P-5'-P-CCNC: 22 U/L (ref 10–44)
ANION GAP SERPL CALC-SCNC: 11 MMOL/L (ref 8–16)
AST SERPL-CCNC: 26 U/L (ref 10–40)
BASOPHILS # BLD AUTO: 0.02 K/UL (ref 0–0.2)
BASOPHILS NFR BLD: 0.4 % (ref 0–1.9)
BILIRUB SERPL-MCNC: 0.4 MG/DL (ref 0.1–1)
BUN SERPL-MCNC: 6 MG/DL (ref 8–23)
CALCIUM SERPL-MCNC: 9 MG/DL (ref 8.7–10.5)
CHLORIDE SERPL-SCNC: 106 MMOL/L (ref 95–110)
CHOLEST SERPL-MCNC: 169 MG/DL (ref 120–199)
CHOLEST/HDLC SERPL: 4 {RATIO} (ref 2–5)
CO2 SERPL-SCNC: 25 MMOL/L (ref 23–29)
CREAT SERPL-MCNC: 0.8 MG/DL (ref 0.5–1.4)
CREAT UR-MCNC: 41 MG/DL (ref 15–325)
DIFFERENTIAL METHOD: NORMAL
EOSINOPHIL # BLD AUTO: 0.1 K/UL (ref 0–0.5)
EOSINOPHIL NFR BLD: 2.2 % (ref 0–8)
ERYTHROCYTE [DISTWIDTH] IN BLOOD BY AUTOMATED COUNT: 12.3 % (ref 11.5–14.5)
EST. GFR  (NO RACE VARIABLE): >60 ML/MIN/1.73 M^2
ESTIMATED AVG GLUCOSE: 114 MG/DL (ref 68–131)
GLUCOSE SERPL-MCNC: 96 MG/DL (ref 70–110)
HBA1C MFR BLD: 5.6 % (ref 4–5.6)
HCT VFR BLD AUTO: 40.5 % (ref 37–48.5)
HDLC SERPL-MCNC: 42 MG/DL (ref 40–75)
HDLC SERPL: 24.9 % (ref 20–50)
HGB BLD-MCNC: 13.5 G/DL (ref 12–16)
IMM GRANULOCYTES # BLD AUTO: 0.01 K/UL (ref 0–0.04)
IMM GRANULOCYTES NFR BLD AUTO: 0.2 % (ref 0–0.5)
LDLC SERPL CALC-MCNC: 88.2 MG/DL (ref 63–159)
LYMPHOCYTES # BLD AUTO: 1.5 K/UL (ref 1–4.8)
LYMPHOCYTES NFR BLD: 29.4 % (ref 18–48)
MCH RBC QN AUTO: 30.8 PG (ref 27–31)
MCHC RBC AUTO-ENTMCNC: 33.3 G/DL (ref 32–36)
MCV RBC AUTO: 93 FL (ref 82–98)
MICROALBUMIN UR DL<=1MG/L-MCNC: <5 UG/ML
MONOCYTES # BLD AUTO: 0.4 K/UL (ref 0.3–1)
MONOCYTES NFR BLD: 8.9 % (ref 4–15)
NEUTROPHILS # BLD AUTO: 2.9 K/UL (ref 1.8–7.7)
NEUTROPHILS NFR BLD: 58.9 % (ref 38–73)
NONHDLC SERPL-MCNC: 127 MG/DL
NRBC BLD-RTO: 0 /100 WBC
PLATELET # BLD AUTO: 154 K/UL (ref 150–450)
PMV BLD AUTO: 12.4 FL (ref 9.2–12.9)
POTASSIUM SERPL-SCNC: 3.6 MMOL/L (ref 3.5–5.1)
PROT SERPL-MCNC: 6.7 G/DL (ref 6–8.4)
RBC # BLD AUTO: 4.38 M/UL (ref 4–5.4)
SODIUM SERPL-SCNC: 142 MMOL/L (ref 136–145)
TRIGL SERPL-MCNC: 194 MG/DL (ref 30–150)
TSH SERPL DL<=0.005 MIU/L-ACNC: 2.13 UIU/ML (ref 0.4–4)
WBC # BLD AUTO: 4.97 K/UL (ref 3.9–12.7)

## 2023-07-19 PROCEDURE — 82306 VITAMIN D 25 HYDROXY: CPT | Mod: HCNC | Performed by: FAMILY MEDICINE

## 2023-07-19 PROCEDURE — 82570 ASSAY OF URINE CREATININE: CPT | Mod: HCNC | Performed by: PHYSICIAN ASSISTANT

## 2023-07-19 PROCEDURE — 36415 COLL VENOUS BLD VENIPUNCTURE: CPT | Mod: HCNC | Performed by: FAMILY MEDICINE

## 2023-07-19 PROCEDURE — 83036 HEMOGLOBIN GLYCOSYLATED A1C: CPT | Mod: HCNC | Performed by: FAMILY MEDICINE

## 2023-07-19 PROCEDURE — 84443 ASSAY THYROID STIM HORMONE: CPT | Mod: HCNC | Performed by: FAMILY MEDICINE

## 2023-07-19 PROCEDURE — 80053 COMPREHEN METABOLIC PANEL: CPT | Mod: HCNC | Performed by: FAMILY MEDICINE

## 2023-07-19 PROCEDURE — 80061 LIPID PANEL: CPT | Mod: HCNC | Performed by: FAMILY MEDICINE

## 2023-07-19 PROCEDURE — 85025 COMPLETE CBC W/AUTO DIFF WBC: CPT | Mod: HCNC | Performed by: FAMILY MEDICINE

## 2023-07-26 ENCOUNTER — OFFICE VISIT (OUTPATIENT)
Dept: INTERNAL MEDICINE | Facility: CLINIC | Age: 73
End: 2023-07-26
Payer: MEDICARE

## 2023-07-26 VITALS
HEIGHT: 60 IN | WEIGHT: 137.88 LBS | HEART RATE: 71 BPM | TEMPERATURE: 97 F | BODY MASS INDEX: 27.07 KG/M2 | SYSTOLIC BLOOD PRESSURE: 138 MMHG | OXYGEN SATURATION: 96 % | DIASTOLIC BLOOD PRESSURE: 78 MMHG

## 2023-07-26 DIAGNOSIS — E55.9 VITAMIN D DEFICIENCY: ICD-10-CM

## 2023-07-26 DIAGNOSIS — Z17.0 MALIGNANT NEOPLASM OF UPPER-OUTER QUADRANT OF RIGHT BREAST IN FEMALE, ESTROGEN RECEPTOR POSITIVE: ICD-10-CM

## 2023-07-26 DIAGNOSIS — M85.89 OSTEOPENIA OF MULTIPLE SITES: ICD-10-CM

## 2023-07-26 DIAGNOSIS — M89.9 DISORDER OF BONE, UNSPECIFIED: ICD-10-CM

## 2023-07-26 DIAGNOSIS — C50.411 MALIGNANT NEOPLASM OF UPPER-OUTER QUADRANT OF RIGHT BREAST IN FEMALE, ESTROGEN RECEPTOR POSITIVE: ICD-10-CM

## 2023-07-26 DIAGNOSIS — E11.59 TYPE 2 DIABETES MELLITUS WITH OTHER CIRCULATORY COMPLICATION, WITHOUT LONG-TERM CURRENT USE OF INSULIN: ICD-10-CM

## 2023-07-26 DIAGNOSIS — Z00.00 ROUTINE GENERAL MEDICAL EXAMINATION AT A HEALTH CARE FACILITY: Primary | ICD-10-CM

## 2023-07-26 DIAGNOSIS — F51.04 CHRONIC INSOMNIA: ICD-10-CM

## 2023-07-26 DIAGNOSIS — M46.1 SACROILIITIS: ICD-10-CM

## 2023-07-26 DIAGNOSIS — E11.59 HYPERTENSION ASSOCIATED WITH DIABETES: ICD-10-CM

## 2023-07-26 DIAGNOSIS — K21.9 GASTROESOPHAGEAL REFLUX DISEASE, UNSPECIFIED WHETHER ESOPHAGITIS PRESENT: ICD-10-CM

## 2023-07-26 DIAGNOSIS — I77.1 TORTUOUS AORTA: ICD-10-CM

## 2023-07-26 DIAGNOSIS — E11.69 HYPERLIPIDEMIA ASSOCIATED WITH TYPE 2 DIABETES MELLITUS: ICD-10-CM

## 2023-07-26 DIAGNOSIS — I15.2 HYPERTENSION ASSOCIATED WITH DIABETES: ICD-10-CM

## 2023-07-26 DIAGNOSIS — E78.5 HYPERLIPIDEMIA ASSOCIATED WITH TYPE 2 DIABETES MELLITUS: ICD-10-CM

## 2023-07-26 DIAGNOSIS — I20.89 OTHER FORMS OF ANGINA PECTORIS: ICD-10-CM

## 2023-07-26 PROBLEM — M19.041 OSTEOARTHRITIS OF RIGHT HAND: Status: RESOLVED | Noted: 2022-12-29 | Resolved: 2023-07-26

## 2023-07-26 PROBLEM — M18.11 PRIMARY OSTEOARTHRITIS OF FIRST CARPOMETACARPAL JOINT OF RIGHT HAND: Status: RESOLVED | Noted: 2020-02-19 | Resolved: 2023-07-26

## 2023-07-26 PROBLEM — M19.049 ARTHRITIS OF FINGER: Status: RESOLVED | Noted: 2020-02-19 | Resolved: 2023-07-26

## 2023-07-26 PROCEDURE — 3044F PR MOST RECENT HEMOGLOBIN A1C LEVEL <7.0%: ICD-10-PCS | Mod: HCNC,CPTII,S$GLB, | Performed by: PHYSICIAN ASSISTANT

## 2023-07-26 PROCEDURE — 3078F DIAST BP <80 MM HG: CPT | Mod: HCNC,CPTII,S$GLB, | Performed by: PHYSICIAN ASSISTANT

## 2023-07-26 PROCEDURE — 1160F PR REVIEW ALL MEDS BY PRESCRIBER/CLIN PHARMACIST DOCUMENTED: ICD-10-PCS | Mod: HCNC,CPTII,S$GLB, | Performed by: PHYSICIAN ASSISTANT

## 2023-07-26 PROCEDURE — 99397 PER PM REEVAL EST PAT 65+ YR: CPT | Mod: HCNC,S$GLB,, | Performed by: PHYSICIAN ASSISTANT

## 2023-07-26 PROCEDURE — 1126F AMNT PAIN NOTED NONE PRSNT: CPT | Mod: HCNC,CPTII,S$GLB, | Performed by: PHYSICIAN ASSISTANT

## 2023-07-26 PROCEDURE — 99397 PR PREVENTIVE VISIT,EST,65 & OVER: ICD-10-PCS | Mod: HCNC,S$GLB,, | Performed by: PHYSICIAN ASSISTANT

## 2023-07-26 PROCEDURE — 1159F PR MEDICATION LIST DOCUMENTED IN MEDICAL RECORD: ICD-10-PCS | Mod: HCNC,CPTII,S$GLB, | Performed by: PHYSICIAN ASSISTANT

## 2023-07-26 PROCEDURE — 1159F MED LIST DOCD IN RCRD: CPT | Mod: HCNC,CPTII,S$GLB, | Performed by: PHYSICIAN ASSISTANT

## 2023-07-26 PROCEDURE — 3075F SYST BP GE 130 - 139MM HG: CPT | Mod: HCNC,CPTII,S$GLB, | Performed by: PHYSICIAN ASSISTANT

## 2023-07-26 PROCEDURE — 3008F BODY MASS INDEX DOCD: CPT | Mod: HCNC,CPTII,S$GLB, | Performed by: PHYSICIAN ASSISTANT

## 2023-07-26 PROCEDURE — 3288F PR FALLS RISK ASSESSMENT DOCUMENTED: ICD-10-PCS | Mod: HCNC,CPTII,S$GLB, | Performed by: PHYSICIAN ASSISTANT

## 2023-07-26 PROCEDURE — 3288F FALL RISK ASSESSMENT DOCD: CPT | Mod: HCNC,CPTII,S$GLB, | Performed by: PHYSICIAN ASSISTANT

## 2023-07-26 PROCEDURE — 1160F RVW MEDS BY RX/DR IN RCRD: CPT | Mod: HCNC,CPTII,S$GLB, | Performed by: PHYSICIAN ASSISTANT

## 2023-07-26 PROCEDURE — 3078F PR MOST RECENT DIASTOLIC BLOOD PRESSURE < 80 MM HG: ICD-10-PCS | Mod: HCNC,CPTII,S$GLB, | Performed by: PHYSICIAN ASSISTANT

## 2023-07-26 PROCEDURE — 3066F PR DOCUMENTATION OF TREATMENT FOR NEPHROPATHY: ICD-10-PCS | Mod: HCNC,CPTII,S$GLB, | Performed by: PHYSICIAN ASSISTANT

## 2023-07-26 PROCEDURE — 1126F PR PAIN SEVERITY QUANTIFIED, NO PAIN PRESENT: ICD-10-PCS | Mod: HCNC,CPTII,S$GLB, | Performed by: PHYSICIAN ASSISTANT

## 2023-07-26 PROCEDURE — 99999 PR PBB SHADOW E&M-EST. PATIENT-LVL IV: ICD-10-PCS | Mod: PBBFAC,HCNC,, | Performed by: PHYSICIAN ASSISTANT

## 2023-07-26 PROCEDURE — 3066F NEPHROPATHY DOC TX: CPT | Mod: HCNC,CPTII,S$GLB, | Performed by: PHYSICIAN ASSISTANT

## 2023-07-26 PROCEDURE — 3061F PR NEG MICROALBUMINURIA RESULT DOCUMENTED/REVIEW: ICD-10-PCS | Mod: HCNC,CPTII,S$GLB, | Performed by: PHYSICIAN ASSISTANT

## 2023-07-26 PROCEDURE — 1101F PR PT FALLS ASSESS DOC 0-1 FALLS W/OUT INJ PAST YR: ICD-10-PCS | Mod: HCNC,CPTII,S$GLB, | Performed by: PHYSICIAN ASSISTANT

## 2023-07-26 PROCEDURE — 3044F HG A1C LEVEL LT 7.0%: CPT | Mod: HCNC,CPTII,S$GLB, | Performed by: PHYSICIAN ASSISTANT

## 2023-07-26 PROCEDURE — 1101F PT FALLS ASSESS-DOCD LE1/YR: CPT | Mod: HCNC,CPTII,S$GLB, | Performed by: PHYSICIAN ASSISTANT

## 2023-07-26 PROCEDURE — 99999 PR PBB SHADOW E&M-EST. PATIENT-LVL IV: CPT | Mod: PBBFAC,HCNC,, | Performed by: PHYSICIAN ASSISTANT

## 2023-07-26 PROCEDURE — 3008F PR BODY MASS INDEX (BMI) DOCUMENTED: ICD-10-PCS | Mod: HCNC,CPTII,S$GLB, | Performed by: PHYSICIAN ASSISTANT

## 2023-07-26 PROCEDURE — 3061F NEG MICROALBUMINURIA REV: CPT | Mod: HCNC,CPTII,S$GLB, | Performed by: PHYSICIAN ASSISTANT

## 2023-07-26 PROCEDURE — 3075F PR MOST RECENT SYSTOLIC BLOOD PRESS GE 130-139MM HG: ICD-10-PCS | Mod: HCNC,CPTII,S$GLB, | Performed by: PHYSICIAN ASSISTANT

## 2023-07-26 NOTE — PROGRESS NOTES
Subjective:       Patient ID: Renea Bourgeois is a 73 y.o. female.    Chief Complaint: Annual Exam      Patient presents to clinic today for annual physical exam.      Review of Systems   Constitutional:  Negative for chills, fatigue, fever and unexpected weight change.   HENT:  Negative for congestion, dental problem, ear pain, hearing loss, rhinorrhea and trouble swallowing.    Eyes:  Negative for pain and visual disturbance.   Respiratory:  Negative for cough and shortness of breath.    Cardiovascular:  Negative for chest pain, palpitations and leg swelling.   Gastrointestinal:  Negative for abdominal distention, abdominal pain, blood in stool, constipation, diarrhea, nausea and vomiting.   Genitourinary:  Negative for difficulty urinating and vaginal discharge.   Musculoskeletal:  Positive for arthralgias (chronic) and myalgias (chronic).   Skin:  Negative for rash.   Neurological:  Negative for dizziness, weakness, numbness and headaches.   Hematological:  Negative for adenopathy. Does not bruise/bleed easily.   Psychiatric/Behavioral:  Negative for dysphoric mood and sleep disturbance. The patient is not nervous/anxious.      Objective:      Physical Exam  Vitals and nursing note reviewed.   Constitutional:       General: She is not in acute distress.     Appearance: Normal appearance. She is well-developed.      Comments: Ambulatory without assistive device    HENT:      Head: Normocephalic and atraumatic.      Right Ear: Tympanic membrane, ear canal and external ear normal.      Left Ear: Tympanic membrane, ear canal and external ear normal.      Nose: Nose normal. No mucosal edema or rhinorrhea.      Mouth/Throat:      Lips: Pink.      Mouth: Mucous membranes are moist.      Pharynx: Oropharynx is clear. Uvula midline.   Eyes:      General: Lids are normal. No scleral icterus.     Extraocular Movements: Extraocular movements intact.      Conjunctiva/sclera: Conjunctivae normal.      Pupils: Pupils are  equal, round, and reactive to light.   Neck:      Thyroid: No thyromegaly.   Cardiovascular:      Rate and Rhythm: Normal rate and regular rhythm.      Pulses: Normal pulses.   Pulmonary:      Effort: Pulmonary effort is normal.      Breath sounds: Normal breath sounds. No wheezing, rhonchi or rales.   Abdominal:      General: Bowel sounds are normal. There is no distension.      Palpations: Abdomen is soft. There is no mass.      Tenderness: There is no abdominal tenderness.   Musculoskeletal:         General: Normal range of motion.      Cervical back: Normal range of motion and neck supple.      Right lower leg: No edema.      Left lower leg: No edema.   Lymphadenopathy:      Cervical: No cervical adenopathy.   Skin:     General: Skin is warm and dry.      Findings: No lesion or rash.      Nails: There is no clubbing.   Neurological:      Mental Status: She is alert.      Cranial Nerves: No cranial nerve deficit.      Sensory: No sensory deficit.   Psychiatric:         Mood and Affect: Mood and affect normal.     Protective Sensation (w/ 10 gram monofilament):  Right: Intact  Left: Intact    Visual Inspection:  Dry Skin -  Bilateral and Onychomycosis -  Bilateral    Pedal Pulses:   Right: Present  Left: Present    Posterior Tibialis Pulses:   Right:Present  Left: Present   Assessment:       1. Routine general medical examination at a health care facility    2. Hypertension associated with diabetes    3. Hyperlipidemia associated with type 2 diabetes mellitus    4. Type 2 diabetes mellitus with other circulatory complication, without long-term current use of insulin    5. Malignant neoplasm of upper-outer quadrant of right breast in female, estrogen receptor positive    6. Other forms of angina pectoris    7. Sacroiliitis    8. Tortuous aorta    9. Chronic insomnia    10. Osteopenia of multiple sites    11. Gastroesophageal reflux disease, unspecified whether esophagitis present    12. Disorder of bone,  unspecified    13. Vitamin D deficiency        Plan:   1. Routine general medical examination at a health care facility    2. Hypertension associated with diabetes  Overview:  Followed by Cardiology, continue current treatment plan, Dr. Jovan Urbina @ [facility]    Assessment & Plan:  /78, controlled, continue metoprolol  Lab Results   Component Value Date     07/19/2023    K 3.6 07/19/2023    BUN 6 (L) 07/19/2023    CREATININE 0.8 07/19/2023    ESTGFRAFRICA >60.0 05/26/2022    EGFRNONAA >60.0 05/26/2022    EGFRNORACEVR >60.0 07/19/2023          3. Hyperlipidemia associated with type 2 diabetes mellitus  Assessment & Plan:  LDL not at goal, discussed lifestyle modifications, continue rosuvastatin  Lab Results   Component Value Date    CHOL 169 07/19/2023    LDLCALC 88.2 07/19/2023    TRIG 194 (H) 07/19/2023    HDL 42 07/19/2023    ALT 22 07/19/2023    AST 26 07/19/2023    ALKPHOS 75 07/19/2023        Orders:  -     Comprehensive Metabolic Panel; Future; Expected date: 01/26/2024  -     Lipid Panel; Future; Expected date: 01/26/2024    4. Type 2 diabetes mellitus with other circulatory complication, without long-term current use of insulin  Assessment & Plan:  Controlled, continue glimepiride  Lab Results   Component Value Date    HGBA1C 5.6 07/19/2023    HGBA1C 5.8 (H) 12/02/2022    LDLCALC 88.2 07/19/2023    LABMICR <5.0 07/19/2023    CREATRANDUR 41.0 07/19/2023    MICALBCREAT Unable to calculate 07/19/2023        Orders:  -     Hemoglobin A1C; Future; Expected date: 01/26/2024    5. Malignant neoplasm of upper-outer quadrant of right breast in female, estrogen receptor positive  Overview:  Followed by Hematology/Oncology, continue current treatment plan       6. Other forms of angina pectoris  Overview:  Followed by Cardiology, continue current treatment plan       7. Sacroiliitis  Overview:  Followed by Pain Management, continue current treatment plan       8. Tortuous aorta  Overview:  CXR 3/2011, on  statin      9. Chronic insomnia    10. Osteopenia of multiple sites  Overview:  Followed by Rheumatology, continue current treatment plan     Assessment & Plan:  DEXA up-to-date    Orders:  -     Vitamin D; Future; Expected date: 01/26/2024    11. Gastroesophageal reflux disease, unspecified whether esophagitis present  Assessment & Plan:  Continue Nexium      12. Disorder of bone, unspecified  -     Vitamin D; Future; Expected date: 01/26/2024    13. Vitamin D deficiency  Assessment & Plan:  Continue supplement          Recent labs reviewed with patient.     Discussed Covid booster, scheduling information given.   6 month f/u with Dr. Newton scheduled with fasting labs PTA   Health Maintenance reviewed/updated.

## 2023-07-27 NOTE — ASSESSMENT & PLAN NOTE
/78, controlled, continue metoprolol  Lab Results   Component Value Date     07/19/2023    K 3.6 07/19/2023    BUN 6 (L) 07/19/2023    CREATININE 0.8 07/19/2023    ESTGFRAFRICA >60.0 05/26/2022    EGFRNONAA >60.0 05/26/2022    EGFRNORACEVR >60.0 07/19/2023       Yes

## 2023-07-27 NOTE — ASSESSMENT & PLAN NOTE
LDL not at goal, discussed lifestyle modifications, continue rosuvastatin  Lab Results   Component Value Date    CHOL 169 07/19/2023    LDLCALC 88.2 07/19/2023    TRIG 194 (H) 07/19/2023    HDL 42 07/19/2023    ALT 22 07/19/2023    AST 26 07/19/2023    ALKPHOS 75 07/19/2023

## 2023-07-27 NOTE — ASSESSMENT & PLAN NOTE
Controlled, continue glimepiride  Lab Results   Component Value Date    HGBA1C 5.6 07/19/2023    HGBA1C 5.8 (H) 12/02/2022    LDLCALC 88.2 07/19/2023    LABMICR <5.0 07/19/2023    CREATRANDUR 41.0 07/19/2023    MICALBCREAT Unable to calculate 07/19/2023

## 2023-07-31 NOTE — ASSESSMENT & PLAN NOTE
Continue supplement   [Use of Plain Language] : use of plain language [Adequate] : adequate [None] : none

## 2023-08-03 ENCOUNTER — TELEPHONE (OUTPATIENT)
Dept: ADMINISTRATIVE | Facility: HOSPITAL | Age: 73
End: 2023-08-03
Payer: MEDICARE

## 2023-08-17 NOTE — PROGRESS NOTES
Breast cancer follow-up  Renea Bourgeois  [id number]  [birth year]    Last Visit: 8/25/2022    Oncologist: VIKAS Jones MD    Surgeon: Shubham Shepherd MD    Radiation Oncologist: Korina Moore MD    Internist: Kamini Newton MD    Diagnosis:   stage I, T1 C. N0 M0 ER positive/PA negative high risk breast cancer.  The patient self discovered a lesion in the medial upper portion of her right breast, underwent imaging and confirmatory biopsy in [place]. She ultimately had a lumpectomy and sentinel node biopsy at [facility] 2/11/11,  for a 1.3 cm [place] 8 adenocarcinoma. The sentinel lymph nodes were negative. ER positive/PA negative. Oncotype DX testing revealed that the patient was at high risk of recurrence so she received 6 cycles of TAC chemotherapy which was well-tolerated.      Pt presents today for her annual mammo and breast cancer follow-up    8/23/2022 mammo- wnl    12/29/10 - Performed in [place]- Right breast biopsy - sonocore- 1 oclock: Poorly differentiated invasive ductal carcinoma - high histologic grade of 3/3. 0.6 cm with no definitive vascular or lymphatic stread.     Staging: Stage 1, T1N0M0, ER positive and PA negative, Her2 non amplified.     2/11/11: Oncotype DX 56- high recurrence score.    2/11/11 - Lumpectomy and sentinel node biopsy with Dr. Shepherd. 4 nodes sampled.     3/7/11 - Was considering [id number] randomization but it was decided to go with Cytoxan 500 mg/m2, Adriamycin 50 mg/m2 and taxotere 75 mg/m2 and neulasta support.     3/29/11 - first chemotherapy    4/8/11, 4/19/11, 4/28/11 - grade 2 mucositis on cycle 2 day 10 of TAC.    5/10/11 - C3D1 with 10% dose reduction in Adriamycin.     5/31/11 - C4D1 - 10 % dose reduction in Adriamycin    6/21/11- C5D1- 10% dose reduction in adriamycin dose    7/12/11- C6D1 Completed TAC and had bilateral lower extremity edema - Ultrasounds negative for clots.     8/16/11 - Initial visit with Dr. Moore for Radiation therapy.   August 17,  2011-September 21, 2011, the patient received 4500 cGy in 25 fractions over 35 elapsed days to a posed tangential right  breast field using 6 X photons followed by an electron boost from September 22, 2011-October 3, 2011 delivering 1600 cGy in 8 fractions over 11 elapsed days using 9  MeV electrons to a depth of Dmax. Total cumulative dose was 6100 cGy as of October 3, 2011. Radiotherapy was well-tolerated with minimal tanning and breast  lymphedema. Subsequently the patient has resumed her Arimidex and is tolerating this without side effects.    10/19/11- [place] follow-up after completing radiation. Arimidex initiated. Zometa initiated - 4 mg IVPB for osteopenia.     4/16/12 - Pt did not want Zometa- switched to Prolia     5/2/12 - First dose of Prolia -     Age: 59 y/o age of diagnosis    Family History/Genetics: Father metastatic cancer of unknown origin      Hormone therapy: Arimidex 1 mg po daily X 10 years - completed 2/2020    Health Maintenance and Screening Recommendations:  Colonoscopy: 2/24/2023 -1  polyp- f/u recommended in 5 years (2028)  Dexa: 6/2021- osteopenia  Pt taking vit D and calcium.  - pt followed by Dr. Scherer Rheumatology- receives reclast injection yearly     Mammogram:  8/23/2022-- wnl bilaterally-     mammo today- result pending    Pap: Dr. Sellers 7/24/14        Lifestyle Recommendations:     Exercise: 30 minutes moderate intensity most days of the week.   Alcohol: does not drink  Walking 10 minutes 3 times a day occasionally      ROS: Signs and Symptoms to report: Denies any of the following    Potential late effects and risks of all cancer treatment:    Cough  SOB  Weaknes or fatigue  Bone pain that is unusual and not improving  Swelling of extremities  Chest pain  Headaches or dizziness  Unintentional wt loss  Decreased appetite  Change in bowel or bladder function or character  Breast lumps or changes  Skin nodules or rash  Fevers or night sweats  Lymphedema  Has persistent right  chest wall soreness since radiation- location in prior scar area.    Hormone therapy: Denies any of the following -completed her Arimidex 2/2020   Decrease in bone density with fractures  Endometrial changes/abnormalities and vaginal discharge  Blood clots  Hot flashes  Vaginal dryness and sexual dysfunction.     PSYCHOSOCIAL ADJUSTMENT: denies the following - pt attends a grief support group weekly    Depression   Anxiety   Work stability   Stressors    Adjustment difficulties- death of    Relationship difficulties    Denies lymphedema, neuropathy or ROM changes    Physical Exam   Constitutional: She is oriented to person, place, and time. She appears well-developed and well-nourished. She appears distressed.   HENT:   Head: Normocephalic and atraumatic.   Right Ear: External ear normal.   Left Ear: External ear normal.   Nose: Nose normal. Right sinus exhibits no maxillary sinus tenderness and no frontal sinus tenderness. Left sinus exhibits no maxillary sinus tenderness and no frontal sinus tenderness.   Mouth/Throat: Oropharynx is clear and moist. No oropharyngeal exudate.   Eyes: Pupils are equal, round, and reactive to light. Conjunctivae, EOM and lids are normal. Right eye exhibits no discharge. Left eye exhibits no discharge. Right conjunctiva is not injected. Right conjunctiva has no hemorrhage. Left conjunctiva is not injected. Left conjunctiva has no hemorrhage. No scleral icterus.   Neck: Normal range of motion. Neck supple. No JVD present. No tracheal deviation present. No thyromegaly present.   Cardiovascular: Normal rate and regular rhythm.   Pulmonary/Chest: Effort normal. No stridor. No respiratory distress. She exhibits no tenderness.   Abdominal: Soft. She exhibits no distension and no mass. There is no splenomegaly or hepatomegaly. There is no tenderness. There is no rebound.   Musculoskeletal: Normal range of motion. She exhibits no edema or tenderness.   Breast; no visible mass, erythema  or rash. No nipple retraction. No nipple discharge bilaterally, no palpable breast mass bilaterally.   Lymphadenopathy:     She has no cervical adenopathy.     She has no axillary adenopathy.        Right: No supraclavicular adenopathy present.        Left: No supraclavicular adenopathy present.   Neurological: She is alert and oriented to person, place, and time. No cranial nerve deficit. Coordination normal.   Skin: Skin is dry. No rash noted. She is not diaphoretic. No erythema.   Psychiatric: She has a normal mood and affect. Her behavior is normal. Judgment and thought content normal.   Vitals reviewed.      Follow-up Appointment:   1 year.     Assessment/Plan:  Personal history of high risk for recurrence breast cancer-stage I, T1 C. N0 M0 ER positive/CO negative high risk breast cancer. Chemotherapy Regimen: TAC:   Cytoxan 500 mg/m2,  Adriamycin 50 mg/m2 with 10% dose reduction after the first treatment due to severe mucositis, taxotere 75 mg/m2 and neulasta support.  Dates: 3/29/11 - 7/12/11  Cycles: 6  Right breast radiation therapy  Adjuvant therapy f/u - Completed Arimidex therapy  Negative clinical exam  Mammo today- results pending    RTC one year with melany mammo and for exam.  Reviewed what to look for on exam and report and reviewed possible signs of recurrence to report      30 minutes was spent with the patient and family reviewing past records, identifying risk factors for complications, planning strategies for health promotion and disease prevention, dietary counseling and signs/symptoms to report. Discussed follow-up plan and rationale.

## 2023-08-23 ENCOUNTER — LAB VISIT (OUTPATIENT)
Dept: LAB | Facility: HOSPITAL | Age: 73
End: 2023-08-23
Attending: INTERNAL MEDICINE
Payer: MEDICARE

## 2023-08-23 DIAGNOSIS — Z51.81 MEDICATION MONITORING ENCOUNTER: ICD-10-CM

## 2023-08-23 DIAGNOSIS — M81.0 AGE-RELATED OSTEOPOROSIS WITHOUT CURRENT PATHOLOGICAL FRACTURE: ICD-10-CM

## 2023-08-23 LAB
25(OH)D3+25(OH)D2 SERPL-MCNC: 56 NG/ML (ref 30–96)
ALBUMIN SERPL BCP-MCNC: 4.1 G/DL (ref 3.5–5.2)
ALP SERPL-CCNC: 85 U/L (ref 55–135)
ALT SERPL W/O P-5'-P-CCNC: 31 U/L (ref 10–44)
ANION GAP SERPL CALC-SCNC: 8 MMOL/L (ref 8–16)
AST SERPL-CCNC: 32 U/L (ref 10–40)
BILIRUB SERPL-MCNC: 0.3 MG/DL (ref 0.1–1)
BUN SERPL-MCNC: 7 MG/DL (ref 8–23)
CALCIUM SERPL-MCNC: 9.5 MG/DL (ref 8.7–10.5)
CHLORIDE SERPL-SCNC: 107 MMOL/L (ref 95–110)
CO2 SERPL-SCNC: 26 MMOL/L (ref 23–29)
CREAT SERPL-MCNC: 0.8 MG/DL (ref 0.5–1.4)
EST. GFR  (NO RACE VARIABLE): >60 ML/MIN/1.73 M^2
GLUCOSE SERPL-MCNC: 114 MG/DL (ref 70–110)
POTASSIUM SERPL-SCNC: 3.9 MMOL/L (ref 3.5–5.1)
PROT SERPL-MCNC: 6.8 G/DL (ref 6–8.4)
SODIUM SERPL-SCNC: 141 MMOL/L (ref 136–145)

## 2023-08-23 PROCEDURE — 82306 VITAMIN D 25 HYDROXY: CPT | Mod: HCNC | Performed by: INTERNAL MEDICINE

## 2023-08-23 PROCEDURE — 80053 COMPREHEN METABOLIC PANEL: CPT | Mod: HCNC | Performed by: INTERNAL MEDICINE

## 2023-08-23 PROCEDURE — 36415 COLL VENOUS BLD VENIPUNCTURE: CPT | Mod: HCNC | Performed by: INTERNAL MEDICINE

## 2023-08-30 ENCOUNTER — OFFICE VISIT (OUTPATIENT)
Dept: RHEUMATOLOGY | Facility: CLINIC | Age: 73
End: 2023-08-30
Payer: MEDICARE

## 2023-08-30 ENCOUNTER — PATIENT MESSAGE (OUTPATIENT)
Dept: DIABETES | Facility: CLINIC | Age: 73
End: 2023-08-30
Payer: MEDICARE

## 2023-08-30 VITALS — WEIGHT: 137.81 LBS | HEIGHT: 60 IN | BODY MASS INDEX: 27.06 KG/M2

## 2023-08-30 DIAGNOSIS — Z71.89 COUNSELING ON HEALTH PROMOTION AND DISEASE PREVENTION: ICD-10-CM

## 2023-08-30 DIAGNOSIS — C50.411 MALIGNANT NEOPLASM OF UPPER-OUTER QUADRANT OF RIGHT BREAST IN FEMALE, ESTROGEN RECEPTOR POSITIVE: ICD-10-CM

## 2023-08-30 DIAGNOSIS — R53.83 FATIGUE, UNSPECIFIED TYPE: ICD-10-CM

## 2023-08-30 DIAGNOSIS — E11.9 TYPE 2 DIABETES MELLITUS WITHOUT COMPLICATION, WITHOUT LONG-TERM CURRENT USE OF INSULIN: ICD-10-CM

## 2023-08-30 DIAGNOSIS — M85.89 OSTEOPENIA OF MULTIPLE SITES: ICD-10-CM

## 2023-08-30 DIAGNOSIS — K21.9 GASTROESOPHAGEAL REFLUX DISEASE, UNSPECIFIED WHETHER ESOPHAGITIS PRESENT: ICD-10-CM

## 2023-08-30 DIAGNOSIS — E78.5 HYPERLIPIDEMIA ASSOCIATED WITH TYPE 2 DIABETES MELLITUS: ICD-10-CM

## 2023-08-30 DIAGNOSIS — E11.69 HYPERLIPIDEMIA ASSOCIATED WITH TYPE 2 DIABETES MELLITUS: ICD-10-CM

## 2023-08-30 DIAGNOSIS — E55.9 VITAMIN D INSUFFICIENCY: ICD-10-CM

## 2023-08-30 DIAGNOSIS — I20.89 OTHER FORMS OF ANGINA PECTORIS: ICD-10-CM

## 2023-08-30 DIAGNOSIS — Z17.0 MALIGNANT NEOPLASM OF UPPER-OUTER QUADRANT OF RIGHT BREAST IN FEMALE, ESTROGEN RECEPTOR POSITIVE: ICD-10-CM

## 2023-08-30 DIAGNOSIS — Z85.3 HISTORY OF BREAST CANCER: ICD-10-CM

## 2023-08-30 DIAGNOSIS — M15.9 PRIMARY OSTEOARTHRITIS INVOLVING MULTIPLE JOINTS: ICD-10-CM

## 2023-08-30 DIAGNOSIS — M81.0 AGE-RELATED OSTEOPOROSIS WITHOUT CURRENT PATHOLOGICAL FRACTURE: Primary | ICD-10-CM

## 2023-08-30 PROCEDURE — 3044F PR MOST RECENT HEMOGLOBIN A1C LEVEL <7.0%: ICD-10-PCS | Mod: HCNC,CPTII,S$GLB, | Performed by: INTERNAL MEDICINE

## 2023-08-30 PROCEDURE — 3061F NEG MICROALBUMINURIA REV: CPT | Mod: HCNC,CPTII,S$GLB, | Performed by: INTERNAL MEDICINE

## 2023-08-30 PROCEDURE — 4010F PR ACE/ARB THEARPY RXD/TAKEN: ICD-10-PCS | Mod: HCNC,CPTII,S$GLB, | Performed by: INTERNAL MEDICINE

## 2023-08-30 PROCEDURE — 1126F AMNT PAIN NOTED NONE PRSNT: CPT | Mod: HCNC,CPTII,S$GLB, | Performed by: INTERNAL MEDICINE

## 2023-08-30 PROCEDURE — 99999 PR PBB SHADOW E&M-EST. PATIENT-LVL III: CPT | Mod: PBBFAC,HCNC,, | Performed by: INTERNAL MEDICINE

## 2023-08-30 PROCEDURE — 99214 OFFICE O/P EST MOD 30 MIN: CPT | Mod: HCNC,S$GLB,, | Performed by: INTERNAL MEDICINE

## 2023-08-30 PROCEDURE — 3066F NEPHROPATHY DOC TX: CPT | Mod: HCNC,CPTII,S$GLB, | Performed by: INTERNAL MEDICINE

## 2023-08-30 PROCEDURE — 3066F PR DOCUMENTATION OF TREATMENT FOR NEPHROPATHY: ICD-10-PCS | Mod: HCNC,CPTII,S$GLB, | Performed by: INTERNAL MEDICINE

## 2023-08-30 PROCEDURE — 3008F BODY MASS INDEX DOCD: CPT | Mod: HCNC,CPTII,S$GLB, | Performed by: INTERNAL MEDICINE

## 2023-08-30 PROCEDURE — 1101F PR PT FALLS ASSESS DOC 0-1 FALLS W/OUT INJ PAST YR: ICD-10-PCS | Mod: HCNC,CPTII,S$GLB, | Performed by: INTERNAL MEDICINE

## 2023-08-30 PROCEDURE — 1159F PR MEDICATION LIST DOCUMENTED IN MEDICAL RECORD: ICD-10-PCS | Mod: HCNC,CPTII,S$GLB, | Performed by: INTERNAL MEDICINE

## 2023-08-30 PROCEDURE — 99214 PR OFFICE/OUTPT VISIT, EST, LEVL IV, 30-39 MIN: ICD-10-PCS | Mod: HCNC,S$GLB,, | Performed by: INTERNAL MEDICINE

## 2023-08-30 PROCEDURE — 99999 PR PBB SHADOW E&M-EST. PATIENT-LVL III: ICD-10-PCS | Mod: PBBFAC,HCNC,, | Performed by: INTERNAL MEDICINE

## 2023-08-30 PROCEDURE — 3061F PR NEG MICROALBUMINURIA RESULT DOCUMENTED/REVIEW: ICD-10-PCS | Mod: HCNC,CPTII,S$GLB, | Performed by: INTERNAL MEDICINE

## 2023-08-30 PROCEDURE — 1159F MED LIST DOCD IN RCRD: CPT | Mod: HCNC,CPTII,S$GLB, | Performed by: INTERNAL MEDICINE

## 2023-08-30 PROCEDURE — 3044F HG A1C LEVEL LT 7.0%: CPT | Mod: HCNC,CPTII,S$GLB, | Performed by: INTERNAL MEDICINE

## 2023-08-30 PROCEDURE — 4010F ACE/ARB THERAPY RXD/TAKEN: CPT | Mod: HCNC,CPTII,S$GLB, | Performed by: INTERNAL MEDICINE

## 2023-08-30 PROCEDURE — 1101F PT FALLS ASSESS-DOCD LE1/YR: CPT | Mod: HCNC,CPTII,S$GLB, | Performed by: INTERNAL MEDICINE

## 2023-08-30 PROCEDURE — 3008F PR BODY MASS INDEX (BMI) DOCUMENTED: ICD-10-PCS | Mod: HCNC,CPTII,S$GLB, | Performed by: INTERNAL MEDICINE

## 2023-08-30 PROCEDURE — 3288F PR FALLS RISK ASSESSMENT DOCUMENTED: ICD-10-PCS | Mod: HCNC,CPTII,S$GLB, | Performed by: INTERNAL MEDICINE

## 2023-08-30 PROCEDURE — 3288F FALL RISK ASSESSMENT DOCD: CPT | Mod: HCNC,CPTII,S$GLB, | Performed by: INTERNAL MEDICINE

## 2023-08-30 PROCEDURE — 1126F PR PAIN SEVERITY QUANTIFIED, NO PAIN PRESENT: ICD-10-PCS | Mod: HCNC,CPTII,S$GLB, | Performed by: INTERNAL MEDICINE

## 2023-08-30 RX ORDER — GLIMEPIRIDE 1 MG/1
TABLET ORAL
Qty: 90 TABLET | Refills: 0 | OUTPATIENT
Start: 2023-08-30

## 2023-08-31 ENCOUNTER — OFFICE VISIT (OUTPATIENT)
Dept: SURGERY | Facility: CLINIC | Age: 73
End: 2023-08-31
Payer: MEDICARE

## 2023-08-31 ENCOUNTER — HOSPITAL ENCOUNTER (OUTPATIENT)
Dept: RADIOLOGY | Facility: HOSPITAL | Age: 73
Discharge: HOME | End: 2023-08-31
Attending: NURSE PRACTITIONER
Payer: MEDICARE

## 2023-08-31 VITALS
WEIGHT: 141.31 LBS | SYSTOLIC BLOOD PRESSURE: 144 MMHG | BODY MASS INDEX: 28.49 KG/M2 | RESPIRATION RATE: 16 BRPM | DIASTOLIC BLOOD PRESSURE: 65 MMHG | HEART RATE: 64 BPM | HEIGHT: 59 IN

## 2023-08-31 DIAGNOSIS — Z12.31 ENCOUNTER FOR SCREENING MAMMOGRAM FOR MALIGNANT NEOPLASM OF BREAST: ICD-10-CM

## 2023-08-31 DIAGNOSIS — Z17.0 MALIGNANT NEOPLASM OF OVERLAPPING SITES OF RIGHT BREAST IN FEMALE, ESTROGEN RECEPTOR POSITIVE: Chronic | ICD-10-CM

## 2023-08-31 DIAGNOSIS — C50.811 MALIGNANT NEOPLASM OF OVERLAPPING SITES OF RIGHT BREAST IN FEMALE, ESTROGEN RECEPTOR POSITIVE: Chronic | ICD-10-CM

## 2023-08-31 DIAGNOSIS — Z17.0 MALIGNANT NEOPLASM OF UPPER-OUTER QUADRANT OF RIGHT BREAST IN FEMALE, ESTROGEN RECEPTOR POSITIVE: ICD-10-CM

## 2023-08-31 DIAGNOSIS — Z85.3 ENCOUNTER FOR FOLLOW-UP SURVEILLANCE OF BREAST CANCER: ICD-10-CM

## 2023-08-31 DIAGNOSIS — Z12.39 ENCOUNTER FOR BREAST CANCER SCREENING USING NON-MAMMOGRAM MODALITY: ICD-10-CM

## 2023-08-31 DIAGNOSIS — Z08 ENCOUNTER FOR FOLLOW-UP SURVEILLANCE OF BREAST CANCER: ICD-10-CM

## 2023-08-31 DIAGNOSIS — Z85.3 PERSONAL HISTORY OF BREAST CANCER: Primary | ICD-10-CM

## 2023-08-31 DIAGNOSIS — C50.411 MALIGNANT NEOPLASM OF UPPER-OUTER QUADRANT OF RIGHT BREAST IN FEMALE, ESTROGEN RECEPTOR POSITIVE: ICD-10-CM

## 2023-08-31 PROCEDURE — 3077F PR MOST RECENT SYSTOLIC BLOOD PRESSURE >= 140 MM HG: ICD-10-PCS | Mod: HCNC,CPTII,S$GLB, | Performed by: NURSE PRACTITIONER

## 2023-08-31 PROCEDURE — 3288F PR FALLS RISK ASSESSMENT DOCUMENTED: ICD-10-PCS | Mod: HCNC,CPTII,S$GLB, | Performed by: NURSE PRACTITIONER

## 2023-08-31 PROCEDURE — 1101F PR PT FALLS ASSESS DOC 0-1 FALLS W/OUT INJ PAST YR: ICD-10-PCS | Mod: HCNC,CPTII,S$GLB, | Performed by: NURSE PRACTITIONER

## 2023-08-31 PROCEDURE — 3061F NEG MICROALBUMINURIA REV: CPT | Mod: HCNC,CPTII,S$GLB, | Performed by: NURSE PRACTITIONER

## 2023-08-31 PROCEDURE — 99214 PR OFFICE/OUTPT VISIT, EST, LEVL IV, 30-39 MIN: ICD-10-PCS | Mod: HCNC,S$GLB,, | Performed by: NURSE PRACTITIONER

## 2023-08-31 PROCEDURE — 77067 MAMMO DIGITAL SCREENING BILAT WITH TOMO: ICD-10-PCS | Mod: 26,HCNC,, | Performed by: RADIOLOGY

## 2023-08-31 PROCEDURE — 99999 PR PBB SHADOW E&M-EST. PATIENT-LVL IV: CPT | Mod: PBBFAC,HCNC,, | Performed by: NURSE PRACTITIONER

## 2023-08-31 PROCEDURE — 3066F PR DOCUMENTATION OF TREATMENT FOR NEPHROPATHY: ICD-10-PCS | Mod: HCNC,CPTII,S$GLB, | Performed by: NURSE PRACTITIONER

## 2023-08-31 PROCEDURE — 99999 PR PBB SHADOW E&M-EST. PATIENT-LVL IV: ICD-10-PCS | Mod: PBBFAC,HCNC,, | Performed by: NURSE PRACTITIONER

## 2023-08-31 PROCEDURE — 1126F PR PAIN SEVERITY QUANTIFIED, NO PAIN PRESENT: ICD-10-PCS | Mod: HCNC,CPTII,S$GLB, | Performed by: NURSE PRACTITIONER

## 2023-08-31 PROCEDURE — 1159F PR MEDICATION LIST DOCUMENTED IN MEDICAL RECORD: ICD-10-PCS | Mod: HCNC,CPTII,S$GLB, | Performed by: NURSE PRACTITIONER

## 2023-08-31 PROCEDURE — 3078F DIAST BP <80 MM HG: CPT | Mod: HCNC,CPTII,S$GLB, | Performed by: NURSE PRACTITIONER

## 2023-08-31 PROCEDURE — 77067 SCR MAMMO BI INCL CAD: CPT | Mod: TC,HCNC

## 2023-08-31 PROCEDURE — 1126F AMNT PAIN NOTED NONE PRSNT: CPT | Mod: HCNC,CPTII,S$GLB, | Performed by: NURSE PRACTITIONER

## 2023-08-31 PROCEDURE — 3288F FALL RISK ASSESSMENT DOCD: CPT | Mod: HCNC,CPTII,S$GLB, | Performed by: NURSE PRACTITIONER

## 2023-08-31 PROCEDURE — 3044F PR MOST RECENT HEMOGLOBIN A1C LEVEL <7.0%: ICD-10-PCS | Mod: HCNC,CPTII,S$GLB, | Performed by: NURSE PRACTITIONER

## 2023-08-31 PROCEDURE — 1101F PT FALLS ASSESS-DOCD LE1/YR: CPT | Mod: HCNC,CPTII,S$GLB, | Performed by: NURSE PRACTITIONER

## 2023-08-31 PROCEDURE — 3066F NEPHROPATHY DOC TX: CPT | Mod: HCNC,CPTII,S$GLB, | Performed by: NURSE PRACTITIONER

## 2023-08-31 PROCEDURE — 3061F PR NEG MICROALBUMINURIA RESULT DOCUMENTED/REVIEW: ICD-10-PCS | Mod: HCNC,CPTII,S$GLB, | Performed by: NURSE PRACTITIONER

## 2023-08-31 PROCEDURE — 3077F SYST BP >= 140 MM HG: CPT | Mod: HCNC,CPTII,S$GLB, | Performed by: NURSE PRACTITIONER

## 2023-08-31 PROCEDURE — 3044F HG A1C LEVEL LT 7.0%: CPT | Mod: HCNC,CPTII,S$GLB, | Performed by: NURSE PRACTITIONER

## 2023-08-31 PROCEDURE — 77067 SCR MAMMO BI INCL CAD: CPT | Mod: 26,HCNC,, | Performed by: RADIOLOGY

## 2023-08-31 PROCEDURE — 77063 BREAST TOMOSYNTHESIS BI: CPT | Mod: 26,HCNC,, | Performed by: RADIOLOGY

## 2023-08-31 PROCEDURE — 3078F PR MOST RECENT DIASTOLIC BLOOD PRESSURE < 80 MM HG: ICD-10-PCS | Mod: HCNC,CPTII,S$GLB, | Performed by: NURSE PRACTITIONER

## 2023-08-31 PROCEDURE — 99214 OFFICE O/P EST MOD 30 MIN: CPT | Mod: HCNC,S$GLB,, | Performed by: NURSE PRACTITIONER

## 2023-08-31 PROCEDURE — 4010F PR ACE/ARB THEARPY RXD/TAKEN: ICD-10-PCS | Mod: HCNC,CPTII,S$GLB, | Performed by: NURSE PRACTITIONER

## 2023-08-31 PROCEDURE — 3008F BODY MASS INDEX DOCD: CPT | Mod: HCNC,CPTII,S$GLB, | Performed by: NURSE PRACTITIONER

## 2023-08-31 PROCEDURE — 4010F ACE/ARB THERAPY RXD/TAKEN: CPT | Mod: HCNC,CPTII,S$GLB, | Performed by: NURSE PRACTITIONER

## 2023-08-31 PROCEDURE — 1160F PR REVIEW ALL MEDS BY PRESCRIBER/CLIN PHARMACIST DOCUMENTED: ICD-10-PCS | Mod: HCNC,CPTII,S$GLB, | Performed by: NURSE PRACTITIONER

## 2023-08-31 PROCEDURE — 1159F MED LIST DOCD IN RCRD: CPT | Mod: HCNC,CPTII,S$GLB, | Performed by: NURSE PRACTITIONER

## 2023-08-31 PROCEDURE — 77063 MAMMO DIGITAL SCREENING BILAT WITH TOMO: ICD-10-PCS | Mod: 26,HCNC,, | Performed by: RADIOLOGY

## 2023-08-31 PROCEDURE — 3008F PR BODY MASS INDEX (BMI) DOCUMENTED: ICD-10-PCS | Mod: HCNC,CPTII,S$GLB, | Performed by: NURSE PRACTITIONER

## 2023-08-31 PROCEDURE — 1160F RVW MEDS BY RX/DR IN RCRD: CPT | Mod: HCNC,CPTII,S$GLB, | Performed by: NURSE PRACTITIONER

## 2023-08-31 RX ORDER — OLMESARTAN MEDOXOMIL 20 MG/1
20 TABLET ORAL
COMMUNITY
Start: 2023-08-29

## 2023-09-05 DIAGNOSIS — E11.9 TYPE 2 DIABETES MELLITUS WITHOUT COMPLICATION, WITHOUT LONG-TERM CURRENT USE OF INSULIN: ICD-10-CM

## 2023-09-06 ENCOUNTER — TELEPHONE (OUTPATIENT)
Dept: DIABETES | Facility: CLINIC | Age: 73
End: 2023-09-06
Payer: MEDICARE

## 2023-09-06 RX ORDER — GLIMEPIRIDE 1 MG/1
TABLET ORAL
Qty: 90 TABLET | Refills: 0 | Status: SHIPPED | OUTPATIENT
Start: 2023-09-06 | End: 2023-11-28

## 2023-09-06 NOTE — TELEPHONE ENCOUNTER
Pt called to be informed that provider sent medication to pharmacy. Pt stated medication has already been filled and she has picked it up.  ----- Message from Noelle Sotelo RD, CDE sent at 9/6/2023  3:01 PM CDT -----  Contact: Renea    ----- Message -----  From: Luma Llanos  Sent: 9/6/2023  10:30 AM CDT  To: #    Type:  RX Refill Request    Who Called:  Renea  Refill or New Rx: Refill  RX Name and Strength:Glimepiride   How is the patient currently taking it? (ex. 1XDay): 1x  Is this a 30 day or 90 day RX: 90 days  Preferred Pharmacy with phone number:  [id number] - [place] - [address] AT [facility]  [address]  [place] [address]  Phone: [phone] Fax: [phone]  Local or Mail Order:Local  Ordering Provider:Andree Coyne -(Patient is unsure why Shannon Hubbard is the Authorizing provider as she has never seen her)-  Would the patient rather a call back or a response via [facility]? Call  Best Call Back Number: [phone]  Additional Information: Patient is out

## 2023-09-12 ENCOUNTER — PATIENT MESSAGE (OUTPATIENT)
Dept: RHEUMATOLOGY | Facility: CLINIC | Age: 73
End: 2023-09-12
Payer: MEDICARE

## 2023-09-12 ENCOUNTER — OFFICE VISIT (OUTPATIENT)
Dept: INTERNAL MEDICINE | Facility: CLINIC | Age: 73
End: 2023-09-12
Payer: MEDICARE

## 2023-09-12 VITALS
HEIGHT: 61 IN | HEART RATE: 65 BPM | SYSTOLIC BLOOD PRESSURE: 136 MMHG | BODY MASS INDEX: 26.81 KG/M2 | OXYGEN SATURATION: 96 % | WEIGHT: 142 LBS | DIASTOLIC BLOOD PRESSURE: 70 MMHG

## 2023-09-12 DIAGNOSIS — I10 HYPERTENSION, UNSPECIFIED TYPE: ICD-10-CM

## 2023-09-12 DIAGNOSIS — E11.69 HYPERLIPIDEMIA ASSOCIATED WITH TYPE 2 DIABETES MELLITUS: ICD-10-CM

## 2023-09-12 DIAGNOSIS — Z85.3 HISTORY OF BREAST CANCER: ICD-10-CM

## 2023-09-12 DIAGNOSIS — Z00.00 ENCOUNTER FOR MEDICARE ANNUAL WELLNESS EXAM: ICD-10-CM

## 2023-09-12 DIAGNOSIS — Z00.00 ENCOUNTER FOR PREVENTIVE HEALTH EXAMINATION: Primary | ICD-10-CM

## 2023-09-12 DIAGNOSIS — M85.80 OSTEOPENIA, UNSPECIFIED LOCATION: ICD-10-CM

## 2023-09-12 DIAGNOSIS — E78.5 HYPERLIPIDEMIA ASSOCIATED WITH TYPE 2 DIABETES MELLITUS: ICD-10-CM

## 2023-09-12 PROCEDURE — 3288F FALL RISK ASSESSMENT DOCD: CPT | Mod: HCNC,CPTII,S$GLB, | Performed by: NURSE PRACTITIONER

## 2023-09-12 PROCEDURE — 3288F PR FALLS RISK ASSESSMENT DOCUMENTED: ICD-10-PCS | Mod: HCNC,CPTII,S$GLB, | Performed by: NURSE PRACTITIONER

## 2023-09-12 PROCEDURE — 1101F PR PT FALLS ASSESS DOC 0-1 FALLS W/OUT INJ PAST YR: ICD-10-PCS | Mod: HCNC,CPTII,S$GLB, | Performed by: NURSE PRACTITIONER

## 2023-09-12 PROCEDURE — 1101F PT FALLS ASSESS-DOCD LE1/YR: CPT | Mod: HCNC,CPTII,S$GLB, | Performed by: NURSE PRACTITIONER

## 2023-09-12 PROCEDURE — 3008F BODY MASS INDEX DOCD: CPT | Mod: HCNC,CPTII,S$GLB, | Performed by: NURSE PRACTITIONER

## 2023-09-12 PROCEDURE — 99999 PR PBB SHADOW E&M-EST. PATIENT-LVL V: CPT | Mod: PBBFAC,HCNC,, | Performed by: NURSE PRACTITIONER

## 2023-09-12 PROCEDURE — 3075F SYST BP GE 130 - 139MM HG: CPT | Mod: HCNC,CPTII,S$GLB, | Performed by: NURSE PRACTITIONER

## 2023-09-12 PROCEDURE — 4010F ACE/ARB THERAPY RXD/TAKEN: CPT | Mod: HCNC,CPTII,S$GLB, | Performed by: NURSE PRACTITIONER

## 2023-09-12 PROCEDURE — 3044F PR MOST RECENT HEMOGLOBIN A1C LEVEL <7.0%: ICD-10-PCS | Mod: HCNC,CPTII,S$GLB, | Performed by: NURSE PRACTITIONER

## 2023-09-12 PROCEDURE — 3078F DIAST BP <80 MM HG: CPT | Mod: HCNC,CPTII,S$GLB, | Performed by: NURSE PRACTITIONER

## 2023-09-12 PROCEDURE — 1160F RVW MEDS BY RX/DR IN RCRD: CPT | Mod: HCNC,CPTII,S$GLB, | Performed by: NURSE PRACTITIONER

## 2023-09-12 PROCEDURE — 3075F PR MOST RECENT SYSTOLIC BLOOD PRESS GE 130-139MM HG: ICD-10-PCS | Mod: HCNC,CPTII,S$GLB, | Performed by: NURSE PRACTITIONER

## 2023-09-12 PROCEDURE — 3078F PR MOST RECENT DIASTOLIC BLOOD PRESSURE < 80 MM HG: ICD-10-PCS | Mod: HCNC,CPTII,S$GLB, | Performed by: NURSE PRACTITIONER

## 2023-09-12 PROCEDURE — 3061F PR NEG MICROALBUMINURIA RESULT DOCUMENTED/REVIEW: ICD-10-PCS | Mod: HCNC,CPTII,S$GLB, | Performed by: NURSE PRACTITIONER

## 2023-09-12 PROCEDURE — G0439 PR MEDICARE ANNUAL WELLNESS SUBSEQUENT VISIT: ICD-10-PCS | Mod: HCNC,S$GLB,, | Performed by: NURSE PRACTITIONER

## 2023-09-12 PROCEDURE — 1170F FXNL STATUS ASSESSED: CPT | Mod: HCNC,CPTII,S$GLB, | Performed by: NURSE PRACTITIONER

## 2023-09-12 PROCEDURE — 3066F NEPHROPATHY DOC TX: CPT | Mod: HCNC,CPTII,S$GLB, | Performed by: NURSE PRACTITIONER

## 2023-09-12 PROCEDURE — 1126F PR PAIN SEVERITY QUANTIFIED, NO PAIN PRESENT: ICD-10-PCS | Mod: HCNC,CPTII,S$GLB, | Performed by: NURSE PRACTITIONER

## 2023-09-12 PROCEDURE — G0439 PPPS, SUBSEQ VISIT: HCPCS | Mod: HCNC,S$GLB,, | Performed by: NURSE PRACTITIONER

## 2023-09-12 PROCEDURE — 3061F NEG MICROALBUMINURIA REV: CPT | Mod: HCNC,CPTII,S$GLB, | Performed by: NURSE PRACTITIONER

## 2023-09-12 PROCEDURE — 1170F PR FUNCTIONAL STATUS ASSESSED: ICD-10-PCS | Mod: HCNC,CPTII,S$GLB, | Performed by: NURSE PRACTITIONER

## 2023-09-12 PROCEDURE — 1126F AMNT PAIN NOTED NONE PRSNT: CPT | Mod: HCNC,CPTII,S$GLB, | Performed by: NURSE PRACTITIONER

## 2023-09-12 PROCEDURE — 1160F PR REVIEW ALL MEDS BY PRESCRIBER/CLIN PHARMACIST DOCUMENTED: ICD-10-PCS | Mod: HCNC,CPTII,S$GLB, | Performed by: NURSE PRACTITIONER

## 2023-09-12 PROCEDURE — 4010F PR ACE/ARB THEARPY RXD/TAKEN: ICD-10-PCS | Mod: HCNC,CPTII,S$GLB, | Performed by: NURSE PRACTITIONER

## 2023-09-12 PROCEDURE — 99999 PR PBB SHADOW E&M-EST. PATIENT-LVL V: ICD-10-PCS | Mod: PBBFAC,HCNC,, | Performed by: NURSE PRACTITIONER

## 2023-09-12 PROCEDURE — 3066F PR DOCUMENTATION OF TREATMENT FOR NEPHROPATHY: ICD-10-PCS | Mod: HCNC,CPTII,S$GLB, | Performed by: NURSE PRACTITIONER

## 2023-09-12 PROCEDURE — 3044F HG A1C LEVEL LT 7.0%: CPT | Mod: HCNC,CPTII,S$GLB, | Performed by: NURSE PRACTITIONER

## 2023-09-12 PROCEDURE — 3008F PR BODY MASS INDEX (BMI) DOCUMENTED: ICD-10-PCS | Mod: HCNC,CPTII,S$GLB, | Performed by: NURSE PRACTITIONER

## 2023-09-12 PROCEDURE — 1159F PR MEDICATION LIST DOCUMENTED IN MEDICAL RECORD: ICD-10-PCS | Mod: HCNC,CPTII,S$GLB, | Performed by: NURSE PRACTITIONER

## 2023-09-12 PROCEDURE — 1159F MED LIST DOCD IN RCRD: CPT | Mod: HCNC,CPTII,S$GLB, | Performed by: NURSE PRACTITIONER

## 2023-09-12 NOTE — PATIENT INSTRUCTIONS
Counseling and Referral of Other Preventative  (Italic type indicates deductible and co-insurance are waived)    Patient Name: Renea Bourgeois  Today's Date: 9/12/2023    Health Maintenance       Date Due Completion Date    Aspirin/Antiplatelet Therapy Never done ---    Influenza Vaccine (1) 09/01/2023 11/4/2022    Eye Exam 12/06/2023 12/6/2022    COVID-19 Vaccine (5 - Pfizer series) 07/26/2024 (Originally 10/18/2022) 8/23/2022    Hemoglobin A1c 01/19/2024 7/19/2023    Diabetes Urine Screening 07/19/2024 7/19/2023    Lipid Panel 07/19/2024 7/19/2023    Foot Exam 07/26/2024 7/26/2023    High Dose Statin 08/30/2024 8/30/2023    Mammogram 08/31/2024 8/31/2023    TETANUS VACCINE 10/24/2024 10/24/2014    DEXA Scan 08/23/2025 8/23/2023    Colorectal Cancer Screening 02/24/2028 2/24/2023        No orders of the defined types were placed in this encounter.    The following information is provided to all patients.  This information is to help you find resources for any of the problems found today that may be affecting your health:                Living healthy guide: www.[facility].[place].gov      Understanding Diabetes: www.diabetes.org      Eating healthy: www.cdc.gov/healthyweight      CDC home safety checklist: www.cdc.gov/steadi/patient.html      Agency on Aging: www.goea.[place].gov      Alcoholics anonymous (AA): www.aa.org      Physical Activity: www.arie.nih.gov/xa9bykq      Tobacco use: www.quitwithusla.org

## 2023-09-12 NOTE — PROGRESS NOTES
"  Renea Bourgeois presented for a  Medicare AWV and comprehensive Health Risk Assessment today. The following components were reviewed and updated:    Medical history  Family History  Social history  Allergies and Current Medications  Health Risk Assessment  Health Maintenance  Care Team         ** See Completed Assessments for Annual Wellness Visit within the encounter summary.**         The following assessments were completed:  Living Situation  CAGE  Depression Screening  Timed Get Up and Go  Whisper Test  Cognitive Function Screening  Nutrition Screening  ADL Screening  PAQ Screening        Vitals:    09/12/23 0938   BP: 136/70   Pulse: 65   SpO2: 96%   Weight: 64.4 kg (141 lb 15.6 oz)   Height: 5' 1" (1.549 m)     Body mass index is 26.83 kg/m².  Physical Exam  Vitals and nursing note reviewed.   Constitutional:       Appearance: She is well-developed.   HENT:      Head: Normocephalic.   Cardiovascular:      Rate and Rhythm: Normal rate and regular rhythm.      Heart sounds: Normal heart sounds.   Pulmonary:      Effort: Pulmonary effort is normal. No respiratory distress.      Breath sounds: Normal breath sounds.   Abdominal:      Palpations: Abdomen is soft. There is no mass.      Tenderness: There is no abdominal tenderness.   Musculoskeletal:         General: Normal range of motion.   Skin:     General: Skin is warm and dry.   Neurological:      Mental Status: She is alert and oriented to person, place, and time.      Motor: No abnormal muscle tone.   Psychiatric:         Speech: Speech normal.         Behavior: Behavior normal.               Diagnoses and health risks identified today and associated recommendations/orders:    1. Encounter for preventive health examination     Review for Opioid Screening: Pt does not have Rx for Opioids      Review for Substance Use Disorders: Patient does not use substance  per chart     Encouraged healthy diet and exercise as tolerated   PHQ 2-2. Discussed counseling. " Psychiatry department contact information given to patient.  Advised to follow up with PCP for further evaluation and recommendations. Patient expressed understanding  Discussed receiving flu vaccine at pharmacy.       2. Hyperlipidemia associated with type 2 diabetes mellitus  A1c 5.6  Lipid-Triglycerides elevated  Continue current treatment plan as previously prescribed with your  pcp and dm management.     3. Hypertension, unspecified type  Continue current treatment plan as previously prescribed with your  pcp     4. Osteopenia, unspecified location  Dexa 8/23  Continue current treatment plan as previously prescribed with your  rheumatologist.     5. History of breast cancer  Continue current treatment plan as previously prescribed with your  oncologist     6. Encounter for Medicare annual wellness exam  - Ambulatory Referral/Consult to Enhanced Annual Wellness Visit (eAWV)      Provided Renea with a 5-10 year written screening schedule and personal prevention plan. Recommendations were developed using the USPSTF age appropriate recommendations. Education, counseling, and referrals were provided as needed. After Visit Summary printed and given to patient which includes a list of additional screenings\tests needed.    Follow up in about 1 year (around 9/12/2024) for awv.    Merced Trujillo NP  I offered to discuss advanced care planning, including how to pick a person who would make decisions for you if you were unable to make them for yourself, called a health care power of , and what kind of decisions you might make such as use of life sustaining treatments such as ventilators and tube feeding when faced with a life limiting illness recorded on a living will that they will need to know. (How you want to be cared for as you near the end of your natural life)     X Patient is interested in learning more about how to make advanced directives.  I provided them paperwork and offered to discuss this with  them.

## 2023-11-18 DIAGNOSIS — M15.9 PRIMARY OSTEOARTHRITIS INVOLVING MULTIPLE JOINTS: ICD-10-CM

## 2023-11-22 RX ORDER — GABAPENTIN 100 MG/1
100 CAPSULE ORAL 3 TIMES DAILY
Qty: 270 CAPSULE | Refills: 11 | Status: SHIPPED | OUTPATIENT
Start: 2023-11-22

## 2023-11-28 DIAGNOSIS — E11.9 TYPE 2 DIABETES MELLITUS WITHOUT COMPLICATION, WITHOUT LONG-TERM CURRENT USE OF INSULIN: ICD-10-CM

## 2023-11-28 RX ORDER — GLIMEPIRIDE 1 MG/1
TABLET ORAL
Qty: 90 TABLET | Refills: 0 | Status: SHIPPED | OUTPATIENT
Start: 2023-11-28 | End: 2024-01-24 | Stop reason: SDUPTHER

## 2023-12-06 ENCOUNTER — OFFICE VISIT (OUTPATIENT)
Dept: OPHTHALMOLOGY | Facility: CLINIC | Age: 73
End: 2023-12-06
Payer: MEDICARE

## 2023-12-06 DIAGNOSIS — E11.9 DIABETES MELLITUS TYPE 2 WITHOUT RETINOPATHY: Primary | ICD-10-CM

## 2023-12-06 DIAGNOSIS — Z96.1 PSEUDOPHAKIA OF BOTH EYES: ICD-10-CM

## 2023-12-06 DIAGNOSIS — H52.7 REFRACTIVE ERRORS: ICD-10-CM

## 2023-12-06 PROCEDURE — 92014 PR EYE EXAM, EST PATIENT,COMPREHESV: ICD-10-PCS | Mod: HCNC,S$GLB,, | Performed by: OPTOMETRIST

## 2023-12-06 PROCEDURE — 92015 DETERMINE REFRACTIVE STATE: CPT | Mod: HCNC,S$GLB,, | Performed by: OPTOMETRIST

## 2023-12-06 PROCEDURE — 92014 COMPRE OPH EXAM EST PT 1/>: CPT | Mod: HCNC,S$GLB,, | Performed by: OPTOMETRIST

## 2023-12-06 PROCEDURE — 3066F NEPHROPATHY DOC TX: CPT | Mod: HCNC,CPTII,S$GLB, | Performed by: OPTOMETRIST

## 2023-12-06 PROCEDURE — 4010F ACE/ARB THERAPY RXD/TAKEN: CPT | Mod: HCNC,CPTII,S$GLB, | Performed by: OPTOMETRIST

## 2023-12-06 PROCEDURE — 3044F HG A1C LEVEL LT 7.0%: CPT | Mod: HCNC,CPTII,S$GLB, | Performed by: OPTOMETRIST

## 2023-12-06 PROCEDURE — 99999 PR PBB SHADOW E&M-EST. PATIENT-LVL III: ICD-10-PCS | Mod: PBBFAC,HCNC,, | Performed by: OPTOMETRIST

## 2023-12-06 PROCEDURE — 1159F PR MEDICATION LIST DOCUMENTED IN MEDICAL RECORD: ICD-10-PCS | Mod: HCNC,CPTII,S$GLB, | Performed by: OPTOMETRIST

## 2023-12-06 PROCEDURE — 99999 PR PBB SHADOW E&M-EST. PATIENT-LVL III: CPT | Mod: PBBFAC,HCNC,, | Performed by: OPTOMETRIST

## 2023-12-06 PROCEDURE — 3044F PR MOST RECENT HEMOGLOBIN A1C LEVEL <7.0%: ICD-10-PCS | Mod: HCNC,CPTII,S$GLB, | Performed by: OPTOMETRIST

## 2023-12-06 PROCEDURE — 1159F MED LIST DOCD IN RCRD: CPT | Mod: HCNC,CPTII,S$GLB, | Performed by: OPTOMETRIST

## 2023-12-06 PROCEDURE — 4010F PR ACE/ARB THEARPY RXD/TAKEN: ICD-10-PCS | Mod: HCNC,CPTII,S$GLB, | Performed by: OPTOMETRIST

## 2023-12-06 PROCEDURE — 3061F NEG MICROALBUMINURIA REV: CPT | Mod: HCNC,CPTII,S$GLB, | Performed by: OPTOMETRIST

## 2023-12-06 PROCEDURE — 3066F PR DOCUMENTATION OF TREATMENT FOR NEPHROPATHY: ICD-10-PCS | Mod: HCNC,CPTII,S$GLB, | Performed by: OPTOMETRIST

## 2023-12-06 PROCEDURE — 92015 PR REFRACTION: ICD-10-PCS | Mod: HCNC,S$GLB,, | Performed by: OPTOMETRIST

## 2023-12-06 PROCEDURE — 3061F PR NEG MICROALBUMINURIA RESULT DOCUMENTED/REVIEW: ICD-10-PCS | Mod: HCNC,CPTII,S$GLB, | Performed by: OPTOMETRIST

## 2023-12-06 NOTE — PROGRESS NOTES
HPI    NIDDM exam.   Dry eyes a lot  Last eye exam 12/06/2022 TRF.  Update glasses RX.  Bilateral pseudophakia.  Lab Results       Component                Value               Date                       HGBA1C                   5.6                 07/19/2023              Last edited by Shoshana Butcher MA on 12/6/2023  4:04 PM.            Assessment /Plan     For exam results, see Encounter Report.    Diabetes mellitus type 2 without retinopathy    Pseudophakia of both eyes    Refractive errors      No Background Diabetic Retinopathy  Strict BG control, f/u w/ PCP, and annual DFE  Stressed importance of DM control to preserve visionDispense     Final Rx for glasses.  RTC 1 year  Discussed above and answered questions.

## 2024-01-08 DIAGNOSIS — F51.04 CHRONIC INSOMNIA: ICD-10-CM

## 2024-01-08 NOTE — TELEPHONE ENCOUNTER
No care due was identified.  Health [facility] Embedded Care Due Messages. Reference number: [id number].   1/08/2024 10:12:50 AM CST

## 2024-01-09 RX ORDER — TRAZODONE HYDROCHLORIDE 100 MG/1
TABLET ORAL
Qty: 90 TABLET | Refills: 0 | Status: SHIPPED | OUTPATIENT
Start: 2024-01-09

## 2024-01-22 ENCOUNTER — LAB VISIT (OUTPATIENT)
Dept: LAB | Facility: HOSPITAL | Age: 74
End: 2024-01-22
Attending: PHYSICIAN ASSISTANT
Payer: MEDICARE

## 2024-01-22 DIAGNOSIS — E11.59 TYPE 2 DIABETES MELLITUS WITH OTHER CIRCULATORY COMPLICATION, WITHOUT LONG-TERM CURRENT USE OF INSULIN: ICD-10-CM

## 2024-01-22 DIAGNOSIS — E78.5 HYPERLIPIDEMIA ASSOCIATED WITH TYPE 2 DIABETES MELLITUS: ICD-10-CM

## 2024-01-22 DIAGNOSIS — M85.89 OSTEOPENIA OF MULTIPLE SITES: ICD-10-CM

## 2024-01-22 DIAGNOSIS — M89.9 DISORDER OF BONE, UNSPECIFIED: ICD-10-CM

## 2024-01-22 DIAGNOSIS — E11.69 HYPERLIPIDEMIA ASSOCIATED WITH TYPE 2 DIABETES MELLITUS: ICD-10-CM

## 2024-01-22 LAB
25(OH)D3+25(OH)D2 SERPL-MCNC: 80 NG/ML (ref 30–96)
ALBUMIN SERPL BCP-MCNC: 4.1 G/DL (ref 3.5–5.2)
ALP SERPL-CCNC: 70 U/L (ref 55–135)
ALT SERPL W/O P-5'-P-CCNC: 35 U/L (ref 10–44)
ANION GAP SERPL CALC-SCNC: 10 MMOL/L (ref 8–16)
AST SERPL-CCNC: 36 U/L (ref 10–40)
BILIRUB SERPL-MCNC: 0.5 MG/DL (ref 0.1–1)
BUN SERPL-MCNC: 7 MG/DL (ref 8–23)
CALCIUM SERPL-MCNC: 9.7 MG/DL (ref 8.7–10.5)
CHLORIDE SERPL-SCNC: 108 MMOL/L (ref 95–110)
CHOLEST SERPL-MCNC: 183 MG/DL (ref 120–199)
CHOLEST/HDLC SERPL: 4.4 {RATIO} (ref 2–5)
CO2 SERPL-SCNC: 25 MMOL/L (ref 23–29)
CREAT SERPL-MCNC: 0.8 MG/DL (ref 0.5–1.4)
EST. GFR  (NO RACE VARIABLE): >60 ML/MIN/1.73 M^2
ESTIMATED AVG GLUCOSE: 126 MG/DL (ref 68–131)
GLUCOSE SERPL-MCNC: 99 MG/DL (ref 70–110)
HBA1C MFR BLD: 6 % (ref 4–5.6)
HDLC SERPL-MCNC: 42 MG/DL (ref 40–75)
HDLC SERPL: 23 % (ref 20–50)
LDLC SERPL CALC-MCNC: 101.8 MG/DL (ref 63–159)
NONHDLC SERPL-MCNC: 141 MG/DL
POTASSIUM SERPL-SCNC: 4 MMOL/L (ref 3.5–5.1)
PROT SERPL-MCNC: 7 G/DL (ref 6–8.4)
SODIUM SERPL-SCNC: 143 MMOL/L (ref 136–145)
TRIGL SERPL-MCNC: 196 MG/DL (ref 30–150)

## 2024-01-22 PROCEDURE — 36415 COLL VENOUS BLD VENIPUNCTURE: CPT | Mod: HCNC | Performed by: PHYSICIAN ASSISTANT

## 2024-01-22 PROCEDURE — 82306 VITAMIN D 25 HYDROXY: CPT | Mod: HCNC | Performed by: PHYSICIAN ASSISTANT

## 2024-01-22 PROCEDURE — 80061 LIPID PANEL: CPT | Mod: HCNC | Performed by: PHYSICIAN ASSISTANT

## 2024-01-22 PROCEDURE — 80053 COMPREHEN METABOLIC PANEL: CPT | Mod: HCNC | Performed by: PHYSICIAN ASSISTANT

## 2024-01-22 PROCEDURE — 83036 HEMOGLOBIN GLYCOSYLATED A1C: CPT | Mod: HCNC | Performed by: PHYSICIAN ASSISTANT

## 2024-01-24 ENCOUNTER — OFFICE VISIT (OUTPATIENT)
Dept: DIABETES | Facility: CLINIC | Age: 74
End: 2024-01-24
Payer: MEDICARE

## 2024-01-24 VITALS
HEIGHT: 61 IN | SYSTOLIC BLOOD PRESSURE: 142 MMHG | DIASTOLIC BLOOD PRESSURE: 86 MMHG | WEIGHT: 144.38 LBS | BODY MASS INDEX: 27.26 KG/M2

## 2024-01-24 DIAGNOSIS — E11.9 TYPE 2 DIABETES MELLITUS WITHOUT COMPLICATION, WITHOUT LONG-TERM CURRENT USE OF INSULIN: Primary | ICD-10-CM

## 2024-01-24 DIAGNOSIS — E11.59 TYPE 2 DIABETES MELLITUS WITH OTHER CIRCULATORY COMPLICATION, WITHOUT LONG-TERM CURRENT USE OF INSULIN: ICD-10-CM

## 2024-01-24 LAB — GLUCOSE SERPL-MCNC: 98 MG/DL (ref 70–110)

## 2024-01-24 PROCEDURE — 3079F DIAST BP 80-89 MM HG: CPT | Mod: HCNC,CPTII,S$GLB, | Performed by: NURSE PRACTITIONER

## 2024-01-24 PROCEDURE — 99999 PR PBB SHADOW E&M-EST. PATIENT-LVL III: CPT | Mod: PBBFAC,HCNC,, | Performed by: NURSE PRACTITIONER

## 2024-01-24 PROCEDURE — 99214 OFFICE O/P EST MOD 30 MIN: CPT | Mod: HCNC,S$GLB,, | Performed by: NURSE PRACTITIONER

## 2024-01-24 PROCEDURE — 1126F AMNT PAIN NOTED NONE PRSNT: CPT | Mod: HCNC,CPTII,S$GLB, | Performed by: NURSE PRACTITIONER

## 2024-01-24 PROCEDURE — 1160F RVW MEDS BY RX/DR IN RCRD: CPT | Mod: HCNC,CPTII,S$GLB, | Performed by: NURSE PRACTITIONER

## 2024-01-24 PROCEDURE — 1159F MED LIST DOCD IN RCRD: CPT | Mod: HCNC,CPTII,S$GLB, | Performed by: NURSE PRACTITIONER

## 2024-01-24 PROCEDURE — 3077F SYST BP >= 140 MM HG: CPT | Mod: HCNC,CPTII,S$GLB, | Performed by: NURSE PRACTITIONER

## 2024-01-24 PROCEDURE — 1101F PT FALLS ASSESS-DOCD LE1/YR: CPT | Mod: HCNC,CPTII,S$GLB, | Performed by: NURSE PRACTITIONER

## 2024-01-24 PROCEDURE — 3044F HG A1C LEVEL LT 7.0%: CPT | Mod: HCNC,CPTII,S$GLB, | Performed by: NURSE PRACTITIONER

## 2024-01-24 PROCEDURE — 82962 GLUCOSE BLOOD TEST: CPT | Mod: HCNC,S$GLB,, | Performed by: NURSE PRACTITIONER

## 2024-01-24 PROCEDURE — 3288F FALL RISK ASSESSMENT DOCD: CPT | Mod: HCNC,CPTII,S$GLB, | Performed by: NURSE PRACTITIONER

## 2024-01-24 RX ORDER — GLIMEPIRIDE 1 MG/1
1 TABLET ORAL DAILY
Qty: 90 TABLET | Refills: 3 | Status: SHIPPED | OUTPATIENT
Start: 2024-01-24

## 2024-01-24 NOTE — PROGRESS NOTES
"PCP: Kamini Newton MD  // Dr. Lewis ( cardiology )     Subjective:     Chief Complaint: Diabetes Follow Up    HISTORY OF PRESENT ILLNESS: 74 y.o.  female presenting for diabetes follow up. Patient has had Type II diabetes since 2010 and has the following complications: none. She is to be enrolled in diabetes education classes.  She has a history of breast cancer, treated in 2010 with remission since then.      Past tried / failed medications: No longer taking metformin due to SE. She denies any recent hospital admissions, emergency room visits, syncope, or diaphoresis.     Lately, blood glucose testing has been higher than normal, with fasting BGs ranging between 110 - 120 s. Denies hypoglycemia symptoms.  For the past several months, she was caring for her brother that had terminal cancer.  Due to stress, she was eating more carbohydrates.  He just passed away, and she is now back home.      Height: 5' 1" (154.9 cm)  //  Weight: 65.5 kg (144 lb 6.4 oz), Body mass index is 27.28 kg/m².      Her blood sugar in clinic today is: 98 mg/dl       Labs Reviewed.       DM MEDICATIONS:  Glimepiride 1 mg before breakfast    Review of Systems   Constitutional:  Negative for appetite change, fatigue and unexpected weight change.   Eyes:  Negative for visual disturbance.   Gastrointestinal:  Negative for abdominal pain, constipation, diarrhea, nausea and vomiting.   Endocrine: Negative for polydipsia, polyphagia and polyuria.   Neurological:  Negative for dizziness, numbness and headaches.       Diabetes Management Status  Statin: Taking  ACE/ARB: Not taking    Screening or Prevention Patient's value Goal Complete/Controlled?   HgA1C Testing and Control   Lab Results   Component Value Date    HGBA1C 6.0 (H) 01/22/2024      Annually/Less than 8% Yes   Lipid profile : 01/22/2024 Annually Yes   LDL control Lab Results   Component Value Date    LDLCALC 101.8 01/22/2024    Annually/Less than 100 mg/dl  Yes "   Nephropathy screening Lab Results   Component Value Date    MICALBCREAT Unable to calculate 07/19/2023     Lab Results   Component Value Date    PROTEINUA Negative 08/20/2013    Annually Yes   Blood pressure BP Readings from Last 1 Encounters:   01/24/24 (!) 142/86    Less than 140/90 Yes   Dilated retinal exam : 12/06/2023 Annually Yes, Dr. Xiong   Foot exam   : 01/24/2024 Annually Yes     ACTIVITY LEVEL: Moderately Active. Discussed activities, benefits, methods, and precautions.  MEAL PLANNING: Patient reports number of meals per day to be 3 and number of snacks per day to be 2.   Patient is encouraged to carb count and consume no more than 45 - 60 grams of carbohydrates in each meal, and 1800 k / razia per day.      BLOOD GLUCOSE TESTING:  Accu-Chek meter, one time daily  SOCIAL HISTORY: .  Never smoker.    Objective:      Physical Exam  Constitutional:       Appearance: She is well-developed.   HENT:      Head: Normocephalic and atraumatic.   Eyes:      Pupils: Pupils are equal, round, and reactive to light.   Cardiovascular:      Pulses:           Dorsalis pedis pulses are 2+ on the right side and 2+ on the left side.   Pulmonary:      Effort: Pulmonary effort is normal.   Feet:      Right foot:      Protective Sensation: 6 sites tested.  6 sites sensed.      Skin integrity: No ulcer, callus or dry skin.      Left foot:      Protective Sensation: 6 sites tested.  6 sites sensed.      Skin integrity: No ulcer, blister, callus or dry skin.   Skin:     General: Skin is warm and dry.      Findings: No rash.   Psychiatric:         Behavior: Behavior normal.         Thought Content: Thought content normal.         Judgment: Judgment normal.         Assessment / Plan:     1.) Type 2 diabetes mellitus without complication, without long-term current use of insulin  Comments:  -  Controlled, A1C at 6.0 %.  Continue glimepiride 1 mg before breakfast. She denies symptoms of hypoglycemia.    Orders:  -     POCT  Glucose, Hand-Held Device  -     glimepiride (AMARYL) 1 MG tablet; Take 1 tablet (1 mg total) by mouth once daily.  Dispense: 90 tablet; Refill:     2.) Hypertension associated with diabetes - BP slightly above goal, scheduled to see PCP tomorrow, and will discuss with her.    3.) Hyperlipidemia associated with type 2 diabetes mellitus- continue meds as prescribed.    Additional Plan Details:    1.) Continue monitoring blood sugar 1 x daily, rotating times. Discussed ADA goal for fasting blood sugar, 80 - 130 mg/dL; pp blood sugars below 180 mg/dl. Also, discussed prevention of hypoglycemia and the need to adjust goals to higher levels if persistent hypoglycemia.  Reminded to bring BG records or meter to each visit for review.  2.) Return to clinic in 1 year for follow up. The patient was explained the above plan and given opportunity to ask questions.  She understands, chooses and consents to this plan and accepts all the risks, which include but are not limited to the risks mentioned above. She understands the alternative of having no testing, interventions or treatments at this time. She left content and without further questions.     Andree Coyne, JULIUS-C, [facility]    A total of 30 minutes was spent in face to face time, of which over 50 % was spent in counseling patient on disease process, complications, treatment, and side effects of medications.

## 2024-01-31 ENCOUNTER — OFFICE VISIT (OUTPATIENT)
Dept: INTERNAL MEDICINE | Facility: CLINIC | Age: 74
End: 2024-01-31
Payer: MEDICARE

## 2024-01-31 VITALS
TEMPERATURE: 98 F | SYSTOLIC BLOOD PRESSURE: 132 MMHG | BODY MASS INDEX: 26.54 KG/M2 | WEIGHT: 140.56 LBS | OXYGEN SATURATION: 96 % | DIASTOLIC BLOOD PRESSURE: 86 MMHG | HEIGHT: 61 IN | HEART RATE: 79 BPM

## 2024-01-31 DIAGNOSIS — F51.04 CHRONIC INSOMNIA: ICD-10-CM

## 2024-01-31 DIAGNOSIS — K21.9 GASTROESOPHAGEAL REFLUX DISEASE WITHOUT ESOPHAGITIS: ICD-10-CM

## 2024-01-31 DIAGNOSIS — E78.5 HYPERLIPIDEMIA ASSOCIATED WITH TYPE 2 DIABETES MELLITUS: ICD-10-CM

## 2024-01-31 DIAGNOSIS — E11.69 HYPERLIPIDEMIA ASSOCIATED WITH TYPE 2 DIABETES MELLITUS: ICD-10-CM

## 2024-01-31 DIAGNOSIS — I15.2 HYPERTENSION ASSOCIATED WITH DIABETES: ICD-10-CM

## 2024-01-31 DIAGNOSIS — E11.59 HYPERTENSION ASSOCIATED WITH DIABETES: ICD-10-CM

## 2024-01-31 DIAGNOSIS — E11.59 TYPE 2 DIABETES MELLITUS WITH OTHER CIRCULATORY COMPLICATION, WITHOUT LONG-TERM CURRENT USE OF INSULIN: Primary | ICD-10-CM

## 2024-01-31 DIAGNOSIS — E55.9 VITAMIN D DEFICIENCY: ICD-10-CM

## 2024-01-31 PROCEDURE — 3075F SYST BP GE 130 - 139MM HG: CPT | Mod: HCNC,CPTII,S$GLB, | Performed by: FAMILY MEDICINE

## 2024-01-31 PROCEDURE — 3044F HG A1C LEVEL LT 7.0%: CPT | Mod: HCNC,CPTII,S$GLB, | Performed by: FAMILY MEDICINE

## 2024-01-31 PROCEDURE — 1159F MED LIST DOCD IN RCRD: CPT | Mod: HCNC,CPTII,S$GLB, | Performed by: FAMILY MEDICINE

## 2024-01-31 PROCEDURE — 99214 OFFICE O/P EST MOD 30 MIN: CPT | Mod: HCNC,S$GLB,, | Performed by: FAMILY MEDICINE

## 2024-01-31 PROCEDURE — 3079F DIAST BP 80-89 MM HG: CPT | Mod: HCNC,CPTII,S$GLB, | Performed by: FAMILY MEDICINE

## 2024-01-31 PROCEDURE — 1126F AMNT PAIN NOTED NONE PRSNT: CPT | Mod: HCNC,CPTII,S$GLB, | Performed by: FAMILY MEDICINE

## 2024-01-31 PROCEDURE — 1101F PT FALLS ASSESS-DOCD LE1/YR: CPT | Mod: HCNC,CPTII,S$GLB, | Performed by: FAMILY MEDICINE

## 2024-01-31 PROCEDURE — 3288F FALL RISK ASSESSMENT DOCD: CPT | Mod: HCNC,CPTII,S$GLB, | Performed by: FAMILY MEDICINE

## 2024-01-31 PROCEDURE — 99999 PR PBB SHADOW E&M-EST. PATIENT-LVL V: CPT | Mod: PBBFAC,HCNC,, | Performed by: FAMILY MEDICINE

## 2024-01-31 RX ORDER — ROSUVASTATIN CALCIUM 20 MG/1
20 TABLET, COATED ORAL DAILY
Qty: 90 TABLET | Refills: 3 | Status: SHIPPED | OUTPATIENT
Start: 2024-01-31

## 2024-01-31 NOTE — ASSESSMENT & PLAN NOTE
Controlled, continue glimepiride    Lab Results   Component Value Date    HGBA1C 6.0 (H) 01/22/2024    HGBA1C 5.6 07/19/2023    LDLCALC 101.8 01/22/2024    LABMICR <5.0 07/19/2023    CREATRANDUR 41.0 07/19/2023    MICALBCREAT Unable to calculate 07/19/2023

## 2024-01-31 NOTE — PROGRESS NOTES
Subjective:       Patient ID: Renea Bourgeois is a 74 y.o. female.    Chief Complaint: Follow-up    Patient presents to clinic today for followup of chronic conditions.      Review of Systems   Constitutional:  Negative for chills, fatigue, fever and unexpected weight change.   Eyes:  Negative for visual disturbance.   Respiratory:  Negative for shortness of breath.    Cardiovascular:  Negative for chest pain.   Musculoskeletal:  Negative for myalgias.   Neurological:  Negative for headaches.         Objective:      Physical Exam  Vitals reviewed.   Constitutional:       General: She is not in acute distress.     Appearance: She is well-developed.   HENT:      Head: Normocephalic and atraumatic.   Eyes:      General: Lids are normal. No scleral icterus.     Extraocular Movements: Extraocular movements intact.      Conjunctiva/sclera: Conjunctivae normal.      Pupils: Pupils are equal, round, and reactive to light.   Pulmonary:      Effort: Pulmonary effort is normal.   Neurological:      Mental Status: She is alert and oriented to person, place, and time.      Cranial Nerves: No cranial nerve deficit.      Gait: Gait normal.   Psychiatric:         Mood and Affect: Mood and affect normal.         Assessment:       1. Type 2 diabetes mellitus with other circulatory complication, without long-term current use of insulin    2. Hyperlipidemia associated with type 2 diabetes mellitus    3. Hypertension associated with diabetes    4. Vitamin D deficiency    5. Gastroesophageal reflux disease without esophagitis    6. Chronic insomnia        Plan:   1. Type 2 diabetes mellitus with other circulatory complication, without long-term current use of insulin  Assessment & Plan:  Controlled, continue glimepiride    Lab Results   Component Value Date    HGBA1C 6.0 (H) 01/22/2024    HGBA1C 5.6 07/19/2023    LDLCALC 101.8 01/22/2024    LABMICR <5.0 07/19/2023    CREATRANDUR 41.0 07/19/2023    MICALBCREAT Unable to calculate  07/19/2023         Orders:  -     Hemoglobin A1C; Future; Expected date: 07/31/2024    2. Hyperlipidemia associated with type 2 diabetes mellitus  Assessment & Plan:  Above goal, increase crestor to 20 mg daily and recheck labs in 3 months; notify MD for any problems with increased dose    Lab Results   Component Value Date    CHOL 183 01/22/2024    LDLCALC 101.8 01/22/2024    TRIG 196 (H) 01/22/2024    HDL 42 01/22/2024    ALT 35 01/22/2024    AST 36 01/22/2024    ALKPHOS 70 01/22/2024         Orders:  -     Comprehensive Metabolic Panel; Future; Expected date: 07/31/2024  -     Lipid Panel; Future; Expected date: 07/31/2024  -     Comprehensive Metabolic Panel; Future; Expected date: 04/30/2024  -     Lipid Panel; Future; Expected date: 04/30/2024  -     rosuvastatin (CRESTOR) 20 MG tablet; Take 1 tablet (20 mg total) by mouth once daily.  Dispense: 90 tablet; Refill: 3    3. Hypertension associated with diabetes  Overview:  Followed by Cardiology, continue current treatment plan, Dr. Jovan Urbina @ [facility]    Assessment & Plan:  Controlled, continue metoprolol and olmesartan    Orders:  -     TSH; Future; Expected date: 07/31/2024  -     CBC Auto Differential; Future; Expected date: 07/31/2024    4. Vitamin D deficiency  Assessment & Plan:  Controlled, continue supplement     Orders:  -     Vitamin D; Future; Expected date: 07/31/2024    5. Gastroesophageal reflux disease without esophagitis  Assessment & Plan:  Stable on nexium      6. Chronic insomnia  Assessment & Plan:  Stable on trazodone          Health Maintenance reviewed/updated.

## 2024-01-31 NOTE — ASSESSMENT & PLAN NOTE
Above goal, increase crestor to 20 mg daily and recheck labs in 3 months; notify MD for any problems with increased dose    Lab Results   Component Value Date    CHOL 183 01/22/2024    LDLCALC 101.8 01/22/2024    TRIG 196 (H) 01/22/2024    HDL 42 01/22/2024    ALT 35 01/22/2024    AST 36 01/22/2024    ALKPHOS 70 01/22/2024

## 2024-03-08 ENCOUNTER — PATIENT MESSAGE (OUTPATIENT)
Dept: INTERNAL MEDICINE | Facility: CLINIC | Age: 74
End: 2024-03-08
Payer: MEDICARE

## 2024-03-13 ENCOUNTER — PATIENT MESSAGE (OUTPATIENT)
Dept: INTERNAL MEDICINE | Facility: CLINIC | Age: 74
End: 2024-03-13
Payer: MEDICARE

## 2024-04-06 DIAGNOSIS — F51.04 CHRONIC INSOMNIA: ICD-10-CM

## 2024-04-07 RX ORDER — TRAZODONE HYDROCHLORIDE 100 MG/1
TABLET ORAL
Qty: 90 TABLET | Refills: 3 | Status: SHIPPED | OUTPATIENT
Start: 2024-04-07

## 2024-04-07 NOTE — TELEPHONE ENCOUNTER
Refill Decision Note   Renea Bourgeois  is requesting a refill authorization.  Brief Assessment and Rationale for Refill:  Approve     Medication Therapy Plan:         Comments:     Note composed:9:29 AM 04/07/2024

## 2024-04-17 ENCOUNTER — OFFICE VISIT (OUTPATIENT)
Dept: INTERNAL MEDICINE | Facility: CLINIC | Age: 74
End: 2024-04-17
Payer: MEDICARE

## 2024-04-17 VITALS
WEIGHT: 138.69 LBS | HEIGHT: 61 IN | HEART RATE: 65 BPM | SYSTOLIC BLOOD PRESSURE: 126 MMHG | DIASTOLIC BLOOD PRESSURE: 70 MMHG | BODY MASS INDEX: 26.19 KG/M2 | OXYGEN SATURATION: 96 %

## 2024-04-17 DIAGNOSIS — R20.8 BURNING SENSATION: ICD-10-CM

## 2024-04-17 DIAGNOSIS — Z00.00 ENCOUNTER FOR PREVENTIVE HEALTH EXAMINATION: Primary | ICD-10-CM

## 2024-04-17 DIAGNOSIS — Z85.3 HISTORY OF BREAST CANCER: ICD-10-CM

## 2024-04-17 DIAGNOSIS — R41.3 MEMORY DIFFICULTIES: ICD-10-CM

## 2024-04-17 DIAGNOSIS — E11.69 HYPERLIPIDEMIA ASSOCIATED WITH TYPE 2 DIABETES MELLITUS: ICD-10-CM

## 2024-04-17 DIAGNOSIS — M85.80 OSTEOPENIA, UNSPECIFIED LOCATION: ICD-10-CM

## 2024-04-17 DIAGNOSIS — E78.5 HYPERLIPIDEMIA ASSOCIATED WITH TYPE 2 DIABETES MELLITUS: ICD-10-CM

## 2024-04-17 DIAGNOSIS — I10 HYPERTENSION, UNSPECIFIED TYPE: ICD-10-CM

## 2024-04-17 PROBLEM — Z87.39 HISTORY OF INFLAMMATION OF SACROILIAC JOINT: Status: ACTIVE | Noted: 2022-06-02

## 2024-04-17 PROCEDURE — 99999 PR PBB SHADOW E&M-EST. PATIENT-LVL V: CPT | Mod: PBBFAC,HCNC,, | Performed by: NURSE PRACTITIONER

## 2024-04-17 PROCEDURE — 3044F HG A1C LEVEL LT 7.0%: CPT | Mod: HCNC,CPTII,S$GLB, | Performed by: NURSE PRACTITIONER

## 2024-04-17 PROCEDURE — 1158F ADVNC CARE PLAN TLK DOCD: CPT | Mod: HCNC,CPTII,S$GLB, | Performed by: NURSE PRACTITIONER

## 2024-04-17 PROCEDURE — 1126F AMNT PAIN NOTED NONE PRSNT: CPT | Mod: HCNC,CPTII,S$GLB, | Performed by: NURSE PRACTITIONER

## 2024-04-17 PROCEDURE — 1101F PT FALLS ASSESS-DOCD LE1/YR: CPT | Mod: HCNC,CPTII,S$GLB, | Performed by: NURSE PRACTITIONER

## 2024-04-17 PROCEDURE — G0439 PPPS, SUBSEQ VISIT: HCPCS | Mod: HCNC,S$GLB,, | Performed by: NURSE PRACTITIONER

## 2024-04-17 PROCEDURE — 1159F MED LIST DOCD IN RCRD: CPT | Mod: HCNC,CPTII,S$GLB, | Performed by: NURSE PRACTITIONER

## 2024-04-17 PROCEDURE — 1160F RVW MEDS BY RX/DR IN RCRD: CPT | Mod: HCNC,CPTII,S$GLB, | Performed by: NURSE PRACTITIONER

## 2024-04-17 PROCEDURE — 3288F FALL RISK ASSESSMENT DOCD: CPT | Mod: HCNC,CPTII,S$GLB, | Performed by: NURSE PRACTITIONER

## 2024-04-17 PROCEDURE — 1170F FXNL STATUS ASSESSED: CPT | Mod: HCNC,CPTII,S$GLB, | Performed by: NURSE PRACTITIONER

## 2024-04-17 PROCEDURE — 3078F DIAST BP <80 MM HG: CPT | Mod: HCNC,CPTII,S$GLB, | Performed by: NURSE PRACTITIONER

## 2024-04-17 PROCEDURE — 4010F ACE/ARB THERAPY RXD/TAKEN: CPT | Mod: HCNC,CPTII,S$GLB, | Performed by: NURSE PRACTITIONER

## 2024-04-17 PROCEDURE — 3074F SYST BP LT 130 MM HG: CPT | Mod: HCNC,CPTII,S$GLB, | Performed by: NURSE PRACTITIONER

## 2024-04-17 NOTE — PROGRESS NOTES
"  Renea Bourgeois presented for a  Medicare AWV and comprehensive Health Risk Assessment today. The following components were reviewed and updated:    Medical history  Family History  Social history  Allergies and Current Medications  Health Risk Assessment  Health Maintenance  Care Team         ** See Completed Assessments for Annual Wellness Visit within the encounter summary.**         The following assessments were completed:  Living Situation  CAGE  Depression Screening  Timed Get Up and Go  Whisper Test  Cognitive Function Screening  Nutrition Screening  ADL Screening  PAQ Screening      Opioid documentation:      Patient does not have a current opioid prescription.         Vitals:    04/17/24 0843   BP: 126/70   Pulse: 65   SpO2: 96%   Weight: 62.9 kg (138 lb 10.7 oz)   Height: 5' 1" (1.549 m)     Body mass index is 26.2 kg/m².  Physical Exam  Vitals and nursing note reviewed.   Constitutional:       Appearance: She is well-developed.   HENT:      Head: Normocephalic.   Cardiovascular:      Rate and Rhythm: Normal rate and regular rhythm.      Pulses:           Dorsalis pedis pulses are 2+ on the right side and 2+ on the left side.      Heart sounds: Normal heart sounds.   Pulmonary:      Effort: Pulmonary effort is normal. No respiratory distress.      Breath sounds: Normal breath sounds.   Abdominal:      Palpations: Abdomen is soft. There is no mass.      Tenderness: There is no abdominal tenderness.   Musculoskeletal:         General: Normal range of motion.   Skin:     General: Skin is warm and dry.   Neurological:      Mental Status: She is alert and oriented to person, place, and time.      Motor: No abnormal muscle tone.   Psychiatric:         Speech: Speech normal.         Behavior: Behavior normal.               Diagnoses and health risks identified today and associated recommendations/orders:    1. Encounter for preventive health examination  Encouraged healthy diet and exercise as " tolerated  Discussed receiving rsv vaccine at pharmacy.       2. Hyperlipidemia associated with type 2 diabetes mellitus  A1c 6.0  Lipid-triglycerides elevated  Continue current treatment plan as previously prescribed with your  pcp and dm management    3. Hypertension, unspecified type  Stable. Continue current treatment plan as previously prescribed with your  pcp     4. Osteopenia, unspecified location  Dexa 8/23  Continue current treatment plan as previously prescribed with your  rheumatologist.     5. History of breast cancer  Continue current treatment plan as previously prescribed with your  oncologist.     6. Burning sensation  Hands  Chronic  Advised to follow up with PCP/ortho for further evaluation and recommendations. Patient expressed understanding.      7. Memory difficulties  Reported per pt  Abnormal cognitive function screening.    Reports stress but is not problematic at this time. Patient knows to follow up with PCP if becomes problematic.    Advised to follow up with PCP for further evaluation and recommendations. Patient expressed understanding.        Provided Renea with a 5-10 year written screening schedule and personal prevention plan. Recommendations were developed using the USPSTF age appropriate recommendations. Education, counseling, and referrals were provided as needed. After Visit Summary printed and given to patient which includes a list of additional screenings\tests needed.    Follow up in about 1 year (around 4/17/2025) for awv.    Merced Trujillo NP  I offered to discuss advanced care planning, including how to pick a person who would make decisions for you if you were unable to make them for yourself, called a health care power of , and what kind of decisions you might make such as use of life sustaining treatments such as ventilators and tube feeding when faced with a life limiting illness recorded on a living will that they will need to know. (How you want to be  cared for as you near the end of your natural life)     X  Patient has advanced directives written and agrees to provide copies to the institution.

## 2024-04-17 NOTE — PATIENT INSTRUCTIONS
Counseling and Referral of Other Preventative  (Italic type indicates deductible and co-insurance are waived)    Patient Name: Renea Bourgeois  Today's Date: 4/17/2024    Health Maintenance       Date Due Completion Date    Aspirin/Antiplatelet Therapy Never done ---    RSV Vaccine (Age 60+ and Pregnant patients) (1 - 1-dose 60+ series) Never done ---    COVID-19 Vaccine (5 - 2023-24 season) 01/31/2025 (Originally 9/1/2023) 8/23/2022    Diabetes Urine Screening 07/19/2024 7/19/2023    Hemoglobin A1c 07/22/2024 1/22/2024    TETANUS VACCINE 10/24/2024 10/24/2014    Eye Exam 12/06/2024 12/6/2023    Lipid Panel 01/22/2025 1/22/2024    Foot Exam 01/24/2025 1/24/2024    High Dose Statin 01/31/2025 1/31/2024    DEXA Scan 08/23/2025 8/23/2023    Colorectal Cancer Screening 02/24/2028 2/24/2023        No orders of the defined types were placed in this encounter.    The following information is provided to all patients.  This information is to help you find resources for any of the problems found today that may be affecting your health:                  Living healthy guide: www.[facility].[place].gov      Understanding Diabetes: www.diabetes.org      Eating healthy: www.cdc.gov/healthyweight      CDC home safety checklist: www.cdc.gov/steadi/patient.html      Agency on Aging: www.goea.[place].[facility]      Alcoholics anonymous (AA): www.aa.org      Physical Activity: www.arie.nih.gov/nr6gybm      Tobacco use: www.quitwithusla.org

## 2024-04-30 ENCOUNTER — LAB VISIT (OUTPATIENT)
Dept: LAB | Facility: HOSPITAL | Age: 74
End: 2024-04-30
Attending: FAMILY MEDICINE
Payer: MEDICARE

## 2024-04-30 DIAGNOSIS — E78.5 HYPERLIPIDEMIA ASSOCIATED WITH TYPE 2 DIABETES MELLITUS: ICD-10-CM

## 2024-04-30 DIAGNOSIS — E11.69 HYPERLIPIDEMIA ASSOCIATED WITH TYPE 2 DIABETES MELLITUS: ICD-10-CM

## 2024-04-30 LAB
ALBUMIN SERPL BCP-MCNC: 4.1 G/DL (ref 3.5–5.2)
ALP SERPL-CCNC: 65 U/L (ref 55–135)
ALT SERPL W/O P-5'-P-CCNC: 23 U/L (ref 10–44)
ANION GAP SERPL CALC-SCNC: 8 MMOL/L (ref 8–16)
AST SERPL-CCNC: 27 U/L (ref 10–40)
BILIRUB SERPL-MCNC: 0.5 MG/DL (ref 0.1–1)
BUN SERPL-MCNC: 6 MG/DL (ref 8–23)
CALCIUM SERPL-MCNC: 9.6 MG/DL (ref 8.7–10.5)
CHLORIDE SERPL-SCNC: 108 MMOL/L (ref 95–110)
CHOLEST SERPL-MCNC: 156 MG/DL (ref 120–199)
CHOLEST/HDLC SERPL: 3.8 {RATIO} (ref 2–5)
CO2 SERPL-SCNC: 28 MMOL/L (ref 23–29)
CREAT SERPL-MCNC: 0.7 MG/DL (ref 0.5–1.4)
EST. GFR  (NO RACE VARIABLE): >60 ML/MIN/1.73 M^2
GLUCOSE SERPL-MCNC: 91 MG/DL (ref 70–110)
HDLC SERPL-MCNC: 41 MG/DL (ref 40–75)
HDLC SERPL: 26.3 % (ref 20–50)
LDLC SERPL CALC-MCNC: 83.8 MG/DL (ref 63–159)
NONHDLC SERPL-MCNC: 115 MG/DL
POTASSIUM SERPL-SCNC: 3.9 MMOL/L (ref 3.5–5.1)
PROT SERPL-MCNC: 6.8 G/DL (ref 6–8.4)
SODIUM SERPL-SCNC: 144 MMOL/L (ref 136–145)
TRIGL SERPL-MCNC: 156 MG/DL (ref 30–150)

## 2024-04-30 PROCEDURE — 80061 LIPID PANEL: CPT | Mod: HCNC | Performed by: FAMILY MEDICINE

## 2024-04-30 PROCEDURE — 36415 COLL VENOUS BLD VENIPUNCTURE: CPT | Mod: HCNC | Performed by: FAMILY MEDICINE

## 2024-04-30 PROCEDURE — 80053 COMPREHEN METABOLIC PANEL: CPT | Mod: HCNC | Performed by: FAMILY MEDICINE

## 2024-05-13 ENCOUNTER — OFFICE VISIT (OUTPATIENT)
Dept: PODIATRY | Facility: CLINIC | Age: 74
End: 2024-05-13
Payer: MEDICARE

## 2024-05-13 DIAGNOSIS — E11.59 TYPE 2 DIABETES MELLITUS WITH OTHER CIRCULATORY COMPLICATION, WITHOUT LONG-TERM CURRENT USE OF INSULIN: ICD-10-CM

## 2024-05-13 DIAGNOSIS — Z92.21 HISTORY OF CHEMOTHERAPY: ICD-10-CM

## 2024-05-13 DIAGNOSIS — L60.3 ONYCHODYSTROPHY: Primary | ICD-10-CM

## 2024-05-13 PROCEDURE — 3288F FALL RISK ASSESSMENT DOCD: CPT | Mod: HCNC,CPTII,S$GLB, | Performed by: PODIATRIST

## 2024-05-13 PROCEDURE — 3044F HG A1C LEVEL LT 7.0%: CPT | Mod: HCNC,CPTII,S$GLB, | Performed by: PODIATRIST

## 2024-05-13 PROCEDURE — 1160F RVW MEDS BY RX/DR IN RCRD: CPT | Mod: HCNC,CPTII,S$GLB, | Performed by: PODIATRIST

## 2024-05-13 PROCEDURE — 1159F MED LIST DOCD IN RCRD: CPT | Mod: HCNC,CPTII,S$GLB, | Performed by: PODIATRIST

## 2024-05-13 PROCEDURE — 99213 OFFICE O/P EST LOW 20 MIN: CPT | Mod: HCNC,S$GLB,, | Performed by: PODIATRIST

## 2024-05-13 PROCEDURE — 99999 PR PBB SHADOW E&M-EST. PATIENT-LVL III: CPT | Mod: PBBFAC,HCNC,, | Performed by: PODIATRIST

## 2024-05-13 PROCEDURE — 1125F AMNT PAIN NOTED PAIN PRSNT: CPT | Mod: HCNC,CPTII,S$GLB, | Performed by: PODIATRIST

## 2024-05-13 PROCEDURE — 4010F ACE/ARB THERAPY RXD/TAKEN: CPT | Mod: HCNC,CPTII,S$GLB, | Performed by: PODIATRIST

## 2024-05-13 PROCEDURE — 1101F PT FALLS ASSESS-DOCD LE1/YR: CPT | Mod: HCNC,CPTII,S$GLB, | Performed by: PODIATRIST

## 2024-05-13 NOTE — PROGRESS NOTES
Subjective:       Patient ID: Renea Bourgeois is a 74 y.o. female.    Chief Complaint: Toe Pain (Patient complains of 4/10 pain at present to right hallux nail. She said the pain is burning at times and the nail is yellow, thickened and no longer grows. )      HPI: eRnea Bourgeois presents to the office with complaints of abnormal appearance to the right great  toe, due to dystrophic and thickened nail.  Does relate a history of chemotherapy which is ultimately resulted in yellow discoloration of the nail plate.  She also relates burning proximal aspect nail at times.  States 4/10 pain at the worst.  Currently she is in little discomfort. Patient's Primary Care Provider is Kamini Newton MD.     Review of patient's allergies indicates:  No Known Allergies    Past Medical History:   Diagnosis Date    Allergy     Anemia 11/14/2017    Angina pectoris     followed by cardiology    Arthritis     Breast cancer     Right T1 Ductal Ca in situ,high oncotype Dx 33, Estrogen positive. Lumpectomy, TAC chemo x 6 cyscles then RT,on aromatase inhibitor    Cataract     Chondromalacia of right patella 03/26/2018    Stable and controlled. Continue current treatment plan as previously prescribed with your PCP.      Diabetes mellitus type II 2011     am 12/06/2023    DM (diabetes mellitus) 2011     am 08/04/2017    DM (diabetes mellitus) 2010     am 06/22/2020    Elevated BP without diagnosis of hypertension 11/27/2018    GERD (gastroesophageal reflux disease)     History of colon polyps 02/21/2018    The patient had adenomatous colon polyps in 2014.      History of renal calculi 08/11/2015    Right obstructing 8/20/13 - passed.     Hyperlipidemia     Hypertension     Kidney stone     right side, passed    Malignant neoplasm of upper-outer quadrant of right breast in female, estrogen receptor positive 07/23/2018    Followed by Dr. Jones    Malignant neoplasm of upper-outer quadrant of right breast in  female, estrogen receptor positive     Followed by Hematology/Oncology, continue current treatment plan       Meniere disease     reported per pt    Nuclear sclerosis of both eyes 10/05/2015    Osteopenia     Osteoporosis 04/12/2019    Pseudophakia 10/15/2015    PVC (premature ventricular contraction)     on and off    Refractive error 10/05/2015    Trouble in sleeping     Type 2 diabetes mellitus 2010     am 12/06/2022       Family History   Problem Relation Name Age of Onset    Alzheimer's disease Mother      Diabetes Father Silvia     Hypertension Father Silvia     Stroke Father Silvia     Cancer Father Silvia 65        metastatic when diagnosed    Cataracts Sister      Cancer Brother Marquise         skin prostate    Stroke Brother Marquise         Stoke    Cataracts Brother      Parkinsonism Brother      Cancer Brother          met    Atrial fibrillation Son Meng         35    Heart disease Son Meng     Glaucoma Maternal Grandmother      Cancer Paternal Grandfather Silvia         prostate    Psoriasis Cousin      Cancer Other nephew         kidney    Alcohol abuse Neg Hx      Drug abuse Neg Hx      COPD Neg Hx      Birth defects Neg Hx      Intellectual disability Neg Hx      Mental illness Neg Hx      Kidney disease Neg Hx      Hyperlipidemia Neg Hx      Melanoma Neg Hx      Lupus Neg Hx         Social History     Socioeconomic History    Marital status:      Spouse name: Don    Number of children: 4   Occupational History    Occupation: Retired Teacher     Comment: [employer]   Tobacco Use    Smoking status: Never    Smokeless tobacco: Never   Substance and Sexual Activity    Alcohol use: No     Alcohol/week: 0.0 standard drinks of alcohol    Drug use: No    Sexual activity: Not Currently     Partners: Male     Birth control/protection: Post-menopausal   Social History Narrative    aretired from homeschooling/,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 Caffeine intake;1-2 per week - dennis calvo.  Decaffinated tea and most beveridges. Does have a living will - at home in her filing cabinet.      Social Determinants of Health     Financial Resource Strain: Low Risk  (4/17/2024)    Overall Financial Resource Strain (CARDIA)     Difficulty of Paying Living Expenses: Not hard at all   Food Insecurity: No Food Insecurity (4/17/2024)    Hunger Vital Sign     Worried About Running Out of Food in the Last Year: Never true     Ran Out of Food in the Last Year: Never true   Transportation Needs: No Transportation Needs (4/17/2024)    PRAPARE - Transportation     Lack of Transportation (Medical): No     Lack of Transportation (Non-Medical): No   Physical Activity: Inactive (4/17/2024)    Exercise Vital Sign     Days of Exercise per Week: 0 days     Minutes of Exercise per Session: 0 min   Stress: Stress Concern Present (4/17/2024)    [employer] - Occupational Stress Questionnaire     Feeling of Stress : To some extent   Housing Stability: Low Risk  (4/17/2024)    Housing Stability Vital Sign     Unable to Pay for Housing in the Last Year: No     Homeless in the Last Year: No       Past Surgical History:   Procedure Laterality Date    ARTHROPLASTY OF JOINT OF FINGER Right 1/5/2023    Procedure: ARTHROPLASTY, FINGER;  Surgeon: Iban Kuhn MD;  Location: [facility] OR;  Service: Orthopedics;  Laterality: Right;  right thumb basal joint arthroplasty    BREAST BIOPSY      BREAST LUMPECTOMY  02/11/2011    right    CATARACT EXTRACTION Bilateral 10/14/2015    CHOLECYSTECTOMY  1989    open    COLONOSCOPY N/A 02/22/2018    Procedure: COLONOSCOPY;  Surgeon: Carlito Odonnell MD;  Location: [facility] ENDO;  Service: Endoscopy;  Laterality: N/A;    COLONOSCOPY N/A 2/24/2023    Procedure: COLONOSCOPY;  Surgeon: Yvonne Saldana MD;  Location: [facility] ENDO;  Service: Endoscopy;  Laterality: N/A;    CYST REMOVAL Left 11/2017    foot    DILATION AND CURETTAGE OF UTERUS  10/2010    In [place]     ESOPHAGOGASTRODUODENOSCOPY N/A 06/29/2020    Procedure: EGD (ESOPHAGOGASTRODUODENOSCOPY);  Surgeon: Nancy Hahn MD;  Location: [facility];  Service: Endoscopy;  Laterality: N/A;    EYE SURGERY  2012    cataract    GALLBLADDER SURGERY      removed in 1989    HAND SURGERY Left 2015    Nasal septal deviation repair  2009    toenail edges removed      TONSILLECTOMY  1959    TOTAL REDUCTION MAMMOPLASTY Left     2015    TUBAL LIGATION  1981    UVULOPALATOPLASTY         Review of Systems      Objective:   LMP 01/01/2004 (Within Months)     Physical Exam  LOWER EXTREMITY PHYSICAL EXAMINATION    NEUROLOGY: Sensation to light touch is intact. Proprioception is intact.  Vibratory sensation intact    VASCULAR:  The right dorsalis pedis pulse 2/4 and the right posterior tibial pulse 2/4.  The left dorsalis pedis pulse 2/4 and posterior tibial pulse on the left is 2/4.  Capillary refill is intact.  Pedal hair growth intact  DERMATOLOGY:  Normal darkened appearance right hallux nail.  Proximal nail does have a medial to lateral line of demarcation with a new nail growing in proximally and underlying over nail.  No signs of infection.  No signs of ingrown.    Assessment:     1. Onychodystrophy    2. History of chemotherapy    3. Type 2 diabetes mellitus with other circulatory complication, without long-term current use of insulin        Plan:     Onychodystrophy    History of chemotherapy    Type 2 diabetes mellitus with other circulatory complication, without long-term current use of insulin      Thorough discussion is had with the patient today, concerning the diagnosis, its etiology, and the treatment algorithm at present.    Nail does not appear to be associated with signs of infection complications.  Appears be associated with history of chemotherapy and new nail growing in proximally.  All nail was trimmed in new nail was exposed.    Foot counseling and education is provided at this visit. Patient is advised to wear  socks and shoes at all times.  Do not walk barefoot, or with just socks, even when indoors.  Be sure to check and inspect the inside of the shoe before putting them on her feet.  Protect your feet at all times.  Walking shoes and/or athletic shoes are the best types of shoe gear. Do not wear vinyl or plastic type shoe gear, as they do not stretch and/or breathe.  Protect your feet from hot and/or cold. Elevate the extremities when sitting.  Do not wear excessively tight socks and/or shoe gear. Wiggle your toes for a few minutes throughout the day. Move your ankles up and down, in and out, to help blood flow in your lower extremity.           Future Appointments   Date Time Provider Department [place]   7/26/2024  8:30 AM BRANDEN JAVIER [facility] [place]   8/1/2024  4:00 PM [facility]   8/28/2024  9:30 AM BRANDEN JAVIER [facility] [place]   9/4/2024 11:30 AM Nathaniel Moon MD [facility]  Medical C   1/29/2025 11:30 AM Andree Coyne, PhD, NP-C [facility]

## 2024-06-21 ENCOUNTER — OFFICE VISIT (OUTPATIENT)
Dept: INTERNAL MEDICINE | Facility: CLINIC | Age: 74
End: 2024-06-21
Payer: MEDICARE

## 2024-06-21 VITALS
BODY MASS INDEX: 25.74 KG/M2 | WEIGHT: 136.25 LBS | SYSTOLIC BLOOD PRESSURE: 146 MMHG | DIASTOLIC BLOOD PRESSURE: 78 MMHG | HEART RATE: 71 BPM | OXYGEN SATURATION: 97 %

## 2024-06-21 DIAGNOSIS — J30.9 ALLERGIC RHINITIS, UNSPECIFIED SEASONALITY, UNSPECIFIED TRIGGER: Primary | ICD-10-CM

## 2024-06-21 DIAGNOSIS — H66.43 SUPPURATIVE OTITIS MEDIA OF BOTH EARS, UNSPECIFIED CHRONICITY: ICD-10-CM

## 2024-06-21 PROCEDURE — 99213 OFFICE O/P EST LOW 20 MIN: CPT | Mod: HCNC,S$GLB,, | Performed by: PHYSICIAN ASSISTANT

## 2024-06-21 PROCEDURE — 3044F HG A1C LEVEL LT 7.0%: CPT | Mod: HCNC,CPTII,S$GLB, | Performed by: PHYSICIAN ASSISTANT

## 2024-06-21 PROCEDURE — 3078F DIAST BP <80 MM HG: CPT | Mod: HCNC,CPTII,S$GLB, | Performed by: PHYSICIAN ASSISTANT

## 2024-06-21 PROCEDURE — 4010F ACE/ARB THERAPY RXD/TAKEN: CPT | Mod: HCNC,CPTII,S$GLB, | Performed by: PHYSICIAN ASSISTANT

## 2024-06-21 PROCEDURE — 99999 PR PBB SHADOW E&M-EST. PATIENT-LVL V: CPT | Mod: PBBFAC,HCNC,, | Performed by: PHYSICIAN ASSISTANT

## 2024-06-21 PROCEDURE — 1125F AMNT PAIN NOTED PAIN PRSNT: CPT | Mod: HCNC,CPTII,S$GLB, | Performed by: PHYSICIAN ASSISTANT

## 2024-06-21 PROCEDURE — 3077F SYST BP >= 140 MM HG: CPT | Mod: HCNC,CPTII,S$GLB, | Performed by: PHYSICIAN ASSISTANT

## 2024-06-21 PROCEDURE — 1160F RVW MEDS BY RX/DR IN RCRD: CPT | Mod: HCNC,CPTII,S$GLB, | Performed by: PHYSICIAN ASSISTANT

## 2024-06-21 PROCEDURE — 1159F MED LIST DOCD IN RCRD: CPT | Mod: HCNC,CPTII,S$GLB, | Performed by: PHYSICIAN ASSISTANT

## 2024-06-21 RX ORDER — FLUTICASONE PROPIONATE 50 MCG
2 SPRAY, SUSPENSION (ML) NASAL DAILY
Qty: 16 G | Refills: 0 | Status: SHIPPED | OUTPATIENT
Start: 2024-06-21

## 2024-06-21 RX ORDER — LEVOCETIRIZINE DIHYDROCHLORIDE 5 MG/1
5 TABLET, FILM COATED ORAL NIGHTLY
Qty: 30 TABLET | Refills: 0 | Status: SHIPPED | OUTPATIENT
Start: 2024-06-21

## 2024-06-21 NOTE — PROGRESS NOTES
Subjective:       Patient ID: Renea Bourgeois is a 74 y.o. female.    Chief Complaint: Otalgia (Patient stated that she has had right ear pain off and on for two weeks. )      Otalgia   There is pain in the right ear. This is a new problem. The current episode started 1 to 4 weeks ago. The problem occurs every few hours. The problem has been unchanged. There has been no fever. The pain is at a severity of 3/10. Pertinent negatives include no coughing, ear discharge, headaches, hearing loss, neck pain, sore throat or vomiting. Associated symptoms comments: Runny nose. She has tried nothing for the symptoms.       Review of Systems   Constitutional:  Negative for chills, fatigue and fever.   HENT:  Positive for ear pain. Negative for congestion, ear discharge, hearing loss, sore throat and tinnitus.    Respiratory:  Negative for cough and shortness of breath.    Cardiovascular:  Negative for chest pain.   Gastrointestinal:  Negative for nausea and vomiting.   Musculoskeletal:  Negative for neck pain.   Neurological:  Negative for dizziness, facial asymmetry, speech difficulty, weakness, light-headedness and headaches.       Objective:      Physical Exam  Vitals and nursing note reviewed.   Constitutional:       General: She is not in acute distress.     Appearance: She is well-developed.   HENT:      Head: Normocephalic and atraumatic.      Right Ear: Ear canal and external ear normal. A middle ear effusion is present. Tympanic membrane is not erythematous.      Left Ear: Ear canal and external ear normal. A middle ear effusion is present. Tympanic membrane is not erythematous.      Nose: Nose normal.      Mouth/Throat:      Lips: Pink.      Mouth: Mucous membranes are moist.      Pharynx: Oropharynx is clear.   Eyes:      General: Lids are normal. No scleral icterus.     Extraocular Movements: Extraocular movements intact.      Conjunctiva/sclera: Conjunctivae normal.   Cardiovascular:      Rate and Rhythm: Normal  rate and regular rhythm.   Pulmonary:      Effort: Pulmonary effort is normal.      Breath sounds: Normal breath sounds. No decreased breath sounds, wheezing, rhonchi or rales.   Neurological:      Mental Status: She is alert.      Cranial Nerves: No cranial nerve deficit.   Psychiatric:         Mood and Affect: Mood and affect normal.         Assessment:       1. Allergic rhinitis, unspecified seasonality, unspecified trigger    2. Suppurative otitis media of both ears, unspecified chronicity        Plan:   1. Allergic rhinitis, unspecified seasonality, unspecified trigger    2. Suppurative otitis media of both ears, unspecified chronicity  -     fluticasone propionate (FLONASE) 50 mcg/actuation nasal spray; 2 sprays (100 mcg total) by Each Nostril route once daily.  Dispense: 16 g; Refill: 0  -     levocetirizine (XYZAL) 5 MG tablet; Take 1 tablet (5 mg total) by mouth every evening.  Dispense: 30 tablet; Refill: 0

## 2024-06-23 PROBLEM — I20.89 OTHER FORMS OF ANGINA PECTORIS: Status: RESOLVED | Noted: 2023-07-26 | Resolved: 2024-06-23

## 2024-07-18 DIAGNOSIS — H66.43 SUPPURATIVE OTITIS MEDIA OF BOTH EARS, UNSPECIFIED CHRONICITY: ICD-10-CM

## 2024-07-18 RX ORDER — LEVOCETIRIZINE DIHYDROCHLORIDE 5 MG/1
5 TABLET, FILM COATED ORAL NIGHTLY
Qty: 30 TABLET | Refills: 5 | Status: SHIPPED | OUTPATIENT
Start: 2024-07-18

## 2024-07-22 ENCOUNTER — PATIENT OUTREACH (OUTPATIENT)
Dept: ADMINISTRATIVE | Facility: HOSPITAL | Age: 74
End: 2024-07-22
Payer: MEDICARE

## 2024-07-22 DIAGNOSIS — E11.69 TYPE 2 DIABETES MELLITUS WITH OTHER SPECIFIED COMPLICATION, WITHOUT LONG-TERM CURRENT USE OF INSULIN: ICD-10-CM

## 2024-07-22 DIAGNOSIS — E11.59 TYPE 2 DIABETES MELLITUS WITH OTHER CIRCULATORY COMPLICATION, WITHOUT LONG-TERM CURRENT USE OF INSULIN: Primary | ICD-10-CM

## 2024-07-22 NOTE — PROGRESS NOTES
Working Diabetes Lab.    Pt has lab appt scheduled 7/25/24.  Micro albumin urine ordered and linked to appt.

## 2024-07-31 ENCOUNTER — PATIENT MESSAGE (OUTPATIENT)
Dept: RESEARCH | Facility: HOSPITAL | Age: 74
End: 2024-07-31
Payer: MEDICARE

## 2024-08-05 ENCOUNTER — PATIENT OUTREACH (OUTPATIENT)
Dept: ADMINISTRATIVE | Facility: HOSPITAL | Age: 74
End: 2024-08-05
Payer: MEDICARE

## 2024-08-09 ENCOUNTER — LAB VISIT (OUTPATIENT)
Dept: LAB | Facility: HOSPITAL | Age: 74
End: 2024-08-09
Payer: MEDICARE

## 2024-08-09 DIAGNOSIS — E78.5 HYPERLIPIDEMIA ASSOCIATED WITH TYPE 2 DIABETES MELLITUS: ICD-10-CM

## 2024-08-09 DIAGNOSIS — E55.9 VITAMIN D DEFICIENCY: ICD-10-CM

## 2024-08-09 DIAGNOSIS — I15.2 HYPERTENSION ASSOCIATED WITH DIABETES: ICD-10-CM

## 2024-08-09 DIAGNOSIS — E11.59 HYPERTENSION ASSOCIATED WITH DIABETES: ICD-10-CM

## 2024-08-09 DIAGNOSIS — E11.69 HYPERLIPIDEMIA ASSOCIATED WITH TYPE 2 DIABETES MELLITUS: ICD-10-CM

## 2024-08-09 DIAGNOSIS — E11.59 TYPE 2 DIABETES MELLITUS WITH OTHER CIRCULATORY COMPLICATION, WITHOUT LONG-TERM CURRENT USE OF INSULIN: ICD-10-CM

## 2024-08-09 DIAGNOSIS — E11.69 TYPE 2 DIABETES MELLITUS WITH OTHER SPECIFIED COMPLICATION, WITHOUT LONG-TERM CURRENT USE OF INSULIN: ICD-10-CM

## 2024-08-09 LAB
25(OH)D3+25(OH)D2 SERPL-MCNC: 61 NG/ML (ref 30–96)
ALBUMIN SERPL BCP-MCNC: 4.2 G/DL (ref 3.5–5.2)
ALBUMIN/CREAT UR: 11.8 UG/MG (ref 0–30)
ALP SERPL-CCNC: 72 U/L (ref 55–135)
ALT SERPL W/O P-5'-P-CCNC: 27 U/L (ref 10–44)
ANION GAP SERPL CALC-SCNC: 11 MMOL/L (ref 8–16)
AST SERPL-CCNC: 30 U/L (ref 10–40)
BASOPHILS # BLD AUTO: 0.03 K/UL (ref 0–0.2)
BASOPHILS NFR BLD: 0.6 % (ref 0–1.9)
BILIRUB SERPL-MCNC: 0.5 MG/DL (ref 0.1–1)
BUN SERPL-MCNC: 8 MG/DL (ref 8–23)
CALCIUM SERPL-MCNC: 9.4 MG/DL (ref 8.7–10.5)
CHLORIDE SERPL-SCNC: 107 MMOL/L (ref 95–110)
CHOLEST SERPL-MCNC: 146 MG/DL (ref 120–199)
CHOLEST/HDLC SERPL: 3.8 {RATIO} (ref 2–5)
CO2 SERPL-SCNC: 25 MMOL/L (ref 23–29)
CREAT SERPL-MCNC: 0.7 MG/DL (ref 0.5–1.4)
CREAT UR-MCNC: 119 MG/DL (ref 15–325)
DIFFERENTIAL METHOD BLD: ABNORMAL
EOSINOPHIL # BLD AUTO: 0.1 K/UL (ref 0–0.5)
EOSINOPHIL NFR BLD: 2.9 % (ref 0–8)
ERYTHROCYTE [DISTWIDTH] IN BLOOD BY AUTOMATED COUNT: 12.6 % (ref 11.5–14.5)
EST. GFR  (NO RACE VARIABLE): >60 ML/MIN/1.73 M^2
ESTIMATED AVG GLUCOSE: 108 MG/DL (ref 68–131)
GLUCOSE SERPL-MCNC: 100 MG/DL (ref 70–110)
HBA1C MFR BLD: 5.4 % (ref 4–5.6)
HCT VFR BLD AUTO: 38.3 % (ref 37–48.5)
HDLC SERPL-MCNC: 38 MG/DL (ref 40–75)
HDLC SERPL: 26 % (ref 20–50)
HGB BLD-MCNC: 13.1 G/DL (ref 12–16)
IMM GRANULOCYTES # BLD AUTO: 0.02 K/UL (ref 0–0.04)
IMM GRANULOCYTES NFR BLD AUTO: 0.4 % (ref 0–0.5)
LDLC SERPL CALC-MCNC: 76.6 MG/DL (ref 63–159)
LYMPHOCYTES # BLD AUTO: 1.4 K/UL (ref 1–4.8)
LYMPHOCYTES NFR BLD: 29.6 % (ref 18–48)
MCH RBC QN AUTO: 31.3 PG (ref 27–31)
MCHC RBC AUTO-ENTMCNC: 34.2 G/DL (ref 32–36)
MCV RBC AUTO: 92 FL (ref 82–98)
MICROALBUMIN UR DL<=1MG/L-MCNC: 14 UG/ML
MONOCYTES # BLD AUTO: 0.3 K/UL (ref 0.3–1)
MONOCYTES NFR BLD: 6.7 % (ref 4–15)
NEUTROPHILS # BLD AUTO: 2.9 K/UL (ref 1.8–7.7)
NEUTROPHILS NFR BLD: 59.8 % (ref 38–73)
NONHDLC SERPL-MCNC: 108 MG/DL
NRBC BLD-RTO: 0 /100 WBC
PLATELET # BLD AUTO: 146 K/UL (ref 150–450)
PMV BLD AUTO: 12.2 FL (ref 9.2–12.9)
POTASSIUM SERPL-SCNC: 3.1 MMOL/L (ref 3.5–5.1)
PROT SERPL-MCNC: 6.8 G/DL (ref 6–8.4)
RBC # BLD AUTO: 4.18 M/UL (ref 4–5.4)
SODIUM SERPL-SCNC: 143 MMOL/L (ref 136–145)
TRIGL SERPL-MCNC: 157 MG/DL (ref 30–150)
TSH SERPL DL<=0.005 MIU/L-ACNC: 1.62 UIU/ML (ref 0.4–4)
WBC # BLD AUTO: 4.8 K/UL (ref 3.9–12.7)

## 2024-08-09 PROCEDURE — 82306 VITAMIN D 25 HYDROXY: CPT | Mod: HCNC | Performed by: FAMILY MEDICINE

## 2024-08-09 PROCEDURE — 80061 LIPID PANEL: CPT | Mod: HCNC | Performed by: FAMILY MEDICINE

## 2024-08-09 PROCEDURE — 83036 HEMOGLOBIN GLYCOSYLATED A1C: CPT | Mod: HCNC | Performed by: FAMILY MEDICINE

## 2024-08-09 PROCEDURE — 36415 COLL VENOUS BLD VENIPUNCTURE: CPT | Mod: HCNC | Performed by: FAMILY MEDICINE

## 2024-08-09 PROCEDURE — 80053 COMPREHEN METABOLIC PANEL: CPT | Mod: HCNC | Performed by: FAMILY MEDICINE

## 2024-08-09 PROCEDURE — 82570 ASSAY OF URINE CREATININE: CPT | Mod: HCNC | Performed by: FAMILY MEDICINE

## 2024-08-09 PROCEDURE — 85025 COMPLETE CBC W/AUTO DIFF WBC: CPT | Mod: HCNC | Performed by: FAMILY MEDICINE

## 2024-08-09 PROCEDURE — 84443 ASSAY THYROID STIM HORMONE: CPT | Mod: HCNC | Performed by: FAMILY MEDICINE

## 2024-08-16 ENCOUNTER — OFFICE VISIT (OUTPATIENT)
Dept: INTERNAL MEDICINE | Facility: CLINIC | Age: 74
End: 2024-08-16
Payer: MEDICARE

## 2024-08-16 ENCOUNTER — LAB VISIT (OUTPATIENT)
Dept: LAB | Facility: HOSPITAL | Age: 74
End: 2024-08-16
Attending: PHYSICIAN ASSISTANT
Payer: MEDICARE

## 2024-08-16 VITALS
DIASTOLIC BLOOD PRESSURE: 80 MMHG | OXYGEN SATURATION: 98 % | WEIGHT: 133.19 LBS | BODY MASS INDEX: 25.16 KG/M2 | HEART RATE: 70 BPM | SYSTOLIC BLOOD PRESSURE: 146 MMHG

## 2024-08-16 DIAGNOSIS — R41.3 MEMORY DIFFICULTIES: ICD-10-CM

## 2024-08-16 DIAGNOSIS — E11.59 TYPE 2 DIABETES MELLITUS WITH OTHER CIRCULATORY COMPLICATION, WITHOUT LONG-TERM CURRENT USE OF INSULIN: ICD-10-CM

## 2024-08-16 DIAGNOSIS — R20.8 BURNING SENSATION: ICD-10-CM

## 2024-08-16 DIAGNOSIS — D69.6 THROMBOCYTOPENIA: ICD-10-CM

## 2024-08-16 DIAGNOSIS — K21.9 GASTROESOPHAGEAL REFLUX DISEASE WITHOUT ESOPHAGITIS: ICD-10-CM

## 2024-08-16 DIAGNOSIS — E11.69 HYPERLIPIDEMIA ASSOCIATED WITH TYPE 2 DIABETES MELLITUS: ICD-10-CM

## 2024-08-16 DIAGNOSIS — E11.59 HYPERTENSION ASSOCIATED WITH DIABETES: ICD-10-CM

## 2024-08-16 DIAGNOSIS — E87.6 HYPOKALEMIA: ICD-10-CM

## 2024-08-16 DIAGNOSIS — E78.5 HYPERLIPIDEMIA ASSOCIATED WITH TYPE 2 DIABETES MELLITUS: ICD-10-CM

## 2024-08-16 DIAGNOSIS — F51.04 CHRONIC INSOMNIA: ICD-10-CM

## 2024-08-16 DIAGNOSIS — I15.2 HYPERTENSION ASSOCIATED WITH DIABETES: ICD-10-CM

## 2024-08-16 DIAGNOSIS — Z85.3 HISTORY OF BREAST CANCER: ICD-10-CM

## 2024-08-16 DIAGNOSIS — Z00.00 ROUTINE GENERAL MEDICAL EXAMINATION AT A HEALTH CARE FACILITY: Primary | ICD-10-CM

## 2024-08-16 DIAGNOSIS — M85.89 OSTEOPENIA OF MULTIPLE SITES: ICD-10-CM

## 2024-08-16 DIAGNOSIS — E55.9 VITAMIN D DEFICIENCY: ICD-10-CM

## 2024-08-16 PROBLEM — F43.21 GRIEF REACTION: Status: RESOLVED | Noted: 2021-08-20 | Resolved: 2024-08-16

## 2024-08-16 PROBLEM — F43.20 GRIEF REACTION: Status: RESOLVED | Noted: 2021-08-20 | Resolved: 2024-08-16

## 2024-08-16 LAB — POTASSIUM SERPL-SCNC: 3.6 MMOL/L (ref 3.5–5.1)

## 2024-08-16 PROCEDURE — 84132 ASSAY OF SERUM POTASSIUM: CPT | Mod: HCNC | Performed by: PHYSICIAN ASSISTANT

## 2024-08-16 PROCEDURE — 99999 PR PBB SHADOW E&M-EST. PATIENT-LVL IV: CPT | Mod: PBBFAC,HCNC,, | Performed by: PHYSICIAN ASSISTANT

## 2024-08-16 PROCEDURE — 36415 COLL VENOUS BLD VENIPUNCTURE: CPT | Mod: HCNC | Performed by: PHYSICIAN ASSISTANT

## 2024-08-16 RX ORDER — OLMESARTAN MEDOXOMIL 20 MG/1
20 TABLET ORAL DAILY
Qty: 90 TABLET | Refills: 3 | Status: SHIPPED | OUTPATIENT
Start: 2024-08-16 | End: 2025-08-16

## 2024-08-16 NOTE — ASSESSMENT & PLAN NOTE
Controlled, continue glimepiride  Lab Results   Component Value Date    HGBA1C 5.4 08/09/2024    HGBA1C 6.0 (H) 01/22/2024    LDLCALC 76.6 08/09/2024    LABMICR 14.0 08/09/2024    CREATRANDUR 119.0 08/09/2024    MICALBCREAT 11.8 08/09/2024

## 2024-08-16 NOTE — ASSESSMENT & PLAN NOTE
LDL close to goal, continue rosuvastatin  Lab Results   Component Value Date    CHOL 146 08/09/2024    LDLCALC 76.6 08/09/2024    TRIG 157 (H) 08/09/2024    HDL 38 (L) 08/09/2024    ALT 27 08/09/2024    AST 30 08/09/2024    ALKPHOS 72 08/09/2024

## 2024-08-16 NOTE — PROGRESS NOTES
Subjective:       Patient ID: Renea Bourgeois is a 74 y.o. female.    Chief Complaint: Annual Exam      Patient presents to clinic today for annual physical exam.    Recent HRA exam reviewed.         Review of Systems   Constitutional:  Positive for fatigue. Negative for chills, fever and unexpected weight change.   HENT:  Positive for rhinorrhea (ongoing). Negative for congestion, dental problem, ear pain, hearing loss and trouble swallowing.    Eyes:  Negative for pain and visual disturbance.   Respiratory:  Positive for cough (ongoing). Negative for shortness of breath.    Cardiovascular:  Negative for chest pain, palpitations and leg swelling.   Gastrointestinal:  Negative for abdominal distention, abdominal pain, blood in stool, constipation, diarrhea, nausea and vomiting.   Genitourinary:  Negative for difficulty urinating and vaginal discharge.   Musculoskeletal:  Positive for arthralgias (ongoing) and myalgias (ongoing).   Skin:  Negative for rash.   Neurological:  Negative for dizziness, weakness, numbness and headaches.   Hematological:  Negative for adenopathy. Does not bruise/bleed easily.   Psychiatric/Behavioral:  Positive for sleep disturbance (ongoing). Negative for dysphoric mood. The patient is not nervous/anxious.        Objective:      Physical Exam  Vitals and nursing note reviewed.   Constitutional:       General: She is not in acute distress.     Appearance: Normal appearance. She is well-developed.   HENT:      Head: Normocephalic and atraumatic.      Right Ear: Tympanic membrane, ear canal and external ear normal.      Left Ear: Tympanic membrane, ear canal and external ear normal.      Nose: Nose normal. No mucosal edema or rhinorrhea.      Mouth/Throat:      Lips: Pink.      Mouth: Mucous membranes are moist.      Pharynx: Oropharynx is clear. Uvula midline.   Eyes:      General: Lids are normal. No scleral icterus.     Extraocular Movements: Extraocular movements intact.       Conjunctiva/sclera: Conjunctivae normal.      Pupils: Pupils are equal, round, and reactive to light.   Neck:      Thyroid: No thyromegaly.   Cardiovascular:      Rate and Rhythm: Normal rate and regular rhythm.      Pulses: Normal pulses.   Pulmonary:      Effort: Pulmonary effort is normal.      Breath sounds: Normal breath sounds. No wheezing, rhonchi or rales.   Musculoskeletal:         General: Normal range of motion.      Cervical back: Normal range of motion and neck supple.      Right lower leg: No edema.      Left lower leg: No edema.   Lymphadenopathy:      Cervical: No cervical adenopathy.   Skin:     General: Skin is warm and dry.      Findings: No lesion or rash.      Nails: There is no clubbing.   Neurological:      Mental Status: She is alert.      Cranial Nerves: No cranial nerve deficit.      Sensory: No sensory deficit.   Psychiatric:         Mood and Affect: Mood and affect normal.         Assessment:       1. Routine general medical examination at a health care facility    2. Hypertension associated with diabetes    3. Hyperlipidemia associated with type 2 diabetes mellitus    4. Type 2 diabetes mellitus with other circulatory complication, without long-term current use of insulin    5. Osteopenia of multiple sites    6. Gastroesophageal reflux disease without esophagitis    7. Thrombocytopenia    8. Vitamin D deficiency    9. Chronic insomnia    10. History of breast cancer    11. Burning sensation    12. Memory difficulties    13. Hypokalemia        Plan:   1. Routine general medical examination at a health care facility    2. Hypertension associated with diabetes  Overview:  Followed by Cardiology, continue current treatment plan, Dr. Jovan Urbina @ [facility]    Assessment & Plan:  /80, elevated, continue metoprolol and olmesartan, RTC 2 weeks for BP recheck  Lab Results   Component Value Date     08/09/2024    K 3.1 (L) 08/09/2024    BUN 8 08/09/2024    CREATININE 0.7 08/09/2024     EGFRNORACEVR >60.0 08/09/2024        Orders:  -     olmesartan (BENICAR) 20 MG tablet; Take 1 tablet (20 mg total) by mouth once daily.  Dispense: 90 tablet; Refill: 3    3. Hyperlipidemia associated with type 2 diabetes mellitus  Assessment & Plan:  LDL close to goal, continue rosuvastatin  Lab Results   Component Value Date    CHOL 146 08/09/2024    LDLCALC 76.6 08/09/2024    TRIG 157 (H) 08/09/2024    HDL 38 (L) 08/09/2024    ALT 27 08/09/2024    AST 30 08/09/2024    ALKPHOS 72 08/09/2024        Orders:  -     Comprehensive Metabolic Panel; Future; Expected date: 02/16/2025  -     Lipid Panel; Future; Expected date: 02/16/2025    4. Type 2 diabetes mellitus with other circulatory complication, without long-term current use of insulin  Assessment & Plan:  Controlled, continue glimepiride  Lab Results   Component Value Date    HGBA1C 5.4 08/09/2024    HGBA1C 6.0 (H) 01/22/2024    LDLCALC 76.6 08/09/2024    LABMICR 14.0 08/09/2024    CREATRANDUR 119.0 08/09/2024    MICALBCREAT 11.8 08/09/2024        Orders:  -     Hemoglobin A1C; Future; Expected date: 02/16/2025  -     Ambulatory referral/consult to Optometry; Future; Expected date: 12/06/2024    5. Osteopenia of multiple sites  Overview:  Followed by Rheumatology, continue current treatment plan   DEXA 8/2023      6. Gastroesophageal reflux disease without esophagitis  Overview:  Stable on Nexium      7. Thrombocytopenia  Assessment & Plan:  Repeat in 6 months  Lab Results   Component Value Date     (L) 08/09/2024     07/19/2023     12/02/2022        Orders:  -     CBC Auto Differential; Future; Expected date: 02/16/2025    8. Vitamin D deficiency  Overview:  Continue supplement     Orders:  -     Vitamin D; Future; Expected date: 02/16/2025    9. Chronic insomnia    10. History of breast cancer  Overview:  Followed by Hematology/Oncology, continue current treatment plan       11. Burning sensation    12. Memory difficulties    13.  Hypokalemia  -     Potassium; Future; Expected date: 08/16/2024        Check potassium today  Recent labs reviewed with patient.     Schedule eye appt December 6 month f/u with Dr. Newton scheduled with fasting labs PTA   Health Maintenance reviewed/updated.      Visit today included increased complexity associated with the care of the episodic problem HTN, which was addressed while instituting co-management of the longitudinal care of the patient due to the serious and/or complex managed problem(s) .    I have evaluated and discussed management associated with medical care services that serve as the continuing focal point for all needed health care services and/or with medical care services that are part of ongoing care related to my patient's single, serious condition or a complex condition(s).    I am providing ongoing care and I am the primary care provider for this patient, and they are being managed, monitored, and/or observed for their chronic conditions over time.     I have addressed their ongoing health maintenance requirements and needs for all health care services and reviewed co-management plans provided by specialty providers when available.    Health Maintenance Due   Topic Date Due    Aspirin/Antiplatelet Therapy  Never done    RSV Vaccine (Age 60+ and Pregnant patients) (1 - 1-dose 60+ series) Never done         yes

## 2024-08-16 NOTE — ASSESSMENT & PLAN NOTE
Repeat in 6 months  Lab Results   Component Value Date     (L) 08/09/2024     07/19/2023     12/02/2022

## 2024-08-16 NOTE — ASSESSMENT & PLAN NOTE
/80, elevated, continue metoprolol and olmesartan, RTC 2 weeks for BP recheck  Lab Results   Component Value Date     08/09/2024    K 3.1 (L) 08/09/2024    BUN 8 08/09/2024    CREATININE 0.7 08/09/2024    EGFRNORACEVR >60.0 08/09/2024

## 2024-08-30 ENCOUNTER — CLINICAL SUPPORT (OUTPATIENT)
Dept: INTERNAL MEDICINE | Facility: CLINIC | Age: 74
End: 2024-08-30
Payer: MEDICARE

## 2024-08-30 VITALS — DIASTOLIC BLOOD PRESSURE: 78 MMHG | SYSTOLIC BLOOD PRESSURE: 128 MMHG

## 2024-08-30 DIAGNOSIS — I15.2 HYPERTENSION ASSOCIATED WITH DIABETES: Primary | ICD-10-CM

## 2024-08-30 DIAGNOSIS — E11.59 HYPERTENSION ASSOCIATED WITH DIABETES: Primary | ICD-10-CM

## 2024-08-30 NOTE — PATIENT INSTRUCTIONS
Patient in today for BP check  Verified patient using name and   Patient says she took olmesartan and metoprolol last night  Has been trying to relax today and not do too much  Let patient rest for about 10 min before taking BP  BP reading in office today: 128/78

## 2024-09-04 ENCOUNTER — OFFICE VISIT (OUTPATIENT)
Dept: RHEUMATOLOGY | Facility: CLINIC | Age: 74
End: 2024-09-04
Payer: MEDICARE

## 2024-09-04 VITALS
WEIGHT: 130.94 LBS | SYSTOLIC BLOOD PRESSURE: 124 MMHG | HEART RATE: 62 BPM | DIASTOLIC BLOOD PRESSURE: 64 MMHG | BODY MASS INDEX: 24.72 KG/M2 | HEIGHT: 61 IN

## 2024-09-04 DIAGNOSIS — M81.0 AGE-RELATED OSTEOPOROSIS WITHOUT CURRENT PATHOLOGICAL FRACTURE: Primary | ICD-10-CM

## 2024-09-04 DIAGNOSIS — Z51.81 MEDICATION MONITORING ENCOUNTER: ICD-10-CM

## 2024-09-04 DIAGNOSIS — E55.9 VITAMIN D INSUFFICIENCY: ICD-10-CM

## 2024-09-04 DIAGNOSIS — G89.29 CHRONIC LOW BACK PAIN, UNSPECIFIED BACK PAIN LATERALITY, UNSPECIFIED WHETHER SCIATICA PRESENT: ICD-10-CM

## 2024-09-04 DIAGNOSIS — M54.50 CHRONIC LOW BACK PAIN, UNSPECIFIED BACK PAIN LATERALITY, UNSPECIFIED WHETHER SCIATICA PRESENT: ICD-10-CM

## 2024-09-04 PROBLEM — Z87.39 HISTORY OF INFLAMMATION OF SACROILIAC JOINT: Status: RESOLVED | Noted: 2022-06-02 | Resolved: 2024-09-04

## 2024-09-04 PROCEDURE — 99999 PR PBB SHADOW E&M-EST. PATIENT-LVL IV: CPT | Mod: PBBFAC,HCNC,, | Performed by: INTERNAL MEDICINE

## 2024-09-04 PROCEDURE — 3061F NEG MICROALBUMINURIA REV: CPT | Mod: HCNC,CPTII,S$GLB, | Performed by: INTERNAL MEDICINE

## 2024-09-04 PROCEDURE — 3066F NEPHROPATHY DOC TX: CPT | Mod: HCNC,CPTII,S$GLB, | Performed by: INTERNAL MEDICINE

## 2024-09-04 PROCEDURE — 4010F ACE/ARB THERAPY RXD/TAKEN: CPT | Mod: HCNC,CPTII,S$GLB, | Performed by: INTERNAL MEDICINE

## 2024-09-04 PROCEDURE — 1159F MED LIST DOCD IN RCRD: CPT | Mod: HCNC,CPTII,S$GLB, | Performed by: INTERNAL MEDICINE

## 2024-09-04 PROCEDURE — 3044F HG A1C LEVEL LT 7.0%: CPT | Mod: HCNC,CPTII,S$GLB, | Performed by: INTERNAL MEDICINE

## 2024-09-04 PROCEDURE — 1125F AMNT PAIN NOTED PAIN PRSNT: CPT | Mod: HCNC,CPTII,S$GLB, | Performed by: INTERNAL MEDICINE

## 2024-09-04 PROCEDURE — 1101F PT FALLS ASSESS-DOCD LE1/YR: CPT | Mod: HCNC,CPTII,S$GLB, | Performed by: INTERNAL MEDICINE

## 2024-09-04 PROCEDURE — 3078F DIAST BP <80 MM HG: CPT | Mod: HCNC,CPTII,S$GLB, | Performed by: INTERNAL MEDICINE

## 2024-09-04 PROCEDURE — 99214 OFFICE O/P EST MOD 30 MIN: CPT | Mod: HCNC,S$GLB,, | Performed by: INTERNAL MEDICINE

## 2024-09-04 PROCEDURE — 3074F SYST BP LT 130 MM HG: CPT | Mod: HCNC,CPTII,S$GLB, | Performed by: INTERNAL MEDICINE

## 2024-09-04 PROCEDURE — 3288F FALL RISK ASSESSMENT DOCD: CPT | Mod: HCNC,CPTII,S$GLB, | Performed by: INTERNAL MEDICINE

## 2024-09-04 NOTE — PROGRESS NOTES
RHEUMATOLOGY OUTPATIENT CLINIC NOTE    9/4/2024    Attending Rheumatologist: Nathaniel Moon  Primary Care Provider/Physician Requesting Consultation: Kamini Newton MD   Chief Complaint/Reason For Consultation:  Osteoporosis and Osteoarthritis      Subjective:     Renea Bourgeois is a 74 y.o. White female with OP    No acute complaints.  Denies fractures or falls since last visit.  Not currently scheduled for invasive dental w/u.    Review of Systems   Constitutional:  Negative for fever.   Eyes:  Negative for photophobia and pain.   Genitourinary:  Negative for dysuria, hematuria and urgency.   Musculoskeletal:  Positive for back pain.   Skin:  Negative for rash.   Neurological:  Negative for focal weakness and weakness.       Chronic comorbid conditions affecting medical decision making today:  Past Medical History:   Diagnosis Date    Allergy     Anemia 11/14/2017    Angina pectoris     followed by cardiology    Arthritis     Breast cancer     Right T1 Ductal Ca in situ,high oncotype Dx 33, Estrogen positive. Lumpectomy, TAC chemo x 6 cyscles then RT,on aromatase inhibitor    Cataract     Chondromalacia of right patella 03/26/2018    Stable and controlled. Continue current treatment plan as previously prescribed with your PCP.      Diabetes mellitus type II 2011     am 12/06/2023    DM (diabetes mellitus) 2011     am 08/04/2017    DM (diabetes mellitus) 2010     am 06/22/2020    Elevated BP without diagnosis of hypertension 11/27/2018    GERD (gastroesophageal reflux disease)     History of colon polyps 02/21/2018    The patient had adenomatous colon polyps in 2014.      History of renal calculi 08/11/2015    Right obstructing 8/20/13 - passed.     Hyperlipidemia     Hypertension     Kidney stone     right side, passed    Malignant neoplasm of upper-outer quadrant of right breast in female, estrogen receptor positive 07/23/2018    Followed by Dr. Jones    Malignant neoplasm of  upper-outer quadrant of right breast in female, estrogen receptor positive     Followed by Hematology/Oncology, continue current treatment plan       Meniere disease     reported per pt    Nuclear sclerosis of both eyes 10/05/2015    Osteopenia     Osteoporosis 04/12/2019    Pseudophakia 10/15/2015    PVC (premature ventricular contraction)     on and off    Refractive error 10/05/2015    Trouble in sleeping     Type 2 diabetes mellitus 2010     am 12/06/2022     Past Surgical History:   Procedure Laterality Date    ARTHROPLASTY OF JOINT OF FINGER Right 01/05/2023    Procedure: ARTHROPLASTY, FINGER;  Surgeon: Iban Kuhn MD;  Location: [facility];  Service: Orthopedics;  Laterality: Right;  right thumb basal joint arthroplasty    BREAST BIOPSY      BREAST LUMPECTOMY  02/11/2011    right    CATARACT EXTRACTION Bilateral 10/14/2015    CHOLECYSTECTOMY  1989    open    COLONOSCOPY N/A 02/22/2018    Procedure: COLONOSCOPY;  Surgeon: Carlito Odonnell MD;  Location: [facility] ENDO;  Service: Endoscopy;  Laterality: N/A;    COLONOSCOPY N/A 02/24/2023    Procedure: COLONOSCOPY;  Surgeon: Yvonne Saldana MD;  Location: [facility];  Service: Endoscopy;  Laterality: N/A;    CYST REMOVAL Left 11/2017    foot    DILATION AND CURETTAGE OF UTERUS  10/2010    In [place]    ESOPHAGOGASTRODUODENOSCOPY N/A 06/29/2020    Procedure: EGD (ESOPHAGOGASTRODUODENOSCOPY);  Surgeon: Nancy Hahn MD;  Location: [facility];  Service: Endoscopy;  Laterality: N/A;    EYE SURGERY  2012    cataract    GALLBLADDER SURGERY      removed in 1989    HAND SURGERY Left 2015    Nasal septal deviation repair  2009    toenail edges removed      TONSILLECTOMY  1959    TOTAL REDUCTION MAMMOPLASTY Left     2015    TUBAL LIGATION  1981    UVULOPALATOPLASTY       Family History   Problem Relation Name Age of Onset    Alzheimer's disease Mother      Diabetes Father Silvia     Hypertension Father Silvia     Stroke Father Silvia     Cancer Father Silvia  65        metastatic when diagnosed    Cataracts Sister      Cancer Brother Marquise         skin prostate    Stroke Brother Marquise Triplettke    Cataracts Brother      Parkinsonism Brother      Cancer Brother Zaid         met    Atrial fibrillation Son Meng         35    Heart disease Son Meng     Glaucoma Maternal Grandmother      Cancer Paternal Grandfather Silvia         prostate    Psoriasis Cousin      Cancer Other nephew         kidney    Alcohol abuse Neg Hx      Drug abuse Neg Hx      COPD Neg Hx      Birth defects Neg Hx      Intellectual disability Neg Hx      Mental illness Neg Hx      Kidney disease Neg Hx      Hyperlipidemia Neg Hx      Melanoma Neg Hx      Lupus Neg Hx       Social History     Tobacco Use   Smoking Status Never   Smokeless Tobacco Never       Current Outpatient Medications:     ACCU-CHEK JD PLUS TEST STRP Strp, TEST three times a day, Disp: 100 strip, Rfl: 11    acetaminophen (TYLENOL) 500 MG tablet, Take 500 mg by mouth every 6 (six) hours as needed for Pain., Disp: , Rfl:     ALCOHOL PREP PADS PadM, , Disp: , Rfl:     calcium-vitamin D (CALCIUM 600 + D,3,) 600 mg(1,500mg) -400 unit Tab, Take 1 tablet by mouth 2 (two) times daily., Disp: 60 tablet, Rfl: 3    cholecalciferol, vitamin D3, (VITAMIN D3) 1,000 unit capsule, Take 1 capsule (1,000 Units total) by mouth once daily., Disp: , Rfl: 0    esomeprazole magnesium (NEXIUM ORAL), Take by mouth., Disp: , Rfl:     ferrous gluconate (FERGON) 240 (27 FE) MG tablet, Take 1 tablet (240 mg total) by mouth once daily., Disp: , Rfl:     fluticasone propionate (FLONASE) 50 mcg/actuation nasal spray, 2 sprays (100 mcg total) by Each Nostril route once daily., Disp: 16 g, Rfl: 0    gabapentin (NEURONTIN) 100 MG capsule, TAKE 1 CAPSULE(100 MG) BY MOUTH THREE TIMES DAILY, Disp: 270 capsule, Rfl: 11    glimepiride (AMARYL) 1 MG tablet, Take 1 tablet (1 mg total) by mouth once daily., Disp: 90 tablet, Rfl: 3    lancets (ACCU-CHEK SOFTCLIX  LANCETS) Misc, 1 each by Misc.(Non-Drug; Combo Route) route 3 (three) times daily., Disp: 100 each, Rfl: 11    lancing device Misc, , Disp: , Rfl:     levocetirizine (XYZAL) 5 MG tablet, TAKE 1 TABLET(5 MG) BY MOUTH EVERY EVENING, Disp: 30 tablet, Rfl: 5    magnesium aspartate HCl 61 mg (615 mg) TbEC, Take by mouth once., Disp: , Rfl:     metoprolol succinate (TOPROL-XL) 25 MG 24 hr tablet, Take 25 mg by mouth once daily., Disp: , Rfl:     olmesartan (BENICAR) 20 MG tablet, Take 1 tablet (20 mg total) by mouth once daily., Disp: 90 tablet, Rfl: 3    rosuvastatin (CRESTOR) 20 MG tablet, Take 1 tablet (20 mg total) by mouth once daily., Disp: 90 tablet, Rfl: 3    traZODone (DESYREL) 100 MG tablet, TAKE 1 TABLET BY MOUTH AT BEDTIME, Disp: 90 tablet, Rfl: 3    turmeric 400 mg Cap, Take 400 mg by mouth 2 (two) times daily as needed., Disp: 60 capsule, Rfl: 0     Objective:     Vitals:    09/04/24 1140   BP: 124/64   Pulse: 62     Physical Exam   Eyes: Conjunctivae are normal.   Pulmonary/Chest: Effort normal. No respiratory distress.   Musculoskeletal:         General: Deformity present. No swelling or tenderness.   Skin: No rash noted.       Reviewed available old and all outside pertinent medical records available.    All lab results personally reviewed and interpreted by me.       ASSESSMENT / PLAN     1. Age-related osteoporosis without current pathological fracture  Undergoing IV bisphosphonate drug holida (2021-).  Repeat DXA w/ improved osteopenic T scores.   Plan to repeat DXA next year.  Ca/vit D supp. OTC  DXA Bone Density Axial Skeleton 1 or more sites      2. Medication monitoring encounter  Comprehensive Metabolic Panel      3. Vitamin D insufficiency  Vitamin D level      4. Chronic low back pain, unspecified back pain laterality, unspecified whether sciatica present  PT TORSTEN Moon M.D.

## 2024-09-09 ENCOUNTER — HOSPITAL ENCOUNTER (OUTPATIENT)
Dept: RADIOLOGY | Facility: HOSPITAL | Age: 74
Discharge: HOME | End: 2024-09-09
Attending: NURSE PRACTITIONER
Payer: MEDICARE

## 2024-09-09 ENCOUNTER — OFFICE VISIT (OUTPATIENT)
Dept: SURGERY | Facility: CLINIC | Age: 74
End: 2024-09-09
Payer: MEDICARE

## 2024-09-09 VITALS — BODY MASS INDEX: 24.55 KG/M2 | HEIGHT: 61 IN | WEIGHT: 130.06 LBS

## 2024-09-09 VITALS — BODY MASS INDEX: 24.83 KG/M2 | WEIGHT: 131.38 LBS

## 2024-09-09 DIAGNOSIS — Z85.3 PERSONAL HISTORY OF BREAST CANCER: ICD-10-CM

## 2024-09-09 DIAGNOSIS — Z17.0 MALIGNANT NEOPLASM OF OVERLAPPING SITES OF RIGHT BREAST IN FEMALE, ESTROGEN RECEPTOR POSITIVE: Chronic | ICD-10-CM

## 2024-09-09 DIAGNOSIS — Z12.31 ENCOUNTER FOR SCREENING MAMMOGRAM FOR MALIGNANT NEOPLASM OF BREAST: ICD-10-CM

## 2024-09-09 DIAGNOSIS — C50.411 MALIGNANT NEOPLASM OF UPPER-OUTER QUADRANT OF RIGHT BREAST IN FEMALE, ESTROGEN RECEPTOR POSITIVE: ICD-10-CM

## 2024-09-09 DIAGNOSIS — C50.811 MALIGNANT NEOPLASM OF OVERLAPPING SITES OF RIGHT BREAST IN FEMALE, ESTROGEN RECEPTOR POSITIVE: Chronic | ICD-10-CM

## 2024-09-09 DIAGNOSIS — Z85.3 PERSONAL HISTORY OF BREAST CANCER: Primary | ICD-10-CM

## 2024-09-09 DIAGNOSIS — Z17.0 MALIGNANT NEOPLASM OF UPPER-OUTER QUADRANT OF RIGHT BREAST IN FEMALE, ESTROGEN RECEPTOR POSITIVE: ICD-10-CM

## 2024-09-09 PROCEDURE — 77063 BREAST TOMOSYNTHESIS BI: CPT | Mod: 26,HCNC,, | Performed by: RADIOLOGY

## 2024-09-09 PROCEDURE — 77067 SCR MAMMO BI INCL CAD: CPT | Mod: 26,HCNC,, | Performed by: RADIOLOGY

## 2024-09-09 PROCEDURE — 99999 PR PBB SHADOW E&M-EST. PATIENT-LVL III: CPT | Mod: PBBFAC,HCNC,, | Performed by: NURSE PRACTITIONER

## 2024-09-09 PROCEDURE — 77067 SCR MAMMO BI INCL CAD: CPT | Mod: TC,HCNC

## 2024-12-19 ENCOUNTER — TELEPHONE (OUTPATIENT)
Dept: PAIN MEDICINE | Facility: CLINIC | Age: 74
End: 2024-12-19
Payer: MEDICARE

## 2024-12-19 NOTE — TELEPHONE ENCOUNTER
----- Message from Yvonne sent at 12/19/2024  3:09 PM CST -----  Contact: DUNG KUMAR [[id number]]  .Type:  Needs Medical Advice    Who Called: DUNG KUMAR [[id number]]  Symptoms (please be specific):  sciatica nerve pain    How long has patient had these symptoms:   off and on several months   Pharmacy name and phone #:   .  [id number] - [place] - [address] AT [facility]  [address]  [place] [address]  Phone: [phone] Fax: [phone]  Would the patient rather a call back or a response via [facility]?  call  Best Call Back Number:  [phone] (home)   Additional Information:

## 2025-01-28 DIAGNOSIS — E11.9 TYPE 2 DIABETES MELLITUS WITHOUT COMPLICATION, WITHOUT LONG-TERM CURRENT USE OF INSULIN: ICD-10-CM

## 2025-01-29 ENCOUNTER — OFFICE VISIT (OUTPATIENT)
Dept: DIABETES | Facility: CLINIC | Age: 75
End: 2025-01-29
Payer: MEDICARE

## 2025-01-29 VITALS
DIASTOLIC BLOOD PRESSURE: 68 MMHG | BODY MASS INDEX: 24.69 KG/M2 | HEART RATE: 66 BPM | HEIGHT: 61 IN | WEIGHT: 130.75 LBS | SYSTOLIC BLOOD PRESSURE: 130 MMHG

## 2025-01-29 DIAGNOSIS — E78.5 HYPERLIPIDEMIA ASSOCIATED WITH TYPE 2 DIABETES MELLITUS: ICD-10-CM

## 2025-01-29 DIAGNOSIS — E11.59 HYPERTENSION ASSOCIATED WITH DIABETES: ICD-10-CM

## 2025-01-29 DIAGNOSIS — I15.2 HYPERTENSION ASSOCIATED WITH DIABETES: ICD-10-CM

## 2025-01-29 DIAGNOSIS — E11.9 TYPE 2 DIABETES MELLITUS WITHOUT COMPLICATION, WITHOUT LONG-TERM CURRENT USE OF INSULIN: Primary | ICD-10-CM

## 2025-01-29 DIAGNOSIS — E11.69 HYPERLIPIDEMIA ASSOCIATED WITH TYPE 2 DIABETES MELLITUS: ICD-10-CM

## 2025-01-29 LAB — GLUCOSE SERPL-MCNC: 89 MG/DL (ref 70–110)

## 2025-01-29 PROCEDURE — 3288F FALL RISK ASSESSMENT DOCD: CPT | Mod: HCNC,CPTII,S$GLB, | Performed by: NURSE PRACTITIONER

## 2025-01-29 PROCEDURE — 3075F SYST BP GE 130 - 139MM HG: CPT | Mod: HCNC,CPTII,S$GLB, | Performed by: NURSE PRACTITIONER

## 2025-01-29 PROCEDURE — 3078F DIAST BP <80 MM HG: CPT | Mod: HCNC,CPTII,S$GLB, | Performed by: NURSE PRACTITIONER

## 2025-01-29 PROCEDURE — 1101F PT FALLS ASSESS-DOCD LE1/YR: CPT | Mod: HCNC,CPTII,S$GLB, | Performed by: NURSE PRACTITIONER

## 2025-01-29 PROCEDURE — 1126F AMNT PAIN NOTED NONE PRSNT: CPT | Mod: HCNC,CPTII,S$GLB, | Performed by: NURSE PRACTITIONER

## 2025-01-29 PROCEDURE — 82962 GLUCOSE BLOOD TEST: CPT | Mod: HCNC,S$GLB,, | Performed by: NURSE PRACTITIONER

## 2025-01-29 PROCEDURE — 1160F RVW MEDS BY RX/DR IN RCRD: CPT | Mod: HCNC,CPTII,S$GLB, | Performed by: NURSE PRACTITIONER

## 2025-01-29 PROCEDURE — 99213 OFFICE O/P EST LOW 20 MIN: CPT | Mod: HCNC,S$GLB,, | Performed by: NURSE PRACTITIONER

## 2025-01-29 PROCEDURE — 99999 PR PBB SHADOW E&M-EST. PATIENT-LVL III: CPT | Mod: PBBFAC,HCNC,, | Performed by: NURSE PRACTITIONER

## 2025-01-29 PROCEDURE — 1159F MED LIST DOCD IN RCRD: CPT | Mod: HCNC,CPTII,S$GLB, | Performed by: NURSE PRACTITIONER

## 2025-01-29 RX ORDER — GLIMEPIRIDE 1 MG/1
1 TABLET ORAL
Qty: 90 TABLET | Refills: 2 | Status: SHIPPED | OUTPATIENT
Start: 2025-01-29

## 2025-01-29 NOTE — PROGRESS NOTES
"PCP: Kamini Newton MD  // Dr. Lewis ( cardiology )     Subjective:     Chief Complaint: Diabetes Follow Up    HISTORY OF PRESENT ILLNESS: 75 y.o.  female presenting for diabetes follow up. Patient has had Type II diabetes since 2010 and has the following complications: none. She is to be enrolled in diabetes education classes.  She has a history of breast cancer, treated in 2010 with remission since then. Past tried / failed medications: No longer taking metformin due to SE.     She denies any recent hospital admissions, emergency room visits, syncope, or diaphoresis.     Patient is enrolled in digital diabetes program. Per episodes tab, fasting BGs range < 66 >, 90 - 113.  Denies hypoglycemia symptoms.  She continues to drive back-and-forth to [place] to help her niece.    Height: 5' 1" (154.9 cm)  //  Weight: 59.3 kg (130 lb 11.7 oz), Body mass index is 24.7 kg/m².      Her blood sugar in clinic today is: 89 mg/dl       Labs Reviewed.       DM MEDICATIONS:  Glimepiride 1 mg before breakfast    Review of Systems   Constitutional:  Negative for appetite change, fatigue and unexpected weight change.   Eyes:  Negative for visual disturbance.   Gastrointestinal:  Negative for abdominal pain, constipation, diarrhea, nausea and vomiting.   Endocrine: Negative for polydipsia, polyphagia and polyuria.   Neurological:  Negative for dizziness, numbness and headaches.       Diabetes Management Status  Statin: Taking  ACE/ARB: Not taking    Screening or Prevention Patient's value Goal Complete/Controlled?   HgA1C Testing and Control   Lab Results   Component Value Date    HGBA1C 5.4 08/09/2024      Annually/Less than 8% Yes   Lipid profile : 08/09/2024 Annually Yes   LDL control Lab Results   Component Value Date    LDLCALC 76.6 08/09/2024    Annually/Less than 100 mg/dl  Yes   Nephropathy screening Lab Results   Component Value Date    MICALBCREAT 11.8 08/09/2024     Lab Results   Component Value Date    PROTEINUA " Negative 08/20/2013    Annually Yes   Blood pressure BP Readings from Last 1 Encounters:   01/29/25 130/68    Less than 140/90 Yes   Dilated retinal exam : 12/06/2023 Annually Yes, Dr. Foote, has an appointment scheduled.     Foot exam   Most Recent Foot Exam Date: Not Found Annually Yes     ACTIVITY LEVEL: Moderately Active. Discussed activities, benefits, methods, and precautions.  MEAL PLANNING: Patient reports number of meals per day to be 3 and number of snacks per day to be 2.   Patient is encouraged to carb count and consume no more than 45 - 60 grams of carbohydrates in each meal, and 1800 k / razia per day.      BLOOD GLUCOSE TESTING:  Digital Diabetes Program  SOCIAL HISTORY: .  Never smoker.    Objective:      Physical Exam  Constitutional:       Appearance: She is well-developed.   HENT:      Head: Normocephalic and atraumatic.   Eyes:      Pupils: Pupils are equal, round, and reactive to light.   Cardiovascular:      Pulses:           Dorsalis pedis pulses are 2+ on the right side and 2+ on the left side.   Pulmonary:      Effort: Pulmonary effort is normal.   Feet:      Right foot:      Protective Sensation: 6 sites tested.  6 sites sensed.      Skin integrity: No ulcer, callus or dry skin.      Left foot:      Protective Sensation: 6 sites tested.  6 sites sensed.      Skin integrity: No ulcer, callus or dry skin.   Skin:     General: Skin is warm and dry.      Findings: No rash.   Psychiatric:         Behavior: Behavior normal.         Thought Content: Thought content normal.         Judgment: Judgment normal.         Assessment / Plan:     1.) Type 2 diabetes mellitus without complication, without long-term current use of insulin  Comments:  -  Controlled, A1C at 5.4 %. Labs re-scheduled for April when she comes back from [place].  Continue glimepiride 1 mg before breakfast.  She denies symptoms of hypoglycemia.    Orders:  -     POCT Glucose, Hand-Held Device    2.) Hypertension associated  with diabetes - continue medication as prescribed.    3.) Hyperlipidemia associated with type 2 diabetes mellitus- continue meds as prescribed.    Additional Plan Details:    1.) Continue monitoring blood sugar 1 x daily, rotating times. Discussed ADA goal for fasting blood sugar, 80 - 130 mg/dL; pp blood sugars below 180 mg/dl. Also, discussed prevention of hypoglycemia and the need to adjust goals to higher levels if persistent hypoglycemia.  Reminded to bring BG records or meter to each visit for review.  2.) Return to clinic in 1 year for follow up. The patient was explained the above plan and given opportunity to ask questions.  She understands, chooses and consents to this plan and accepts all the risks, which include but are not limited to the risks mentioned above. She understands the alternative of having no testing, interventions or treatments at this time. She left content and without further questions.     Andree Coyne, NP-C, [facility]    A total of 30 minutes was spent in face to face time, of which over 50 % was spent in counseling patient on disease process, complications, treatment, and side effects of medications.

## 2025-02-10 ENCOUNTER — PATIENT MESSAGE (OUTPATIENT)
Dept: ADMINISTRATIVE | Facility: OTHER | Age: 75
End: 2025-02-10
Payer: MEDICARE

## 2025-03-04 DIAGNOSIS — H66.43 SUPPURATIVE OTITIS MEDIA OF BOTH EARS, UNSPECIFIED CHRONICITY: ICD-10-CM

## 2025-03-05 RX ORDER — FLUTICASONE PROPIONATE 50 MCG
SPRAY, SUSPENSION (ML) NASAL
Qty: 16 G | Refills: 5 | Status: SHIPPED | OUTPATIENT
Start: 2025-03-05

## 2025-04-23 ENCOUNTER — OFFICE VISIT (OUTPATIENT)
Dept: INTERNAL MEDICINE | Facility: CLINIC | Age: 75
End: 2025-04-23
Payer: MEDICARE

## 2025-04-23 VITALS
RESPIRATION RATE: 18 BRPM | TEMPERATURE: 97 F | SYSTOLIC BLOOD PRESSURE: 112 MMHG | HEIGHT: 61 IN | BODY MASS INDEX: 24.18 KG/M2 | OXYGEN SATURATION: 97 % | HEART RATE: 60 BPM | DIASTOLIC BLOOD PRESSURE: 84 MMHG | WEIGHT: 128.06 LBS

## 2025-04-23 DIAGNOSIS — E55.9 VITAMIN D DEFICIENCY: ICD-10-CM

## 2025-04-23 DIAGNOSIS — R20.8 BURNING SENSATION: ICD-10-CM

## 2025-04-23 DIAGNOSIS — K21.9 GASTROESOPHAGEAL REFLUX DISEASE WITHOUT ESOPHAGITIS: ICD-10-CM

## 2025-04-23 DIAGNOSIS — E11.69 HYPERLIPIDEMIA ASSOCIATED WITH TYPE 2 DIABETES MELLITUS: ICD-10-CM

## 2025-04-23 DIAGNOSIS — F51.04 CHRONIC INSOMNIA: ICD-10-CM

## 2025-04-23 DIAGNOSIS — I15.2 HYPERTENSION ASSOCIATED WITH DIABETES: Primary | ICD-10-CM

## 2025-04-23 DIAGNOSIS — E11.59 HYPERTENSION ASSOCIATED WITH DIABETES: Primary | ICD-10-CM

## 2025-04-23 DIAGNOSIS — D69.6 THROMBOCYTOPENIA: ICD-10-CM

## 2025-04-23 DIAGNOSIS — E78.5 HYPERLIPIDEMIA ASSOCIATED WITH TYPE 2 DIABETES MELLITUS: ICD-10-CM

## 2025-04-23 DIAGNOSIS — M15.0 PRIMARY OSTEOARTHRITIS INVOLVING MULTIPLE JOINTS: ICD-10-CM

## 2025-04-23 DIAGNOSIS — M85.89 OSTEOPENIA OF MULTIPLE SITES: ICD-10-CM

## 2025-04-23 DIAGNOSIS — E11.59 TYPE 2 DIABETES MELLITUS WITH OTHER CIRCULATORY COMPLICATION, WITHOUT LONG-TERM CURRENT USE OF INSULIN: ICD-10-CM

## 2025-04-23 DIAGNOSIS — Z85.3 HISTORY OF BREAST CANCER: ICD-10-CM

## 2025-04-23 PROCEDURE — 3288F FALL RISK ASSESSMENT DOCD: CPT | Mod: HCNC,CPTII,S$GLB, | Performed by: PHYSICIAN ASSISTANT

## 2025-04-23 PROCEDURE — 3079F DIAST BP 80-89 MM HG: CPT | Mod: HCNC,CPTII,S$GLB, | Performed by: PHYSICIAN ASSISTANT

## 2025-04-23 PROCEDURE — G2211 COMPLEX E/M VISIT ADD ON: HCPCS | Mod: HCNC,S$GLB,, | Performed by: PHYSICIAN ASSISTANT

## 2025-04-23 PROCEDURE — 3074F SYST BP LT 130 MM HG: CPT | Mod: HCNC,CPTII,S$GLB, | Performed by: PHYSICIAN ASSISTANT

## 2025-04-23 PROCEDURE — 1101F PT FALLS ASSESS-DOCD LE1/YR: CPT | Mod: HCNC,CPTII,S$GLB, | Performed by: PHYSICIAN ASSISTANT

## 2025-04-23 PROCEDURE — 99214 OFFICE O/P EST MOD 30 MIN: CPT | Mod: HCNC,S$GLB,, | Performed by: PHYSICIAN ASSISTANT

## 2025-04-23 PROCEDURE — 99999 PR PBB SHADOW E&M-EST. PATIENT-LVL V: CPT | Mod: PBBFAC,HCNC,, | Performed by: PHYSICIAN ASSISTANT

## 2025-04-23 PROCEDURE — 1125F AMNT PAIN NOTED PAIN PRSNT: CPT | Mod: HCNC,CPTII,S$GLB, | Performed by: PHYSICIAN ASSISTANT

## 2025-04-23 RX ORDER — METOPROLOL SUCCINATE 25 MG/1
25 TABLET, EXTENDED RELEASE ORAL DAILY
Qty: 90 TABLET | Refills: 3 | Status: SHIPPED | OUTPATIENT
Start: 2025-04-23 | End: 2026-04-23

## 2025-04-23 RX ORDER — OLMESARTAN MEDOXOMIL 20 MG/1
20 TABLET ORAL DAILY
Qty: 90 TABLET | Refills: 3 | Status: SHIPPED | OUTPATIENT
Start: 2025-04-23 | End: 2026-04-23

## 2025-04-23 RX ORDER — ROSUVASTATIN CALCIUM 20 MG/1
20 TABLET, COATED ORAL DAILY
Qty: 90 TABLET | Refills: 3 | Status: SHIPPED | OUTPATIENT
Start: 2025-04-23 | End: 2026-04-23

## 2025-04-23 NOTE — PROGRESS NOTES
"Subjective:       Patient ID: Renea Bourgeois is a 75 y.o. female.    Chief Complaint: No chief complaint on file.      HPI:  Patient presents to clinic today for followup of chronic conditions.    Review of Systems   Constitutional:  Negative for chills, fatigue, fever and unexpected weight change.   Eyes:  Negative for visual disturbance.   Respiratory:  Negative for shortness of breath.    Cardiovascular:  Negative for chest pain.   Musculoskeletal:  Positive for myalgias (BL legs, decreased gabapentin while out of town and this coincides, moving lots of boxes for family member). Negative for back pain and gait problem.   Neurological:  Positive for headaches.         Objective:     Vitals:    04/23/25 1007   BP: 112/84   BP Location: Left arm   Patient Position: Sitting   Pulse: 60   Resp: 18   Temp: 97.4 °F (36.3 °C)   TempSrc: Tympanic   SpO2: 97%   Weight: 58.1 kg (128 lb 1.4 oz)   Height: 5' 1" (1.549 m)       Physical Exam  Vitals and nursing note reviewed.   Constitutional:       General: She is not in acute distress.     Appearance: She is well-developed.   HENT:      Head: Normocephalic and atraumatic.   Eyes:      General: Lids are normal. No scleral icterus.     Extraocular Movements: Extraocular movements intact.      Conjunctiva/sclera: Conjunctivae normal.   Pulmonary:      Effort: Pulmonary effort is normal.   Neurological:      Mental Status: She is alert.      Cranial Nerves: No cranial nerve deficit.   Psychiatric:         Mood and Affect: Mood and affect normal.           Assessment:     1. Hypertension associated with diabetes    2. Hyperlipidemia associated with type 2 diabetes mellitus    3. Type 2 diabetes mellitus with other circulatory complication, without long-term current use of insulin    4. Osteopenia of multiple sites    5. Gastroesophageal reflux disease without esophagitis    6. Thrombocytopenia    7. Vitamin D deficiency    8. Chronic insomnia    9. Burning sensation    10. " History of breast cancer          Plan:   1. Hypertension associated with diabetes  Overview:  Followed by Cardiology, continue current treatment plan, Dr. Jovan Urbina @ [facility]    Assessment & Plan:  Controlled, continue metoprolol and olmesartan    Orders:  -     TSH; Future; Expected date: 10/23/2025  -     metoprolol succinate (TOPROL-XL) 25 MG 24 hr tablet; Take 1 tablet (25 mg total) by mouth once daily.  Dispense: 90 tablet; Refill: 3  -     olmesartan (BENICAR) 20 MG tablet; Take 1 tablet (20 mg total) by mouth once daily.  Dispense: 90 tablet; Refill: 3    2. Hyperlipidemia associated with type 2 diabetes mellitus  Assessment & Plan:  Status pending lab, continue rosuvastatin    Orders:  -     Comprehensive Metabolic Panel; Future; Expected date: 10/23/2025  -     Lipid Panel; Future; Expected date: 10/23/2025  -     rosuvastatin (CRESTOR) 20 MG tablet; Take 1 tablet (20 mg total) by mouth once daily.  Dispense: 90 tablet; Refill: 3    3. Type 2 diabetes mellitus with other circulatory complication, without long-term current use of insulin  Assessment & Plan:  Status pending lab, continue glimepiride    Orders:  -     Hemoglobin A1C; Future; Expected date: 10/23/2025  -     Microalbumin/Creatinine Ratio, Urine; Future; Expected date: 10/23/2025    4. Osteopenia of multiple sites  Overview:  Followed by Rheumatology, continue current treatment plan   DEXA 8/2023      5. Gastroesophageal reflux disease without esophagitis  Overview:  Stable on Nexium      6. Thrombocytopenia  Assessment & Plan:  Status pending lab     Orders:  -     CBC Auto Differential; Future; Expected date: 10/23/2025    7. Vitamin D deficiency  Overview:  Continue supplement     Orders:  -     Vitamin D; Future; Expected date: 10/23/2025    8. Chronic insomnia  Overview:  Stable on trazodone       9. Burning sensation    10. History of breast cancer  Overview:  Followed by Hematology/Oncology, continue current treatment plan            Information given about TDAP vaccine, advised can be obtained at pharmacy.   Eye appt scheduled 4/28/25  Health Maintenance reviewed/updated.      Check-out note:  Please follow up in 6 months for Annual exam In-person with Dr. Newton.  Labs: Fasting lab ASAP (tomorrow) and Fasting lab PTA  Additional orders: Print AVS     Future Appointments   Date Time Provider Department Center   4/24/2025 10:50 AM LABORATORY, [facility]   4/28/2025  4:00 PM James Foote OD [facility]   5/12/2025  8:30 AM Merced Trujillo NP [facility] IM [facility]   8/28/2025 11:30 AM LABORATORY, [facility]   8/28/2025 11:45 AM [facility] BMD1 [facility] DEXABMD [facility]   9/4/2025 11:00 AM Nathaniel Moon MD [facility] RHEU [facility]   10/3/2025  8:10 AM LABORATORY, [facility]   10/7/2025  1:40 PM Kamini Newton MD [facility] IM [facility]   2/4/2026 11:30 AM Andree Coyne, PhD, NP-C [facility]     Visit today included increased complexity associated with the care of the episodic problem HTN, which was addressed while instituting co-management of the longitudinal care of the patient due to the serious and/or complex managed problem(s) .    I have evaluated and discussed management associated with medical care services that serve as the continuing focal point for all needed health care services and/or with medical care services that are part of ongoing care related to my patient's single, serious condition or a complex condition(s).    I am providing ongoing care and I am the primary care provider for this patient, and they are being managed, monitored, and/or observed for their chronic conditions over time.     I have addressed their ongoing health maintenance requirements and needs for all health care services and reviewed co-management plans provided by specialty providers when available.    Health Maintenance Due   Topic Date Due     Aspirin/Antiplatelet Therapy  Never done    COVID-19 Vaccine (5 - 2024-25 season) 09/01/2024    TETANUS VACCINE  10/24/2024    Diabetic Eye Exam  12/06/2024    Hemoglobin A1c  02/09/2025

## 2025-04-24 ENCOUNTER — LAB VISIT (OUTPATIENT)
Dept: LAB | Facility: HOSPITAL | Age: 75
End: 2025-04-24
Attending: PHYSICIAN ASSISTANT
Payer: MEDICARE

## 2025-04-24 DIAGNOSIS — E11.69 HYPERLIPIDEMIA ASSOCIATED WITH TYPE 2 DIABETES MELLITUS: ICD-10-CM

## 2025-04-24 DIAGNOSIS — E55.9 VITAMIN D DEFICIENCY: ICD-10-CM

## 2025-04-24 DIAGNOSIS — E78.5 HYPERLIPIDEMIA ASSOCIATED WITH TYPE 2 DIABETES MELLITUS: ICD-10-CM

## 2025-04-24 DIAGNOSIS — D69.6 THROMBOCYTOPENIA: ICD-10-CM

## 2025-04-24 DIAGNOSIS — E11.59 TYPE 2 DIABETES MELLITUS WITH OTHER CIRCULATORY COMPLICATION, WITHOUT LONG-TERM CURRENT USE OF INSULIN: ICD-10-CM

## 2025-04-24 LAB
25(OH)D3+25(OH)D2 SERPL-MCNC: 64 NG/ML (ref 30–96)
ABSOLUTE EOSINOPHIL (OHS): 0.12 K/UL
ABSOLUTE MONOCYTE (OHS): 0.41 K/UL (ref 0.3–1)
ABSOLUTE NEUTROPHIL COUNT (OHS): 3.46 K/UL (ref 1.8–7.7)
ALBUMIN SERPL BCP-MCNC: 4.1 G/DL (ref 3.5–5.2)
ALP SERPL-CCNC: 60 UNIT/L (ref 40–150)
ALT SERPL W/O P-5'-P-CCNC: 11 UNIT/L (ref 10–44)
ANION GAP (OHS): 9 MMOL/L (ref 8–16)
AST SERPL-CCNC: 17 UNIT/L (ref 11–45)
BASOPHILS # BLD AUTO: 0.02 K/UL
BASOPHILS NFR BLD AUTO: 0.4 %
BILIRUB SERPL-MCNC: 0.5 MG/DL (ref 0.1–1)
BUN SERPL-MCNC: 10 MG/DL (ref 8–23)
CALCIUM SERPL-MCNC: 9 MG/DL (ref 8.7–10.5)
CHLORIDE SERPL-SCNC: 106 MMOL/L (ref 95–110)
CHOLEST SERPL-MCNC: 162 MG/DL (ref 120–199)
CHOLEST/HDLC SERPL: 3.4 {RATIO} (ref 2–5)
CO2 SERPL-SCNC: 28 MMOL/L (ref 23–29)
CREAT SERPL-MCNC: 0.8 MG/DL (ref 0.5–1.4)
EAG (OHS): 105 MG/DL (ref 68–131)
ERYTHROCYTE [DISTWIDTH] IN BLOOD BY AUTOMATED COUNT: 12.1 % (ref 11.5–14.5)
GFR SERPLBLD CREATININE-BSD FMLA CKD-EPI: >60 ML/MIN/1.73/M2
GLUCOSE SERPL-MCNC: 89 MG/DL (ref 70–110)
HBA1C MFR BLD: 5.3 % (ref 4–5.6)
HCT VFR BLD AUTO: 40.1 % (ref 37–48.5)
HDLC SERPL-MCNC: 47 MG/DL (ref 40–75)
HDLC SERPL: 29 % (ref 20–50)
HGB BLD-MCNC: 13.1 GM/DL (ref 12–16)
IMM GRANULOCYTES # BLD AUTO: 0.01 K/UL (ref 0–0.04)
IMM GRANULOCYTES NFR BLD AUTO: 0.2 % (ref 0–0.5)
LDLC SERPL CALC-MCNC: 80.2 MG/DL (ref 63–159)
LYMPHOCYTES # BLD AUTO: 1.67 K/UL (ref 1–4.8)
MCH RBC QN AUTO: 30.3 PG (ref 27–31)
MCHC RBC AUTO-ENTMCNC: 32.7 G/DL (ref 32–36)
MCV RBC AUTO: 93 FL (ref 82–98)
NONHDLC SERPL-MCNC: 115 MG/DL
NUCLEATED RBC (/100WBC) (OHS): 0 /100 WBC
PLATELET # BLD AUTO: 149 K/UL (ref 150–450)
PMV BLD AUTO: 11.8 FL (ref 9.2–12.9)
POTASSIUM SERPL-SCNC: 3.4 MMOL/L (ref 3.5–5.1)
PROT SERPL-MCNC: 7 GM/DL (ref 6–8.4)
RBC # BLD AUTO: 4.33 M/UL (ref 4–5.4)
RELATIVE EOSINOPHIL (OHS): 2.1 %
RELATIVE LYMPHOCYTE (OHS): 29.3 % (ref 18–48)
RELATIVE MONOCYTE (OHS): 7.2 % (ref 4–15)
RELATIVE NEUTROPHIL (OHS): 60.8 % (ref 38–73)
SODIUM SERPL-SCNC: 143 MMOL/L (ref 136–145)
TRIGL SERPL-MCNC: 174 MG/DL (ref 30–150)
WBC # BLD AUTO: 5.69 K/UL (ref 3.9–12.7)

## 2025-04-24 PROCEDURE — 80053 COMPREHEN METABOLIC PANEL: CPT | Mod: HCNC

## 2025-04-24 PROCEDURE — 36415 COLL VENOUS BLD VENIPUNCTURE: CPT | Mod: HCNC

## 2025-04-24 PROCEDURE — 85025 COMPLETE CBC W/AUTO DIFF WBC: CPT | Mod: HCNC

## 2025-04-24 PROCEDURE — 80061 LIPID PANEL: CPT | Mod: HCNC

## 2025-04-24 PROCEDURE — 82306 VITAMIN D 25 HYDROXY: CPT | Mod: HCNC

## 2025-04-24 PROCEDURE — 83036 HEMOGLOBIN GLYCOSYLATED A1C: CPT | Mod: HCNC

## 2025-04-25 ENCOUNTER — RESULTS FOLLOW-UP (OUTPATIENT)
Dept: INTERNAL MEDICINE | Facility: CLINIC | Age: 75
End: 2025-04-25

## 2025-04-25 RX ORDER — GABAPENTIN 100 MG/1
CAPSULE ORAL
Qty: 270 CAPSULE | Refills: 11 | Status: SHIPPED | OUTPATIENT
Start: 2025-04-25

## 2025-04-28 ENCOUNTER — OFFICE VISIT (OUTPATIENT)
Dept: OPHTHALMOLOGY | Facility: CLINIC | Age: 75
End: 2025-04-28
Payer: MEDICARE

## 2025-04-28 DIAGNOSIS — E11.9 DIABETES MELLITUS TYPE 2 WITHOUT RETINOPATHY: Primary | ICD-10-CM

## 2025-04-28 DIAGNOSIS — Z96.1 PSEUDOPHAKIA OF BOTH EYES: ICD-10-CM

## 2025-04-28 DIAGNOSIS — H52.7 REFRACTIVE ERRORS: ICD-10-CM

## 2025-04-28 PROCEDURE — 2023F DILAT RTA XM W/O RTNOPTHY: CPT | Mod: CPTII,HCNC,S$GLB, | Performed by: OPTOMETRIST

## 2025-04-28 PROCEDURE — 1159F MED LIST DOCD IN RCRD: CPT | Mod: CPTII,HCNC,S$GLB, | Performed by: OPTOMETRIST

## 2025-04-28 PROCEDURE — 99999 PR PBB SHADOW E&M-EST. PATIENT-LVL III: CPT | Mod: PBBFAC,HCNC,, | Performed by: OPTOMETRIST

## 2025-04-28 PROCEDURE — 92014 COMPRE OPH EXAM EST PT 1/>: CPT | Mod: HCNC,S$GLB,, | Performed by: OPTOMETRIST

## 2025-04-28 NOTE — PROGRESS NOTES
SUBJECTIVE  Renea Bourgeois is 75 y.o. female  Uncorrected distance visual acuity was 20/20 in the right eye and 20/25 in the left eye.   Chief Complaint   Patient presents with    Diabetic Eye Exam          HPI    Vision changes since last eye exam?: No   Any eye pain today: No  Other ocular symptoms: No  Last A1C 5.3  Wears OTC readers      DM x 2010  PCIOL OS 10/28/15  PCIOL OD 10/14/15  Last edited by Eddie Butcher on 4/28/2025  4:04 PM.         Assessment /Plan :  1. Diabetes mellitus type 2 without retinopathy  No Background Diabetic Retinopathy  Strict BG control, f/u w/ PCP, and annual DFE  Stressed importance of DM control to preserve vision     2. Pseudophakia of both eyes  Well-centered, stable IOL OU. Monitor annually.     3. Refractive errors  RTC 1 year  Discussed above and answered questions.

## 2025-05-01 DIAGNOSIS — M79.642 LEFT HAND PAIN: Primary | ICD-10-CM

## 2025-05-05 ENCOUNTER — HOSPITAL ENCOUNTER (OUTPATIENT)
Dept: RADIOLOGY | Facility: HOSPITAL | Age: 75
Discharge: HOME | End: 2025-05-05
Attending: ORTHOPAEDIC SURGERY
Payer: MEDICARE

## 2025-05-05 ENCOUNTER — OFFICE VISIT (OUTPATIENT)
Dept: ORTHOPEDICS | Facility: CLINIC | Age: 75
End: 2025-05-05
Payer: MEDICARE

## 2025-05-05 VITALS — WEIGHT: 128.06 LBS | BODY MASS INDEX: 24.18 KG/M2 | HEIGHT: 61 IN

## 2025-05-05 DIAGNOSIS — E11.9 TYPE 2 DIABETES MELLITUS WITHOUT COMPLICATION, WITHOUT LONG-TERM CURRENT USE OF INSULIN: ICD-10-CM

## 2025-05-05 DIAGNOSIS — M19.042 ARTHRITIS OF FINGER OF LEFT HAND: Primary | ICD-10-CM

## 2025-05-05 DIAGNOSIS — M79.642 LEFT HAND PAIN: ICD-10-CM

## 2025-05-05 PROCEDURE — 1159F MED LIST DOCD IN RCRD: CPT | Mod: CPTII,S$GLB,, | Performed by: ORTHOPAEDIC SURGERY

## 2025-05-05 PROCEDURE — 99999 PR PBB SHADOW E&M-EST. PATIENT-LVL III: CPT | Mod: PBBFAC,,, | Performed by: ORTHOPAEDIC SURGERY

## 2025-05-05 PROCEDURE — 73130 X-RAY EXAM OF HAND: CPT | Mod: 26,LT,, | Performed by: STUDENT IN AN ORGANIZED HEALTH CARE EDUCATION/TRAINING PROGRAM

## 2025-05-05 PROCEDURE — 99213 OFFICE O/P EST LOW 20 MIN: CPT | Mod: 25,S$GLB,, | Performed by: ORTHOPAEDIC SURGERY

## 2025-05-05 PROCEDURE — 1126F AMNT PAIN NOTED NONE PRSNT: CPT | Mod: CPTII,S$GLB,, | Performed by: ORTHOPAEDIC SURGERY

## 2025-05-05 PROCEDURE — 1160F RVW MEDS BY RX/DR IN RCRD: CPT | Mod: CPTII,S$GLB,, | Performed by: ORTHOPAEDIC SURGERY

## 2025-05-05 PROCEDURE — 1101F PT FALLS ASSESS-DOCD LE1/YR: CPT | Mod: CPTII,S$GLB,, | Performed by: ORTHOPAEDIC SURGERY

## 2025-05-05 PROCEDURE — 73130 X-RAY EXAM OF HAND: CPT | Mod: TC,LT

## 2025-05-05 PROCEDURE — 20600 DRAIN/INJ JOINT/BURSA W/O US: CPT | Mod: LT,S$GLB,, | Performed by: ORTHOPAEDIC SURGERY

## 2025-05-05 PROCEDURE — 3288F FALL RISK ASSESSMENT DOCD: CPT | Mod: CPTII,S$GLB,, | Performed by: ORTHOPAEDIC SURGERY

## 2025-05-05 RX ORDER — TRIAMCINOLONE ACETONIDE 40 MG/ML
40 INJECTION, SUSPENSION INTRA-ARTICULAR; INTRAMUSCULAR
Status: DISCONTINUED | OUTPATIENT
Start: 2025-05-05 | End: 2025-05-05 | Stop reason: HOSPADM

## 2025-05-05 RX ADMIN — TRIAMCINOLONE ACETONIDE 40 MG: 40 INJECTION, SUSPENSION INTRA-ARTICULAR; INTRAMUSCULAR at 02:05

## 2025-05-05 NOTE — PROCEDURES
Small Joint Aspiration/Injection: L small PIP    Date/Time: 5/5/2025 2:00 PM    Performed by: Iban Kuhn MD  Authorized by: Iban Kuhn MD    Consent Done?:  Yes (Verbal)  Indications:  Pain  Site marked: the procedure site was marked    Timeout: prior to procedure the correct patient, procedure, and site was verified    Prep: patient was prepped and draped in usual sterile fashion      Local anesthesia used?: Yes    Local anesthetic:  Lidocaine 2% without epinephrine  Anesthetic total (ml):  0.5    Location:  Small finger  Site:  L small PIP  Ultrasonic guidance for needle placement?: No    Needle size:  25 G  Medications:  40 mg triamcinolone acetonide 40 mg/mL

## 2025-05-05 NOTE — PROCEDURES
Small Joint Aspiration/Injection: L long PIP    Date/Time: 5/5/2025 2:00 PM    Performed by: Iban Kuhn MD  Authorized by: Iban Kuhn MD    Consent Done?:  Yes (Verbal)  Indications:  Pain  Site marked: the procedure site was marked    Timeout: prior to procedure the correct patient, procedure, and site was verified    Prep: patient was prepped and draped in usual sterile fashion      Local anesthesia used?: Yes    Local anesthetic:  Lidocaine 2% without epinephrine  Anesthetic total (ml):  0.5    Location:  Long finger  Site:  L long PIP  Ultrasonic guidance for needle placement?: No    Needle size:  25 G  Approach:  Radial  Medications:  40 mg triamcinolone acetonide 40 mg/mL

## 2025-05-06 RX ORDER — GLIMEPIRIDE 1 MG/1
1 TABLET ORAL
Qty: 90 TABLET | Refills: 1 | Status: SHIPPED | OUTPATIENT
Start: 2025-05-06

## 2025-05-12 ENCOUNTER — OFFICE VISIT (OUTPATIENT)
Dept: INTERNAL MEDICINE | Facility: CLINIC | Age: 75
End: 2025-05-12
Payer: MEDICARE

## 2025-05-12 VITALS
OXYGEN SATURATION: 97 % | DIASTOLIC BLOOD PRESSURE: 70 MMHG | HEART RATE: 60 BPM | HEIGHT: 61 IN | SYSTOLIC BLOOD PRESSURE: 146 MMHG | BODY MASS INDEX: 23.77 KG/M2 | WEIGHT: 125.88 LBS

## 2025-05-12 DIAGNOSIS — M85.89 OSTEOPENIA OF MULTIPLE SITES: ICD-10-CM

## 2025-05-12 DIAGNOSIS — Z85.3 HISTORY OF BREAST CANCER: ICD-10-CM

## 2025-05-12 DIAGNOSIS — E11.69 HYPERLIPIDEMIA ASSOCIATED WITH TYPE 2 DIABETES MELLITUS: ICD-10-CM

## 2025-05-12 DIAGNOSIS — E11.59 HYPERTENSION ASSOCIATED WITH DIABETES: ICD-10-CM

## 2025-05-12 DIAGNOSIS — D69.6 THROMBOCYTOPENIA: ICD-10-CM

## 2025-05-12 DIAGNOSIS — Z00.00 ENCOUNTER FOR MEDICARE ANNUAL WELLNESS EXAM: Primary | ICD-10-CM

## 2025-05-12 DIAGNOSIS — J30.9 ALLERGIC RHINITIS, UNSPECIFIED SEASONALITY, UNSPECIFIED TRIGGER: ICD-10-CM

## 2025-05-12 DIAGNOSIS — I15.2 HYPERTENSION ASSOCIATED WITH DIABETES: ICD-10-CM

## 2025-05-12 DIAGNOSIS — K21.9 GASTROESOPHAGEAL REFLUX DISEASE WITHOUT ESOPHAGITIS: ICD-10-CM

## 2025-05-12 DIAGNOSIS — E78.5 HYPERLIPIDEMIA ASSOCIATED WITH TYPE 2 DIABETES MELLITUS: ICD-10-CM

## 2025-05-12 PROCEDURE — 99999 PR PBB SHADOW E&M-EST. PATIENT-LVL IV: CPT | Mod: PBBFAC,,, | Performed by: NURSE PRACTITIONER

## 2025-05-12 NOTE — PATIENT INSTRUCTIONS
Counseling and Referral of Other Preventative  (Italic type indicates deductible and co-insurance are waived)    Patient Name: Renea Bourgeois  Today's Date: 5/12/2025    Health Maintenance       Date Due Completion Date    Aspirin/Antiplatelet Therapy Never done ---    TETANUS VACCINE 10/24/2024 10/24/2014    COVID-19 Vaccine (5 - 2024-25 season) 05/12/2026 (Originally 9/1/2024) 8/23/2022    Diabetes Urine Screening 08/09/2025 8/9/2024    DEXA Scan 08/23/2025 8/23/2023    Hemoglobin A1c 10/24/2025 4/24/2025    Lipid Panel 04/24/2026 4/24/2025    Diabetic Eye Exam 04/28/2026 4/28/2025        No orders of the defined types were placed in this encounter.    The following information is provided to all patients.  This information is to help you find resources for any of the problems found today that may be affecting your health:                  Living healthy guide: www.[facility]      Understanding Diabetes: www.diabetes.org      Eating healthy: www.cdc.gov/healthyweight      CDC home safety checklist: www.cdc.gov/steadi/patient.html      Agency on Aging: www.goea.[place].gov      Alcoholics anonymous (AA): www.aa.org      Physical Activity: www.arie.nih.gov/bb1geky      Tobacco use: www.quitwithusla.org

## 2025-05-12 NOTE — PROGRESS NOTES
"  Renea Bourgeois presented for a  Medicare AWV and comprehensive Health Risk Assessment today. The following components were reviewed and updated:    Medical history  Family History  Social history  Allergies and Current Medications  Health Risk Assessment  Health Maintenance  Care Team         ** See Completed Assessments for Annual Wellness Visit within the encounter summary.**         The following assessments were completed:  Living Situation  CAGE  Depression Screening  Timed Get Up and Go  Whisper Test  Cognitive Function Screening  Nutrition Screening  ADL Screening  PAQ Screening      Opioid documentation:      Patient does not have a current opioid prescription.        Vitals:    05/12/25 0843   BP: (!) 146/70   Pulse: 60   SpO2: 97%   Weight: 57.1 kg (125 lb 14.1 oz)   Height: 5' 1" (1.549 m)     Body mass index is 23.79 kg/m².  Physical Exam  Vitals and nursing note reviewed.   Constitutional:       Appearance: She is well-developed.   HENT:      Head: Normocephalic.   Cardiovascular:      Rate and Rhythm: Normal rate and regular rhythm.      Pulses:           Dorsalis pedis pulses are 2+ on the right side and 2+ on the left side.      Heart sounds: Normal heart sounds.   Pulmonary:      Effort: Pulmonary effort is normal. No respiratory distress.      Breath sounds: Normal breath sounds.   Abdominal:      Palpations: Abdomen is soft. There is no mass.      Tenderness: There is no abdominal tenderness.   Musculoskeletal:         General: Normal range of motion.   Skin:     General: Skin is warm and dry.   Neurological:      Mental Status: She is alert and oriented to person, place, and time.      Motor: No abnormal muscle tone.   Psychiatric:         Speech: Speech normal.         Behavior: Behavior normal.               Diagnoses and health risks identified today and associated recommendations/orders:    1. Encounter for Medicare annual wellness exam  Encouraged healthy diet and exercise as tolerated "   Has urine leakage ever interrupted your daily activites or sleep? No  Do you think you could use some help to better manage urine leakage?No     Reports stress but is not problematic at this time. Patient knows to follow up with PCP if becomes problematic.   Declines covid vaccine  Discussed receiving tetanus vaccine at pharmacy.       Reports leg pain on exertion. Red flag s/s discussed (denies any) and advised 911/ER if occur. Patient expressed understanding.  Declines onel at this time. Advised to follow up with PCP for further evaluation and recommendations. Patient expressed understanding.      2. Hyperlipidemia associated with type 2 diabetes mellitus  A1c 5.3  Lipid-tri elevated  Continue current treatment plan as previously prescribed with your  pcp     3. Hypertension associated with diabetes  BP elevated at today's visit. asymptomatic. Discussed s/s of MI and stroke (patient denies any s/s) and advised to go to the ER/911 if occur. Advised patient to monitor BP (keep a log) and if continues to stay elevated (greater than 130/80) to follow up with PCP for further evaluation and treatment. Reports she is in [facility].       4. Thrombocytopenia  Stable. Continue current treatment plan as previously prescribed with your  pcp     5. Gastroesophageal reflux disease without esophagitis  Nexium  Continue current treatment plan as previously prescribed with your  pcp     6. Osteopenia of multiple sites  Dexa 8/23  Continue current treatment plan as previously prescribed with your  rheumatologist.     7. Allergic rhinitis, unspecified seasonality, unspecified trigger  Stable per pt  Continue current treatment plan as previously prescribed with your  pcp     8. History of breast cancer  Continue current treatment plan as previously prescribed with your  oncologist.       Provided Renea with a 5-10 year written screening schedule and personal prevention plan. Recommendations were developed using the USPSTF age  appropriate recommendations. Education, counseling, and referrals were provided as needed. After Visit Summary , available on [facility],  which includes a list of additional screenings\tests needed.    Follow up in about 1 year (around 5/12/2026) for awv.    Merced Trujillo, NP  I offered to discuss advanced care planning, including how to pick a person who would make decisions for you if you were unable to make them for yourself, called a health care power of , and what kind of decisions you might make such as use of life sustaining treatments such as ventilators and tube feeding when faced with a life limiting illness recorded on a living will that they will need to know. (How you want to be cared for as you near the end of your natural life)     X Patient is interested in learning more about how to make advanced directives.  I provided them paperwork and offered to discuss this with them.

## 2025-07-14 ENCOUNTER — OFFICE VISIT (OUTPATIENT)
Dept: URGENT CARE | Facility: CLINIC | Age: 75
End: 2025-07-14
Payer: MEDICARE

## 2025-07-14 VITALS
OXYGEN SATURATION: 96 % | SYSTOLIC BLOOD PRESSURE: 145 MMHG | DIASTOLIC BLOOD PRESSURE: 82 MMHG | BODY MASS INDEX: 24.47 KG/M2 | WEIGHT: 129.63 LBS | RESPIRATION RATE: 20 BRPM | HEART RATE: 70 BPM | HEIGHT: 61 IN | TEMPERATURE: 99 F

## 2025-07-14 DIAGNOSIS — R51.9 NONINTRACTABLE HEADACHE, UNSPECIFIED CHRONICITY PATTERN, UNSPECIFIED HEADACHE TYPE: ICD-10-CM

## 2025-07-14 DIAGNOSIS — M54.2 NECK PAIN ON LEFT SIDE: Primary | ICD-10-CM

## 2025-07-14 PROCEDURE — 99213 OFFICE O/P EST LOW 20 MIN: CPT | Mod: S$GLB,,, | Performed by: PHYSICIAN ASSISTANT

## 2025-07-14 RX ORDER — KETOROLAC TROMETHAMINE 30 MG/ML
30 INJECTION, SOLUTION INTRAMUSCULAR; INTRAVENOUS
Status: CANCELLED | OUTPATIENT
Start: 2025-07-14 | End: 2025-07-14

## 2025-07-14 RX ORDER — METHOCARBAMOL 500 MG/1
500 TABLET, FILM COATED ORAL 2 TIMES DAILY
Qty: 20 TABLET | Refills: 0 | Status: SHIPPED | OUTPATIENT
Start: 2025-07-14 | End: 2025-07-24

## 2025-07-14 NOTE — PROGRESS NOTES
"Subjective:      Patient ID: Renea Bourgeois is a 75 y.o. female.    Vitals:  height is 5' 1" (1.549 m) and weight is 58.8 kg (129 lb 10.1 oz). Her oral temperature is 98.5 °F (36.9 °C). Her blood pressure is 145/82 (abnormal) and her pulse is 70. Her respiration is 20 and oxygen saturation is 96%.     Chief Complaint: Headache    Pt states she has had intermittent headache for about 1 month.  Left sided Neck pain started 2-3 days ago.  No vision changes, N/V or dizziness.  Admits to increased stressors over the past month.  No fever.  Neck pain is burning when she moves her neck.   Unable to turn head side to side without pain.    Headache   Pain location: not consistant, moving to different parts of her head. The pain does not radiate. The quality of the pain is described as throbbing. The pain is at a severity of 5/10. Associated symptoms include insomnia and neck pain. Pertinent negatives include no abdominal pain, blurred vision, coughing, dizziness, ear pain, eye pain, fever, loss of balance, nausea, numbness, phonophobia, photophobia, scalp tenderness, seizures, sinus pressure, sore throat, tinnitus, visual change or vomiting. She has tried NSAIDs and acetaminophen for the symptoms. The treatment provided mild relief. Her past medical history is significant for cancer, hypertension and TMJ. There is no history of cluster headaches, immunosuppression, migraine headaches, obesity, pseudotumor cerebri or recent head traumas.       Constitution: Negative for fever.   HENT:  Negative for ear pain, tinnitus, sinus pressure and sore throat.    Neck: Positive for neck pain.   Eyes:  Negative for eye pain, photophobia and blurred vision.   Respiratory:  Negative for cough.    Gastrointestinal:  Negative for abdominal pain, nausea and vomiting.   Neurological:  Positive for headaches. Negative for dizziness, loss of balance, history of migraines, numbness and seizures.   Psychiatric/Behavioral:  The patient has " insomnia.       Objective:     Physical Exam   Constitutional: She is oriented to person, place, and time. She appears well-developed.  Non-toxic appearance. She does not appear ill. No distress.   HENT:   Head: Normocephalic and atraumatic.   Ears:   Right Ear: Hearing, tympanic membrane, external ear and ear canal normal.   Left Ear: Hearing, tympanic membrane, external ear and ear canal normal.   Nose: Nose normal. No mucosal edema, rhinorrhea or nasal deformity. No epistaxis. Right sinus exhibits no maxillary sinus tenderness and no frontal sinus tenderness. Left sinus exhibits no maxillary sinus tenderness and no frontal sinus tenderness.   Mouth/Throat: Uvula is midline, oropharynx is clear and moist and mucous membranes are normal. No trismus in the jaw. Normal dentition. No uvula swelling. No posterior oropharyngeal erythema.   Eyes: Conjunctivae, EOM and lids are normal. Pupils are equal, round, and reactive to light. No scleral icterus.   Neck: Trachea normal and phonation normal. Neck supple. No crepitus. No torticollis present.     No neck rigidity present. pain with movement present. No spinous process tenderness present. muscular tenderness present.   Cardiovascular: Normal rate, regular rhythm, normal heart sounds and normal pulses.   Pulmonary/Chest: Effort normal and breath sounds normal. No respiratory distress.   Abdominal: Normal appearance and bowel sounds are normal. She exhibits no distension. Soft. There is no abdominal tenderness.   Musculoskeletal: Normal range of motion.         General: No deformity. Normal range of motion.   Neurological: She is alert and oriented to person, place, and time. No cranial nerve deficit. She exhibits normal muscle tone. Coordination normal.   Skin: Skin is warm, dry, intact, not diaphoretic and not pale.   Psychiatric: Her speech is normal and behavior is normal. Judgment and thought content normal.   Nursing note and vitals reviewed.      Assessment:     1.  Neck pain on left side    2. Nonintractable headache, unspecified chronicity pattern, unspecified headache type        Plan:       Neck pain on left side  -     methocarbamoL (ROBAXIN) 500 MG Tab; Take 1 tablet (500 mg total) by mouth 2 (two) times daily. for 10 days  Dispense: 20 tablet; Refill: 0    Nonintractable headache, unspecified chronicity pattern, unspecified headache type      Cannot do Toradol due to thrombocytopenia dx.  Will try a course of muscle relaxers for pain.  Advised to continue with Tylenol for HA.  Decrease stressors and rest.  Follow up with Dr. Newton if VALADEZ continues.  RTC or go to the ER with new or worsening symptoms.

## 2025-07-16 ENCOUNTER — PATIENT MESSAGE (OUTPATIENT)
Dept: ADMINISTRATIVE | Facility: OTHER | Age: 75
End: 2025-07-16
Payer: MEDICARE

## 2025-07-19 ENCOUNTER — HOSPITAL ENCOUNTER (EMERGENCY)
Facility: HOSPITAL | Age: 75
Discharge: HOME | End: 2025-07-19
Attending: EMERGENCY MEDICINE
Payer: MEDICARE

## 2025-07-19 VITALS
DIASTOLIC BLOOD PRESSURE: 82 MMHG | HEART RATE: 66 BPM | SYSTOLIC BLOOD PRESSURE: 160 MMHG | WEIGHT: 129.63 LBS | RESPIRATION RATE: 20 BRPM | TEMPERATURE: 98 F | OXYGEN SATURATION: 98 % | HEIGHT: 61 IN | BODY MASS INDEX: 24.47 KG/M2

## 2025-07-19 DIAGNOSIS — R53.1 WEAKNESS: ICD-10-CM

## 2025-07-19 DIAGNOSIS — R42 VERTIGO: Primary | ICD-10-CM

## 2025-07-19 LAB
ABSOLUTE EOSINOPHIL (OHS): 0.06 K/UL
ABSOLUTE MONOCYTE (OHS): 0.42 K/UL (ref 0.3–1)
ABSOLUTE NEUTROPHIL COUNT (OHS): 6.73 K/UL (ref 1.8–7.7)
ALBUMIN SERPL BCP-MCNC: 4.5 G/DL (ref 3.5–5.2)
ALP SERPL-CCNC: 57 UNIT/L (ref 40–150)
ALT SERPL W/O P-5'-P-CCNC: 14 UNIT/L (ref 10–44)
ANION GAP (OHS): 11 MMOL/L (ref 8–16)
AST SERPL-CCNC: 17 UNIT/L (ref 11–45)
BASOPHILS # BLD AUTO: 0.04 K/UL
BASOPHILS NFR BLD AUTO: 0.5 %
BILIRUB SERPL-MCNC: 0.3 MG/DL (ref 0.1–1)
BILIRUB UR QL STRIP.AUTO: NEGATIVE
BNP SERPL-MCNC: 100 PG/ML (ref 0–99)
BUN SERPL-MCNC: 10 MG/DL (ref 8–23)
CALCIUM SERPL-MCNC: 9.4 MG/DL (ref 8.7–10.5)
CHLORIDE SERPL-SCNC: 105 MMOL/L (ref 95–110)
CLARITY UR: CLEAR
CO2 SERPL-SCNC: 24 MMOL/L (ref 23–29)
COLOR UR AUTO: YELLOW
CREAT SERPL-MCNC: 0.8 MG/DL (ref 0.5–1.4)
ERYTHROCYTE [DISTWIDTH] IN BLOOD BY AUTOMATED COUNT: 11.9 % (ref 11.5–14.5)
GFR SERPLBLD CREATININE-BSD FMLA CKD-EPI: >60 ML/MIN/1.73/M2
GLUCOSE SERPL-MCNC: 135 MG/DL (ref 70–110)
GLUCOSE UR QL STRIP: NEGATIVE
HCT VFR BLD AUTO: 39.8 % (ref 37–48.5)
HGB BLD-MCNC: 13.7 GM/DL (ref 12–16)
HGB UR QL STRIP: ABNORMAL
HYALINE CASTS UR QL AUTO: 3 /LPF (ref 0–1)
IMM GRANULOCYTES # BLD AUTO: 0.04 K/UL (ref 0–0.04)
IMM GRANULOCYTES NFR BLD AUTO: 0.5 % (ref 0–0.5)
KETONES UR QL STRIP: NEGATIVE
LEUKOCYTE ESTERASE UR QL STRIP: ABNORMAL
LYMPHOCYTES # BLD AUTO: 1.07 K/UL (ref 1–4.8)
MCH RBC QN AUTO: 31 PG (ref 27–31)
MCHC RBC AUTO-ENTMCNC: 34.4 G/DL (ref 32–36)
MCV RBC AUTO: 90 FL (ref 82–98)
MICROSCOPIC COMMENT: ABNORMAL
NITRITE UR QL STRIP: NEGATIVE
NUCLEATED RBC (/100WBC) (OHS): 0 /100 WBC
PH UR STRIP: 7 [PH]
PLATELET # BLD AUTO: 146 K/UL (ref 150–450)
PMV BLD AUTO: 10.5 FL (ref 9.2–12.9)
POCT GLUCOSE: 131 MG/DL (ref 70–110)
POTASSIUM SERPL-SCNC: 4 MMOL/L (ref 3.5–5.1)
PROT SERPL-MCNC: 7.6 GM/DL (ref 6–8.4)
PROT UR QL STRIP: NEGATIVE
RBC # BLD AUTO: 4.42 M/UL (ref 4–5.4)
RBC #/AREA URNS AUTO: 6 /HPF (ref 0–4)
RELATIVE EOSINOPHIL (OHS): 0.7 %
RELATIVE LYMPHOCYTE (OHS): 12.8 % (ref 18–48)
RELATIVE MONOCYTE (OHS): 5 % (ref 4–15)
RELATIVE NEUTROPHIL (OHS): 80.5 % (ref 38–73)
SODIUM SERPL-SCNC: 140 MMOL/L (ref 136–145)
SP GR UR STRIP: 1.01
SQUAMOUS #/AREA URNS AUTO: 2 /HPF
TROPONIN I SERPL DL<=0.01 NG/ML-MCNC: <0.006 NG/ML
UROBILINOGEN UR STRIP-ACNC: NEGATIVE EU/DL
WBC # BLD AUTO: 8.36 K/UL (ref 3.9–12.7)
WBC #/AREA URNS AUTO: 2 /HPF (ref 0–5)

## 2025-07-19 PROCEDURE — 25000003 PHARM REV CODE 250: Mod: HCNC | Performed by: EMERGENCY MEDICINE

## 2025-07-19 PROCEDURE — 85025 COMPLETE CBC W/AUTO DIFF WBC: CPT | Mod: HCNC | Performed by: FAMILY MEDICINE

## 2025-07-19 PROCEDURE — 93010 ELECTROCARDIOGRAM REPORT: CPT | Mod: HCNC,,, | Performed by: INTERNAL MEDICINE

## 2025-07-19 PROCEDURE — 81001 URINALYSIS AUTO W/SCOPE: CPT | Mod: HCNC | Performed by: FAMILY MEDICINE

## 2025-07-19 PROCEDURE — 63600175 PHARM REV CODE 636 W HCPCS: Mod: HCNC | Performed by: EMERGENCY MEDICINE

## 2025-07-19 PROCEDURE — 83880 ASSAY OF NATRIURETIC PEPTIDE: CPT | Mod: HCNC | Performed by: FAMILY MEDICINE

## 2025-07-19 PROCEDURE — 96374 THER/PROPH/DIAG INJ IV PUSH: CPT | Mod: HCNC

## 2025-07-19 PROCEDURE — 84484 ASSAY OF TROPONIN QUANT: CPT | Mod: HCNC | Performed by: FAMILY MEDICINE

## 2025-07-19 PROCEDURE — 99285 EMERGENCY DEPT VISIT HI MDM: CPT | Mod: 25,HCNC

## 2025-07-19 PROCEDURE — 80053 COMPREHEN METABOLIC PANEL: CPT | Mod: HCNC | Performed by: FAMILY MEDICINE

## 2025-07-19 PROCEDURE — 82962 GLUCOSE BLOOD TEST: CPT | Mod: HCNC

## 2025-07-19 PROCEDURE — 93005 ELECTROCARDIOGRAM TRACING: CPT | Mod: HCNC

## 2025-07-19 RX ORDER — MECLIZINE HYDROCHLORIDE 25 MG/1
50 TABLET ORAL
Status: COMPLETED | OUTPATIENT
Start: 2025-07-19 | End: 2025-07-19

## 2025-07-19 RX ORDER — ONDANSETRON 4 MG/1
4 TABLET, ORALLY DISINTEGRATING ORAL EVERY 6 HOURS PRN
Qty: 30 TABLET | Refills: 0 | Status: SHIPPED | OUTPATIENT
Start: 2025-07-19

## 2025-07-19 RX ORDER — MECLIZINE HYDROCHLORIDE 25 MG/1
25 TABLET ORAL 3 TIMES DAILY PRN
Qty: 20 TABLET | Refills: 0 | Status: SHIPPED | OUTPATIENT
Start: 2025-07-19

## 2025-07-19 RX ORDER — ONDANSETRON HYDROCHLORIDE 2 MG/ML
4 INJECTION, SOLUTION INTRAVENOUS
Status: COMPLETED | OUTPATIENT
Start: 2025-07-19 | End: 2025-07-19

## 2025-07-19 RX ADMIN — MECLIZINE HYDROCHLORIDE 50 MG: 25 TABLET ORAL at 08:07

## 2025-07-19 RX ADMIN — ONDANSETRON 4 MG: 2 INJECTION INTRAMUSCULAR; INTRAVENOUS at 08:07

## 2025-07-20 LAB
HOLD SPECIMEN: NORMAL
OHS QRS DURATION: 88 MS
OHS QTC CALCULATION: 455 MS

## 2025-07-20 NOTE — ED PROVIDER NOTES
"SCRIBE #1 NOTE: I, Balbina Mayra, am scribing for, and in the presence of, Toni Reid Jr., MD. I have scribed the entire note.       History     Chief Complaint   Patient presents with    Weakness     Pt report some N/V today along with weakness and hot flashes. Pt has hx of vertigo and was seen at  for HA and neck pain earlier this week and they gave her muscle relaxers.      Review of patient's allergies indicates:  No Known Allergies      History of Present Illness     HPI    7/19/2025, 8:42 PM  History obtained from the medical records, daughter, and patient      History of Present Illness: Renea Bourgeois is a 75 y.o. female patient with a PMHx of HTN, HLD, anemia, breast cancer, DM type II, and meniere disease who presents to the Emergency Department for evaluation of dizziness which onset at 3-4 pm today. Pt was sitting down watching TV when sudden onset of right ear pressure and hot flashes. Symptoms are constant and moderate in severity. Movement worsens sxs. Associated sxs include N/V, weakness, and dizziness. Pt describes dizziness as a "roller coaster" feel. Patient denies any dysuria, abdominal pain, back pain, myalgias, and all other sxs at this time. Pt reports never experiencing these sxs previously. No prior Tx. Pt denies blood thinners. Pt denies any allergies. Pt is compliant with home medication. Daughter is concerned for patient's blood sugar because pt has eaten very minimal today and last checked in the morning. No further complaints or concerns at this time.       Arrival mode: Personal vehicle     PCP: Kamini Newton MD        Past Medical History:  Past Medical History:   Diagnosis Date    Allergy     Anemia 11/14/2017    Angina pectoris     followed by cardiology    Arthritis     Breast cancer     Right T1 Ductal Ca in situ,high oncotype Dx 33, Estrogen positive. Lumpectomy, TAC chemo x 6 cyscles then RT,on aromatase inhibitor    Cataract     Chondromalacia of right patella " 03/26/2018    Stable and controlled. Continue current treatment plan as previously prescribed with your PCP.      Diabetes mellitus type II 2011     am 12/06/2023    DM (diabetes mellitus) 2011     am 08/04/2017    DM (diabetes mellitus) 2010     am 06/22/2020    Elevated BP without diagnosis of hypertension 11/27/2018    GERD (gastroesophageal reflux disease)     History of colon polyps 02/21/2018    The patient had adenomatous colon polyps in 2014.      History of renal calculi 08/11/2015    Right obstructing 8/20/13 - passed.     Hyperlipidemia     Hypertension     Kidney stone     right side, passed    Malignant neoplasm of upper-outer quadrant of right breast in female, estrogen receptor positive 07/23/2018    Followed by Dr. Jones    Malignant neoplasm of upper-outer quadrant of right breast in female, estrogen receptor positive     Followed by Hematology/Oncology, continue current treatment plan       Meniere disease     reported per pt    Nuclear sclerosis of both eyes 10/05/2015    Osteopenia     Osteoporosis 04/12/2019    Pseudophakia 10/15/2015    PVC (premature ventricular contraction)     on and off    Refractive error 10/05/2015    Trouble in sleeping     Type 2 diabetes mellitus 2010     am 12/06/2022       Past Surgical History:  Past Surgical History:   Procedure Laterality Date    ARTHROPLASTY OF JOINT OF FINGER Right 01/05/2023    Procedure: ARTHROPLASTY, FINGER;  Surgeon: Iban Kuhn MD;  Location: [facility];  Service: Orthopedics;  Laterality: Right;  right thumb basal joint arthroplasty    BREAST BIOPSY      BREAST LUMPECTOMY  02/11/2011    right    CATARACT EXTRACTION Bilateral 10/14/2015    CHOLECYSTECTOMY  1989    open    COLONOSCOPY N/A 02/22/2018    Procedure: COLONOSCOPY;  Surgeon: Carlito Odonnell MD;  Location: [facility];  Service: Endoscopy;  Laterality: N/A;    COLONOSCOPY N/A 02/24/2023    Procedure: COLONOSCOPY;  Surgeon: Yvonne Saldana MD;   Location: [facility] ENDO;  Service: Endoscopy;  Laterality: N/A;    CYST REMOVAL Left 11/2017    foot    DILATION AND CURETTAGE OF UTERUS  10/2010    [place]    ESOPHAGOGASTRODUODENOSCOPY N/A 06/29/2020    Procedure: EGD (ESOPHAGOGASTRODUODENOSCOPY);  Surgeon: Nancy Hahn MD;  Location: [facility] ENDO;  Service: Endoscopy;  Laterality: N/A;    EYE SURGERY  2012    cataract    GALLBLADDER SURGERY      removed in 1989    HAND SURGERY Left 2015    Nasal septal deviation repair  2009    toenail edges removed      TONSILLECTOMY  1959    TOTAL REDUCTION MAMMOPLASTY Left     2015    TUBAL LIGATION  1981    UVULOPALATOPLASTY           Family History:  Family History   Problem Relation Name Age of Onset    Alzheimer's disease Mother      Diabetes Father Silvia     Hypertension Father Silvia     Stroke Father Silvia     Cancer Father Silvia 65        metastatic when diagnosed    Cataracts Sister      Cancer Brother Marquise         skin prostate    Stroke Brother Marquise         Stoke    Cataracts Brother      Parkinsonism Brother      Cancer Brother Zaid         met    Atrial fibrillation Son Meng         35    Heart disease Son Meng     Glaucoma Maternal Grandmother      Cancer Paternal Grandfather Silvia         prostate    Psoriasis Cousin      Cancer Other nephew         kidney    Alcohol abuse Neg Hx      Drug abuse Neg Hx      COPD Neg Hx      Birth defects Neg Hx      Intellectual disability Neg Hx      Mental illness Neg Hx      Kidney disease Neg Hx      Hyperlipidemia Neg Hx      Melanoma Neg Hx      Lupus Neg Hx         Social History:  Social History     Tobacco Use    Smoking status: Never    Smokeless tobacco: Never   Substance and Sexual Activity    Alcohol use: No    Drug use: No    Sexual activity: Not Currently     Partners: Male     Birth control/protection: Post-menopausal        Review of Systems     Review of Systems   Constitutional:  Negative for fever.        (+) Hot flashes   HENT:  Positive for ear  pain (Right eat pressure). Negative for sore throat.    Respiratory:  Negative for shortness of breath.    Cardiovascular:  Negative for chest pain.   Gastrointestinal:  Positive for nausea and vomiting. Negative for abdominal pain.   Genitourinary:  Negative for dysuria.   Musculoskeletal:  Negative for back pain and myalgias.   Skin:  Negative for rash.   Neurological:  Positive for dizziness and weakness.   Hematological:  Does not bruise/bleed easily.   All other systems reviewed and are negative.     Physical Exam     Initial Vitals [07/19/25 1805]   BP Pulse Resp Temp SpO2   (!) 134/90 (!) 55 18 97.6 °F (36.4 °C) 97 %      MAP       --          Physical Exam  Nursing Notes and Vital Signs Reviewed.  Constitutional: Patient is in no acute distress. Well-developed and well-nourished.  Head: Atraumatic. Normocephalic.  Eyes: PERRL. EOM intact. Conjunctivae are not pale. No scleral icterus.  ENT: Mucous membranes are moist. Oropharynx is clear and symmetric.    Neck: Supple. Full ROM. No lymphadenopathy.  Cardiovascular: Bradycardic. Regular rhythm. No murmurs, rubs, or gallops. Distal pulses are 2+ and symmetric.  Pulmonary/Chest: No respiratory distress. Clear to auscultation bilaterally. No wheezing or rales.  Abdominal: Soft and non-distended.  There is no tenderness.  No rebound, guarding, or rigidity. Good bowel sounds.  Genitourinary: No CVA tenderness  Musculoskeletal: Moves all extremities. No obvious deformities. No edema. No calf tenderness.  Skin: Warm and dry.  Neurological:  Alert, awake, and appropriate.  Normal speech.  No acute focal neurological deficits are appreciated. GS15. Cranial nerves II-XII intact.  Psychiatric: Normal affect. Good eye contact. Appropriate in content.     ED Course   Procedures  ED Vital Signs:  Vitals:    07/19/25 1805 07/19/25 1910 07/19/25 2000 07/19/25 2120   BP: (!) 134/90 (!) 207/88 (!) 177/77 (!) 168/76   Pulse: (!) 55 60 60 (!) 59   Resp: 18  20    Temp: 97.6 °F  "(36.4 °C) 97.6 °F (36.4 °C)     TempSrc: Oral Oral     SpO2: 97% 97% 98% 100%   Weight:  58.8 kg (129 lb 10.1 oz)     Height:  5' 1" (1.549 m)      07/19/25 2122 07/19/25 2124 07/19/25 2200 07/19/25 2339   BP: (!) 166/75 (!) 145/67 (!) 169/79 (!) 160/82   Pulse: 62 67 66 66   Resp:   (!) 60 20   Temp:    97.6 °F (36.4 °C)   TempSrc:    Oral   SpO2: 99% 99% 98% 98%   Weight:       Height:           Abnormal Lab Results:  Labs Reviewed   COMPREHENSIVE METABOLIC PANEL - Abnormal       Result Value    Sodium 140      Potassium 4.0      Chloride 105      CO2 24      Glucose 135 (*)     BUN 10      Creatinine 0.8      Calcium 9.4      Protein Total 7.6      Albumin 4.5      Bilirubin Total 0.3      ALP 57      AST 17      ALT 14      Anion Gap 11      eGFR >60     URINALYSIS - Abnormal    Color, UA Yellow      Appearance, UA Clear      pH, UA 7.0      Spec Grav UA 1.010      Protein, UA Negative      Glucose, UA Negative      Ketones, UA Negative      Bilirubin, UA Negative      Blood, UA 1+ (*)     Nitrites, UA Negative      Urobilinogen, UA Negative      Leukocyte Esterase, UA 1+ (*)    B-TYPE NATRIURETIC PEPTIDE - Abnormal     (*)    CBC WITH DIFFERENTIAL - Abnormal    WBC 8.36      RBC 4.42      HGB 13.7      HCT 39.8      MCV 90      MCH 31.0      MCHC 34.4      RDW 11.9      Platelet Count 146 (*)     MPV 10.5      Nucleated RBC 0      Neut % 80.5 (*)     Lymph % 12.8 (*)     Mono % 5.0      Eos % 0.7      Basophil % 0.5      Imm Grans % 0.5      Neut # 6.73      Lymph # 1.07      Mono # 0.42      Eos # 0.06      Baso # 0.04      Imm Grans # 0.04     URINALYSIS MICROSCOPIC - Abnormal    RBC, UA 6 (*)     WBC, UA 2      Squamous Epithelial Cells, UA 2      Hyaline Casts, UA 3 (*)     Microscopic Comment       POCT GLUCOSE - Abnormal    POCT Glucose 131 (*)    TROPONIN I - Normal    Troponin-I <0.006     CBC W/ AUTO DIFFERENTIAL    Narrative:     The following orders were created for panel order CBC auto " differential.  Procedure                               Abnormality         Status                     ---------                               -----------         ------                     CBC with Differential[[id number]]       Abnormal            Final result                 Please view results for these tests on the individual orders.   GREY TOP URINE HOLD    Extra Tube Hold for add-ons.          All Lab Results:  Results for orders placed or performed during the hospital encounter of 07/19/25   Comprehensive metabolic panel    Collection Time: 07/19/25  7:26 PM   Result Value Ref Range    Sodium 140 136 - 145 mmol/L    Potassium 4.0 3.5 - 5.1 mmol/L    Chloride 105 95 - 110 mmol/L    CO2 24 23 - 29 mmol/L    Glucose 135 (H) 70 - 110 mg/dL    BUN 10 8 - 23 mg/dL    Creatinine 0.8 0.5 - 1.4 mg/dL    Calcium 9.4 8.7 - 10.5 mg/dL    Protein Total 7.6 6.0 - 8.4 gm/dL    Albumin 4.5 3.5 - 5.2 g/dL    Bilirubin Total 0.3 0.1 - 1.0 mg/dL    ALP 57 40 - 150 unit/L    AST 17 11 - 45 unit/L    ALT 14 10 - 44 unit/L    Anion Gap 11 8 - 16 mmol/L    eGFR >60 >60 mL/min/1.73/m2   Troponin I    Collection Time: 07/19/25  7:26 PM   Result Value Ref Range    Troponin-I <0.006 <=0.026 ng/mL   B-Type natriuretic peptide (BNP)    Collection Time: 07/19/25  7:26 PM   Result Value Ref Range     (H) 0 - 99 pg/mL   CBC with Differential    Collection Time: 07/19/25  7:26 PM   Result Value Ref Range    WBC 8.36 3.90 - 12.70 K/uL    RBC 4.42 4.00 - 5.40 M/uL    HGB 13.7 12.0 - 16.0 gm/dL    HCT 39.8 37.0 - 48.5 %    MCV 90 82 - 98 fL    MCH 31.0 27.0 - 31.0 pg    MCHC 34.4 32.0 - 36.0 g/dL    RDW 11.9 11.5 - 14.5 %    Platelet Count 146 (L) 150 - 450 K/uL    MPV 10.5 9.2 - 12.9 fL    Nucleated RBC 0 <=0 /100 WBC    Neut % 80.5 (H) 38 - 73 %    Lymph % 12.8 (L) 18 - 48 %    Mono % 5.0 4 - 15 %    Eos % 0.7 <=8 %    Basophil % 0.5 <=1.9 %    Imm Grans % 0.5 0.0 - 0.5 %    Neut # 6.73 1.8 - 7.7 K/uL    Lymph # 1.07 1 - 4.8 K/uL     Mono # 0.42 0.3 - 1 K/uL    Eos # 0.06 <=0.5 K/uL    Baso # 0.04 <=0.2 K/uL    Imm Grans # 0.04 0.00 - 0.04 K/uL   EKG 12-lead    Collection Time: 07/19/25  7:52 PM   Result Value Ref Range    QRS Duration 88 ms    [facility] QTC Calculation 455 ms   POCT glucose    Collection Time: 07/19/25  9:08 PM   Result Value Ref Range    POCT Glucose 131 (H) 70 - 110 mg/dL   Urinalysis - Clean Catch    Collection Time: 07/19/25  9:33 PM   Result Value Ref Range    Color, UA Yellow Straw, Bozena, Yellow, Light-Orange    Appearance, UA Clear Clear    pH, UA 7.0 5.0 - 8.0    Spec Grav UA 1.010 1.005 - 1.030    Protein, UA Negative Negative    Glucose, UA Negative Negative    Ketones, UA Negative Negative    Bilirubin, UA Negative Negative    Blood, UA 1+ (A) Negative    Nitrites, UA Negative Negative    Urobilinogen, UA Negative <2.0 EU/dL    Leukocyte Esterase, UA 1+ (A) Negative   GREY TOP URINE HOLD    Collection Time: 07/19/25  9:33 PM   Result Value Ref Range    Extra Tube Hold for add-ons.    Urinalysis Microscopic    Collection Time: 07/19/25  9:33 PM   Result Value Ref Range    RBC, UA 6 (H) 0 - 4 /HPF    WBC, UA 2 0 - 5 /HPF    Squamous Epithelial Cells, UA 2 <=5 /HPF    Hyaline Casts, UA 3 (H) 0 - 1 /LPF    Microscopic Comment       *Note: Due to a large number of results and/or encounters for the requested time period, some results have not been displayed. A complete set of results can be found in Results Review.       Imaging Results:  Imaging Results              CT Head Without Contrast (Final result)  Result time 07/19/25 20:05:35      Final result by Juan A Mcclendon DO (07/19/25 20:05:35)                   Impression:     No acute intracranial abnormality.    All CT scans at [this location] are performed using dose modulation techniques as appropriate to a performed exam including the following: automated exposure control; adjustment of the mA and/or kV according to patient size (this includes techniques or  standardized protocols for targeted exams where dose is matched to indication / reason for exam; i.e. extremities or head); use of iterative reconstruction technique.    Finalized on: 7/19/2025 8:05 PM By:  Juan A Mcclendon  [facility]# [id number]      2025-07-19 20:07:36.337     [facility]               Narrative:    Exam: CT HEAD WITHOUT CONTRAST    Comparison: None    Clinical Indication:  Dizziness    Technique: Axial noncontrast images are acquired from the foramen magnum to the vertex.    Findings: No sagittal or coronal images were provided at time of this dictation.    No intra or extra-axial hemorrhage. No shift of the midline structures.    Gray-white differentiation is preserved throughout both cerebral hemispheres.    Bones of the calvarium and skull base are intact.     Visualized paranasal sinuses and mastoid air cells are clear.                                         X-Ray Chest AP Portable (Final result)  Result time 07/19/25 20:03:32      Final result by Juan A Mcclendon DO (07/19/25 20:03:32)                   Impression:    No acute cardiopulmonary disease.    Finalized on: 7/19/2025 8:03 PM By:  Juan A Mcclendon  [facility]# [id number]      2025-07-19 20:05:36.236     [facility]               Narrative:    Exam: XR CHEST AP PORTABLE    Comparison: 12/02/2022    Clinical Indication:  Chest pain    Findings: Heart size and pulmonary vasculature are unremarkable.  No focal consolidation, pleural effusions or pneumothorax.    No acute angulated or displaced fractures.                                         The EKG was ordered, reviewed, and independently interpreted by the ED provider.  Interpretation time: 19:52  Rate: 57 BPM  Rhythm: sinus bradycardia  Interpretation: No acute ST changes. No STEMI.           The Emergency Provider reviewed the vital signs and test results, which are outlined above.     ED Discussion     11:26 PM: Reassessed pt at this time. Discussed with patient and/or family/caretaker all pertinent ED  information and results. Discussed pt dx and plan of tx. Gave the patient and family all f/u and return to the ED instructions. All questions and concerns were addressed at this time. Patient and/or family/caretaker expresses understanding of information and instructions, and is comfortable with plan to discharge. Pt is stable for discharge.     I discussed with patient and/or family/caretaker that evaluation in the ED does not suggest any emergent or life threatening medical conditions requiring immediate intervention beyond what was provided in the ED, and I believe patient is safe for discharge. Regardless, an unremarkable evaluation in the ED does not preclude the development or presence of a serious or life threatening condition. As such, I instructed that the patient is to return immediately for any worsening or change in current symptoms.    Regarding VERTIGO & DIZZINESS, I recommended strategies to prevent or manage symptoms such as: avoiding sudden head movements; refraining from bending over at the waist; keeping head raised when lying down (i,e., place pillows under upper back and head or rest in a recliner); avoid staying in bed for long periods of time or if bedrest is required, change position often when in bed; and always wear a helmet when riding bikes or playing sports. Instructed patient to contact primary care provider if symptoms worsen, falls become more frequent, or any questions arise regarding condition and/or treatment.  Patient instructed to return to the emergency department if: a headache develops and becomes persistent and severe: notice any increase in weakness or visual changes; experience any shortness of breath or chest pain; and/or develop hearing problems or tinnitus.     Driving or other activities under influence of medications - Patient and/or family/caretaker was given a prescription for, or instructed to use a medicine that may impair ability to drive, operate machinery, or  participate in other potentially dangerous activities.  Patient was instructed not to participate in these activities while under the influence of these medications.      ED Course as of 07/21/25 2118   Sat Jul 19, 2025 2322 Rhythm strip reviewed. Sinus bradycardia, no stemi. 57 bpm. [LV]      ED Course User Index  [LV] Toni Reid Jr., MD     Medical Decision Making  Amount and/or Complexity of Data Reviewed  Labs: ordered. Decision-making details documented in ED Course.  Radiology: ordered and independent interpretation performed. Decision-making details documented in ED Course.  ECG/medicine tests: ordered and independent interpretation performed. Decision-making details documented in ED Course.    Risk  OTC drugs.  Prescription drug management.  Parenteral controlled substances.  Risk Details: OTC drugs, prescription drugs and controlled substances considered.  Due to patient's symptoms improving and pain controlled pain medications ordered appropriately.  Differential diagnosis: Dehydration, electrolyte abnormality, arrhythmia, infection, STEMI, NSTEMI, UTI among others.                  ED Medication(s):  Medications   meclizine tablet 50 mg (50 mg Oral Given 7/19/25 2057)   ondansetron injection 4 mg (4 mg Intravenous Given 7/19/25 2057)       Discharge Medication List as of 7/19/2025 11:27 PM        START taking these medications    Details   meclizine (ANTIVERT) 25 mg tablet Take 1 tablet (25 mg total) by mouth 3 (three) times daily as needed., Starting Sat 7/19/2025, Print      ondansetron (ZOFRAN-ODT) 4 MG TbDL Take 1 tablet (4 mg total) by mouth every 6 (six) hours as needed., Starting Sat 7/19/2025, Print              Follow-up Information       Kamini Newton MD. Schedule an appointment as soon as possible for a visit in 1 week.    Specialty: Family Medicine  Contact information:  [address] [place] [address] [address] [place]TA [address] [phone]               [facility].  Schedule an appointment as soon as possible for a visit in 1 week.    Specialty: Otolaryngology  Contact information:  [address] [facility] [address] [phone]  Additional information:  Please park on the [address] side and use the Clinic entrance. Check in on the 3rd floor or use any kiosk for self check-in.             [facility] Emergency Dept..    Specialty: Emergency Medicine  Why: If symptoms worsen, As needed  Contact information:  [address] [address]  [facility] [address] [phone]                               Scribe Attestation:   Scribe #1: I performed the above scribed service and the documentation accurately describes the services I performed. I attest to the accuracy of the note.     Attending:   Physician Attestation Statement for Scribe #1: I, Toni Reid Jr., MD, personally performed the services described in this documentation, as scribed by Balbina Nunez, in my presence, and it is both accurate and complete.           Clinical Impression       ICD-10-CM ICD-9-CM   1. Vertigo  R42 780.4   2. Weakness  R53.1 780.79       Disposition:   Disposition: Discharged  Condition: Stable       Toni Reid Jr., MD  07/21/25 3314

## 2025-07-21 ENCOUNTER — OFFICE VISIT (OUTPATIENT)
Dept: INTERNAL MEDICINE | Facility: CLINIC | Age: 75
End: 2025-07-21
Payer: MEDICARE

## 2025-07-21 VITALS
OXYGEN SATURATION: 98 % | TEMPERATURE: 98 F | HEART RATE: 74 BPM | WEIGHT: 125 LBS | SYSTOLIC BLOOD PRESSURE: 124 MMHG | HEIGHT: 61 IN | DIASTOLIC BLOOD PRESSURE: 62 MMHG | BODY MASS INDEX: 23.6 KG/M2

## 2025-07-21 DIAGNOSIS — R31.9 HEMATURIA, UNSPECIFIED TYPE: Primary | ICD-10-CM

## 2025-07-21 DIAGNOSIS — F51.04 CHRONIC INSOMNIA: ICD-10-CM

## 2025-07-21 DIAGNOSIS — R42 VERTIGO: ICD-10-CM

## 2025-07-21 LAB
BACTERIA #/AREA URNS HPF: ABNORMAL /HPF
BILIRUB UR QL STRIP.AUTO: ABNORMAL
CLARITY UR: ABNORMAL
COLOR UR AUTO: YELLOW
GLUCOSE UR QL STRIP: NEGATIVE
HGB UR QL STRIP: ABNORMAL
KETONES UR QL STRIP: NEGATIVE
LEUKOCYTE ESTERASE UR QL STRIP: ABNORMAL
MICROSCOPIC COMMENT: ABNORMAL
NITRITE UR QL STRIP: NEGATIVE
PH UR STRIP: 6 [PH]
PROT UR QL STRIP: ABNORMAL
RBC #/AREA URNS HPF: >100 /HPF (ref 0–4)
SP GR UR STRIP: 1.02
SQUAMOUS #/AREA URNS HPF: 6 /HPF
WBC #/AREA URNS HPF: 12 /HPF (ref 0–5)

## 2025-07-21 PROCEDURE — 99999 PR PBB SHADOW E&M-EST. PATIENT-LVL V: CPT | Mod: PBBFAC,HCNC,, | Performed by: FAMILY MEDICINE

## 2025-07-21 PROCEDURE — 1101F PT FALLS ASSESS-DOCD LE1/YR: CPT | Mod: CPTII,HCNC,S$GLB, | Performed by: FAMILY MEDICINE

## 2025-07-21 PROCEDURE — 3074F SYST BP LT 130 MM HG: CPT | Mod: CPTII,HCNC,S$GLB, | Performed by: FAMILY MEDICINE

## 2025-07-21 PROCEDURE — 99214 OFFICE O/P EST MOD 30 MIN: CPT | Mod: HCNC,S$GLB,, | Performed by: FAMILY MEDICINE

## 2025-07-21 PROCEDURE — 87086 URINE CULTURE/COLONY COUNT: CPT | Mod: HCNC | Performed by: FAMILY MEDICINE

## 2025-07-21 PROCEDURE — 81003 URINALYSIS AUTO W/O SCOPE: CPT | Mod: HCNC | Performed by: FAMILY MEDICINE

## 2025-07-21 PROCEDURE — 3078F DIAST BP <80 MM HG: CPT | Mod: CPTII,HCNC,S$GLB, | Performed by: FAMILY MEDICINE

## 2025-07-21 PROCEDURE — 1125F AMNT PAIN NOTED PAIN PRSNT: CPT | Mod: CPTII,HCNC,S$GLB, | Performed by: FAMILY MEDICINE

## 2025-07-21 PROCEDURE — 3288F FALL RISK ASSESSMENT DOCD: CPT | Mod: CPTII,HCNC,S$GLB, | Performed by: FAMILY MEDICINE

## 2025-07-21 RX ORDER — TRAZODONE HYDROCHLORIDE 100 MG/1
100 TABLET ORAL NIGHTLY
Qty: 90 TABLET | Refills: 0 | Status: SHIPPED | OUTPATIENT
Start: 2025-07-21

## 2025-07-21 NOTE — TELEPHONE ENCOUNTER
No care due was identified.  [facility] Embedded Care Due Messages. Reference number: [id number].   7/21/2025 5:15:18 PM CDT

## 2025-07-22 ENCOUNTER — PATIENT MESSAGE (OUTPATIENT)
Dept: INTERNAL MEDICINE | Facility: CLINIC | Age: 75
End: 2025-07-22
Payer: MEDICARE

## 2025-07-22 ENCOUNTER — PATIENT MESSAGE (OUTPATIENT)
Dept: OTOLARYNGOLOGY | Facility: CLINIC | Age: 75
End: 2025-07-22
Payer: MEDICARE

## 2025-07-22 ENCOUNTER — OFFICE VISIT (OUTPATIENT)
Dept: OTOLARYNGOLOGY | Facility: CLINIC | Age: 75
End: 2025-07-22
Payer: MEDICARE

## 2025-07-22 VITALS — BODY MASS INDEX: 23.6 KG/M2 | WEIGHT: 125 LBS | HEIGHT: 61 IN

## 2025-07-22 DIAGNOSIS — R26.89 IMBALANCE: ICD-10-CM

## 2025-07-22 DIAGNOSIS — Z01.10 NORMAL EAR EXAM: Primary | ICD-10-CM

## 2025-07-22 DIAGNOSIS — H90.3 SENSORINEURAL HEARING LOSS (SNHL) OF BOTH EARS: ICD-10-CM

## 2025-07-22 LAB — HOLD SPECIMEN: NORMAL

## 2025-07-22 PROCEDURE — 1101F PT FALLS ASSESS-DOCD LE1/YR: CPT | Mod: CPTII,S$GLB,,

## 2025-07-22 PROCEDURE — 1126F AMNT PAIN NOTED NONE PRSNT: CPT | Mod: CPTII,S$GLB,,

## 2025-07-22 PROCEDURE — 99999 PR PBB SHADOW E&M-EST. PATIENT-LVL III: CPT | Mod: PBBFAC,HCNC,,

## 2025-07-22 PROCEDURE — 99213 OFFICE O/P EST LOW 20 MIN: CPT | Mod: S$GLB,,,

## 2025-07-22 PROCEDURE — 3288F FALL RISK ASSESSMENT DOCD: CPT | Mod: CPTII,S$GLB,,

## 2025-07-22 NOTE — TELEPHONE ENCOUNTER
Refill Decision Note   Renea Bourgeois  is requesting a refill authorization.  Brief Assessment and Rationale for Refill:  Approve     Medication Therapy Plan:         Comments:     Note composed:9:39 PM 07/21/2025

## 2025-07-22 NOTE — Clinical Note
Can we call this pt & schedule updated audio at her convenience? We discussed in clinic today but I forgot to have her schedule  Thank you

## 2025-07-22 NOTE — PROGRESS NOTES
"Subjective:   Patient ID: Renea Bourgeois is a 75 y.o. female.    Chief Complaint: Dizziness and Ear Fullness (Pt is coming in today for vertigo pt is c/o of ear fullness/pressure in right ear )    History of Present Illness    Patient presents today for evaluation of dizziness and nausea. She reports experiencing balance issues over the past month with intermittent episodes of momentary off-balance sensations lasting approximately one minute, requiring support from walls. These episodes differ from previous dizzy spells years ago as they lack spinning sensations. On Saturday she sheri to ER due to severe episode with intense nausea, vomiting, and significant vertigo, improved when closing eyes. She denies current hearing changes or intense spinning dizziness. She was discharged with Zofran 4mg and Meclizine 12.5mg. Urinalysis at the hospital revealed hematuria, which was followed up with Dr. Newton. Today, she reports significant hematuria.  She reports headaches for the past couple of months. Since Monday, she has developed severe neck pain that is tender to touch, affecting her sleep. While slightly improved, the pain continues to be bothersome.  She reports experiencing mild general weakness over the past week and has been resting. She states her blood sugars have been fluctuating.        Review of patient's allergies indicates:  No Known Allergies        Review of Systems   HENT:  Negative for ear discharge, hearing loss, sinus pressure, sinus pain and tinnitus.    Genitourinary:  Positive for dysuria and hematuria.   Neurological:  Positive for dizziness and light-headedness.         Objective:   Ht 5' 1" (1.549 m)   Wt 56.7 kg (125 lb)   LMP 01/01/2004 (Within Months)   BMI 23.62 kg/m²     Physical Exam  Constitutional:       General: She is not in acute distress.     Appearance: Normal appearance. She is not ill-appearing.   HENT:      Head: Normocephalic and atraumatic.      Right Ear: Tympanic membrane, " ear canal and external ear normal. There is no impacted cerumen.      Left Ear: Tympanic membrane, ear canal and external ear normal. There is no impacted cerumen.      Ears:      Comments: Mesa-Hallpike negative AU    Tymps   AS - pk0.7ml/1daPa; ECV 1.1ml  AD - pk 0.5ml/-3daPa; ECV 0.9ml       Nose: Nose normal. No congestion or rhinorrhea.      Mouth/Throat:      Mouth: Mucous membranes are moist.      Pharynx: Oropharynx is clear. Uvula midline. No oropharyngeal exudate or posterior oropharyngeal erythema.   Eyes:      Extraocular Movements: Extraocular movements intact.      Conjunctiva/sclera: Conjunctivae normal.      Pupils: Pupils are equal, round, and reactive to light.   Neck:      Thyroid: No thyroid mass.   Pulmonary:      Effort: Pulmonary effort is normal. No respiratory distress.   Musculoskeletal:         General: Normal range of motion.      Cervical back: Full passive range of motion without pain.   Lymphadenopathy:      Cervical: No cervical adenopathy.   Neurological:      General: No focal deficit present.      Mental Status: She is alert and oriented to person, place, and time.      Cranial Nerves: Cranial nerves 2-12 are intact.   Psychiatric:         Mood and Affect: Mood normal.         Behavior: Behavior normal. Behavior is cooperative.         Thought Content: Thought content normal.         Judgment: Judgment normal.          Labs: Reviewed labs from ER on 7/19/25    Imaging :   CT Head Without Contrast  Order: [id number]   Status: Final result       Next appt: 08/28/2025 at 11:30 AM in Lab ([facility])    Test Result Released: Yes (seen)    0 Result Notes  Details    Reading Physician Reading Date Result Priority   Juan A Mcclendon DO  [phone]  7/19/2025      Narrative & Impression  Exam: CT HEAD WITHOUT CONTRAST     Comparison: None     Clinical Indication:  Dizziness     Technique: Axial noncontrast images are acquired from the foramen magnum to the vertex.      Findings: No sagittal or coronal images were provided at time of this dictation.     No intra or extra-axial hemorrhage. No shift of the midline structures.     Gray-white differentiation is preserved throughout both cerebral hemispheres.     Bones of the calvarium and skull base are intact.     Visualized paranasal sinuses and mastoid air cells are clear.        Impression:   No acute intracranial abnormality.     All CT scans at [this location] are performed using dose modulation techniques as appropriate to a performed exam including the following: automated exposure control; adjustment of the mA and/or kV according to patient size (this includes techniques or standardized protocols for targeted exams where dose is matched to indication / reason for exam; i.e. extremities or head); use of iterative reconstruction technique.     Finalized on: 7/19/2025 8:05 PM By:  Juan A Mcclendon  [facility]# [id number]      2025-07-19 20:07:[id number]     [facility]        Exam Ended: 07/19/25 19:37 CDT Last Resulted: 07/19/25 20:05 CDT      Contains abnormal data Urinalysis Microscopic  Order: [id number] - Reflex for Order [id number]   Status: Final result       Next appt: 08/28/2025 at 11:30 AM in Lab ([facility])       Dx: Hematuria, unspecified type    Test Result Released: Yes (seen)    0 Result Notes            Component  Ref Range & Units (hover) 1 d ago  (7/21/25) 3 d ago  (7/19/25) 11 yr ago  (8/20/13) 12 yr ago  (4/24/13) 12 yr ago  (4/8/13) 12 yr ago  (4/5/13) 12 yr ago  (3/27/13)   RBC, UA >100 High  6 High  40-60 Abnormal  R 0-1 R   20-25 Abnormal  R   WBC, UA 12 High  2 3-5 R 0-1 R 38-40 Abnormal  R 1-4 R 25-30 Abnormal  R   Bacteria, UA Few Abnormal     Rare R Few R Marked Abnormal  R   Squamous Epithelial Cells, UA 6 2 Few R Rare R <1 R 2 R 6 R   Microscopic Comment  CM        Comment: Other formed elements not mentioned in the report are not present in the microscopic examination.   Hyaline Casts, UA  3 High  R         Bilirubin (UA)    Negative R, CM Negative R, CM Negative R, CM Negative R, CM   Occult Blood UA    Trace Abnormal  R 1+ Abnormal  R Negative R 1+ Abnormal  R   Nitrite, UA    Negative R Negative R Negative R Positive Abnormal  R   Leukocytes, UA    Negative R 2+ Abnormal  R Negative R Trace Abnormal  R   Glitter Cells, UA     Marked Abnormal  R     Ca Oxalate Celeste, UA      Few R    Amorphous, UA      Few R    Specimen UA    Cath Cath Voided Voided   Color, UA    Yellow R, CM Straw R, CM Yellow R, CM Yellow R, CM   Appearance, UA    Clear R Hazy Abnormal  R Clear R Cloudy Abnormal  R   pH, UA    7.5 R 6.0 R 6.5 R 6.5 R   Specific Gravity, UA    1.005 R 1.020 R 1.020 R 1.020 R   Protein, UA    Negative R Negative R Negative R Trace Abnormal  R   Glucose, UA    Negative R Negative R Negative R Negative R   Ketones, UA    Negative R Negative R Negative R Negative R   Resulting Agency [facility] L[facility]B LISLLB LISLLB          Assessment/Plan:     1. Normal ear exam    2. Imbalance          Normal ear exam    Imbalance  -     Ambulatory referral/consult to ENT        Assessment & Plan    I had a long discussion with the patient regarding their symptoms.  Dizziness, vertigo and disequilibrium are common symptoms reported by adults during visits to their doctors. They are all symptoms that can result from a peripheral vestibular disorder (a dysfunction of the balance organs of the inner ear) or central vestibular disorder (a dysfunction of one or more parts of the central nervous system that help process balance and spatial information).  There are also non-vestibular causes of dizziness, and dizziness can be linked to a wide array of problems such as blood-flow irregularities from cardiovascular problems and blood pressure fluctuations. Low suspicion for vestibular etiology for lightheadedness, no true spinning dizziness which she has had in the past. She is most concerned with blood in her urine currently. We  discussed option for me to place a referral for VRT to help with balance and minimize fall risk, but she would like to hold off on that. She will please let me know if her symptoms change or worsen and we can order additional inner ear testing. Normal tympanograms today.     Recommend she only take meclizine for severe episodes of dizziness as it can weaken vestibular system if used for long periods of time.     Schedule updated audiogram at patient's convenience.          This note was generated with the assistance of ambient listening technology. Verbal consent was obtained by the patient and accompanying visitor(s) for the recording of patient appointment to facilitate this note. I attest to having reviewed and edited the generated note for accuracy, though some syntax or spelling errors may persist. Please contact the author of this note for any clarification.

## 2025-07-23 ENCOUNTER — TELEPHONE (OUTPATIENT)
Dept: UROLOGY | Facility: CLINIC | Age: 75
End: 2025-07-23

## 2025-07-23 ENCOUNTER — LAB VISIT (OUTPATIENT)
Dept: LAB | Facility: HOSPITAL | Age: 75
End: 2025-07-23
Attending: NURSE PRACTITIONER
Payer: MEDICARE

## 2025-07-23 ENCOUNTER — OFFICE VISIT (OUTPATIENT)
Dept: UROLOGY | Facility: CLINIC | Age: 75
End: 2025-07-23
Payer: MEDICARE

## 2025-07-23 VITALS
BODY MASS INDEX: 24.52 KG/M2 | HEIGHT: 61 IN | SYSTOLIC BLOOD PRESSURE: 187 MMHG | WEIGHT: 129.88 LBS | HEART RATE: 65 BPM | RESPIRATION RATE: 16 BRPM | DIASTOLIC BLOOD PRESSURE: 80 MMHG

## 2025-07-23 DIAGNOSIS — R31.9 HEMATURIA, UNSPECIFIED TYPE: ICD-10-CM

## 2025-07-23 PROBLEM — H53.2 DIPLOPIA: Status: RESOLVED | Noted: 2020-12-04 | Resolved: 2025-07-23

## 2025-07-23 PROBLEM — J30.9 ALLERGIC RHINITIS: Status: RESOLVED | Noted: 2017-11-14 | Resolved: 2025-07-23

## 2025-07-23 PROBLEM — H93.13 TINNITUS, BILATERAL: Status: RESOLVED | Noted: 2023-01-28 | Resolved: 2025-07-23

## 2025-07-23 LAB
BACTERIA UR CULT: NORMAL
CREAT SERPL-MCNC: 0.8 MG/DL (ref 0.5–1.4)
GFR SERPLBLD CREATININE-BSD FMLA CKD-EPI: >60 ML/MIN/1.73/M2

## 2025-07-23 PROCEDURE — 82565 ASSAY OF CREATININE: CPT

## 2025-07-23 PROCEDURE — 36415 COLL VENOUS BLD VENIPUNCTURE: CPT

## 2025-07-23 PROCEDURE — 99999 PR PBB SHADOW E&M-EST. PATIENT-LVL V: CPT | Mod: PBBFAC,,, | Performed by: NURSE PRACTITIONER

## 2025-07-23 NOTE — TELEPHONE ENCOUNTER
Outgoing call placed to patient in regards to renal function labs. Verified name and , patient's results were released. Verbalized understanding, no further questions at this time.          ----- Message from Doreen Yeung NP sent at 2025  2:46 PM CDT -----  Please call patient, does not use the patient portal, and inform her that renal function labs are normal.  ----- Message -----  From: Lab, Background User  Sent: 2025   2:35 PM CDT  To: Doreen Yeung NP

## 2025-07-23 NOTE — PROGRESS NOTES
Chief Complaint:   Microhematuria    HPI:   Patient is a 75-year-old female that is presenting with microhematuria.  Has had microhematuria for years, no workup.  Denies gross hematuria has a remote history of renal stones.  Denies flank pain.  No  cancers in her family patient has never smoked cigarettes unable to give urine sample.  PVR 14 mL  Allergies:  Patient has no known allergies.    Medications:  has a current medication list which includes the following prescription(s): accu-chek nancy plus test strp, acetaminophen, alcohol prep pads, calcium-vitamin d, cholecalciferol (vitamin d3), esomeprazole magnesium, gabapentin, glimepiride, lancets, lancing device, magnesium aspartate hcl, meclizine, methocarbamol, metoprolol succinate, olmesartan, ondansetron, potassium, rosuvastatin, trazodone, and turmeric.    Review of Systems:  General: No fever, chills, fatigability, or weight loss.  Skin: No rashes, itching, or changes in color or texture of skin.  Chest: Denies BLANCA, cyanosis, wheezing, cough, and sputum production.  Abdomen: Appetite fine. No weight loss. Denies diarrhea, abdominal pain, hematemesis, or blood in stool.  Musculoskeletal: No joint stiffness or swelling. Denies back pain.  : As above.  All other review of systems negative.    PMH:   has a past medical history of Allergy, Anemia (11/14/2017), Angina pectoris, Arthritis, Breast cancer, Cataract, Chondromalacia of right patella (03/26/2018), Diabetes mellitus type II (2011), DM (diabetes mellitus) (2011), DM (diabetes mellitus) (2010), Elevated BP without diagnosis of hypertension (11/27/2018), GERD (gastroesophageal reflux disease), History of colon polyps (02/21/2018), History of renal calculi (08/11/2015), Hyperlipidemia, Hypertension, Kidney stone, Malignant neoplasm of upper-outer quadrant of right breast in female, estrogen receptor positive (07/23/2018), Malignant neoplasm of upper-outer quadrant of right breast in female, estrogen  receptor positive, Meniere disease, Nuclear sclerosis of both eyes (10/05/2015), Osteopenia, Osteoporosis (04/12/2019), Pseudophakia (10/15/2015), PVC (premature ventricular contraction), Refractive error (10/05/2015), Trouble in sleeping, and Type 2 diabetes mellitus (2010).    PSH:   has a past surgical history that includes Tonsillectomy (1959); toenail edges removed; Dilation and curettage of uterus (10/2010); Cholecystectomy (1989); Nasal septal deviation repair (2009); Breast lumpectomy (02/11/2011); Total Reduction Mammoplasty (Left); Cyst Removal (Left, 11/2017); Colonoscopy (N/A, 02/22/2018); Breast biopsy; Gallbladder surgery; Cataract extraction (Bilateral, 10/14/2015); Esophagogastroduodenoscopy (N/A, 06/29/2020); Hand surgery (Left, 2015); Eye surgery (2012); Tubal ligation (1981); Uvulopalatoplasty; Arthroplasty of joint of finger (Right, 01/05/2023); and Colonoscopy (N/A, 02/24/2023).    FamHx: family history includes Alzheimer's disease in her mother; Atrial fibrillation in her son; Cancer in her brother, brother, paternal grandfather, and another family member; Cancer (age of onset: 65) in her father; Cataracts in her brother and sister; Diabetes in her father; Glaucoma in her maternal grandmother; Heart disease in her son; Hypertension in her father; Parkinsonism in her brother; Psoriasis in her cousin; Stroke in her brother and father.    SocHx:  reports that she has never smoked. She has never used smokeless tobacco. She reports that she does not drink alcohol and does not use drugs.      Physical Exam:  Vitals:    07/23/25 1329   BP: (!) 187/80   Pulse: 65   Resp: 16     General: A&Ox3, no apparent distress, no deformities  Neck: No masses, normal thyroid  Lungs: normal inspiration, no use of accessory muscles  Heart: normal pulse, no arrhythmias  Abdomen: Soft, NT, ND, no masses, no hernias, no hepatosplenomegaly  Lymphatic: Neck and groin nodes negative  Skin: The skin is warm and dry. No  jaundice.  Ext: No c/c/e.      Labs/Studies:   See HPI  Impression/Plan:   Microhematuria  I reviewed the definition of microscopic hematuria which is greater than a 3 red blood cells per high-power field and the etiology of microscopic hematuria with the patient including benign conditions such as kidney, ureteral, and bladder stones, kidney and bladder infections, large prostates(if she were male), but also noted that the most concerning etiology that we must rule out a is a renal or bladder neoplasm.  I reviewed the typical workup of microscopic hematuria which includes an imaging study, typically a CT urogram, and an office cystoscopy. We will move forward with scheduling a CT urogram with a office cystoscopy to follow.      F/u 1-2 weeks with CT urogram and office cystoscopy

## 2025-07-29 NOTE — PROGRESS NOTES
Subjective:       Patient ID: Renea Bourgeois is a 75 y.o. female.    Chief Complaint: Headache      History of Present Illness    - Patient presents for follow-up after recent urgent care and ER visits, with persistent symptoms including neck pain, muscle discomfort, dizziness, and general malaise.  - Patient reports feeling unwell following recent evaluations at urgent care and the ER. She describes her current state as suboptimal and difficult to articulate fully. She has persistent neck pain and muscle discomfort, particularly unilateral, causing significant distress. She reports a general sense of systemic discomfort.  - Her symptoms have not worsened since the ER visit but persist. She notes slight improvement but remains significantly below her desired state of health, with a lack of motivation for activities. She reports severe dizziness, causing visual disturbances and necessitating eye closure due to vertigo.  - She began vomiting on Saturday, affecting her hydration. Prior to that, she had maintained adequate hydration. Resting, particularly remaining sedentary throughout the day, alleviates her symptoms. She states this is the most unwell she has felt in a considerable time.  - Her recent medical history includes evaluations at urgent care and the ER, where she underwent various tests including a chest CT, chest XR, labs, and a urine test. The urine test revealed microscopic hematuria. She is awaiting an appointment with an ENT specialist as recommended by the ER.  - She denies feeling depressed or having anxiety.  Patient is otherwise without concerns today.    MEDICATIONS:  - Meclizine, prescribed for dizziness (not yet filled)    TEST RESULTS:  - Urine test: Recent (in ER), showed microscopic hematuria  - Blood work: Recent (in ER), within normal limits    IMAGING:  - Chest XR: Recent, unremarkable  - CT Chest: Recent, negative    ROS:  General: -fever, -chills, -fatigue, -weight gain, -weight  "loss  Eyes: -eye pain, -vision change  ENMT: -hearing loss, -ear pain, -nasal congestion, -rhinorrhea, -dental problem, -trouble swallowing  Cardiovascular: -chest pain, -palpitations, -lower extremity edema  Respiratory: -cough, -trouble breathing  Gastrointestinal: -abdominal pain, -abdominal swelling, -nausea, +VOMITING, -diarrhea, -constipation, -blood in stool  Genitourinary: -difficulty urinating, -genital problem  Musculoskeletal: -joint pain, +MUSCLE ACHES  Integumentary: -rash  Lymphatics: -swollen glands, -easy bruising  Neurologic: -headache, +DIZZINESS, -numbness, +WEAKNESS  Psychiatric: -anxiety, -depression, -sleep difficulty  Neck: +NECK PAIN     Objective:     Vitals:    07/21/25 0930   BP: 124/62   Pulse: 74   Temp: 97.6 °F (36.4 °C)   TempSrc: Tympanic   SpO2: 98%   Weight: 56.7 kg (125 lb)   Height: 5' 1" (1.549 m)          Physical Exam  Vitals reviewed.   Constitutional:       General: She is not in acute distress.     Appearance: She is well-developed.   HENT:      Head: Normocephalic and atraumatic.   Eyes:      General: Lids are normal. No scleral icterus.     Extraocular Movements: Extraocular movements intact.      Conjunctiva/sclera: Conjunctivae normal.      Pupils: Pupils are equal, round, and reactive to light.   Cardiovascular:      Rate and Rhythm: Normal rate and regular rhythm.      Heart sounds: No murmur heard.     No friction rub. No gallop.   Pulmonary:      Effort: Pulmonary effort is normal.      Breath sounds: Normal breath sounds. No decreased breath sounds, wheezing, rhonchi or rales.   Neurological:      Mental Status: She is alert and oriented to person, place, and time.      Cranial Nerves: No cranial nerve deficit.      Gait: Gait normal.   Psychiatric:         Mood and Affect: Mood and affect normal.           Assessment:       1. Hematuria, unspecified type    2. Vertigo        Plan:   1. Hematuria, unspecified type  -     Urinalysis, Reflex to Urine Culture Urine, " Clean Catch  -     GREY TOP URINE HOLD  -     Urinalysis Microscopic  -     Urine culture    2. Vertigo  -     Ambulatory referral/consult to ENT; Future; Expected date: 07/28/2025      Assessment & Plan    CERVICAL RADICULOPATHY:   Patient reports neck pain and muscle discomfort on one side.   Upon evaluation, patient feels slightly better but not fully recovered.   Discussed stress as a potentially contributing factor to persistent symptoms including neck pain and general malaise.    BENIGN PAROXYSMAL VERTIGO:   Patient experiences dizziness and spinning sensation, reporting inability to see during episodes.   Prescribed meclizine for dizziness, but patient has not yet filled the prescription.   Recommend rest as it helps alleviate symptoms.   ENT referral placed.    HEMATURIA:   Previous urine test showed microscopic hematuria.   Ordered repeat urine test.    VIRAL INFECTION:   Discussed possibility of viral infection as cause of symptoms.   Explained importance of rest, hydration, and avoiding overexertion for recovery.    FOLLOW-UP AND GENERAL CARE:   Referred patient to ENT for further evaluation.   Reviewed negative chest CT and chest XR results, as well as recent urgent care and ER visits.   Follow up in October for annual physical and lab work as scheduled.   Patient advised to follow up sooner if needed.       Patient expressed understanding and agreement with plan.    Follow up if symptoms worsen or fail to improve, for keep routine follow up.    This note was generated with the assistance of ambient listening technology. Verbal consent was obtained by the patient and accompanying visitor(s) for the recording of patient appointment to facilitate this note. I attest to having reviewed and edited the generated note for accuracy, though some syntax or spelling errors may persist. Please contact the author of this note for any clarification.

## 2025-08-01 ENCOUNTER — PATIENT OUTREACH (OUTPATIENT)
Dept: ADMINISTRATIVE | Facility: HOSPITAL | Age: 75
End: 2025-08-01
Payer: MEDICARE

## 2025-08-01 VITALS — DIASTOLIC BLOOD PRESSURE: 74 MMHG | SYSTOLIC BLOOD PRESSURE: 119 MMHG

## 2025-08-12 ENCOUNTER — HOSPITAL ENCOUNTER (OUTPATIENT)
Dept: RADIOLOGY | Facility: HOSPITAL | Age: 75
Discharge: HOME | End: 2025-08-12
Attending: NURSE PRACTITIONER
Payer: MEDICARE

## 2025-08-12 DIAGNOSIS — R31.9 HEMATURIA, UNSPECIFIED TYPE: ICD-10-CM

## 2025-08-12 PROCEDURE — 74178 CT ABD&PLV WO CNTR FLWD CNTR: CPT | Mod: TC,HCNC

## 2025-08-12 PROCEDURE — 25500020 PHARM REV CODE 255: Mod: HCNC | Performed by: NURSE PRACTITIONER

## 2025-08-12 PROCEDURE — 74178 CT ABD&PLV WO CNTR FLWD CNTR: CPT | Mod: 26,HCNC,, | Performed by: RADIOLOGY

## 2025-08-12 RX ADMIN — IOHEXOL 100 ML: 350 INJECTION, SOLUTION INTRAVENOUS at 03:08

## 2025-08-19 ENCOUNTER — PROCEDURE VISIT (OUTPATIENT)
Dept: UROLOGY | Facility: CLINIC | Age: 75
End: 2025-08-19
Payer: MEDICARE

## 2025-08-19 VITALS
DIASTOLIC BLOOD PRESSURE: 67 MMHG | RESPIRATION RATE: 16 BRPM | WEIGHT: 129.88 LBS | TEMPERATURE: 99 F | HEIGHT: 61 IN | BODY MASS INDEX: 24.52 KG/M2 | SYSTOLIC BLOOD PRESSURE: 132 MMHG | HEART RATE: 60 BPM

## 2025-08-19 DIAGNOSIS — R31.9 HEMATURIA, UNSPECIFIED TYPE: Primary | ICD-10-CM

## 2025-08-19 LAB
BILIRUBIN, UA POC OHS: ABNORMAL
BLOOD, UA POC OHS: ABNORMAL
CLARITY, UA POC OHS: ABNORMAL
COLOR, UA POC OHS: YELLOW
GLUCOSE, UA POC OHS: NEGATIVE
KETONES, UA POC OHS: ABNORMAL
LEUKOCYTES, UA POC OHS: ABNORMAL
NITRITE, UA POC OHS: NEGATIVE
PH, UA POC OHS: 6
PROTEIN, UA POC OHS: ABNORMAL
SPECIFIC GRAVITY, UA POC OHS: 1.02
UROBILINOGEN, UA POC OHS: 0.2

## 2025-08-19 PROCEDURE — 87086 URINE CULTURE/COLONY COUNT: CPT | Mod: HCNC | Performed by: UROLOGY

## 2025-08-19 PROCEDURE — 88112 CYTOPATH CELL ENHANCE TECH: CPT | Mod: TC,HCNC | Performed by: UROLOGY

## 2025-08-19 PROCEDURE — 52000 CYSTOURETHROSCOPY: CPT | Mod: HCNC,S$GLB,, | Performed by: UROLOGY

## 2025-08-19 PROCEDURE — 81003 URINALYSIS AUTO W/O SCOPE: CPT | Mod: QW,HCNC,S$GLB, | Performed by: UROLOGY

## 2025-08-19 RX ORDER — CIPROFLOXACIN 500 MG/1
500 TABLET, FILM COATED ORAL
Status: COMPLETED | OUTPATIENT
Start: 2025-08-19 | End: 2025-08-19

## 2025-08-19 RX ORDER — LIDOCAINE HYDROCHLORIDE 20 MG/ML
JELLY TOPICAL
Status: COMPLETED | OUTPATIENT
Start: 2025-08-19 | End: 2025-08-19

## 2025-08-19 RX ADMIN — CIPROFLOXACIN 500 MG: 500 TABLET, FILM COATED ORAL at 02:08

## 2025-08-19 RX ADMIN — LIDOCAINE HYDROCHLORIDE: 20 JELLY TOPICAL at 02:08

## 2025-08-21 LAB
BACTERIA UR CULT: NO GROWTH
ESTROGEN SERPL-MCNC: ABNORMAL PG/ML
INSULIN SERPL-ACNC: ABNORMAL U[IU]/ML
LAB AP CLINICAL INFORMATION: ABNORMAL
LAB AP GROSS DESCRIPTION: ABNORMAL
LAB AP PERFORMING LOCATION(S): ABNORMAL
LAB AP URINE CYTOLOGY INTERPRETATION SPECIMEN 1: ABNORMAL

## 2025-08-26 ENCOUNTER — PATIENT OUTREACH (OUTPATIENT)
Dept: ADMINISTRATIVE | Facility: HOSPITAL | Age: 75
End: 2025-08-26
Payer: MEDICARE

## 2025-08-28 ENCOUNTER — LAB VISIT (OUTPATIENT)
Dept: LAB | Facility: HOSPITAL | Age: 75
End: 2025-08-28
Attending: INTERNAL MEDICINE
Payer: MEDICARE

## 2025-08-28 ENCOUNTER — PATIENT MESSAGE (OUTPATIENT)
Dept: ADMINISTRATIVE | Facility: OTHER | Age: 75
End: 2025-08-28
Payer: MEDICARE

## 2025-08-28 DIAGNOSIS — E55.9 VITAMIN D INSUFFICIENCY: ICD-10-CM

## 2025-08-28 DIAGNOSIS — Z51.81 MEDICATION MONITORING ENCOUNTER: ICD-10-CM

## 2025-08-28 LAB
25(OH)D3+25(OH)D2 SERPL-MCNC: 66 NG/ML (ref 30–96)
ALBUMIN SERPL BCP-MCNC: 4.3 G/DL (ref 3.5–5.2)
ALP SERPL-CCNC: 55 UNIT/L (ref 40–150)
ALT SERPL W/O P-5'-P-CCNC: 9 UNIT/L (ref 10–44)
ANION GAP (OHS): 10 MMOL/L (ref 8–16)
AST SERPL-CCNC: 18 UNIT/L (ref 11–45)
BILIRUB SERPL-MCNC: 0.4 MG/DL (ref 0.1–1)
BUN SERPL-MCNC: 8 MG/DL (ref 8–23)
CALCIUM SERPL-MCNC: 9.4 MG/DL (ref 8.7–10.5)
CHLORIDE SERPL-SCNC: 106 MMOL/L (ref 95–110)
CO2 SERPL-SCNC: 27 MMOL/L (ref 23–29)
CREAT SERPL-MCNC: 0.7 MG/DL (ref 0.5–1.4)
GFR SERPLBLD CREATININE-BSD FMLA CKD-EPI: >60 ML/MIN/1.73/M2
GLUCOSE SERPL-MCNC: 128 MG/DL (ref 70–110)
POTASSIUM SERPL-SCNC: 3.3 MMOL/L (ref 3.5–5.1)
PROT SERPL-MCNC: 6.7 GM/DL (ref 6–8.4)
SODIUM SERPL-SCNC: 143 MMOL/L (ref 136–145)

## 2025-08-28 PROCEDURE — 36415 COLL VENOUS BLD VENIPUNCTURE: CPT | Mod: HCNC

## 2025-08-28 PROCEDURE — 82306 VITAMIN D 25 HYDROXY: CPT | Mod: HCNC

## 2025-08-28 PROCEDURE — 82040 ASSAY OF SERUM ALBUMIN: CPT | Mod: HCNC

## 2025-09-04 ENCOUNTER — CLINICAL SUPPORT (OUTPATIENT)
Dept: AUDIOLOGY | Facility: CLINIC | Age: 75
End: 2025-09-04
Payer: MEDICARE

## 2025-09-04 ENCOUNTER — TELEPHONE (OUTPATIENT)
Dept: RHEUMATOLOGY | Facility: CLINIC | Age: 75
End: 2025-09-04
Payer: MEDICARE

## 2025-09-04 ENCOUNTER — OFFICE VISIT (OUTPATIENT)
Dept: RHEUMATOLOGY | Facility: CLINIC | Age: 75
End: 2025-09-04
Payer: MEDICARE

## 2025-09-04 VITALS
WEIGHT: 127 LBS | DIASTOLIC BLOOD PRESSURE: 72 MMHG | BODY MASS INDEX: 23.98 KG/M2 | HEIGHT: 61 IN | SYSTOLIC BLOOD PRESSURE: 137 MMHG | HEART RATE: 58 BPM

## 2025-09-04 DIAGNOSIS — H90.3 SENSORINEURAL HEARING LOSS (SNHL), BILATERAL: Primary | ICD-10-CM

## 2025-09-04 DIAGNOSIS — M81.0 AGE-RELATED OSTEOPOROSIS WITHOUT CURRENT PATHOLOGICAL FRACTURE: Primary | ICD-10-CM

## 2025-09-04 DIAGNOSIS — H93.13 TINNITUS, BILATERAL: ICD-10-CM

## 2025-09-04 DIAGNOSIS — E55.9 VITAMIN D INSUFFICIENCY: ICD-10-CM

## 2025-09-04 DIAGNOSIS — Z79.83 LONG TERM USE OF BISPHOSPHONATES: ICD-10-CM

## 2025-09-04 DIAGNOSIS — Z51.81 MEDICATION MONITORING ENCOUNTER: ICD-10-CM

## 2025-09-04 DIAGNOSIS — Z71.89 COUNSELING ON HEALTH PROMOTION AND DISEASE PREVENTION: ICD-10-CM

## 2025-09-04 PROCEDURE — 99999 PR PBB SHADOW E&M-EST. PATIENT-LVL I: CPT | Mod: PBBFAC,HCNC,, | Performed by: AUDIOLOGIST-HEARING AID FITTER

## 2025-09-04 PROCEDURE — 99999 PR PBB SHADOW E&M-EST. PATIENT-LVL IV: CPT | Mod: PBBFAC,HCNC,, | Performed by: INTERNAL MEDICINE

## 2025-09-04 PROCEDURE — 92557 COMPREHENSIVE HEARING TEST: CPT | Mod: HCNC,S$GLB,, | Performed by: AUDIOLOGIST-HEARING AID FITTER

## 2025-09-04 PROCEDURE — 92567 TYMPANOMETRY: CPT | Mod: HCNC,S$GLB,, | Performed by: AUDIOLOGIST-HEARING AID FITTER

## 2025-09-04 RX ORDER — SODIUM CHLORIDE 0.9 % (FLUSH) 0.9 %
10 SYRINGE (ML) INJECTION
OUTPATIENT
Start: 2025-09-04

## 2025-09-04 RX ORDER — ZOLEDRONIC ACID 5 MG/100ML
5 INJECTION, SOLUTION INTRAVENOUS
OUTPATIENT
Start: 2025-09-04 | End: 2025-09-04

## 2025-09-04 RX ORDER — HEPARIN 100 UNIT/ML
500 SYRINGE INTRAVENOUS
OUTPATIENT
Start: 2025-09-04

## (undated) DEVICE — SEE MEDLINE ITEM 146298

## (undated) DEVICE — SEE MEDLINE ITEM 157117

## (undated) DEVICE — DRAPE HAND STERILE

## (undated) DEVICE — GAUZE SPONGE 4X4 12PLY

## (undated) DEVICE — UNDERGLOVES BIOGEL PI SIZE 7.5

## (undated) DEVICE — PACK BASIC SETUP SC BR

## (undated) DEVICE — GLOVE BIOGEL SZ 8 1/2

## (undated) DEVICE — SEE MEDLINE ITEM 152622

## (undated) DEVICE — BANDAGE DERMACEA STRETCH 4X1IN

## (undated) DEVICE — SPLINT FIBERGLASS PAD 3X15

## (undated) DEVICE — SUT 4-0 ETHILON 18 PS-2

## (undated) DEVICE — DRAPE PLASTIC U 60X72

## (undated) DEVICE — DRAPE U SPLIT SHEET 54X76IN

## (undated) DEVICE — GOWN POLY REINF BRTH SLV XL

## (undated) DEVICE — BANDAGE ELASTIC 3X5 VELCRO ST

## (undated) DEVICE — GOWN NONREINF SET-IN SLV 2XL

## (undated) DEVICE — GLOVE PROTEXIS LTX MICRO 6.5

## (undated) DEVICE — MANIFOLD 4 PORT

## (undated) DEVICE — COVER LIGHT HANDLE 80/CA

## (undated) DEVICE — SEE MEDLINE ITEM 157027

## (undated) DEVICE — UNDERGLOVES BIOGEL PI SZ 7 LF

## (undated) DEVICE — SYR 10CC LUER LOCK

## (undated) DEVICE — APPLICATOR CHLORAPREP ORN 26ML

## (undated) DEVICE — PAD CAST SPECIALIST STRL 4

## (undated) DEVICE — ALCOHOL 70% ISOP RUBBING 4OZ

## (undated) DEVICE — DRESSING N ADH OIL EMUL 3X3

## (undated) DEVICE — SEE MEDLINE ITEM 152529

## (undated) DEVICE — COVER CAMERA OPERATING ROOM

## (undated) DEVICE — BLADE SURG #15 CARBON STEEL

## (undated) DEVICE — DURAPREP SURG SCRUB 26ML

## (undated) DEVICE — UNDERGLOVES BIOGEL PI SIZE 8.5

## (undated) DEVICE — ELECTRODE REM PLYHSV RETURN 9

## (undated) DEVICE — SUPPORT ULNA NERVE PROTECTOR

## (undated) DEVICE — GLOVE SURGICAL LATEX SZ 7

## (undated) DEVICE — DRESSING XEROFORM FOIL PK 1X8

## (undated) DEVICE — LINER GLOVE POWDERFREE SZ 6.5

## (undated) DEVICE — DRAPE SURG W/TWL 17 5/8X23

## (undated) DEVICE — NDL HYPO 27G X 1 1/2

## (undated) DEVICE — COVER OVERHEAD SURG LT BLUE

## (undated) DEVICE — PAD ABD 8X10 STERILE

## (undated) DEVICE — SCRUB HIBICLENS 4% CHG 4OZ

## (undated) DEVICE — BANDAGE CONFORM 3IN STRL

## (undated) DEVICE — POSITIONER HEAD DONUT 9IN FOAM

## (undated) DEVICE — SEE MEDLINE ITEM 146308

## (undated) DEVICE — SOL 9P NACL IRR PIC IL

## (undated) DEVICE — DRAPE THREE-QTR REINF 53X77IN

## (undated) DEVICE — DRAPE MOBILE C-ARM

## (undated) DEVICE — CORD BIPOLAR ELECTROSURGICAL